# Patient Record
Sex: MALE | Race: WHITE | URBAN - NONMETROPOLITAN AREA
[De-identification: names, ages, dates, MRNs, and addresses within clinical notes are randomized per-mention and may not be internally consistent; named-entity substitution may affect disease eponyms.]

---

## 2017-01-10 ENCOUNTER — DOCTOR'S OFFICE (OUTPATIENT)
Dept: URBAN - NONMETROPOLITAN AREA CLINIC 1 | Facility: CLINIC | Age: 82
Setting detail: OPHTHALMOLOGY
End: 2017-01-10
Payer: COMMERCIAL

## 2017-01-10 DIAGNOSIS — H35.3124: ICD-10-CM

## 2017-01-10 DIAGNOSIS — H35.3211: ICD-10-CM

## 2017-01-10 PROCEDURE — 92134 CPTRZ OPH DX IMG PST SGM RTA: CPT | Performed by: OPHTHALMOLOGY

## 2017-01-10 PROCEDURE — 67028 INJECTION EYE DRUG: CPT | Performed by: OPHTHALMOLOGY

## 2017-01-10 PROCEDURE — 99024 POSTOP FOLLOW-UP VISIT: CPT | Performed by: OPHTHALMOLOGY

## 2017-01-10 RX ORDER — ONDANSETRON 4 MG/1
4 TABLET, FILM COATED ORAL EVERY 8 HOURS PRN
COMMUNITY
End: 2018-06-05 | Stop reason: ALTCHOICE

## 2017-01-10 RX ORDER — COLESEVELAM 180 1/1
625 TABLET ORAL 2 TIMES DAILY WITH MEALS
Status: ON HOLD | COMMUNITY
End: 2018-08-03 | Stop reason: ALTCHOICE

## 2017-01-10 ASSESSMENT — REFRACTION_AUTOREFRACTION
OD_CYLINDER: -1.00
OD_SPHERE: -1.00
OD_AXIS: 087
OS_AXIS: 074
OS_SPHERE: +0.25
OS_CYLINDER: -1.75

## 2017-01-10 ASSESSMENT — REFRACTION_OUTSIDERX
OS_VA1: 20/100-1
OD_ADD: +2.75
OS_VA2: 20/80
OD_VA1: 20/40
OS_SPHERE: PLANO
OD_CYLINDER: -1.00
OD_SPHERE: -1.25
OS_ADD: +2.75
OD_VA2: 20/
OD_VA3: 20/
OS_AXIS: 035
OS_CYLINDER: -1.25
OD_VA3: 20/
OS_SPHERE: +1.50
OS_VA2: 20/
OD_VA1: 20/50-2
OS_AXIS: 075
OD_SPHERE: +1.50
OS_VA1: 20/200+1
OD_AXIS: 087
OD_VA2: 20/50-2
OD_AXIS: 087
OD_CYLINDER: -1.00
OU_VA: 20/
OS_VA3: 20/
OU_VA: 20/
OS_VA3: 20/
OS_CYLINDER: -1.00

## 2017-01-10 ASSESSMENT — SPHEQUIV_DERIVED
OS_SPHEQUIV: -0.625
OD_SPHEQUIV: -1.5

## 2017-01-10 ASSESSMENT — REFRACTION_MANIFEST
OD_VA3: 20/
OS_VA1: 20/
OD_VA2: 20/
OS_VA2: 20/
OS_VA3: 20/
OU_VA: 20/
OD_VA1: 20/

## 2017-01-10 ASSESSMENT — REFRACTION_CURRENTRX
OD_OVR_VA: 20/
OS_OVR_VA: 20/
OS_OVR_VA: 20/
OD_OVR_VA: 20/
OD_OVR_VA: 20/
OS_OVR_VA: 20/

## 2017-01-10 ASSESSMENT — CONFRONTATIONAL VISUAL FIELD TEST (CVF)
OD_FINDINGS: FULL
OS_FINDINGS: FULL

## 2017-01-10 ASSESSMENT — VISUAL ACUITY
OD_BCVA: 20/60-2
OS_BCVA: 20/60-1

## 2017-01-11 ENCOUNTER — APPOINTMENT (OUTPATIENT)
Dept: LAB | Facility: HOSPITAL | Age: 82
End: 2017-01-11
Attending: INTERNAL MEDICINE
Payer: MEDICARE

## 2017-01-11 ENCOUNTER — TRANSCRIBE ORDERS (OUTPATIENT)
Dept: ADMINISTRATIVE | Facility: HOSPITAL | Age: 82
End: 2017-01-11

## 2017-01-11 ENCOUNTER — ANESTHESIA EVENT (OUTPATIENT)
Dept: PERIOP | Facility: HOSPITAL | Age: 82
End: 2017-01-11
Payer: MEDICARE

## 2017-01-11 ENCOUNTER — APPOINTMENT (OUTPATIENT)
Dept: LAB | Facility: HOSPITAL | Age: 82
End: 2017-01-11
Attending: UROLOGY
Payer: MEDICARE

## 2017-01-11 DIAGNOSIS — R79.1 ABNORMAL COAGULATION PROFILE: ICD-10-CM

## 2017-01-11 DIAGNOSIS — D49.4 NEOPLASM OF BLADDER: ICD-10-CM

## 2017-01-11 LAB
ABO GROUP BLD: NORMAL
RH BLD: POSITIVE

## 2017-01-11 PROCEDURE — 85250 CLOT FACTOR IX PTC/CHRSTMAS: CPT

## 2017-01-11 PROCEDURE — 85705 THROMBOPLASTIN INHIBITION: CPT

## 2017-01-11 PROCEDURE — 85270 CLOT FACTOR XI PTA: CPT

## 2017-01-11 PROCEDURE — 85598 HEXAGNAL PHOSPH PLTLT NEUTRL: CPT

## 2017-01-11 PROCEDURE — 86900 BLOOD TYPING SEROLOGIC ABO: CPT | Performed by: UROLOGY

## 2017-01-11 PROCEDURE — 85613 RUSSELL VIPER VENOM DILUTED: CPT

## 2017-01-11 PROCEDURE — 85732 THROMBOPLASTIN TIME PARTIAL: CPT

## 2017-01-11 PROCEDURE — 85670 THROMBIN TIME PLASMA: CPT

## 2017-01-11 PROCEDURE — 86901 BLOOD TYPING SEROLOGIC RH(D): CPT | Performed by: UROLOGY

## 2017-01-11 PROCEDURE — 36415 COLL VENOUS BLD VENIPUNCTURE: CPT

## 2017-01-12 ENCOUNTER — HOSPITAL ENCOUNTER (OUTPATIENT)
Facility: HOSPITAL | Age: 82
Setting detail: OUTPATIENT SURGERY
Discharge: HOME | End: 2017-01-12
Attending: UROLOGY | Admitting: UROLOGY
Payer: MEDICARE

## 2017-01-12 ENCOUNTER — ANESTHESIA (OUTPATIENT)
Dept: PERIOP | Facility: HOSPITAL | Age: 82
End: 2017-01-12
Payer: MEDICARE

## 2017-01-12 VITALS
WEIGHT: 166 LBS | BODY MASS INDEX: 22.48 KG/M2 | HEIGHT: 72 IN | RESPIRATION RATE: 18 BRPM | DIASTOLIC BLOOD PRESSURE: 80 MMHG | TEMPERATURE: 97.8 F | OXYGEN SATURATION: 97 % | HEART RATE: 72 BPM | SYSTOLIC BLOOD PRESSURE: 142 MMHG

## 2017-01-12 DIAGNOSIS — D49.4 NEOPLASM OF BLADDER: ICD-10-CM

## 2017-01-12 PROCEDURE — 88307 TISSUE EXAM BY PATHOLOGIST: CPT | Performed by: UROLOGY

## 2017-01-12 PROCEDURE — 88305 TISSUE EXAM BY PATHOLOGIST: CPT | Performed by: UROLOGY

## 2017-01-12 RX ORDER — PHENAZOPYRIDINE HYDROCHLORIDE 100 MG/1
100 TABLET, FILM COATED ORAL 3 TIMES DAILY PRN
Qty: 30 TABLET | Refills: 0 | Status: SHIPPED | OUTPATIENT
Start: 2017-01-12 | End: 2018-06-05 | Stop reason: ALTCHOICE

## 2017-01-12 RX ORDER — ACETAMINOPHEN AND CODEINE PHOSPHATE 300; 30 MG/1; MG/1
1 TABLET ORAL EVERY 4 HOURS PRN
Qty: 25 TABLET | Refills: 0 | Status: SHIPPED | OUTPATIENT
Start: 2017-01-12 | End: 2018-06-05 | Stop reason: ALTCHOICE

## 2017-01-12 RX ORDER — PROMETHAZINE HYDROCHLORIDE 25 MG/ML
12.5 INJECTION, SOLUTION INTRAMUSCULAR; INTRAVENOUS ONCE AS NEEDED
Status: DISCONTINUED | OUTPATIENT
Start: 2017-01-12 | End: 2017-01-12 | Stop reason: HOSPADM

## 2017-01-12 RX ORDER — LIDOCAINE HYDROCHLORIDE 10 MG/ML
INJECTION, SOLUTION INFILTRATION; PERINEURAL AS NEEDED
Status: DISCONTINUED | OUTPATIENT
Start: 2017-01-12 | End: 2017-01-12 | Stop reason: SURG

## 2017-01-12 RX ORDER — TAMSULOSIN HYDROCHLORIDE 0.4 MG/1
0.4 CAPSULE ORAL
Qty: 30 CAPSULE | Refills: 0 | Status: SHIPPED | OUTPATIENT
Start: 2017-01-12 | End: 2018-06-05 | Stop reason: ALTCHOICE

## 2017-01-12 RX ORDER — MAGNESIUM HYDROXIDE 1200 MG/15ML
LIQUID ORAL AS NEEDED
Status: DISCONTINUED | OUTPATIENT
Start: 2017-01-12 | End: 2017-01-12 | Stop reason: HOSPADM

## 2017-01-12 RX ORDER — SODIUM CHLORIDE, SODIUM LACTATE, POTASSIUM CHLORIDE, CALCIUM CHLORIDE 600; 310; 30; 20 MG/100ML; MG/100ML; MG/100ML; MG/100ML
125 INJECTION, SOLUTION INTRAVENOUS CONTINUOUS
Status: DISCONTINUED | OUTPATIENT
Start: 2017-01-12 | End: 2017-01-12 | Stop reason: HOSPADM

## 2017-01-12 RX ORDER — AMLODIPINE BESYLATE 5 MG/1
5 TABLET ORAL DAILY
COMMUNITY
End: 2017-09-20 | Stop reason: ALTCHOICE

## 2017-01-12 RX ORDER — TAMSULOSIN HYDROCHLORIDE 0.4 MG/1
0.4 CAPSULE ORAL ONCE
Status: COMPLETED | OUTPATIENT
Start: 2017-01-12 | End: 2017-01-12

## 2017-01-12 RX ORDER — PHENAZOPYRIDINE HYDROCHLORIDE 100 MG/1
100 TABLET, FILM COATED ORAL ONCE
Status: COMPLETED | OUTPATIENT
Start: 2017-01-12 | End: 2017-01-12

## 2017-01-12 RX ORDER — ONDANSETRON 2 MG/ML
INJECTION INTRAMUSCULAR; INTRAVENOUS AS NEEDED
Status: DISCONTINUED | OUTPATIENT
Start: 2017-01-12 | End: 2017-01-12 | Stop reason: SURG

## 2017-01-12 RX ORDER — EPHEDRINE SULFATE 50 MG/ML
INJECTION, SOLUTION INTRAVENOUS AS NEEDED
Status: DISCONTINUED | OUTPATIENT
Start: 2017-01-12 | End: 2017-01-12 | Stop reason: SURG

## 2017-01-12 RX ORDER — MIDAZOLAM HYDROCHLORIDE 1 MG/ML
INJECTION INTRAMUSCULAR; INTRAVENOUS AS NEEDED
Status: DISCONTINUED | OUTPATIENT
Start: 2017-01-12 | End: 2017-01-12 | Stop reason: SURG

## 2017-01-12 RX ORDER — FENTANYL CITRATE 50 UG/ML
INJECTION, SOLUTION INTRAMUSCULAR; INTRAVENOUS AS NEEDED
Status: DISCONTINUED | OUTPATIENT
Start: 2017-01-12 | End: 2017-01-12 | Stop reason: SURG

## 2017-01-12 RX ORDER — CIPROFLOXACIN 500 MG/1
500 TABLET, FILM COATED ORAL 2 TIMES DAILY
Qty: 6 TABLET | Refills: 0 | Status: SHIPPED | OUTPATIENT
Start: 2017-01-12 | End: 2017-01-15

## 2017-01-12 RX ORDER — FENTANYL CITRATE/PF 50 MCG/ML
25 SYRINGE (ML) INJECTION
Status: DISCONTINUED | OUTPATIENT
Start: 2017-01-12 | End: 2017-01-12 | Stop reason: HOSPADM

## 2017-01-12 RX ORDER — METOCLOPRAMIDE HYDROCHLORIDE 5 MG/ML
10 INJECTION INTRAMUSCULAR; INTRAVENOUS ONCE AS NEEDED
Status: DISCONTINUED | OUTPATIENT
Start: 2017-01-12 | End: 2017-01-12 | Stop reason: HOSPADM

## 2017-01-12 RX ORDER — MEPERIDINE HYDROCHLORIDE 25 MG/ML
12.5 INJECTION INTRAMUSCULAR; INTRAVENOUS; SUBCUTANEOUS ONCE AS NEEDED
Status: DISCONTINUED | OUTPATIENT
Start: 2017-01-12 | End: 2017-01-12 | Stop reason: HOSPADM

## 2017-01-12 RX ORDER — PROPOFOL 10 MG/ML
INJECTION, EMULSION INTRAVENOUS AS NEEDED
Status: DISCONTINUED | OUTPATIENT
Start: 2017-01-12 | End: 2017-01-12 | Stop reason: SURG

## 2017-01-12 RX ADMIN — PHENAZOPYRIDINE HYDROCHLORIDE 100 MG: 100 TABLET ORAL at 11:24

## 2017-01-12 RX ADMIN — PROPOFOL 200 MG: 10 INJECTION, EMULSION INTRAVENOUS at 10:04

## 2017-01-12 RX ADMIN — MIDAZOLAM HYDROCHLORIDE 2 MG: 1 INJECTION, SOLUTION INTRAMUSCULAR; INTRAVENOUS at 10:01

## 2017-01-12 RX ADMIN — CEFAZOLIN SODIUM 1000 MG: 1 SOLUTION INTRAVENOUS at 10:08

## 2017-01-12 RX ADMIN — TAMSULOSIN HYDROCHLORIDE 0.4 MG: 0.4 CAPSULE ORAL at 11:24

## 2017-01-12 RX ADMIN — EPHEDRINE SULFATE 10 MG: 50 INJECTION, SOLUTION INTRAMUSCULAR; INTRAVENOUS; SUBCUTANEOUS at 10:27

## 2017-01-12 RX ADMIN — DEXAMETHASONE SODIUM PHOSPHATE 4 MG: 10 INJECTION INTRAMUSCULAR; INTRAVENOUS at 10:08

## 2017-01-12 RX ADMIN — ONDANSETRON HYDROCHLORIDE 4 MG: 2 INJECTION, SOLUTION INTRAVENOUS at 10:08

## 2017-01-12 RX ADMIN — SODIUM CHLORIDE, SODIUM LACTATE, POTASSIUM CHLORIDE, AND CALCIUM CHLORIDE 125 ML/HR: .6; .31; .03; .02 INJECTION, SOLUTION INTRAVENOUS at 08:53

## 2017-01-12 RX ADMIN — FENTANYL CITRATE 50 MCG: 50 INJECTION, SOLUTION INTRAMUSCULAR; INTRAVENOUS at 10:04

## 2017-01-12 RX ADMIN — LIDOCAINE HYDROCHLORIDE 50 MG: 10 INJECTION, SOLUTION INFILTRATION; PERINEURAL at 10:04

## 2017-01-12 RX ADMIN — EPHEDRINE SULFATE 10 MG: 50 INJECTION, SOLUTION INTRAMUSCULAR; INTRAVENOUS; SUBCUTANEOUS at 10:13

## 2017-01-12 RX ADMIN — EPHEDRINE SULFATE 10 MG: 50 INJECTION, SOLUTION INTRAMUSCULAR; INTRAVENOUS; SUBCUTANEOUS at 10:09

## 2017-01-14 LAB
FACT IX ACT/NOR PPP: 99 % (ref 60–177)
FACT XI ACT/NOR PPP: 77 % (ref 60–150)

## 2017-01-16 LAB
APTT HEX PL PPP: 22 SEC (ref 0–11)
APTT SCREEN TO CONFIRM RATIO: 1.02 RATIO (ref 0–1.4)
APTT-LA IMM 4:1 NP PPP: 49.5 SEC (ref 0–40.6)
CONFIRM APTT/NORMAL: 41.3 SEC (ref 0–55)
LA PPP-IMP: ABNORMAL
SCREEN APTT: 61.5 SEC (ref 0–40.6)
SCREEN DRVVT: 38.2 SEC (ref 0–44)
THROMBIN TIME: 16.6 SEC (ref 0–20.9)

## 2017-01-24 ENCOUNTER — ALLSCRIPTS OFFICE VISIT (OUTPATIENT)
Dept: OTHER | Facility: OTHER | Age: 82
End: 2017-01-24

## 2017-02-02 ENCOUNTER — ALLSCRIPTS OFFICE VISIT (OUTPATIENT)
Dept: OTHER | Facility: OTHER | Age: 82
End: 2017-02-02

## 2017-02-08 ENCOUNTER — DOCTOR'S OFFICE (OUTPATIENT)
Dept: URBAN - NONMETROPOLITAN AREA CLINIC 1 | Facility: CLINIC | Age: 82
Setting detail: OPHTHALMOLOGY
End: 2017-02-08

## 2017-02-08 ENCOUNTER — DOCTOR'S OFFICE (OUTPATIENT)
Dept: URBAN - NONMETROPOLITAN AREA CLINIC 1 | Facility: CLINIC | Age: 82
Setting detail: OPHTHALMOLOGY
End: 2017-02-08
Payer: COMMERCIAL

## 2017-02-08 DIAGNOSIS — H35.3124: ICD-10-CM

## 2017-02-08 DIAGNOSIS — H52.4: ICD-10-CM

## 2017-02-08 DIAGNOSIS — H31.002: ICD-10-CM

## 2017-02-08 DIAGNOSIS — H35.3211: ICD-10-CM

## 2017-02-08 DIAGNOSIS — H33.021: ICD-10-CM

## 2017-02-08 DIAGNOSIS — H43.393: ICD-10-CM

## 2017-02-08 PROCEDURE — 92134 CPTRZ OPH DX IMG PST SGM RTA: CPT | Performed by: OPHTHALMOLOGY

## 2017-02-08 PROCEDURE — 99024 POSTOP FOLLOW-UP VISIT: CPT | Performed by: OPHTHALMOLOGY

## 2017-02-08 PROCEDURE — 67028 INJECTION EYE DRUG: CPT | Performed by: OPHTHALMOLOGY

## 2017-02-08 PROCEDURE — VITAEYE VITA EYE VITAMINS: Performed by: OPHTHALMOLOGY

## 2017-02-08 ASSESSMENT — REFRACTION_OUTSIDERX
OS_CYLINDER: -1.00
OD_ADD: +2.75
OU_VA: 20/
OS_ADD: +2.75
OU_VA: 20/
OD_AXIS: 087
OS_SPHERE: +1.50
OD_VA3: 20/
OS_AXIS: 075
OS_CYLINDER: -1.25
OD_SPHERE: -1.25
OD_SPHERE: +1.50
OS_VA2: 20/
OD_VA2: 20/
OD_AXIS: 087
OD_VA3: 20/
OS_SPHERE: PLANO
OD_VA2: 20/50-2
OS_AXIS: 035
OS_VA3: 20/
OD_CYLINDER: -1.00
OD_VA1: 20/40
OD_VA1: 20/50-2
OS_VA3: 20/
OS_VA1: 20/200+1
OD_CYLINDER: -1.00
OS_VA2: 20/80
OS_VA1: 20/100-1

## 2017-02-08 ASSESSMENT — REFRACTION_AUTOREFRACTION
OD_SPHERE: -1.00
OS_SPHERE: +0.25
OS_CYLINDER: -1.75
OD_AXIS: 087
OD_CYLINDER: -1.00
OS_AXIS: 074

## 2017-02-08 ASSESSMENT — REFRACTION_MANIFEST
OD_VA1: 20/
OD_VA3: 20/
OS_VA1: 20/
OD_VA2: 20/
OS_VA3: 20/
OS_VA2: 20/
OU_VA: 20/

## 2017-02-08 ASSESSMENT — REFRACTION_CURRENTRX
OS_OVR_VA: 20/
OD_OVR_VA: 20/

## 2017-02-08 ASSESSMENT — CONFRONTATIONAL VISUAL FIELD TEST (CVF)
OD_FINDINGS: FULL
OS_FINDINGS: FULL

## 2017-02-08 ASSESSMENT — SPHEQUIV_DERIVED
OD_SPHEQUIV: -1.5
OS_SPHEQUIV: -0.625

## 2017-02-08 ASSESSMENT — VISUAL ACUITY
OS_BCVA: 20/100-1
OD_BCVA: 20/80-1

## 2017-02-15 ENCOUNTER — DOCTOR'S OFFICE (OUTPATIENT)
Dept: URBAN - NONMETROPOLITAN AREA CLINIC 1 | Facility: CLINIC | Age: 82
Setting detail: OPHTHALMOLOGY
End: 2017-02-15
Payer: COMMERCIAL

## 2017-02-15 DIAGNOSIS — H35.3211: ICD-10-CM

## 2017-02-15 PROCEDURE — 92134 CPTRZ OPH DX IMG PST SGM RTA: CPT | Performed by: OPHTHALMOLOGY

## 2017-02-15 PROCEDURE — 99024 POSTOP FOLLOW-UP VISIT: CPT | Performed by: OPHTHALMOLOGY

## 2017-02-15 PROCEDURE — 67028 INJECTION EYE DRUG: CPT | Performed by: OPHTHALMOLOGY

## 2017-02-15 ASSESSMENT — REFRACTION_AUTOREFRACTION
OS_SPHERE: +0.25
OS_CYLINDER: -1.75
OD_SPHERE: -1.00
OD_AXIS: 087
OS_AXIS: 074
OD_CYLINDER: -1.00

## 2017-02-15 ASSESSMENT — SPHEQUIV_DERIVED
OD_SPHEQUIV: -1.5
OS_SPHEQUIV: -0.625

## 2017-02-15 ASSESSMENT — VISUAL ACUITY
OD_BCVA: 20/80-1
OS_BCVA: 20/100-2

## 2017-02-28 ENCOUNTER — DOCTOR'S OFFICE (OUTPATIENT)
Dept: URBAN - NONMETROPOLITAN AREA CLINIC 1 | Facility: CLINIC | Age: 82
Setting detail: OPHTHALMOLOGY
End: 2017-02-28
Payer: COMMERCIAL

## 2017-02-28 DIAGNOSIS — H43.393: ICD-10-CM

## 2017-02-28 DIAGNOSIS — H33.021: ICD-10-CM

## 2017-02-28 DIAGNOSIS — H35.3211: ICD-10-CM

## 2017-02-28 DIAGNOSIS — H35.3124: ICD-10-CM

## 2017-02-28 PROCEDURE — 92134 CPTRZ OPH DX IMG PST SGM RTA: CPT | Performed by: OPHTHALMOLOGY

## 2017-02-28 PROCEDURE — 67028 INJECTION EYE DRUG: CPT | Performed by: OPHTHALMOLOGY

## 2017-02-28 PROCEDURE — 99024 POSTOP FOLLOW-UP VISIT: CPT | Performed by: OPHTHALMOLOGY

## 2017-02-28 ASSESSMENT — REFRACTION_CURRENTRX
OD_OVR_VA: 20/
OD_OVR_VA: 20/
OS_OVR_VA: 20/
OS_OVR_VA: 20/
OD_OVR_VA: 20/
OS_OVR_VA: 20/

## 2017-02-28 ASSESSMENT — CONFRONTATIONAL VISUAL FIELD TEST (CVF)
OD_FINDINGS: FULL
OS_FINDINGS: FULL

## 2017-02-28 ASSESSMENT — REFRACTION_MANIFEST
OD_VA2: 20/
OS_VA1: 20/
OD_VA1: 20/
OD_VA3: 20/
OU_VA: 20/
OD_VA1: 20/
OU_VA: 20/
OS_VA3: 20/
OS_VA2: 20/
OS_VA2: 20/
OD_VA2: 20/
OS_VA3: 20/
OU_VA: 20/
OS_VA3: 20/
OS_VA2: 20/
OD_VA1: 20/
OS_VA1: 20/
OD_VA3: 20/
OS_VA1: 20/
OD_VA2: 20/
OD_VA3: 20/

## 2017-02-28 ASSESSMENT — REFRACTION_AUTOREFRACTION
OD_SPHERE: -1.00
OD_AXIS: 087
OD_CYLINDER: -1.00
OS_AXIS: 074
OS_SPHERE: +0.25
OS_CYLINDER: -1.75

## 2017-02-28 ASSESSMENT — VISUAL ACUITY
OD_BCVA: 20/80-1
OS_BCVA: 20/100-2

## 2017-02-28 ASSESSMENT — SPHEQUIV_DERIVED
OD_SPHEQUIV: -1.5
OS_SPHEQUIV: -0.625

## 2017-03-02 ENCOUNTER — DOCTOR'S OFFICE (OUTPATIENT)
Dept: URBAN - NONMETROPOLITAN AREA CLINIC 1 | Facility: CLINIC | Age: 82
Setting detail: OPHTHALMOLOGY
End: 2017-03-02
Payer: COMMERCIAL

## 2017-03-02 DIAGNOSIS — H33.021: ICD-10-CM

## 2017-03-02 DIAGNOSIS — H35.3211: ICD-10-CM

## 2017-03-02 PROCEDURE — 92083 EXTENDED VISUAL FIELD XM: CPT | Performed by: OPHTHALMOLOGY

## 2017-03-06 ENCOUNTER — DOCTOR'S OFFICE (OUTPATIENT)
Dept: URBAN - NONMETROPOLITAN AREA CLINIC 1 | Facility: CLINIC | Age: 82
Setting detail: OPHTHALMOLOGY
End: 2017-03-06

## 2017-03-06 ENCOUNTER — GENERIC CONVERSION - ENCOUNTER (OUTPATIENT)
Dept: OTHER | Facility: OTHER | Age: 82
End: 2017-03-06

## 2017-03-06 DIAGNOSIS — H35.3211: ICD-10-CM

## 2017-03-06 PROCEDURE — NO CHARGE N/C PROFESSIONAL COURTESY: Performed by: OPHTHALMOLOGY

## 2017-03-06 ASSESSMENT — SPHEQUIV_DERIVED
OD_SPHEQUIV: -1.5
OS_SPHEQUIV: -0.625

## 2017-03-06 ASSESSMENT — REFRACTION_MANIFEST
OD_VA3: 20/
OS_VA2: 20/
OD_VA1: 20/
OS_VA2: 20/
OS_VA1: 20/
OS_VA1: 20/
OD_VA2: 20/
OS_VA2: 20/
OS_VA1: 20/
OD_VA2: 20/
OD_VA1: 20/
OS_VA3: 20/
OD_VA1: 20/
OD_VA3: 20/
OU_VA: 20/
OS_VA3: 20/
OU_VA: 20/
OS_VA3: 20/
OD_VA3: 20/
OU_VA: 20/
OD_VA2: 20/

## 2017-03-06 ASSESSMENT — REFRACTION_CURRENTRX
OD_OVR_VA: 20/
OS_OVR_VA: 20/
OD_OVR_VA: 20/
OS_OVR_VA: 20/
OD_OVR_VA: 20/
OS_OVR_VA: 20/

## 2017-03-06 ASSESSMENT — REFRACTION_AUTOREFRACTION
OS_AXIS: 074
OD_SPHERE: -1.00
OD_CYLINDER: -1.00
OS_SPHERE: +0.25
OD_AXIS: 087
OS_CYLINDER: -1.75

## 2017-03-06 ASSESSMENT — VISUAL ACUITY
OD_BCVA: 20/80-1
OS_BCVA: 20/100-2

## 2017-03-22 ENCOUNTER — ALLSCRIPTS OFFICE VISIT (OUTPATIENT)
Dept: OTHER | Facility: OTHER | Age: 82
End: 2017-03-22

## 2017-03-22 ENCOUNTER — DOCTOR'S OFFICE (OUTPATIENT)
Dept: URBAN - NONMETROPOLITAN AREA CLINIC 1 | Facility: CLINIC | Age: 82
Setting detail: OPHTHALMOLOGY
End: 2017-03-22
Payer: COMMERCIAL

## 2017-03-22 DIAGNOSIS — H33.021: ICD-10-CM

## 2017-03-22 DIAGNOSIS — H43.393: ICD-10-CM

## 2017-03-22 DIAGNOSIS — H35.3211: ICD-10-CM

## 2017-03-22 DIAGNOSIS — H35.3124: ICD-10-CM

## 2017-03-22 PROCEDURE — 99024 POSTOP FOLLOW-UP VISIT: CPT | Performed by: OPHTHALMOLOGY

## 2017-03-22 PROCEDURE — 92134 CPTRZ OPH DX IMG PST SGM RTA: CPT | Performed by: OPHTHALMOLOGY

## 2017-03-22 PROCEDURE — 67028 INJECTION EYE DRUG: CPT | Performed by: OPHTHALMOLOGY

## 2017-03-22 ASSESSMENT — REFRACTION_AUTOREFRACTION
OD_SPHERE: -1.00
OS_SPHERE: +0.25
OS_AXIS: 074
OS_CYLINDER: -1.75
OD_AXIS: 087
OD_CYLINDER: -1.00

## 2017-03-22 ASSESSMENT — CONFRONTATIONAL VISUAL FIELD TEST (CVF)
OD_FINDINGS: FULL
OS_FINDINGS: FULL

## 2017-03-22 ASSESSMENT — REFRACTION_MANIFEST
OD_VA1: 20/
OS_VA1: 20/
OD_VA1: 20/
OD_VA3: 20/
OD_VA2: 20/
OS_VA3: 20/
OS_VA2: 20/
OS_VA1: 20/
OU_VA: 20/
OS_VA1: 20/
OU_VA: 20/
OD_VA3: 20/
OD_VA2: 20/
OS_VA2: 20/
OS_VA3: 20/
OD_VA2: 20/
OS_VA2: 20/
OD_VA3: 20/
OS_VA3: 20/
OD_VA1: 20/
OU_VA: 20/

## 2017-03-22 ASSESSMENT — REFRACTION_CURRENTRX
OS_OVR_VA: 20/
OS_OVR_VA: 20/
OD_OVR_VA: 20/
OS_OVR_VA: 20/
OD_OVR_VA: 20/
OD_OVR_VA: 20/

## 2017-03-22 ASSESSMENT — VISUAL ACUITY
OD_BCVA: 20/80-1
OS_BCVA: 20/100-1

## 2017-03-22 ASSESSMENT — SPHEQUIV_DERIVED
OD_SPHEQUIV: -1.5
OS_SPHEQUIV: -0.625

## 2017-04-12 ENCOUNTER — DOCTOR'S OFFICE (OUTPATIENT)
Dept: URBAN - NONMETROPOLITAN AREA CLINIC 1 | Facility: CLINIC | Age: 82
Setting detail: OPHTHALMOLOGY
End: 2017-04-12
Payer: COMMERCIAL

## 2017-04-12 DIAGNOSIS — H35.3211: ICD-10-CM

## 2017-04-12 PROCEDURE — 67028 INJECTION EYE DRUG: CPT | Performed by: OPHTHALMOLOGY

## 2017-04-12 ASSESSMENT — VISUAL ACUITY
OD_BCVA: 20/80-1
OS_BCVA: 20/100-1

## 2017-04-12 ASSESSMENT — REFRACTION_AUTOREFRACTION
OD_CYLINDER: -1.00
OS_CYLINDER: -1.75
OS_AXIS: 074
OS_SPHERE: +0.25
OD_SPHERE: -1.00
OD_AXIS: 087

## 2017-04-12 ASSESSMENT — SPHEQUIV_DERIVED
OD_SPHEQUIV: -1.5
OS_SPHEQUIV: -0.625

## 2017-05-08 ENCOUNTER — DOCTOR'S OFFICE (OUTPATIENT)
Dept: URBAN - NONMETROPOLITAN AREA CLINIC 1 | Facility: CLINIC | Age: 82
Setting detail: OPHTHALMOLOGY
End: 2017-05-08

## 2017-05-08 DIAGNOSIS — H52.4: ICD-10-CM

## 2017-05-08 PROCEDURE — VITAEYE VITA EYE VITAMINS: Performed by: OPHTHALMOLOGY

## 2017-05-09 ENCOUNTER — DOCTOR'S OFFICE (OUTPATIENT)
Dept: URBAN - NONMETROPOLITAN AREA CLINIC 1 | Facility: CLINIC | Age: 82
Setting detail: OPHTHALMOLOGY
End: 2017-05-09
Payer: COMMERCIAL

## 2017-05-09 DIAGNOSIS — H43.393: ICD-10-CM

## 2017-05-09 DIAGNOSIS — H35.3231: ICD-10-CM

## 2017-05-09 DIAGNOSIS — H33.021: ICD-10-CM

## 2017-05-09 DIAGNOSIS — H31.002: ICD-10-CM

## 2017-05-09 DIAGNOSIS — H27.8: ICD-10-CM

## 2017-05-09 PROCEDURE — 67028 INJECTION EYE DRUG: CPT | Performed by: OPHTHALMOLOGY

## 2017-05-09 PROCEDURE — 92134 CPTRZ OPH DX IMG PST SGM RTA: CPT | Performed by: OPHTHALMOLOGY

## 2017-05-09 PROCEDURE — 92014 COMPRE OPH EXAM EST PT 1/>: CPT | Performed by: OPHTHALMOLOGY

## 2017-05-09 ASSESSMENT — REFRACTION_MANIFEST
OD_VA3: 20/
OU_VA: 20/
OU_VA: 20/
OS_VA3: 20/
OS_VA3: 20/
OD_VA1: 20/
OD_VA2: 20/
OS_VA1: 20/
OS_VA1: 20/
OD_VA1: 20/
OS_VA2: 20/
OD_VA3: 20/
OU_VA: 20/
OS_VA1: 20/
OD_VA2: 20/
OS_VA2: 20/
OS_VA3: 20/
OD_VA1: 20/
OD_VA3: 20/
OD_VA2: 20/
OS_VA2: 20/

## 2017-05-09 ASSESSMENT — REFRACTION_AUTOREFRACTION
OD_SPHERE: -1.00
OS_CYLINDER: -1.75
OS_AXIS: 074
OD_AXIS: 087
OD_CYLINDER: -1.00
OS_SPHERE: +0.25

## 2017-05-09 ASSESSMENT — REFRACTION_CURRENTRX
OD_OVR_VA: 20/
OS_OVR_VA: 20/
OD_OVR_VA: 20/
OS_OVR_VA: 20/
OD_OVR_VA: 20/
OS_OVR_VA: 20/

## 2017-05-09 ASSESSMENT — CONFRONTATIONAL VISUAL FIELD TEST (CVF)
OD_FINDINGS: FULL
OS_FINDINGS: FULL

## 2017-05-09 ASSESSMENT — SPHEQUIV_DERIVED
OD_SPHEQUIV: -1.5
OS_SPHEQUIV: -0.625

## 2017-05-09 ASSESSMENT — VISUAL ACUITY
OD_BCVA: 20/80+2
OS_BCVA: 20/100

## 2017-05-24 ENCOUNTER — DOCTOR'S OFFICE (OUTPATIENT)
Dept: URBAN - NONMETROPOLITAN AREA CLINIC 1 | Facility: CLINIC | Age: 82
Setting detail: OPHTHALMOLOGY
End: 2017-05-24
Payer: COMMERCIAL

## 2017-05-24 DIAGNOSIS — H33.021: ICD-10-CM

## 2017-05-24 DIAGNOSIS — H31.002: ICD-10-CM

## 2017-05-24 DIAGNOSIS — H27.8: ICD-10-CM

## 2017-05-24 DIAGNOSIS — H43.393: ICD-10-CM

## 2017-05-24 DIAGNOSIS — H35.3231: ICD-10-CM

## 2017-05-24 PROCEDURE — 67028 INJECTION EYE DRUG: CPT | Performed by: OPHTHALMOLOGY

## 2017-05-24 PROCEDURE — 92014 COMPRE OPH EXAM EST PT 1/>: CPT | Performed by: OPHTHALMOLOGY

## 2017-05-24 PROCEDURE — 92134 CPTRZ OPH DX IMG PST SGM RTA: CPT | Performed by: OPHTHALMOLOGY

## 2017-05-24 ASSESSMENT — REFRACTION_MANIFEST
OD_VA2: 20/
OS_VA3: 20/
OU_VA: 20/
OS_VA3: 20/
OS_VA1: 20/
OD_VA1: 20/
OD_VA2: 20/
OS_VA2: 20/
OD_VA1: 20/
OD_VA3: 20/
OU_VA: 20/
OS_VA3: 20/
OU_VA: 20/
OS_VA1: 20/
OD_VA3: 20/
OS_VA2: 20/
OS_VA1: 20/
OD_VA1: 20/
OD_VA2: 20/
OD_VA3: 20/
OS_VA2: 20/

## 2017-05-24 ASSESSMENT — REFRACTION_CURRENTRX
OD_OVR_VA: 20/
OD_OVR_VA: 20/
OS_OVR_VA: 20/
OD_OVR_VA: 20/
OS_OVR_VA: 20/
OS_OVR_VA: 20/

## 2017-05-24 ASSESSMENT — REFRACTION_AUTOREFRACTION
OS_SPHERE: +0.25
OS_AXIS: 074
OD_CYLINDER: -1.00
OS_CYLINDER: -1.75
OD_AXIS: 087
OD_SPHERE: -1.00

## 2017-05-24 ASSESSMENT — VISUAL ACUITY
OS_BCVA: 20/150-1
OD_BCVA: 20/80-1

## 2017-05-24 ASSESSMENT — CONFRONTATIONAL VISUAL FIELD TEST (CVF)
OD_FINDINGS: FULL
OS_FINDINGS: FULL

## 2017-05-24 ASSESSMENT — SPHEQUIV_DERIVED
OS_SPHEQUIV: -0.625
OD_SPHEQUIV: -1.5

## 2017-05-30 ENCOUNTER — ALLSCRIPTS OFFICE VISIT (OUTPATIENT)
Dept: OTHER | Facility: OTHER | Age: 82
End: 2017-05-30

## 2017-06-07 ENCOUNTER — DOCTOR'S OFFICE (OUTPATIENT)
Dept: URBAN - NONMETROPOLITAN AREA CLINIC 1 | Facility: CLINIC | Age: 82
Setting detail: OPHTHALMOLOGY
End: 2017-06-07
Payer: COMMERCIAL

## 2017-06-07 DIAGNOSIS — H43.393: ICD-10-CM

## 2017-06-07 DIAGNOSIS — H35.3231: ICD-10-CM

## 2017-06-07 DIAGNOSIS — H27.8: ICD-10-CM

## 2017-06-07 DIAGNOSIS — H31.002: ICD-10-CM

## 2017-06-07 DIAGNOSIS — H33.021: ICD-10-CM

## 2017-06-07 PROCEDURE — 67028 INJECTION EYE DRUG: CPT | Performed by: OPHTHALMOLOGY

## 2017-06-07 PROCEDURE — 92134 CPTRZ OPH DX IMG PST SGM RTA: CPT | Performed by: OPHTHALMOLOGY

## 2017-06-07 PROCEDURE — 92014 COMPRE OPH EXAM EST PT 1/>: CPT | Performed by: OPHTHALMOLOGY

## 2017-06-07 ASSESSMENT — REFRACTION_AUTOREFRACTION
OD_CYLINDER: -1.00
OS_SPHERE: +0.25
OS_CYLINDER: -1.75
OD_AXIS: 087
OS_AXIS: 074
OD_SPHERE: -1.00

## 2017-06-07 ASSESSMENT — REFRACTION_CURRENTRX
OS_OVR_VA: 20/
OD_OVR_VA: 20/
OS_OVR_VA: 20/
OD_OVR_VA: 20/
OS_OVR_VA: 20/
OD_OVR_VA: 20/

## 2017-06-07 ASSESSMENT — REFRACTION_MANIFEST
OD_VA3: 20/
OU_VA: 20/
OS_VA3: 20/
OD_VA3: 20/
OS_VA2: 20/
OS_VA3: 20/
OS_VA3: 20/
OS_VA1: 20/
OD_VA2: 20/
OD_VA2: 20/
OS_VA1: 20/
OD_VA1: 20/
OU_VA: 20/
OS_VA2: 20/
OD_VA3: 20/
OS_VA2: 20/
OD_VA1: 20/
OD_VA1: 20/
OD_VA2: 20/
OU_VA: 20/
OS_VA1: 20/

## 2017-06-07 ASSESSMENT — SPHEQUIV_DERIVED
OD_SPHEQUIV: -1.5
OS_SPHEQUIV: -0.625

## 2017-06-07 ASSESSMENT — CONFRONTATIONAL VISUAL FIELD TEST (CVF)
OS_FINDINGS: FULL
OD_FINDINGS: FULL

## 2017-06-07 ASSESSMENT — VISUAL ACUITY
OD_BCVA: 20/100
OS_BCVA: 20/150-1

## 2017-06-09 ENCOUNTER — DOCTOR'S OFFICE (OUTPATIENT)
Dept: URBAN - NONMETROPOLITAN AREA CLINIC 1 | Facility: CLINIC | Age: 82
Setting detail: OPHTHALMOLOGY
End: 2017-06-09
Payer: COMMERCIAL

## 2017-06-09 ENCOUNTER — RX ONLY (RX ONLY)
Age: 82
End: 2017-06-09

## 2017-06-09 DIAGNOSIS — H35.3231: ICD-10-CM

## 2017-06-09 DIAGNOSIS — S05.01XA: ICD-10-CM

## 2017-06-09 PROCEDURE — 92012 INTRM OPH EXAM EST PATIENT: CPT | Performed by: OPHTHALMOLOGY

## 2017-06-09 ASSESSMENT — DRY EYES - PHYSICIAN NOTES: OD_GENERALCOMMENTS: INFERIORLY

## 2017-06-09 ASSESSMENT — SPHEQUIV_DERIVED
OS_SPHEQUIV: -0.625
OD_SPHEQUIV: -1.5

## 2017-06-09 ASSESSMENT — REFRACTION_MANIFEST
OD_VA1: 20/
OS_VA1: 20/
OS_VA3: 20/
OD_VA1: 20/
OS_VA2: 20/
OS_VA2: 20/
OD_VA2: 20/
OU_VA: 20/
OD_VA2: 20/
OS_VA3: 20/
OD_VA3: 20/
OS_VA3: 20/
OD_VA2: 20/
OU_VA: 20/
OS_VA1: 20/
OU_VA: 20/
OD_VA1: 20/
OD_VA3: 20/
OS_VA2: 20/
OD_VA3: 20/
OS_VA1: 20/

## 2017-06-09 ASSESSMENT — REFRACTION_AUTOREFRACTION
OS_CYLINDER: -1.75
OS_AXIS: 074
OS_SPHERE: +0.25
OD_AXIS: 087
OD_SPHERE: -1.00
OD_CYLINDER: -1.00

## 2017-06-09 ASSESSMENT — VISUAL ACUITY
OS_BCVA: CF X3FT
OD_BCVA: 20/100

## 2017-06-09 ASSESSMENT — REFRACTION_CURRENTRX
OS_OVR_VA: 20/
OD_OVR_VA: 20/
OS_OVR_VA: 20/
OS_OVR_VA: 20/
OD_OVR_VA: 20/
OD_OVR_VA: 20/

## 2017-06-09 ASSESSMENT — SUPERFICIAL PUNCTATE KERATITIS (SPK): OD_SPK: 2+

## 2017-06-09 ASSESSMENT — CORNEAL TRAUMA - ABRASION: OD_ABRASION: PRESENT

## 2017-06-21 ENCOUNTER — DOCTOR'S OFFICE (OUTPATIENT)
Dept: URBAN - NONMETROPOLITAN AREA CLINIC 1 | Facility: CLINIC | Age: 82
Setting detail: OPHTHALMOLOGY
End: 2017-06-21
Payer: COMMERCIAL

## 2017-06-21 ENCOUNTER — RX ONLY (RX ONLY)
Age: 82
End: 2017-06-21

## 2017-06-21 DIAGNOSIS — H35.3231: ICD-10-CM

## 2017-06-21 DIAGNOSIS — H33.021: ICD-10-CM

## 2017-06-21 DIAGNOSIS — H43.393: ICD-10-CM

## 2017-06-21 PROBLEM — S05.01XA CORNEAL ABRASION WITHOUT FB; RIGHT EYE INITIAL ENCOUNTER: Status: RESOLVED | Noted: 2017-06-09 | Resolved: 2017-06-21

## 2017-06-21 PROCEDURE — 92014 COMPRE OPH EXAM EST PT 1/>: CPT | Performed by: OPHTHALMOLOGY

## 2017-06-21 PROCEDURE — 67028 INJECTION EYE DRUG: CPT | Performed by: OPHTHALMOLOGY

## 2017-06-21 PROCEDURE — 92134 CPTRZ OPH DX IMG PST SGM RTA: CPT | Performed by: OPHTHALMOLOGY

## 2017-06-21 ASSESSMENT — CONFRONTATIONAL VISUAL FIELD TEST (CVF)
OD_FINDINGS: FULL
OS_FINDINGS: FULL

## 2017-06-21 ASSESSMENT — REFRACTION_MANIFEST
OU_VA: 20/
OU_VA: 20/
OD_VA2: 20/
OS_VA1: 20/
OS_VA2: 20/
OS_VA1: 20/
OS_VA3: 20/
OS_VA1: 20/
OD_VA3: 20/
OS_VA2: 20/
OD_VA1: 20/
OS_VA3: 20/
OD_VA2: 20/
OD_VA1: 20/
OS_VA3: 20/
OD_VA3: 20/
OS_VA2: 20/
OD_VA1: 20/
OD_VA2: 20/
OU_VA: 20/
OD_VA3: 20/

## 2017-06-21 ASSESSMENT — REFRACTION_AUTOREFRACTION
OS_SPHERE: +0.25
OD_CYLINDER: -1.00
OS_AXIS: 074
OD_AXIS: 087
OD_SPHERE: -1.00
OS_CYLINDER: -1.75

## 2017-06-21 ASSESSMENT — VISUAL ACUITY
OS_BCVA: 20/150+1
OD_BCVA: 20/60+2

## 2017-06-21 ASSESSMENT — DRY EYES - PHYSICIAN NOTES: OD_GENERALCOMMENTS: INFERIORLY

## 2017-06-21 ASSESSMENT — REFRACTION_CURRENTRX
OD_OVR_VA: 20/
OS_OVR_VA: 20/
OS_OVR_VA: 20/
OD_OVR_VA: 20/
OS_OVR_VA: 20/
OD_OVR_VA: 20/

## 2017-06-21 ASSESSMENT — SPHEQUIV_DERIVED
OD_SPHEQUIV: -1.5
OS_SPHEQUIV: -0.625

## 2017-06-21 ASSESSMENT — SUPERFICIAL PUNCTATE KERATITIS (SPK): OD_SPK: 2+

## 2017-06-21 ASSESSMENT — CORNEAL TRAUMA - ABRASION: OD_ABRASION: PRESENT

## 2017-07-12 ENCOUNTER — DOCTOR'S OFFICE (OUTPATIENT)
Dept: URBAN - NONMETROPOLITAN AREA CLINIC 1 | Facility: CLINIC | Age: 82
Setting detail: OPHTHALMOLOGY
End: 2017-07-12
Payer: COMMERCIAL

## 2017-07-12 DIAGNOSIS — H33.021: ICD-10-CM

## 2017-07-12 DIAGNOSIS — H35.3231: ICD-10-CM

## 2017-07-12 DIAGNOSIS — H43.393: ICD-10-CM

## 2017-07-12 PROCEDURE — 67028 INJECTION EYE DRUG: CPT | Performed by: OPHTHALMOLOGY

## 2017-07-12 PROCEDURE — 92014 COMPRE OPH EXAM EST PT 1/>: CPT | Performed by: OPHTHALMOLOGY

## 2017-07-12 PROCEDURE — 92134 CPTRZ OPH DX IMG PST SGM RTA: CPT | Performed by: OPHTHALMOLOGY

## 2017-07-12 ASSESSMENT — REFRACTION_MANIFEST
OS_VA3: 20/
OD_VA3: 20/
OD_VA2: 20/
OD_VA3: 20/
OS_VA2: 20/
OS_VA1: 20/
OS_VA2: 20/
OS_VA3: 20/
OD_VA2: 20/
OS_VA3: 20/
OS_VA2: 20/
OU_VA: 20/
OD_VA2: 20/
OD_VA3: 20/
OD_VA1: 20/
OU_VA: 20/
OD_VA1: 20/
OD_VA1: 20/
OS_VA1: 20/
OU_VA: 20/
OS_VA1: 20/

## 2017-07-12 ASSESSMENT — SPHEQUIV_DERIVED
OD_SPHEQUIV: -1.5
OS_SPHEQUIV: -0.625

## 2017-07-12 ASSESSMENT — REFRACTION_AUTOREFRACTION
OS_SPHERE: +0.25
OS_AXIS: 074
OD_CYLINDER: -1.00
OD_AXIS: 087
OS_CYLINDER: -1.75
OD_SPHERE: -1.00

## 2017-07-12 ASSESSMENT — SUPERFICIAL PUNCTATE KERATITIS (SPK): OD_SPK: 2+

## 2017-07-12 ASSESSMENT — CORNEAL TRAUMA - ABRASION: OD_ABRASION: PRESENT

## 2017-07-12 ASSESSMENT — DRY EYES - PHYSICIAN NOTES: OD_GENERALCOMMENTS: INFERIORLY

## 2017-07-12 ASSESSMENT — REFRACTION_CURRENTRX
OD_OVR_VA: 20/
OS_OVR_VA: 20/
OD_OVR_VA: 20/
OD_OVR_VA: 20/

## 2017-07-12 ASSESSMENT — CONFRONTATIONAL VISUAL FIELD TEST (CVF)
OD_FINDINGS: FULL
OS_FINDINGS: FULL

## 2017-07-12 ASSESSMENT — VISUAL ACUITY
OS_BCVA: 20/150
OD_BCVA: 20/50

## 2017-08-02 ENCOUNTER — APPOINTMENT (OUTPATIENT)
Dept: RADIOLOGY | Facility: MEDICAL CENTER | Age: 82
End: 2017-08-02
Payer: MEDICARE

## 2017-08-02 ENCOUNTER — DOCTOR'S OFFICE (OUTPATIENT)
Dept: URBAN - NONMETROPOLITAN AREA CLINIC 1 | Facility: CLINIC | Age: 82
Setting detail: OPHTHALMOLOGY
End: 2017-08-02
Payer: COMMERCIAL

## 2017-08-02 ENCOUNTER — GENERIC CONVERSION - ENCOUNTER (OUTPATIENT)
Dept: OTHER | Facility: OTHER | Age: 82
End: 2017-08-02

## 2017-08-02 ENCOUNTER — TRANSCRIBE ORDERS (OUTPATIENT)
Dept: RADIOLOGY | Facility: MEDICAL CENTER | Age: 82
End: 2017-08-02

## 2017-08-02 DIAGNOSIS — H33.021: ICD-10-CM

## 2017-08-02 DIAGNOSIS — S99.929A INJURY OF FOOT: ICD-10-CM

## 2017-08-02 DIAGNOSIS — H31.002: ICD-10-CM

## 2017-08-02 DIAGNOSIS — H35.3231: ICD-10-CM

## 2017-08-02 DIAGNOSIS — E03.9 HYPOTHYROIDISM: ICD-10-CM

## 2017-08-02 DIAGNOSIS — I10 ESSENTIAL (PRIMARY) HYPERTENSION: ICD-10-CM

## 2017-08-02 DIAGNOSIS — M79.89 OTHER DISORDERS OF SOFT TISSUE: ICD-10-CM

## 2017-08-02 DIAGNOSIS — E78.5 HYPERLIPIDEMIA: ICD-10-CM

## 2017-08-02 DIAGNOSIS — H43.393: ICD-10-CM

## 2017-08-02 DIAGNOSIS — R63.4 ABNORMAL WEIGHT LOSS: ICD-10-CM

## 2017-08-02 DIAGNOSIS — M25.473 EFFUSION OF ANKLE: ICD-10-CM

## 2017-08-02 PROCEDURE — 67028 INJECTION EYE DRUG: CPT | Performed by: OPHTHALMOLOGY

## 2017-08-02 PROCEDURE — 92134 CPTRZ OPH DX IMG PST SGM RTA: CPT | Performed by: OPHTHALMOLOGY

## 2017-08-02 PROCEDURE — 92014 COMPRE OPH EXAM EST PT 1/>: CPT | Performed by: OPHTHALMOLOGY

## 2017-08-02 PROCEDURE — 73610 X-RAY EXAM OF ANKLE: CPT

## 2017-08-02 PROCEDURE — 73630 X-RAY EXAM OF FOOT: CPT

## 2017-08-02 ASSESSMENT — REFRACTION_MANIFEST
OU_VA: 20/
OD_VA3: 20/
OD_VA2: 20/
OS_VA3: 20/
OS_VA2: 20/
OD_VA2: 20/
OS_VA1: 20/
OS_VA1: 20/
OS_VA2: 20/
OS_VA1: 20/
OU_VA: 20/
OU_VA: 20/
OD_VA3: 20/
OD_VA1: 20/
OS_VA3: 20/
OD_VA2: 20/
OD_VA3: 20/
OS_VA3: 20/
OD_VA1: 20/
OS_VA2: 20/
OD_VA1: 20/

## 2017-08-02 ASSESSMENT — SPHEQUIV_DERIVED
OD_SPHEQUIV: -1.5
OS_SPHEQUIV: -0.625

## 2017-08-02 ASSESSMENT — REFRACTION_AUTOREFRACTION
OD_SPHERE: -1.00
OS_AXIS: 074
OD_CYLINDER: -1.00
OS_SPHERE: +0.25
OS_CYLINDER: -1.75
OD_AXIS: 087

## 2017-08-02 ASSESSMENT — CONFRONTATIONAL VISUAL FIELD TEST (CVF)
OD_FINDINGS: FULL
OS_FINDINGS: FULL

## 2017-08-02 ASSESSMENT — CORNEAL TRAUMA - ABRASION: OD_ABRASION: PRESENT

## 2017-08-02 ASSESSMENT — VISUAL ACUITY
OS_BCVA: 20/70-2
OD_BCVA: 20/60+1

## 2017-08-02 ASSESSMENT — REFRACTION_CURRENTRX
OS_OVR_VA: 20/
OS_OVR_VA: 20/
OD_OVR_VA: 20/
OS_OVR_VA: 20/

## 2017-08-02 ASSESSMENT — SUPERFICIAL PUNCTATE KERATITIS (SPK): OD_SPK: 2+

## 2017-08-02 ASSESSMENT — DRY EYES - PHYSICIAN NOTES: OD_GENERALCOMMENTS: INFERIORLY

## 2017-08-22 ENCOUNTER — DOCTOR'S OFFICE (OUTPATIENT)
Dept: URBAN - METROPOLITAN AREA CLINIC 136 | Facility: CLINIC | Age: 82
Setting detail: OPHTHALMOLOGY
End: 2017-08-22
Payer: COMMERCIAL

## 2017-08-22 DIAGNOSIS — H35.3231: ICD-10-CM

## 2017-08-22 DIAGNOSIS — H33.021: ICD-10-CM

## 2017-08-22 DIAGNOSIS — H43.392: ICD-10-CM

## 2017-08-22 DIAGNOSIS — H31.002: ICD-10-CM

## 2017-08-22 PROCEDURE — 67028 INJECTION EYE DRUG: CPT | Performed by: OPHTHALMOLOGY

## 2017-08-22 PROCEDURE — 92134 CPTRZ OPH DX IMG PST SGM RTA: CPT | Performed by: OPHTHALMOLOGY

## 2017-08-22 PROCEDURE — 92014 COMPRE OPH EXAM EST PT 1/>: CPT | Performed by: OPHTHALMOLOGY

## 2017-08-22 ASSESSMENT — VISUAL ACUITY
OS_BCVA: 20/500
OD_BCVA: 20/70-2

## 2017-08-22 ASSESSMENT — REFRACTION_MANIFEST
OU_VA: 20/
OS_VA1: 20/
OD_VA2: 20/
OU_VA: 20/
OD_VA3: 20/
OS_VA3: 20/
OS_VA2: 20/
OD_VA3: 20/
OD_VA1: 20/
OS_VA2: 20/
OU_VA: 20/
OD_VA2: 20/
OS_VA2: 20/
OD_VA1: 20/
OS_VA3: 20/
OD_VA2: 20/
OS_VA1: 20/
OD_VA3: 20/
OS_VA1: 20/
OD_VA1: 20/
OS_VA3: 20/

## 2017-08-22 ASSESSMENT — REFRACTION_AUTOREFRACTION
OD_SPHERE: -1.00
OS_AXIS: 074
OS_SPHERE: +0.25
OD_AXIS: 087
OS_CYLINDER: -1.75
OD_CYLINDER: -1.00

## 2017-08-22 ASSESSMENT — DRY EYES - PHYSICIAN NOTES: OD_GENERALCOMMENTS: INFERIORLY

## 2017-08-22 ASSESSMENT — REFRACTION_CURRENTRX
OD_OVR_VA: 20/
OS_OVR_VA: 20/
OS_OVR_VA: 20/
OD_OVR_VA: 20/
OS_OVR_VA: 20/
OD_OVR_VA: 20/

## 2017-08-22 ASSESSMENT — SUPERFICIAL PUNCTATE KERATITIS (SPK): OD_SPK: 2+

## 2017-08-22 ASSESSMENT — CONFRONTATIONAL VISUAL FIELD TEST (CVF)
OD_FINDINGS: FULL
OS_FINDINGS: FULL

## 2017-08-22 ASSESSMENT — SPHEQUIV_DERIVED
OD_SPHEQUIV: -1.5
OS_SPHEQUIV: -0.625

## 2017-08-24 ENCOUNTER — DOCTOR'S OFFICE (OUTPATIENT)
Dept: URBAN - METROPOLITAN AREA CLINIC 136 | Facility: CLINIC | Age: 82
Setting detail: OPHTHALMOLOGY
End: 2017-08-24
Payer: COMMERCIAL

## 2017-08-24 PROCEDURE — DRY EYE VI: Performed by: OPHTHALMOLOGY

## 2017-08-29 ENCOUNTER — APPOINTMENT (OUTPATIENT)
Dept: LAB | Facility: MEDICAL CENTER | Age: 82
End: 2017-08-29
Payer: MEDICARE

## 2017-08-29 ENCOUNTER — TRANSCRIBE ORDERS (OUTPATIENT)
Dept: LAB | Facility: MEDICAL CENTER | Age: 82
End: 2017-08-29

## 2017-08-29 ENCOUNTER — ALLSCRIPTS OFFICE VISIT (OUTPATIENT)
Dept: OTHER | Facility: OTHER | Age: 82
End: 2017-08-29

## 2017-08-29 ENCOUNTER — GENERIC CONVERSION - ENCOUNTER (OUTPATIENT)
Dept: OTHER | Facility: OTHER | Age: 82
End: 2017-08-29

## 2017-08-29 DIAGNOSIS — I10 ESSENTIAL (PRIMARY) HYPERTENSION: ICD-10-CM

## 2017-08-29 DIAGNOSIS — E03.9 UNSPECIFIED HYPOTHYROIDISM: ICD-10-CM

## 2017-08-29 DIAGNOSIS — Z12.5 SPECIAL SCREENING FOR MALIGNANT NEOPLASM OF PROSTATE: ICD-10-CM

## 2017-08-29 DIAGNOSIS — E78.5 OTHER AND UNSPECIFIED HYPERLIPIDEMIA: ICD-10-CM

## 2017-08-29 DIAGNOSIS — R63.4 LOSS OF WEIGHT: ICD-10-CM

## 2017-08-29 DIAGNOSIS — E78.5 HYPERLIPIDEMIA: ICD-10-CM

## 2017-08-29 DIAGNOSIS — I10 UNSPECIFIED ESSENTIAL HYPERTENSION: ICD-10-CM

## 2017-08-29 DIAGNOSIS — Z12.5 SPECIAL SCREENING FOR MALIGNANT NEOPLASM OF PROSTATE: Primary | ICD-10-CM

## 2017-08-29 DIAGNOSIS — R63.4 ABNORMAL WEIGHT LOSS: ICD-10-CM

## 2017-08-29 DIAGNOSIS — E03.9 HYPOTHYROIDISM: ICD-10-CM

## 2017-08-29 LAB
ALBUMIN SERPL BCP-MCNC: 3.3 G/DL (ref 3.5–5)
ALP SERPL-CCNC: 63 U/L (ref 46–116)
ALT SERPL W P-5'-P-CCNC: 25 U/L (ref 12–78)
ANION GAP SERPL CALCULATED.3IONS-SCNC: 9 MMOL/L (ref 4–13)
AST SERPL W P-5'-P-CCNC: 21 U/L (ref 5–45)
BASOPHILS # BLD AUTO: 0.01 THOUSANDS/ΜL (ref 0–0.1)
BASOPHILS NFR BLD AUTO: 0 % (ref 0–1)
BILIRUB SERPL-MCNC: 1.07 MG/DL (ref 0.2–1)
BILIRUB UR QL STRIP: NORMAL
BUN SERPL-MCNC: 14 MG/DL (ref 5–25)
CALCIUM SERPL-MCNC: 8.7 MG/DL (ref 8.3–10.1)
CHLORIDE SERPL-SCNC: 108 MMOL/L (ref 100–108)
CHOLEST SERPL-MCNC: 123 MG/DL (ref 50–200)
CLARITY UR: NORMAL
CO2 SERPL-SCNC: 26 MMOL/L (ref 21–32)
COLOR UR: YELLOW
CREAT SERPL-MCNC: 0.95 MG/DL (ref 0.6–1.3)
EOSINOPHIL # BLD AUTO: 0.18 THOUSAND/ΜL (ref 0–0.61)
EOSINOPHIL NFR BLD AUTO: 3 % (ref 0–6)
ERYTHROCYTE [DISTWIDTH] IN BLOOD BY AUTOMATED COUNT: 16 % (ref 11.6–15.1)
GFR SERPL CREATININE-BSD FRML MDRD: 76 ML/MIN/1.73SQ M
GLUCOSE (HISTORICAL): NORMAL
GLUCOSE P FAST SERPL-MCNC: 94 MG/DL (ref 65–99)
HCT VFR BLD AUTO: 43 % (ref 36.5–49.3)
HDLC SERPL-MCNC: 44 MG/DL (ref 40–60)
HGB BLD-MCNC: 14.2 G/DL (ref 12–17)
HGB UR QL STRIP.AUTO: NORMAL
KETONES UR STRIP-MCNC: NORMAL MG/DL
LDLC SERPL CALC-MCNC: 65 MG/DL (ref 0–100)
LEUKOCYTE ESTERASE UR QL STRIP: NORMAL
LYMPHOCYTES # BLD AUTO: 2.47 THOUSANDS/ΜL (ref 0.6–4.47)
LYMPHOCYTES NFR BLD AUTO: 38 % (ref 14–44)
MCH RBC QN AUTO: 29.5 PG (ref 26.8–34.3)
MCHC RBC AUTO-ENTMCNC: 33 G/DL (ref 31.4–37.4)
MCV RBC AUTO: 89 FL (ref 82–98)
MONOCYTES # BLD AUTO: 0.95 THOUSAND/ΜL (ref 0.17–1.22)
MONOCYTES NFR BLD AUTO: 15 % (ref 4–12)
NEUTROPHILS # BLD AUTO: 2.88 THOUSANDS/ΜL (ref 1.85–7.62)
NEUTS SEG NFR BLD AUTO: 44 % (ref 43–75)
NITRITE UR QL STRIP: NORMAL
NRBC BLD AUTO-RTO: 0 /100 WBCS
PH UR STRIP.AUTO: 5 [PH]
PLATELET # BLD AUTO: 212 THOUSANDS/UL (ref 149–390)
PMV BLD AUTO: 11.2 FL (ref 8.9–12.7)
POTASSIUM SERPL-SCNC: 3.8 MMOL/L (ref 3.5–5.3)
PROT SERPL-MCNC: 7 G/DL (ref 6.4–8.2)
PROT UR STRIP-MCNC: NORMAL MG/DL
PSA SERPL-MCNC: 1 NG/ML (ref 0–4)
RBC # BLD AUTO: 4.82 MILLION/UL (ref 3.88–5.62)
SODIUM SERPL-SCNC: 143 MMOL/L (ref 136–145)
SP GR UR STRIP.AUTO: 1.02
TRIGL SERPL-MCNC: 70 MG/DL
TSH SERPL DL<=0.05 MIU/L-ACNC: 2.09 UIU/ML (ref 0.36–3.74)
UROBILINOGEN UR QL STRIP.AUTO: NORMAL
WBC # BLD AUTO: 6.5 THOUSAND/UL (ref 4.31–10.16)

## 2017-08-29 PROCEDURE — 84443 ASSAY THYROID STIM HORMONE: CPT

## 2017-08-29 PROCEDURE — 80061 LIPID PANEL: CPT

## 2017-08-29 PROCEDURE — 36415 COLL VENOUS BLD VENIPUNCTURE: CPT

## 2017-08-29 PROCEDURE — 80053 COMPREHEN METABOLIC PANEL: CPT

## 2017-08-29 PROCEDURE — 85025 COMPLETE CBC W/AUTO DIFF WBC: CPT

## 2017-08-29 PROCEDURE — G0103 PSA SCREENING: HCPCS

## 2017-08-30 ENCOUNTER — GENERIC CONVERSION - ENCOUNTER (OUTPATIENT)
Dept: OTHER | Facility: OTHER | Age: 82
End: 2017-08-30

## 2017-09-07 ENCOUNTER — ALLSCRIPTS OFFICE VISIT (OUTPATIENT)
Dept: OTHER | Facility: OTHER | Age: 82
End: 2017-09-07

## 2017-09-12 ENCOUNTER — DOCTOR'S OFFICE (OUTPATIENT)
Dept: URBAN - METROPOLITAN AREA CLINIC 136 | Facility: CLINIC | Age: 82
Setting detail: OPHTHALMOLOGY
End: 2017-09-12
Payer: COMMERCIAL

## 2017-09-12 DIAGNOSIS — H35.3231: ICD-10-CM

## 2017-09-12 DIAGNOSIS — H35.3211: ICD-10-CM

## 2017-09-12 PROCEDURE — 67028 INJECTION EYE DRUG: CPT | Performed by: OPHTHALMOLOGY

## 2017-09-12 PROCEDURE — SPECPHARM SPECIALTY PHARMACY DRUG: Performed by: OPHTHALMOLOGY

## 2017-09-12 ASSESSMENT — DRY EYES - PHYSICIAN NOTES: OD_GENERALCOMMENTS: INFERIORLY

## 2017-09-12 ASSESSMENT — CONFRONTATIONAL VISUAL FIELD TEST (CVF)
OD_FINDINGS: FULL
OS_FINDINGS: FULL

## 2017-09-12 ASSESSMENT — SUPERFICIAL PUNCTATE KERATITIS (SPK): OD_SPK: 2+

## 2017-09-13 ASSESSMENT — REFRACTION_MANIFEST
OS_VA3: 20/
OS_VA1: 20/
OU_VA: 20/
OD_VA2: 20/
OU_VA: 20/
OS_VA2: 20/
OD_VA2: 20/
OD_VA1: 20/
OS_VA1: 20/
OS_VA2: 20/
OS_VA2: 20/
OD_VA2: 20/
OD_VA1: 20/
OS_VA3: 20/
OD_VA3: 20/
OU_VA: 20/
OD_VA3: 20/
OS_VA3: 20/
OD_VA3: 20/
OS_VA1: 20/
OD_VA1: 20/

## 2017-09-13 ASSESSMENT — REFRACTION_AUTOREFRACTION
OD_SPHERE: -1.00
OS_CYLINDER: -1.75
OD_AXIS: 087
OS_SPHERE: +0.25
OS_AXIS: 074
OD_CYLINDER: -1.00

## 2017-09-13 ASSESSMENT — REFRACTION_CURRENTRX
OS_OVR_VA: 20/
OS_OVR_VA: 20/
OD_OVR_VA: 20/
OD_OVR_VA: 20/
OS_OVR_VA: 20/
OD_OVR_VA: 20/

## 2017-09-13 ASSESSMENT — VISUAL ACUITY
OS_BCVA: 20/400
OD_BCVA: 20/70+2

## 2017-09-13 ASSESSMENT — SPHEQUIV_DERIVED
OD_SPHEQUIV: -1.5
OS_SPHEQUIV: -0.625

## 2017-09-20 ENCOUNTER — HOSPITAL ENCOUNTER (OUTPATIENT)
Dept: NON INVASIVE DIAGNOSTICS | Facility: HOSPITAL | Age: 82
Discharge: HOME | End: 2017-09-20
Attending: UROLOGY
Payer: MEDICARE

## 2017-09-20 ENCOUNTER — APPOINTMENT (OUTPATIENT)
Dept: LAB | Facility: HOSPITAL | Age: 82
End: 2017-09-20
Attending: UROLOGY
Payer: MEDICARE

## 2017-09-20 ENCOUNTER — TRANSCRIBE ORDERS (OUTPATIENT)
Dept: ADMINISTRATIVE | Facility: HOSPITAL | Age: 82
End: 2017-09-20

## 2017-09-20 ENCOUNTER — APPOINTMENT (OUTPATIENT)
Dept: PREADMISSION TESTING | Facility: HOSPITAL | Age: 82
End: 2017-09-20
Payer: MEDICARE

## 2017-09-20 DIAGNOSIS — C67.9 MALIGNANT NEOPLASM OF URINARY BLADDER, UNSPECIFIED SITE (HCC): ICD-10-CM

## 2017-09-20 DIAGNOSIS — Z01.818 PREOP TESTING: Primary | ICD-10-CM

## 2017-09-20 DIAGNOSIS — Z01.818 PREOP TESTING: ICD-10-CM

## 2017-09-20 LAB
ANION GAP SERPL CALCULATED.3IONS-SCNC: 9 MMOL/L (ref 4–13)
APTT PPP: 46 SECONDS (ref 23–35)
BASOPHILS # BLD AUTO: 0.02 THOUSANDS/ΜL (ref 0–0.1)
BASOPHILS NFR BLD AUTO: 0 % (ref 0–1)
BUN SERPL-MCNC: 14 MG/DL (ref 5–25)
CALCIUM SERPL-MCNC: 8.8 MG/DL (ref 8.3–10.1)
CHLORIDE SERPL-SCNC: 106 MMOL/L (ref 100–108)
CO2 SERPL-SCNC: 27 MMOL/L (ref 21–32)
CREAT SERPL-MCNC: 1.19 MG/DL (ref 0.6–1.3)
EOSINOPHIL # BLD AUTO: 0.15 THOUSAND/ΜL (ref 0–0.61)
EOSINOPHIL NFR BLD AUTO: 2 % (ref 0–6)
ERYTHROCYTE [DISTWIDTH] IN BLOOD BY AUTOMATED COUNT: 15.3 % (ref 11.6–15.1)
GFR SERPL CREATININE-BSD FRML MDRD: 58 ML/MIN/1.73SQ M
GLUCOSE SERPL-MCNC: 91 MG/DL (ref 65–140)
HCT VFR BLD AUTO: 43.3 % (ref 36.5–49.3)
HGB BLD-MCNC: 14.3 G/DL (ref 12–17)
INR PPP: 1.03 (ref 0.86–1.16)
LYMPHOCYTES # BLD AUTO: 2.18 THOUSANDS/ΜL (ref 0.6–4.47)
LYMPHOCYTES NFR BLD AUTO: 34 % (ref 14–44)
MCH RBC QN AUTO: 29 PG (ref 26.8–34.3)
MCHC RBC AUTO-ENTMCNC: 33 G/DL (ref 31.4–37.4)
MCV RBC AUTO: 88 FL (ref 82–98)
MONOCYTES # BLD AUTO: 0.9 THOUSAND/ΜL (ref 0.17–1.22)
MONOCYTES NFR BLD AUTO: 14 % (ref 4–12)
NEUTROPHILS # BLD AUTO: 3.19 THOUSANDS/ΜL (ref 1.85–7.62)
NEUTS SEG NFR BLD AUTO: 50 % (ref 43–75)
PLATELET # BLD AUTO: 183 THOUSANDS/UL (ref 149–390)
PMV BLD AUTO: 10.5 FL (ref 8.9–12.7)
POTASSIUM SERPL-SCNC: 4.1 MMOL/L (ref 3.5–5.3)
PROTHROMBIN TIME: 13.4 SECONDS (ref 12.1–14.4)
RBC # BLD AUTO: 4.93 MILLION/UL (ref 3.88–5.62)
SODIUM SERPL-SCNC: 142 MMOL/L (ref 136–145)
WBC # BLD AUTO: 6.44 THOUSAND/UL (ref 4.31–10.16)

## 2017-09-20 PROCEDURE — 85730 THROMBOPLASTIN TIME PARTIAL: CPT

## 2017-09-20 PROCEDURE — 85610 PROTHROMBIN TIME: CPT

## 2017-09-20 PROCEDURE — 93005 ELECTROCARDIOGRAM TRACING: CPT

## 2017-09-20 PROCEDURE — 80048 BASIC METABOLIC PNL TOTAL CA: CPT

## 2017-09-20 PROCEDURE — 36415 COLL VENOUS BLD VENIPUNCTURE: CPT

## 2017-09-20 PROCEDURE — 87086 URINE CULTURE/COLONY COUNT: CPT

## 2017-09-20 PROCEDURE — 85025 COMPLETE CBC W/AUTO DIFF WBC: CPT

## 2017-09-21 LAB
ATRIAL RATE: 65 BPM
BACTERIA UR CULT: NORMAL
P AXIS: 68 DEGREES
PR INTERVAL: 232 MS
QRS AXIS: -25 DEGREES
QRSD INTERVAL: 104 MS
QT INTERVAL: 452 MS
QTC INTERVAL: 470 MS
T WAVE AXIS: 63 DEGREES
VENTRICULAR RATE: 65 BPM

## 2017-09-28 ENCOUNTER — DOCTOR'S OFFICE (OUTPATIENT)
Dept: URBAN - NONMETROPOLITAN AREA CLINIC 1 | Facility: CLINIC | Age: 82
Setting detail: OPHTHALMOLOGY
End: 2017-09-28
Payer: COMMERCIAL

## 2017-09-28 DIAGNOSIS — H01.002: ICD-10-CM

## 2017-09-28 DIAGNOSIS — H01.001: ICD-10-CM

## 2017-09-28 PROCEDURE — 92012 INTRM OPH EXAM EST PATIENT: CPT | Performed by: OPHTHALMOLOGY

## 2017-09-28 ASSESSMENT — REFRACTION_MANIFEST
OS_VA2: 20/
OU_VA: 20/
OS_VA2: 20/
OD_VA3: 20/
OD_VA1: 20/
OS_VA3: 20/
OS_VA1: 20/
OU_VA: 20/
OS_VA1: 20/
OD_VA1: 20/
OD_VA2: 20/
OD_VA1: 20/
OU_VA: 20/
OS_VA2: 20/
OS_VA1: 20/
OS_VA3: 20/
OD_VA2: 20/
OD_VA2: 20/
OS_VA3: 20/
OD_VA3: 20/
OD_VA3: 20/

## 2017-09-28 ASSESSMENT — SPHEQUIV_DERIVED
OD_SPHEQUIV: -1.5
OS_SPHEQUIV: -0.625

## 2017-09-28 ASSESSMENT — REFRACTION_CURRENTRX
OD_OVR_VA: 20/
OS_OVR_VA: 20/
OD_OVR_VA: 20/
OD_OVR_VA: 20/

## 2017-09-28 ASSESSMENT — REFRACTION_AUTOREFRACTION
OD_CYLINDER: -1.00
OD_AXIS: 087
OD_SPHERE: -1.00
OS_AXIS: 074
OS_SPHERE: +0.25
OS_CYLINDER: -1.75

## 2017-09-28 ASSESSMENT — SUPERFICIAL PUNCTATE KERATITIS (SPK): OD_SPK: 2+

## 2017-09-28 ASSESSMENT — LID EXAM ASSESSMENTS: OD_BLEPHARITIS: 1+

## 2017-09-28 ASSESSMENT — VISUAL ACUITY
OD_BCVA: 20/80-1
OS_BCVA: 20/400

## 2017-09-28 ASSESSMENT — DRY EYES - PHYSICIAN NOTES: OD_GENERALCOMMENTS: INFERIORLY

## 2017-09-29 ENCOUNTER — ANESTHESIA EVENT (OUTPATIENT)
Dept: PERIOP | Facility: HOSPITAL | Age: 82
End: 2017-09-29
Payer: MEDICARE

## 2017-09-29 ENCOUNTER — RX ONLY (RX ONLY)
Age: 82
End: 2017-09-29

## 2017-09-29 ENCOUNTER — DOCTOR'S OFFICE (OUTPATIENT)
Dept: URBAN - NONMETROPOLITAN AREA CLINIC 1 | Facility: CLINIC | Age: 82
Setting detail: OPHTHALMOLOGY
End: 2017-09-29
Payer: COMMERCIAL

## 2017-09-29 DIAGNOSIS — H33.021: ICD-10-CM

## 2017-09-29 DIAGNOSIS — H31.002: ICD-10-CM

## 2017-09-29 DIAGNOSIS — H43.392: ICD-10-CM

## 2017-09-29 DIAGNOSIS — H01.001: ICD-10-CM

## 2017-09-29 DIAGNOSIS — H35.3231: ICD-10-CM

## 2017-09-29 DIAGNOSIS — H01.002: ICD-10-CM

## 2017-09-29 PROCEDURE — 92012 INTRM OPH EXAM EST PATIENT: CPT | Performed by: OPHTHALMOLOGY

## 2017-09-29 ASSESSMENT — REFRACTION_CURRENTRX
OD_OVR_VA: 20/
OS_OVR_VA: 20/
OD_OVR_VA: 20/
OD_OVR_VA: 20/

## 2017-09-29 ASSESSMENT — REFRACTION_MANIFEST
OU_VA: 20/
OD_VA2: 20/
OS_VA2: 20/
OD_VA3: 20/
OS_VA3: 20/
OS_VA1: 20/
OU_VA: 20/
OU_VA: 20/
OS_VA2: 20/
OD_VA1: 20/
OS_VA2: 20/
OD_VA2: 20/
OS_VA3: 20/
OS_VA1: 20/
OD_VA3: 20/
OD_VA2: 20/
OS_VA1: 20/
OD_VA1: 20/
OS_VA3: 20/
OD_VA3: 20/
OD_VA1: 20/

## 2017-09-29 ASSESSMENT — CONFRONTATIONAL VISUAL FIELD TEST (CVF)
OS_FINDINGS: FULL
OD_FINDINGS: FULL

## 2017-09-29 ASSESSMENT — VISUAL ACUITY
OD_BCVA: 20/80-1
OS_BCVA: 20/400

## 2017-09-29 ASSESSMENT — REFRACTION_AUTOREFRACTION
OD_CYLINDER: -1.00
OS_SPHERE: +0.25
OS_CYLINDER: -1.75
OS_AXIS: 074
OD_SPHERE: -1.00
OD_AXIS: 087

## 2017-09-29 ASSESSMENT — SUPERFICIAL PUNCTATE KERATITIS (SPK): OD_SPK: 2+

## 2017-09-29 ASSESSMENT — SPHEQUIV_DERIVED
OS_SPHEQUIV: -0.625
OD_SPHEQUIV: -1.5

## 2017-09-29 ASSESSMENT — DRY EYES - PHYSICIAN NOTES: OD_GENERALCOMMENTS: INFERIORLY

## 2017-09-29 ASSESSMENT — LID EXAM ASSESSMENTS: OD_BLEPHARITIS: 1+

## 2017-10-02 ENCOUNTER — ANESTHESIA (OUTPATIENT)
Dept: PERIOP | Facility: HOSPITAL | Age: 82
End: 2017-10-02
Payer: MEDICARE

## 2017-10-02 ENCOUNTER — HOSPITAL ENCOUNTER (OUTPATIENT)
Facility: HOSPITAL | Age: 82
Setting detail: OUTPATIENT SURGERY
Discharge: HOME | End: 2017-10-02
Attending: UROLOGY | Admitting: UROLOGY
Payer: MEDICARE

## 2017-10-02 ENCOUNTER — APPOINTMENT (OUTPATIENT)
Dept: RADIOLOGY | Facility: HOSPITAL | Age: 82
End: 2017-10-02
Payer: MEDICARE

## 2017-10-02 VITALS
RESPIRATION RATE: 18 BRPM | WEIGHT: 166 LBS | TEMPERATURE: 98 F | BODY MASS INDEX: 22.48 KG/M2 | DIASTOLIC BLOOD PRESSURE: 66 MMHG | OXYGEN SATURATION: 97 % | HEART RATE: 74 BPM | SYSTOLIC BLOOD PRESSURE: 156 MMHG | HEIGHT: 72 IN

## 2017-10-02 DIAGNOSIS — C67.9 MALIGNANT NEOPLASM OF BLADDER (HCC): ICD-10-CM

## 2017-10-02 PROCEDURE — 88305 TISSUE EXAM BY PATHOLOGIST: CPT | Performed by: UROLOGY

## 2017-10-02 PROCEDURE — C1769 GUIDE WIRE: HCPCS | Performed by: UROLOGY

## 2017-10-02 RX ORDER — LIDOCAINE HYDROCHLORIDE 10 MG/ML
INJECTION, SOLUTION INFILTRATION; PERINEURAL AS NEEDED
Status: DISCONTINUED | OUTPATIENT
Start: 2017-10-02 | End: 2017-10-02 | Stop reason: SURG

## 2017-10-02 RX ORDER — SODIUM CHLORIDE, SODIUM LACTATE, POTASSIUM CHLORIDE, CALCIUM CHLORIDE 600; 310; 30; 20 MG/100ML; MG/100ML; MG/100ML; MG/100ML
125 INJECTION, SOLUTION INTRAVENOUS CONTINUOUS
Status: DISCONTINUED | OUTPATIENT
Start: 2017-10-02 | End: 2017-10-02

## 2017-10-02 RX ORDER — FENTANYL CITRATE 50 UG/ML
INJECTION, SOLUTION INTRAMUSCULAR; INTRAVENOUS AS NEEDED
Status: DISCONTINUED | OUTPATIENT
Start: 2017-10-02 | End: 2017-10-02 | Stop reason: SURG

## 2017-10-02 RX ORDER — GLYCOPYRROLATE 0.2 MG/ML
INJECTION INTRAMUSCULAR; INTRAVENOUS AS NEEDED
Status: DISCONTINUED | OUTPATIENT
Start: 2017-10-02 | End: 2017-10-02 | Stop reason: SURG

## 2017-10-02 RX ORDER — ONDANSETRON 2 MG/ML
4 INJECTION INTRAMUSCULAR; INTRAVENOUS ONCE AS NEEDED
Status: DISCONTINUED | OUTPATIENT
Start: 2017-10-02 | End: 2017-10-02 | Stop reason: HOSPADM

## 2017-10-02 RX ORDER — PHENAZOPYRIDINE HYDROCHLORIDE 100 MG/1
100 TABLET, FILM COATED ORAL ONCE
Status: COMPLETED | OUTPATIENT
Start: 2017-10-02 | End: 2017-10-02

## 2017-10-02 RX ORDER — PROPOFOL 10 MG/ML
INJECTION, EMULSION INTRAVENOUS AS NEEDED
Status: DISCONTINUED | OUTPATIENT
Start: 2017-10-02 | End: 2017-10-02 | Stop reason: SURG

## 2017-10-02 RX ORDER — ONDANSETRON 2 MG/ML
INJECTION INTRAMUSCULAR; INTRAVENOUS AS NEEDED
Status: DISCONTINUED | OUTPATIENT
Start: 2017-10-02 | End: 2017-10-02 | Stop reason: SURG

## 2017-10-02 RX ORDER — MAGNESIUM HYDROXIDE 1200 MG/15ML
LIQUID ORAL AS NEEDED
Status: DISCONTINUED | OUTPATIENT
Start: 2017-10-02 | End: 2017-10-02 | Stop reason: HOSPADM

## 2017-10-02 RX ORDER — MIDAZOLAM HYDROCHLORIDE 1 MG/ML
INJECTION INTRAMUSCULAR; INTRAVENOUS AS NEEDED
Status: DISCONTINUED | OUTPATIENT
Start: 2017-10-02 | End: 2017-10-02 | Stop reason: SURG

## 2017-10-02 RX ORDER — TAMSULOSIN HYDROCHLORIDE 0.4 MG/1
0.4 CAPSULE ORAL ONCE
Status: COMPLETED | OUTPATIENT
Start: 2017-10-02 | End: 2017-10-02

## 2017-10-02 RX ORDER — EPHEDRINE SULFATE 50 MG/ML
INJECTION, SOLUTION INTRAVENOUS AS NEEDED
Status: DISCONTINUED | OUTPATIENT
Start: 2017-10-02 | End: 2017-10-02 | Stop reason: SURG

## 2017-10-02 RX ORDER — PHENAZOPYRIDINE HYDROCHLORIDE 100 MG/1
100 TABLET, FILM COATED ORAL 3 TIMES DAILY PRN
Qty: 10 TABLET | Refills: 0 | Status: SHIPPED | OUTPATIENT
Start: 2017-10-02 | End: 2018-06-05 | Stop reason: ALTCHOICE

## 2017-10-02 RX ORDER — FENTANYL CITRATE/PF 50 MCG/ML
25 SYRINGE (ML) INJECTION
Status: DISCONTINUED | OUTPATIENT
Start: 2017-10-02 | End: 2017-10-02 | Stop reason: HOSPADM

## 2017-10-02 RX ADMIN — DEXAMETHASONE SODIUM PHOSPHATE 10 MG: 10 INJECTION INTRAMUSCULAR; INTRAVENOUS at 09:50

## 2017-10-02 RX ADMIN — PROPOFOL 200 MG: 10 INJECTION, EMULSION INTRAVENOUS at 09:45

## 2017-10-02 RX ADMIN — CEFAZOLIN SODIUM 2000 MG: 2 SOLUTION INTRAVENOUS at 09:38

## 2017-10-02 RX ADMIN — EPHEDRINE SULFATE 5 MG: 50 INJECTION, SOLUTION INTRAMUSCULAR; INTRAVENOUS; SUBCUTANEOUS at 10:00

## 2017-10-02 RX ADMIN — TAMSULOSIN HYDROCHLORIDE 0.4 MG: 0.4 CAPSULE ORAL at 10:39

## 2017-10-02 RX ADMIN — ONDANSETRON HYDROCHLORIDE 4 MG: 2 INJECTION, SOLUTION INTRAVENOUS at 09:54

## 2017-10-02 RX ADMIN — PHENAZOPYRIDINE HYDROCHLORIDE 100 MG: 100 TABLET ORAL at 10:39

## 2017-10-02 RX ADMIN — LIDOCAINE HYDROCHLORIDE 50 MG: 10 INJECTION, SOLUTION INFILTRATION; PERINEURAL at 09:45

## 2017-10-02 RX ADMIN — SODIUM CHLORIDE, SODIUM LACTATE, POTASSIUM CHLORIDE, AND CALCIUM CHLORIDE 125 ML/HR: .6; .31; .03; .02 INJECTION, SOLUTION INTRAVENOUS at 08:52

## 2017-10-02 RX ADMIN — FENTANYL CITRATE 25 MCG: 50 INJECTION, SOLUTION INTRAMUSCULAR; INTRAVENOUS at 09:51

## 2017-10-02 RX ADMIN — EPHEDRINE SULFATE 5 MG: 50 INJECTION, SOLUTION INTRAMUSCULAR; INTRAVENOUS; SUBCUTANEOUS at 09:57

## 2017-10-02 RX ADMIN — GLYCOPYRROLATE 0.2 MG: 0.2 INJECTION, SOLUTION INTRAMUSCULAR; INTRAVENOUS at 09:48

## 2017-10-02 RX ADMIN — MIDAZOLAM HYDROCHLORIDE 2 MG: 1 INJECTION, SOLUTION INTRAMUSCULAR; INTRAVENOUS at 09:42

## 2017-10-02 NOTE — OP NOTE
OPERATIVE REPORT  PATIENT NAME: Danyelle Parker    :  1935  MRN: [id number]  Pt Location: [facility] OR ROOM 02    SURGERY DATE: 10/2/2017    Surgeon(s) and Role:     * Tonya Sainz MD - Primary    Preop Diagnosis:  Malignant neoplasm of bladder [C67 9]    Post-Op Diagnosis Codes:     * Malignant neoplasm of bladder [C67 9]    Procedure(s) (LRB):  CYSTOSCOPY WITH  BLADDER BIOPSIES WITH FULGURATION (N/A)  INSTILLATION MYTOMYCIN (N/A)  RETROGRADE PYELOGRAM (Right)  TRANSURETHRAL RESECTION OF BLADDER TUMOR (TURBT) (N/A)  INSERTION STENT URETERAL (Right)    Specimen(s):  ID Type Source Tests Collected by Time Destination   1 : RIGHT trigone Tissue Urinary Bladder TISSUE EXAM Tonya Sainz MD 10/2/2017 1001        Estimated Blood Loss:   Minimal    Drains:   18F rosales to be removed in pacu    Anesthesia Type:   General    Operative Indications:  Malignant neoplasm of bladder [C67 9]      Operative Findings:  1  Papillary tumor involving the RIGHT hemitrigone and ureteral orifice  2  Easy passage of a 5F ureteral catheter into the RIGHT ureter    Complications:   None    Procedure and Technique:  Danyelle Parker is a 80y o  -year-old male with a history of low grade papillary urothelial neoplasm  Flexible cystoscopy in the office revealed recurrent bladder tumor located right hemitrigone  The remainder of the bladder was free and clear of bladder tumor  Risk and benefits of transurethral resection of the recurrent bladder tumor were discussed and reviewed  Informed consent was obtained  I had a lengthy discussion with the patient in the office detail and the potential need for resection of the right laura trigone and ureteral orifice  We discussed the potential need for retrograde stent placement  Patient understood these risks  Patient was brought to the operating room on 10/2/17    After the smooth induction of general LMA anesthesia, the patient was placed in the dorsal lithotomy position  His genitalia was prepped and draped in a sterile fashion  Intravenous antibiotics were administered  A timeout was performed with all members of the operative team confirming the patient's identity and procedure to be performed  A 22 Croatian rigid cystoscope with 30° lens was inserted  The bladder was thoroughly inspected  The right sided hemitrigone bladder tumor was identified  No other mucosal abnormalities or lesions were identified  The left ureteral orifice was also visualized with clear efflux of urine  The cystoscope was exchanged for the continuous-flow resectoscope  Transurethral resection of the medium-sized bladder tumor was performed  We utilized pure cut through the right ureteral orifice  ~3cm of tumor was resected  The tumor was sent as specimen  The base of the tumor was fulgurated with electrocautery  We were extremely careful to stay away from the ureteral orifice with any coagulation  Hemostasis was excellent  Because of the proximity to the right ureteral orifice, after resection we passed a 5 [place] open-ended catheter easily into the right ureter with return of clear urine  This was then removed  The bladder was left full the cystoscope was removed  An 25 Croatian Hobson catheter was inserted and the bladder was drained  40 mg of intravesical mitomycin was then instilled and 40 cc of sterile water into the bladder  The catheter was capped  Overall the patient tolerated the procedure well  The patient was extubated in the operating room and transferred to the PACU in stable condition at the conclusion of the case       I was present for the entire procedure    Patient Disposition:  PACU     SIGNATURE: Milton Koch MD  DATE: October 2, 2017  TIME: 10:16 AM

## 2017-10-02 NOTE — ANESTHESIA POSTPROCEDURE EVALUATION
Post-Op Assessment Note      CV Status:  Stable    Mental Status:  Alert    Hydration Status:  Stable    PONV Controlled:  None    Airway Patency:  Patent    Post Op Vitals Reviewed: Yes          Staff: NAPOLEON           BP   117/64   Temp   97 3   Pulse 92   Resp   16   SpO2   99%

## 2017-10-02 NOTE — ANESTHESIA PREPROCEDURE EVALUATION
Review of Systems/Medical History      No history of anesthetic complications     Cardiovascular  Hyperlipidemia,    Pulmonary  Smoker ex-smoker , ,        GI/Hepatic    GERD well controlled,             Endo/Other  History of thyroid disease ,      GYN       Hematology  Anemia ,     Musculoskeletal       Neurology   Psychology           Physical Exam    Airway       Dental   Comment: Upper denture and lower partial, upper dentures,     Cardiovascular  Cardiovascular exam normal    Pulmonary  Pulmonary exam normal     Other Findings        Anesthesia Plan  ASA Score- 3       Anesthesia Type- general        Induction- intravenous      Informed Consent  Anesthetic plan and risks discussed with patient

## 2017-10-02 NOTE — DISCHARGE INSTRUCTIONS
Transurethral Resection of Bladder Tumors   WHAT YOU NEED TO KNOW:   Transurethral resection of bladder tumors (TURBT) is surgery to remove one or more tumors from your bladder  DISCHARGE INSTRUCTIONS:   Medicines:   · Medicines  help decrease pain or prevent vomiting  · Take your medicine as directed  Contact your healthcare provider if you think your medicine is not helping or if you have side effects  Tell him or her if you are allergic to any medicine  Keep a list of the medicines, vitamins, and herbs you take  Include the amounts, and when and why you take them  Bring the list or the pill bottles to follow-up visits  Carry your medicine list with you in case of an emergency  Follow up with your healthcare provider as directed:  Write down your questions so you remember to ask them during your visits  Care for your Hobson catheter:  Keep the bag below your waist  This will prevent urine from flowing back into your bladder and causing an infection or other problems  Also, keep the tube free of kinks so the urine will drain properly  Do not pull on the catheter  This can cause pain and bleeding and may cause the catheter to come out  Empty your urine drainage bag when it is ½ to ? full, or every 8 hours  If you have a smaller leg bag, empty it every 3 to 4 hours  Do the following when you empty your urine drainage bag:  · Hold the urine bag over the toilet or a large container  · Remove the drain spout from its sleeve at the bottom of the urine bag  Do not touch the tip of the drain spout  Open the slide valve on the spout  · Let the urine flow out of the urine bag into the toilet or container  Do not let the drainage tube touch anything  · Clean the end of the drain spout with alcohol when the bag is empty  Ask which cleaning solution is best to use  · Close the slide valve and put the drain spout into its sleeve at the bottom of the urine bag   Write down how much urine was in your bag if you were asked to keep a record  Contact your healthcare provider if:   · You have a fever or chills  · You have new or more blood in your urine  · You have nausea or are vomiting  · You have new or more pain when you urinate  · You are unable to control when you urinate  · You have questions or concerns about your condition or care  Seek care immediately or call 911 if:   · You have heavy bleeding from your urethra  · You start to urinate less often, very little, or not at all  · You have severe pain in your abdomen or pelvis  © 2017 [employer] Information is for End User's use only and may not be sold, redistributed or otherwise used for commercial purposes  All illustrations and images included in CareNotes® are the copyrighted property of [employer]  or [employer]  The above information is an  only  It is not intended as medical advice for individual conditions or treatments  Talk to your doctor, nurse or pharmacist before following any medical regimen to see if it is safe and effective for you

## 2017-10-04 ENCOUNTER — DOCTOR'S OFFICE (OUTPATIENT)
Dept: URBAN - METROPOLITAN AREA CLINIC 136 | Facility: CLINIC | Age: 82
Setting detail: OPHTHALMOLOGY
End: 2017-10-04
Payer: COMMERCIAL

## 2017-10-04 DIAGNOSIS — H01.001: ICD-10-CM

## 2017-10-04 DIAGNOSIS — H43.392: ICD-10-CM

## 2017-10-04 DIAGNOSIS — H01.002: ICD-10-CM

## 2017-10-04 DIAGNOSIS — H33.021: ICD-10-CM

## 2017-10-04 DIAGNOSIS — H35.3211: ICD-10-CM

## 2017-10-04 DIAGNOSIS — H35.3231: ICD-10-CM

## 2017-10-04 PROCEDURE — 92134 CPTRZ OPH DX IMG PST SGM RTA: CPT | Performed by: OPHTHALMOLOGY

## 2017-10-04 PROCEDURE — 99214 OFFICE O/P EST MOD 30 MIN: CPT | Performed by: OPHTHALMOLOGY

## 2017-10-04 PROCEDURE — 67028 INJECTION EYE DRUG: CPT | Performed by: OPHTHALMOLOGY

## 2017-10-04 ASSESSMENT — REFRACTION_MANIFEST
OD_VA2: 20/
OU_VA: 20/
OD_VA1: 20/
OS_VA1: 20/
OD_VA1: 20/
OS_VA2: 20/
OD_VA3: 20/
OD_VA2: 20/
OU_VA: 20/
OD_VA3: 20/
OS_VA3: 20/
OS_VA1: 20/
OD_VA2: 20/
OS_VA2: 20/
OD_VA1: 20/
OU_VA: 20/
OS_VA2: 20/
OS_VA1: 20/
OS_VA3: 20/
OS_VA3: 20/
OD_VA3: 20/

## 2017-10-04 ASSESSMENT — REFRACTION_AUTOREFRACTION
OD_AXIS: 087
OD_CYLINDER: -1.00
OS_SPHERE: +0.25
OD_SPHERE: -1.00
OS_AXIS: 074
OS_CYLINDER: -1.75

## 2017-10-04 ASSESSMENT — REFRACTION_CURRENTRX
OD_OVR_VA: 20/
OS_OVR_VA: 20/
OD_OVR_VA: 20/
OS_OVR_VA: 20/
OD_OVR_VA: 20/
OS_OVR_VA: 20/

## 2017-10-04 ASSESSMENT — SPHEQUIV_DERIVED
OD_SPHEQUIV: -1.5
OS_SPHEQUIV: -0.625

## 2017-10-04 ASSESSMENT — LID EXAM ASSESSMENTS: OD_BLEPHARITIS: 1+

## 2017-10-04 ASSESSMENT — CONFRONTATIONAL VISUAL FIELD TEST (CVF)
OD_FINDINGS: FULL
OS_FINDINGS: FULL

## 2017-10-04 ASSESSMENT — VISUAL ACUITY
OS_BCVA: 20/400
OD_BCVA: 20/60

## 2017-10-04 ASSESSMENT — SUPERFICIAL PUNCTATE KERATITIS (SPK): OD_SPK: 2+

## 2017-10-04 ASSESSMENT — DRY EYES - PHYSICIAN NOTES: OD_GENERALCOMMENTS: INFERIORLY

## 2017-10-24 ENCOUNTER — DOCTOR'S OFFICE (OUTPATIENT)
Dept: URBAN - METROPOLITAN AREA CLINIC 136 | Facility: CLINIC | Age: 82
Setting detail: OPHTHALMOLOGY
End: 2017-10-24
Payer: COMMERCIAL

## 2017-10-24 DIAGNOSIS — H43.392: ICD-10-CM

## 2017-10-24 DIAGNOSIS — H01.002: ICD-10-CM

## 2017-10-24 DIAGNOSIS — H35.3211: ICD-10-CM

## 2017-10-24 DIAGNOSIS — H35.3221: ICD-10-CM

## 2017-10-24 DIAGNOSIS — H01.001: ICD-10-CM

## 2017-10-24 DIAGNOSIS — H33.021: ICD-10-CM

## 2017-10-24 DIAGNOSIS — H35.3231: ICD-10-CM

## 2017-10-24 PROCEDURE — 92134 CPTRZ OPH DX IMG PST SGM RTA: CPT | Performed by: OPHTHALMOLOGY

## 2017-10-24 PROCEDURE — 92014 COMPRE OPH EXAM EST PT 1/>: CPT | Performed by: OPHTHALMOLOGY

## 2017-10-24 PROCEDURE — 67028 INJECTION EYE DRUG: CPT | Performed by: OPHTHALMOLOGY

## 2017-10-24 PROCEDURE — SPECPHARM SPECIALTY PHARMACY DRUG: Performed by: OPHTHALMOLOGY

## 2017-10-24 ASSESSMENT — CONFRONTATIONAL VISUAL FIELD TEST (CVF)
OD_FINDINGS: FULL
OS_FINDINGS: FULL

## 2017-10-24 ASSESSMENT — LID EXAM ASSESSMENTS: OD_BLEPHARITIS: 1+

## 2017-10-24 ASSESSMENT — SUPERFICIAL PUNCTATE KERATITIS (SPK): OD_SPK: 2+

## 2017-10-24 ASSESSMENT — DRY EYES - PHYSICIAN NOTES: OD_GENERALCOMMENTS: INFERIORLY

## 2017-10-25 ENCOUNTER — GENERIC CONVERSION - ENCOUNTER (OUTPATIENT)
Dept: OTHER | Facility: OTHER | Age: 82
End: 2017-10-25

## 2017-10-25 ASSESSMENT — SPHEQUIV_DERIVED
OS_SPHEQUIV: -0.625
OD_SPHEQUIV: -1.5

## 2017-10-25 ASSESSMENT — REFRACTION_MANIFEST
OS_VA2: 20/
OD_VA3: 20/
OS_VA3: 20/
OS_VA2: 20/
OD_VA2: 20/
OD_VA1: 20/
OU_VA: 20/
OS_VA3: 20/
OS_VA3: 20/
OU_VA: 20/
OD_VA1: 20/
OS_VA1: 20/
OD_VA3: 20/
OS_VA2: 20/
OU_VA: 20/
OS_VA1: 20/
OD_VA2: 20/
OD_VA2: 20/
OS_VA1: 20/
OD_VA3: 20/
OD_VA1: 20/

## 2017-10-25 ASSESSMENT — REFRACTION_CURRENTRX
OD_OVR_VA: 20/
OD_OVR_VA: 20/
OS_OVR_VA: 20/
OD_OVR_VA: 20/

## 2017-10-25 ASSESSMENT — REFRACTION_AUTOREFRACTION
OD_CYLINDER: -1.00
OD_SPHERE: -1.00
OD_AXIS: 087
OS_SPHERE: +0.25
OS_CYLINDER: -1.75
OS_AXIS: 074

## 2017-10-25 ASSESSMENT — VISUAL ACUITY
OD_BCVA: 20/60-2
OS_BCVA: 20/CF-6FT

## 2017-10-26 ENCOUNTER — ALLSCRIPTS OFFICE VISIT (OUTPATIENT)
Dept: OTHER | Facility: OTHER | Age: 82
End: 2017-10-26

## 2017-11-14 ENCOUNTER — DOCTOR'S OFFICE (OUTPATIENT)
Dept: URBAN - METROPOLITAN AREA CLINIC 136 | Facility: CLINIC | Age: 82
Setting detail: OPHTHALMOLOGY
End: 2017-11-14
Payer: COMMERCIAL

## 2017-11-14 DIAGNOSIS — H35.3211: ICD-10-CM

## 2017-11-14 PROCEDURE — 67028 INJECTION EYE DRUG: CPT | Performed by: OPHTHALMOLOGY

## 2017-11-14 PROCEDURE — VITAEYE VITA EYE VITAMINS: Performed by: OPHTHALMOLOGY

## 2017-11-14 ASSESSMENT — REFRACTION_MANIFEST
OD_VA1: 20/
OS_VA3: 20/
OD_VA2: 20/
OS_VA3: 20/
OS_VA2: 20/
OS_VA1: 20/
OD_VA2: 20/
OD_VA3: 20/
OS_VA1: 20/
OD_VA1: 20/
OD_VA3: 20/
OD_VA3: 20/
OD_VA2: 20/
OD_VA1: 20/
OU_VA: 20/
OS_VA3: 20/
OS_VA2: 20/
OS_VA1: 20/
OS_VA2: 20/
OU_VA: 20/
OU_VA: 20/

## 2017-11-14 ASSESSMENT — REFRACTION_CURRENTRX
OD_OVR_VA: 20/
OS_OVR_VA: 20/
OS_OVR_VA: 20/
OD_OVR_VA: 20/
OD_OVR_VA: 20/
OS_OVR_VA: 20/

## 2017-11-14 ASSESSMENT — REFRACTION_AUTOREFRACTION
OD_SPHERE: -1.00
OS_CYLINDER: -1.75
OD_AXIS: 087
OS_AXIS: 074
OD_CYLINDER: -1.00
OS_SPHERE: +0.25

## 2017-11-14 ASSESSMENT — SPHEQUIV_DERIVED
OD_SPHEQUIV: -1.5
OS_SPHEQUIV: -0.625

## 2017-11-14 ASSESSMENT — VISUAL ACUITY
OS_BCVA: 20/CF-6FT
OD_BCVA: 20/60-2

## 2017-12-07 ENCOUNTER — DOCTOR'S OFFICE (OUTPATIENT)
Dept: URBAN - METROPOLITAN AREA CLINIC 136 | Facility: CLINIC | Age: 82
Setting detail: OPHTHALMOLOGY
End: 2017-12-07
Payer: COMMERCIAL

## 2017-12-07 DIAGNOSIS — H35.3231: ICD-10-CM

## 2017-12-07 DIAGNOSIS — H33.021: ICD-10-CM

## 2017-12-07 DIAGNOSIS — H35.3211: ICD-10-CM

## 2017-12-07 DIAGNOSIS — H31.002: ICD-10-CM

## 2017-12-07 PROCEDURE — 92012 INTRM OPH EXAM EST PATIENT: CPT | Performed by: OPHTHALMOLOGY

## 2017-12-07 PROCEDURE — 67028 INJECTION EYE DRUG: CPT | Performed by: OPHTHALMOLOGY

## 2017-12-07 PROCEDURE — 92134 CPTRZ OPH DX IMG PST SGM RTA: CPT | Performed by: OPHTHALMOLOGY

## 2017-12-07 ASSESSMENT — DRY EYES - PHYSICIAN NOTES: OD_GENERALCOMMENTS: INFERIORLY

## 2017-12-07 ASSESSMENT — CONFRONTATIONAL VISUAL FIELD TEST (CVF)
OD_FINDINGS: FULL
OS_FINDINGS: FULL

## 2017-12-07 ASSESSMENT — SUPERFICIAL PUNCTATE KERATITIS (SPK): OD_SPK: 2+

## 2017-12-07 ASSESSMENT — LID EXAM ASSESSMENTS: OD_BLEPHARITIS: 1+

## 2017-12-11 ASSESSMENT — REFRACTION_MANIFEST
OS_VA3: 20/
OS_VA2: 20/
OD_VA1: 20/
OS_VA1: 20/
OS_VA3: 20/
OD_VA3: 20/
OD_VA2: 20/
OD_VA2: 20/
OS_VA2: 20/
OU_VA: 20/
OS_VA1: 20/
OU_VA: 20/
OS_VA3: 20/
OD_VA3: 20/
OD_VA1: 20/
OD_VA2: 20/
OS_VA2: 20/
OS_VA1: 20/
OD_VA1: 20/
OD_VA3: 20/
OU_VA: 20/

## 2017-12-11 ASSESSMENT — REFRACTION_CURRENTRX
OD_OVR_VA: 20/
OS_OVR_VA: 20/

## 2017-12-11 ASSESSMENT — REFRACTION_AUTOREFRACTION
OS_CYLINDER: -1.75
OD_CYLINDER: -1.00
OD_AXIS: 087
OS_SPHERE: +0.25
OS_AXIS: 074
OD_SPHERE: -1.00

## 2017-12-11 ASSESSMENT — VISUAL ACUITY
OD_BCVA: 20/50+1
OS_BCVA: 20/125

## 2017-12-11 ASSESSMENT — SPHEQUIV_DERIVED
OD_SPHEQUIV: -1.5
OS_SPHEQUIV: -0.625

## 2017-12-26 ENCOUNTER — DOCTOR'S OFFICE (OUTPATIENT)
Dept: URBAN - METROPOLITAN AREA CLINIC 136 | Facility: CLINIC | Age: 82
Setting detail: OPHTHALMOLOGY
End: 2017-12-26
Payer: COMMERCIAL

## 2017-12-26 DIAGNOSIS — H35.3231: ICD-10-CM

## 2017-12-26 DIAGNOSIS — H35.3211: ICD-10-CM

## 2017-12-26 PROCEDURE — 67028 INJECTION EYE DRUG: CPT | Performed by: OPHTHALMOLOGY

## 2017-12-26 PROCEDURE — 92134 CPTRZ OPH DX IMG PST SGM RTA: CPT | Performed by: OPHTHALMOLOGY

## 2017-12-27 ASSESSMENT — REFRACTION_AUTOREFRACTION
OD_SPHERE: -1.00
OD_CYLINDER: -1.00
OS_AXIS: 074
OD_AXIS: 087
OS_SPHERE: +0.25
OS_CYLINDER: -1.75

## 2017-12-27 ASSESSMENT — VISUAL ACUITY
OD_BCVA: 20/50+1
OS_BCVA: 20/400

## 2017-12-27 ASSESSMENT — SPHEQUIV_DERIVED
OD_SPHEQUIV: -1.5
OS_SPHEQUIV: -0.625

## 2017-12-28 ENCOUNTER — DOCTOR'S OFFICE (OUTPATIENT)
Dept: URBAN - METROPOLITAN AREA CLINIC 136 | Facility: CLINIC | Age: 82
Setting detail: OPHTHALMOLOGY
End: 2017-12-28
Payer: COMMERCIAL

## 2017-12-28 DIAGNOSIS — H10.12: ICD-10-CM

## 2017-12-28 PROCEDURE — 99211 OFF/OP EST MAY X REQ PHY/QHP: CPT | Performed by: OPHTHALMOLOGY

## 2017-12-28 ASSESSMENT — SUPERFICIAL PUNCTATE KERATITIS (SPK): OD_SPK: 2+

## 2017-12-28 ASSESSMENT — DRY EYES - PHYSICIAN NOTES: OD_GENERALCOMMENTS: INFERIORLY

## 2017-12-28 ASSESSMENT — LID EXAM ASSESSMENTS: OD_BLEPHARITIS: 1+

## 2017-12-29 ENCOUNTER — DOCTOR'S OFFICE (OUTPATIENT)
Dept: URBAN - METROPOLITAN AREA CLINIC 136 | Facility: CLINIC | Age: 82
Setting detail: OPHTHALMOLOGY
End: 2017-12-29
Payer: COMMERCIAL

## 2017-12-29 ENCOUNTER — RX ONLY (RX ONLY)
Age: 82
End: 2017-12-29

## 2017-12-29 DIAGNOSIS — H10.12: ICD-10-CM

## 2017-12-29 PROCEDURE — 99213 OFFICE O/P EST LOW 20 MIN: CPT | Performed by: OPTOMETRIST

## 2017-12-29 ASSESSMENT — REFRACTION_CURRENTRX
OD_OVR_VA: 20/
OS_OVR_VA: 20/
OS_OVR_VA: 20/
OD_OVR_VA: 20/
OS_OVR_VA: 20/
OD_OVR_VA: 20/
OS_OVR_VA: 20/
OS_OVR_VA: 20/
OD_OVR_VA: 20/
OS_OVR_VA: 20/
OD_OVR_VA: 20/
OD_OVR_VA: 20/

## 2017-12-29 ASSESSMENT — REFRACTION_AUTOREFRACTION
OD_SPHERE: -1.00
OS_SPHERE: +0.25
OD_AXIS: 087
OD_AXIS: 087
OS_CYLINDER: -1.75
OS_AXIS: 074
OD_SPHERE: -1.00
OS_AXIS: 074
OS_CYLINDER: -1.75
OD_CYLINDER: -1.00
OD_CYLINDER: -1.00
OS_SPHERE: +0.25

## 2017-12-29 ASSESSMENT — REFRACTION_MANIFEST
OD_VA3: 20/
OD_VA3: 20/
OS_VA2: 20/
OU_VA: 20/
OD_VA2: 20/
OS_VA3: 20/
OS_VA1: 20/
OD_VA2: 20/
OS_VA3: 20/
OS_VA1: 20/
OD_VA2: 20/
OS_VA1: 20/
OU_VA: 20/
OS_VA2: 20/
OD_VA2: 20/
OS_VA3: 20/
OS_VA1: 20/
OS_VA3: 20/
OS_VA1: 20/
OD_VA1: 20/
OS_VA2: 20/
OD_VA1: 20/
OD_VA3: 20/
OU_VA: 20/
OU_VA: 20/
OS_VA1: 20/
OD_VA1: 20/
OD_VA3: 20/
OU_VA: 20/
OD_VA2: 20/
OD_VA1: 20/
OS_VA2: 20/
OD_VA2: 20/
OD_VA3: 20/
OU_VA: 20/
OS_VA2: 20/
OS_VA3: 20/
OS_VA2: 20/
OD_VA3: 20/
OS_VA3: 20/

## 2017-12-29 ASSESSMENT — VISUAL ACUITY
OD_BCVA: 20/70-1
OS_BCVA: 20/400
OD_BCVA: 20/60-2
OS_BCVA: 20/400

## 2017-12-29 ASSESSMENT — SPHEQUIV_DERIVED
OS_SPHEQUIV: -0.625
OD_SPHEQUIV: -1.5
OS_SPHEQUIV: -0.625
OD_SPHEQUIV: -1.5

## 2017-12-29 ASSESSMENT — LID EXAM ASSESSMENTS: OD_BLEPHARITIS: 1+

## 2017-12-29 ASSESSMENT — CONFRONTATIONAL VISUAL FIELD TEST (CVF)
OD_FINDINGS: FULL
OS_FINDINGS: FULL

## 2017-12-29 ASSESSMENT — DRY EYES - PHYSICIAN NOTES: OD_GENERALCOMMENTS: INFERIORLY

## 2017-12-29 ASSESSMENT — SUPERFICIAL PUNCTATE KERATITIS (SPK): OD_SPK: 2+

## 2018-01-03 ENCOUNTER — APPOINTMENT (OUTPATIENT)
Dept: LAB | Facility: MEDICAL CENTER | Age: 83
End: 2018-01-03
Payer: MEDICARE

## 2018-01-03 ENCOUNTER — LAB REQUISITION (OUTPATIENT)
Dept: LAB | Facility: HOSPITAL | Age: 83
End: 2018-01-03
Payer: MEDICARE

## 2018-01-03 ENCOUNTER — GENERIC CONVERSION - ENCOUNTER (OUTPATIENT)
Dept: OTHER | Facility: OTHER | Age: 83
End: 2018-01-03

## 2018-01-03 ENCOUNTER — APPOINTMENT (OUTPATIENT)
Dept: RADIOLOGY | Facility: MEDICAL CENTER | Age: 83
End: 2018-01-03
Payer: MEDICARE

## 2018-01-03 ENCOUNTER — TRANSCRIBE ORDERS (OUTPATIENT)
Dept: RADIOLOGY | Facility: MEDICAL CENTER | Age: 83
End: 2018-01-03

## 2018-01-03 ENCOUNTER — ALLSCRIPTS OFFICE VISIT (OUTPATIENT)
Dept: OTHER | Facility: OTHER | Age: 83
End: 2018-01-03

## 2018-01-03 DIAGNOSIS — J18.1 LOBAR PNEUMONIA (HCC): ICD-10-CM

## 2018-01-03 DIAGNOSIS — J44.1 CHRONIC OBSTRUCTIVE PULMONARY DISEASE WITH ACUTE EXACERBATION (HCC): ICD-10-CM

## 2018-01-03 DIAGNOSIS — D72.829 ELEVATED WHITE BLOOD CELL COUNT: ICD-10-CM

## 2018-01-03 LAB
BASOPHILS # BLD AUTO: 0.01 THOUSANDS/ΜL (ref 0–0.1)
BASOPHILS NFR BLD AUTO: 0 % (ref 0–1)
EOSINOPHIL # BLD AUTO: 0.03 THOUSAND/ΜL (ref 0–0.61)
EOSINOPHIL NFR BLD AUTO: 0 % (ref 0–6)
ERYTHROCYTE [DISTWIDTH] IN BLOOD BY AUTOMATED COUNT: 15.7 % (ref 11.6–15.1)
HCT VFR BLD AUTO: 43.1 % (ref 36.5–49.3)
HGB BLD-MCNC: 14.4 G/DL (ref 12–17)
LYMPHOCYTES # BLD AUTO: 2.1 THOUSANDS/ΜL (ref 0.6–4.47)
LYMPHOCYTES NFR BLD AUTO: 17 % (ref 14–44)
MCH RBC QN AUTO: 29.7 PG (ref 26.8–34.3)
MCHC RBC AUTO-ENTMCNC: 33.4 G/DL (ref 31.4–37.4)
MCV RBC AUTO: 89 FL (ref 82–98)
MONOCYTES # BLD AUTO: 2.11 THOUSAND/ΜL (ref 0.17–1.22)
MONOCYTES NFR BLD AUTO: 17 % (ref 4–12)
NEUTROPHILS # BLD AUTO: 7.92 THOUSANDS/ΜL (ref 1.85–7.62)
NEUTS SEG NFR BLD AUTO: 66 % (ref 43–75)
NRBC BLD AUTO-RTO: 0 /100 WBCS
PLATELET # BLD AUTO: 238 THOUSANDS/UL (ref 149–390)
PMV BLD AUTO: 11 FL (ref 8.9–12.7)
RBC # BLD AUTO: 4.85 MILLION/UL (ref 3.88–5.62)
WBC # BLD AUTO: 12.2 THOUSAND/UL (ref 4.31–10.16)

## 2018-01-03 PROCEDURE — 71046 X-RAY EXAM CHEST 2 VIEWS: CPT

## 2018-01-03 PROCEDURE — 85025 COMPLETE CBC W/AUTO DIFF WBC: CPT

## 2018-01-03 PROCEDURE — 87798 DETECT AGENT NOS DNA AMP: CPT | Performed by: FAMILY MEDICINE

## 2018-01-03 PROCEDURE — 36415 COLL VENOUS BLD VENIPUNCTURE: CPT

## 2018-01-04 ENCOUNTER — GENERIC CONVERSION - ENCOUNTER (OUTPATIENT)
Dept: OTHER | Facility: OTHER | Age: 83
End: 2018-01-04

## 2018-01-04 LAB
FLUAV AG SPEC QL: NORMAL
FLUBV AG SPEC QL: NORMAL
RSV B RNA SPEC QL NAA+PROBE: NORMAL

## 2018-01-04 NOTE — PROGRESS NOTES
Assessment   1  Former smoker (V15 82) (H66 182)   2  No advance directives (V49 89) (Z78 9)   3  COPD with acute exacerbation (491 21) (J44 1)    Plan   COPD with acute exacerbation    · Benzonatate 200 MG Oral Capsule; TAKE 1 CAPSULE 3 TIMES DAILY AS    NEEDED   · Cefdinir 300 MG Oral Capsule; TAKE 1 CAPSULE EVERY 12 HOURS UNTIL    GONE   · Oseltamivir Phosphate 75 MG Oral Capsule (Tamiflu); Take 1 capsule twice daily   · (1) CBC/PLT/DIFF; Status:Active; Requested VDR:58HFK2839;    · * XR CHEST PA & LATERAL; Status:Active; Requested PYX:14LPZ5717;    · Influenza A/B PCR Rfx; Status:Active; Requested LCW:83ZBS2728;     Chief Complaint   1  Cold Symptoms    History of Present Illness   Cold Symptoms:    Rhys Avina presents with complaints of gradual onset of constant episodes of moderate cold symptoms  Episodes started about 5 days ago  He is currently experiencing cold symptoms  Associated symptoms include sneezing,-- nasal congestion,-- runny nose,-- post nasal drainage,-- sore throat,-- productive cough-- and-- fever  Review of Systems        Constitutional: fever,-- feeling poorly-- and-- feeling tired  ENT: no complaints of earache, no loss of hearing, no nosebleeds or nasal discharge, no sore throat or hoarseness  Cardiovascular: no complaints of slow or fast heart rate, no chest pain, no palpitations, no leg claudication or lower extremity edema  Respiratory: shortness of breath-- and-- cough  Gastrointestinal: no complaints of abdominal pain, no constipation, no nausea or vomiting, no diarrhea or bloody stools  Genitourinary: no complaints of dysuria or incontinence, no hesitancy, no nocturia, no genital lesion, no inadequacy of penile erection  Musculoskeletal: Possible left rib fractures from a fall  ROS reviewed  Active Problems   1  Admission for long-term (current) use of other medications (V58 69) (Z51 81)   2   Advanced directives, counseling/discussion (V65 49) (Z71 89)   3  Allergic rhinitis (477 9) (J30 9)   4  Allergy To Pollens (V15 09)   5  Anemia (285 9) (D64 9)   6  Ankle swelling (719 07) (M25 473)   7  Bladder cancer (188 9) (C67 9)   8  Chronic diarrhea of unknown origin (787 91) (K52 9)   9  Depression screening (V79 0) (Z13 89)   10  Diverticulitis of colon (562 11) (K57 32)   11  Encounter for prostate cancer screening (V76 44) (Z12 5)   12  Erectile dysfunction (607 84) (N52 9)   13  Esophageal reflux (530 81) (K21 9)   14  Exercise counseling (V65 41) (Z71 82)   15  Gait instability (781 2) (R26 81)   16  Glaucoma screening (V80 1) (Z13 5)   17  Hyperlipidemia (272 4) (E78 5)   18  Hypertension (401 9) (I10)   19  Hypothyroidism (244 9) (E03 9)   20  Impotence (607 84) (N52 9)   21  Lesion of bladder (596 9) (N32 9)   22  Lumbar canal stenosis (724 02) (M48 061)   23  Lymphocytic colitis (558 9) (K52 832)   24  Macular Degeneration Both Eyes   25  Medicare annual wellness visit, subsequent (V70 0) (Z00 00)   26  Need for influenza vaccination (V04 81) (Z23)   27  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)   28  Peripheral neuropathy (356 9) (G62 9)   29  Prolonged PTT (partial thromboplastin time) (790 92) (R79 1)   30  Screening for genitourinary condition (V81 6) (Z13 89)   31  Screening for neurological condition (V80 09) (Z13 89)   32  Spondylosis 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 (721 0) (M47 812)   33  Von Willebrand disease, type I (286 4) (D68 0)   34  Weight loss (783 21) (R63 4)    Past Medical History   1  History of Acute sinusitis (461 9) (J01 90)   2  History of Acute sinusitis (461 9) (J01 90)   3  History of Acute URI (465 9) (J06 9)   4  History of Cerebral Contusion With Concussion (851 09)   5  History of Colonoscopy (Fiberoptic) Screening   6  History of Contusion of skin with intact surface (924 9) (T14 8XXA)   7  History of 's permit physical examination (V70 3) (Z02 4)   8   History of Foot swelling (729 81) (M79 89)   9  History of influenza vaccination (V49 89) (Z92 29)   10  History of nausea (V12 79) (Z87 898)   11  History of nausea and vomiting (V12 79) (Z87 898)   12  History of Influenza (487 1) (J11 1)   13  History of Injury, foot (959 7) (S99 929A)   14  History of Intentional Injury By Another Person (O192 7)   15  History of Preop cardiovascular exam (V72 81) (Z01 810)   16  History of Shoulder Surgery   17  History of Special screening examination for neoplasm of prostate (V76 44) (Z12 5)  Active Problems And Past Medical History Reviewed: The active problems and past medical history were reviewed and updated today  Family History   Father    1  Family history of Acute Gastric Ulcer  Family History Reviewed: The family history was reviewed and updated today  Social History    · Alcohol Use (History)   · Former smoker (V15 82) (M77 811)   · No advance directives (V49 89) (Z78 9)   · Patient has living will (V49 89) (Z78 9)   · History of Smokes a pipe (305 1) (F17 290)  The social history was reviewed and updated today  The social history was reviewed and is unchanged  Surgical History   1  History of Adenoidectomy   2  History of Appendectomy   3  History of Arthrodesis Lumbar   4  History of Back Surgery   5  History of Cataract Surgery   6  History of Colonoscopy (Fiberoptic)   7  History of Cystoscopy With Resection Of Tumor   8  History of Diagnostic Cystoscopy   9  History of Neck Surgery   10  History of Neuroplasty Median Nerve At Carpal Tunnel   11  History of Retinal Detachment Complete Air Fill Confirmed   12  History of Tonsillectomy With Adenoidectomy  Surgical History Reviewed: The surgical history was reviewed and updated today  Current Meds    1  Atorvastatin Calcium 10 MG Oral Tablet; Take one tablet daily;      Therapy: 61WVW6432 to (Evaluate:09Oct2017)  Requested for: 12Apr2017; Last     Rx:12Apr2017 Ordered   2  B Complex Vitamins CAPS; TAKE 1 CAPSULE Daily Recorded   3  Cetirizine HCl - 10 MG Oral Tablet; take 1 tablet by mouth daily; Therapy: 27YET1738 to (Last Rx:74Lhq3225)  Requested for: 91ZPX7947 Ordered   4  Co Q10 60 MG Oral Capsule; TAKE 1 CAPSULE Daily Recorded   5  Gabapentin 600 MG Oral Tablet; TAKE ONE TABLET BY MOUTH 2 TIMES A DAY; Therapy: 42Ukd8976 to (Evaluate:20Jan2018)  Requested for: 84Fhd3010; Last     Rx:91Wxq9211 Ordered   6  Ginkgo Biloba 120 MG Oral Capsule; Take as directed Recorded   7  Ipratropium Bromide 0 06 % Nasal Solution; INSTILL 2 SPRAYS IN EACH NOSTRIL     TWICE DAILY; Therapy: 35UDJ6394 to (SIBXCXTZ:77ORE0211)  Requested for: 80Fvs6873; Last     Rx:39Hqh5582 Ordered   8  Lutein CAPS; Therapy: ([place]) to Recorded   9  Melatonin CAPS; Therapy: (Lue [place]) to Recorded   10  Milk Thistle CAPS; TAKE 1 CAPSULE Daily Recorded   11  Omeprazole 40 MG Oral Capsule Delayed Release; TAKE ONE CAPSULE BY MOUTH 2      TIMES A DAY; Therapy: 02HBF5920 to (Evaluate:20Jan2018)  Requested for: 09Qgq7414; Last      Rx:53Qpi9933 Ordered   12  SLPG Compound Medication; Called [facility] - supplement provided by Dr Doris Escalante; Therapy: (LVCSZDSE:65XMY8035) to Recorded   13  Viagra 50 MG Oral Tablet; take as directed 1 tab up to 4 hrs before intercourse Maximum      1 tab in 24; Therapy: 56Sat2775 to (Last Rx:97Jpz3503)  Requested for: 71Dqa4181 Ordered   14  Vitamin D3 5000 UNIT Oral Capsule; TAKE AS DIRECTED 1 Bid Recorded     The medication list was reviewed and updated today  Allergies   1  No Known Drug Allergies    Vitals    Recorded: 95OEC6466 01:41PM   Temperature 576 9 F   Systolic 782   Diastolic 84   Height 6 ft 5 in   Weight 185 lb    BMI Calculated 21 94   BSA Calculated 2 16     Physical Exam        Constitutional      General appearance: No acute distress, well appearing and well nourished         Ears, Nose, Mouth, and Throat      External inspection of ears and nose: Normal        Otoscopic examination: Tympanic membrance translucent with normal light reflex  Canals patent without erythema  Nasal mucosa, septum, and turbinates: Abnormal  -- hyperemic  Pulmonary      Respiratory effort: No increased work of breathing or signs of respiratory distress  Auscultation of lungs: Abnormal  -- coarse breath sounds both bases  Cardiovascular      Palpation of heart: Normal PMI, no thrills  Auscultation of heart: Normal rate and rhythm, normal S1 and S2, without murmurs  Examination of extremities for edema and/or varicosities: Normal        Abdomen      Abdomen: Non-tender, no masses  Liver and spleen: No hepatomegaly or splenomegaly  Musculoskeletal      Gait and station: Normal           Future Appointments      Date/Time Provider Specialty Site   02/13/2018 01:00 PM DILIP Jenkins   Urology [facility]     Signatures    Electronically signed by : Arnold Tillman DO; Seven  3 2018  2:09PM EST                       (Author)

## 2018-01-10 NOTE — RESULT NOTES
Verified Results  (1) C  DIFFICILE TOXIN BY PCR [id number] 11:32AM Ankita Johnson    Order Number: [id number]     Test Name Result Flag Reference   C  DIFFICILE TOXIN BY PCR   NEGATIVE for C difficle toxin by PCR  NEGATIVE for C difficle toxin by PCR

## 2018-01-11 NOTE — MISCELLANEOUS
Message   Recorded as Task   Date: 03/06/2017 10:12 AM, Created By: Darci Galeazzi   Task Name: Call Back   Assigned To: Adriana Yeager   Regarding Patient: Ghada Rice, Status: Active   CommentAlvinketurah Keene - 06 Mar 2017 10:12 AM     TASK CREATED  Patient would like results from lab work in January  Adriana Yeager - 06 Mar 2017 4:44 PM     TASK EDITED  Lab results were printed and given to Dr Dora Abel for review  Dr Dora Abel will call and discuss results with the patient  Active Problems    1  3-vessel Coronary Artery Stenosis (414 01)   2  Admission for long-term (current) use of other medications (V58 69) (Z51 81)   3  Advanced directives, counseling/discussion (V65 49) (Z71 89)   4  Allergic rhinitis (477 9) (J30 9)   5  Allergy To Pollens (V15 09)   6  Anemia (285 9) (D64 9)   7  Bladder cancer (188 9) (C67 9)   8  Bladder tumor (239 4) (D49 4)   9  Cerebral Contusion With Concussion (851 09)   10  Chronic diarrhea of unknown origin (787 91) (K52 9)   11  Depression screening (V79 0) (Z13 89)   12  Diverticulitis of colon (562 11) (K57 32)   13  's permit physical examination (V70 3) (Z02 4)   14  Encounter for prostate cancer screening (V76 44) (Z12 5)   15  Erectile dysfunction (607 84) (N52 9)   16  Esophageal reflux (530 81) (K21 9)   17  Exercise counseling (V65 41) (Z71 89)   18  Gait instability (781 2) (R26 81)   19  Glaucoma screening (V80 1) (Z13 5)   20  High blood pressure (401 9) (I10)   21  Hyperlipidemia (272 4) (E78 5)   22  Hypertension (401 9) (I10)   23  Hypothyroidism (244 9) (E03 9)   24  Impotence (607 84) (N52 9)   25  Lesion of bladder (596 9) (N32 9)   26  Lumbar canal stenosis (724 02) (M48 06)   27  Macular Degeneration Both Eyes   28  Medicare annual wellness visit, subsequent (V70 0) (Z00 00)   29  Nausea (787 02) (R11 0)   30  Nausea and vomiting (787 01) (R11 2)   31  Need for influenza vaccination (V04 81) (Z23)   32   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)   33  Peripheral neuropathy (356 9) (G62 9)   34  Preop cardiovascular exam (V72 81) (Z01 810)   35  Prolonged PTT (partial thromboplastin time) (790 92) (R79 1)   36  Renal mass (593 9) (N28 89)   37  Screening for genitourinary condition (V81 6) (Z13 89)   38  Screening for neurological condition (V80 09) (Z13 89)   39  Shoulder Surgery   40  Spondylosis 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 (721 0) (M47 812)   41  Von Willebrand disease, type I (286 4) (D68 0)   42  Weight loss (783 21) (R63 4)    Current Meds   1  AmLODIPine Besylate 5 MG Oral Tablet; TAKE 1 TABLET DAILY; Therapy: 11Dwv1076 to (Evaluate:27Apr2017)  Requested for: 31Wkg1306; Last   Rx:02Lha4239 Ordered   2  B Complex Vitamins CAPS; TAKE 1 CAPSULE Daily Recorded   3  Cetirizine HCl - 10 MG Oral Tablet; take one tablet by mouth every day; Therapy: 15NXK0416 to (Last Rx:93Zbt8161)  Requested for: 79Svx3796 Ordered   4  Co Q10 60 MG Oral Capsule; TAKE 1 CAPSULE Daily Recorded   5  Gabapentin 600 MG Oral Tablet; TAKE ONE TABLET BY MOUTH 2 TIMES A DAY; Therapy: 43Puj7334 to (Last Rx:84Qnw0125)  Requested for: 28Rwa3826 Ordered   6  Ginkgo Biloba 120 MG Oral Capsule; Take as directed Recorded   7  Ipratropium Bromide 0 06 % Nasal Solution; INSTILL 2 SPRAYS IN EACH NOSTRIL   TWICE DAILY; Therapy: 05CKP6354 to (Last Dayo Recinos)  Requested for: 25Jan2017 Ordered   8  Lipitor 10 MG Oral Tablet (Atorvastatin Calcium); One every other evening; Therapy: 90KLV0229 to Recorded   9  Lutein CAPS; Therapy: (Recorded:06Oyk9122) to Recorded   10  Melatonin CAPS; Therapy: (Recorded:96Rtr0433) to Recorded   11  Milk Thistle CAPS; TAKE 1 CAPSULE Daily Recorded   12  Omeprazole 40 MG Oral Capsule Delayed Release; TAKE ONE CAPSULE BY MOUTH 2    TIMES A DAY; Therapy: 45AJD3669 to (Last Rx:47Qao9290)  Requested for: 95VVX3028 Ordered   13   Viagra 50 MG Oral Tablet; take as directed 1 tab up to 4 hrs before intercourse Maximum 1 tab in 24; Therapy: 83Lqe5266 to (Last Rx:25Jeg6391)  Requested for: 63Tdk4421 Ordered   14  Vitamin D3 5000 UNIT Oral Capsule; TAKE AS DIRECTED 1 Bid Recorded    Allergies    1  No Known Drug Allergies    Signatures   Electronically signed by :  Gustabo Cates RN; Mar  6 2017  4:44PM EST                       (Author)

## 2018-01-11 NOTE — CONSULTS
History of Present Illness  Pre-Op Visit (Brief): The patient is being seen for a preoperative visit  The procedure is a(n) cystoscopic with removal of bladder mass scheduled for October 2, 2017 with Dr Darlin Gates  The indication for surgery is bladder mass  Surgical Risk Assessment:   Prior Anesthesia: He had prior anesthesia, no prior adverse reaction to edidural anesthesia, no prior adverse reaction to spinal anesthesia and no prior adverse reaction to general anesthesia  Pertinent Past Medical History: COPD, but no angina, no arrhythmia, no CAD, CAD without prior MI, CAD without recent PCI, no CHF, no chronic liver disease, no acute hepatitis, no coagulation delay, no primary hypercoagulable state, no secondary hypercoagulable state, no pulmonary embolism, no DVT, does not use anticoagulants, no diabetes, does not use insulin, no thyroid disease, no neck osteoarthrosis, no TMJ osteoarthrosis, does not wear dentures, no seizure disorder, no CVA, no asthma, not LAYLA, no renal disease, no low serum albumin and no obesity  Exercise Capacity: unable to walk four blocks without symptoms and unable to walk two flights of stairs without symptoms  Lifestyle Factors: uses tobacco and denies illegal drug use  Symptoms: frequent nosebleeds and no dyspnea  Other LAYLA risk factors include male gender and age over 48, but normal BMI and normal neck circumference  Predicted risk of LAYLA: Mild  Pertinent Family History: no pertinent family history  Living Situation: home is secure and supportive and no post-op concerns with his living situation  HPI: patient here for preop clearance      Review of Systems    Constitutional: No fever or chills, feels well, no tiredness, no recent weight gain or weight loss  Eyes: No complaints of eye pain, no red eyes, no discharge from eyes, no itchy eyes  ENT: no complaints of earache, no hearing loss, no nosebleeds, no nasal discharge, no sore throat, no hoarseness     Cardiovascular: No complaints of slow heart rate, no fast heart rate, no chest pain, no palpitations, no leg claudication, no lower extremity  Respiratory: No complaints of shortness of breath, no wheezing, no cough, no SOB on exertion, no orthopnea or PND  Gastrointestinal: No complaints of abdominal pain, no constipation, no nausea or vomiting, no diarrhea or bloody stools  Genitourinary: No complaints of dysuria, no incontinence, no hesitancy, no nocturia, no genital lesion, no testicular pain  Musculoskeletal: No complaints of arthralgia, no myalgias, no joint swelling or stiffness, no limb pain or swelling  Integumentary: No complaints of skin rash or skin lesions, no itching, no skin wound, no dry skin  Neurological: No compliants of headache, no confusion, no convulsions, no numbness or tingling, no dizziness or fainting, no limb weakness, no difficulty walking  Psychiatric: Is not suicidal, no sleep disturbances, no anxiety or depression, no change in personality, no emotional problems  Endocrine: No complaints of proptosis, no hot flashes, no muscle weakness, no erectile dysfunction, no deepening of the voice, no feelings of weakness  Hematologic/Lymphatic: No complaints of swollen glands, no swollen glands in the neck, does not bleed easily, no easy bruising  ROS reviewed  Active Problems    1  Admission for long-term (current) use of other medications (V58 69) (Z51 81)   2  Advanced directives, counseling/discussion (V65 49) (Z71 89)   3  Allergic rhinitis (477 9) (J30 9)   4  Allergy To Pollens (V15 09)   5  Anemia (285 9) (D64 9)   6  Ankle swelling (719 07) (M25 473)   7  Bladder cancer (188 9) (C67 9)   8  Bladder tumor (239 4) (D49 4)   9  Cerebral Contusion With Concussion (851 09)   10  Chronic diarrhea of unknown origin (787 91) (K52 9)   11  Depression screening (V79 0) (Z13 89)   12  Diverticulitis of colon (562 11) (K57 32)   13   's permit physical examination (V70 3) (Z02 4) 14  Encounter for prostate cancer screening (V76 44) (Z12 5)   15  Erectile dysfunction (607 84) (N52 9)   16  Esophageal reflux (530 81) (K21 9)   17  Exercise counseling (V65 41) (Z71 89)   18  Flu vaccine need (V04 81) (Z23)   19  Foot swelling (729 81) (M79 89)   20  Gait instability (781 2) (R26 81)   21  Glaucoma screening (V80 1) (Z13 5)   22  High blood pressure (401 9) (I10)   23  Hyperlipidemia (272 4) (E78 5)   24  Hypertension (401 9) (I10)   25  Hypothyroidism (244 9) (E03 9)   26  Impotence (607 84) (N52 9)   27  Injury, foot (959 7) (S99 929A)   28  Lesion of bladder (596 9) (N32 9)   29  Lumbar canal stenosis (724 02) (M48 06)   30  Lymphocytic colitis (558 9) (K52 832)   31  Macular Degeneration Both Eyes   32  Medicare annual wellness visit, subsequent (V70 0) (Z00 00)   33  Nausea (787 02) (R11 0)   34  Nausea and vomiting (787 01) (R11 2)   35  Need for influenza vaccination (V04 81) (Z23)   36  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)   37  Peripheral neuropathy (356 9) (G62 9)   38  Preop cardiovascular exam (V72 81) (Z01 810)   39  Prolonged PTT (partial thromboplastin time) (790 92) (R79 1)   40  Renal mass (593 9) (N28 89)   41  Screening for genitourinary condition (V81 6) (Z13 89)   42  Screening for neurological condition (V80 09) (Z13 89)   43  Screening for neurological condition (V80 09) (Z13 89)   44  Shoulder Surgery   45  Spondylosis 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 (721 0) (M47 812)   46  Von Willebrand disease, type I (286 4) (D68 0)   47   Weight loss (783 21) (R63 4)    Past Medical History    · History of Acute sinusitis (461 9) (J01 90)   · History of Acute sinusitis (461 9) (J01 90)   · History of Acute URI (465 9) (J06 9)   · History of Colonoscopy (Fiberoptic) Screening   · History of Contusion of skin with intact surface (924 9) (T14 8)   · History of Influenza (487 1) (J11 1)   · History of Intentional Injury By Another Person (B056 6)   · History of Special screening examination for neoplasm of prostate (V76 44) (Z12 5)    The active problems and past medical history were reviewed and updated today  Surgical History    · History of Adenoidectomy   · History of Appendectomy   · History of Arthrodesis Lumbar   · History of Back Surgery   · History of Cataract Surgery   · History of Colonoscopy (Fiberoptic)   · History of Cystoscopy With Resection Of Tumor   · History of Diagnostic Cystoscopy   · History of Neck Surgery   · History of Neuroplasty Median Nerve At Carpal Tunnel   · History of Retinal Detachment Complete Air Fill Confirmed   · Shoulder Surgery   · History of Tonsillectomy With Adenoidectomy    The surgical history was reviewed and updated today  Family History    · Family history of Acute Gastric Ulcer    The family history was reviewed and updated today  Social History    · Alcohol Use (History)   · Former smoker (V15 82) (L64 513)   · No advance directives (V49 89) (Z78 9)   · Patient has living will (V49 89) (Z78 9)   · Smokes a pipe (305 1) (F17 290)  The social history was reviewed and updated today  The social history was reviewed and is unchanged  Current Meds   1  Atorvastatin Calcium 10 MG Oral Tablet (Lipitor); Take one tablet daily; Therapy: 68HOL7868 to (Evaluate:09Oct2017)  Requested for: 18Pzx3173; Last   Rx:12Apr2017 Ordered   2  B Complex Vitamins CAPS; TAKE 1 CAPSULE Daily Recorded   3  Cetirizine HCl - 10 MG Oral Tablet; take one tablet by mouth every day; Therapy: 90MKE2904 to (Last Rx:90Jnd9835)  Requested for: 30Tcs9574 Ordered   4  Co Q10 60 MG Oral Capsule; TAKE 1 CAPSULE Daily Recorded   5  Gabapentin 600 MG Oral Tablet; TAKE ONE TABLET BY MOUTH 2 TIMES A DAY; Therapy: 50Zks6007 to (Evaluate:20Jan2018)  Requested for: 28Wxy7530; Last   Rx:35Bvv3253 Ordered   6  Ginkgo Biloba 120 MG Oral Capsule; Take as directed Recorded   7   Ipratropium Bromide 0 06 % Nasal Solution; INSTILL 2 SPRAYS IN EACH NOSTRIL TWICE DAILY; Therapy: 97QIB6413 to ([id number])  Requested for: 54Spx8490; Last   Rx:30Qil2587 Ordered   8  Lutein CAPS; Therapy: (Tim Tabor) to Recorded   9  Melatonin CAPS; Therapy: (Tim Tabor) to Recorded   10  Milk Thistle CAPS; TAKE 1 CAPSULE Daily Recorded   11  Omeprazole 40 MG Oral Capsule Delayed Release; TAKE ONE CAPSULE BY MOUTH 2    TIMES A DAY; Therapy: 51XLE7302 to (Evaluate:2018)  Requested for: 08Dce2282; Last    Rx:50Skm0641 Ordered   12  Viagra 50 MG Oral Tablet; take as directed 1 tab up to 4 hrs before intercourse Maximum    1 tab in 24; Therapy: 22Asw4931 to (Last Rx:02Dzp8386)  Requested for: 40Rco3038 Ordered   13  Vitamin D3 5000 UNIT Oral Capsule; TAKE AS DIRECTED 1 Bid Recorded    The medication list was reviewed and updated today  Allergies    1  No Known Drug Allergies    Vitals  Signs    Systolic: 249, LUE, Sitting  Diastolic: 70, LUE, Sitting  Height: 6 ft 5 in  Weight: 182 lb   BMI Calculated: 21 58  BSA Calculated: 2 15    Physical Exam    Constitutional   General appearance: No acute distress, well appearing and well nourished  Eyes   Conjunctiva and lids: No swelling, erythema, or discharge  Pupils and irises: Equal, round and reactive to light  Ears, Nose, Mouth, and Throat   External inspection of ears and nose: Normal     Otoscopic examination: Tympanic membrance translucent with normal light reflex  Canals patent without erythema  Oropharynx: Normal with no erythema, edema, exudate or lesions  Pulmonary   Respiratory effort: No increased work of breathing or signs of respiratory distress  Auscultation of lungs: Clear to auscultation  Cardiovascular   Palpation of heart: Normal PMI, no thrills  Auscultation of heart: Normal rate and rhythm, normal S1 and S2, without murmurs  Examination of extremities for edema and/or varicosities: Normal     Abdomen   Abdomen: Non-tender, no masses      Liver and spleen: No hepatomegaly or splenomegaly  Lymphatic   Palpation of lymph nodes in neck: No lymphadenopathy  Musculoskeletal   Gait and station: Normal     Digits and nails: Normal without clubbing or cyanosis  Inspection/palpation of joints, bones, and muscles: Normal     Skin   Skin and subcutaneous tissue: Normal without rashes or lesions  Neurologic   Cranial nerves: Cranial nerves 2-12 intact  Reflexes: 2+ and symmetric  Sensation: No sensory loss  Psychiatric   Orientation to person, place and time: Normal     Mood and affect: Normal        Results/Data  *VB - Urinary Incontinence Screen (Dx Z13 89 Screen for UI) 07Sep2017 10:52AM Nevin Lango     Test Name Result Flag Reference   Urinary Incontinence Assessment 63TIE6131       *VB-Depression Screening 28ORE1957 10:51AM Nevin Lango     Test Name Result Flag Reference   Depression Scale Result      Depression Screen - Negative For Symptoms     *VB - Fall Risk Assessment  (Dx Z13 89 Screen for Neurologic Disorder) 07Sep2017 10:51AM Nevin Lango     Test Name Result Flag Reference   Falls Risk      No falls in the past year       Assessment    1  Former smoker (V15 82) (Z85 564)   2  No advance directives (V49 89) (Z78 9)   3  Smokes a pipe (305 1) (F17 290)   4  Bladder tumor (239 4) (D49 4)   5  Hypertension (401 9) (I10)    Plan  Depression screening    · *VB-Depression Screening; Status:Complete - Retrospective Authorization;   Done:  50VXR7481 10:51AM  Flu vaccine need    · Stop: Fluzone High-Dose 0 5 ML Intramuscular Suspension Prefilled  Syringe  Screening for genitourinary condition    · *VB - Urinary Incontinence Screen (Dx Z13 89 Screen for UI);  Status:Complete -  Retrospective Authorization;   Done: 18BYN3310 10:52AM  Screening for neurological condition    · *VB - Fall Risk Assessment  (Dx Z13 89 Screen for Neurologic Disorder);  Status:Complete - Retrospective By Protocol Authorization;   Done: 55TUI6167 10: 51AM    Discussion/Summary  Surgical Clearance: He is at a LOW TO MODERATE risk from a cardiovascular standpoint at this time without any additional cardiac testing  Reevaluation needed, if he should present with symptoms prior to surgery/procedure  Patient has no signs of active infection  He has not yet had his preadmission testing done in December he had a medical cardiac clearance by Dr christin Mooney and was cleared for surgery with no need for any pre-operative stress test, so I assume that this still is in effect  He has had no changes since that time we will review his preadmission lab work when it is available in his preop EKG, but from a standpoint of his physical exam  He is medically cleared for surgery  He does not have von Willebrand's disease and does not need treatment with DDAVP prior to his procedure  Education  Education Items with no Session   *VB - Smoking and Tobacco Cessation Counseling 3-10 minutes;  Provided: 80WUD7986  10:54AM; Counselor: Kennedy Davis;     End of Encounter Meds    1  Cetirizine HCl - 10 MG Oral Tablet; take one tablet by mouth every day; Therapy: 76IQW5106 to (Last Rx:46Bkp3357)  Requested for: 40Saa6159 Ordered    2  Ipratropium Bromide 0 06 % Nasal Solution; INSTILL 2 SPRAYS IN EACH NOSTRIL   TWICE DAILY; Therapy: 36YXL4428 to (YXPLJJLP:74DDG9648)  Requested for: 05Yzw3341; Last   Rx:70Pbs3411 Ordered    3  Omeprazole 40 MG Oral Capsule Delayed Release; TAKE ONE CAPSULE BY MOUTH 2   TIMES A DAY; Therapy: 84EFA6769 to (Evaluate:35Wca0175)  Requested for: 83Dlt7036; Last   Rx:95Tva8119 Ordered    4  B Complex Vitamins CAPS; TAKE 1 CAPSULE Daily Recorded   5  Co Q10 60 MG Oral Capsule; TAKE 1 CAPSULE Daily Recorded   6  Ginkgo Biloba 120 MG Oral Capsule; Take as directed Recorded   7  Lutein CAPS; Therapy: (Dalila Radiant) to Recorded   8  Melatonin CAPS; Therapy: (Dalila Radiant) to Recorded   9   Milk Thistle CAPS; TAKE 1 CAPSULE Daily Recorded   10  Vitamin D3 5000 UNIT Oral Capsule; TAKE AS DIRECTED 1 Bid Recorded    11  Atorvastatin Calcium 10 MG Oral Tablet (Lipitor); Take one tablet daily; Therapy: 49XWQ4159 to (77 873 135)  Requested for: 73Vsd5231; Last    Rx:48Kpb1681 Ordered    12  Viagra 50 MG Oral Tablet; take as directed 1 tab up to 4 hrs before intercourse Maximum    1 tab in 24; Therapy: 16Zps3734 to (Last Rx:20Qvo9525)  Requested for: 82Vog4493 Ordered    13  Gabapentin 600 MG Oral Tablet; TAKE ONE TABLET BY MOUTH 2 TIMES A DAY;     Therapy: 38Pzj6823 to (Evaluate:20Jan2018)  Requested for: 51Nle4404; Last    Rx:40Pjw1074 Ordered    Signatures   Electronically signed by : Wali Sharp DO; Sep  7 2017 11:24AM EST                       (Author)

## 2018-01-12 ENCOUNTER — TRANSCRIBE ORDERS (OUTPATIENT)
Dept: RADIOLOGY | Facility: MEDICAL CENTER | Age: 83
End: 2018-01-12

## 2018-01-12 ENCOUNTER — APPOINTMENT (OUTPATIENT)
Dept: LAB | Facility: MEDICAL CENTER | Age: 83
End: 2018-01-12
Payer: MEDICARE

## 2018-01-12 ENCOUNTER — APPOINTMENT (OUTPATIENT)
Dept: RADIOLOGY | Facility: MEDICAL CENTER | Age: 83
End: 2018-01-12
Payer: MEDICARE

## 2018-01-12 VITALS
HEIGHT: 77 IN | SYSTOLIC BLOOD PRESSURE: 148 MMHG | WEIGHT: 175.8 LBS | DIASTOLIC BLOOD PRESSURE: 82 MMHG | BODY MASS INDEX: 20.76 KG/M2

## 2018-01-12 DIAGNOSIS — D72.829 LEUKOCYTOSIS, UNSPECIFIED TYPE: ICD-10-CM

## 2018-01-12 DIAGNOSIS — J18.1 LOBAR PNEUMONIA (HCC): ICD-10-CM

## 2018-01-12 DIAGNOSIS — D72.829 LEUKOCYTOSIS, UNSPECIFIED TYPE: Primary | ICD-10-CM

## 2018-01-12 LAB
BASOPHILS # BLD AUTO: 0.02 THOUSANDS/ΜL (ref 0–0.1)
BASOPHILS NFR BLD AUTO: 0 % (ref 0–1)
EOSINOPHIL # BLD AUTO: 0.18 THOUSAND/ΜL (ref 0–0.61)
EOSINOPHIL NFR BLD AUTO: 2 % (ref 0–6)
ERYTHROCYTE [DISTWIDTH] IN BLOOD BY AUTOMATED COUNT: 15.3 % (ref 11.6–15.1)
HCT VFR BLD AUTO: 44.9 % (ref 36.5–49.3)
HGB BLD-MCNC: 14.7 G/DL (ref 12–17)
LYMPHOCYTES # BLD AUTO: 2.65 THOUSANDS/ΜL (ref 0.6–4.47)
LYMPHOCYTES NFR BLD AUTO: 35 % (ref 14–44)
MCH RBC QN AUTO: 29.2 PG (ref 26.8–34.3)
MCHC RBC AUTO-ENTMCNC: 32.7 G/DL (ref 31.4–37.4)
MCV RBC AUTO: 89 FL (ref 82–98)
MONOCYTES # BLD AUTO: 0.93 THOUSAND/ΜL (ref 0.17–1.22)
MONOCYTES NFR BLD AUTO: 12 % (ref 4–12)
NEUTROPHILS # BLD AUTO: 3.78 THOUSANDS/ΜL (ref 1.85–7.62)
NEUTS SEG NFR BLD AUTO: 51 % (ref 43–75)
NRBC BLD AUTO-RTO: 0 /100 WBCS
PLATELET # BLD AUTO: 335 THOUSANDS/UL (ref 149–390)
PMV BLD AUTO: 10.1 FL (ref 8.9–12.7)
RBC # BLD AUTO: 5.03 MILLION/UL (ref 3.88–5.62)
WBC # BLD AUTO: 7.59 THOUSAND/UL (ref 4.31–10.16)

## 2018-01-12 PROCEDURE — 36415 COLL VENOUS BLD VENIPUNCTURE: CPT

## 2018-01-12 PROCEDURE — 85025 COMPLETE CBC W/AUTO DIFF WBC: CPT

## 2018-01-12 PROCEDURE — 71046 X-RAY EXAM CHEST 2 VIEWS: CPT

## 2018-01-12 NOTE — RESULT NOTES
Verified Results  (1) PSA (SCREEN) (Dx V76 44 Screen for Prostate Cancer) 18Lsl9150 08:47AM [facility]     Test Name Result Flag Reference   PROSTATE SPECIFIC ANTIGEN 1 0 ng/mL  0 0-4 0   American Urological Association Guidelines define biochemical recurrence of prostate cancer as a detectable or rising PSA value post-radical prostatectomy that is greater than or equal to 0 2 ng/mL with a second confirmatory level of greater than or equal to 0 2 ng/mL  This is a patient instruction: This test is non-fasting  Please drink two glasses of water morning of bloodwork  (1) COMPREHENSIVE METABOLIC PANEL 15UHD3506 59:42JR [facility]     Test Name Result Flag Reference   SODIUM 143 mmol/L  136-145   POTASSIUM 3 8 mmol/L  3 5-5 3   CHLORIDE 108 mmol/L  100-108   CARBON DIOXIDE 26 mmol/L  21-32   ANION GAP (CALC) 9 mmol/L  4-13   BLOOD UREA NITROGEN 14 mg/dL  5-25   CREATININE 0 95 mg/dL  0 60-1 30   Standardized to IDMS reference method   CALCIUM 8 7 mg/dL  8 3-10 1   BILI, TOTAL 1 07 mg/dL H 0 20-1 00   ALK PHOSPHATAS 63 U/L     ALT (SGPT) 25 U/L  12-78   Specimen collection should occur prior to Sulfasalazine and/or Sulfapyridine administration due to the potential for falsely depressed results  AST(SGOT) 21 U/L  5-45   Specimen collection should occur prior to Sulfasalazine administration due to the potential for falsely depressed results  ALBUMIN 3 3 g/dL L 3 5-5 0   TOTAL PROTEIN 7 0 g/dL  6 4-8 2   eGFR 76 ml/min/1 73sq m     National Kidney Disease Education Program recommendations are as follows:  GFR calculation is accurate only with a steady state creatinine  Chronic Kidney disease less than 60 ml/min/1 73 sq  meters  Kidney failure less than 15 ml/min/1 73 sq  meters  GLUCOSE FASTING 94 mg/dL  65-99   Specimen collection should occur prior to Sulfasalazine administration due to the potential for falsely depressed results   Specimen collection should occur prior to Sulfapyridine administration due to the potential for falsely elevated results  (1) TSH 55XOY7549 08:47AM Sarah Santiago     Test Name Result Flag Reference   TSH 2 090 uIU/mL  0 358-3 740   This is a patient instruction: This test is non-fasting  Please drink two glasses of water morning of bloodwork  Patients undergoing fluorescein dye angiography may retain small amounts of fluorescein in the body for 48-72 hours post procedure  Samples containing fluorescein can produce falsely depressed TSH values  If the patient had this procedure,a specimen should be resubmitted post fluorescein clearance  (1) LIPID PANEL FASTING W DIRECT LDL REFLEX 45RFI7343 08:47AM Sarah Santiago     Test Name Result Flag Reference   CHOLESTEROL 123 mg/dL     LDL CHOLESTEROL CALCULATED 65 mg/dL  0-100   This is a patient instruction: This is a fasting test  Water, black tea or black coffee only after 9:00pm the night before the test  Drink 2 glasses of water the morning of the test         Triglyceride:        Normal ??? ??? ??? ??? ??? ??? ??? <150 mg/dl   ??? ??? ???Borderline High ??? ??? 150-199 mg/dl   ??? ??? ? ?? High ??? ??? ??? ??? ??? ??? ??? 200-499 mg/dl   ??? ??? ? ??Very High ??? ??? ??? ??? ??? >499 mg/dl      Cholesterol:       Desirable ??? ??? ??? ??? <200 mg/dl   ??? ??? Borderline High ??? 200-239 mg/dl   ??? ??? High ??? ??? ??? ??? ??? ??? >239 mg/dl      HDL Cholesterol:       High ??? ???>59 mg/dL   ??? ??? Low ??? ??? <41 mg/dL      HDL Cholesterol:       High ??? ???>59 mg/dL   ??? ??? Low ??? ??? <41 mg/dL      This screening LDL is a calculated result  It does not have the accuracy of the Direct Measured LDL in the monitoring of patients with hyperlipidemia and/or statin therapy  Direct Measure LDL (UIZ112) must be ordered separately in these patients     TRIGLYCERIDES 70 mg/dL  <=150   Specimen collection should occur prior to N-Acetylcysteine or Metamizole administration due to the potential for falsely depressed results  HDL,DIRECT 44 mg/dL  40-60   Specimen collection should occur prior to Metamizole administration due to the potential for falsley depressed results

## 2018-01-13 VITALS
RESPIRATION RATE: 20 BRPM | BODY MASS INDEX: 21.13 KG/M2 | DIASTOLIC BLOOD PRESSURE: 80 MMHG | HEART RATE: 60 BPM | SYSTOLIC BLOOD PRESSURE: 140 MMHG | HEIGHT: 77 IN | WEIGHT: 179 LBS

## 2018-01-13 VITALS
BODY MASS INDEX: 20.07 KG/M2 | RESPIRATION RATE: 20 BRPM | SYSTOLIC BLOOD PRESSURE: 140 MMHG | HEIGHT: 77 IN | WEIGHT: 170 LBS | HEART RATE: 60 BPM | DIASTOLIC BLOOD PRESSURE: 70 MMHG

## 2018-01-13 VITALS
HEART RATE: 61 BPM | DIASTOLIC BLOOD PRESSURE: 67 MMHG | SYSTOLIC BLOOD PRESSURE: 126 MMHG | OXYGEN SATURATION: 97 % | TEMPERATURE: 96.6 F | BODY MASS INDEX: 21.58 KG/M2 | WEIGHT: 182 LBS

## 2018-01-13 VITALS
WEIGHT: 182 LBS | SYSTOLIC BLOOD PRESSURE: 122 MMHG | HEART RATE: 56 BPM | DIASTOLIC BLOOD PRESSURE: 84 MMHG | BODY MASS INDEX: 21.58 KG/M2

## 2018-01-14 VITALS
BODY MASS INDEX: 21.49 KG/M2 | WEIGHT: 182 LBS | HEIGHT: 77 IN | DIASTOLIC BLOOD PRESSURE: 70 MMHG | SYSTOLIC BLOOD PRESSURE: 138 MMHG

## 2018-01-15 ENCOUNTER — GENERIC CONVERSION - ENCOUNTER (OUTPATIENT)
Dept: OTHER | Facility: OTHER | Age: 83
End: 2018-01-15

## 2018-01-15 ENCOUNTER — TRANSCRIBE ORDERS (OUTPATIENT)
Dept: ADMINISTRATIVE | Facility: HOSPITAL | Age: 83
End: 2018-01-15

## 2018-01-15 DIAGNOSIS — J98.4 DISEASE OF LUNG: Primary | ICD-10-CM

## 2018-01-15 NOTE — RESULT NOTES
Verified Results  (1) LIPID PANEL, FASTING [id number] 09:01AM Cordelia Magdaleno    Order Number: [id number]  TW Order Number: [id number]     Test Name Result Flag Reference   CHOLESTEROL 134 mg/dL     HDL,DIRECT 47 mg/dL  40-60   Specimen collection should occur prior to Metamizole administration due to the potential for falsely depressed results  LDL CHOLESTEROL CALCULATED 77 mg/dL  0-100   - Patient Instructions: This is a fasting blood test  Water,black tea or black  coffee only after 9:00pm the night before test   Drink 2 glasses of water the morning of test    - Patient Instructions: This is a fasting blood test  Water,black tea or black  coffee only after 9:00pm the night before test   Drink 2 glasses of water the morning of test     - Patient Instructions: This is a fasting blood test  Water,black tea or black  coffee only after 9:00pm the night before test Drink 2 glasses of water the morning of test - Patient Instructions: This is a fasting blood test  Water,black tea or black    coffee only after 9:00pm the night before test Drink 2 glasses of water the morning of test - Patient Instructions: This bloodwork is non-fasting  Please drink two glasses of water morning of bloodwork  - Patient Instructions: This bloodwork is   non-fasting  Please drink two glasses of water morning of bloodwork  Triglyceride:         Normal              <150 mg/dl       Borderline High    150-199 mg/dl       High               200-499 mg/dl       Very High          >499 mg/dl  Cholesterol:         Desirable        <200 mg/dl      Borderline High  200-239 mg/dl      High             >239 mg/dl  HDL Cholesterol:        High    >59 mg/dL      Low     <41 mg/dL  LDL CALCULATED:    This screening LDL is a calculated result  It does not have the accuracy of the Direct Measured LDL in the monitoring of patients with hyperlipidemia and/or statin therapy     Direct Measure LDL (HKZ472) must be ordered separately in these patients  TRIGLYCERIDES 52 mg/dL  <=150   Specimen collection should occur prior to N-Acetylcysteine or Metamizole administration due to the potential for falsely depressed results  (1) COMPREHENSIVE METABOLIC PANEL [id number] 18:52MF Emily Prasad    Order Number: [id number]  TW Order Number: [id number]     Test Name Result Flag Reference   GLUCOSE,RANDM 89 mg/dL     If the patient is fasting, the ADA then defines impaired fasting glucose as > 100 mg/dL and diabetes as > or equal to 123 mg/dL  SODIUM 141 mmol/L  136-145   POTASSIUM 4 2 mmol/L  3 5-5 3   CHLORIDE 107 mmol/L  100-108   CARBON DIOXIDE 28 mmol/L  21-32   ANION GAP (CALC) 6 mmol/L  4-13   BLOOD UREA NITROGEN 18 mg/dL  5-25   CREATININE 0 99 mg/dL  0 60-1 30   Standardized to IDMS reference method   CALCIUM 9 1 mg/dL  8 3-10 1   BILI, TOTAL 0 86 mg/dL  0 20-1 00   ALK PHOSPHATAS 70 U/L     ALT (SGPT) 35 U/L  12-78   AST(SGOT) 26 U/L  5-45   ALBUMIN 3 5 g/dL  3 5-5 0   TOTAL PROTEIN 7 2 g/dL  6 4-8 2   eGFR Non-African American      >60 0 ml/min/1 73sq m   - Patient Instructions: This is a fasting blood test  Water,black tea or black  coffee only after 9:00pm the night before test Drink 2 glasses of water the morning of test - Patient Instructions: This is a fasting blood test  Water,black tea or black    coffee only after 9:00pm the night before test Drink 2 glasses of water the morning of test - Patient Instructions: This bloodwork is non-fasting  Please drink two glasses of water morning of bloodwork  - Patient Instructions: This bloodwork is   non-fasting  Please drink two glasses of water morning of bloodwork  National Kidney Disease Education Program recommendations are as follows:  GFR calculation is accurate only with a steady state creatinine  Chronic Kidney disease less than 60 ml/min/1 73 sq  meters  Kidney failure less than 15 ml/min/1 73 sq  meters       (1) TSH [id number] 09:01AM Emily Praasd TW Order Number: [id number]   Order Number: [id number]     Test Name Result Flag Reference   TSH 3 210 uIU/mL  0 358-3 740   - Patient Instructions: This bloodwork is non-fasting  Please drink two glasses of water morning of bloodwork  - Patient Instructions: This bloodwork is non-fasting  Please drink two glasses of water morning of bloodwork  - Patient Instructions: This is a fasting blood test  Water,black tea or black  coffee only after 9:00pm the night before test Drink 2 glasses of water the morning of test - Patient Instructions: This is a fasting blood test  Water,black tea or black    coffee only after 9:00pm the night before test Drink 2 glasses of water the morning of test - Patient Instructions: This bloodwork is non-fasting  Please drink two glasses of water morning of bloodwork  - Patient Instructions: This bloodwork is   non-fasting  Please drink two glasses of water morning of bloodwork  Patients undergoing fluorescein dye angiography may retain small amounts of fluorescein in the body for 48-72 hours post procedure  Samples containing fluorescein can produce falsely depressed TSH values  If the patient had this procedure,a specimen should be resubmitted post fluorescein clearance       (1) CBC/PLT/DIFF [id number] 09:01AM Hernesto Reyna    Order Number: [id number]  TW Order Number: [id number]     Test Name Result Flag Reference   WBC COUNT 8 55 Thousand/uL  4 31-10 16   RBC COUNT 5 20 Million/uL  3 88-5 62   HEMOGLOBIN 15 4 g/dL  12 0-17 0   HEMATOCRIT 46 0 %  36 5-49 3   MCV 89 fL  82-98   MCH 29 6 pg  26 8-34 3   MCHC 33 5 g/dL  31 4-37 4   RDW 15 1 %  11 6-15 1   MPV 11 7 fL  8 9-12 7   PLATELET COUNT 112 Thousands/uL  149-390   nRBC AUTOMATED 0 /100 WBCs     NEUTROPHILS RELATIVE PERCENT 47 %  43-75   LYMPHOCYTES RELATIVE PERCENT 35 %  14-44   MONOCYTES RELATIVE PERCENT 14 % H 4-12   EOSINOPHILS RELATIVE PERCENT 4 %  0-6   BASOPHILS RELATIVE PERCENT 0 %  0-1   NEUTROPHILS ABSOLUTE COUNT 4 05 Thousands/?L  1 85-7 62   LYMPHOCYTES ABSOLUTE COUNT 2 99 Thousands/?L  0 60-4 47   MONOCYTES ABSOLUTE COUNT 1 16 Thousand/?L  0 17-1 22   EOSINOPHILS ABSOLUTE COUNT 0 32 Thousand/?L  0 00-0 61   BASOPHILS ABSOLUTE COUNT 0 02 Thousands/?L  0 00-0 10   - Patient Instructions: This bloodwork is non-fasting  Please drink two glasses of water morning of bloodwork  - Patient Instructions: This bloodwork is non-fasting  Please drink two glasses of water morning of bloodwork  - Patient Instructions: This bloodwork is non-fasting  Please drink two glasses of water morning of bloodwork  - Patient Instructions: This bloodwork is non-fasting  Please drink two glasses of water morning of bloodwork  (1) PSA (SCREEN) (Dx V76 44 Screen for Prostate Cancer) [id number] 09:01AM Mick Gonzalez Order Number: [id number]   Order Number: [id number]     Test Name Result Flag Reference   PROSTATE SPECIFIC ANTIGEN 1 1 ng/mL  0 0-4 0   - Patient Instructions: This test is non-fasting  Please drink two glasses of water morning of bloodwork  - Patient Instructions: This test is non-fasting  Please drink two glasses of water morning of bloodwork  - Patient Instructions: This test is non-fasting  Please drink two glasses of water morning of bloodwork  - Patient Instructions: This test is non-fasting  Please drink two glasses of water morning of bloodwork       (1) URINALYSIS w URINE C/S REFLEX (will reflex a microscopy if leukocytes, occult blood, or nitrites are not within normal limits) [id number] 09:01AM Mick Gonzalez Order Number: [id number]   Order Number: [id number]     Test Name Result Flag Reference   COLOR Yellow     CLARITY Cloudy     PH UA 6 0  4 5-8 0   LEUKOCYTE ESTERASE UA Negative  Negative   NITRITE UA Negative  Negative   PROTEIN  (2+) mg/dl A Negative   GLUCOSE UA Negative mg/dl  Negative   KETONES UA Negative mg/dl Negative   UROBILINOGEN UA 0 2 E U /dl  0 2, 1 0 E U /dl   BILIRUBIN UA Negative  Negative   BLOOD UA Negative  Negative   SPECIFIC GRAVITY UA 1 030  1 003-1 030   BACTERIA None Seen /hpf  None Seen, Occasional   CALCIUM OXALATE CRYSTALS /HPF IN URINE SEDIMENT BY MICROSCOPY Occasional /hpf A None Seen   EPITHELIAL CELLS Occasional /hpf  None Seen, Occasional   HYALINE CASTS 0-3 /lpf A None Seen   RBC UA None Seen /hpf  None Seen   WBC UA None Seen /hpf  None Seen   MUCOUS THREADS Occasional  Occasional, Moderate, Innumerable

## 2018-01-16 NOTE — RESULT NOTES
Verified Results  (1) PT WITH INR [id number] 09:03AM Josias Carrillo     Test Name Result Flag Reference   INR 1 08  0 86-1 16   PT 13 7 seconds  12 0-14 3

## 2018-01-17 NOTE — RESULT NOTES
Verified Results  (1) STOOL CULTURE [id number] 11:14AM Ligia Genre   TW Order Number: [id number]     Test Name Result Flag Reference   CLINICAL REPORT (Report)     Test:        Stool culture  Specimen Type:   Stool  Specimen Date:   11/12/2016 11:14 AM  Result Date:    11/14/2016 10:17 AM  Result Status:   Final result  Resulting Lab:   [place] [address]            Tel: [phone]      CULTURE                                       ------------------                                   No Salmonella, Shigella or Campylobacter isolated  Shiga Toxin 1 NOT detected, Shiga Toxin 2 NOT detected     (1) CBC/PLT/DIFF [id number] 10:19AM Dereck Anderson Order Number: [id number]     Test Name Result Flag Reference   WBC COUNT 7 55 Thousand/uL  4 31-10 16   RBC COUNT 5 14 Million/uL  3 88-5 62   HEMOGLOBIN 15 3 g/dL  12 0-17 0   HEMATOCRIT 45 8 %  36 5-49 3   MCV 89 fL  82-98   MCH 29 8 pg  26 8-34 3   MCHC 33 4 g/dL  31 4-37 4   RDW 15 2 % H 11 6-15 1   MPV 10 7 fL  8 9-12 7   PLATELET COUNT 441 Thousands/uL  149-390   NEUTROPHILS RELATIVE PERCENT 53 %  43-75   LYMPHOCYTES RELATIVE PERCENT 30 %  14-44   MONOCYTES RELATIVE PERCENT 14 % H 4-12   EOSINOPHILS RELATIVE PERCENT 3 %  0-6   BASOPHILS RELATIVE PERCENT 0 %  0-1   NEUTROPHILS ABSOLUTE COUNT 3 94 Thousands/?L  1 85-7 62   LYMPHOCYTES ABSOLUTE COUNT 2 27 Thousands/?L  0 60-4 47   MONOCYTES ABSOLUTE COUNT 1 08 Thousand/?L  0 17-1 22   EOSINOPHILS ABSOLUTE COUNT 0 24 Thousand/?L  0 00-0 61   BASOPHILS ABSOLUTE COUNT 0 02 Thousands/?L  0 00-0 10   - Patient Instructions: This bloodwork is non-fasting  Please drink two glasses of water morning of bloodwork  - Patient Instructions: This bloodwork is non-fasting  Please drink two glasses of water morning of bloodwork       (1) COMPREHENSIVE METABOLIC PANEL [id number] 05:65XK LIN TV Order Number: [id number] Test Name Result Flag Reference   GLUCOSE,RANDM 96 mg/dL     If the patient is fasting, the ADA then defines impaired fasting glucose as > 100 mg/dL and diabetes as > or equal to 123 mg/dL  SODIUM 147 mmol/L H 136-145   POTASSIUM 4 1 mmol/L  3 5-5 3   CHLORIDE 109 mmol/L H 100-108   CARBON DIOXIDE 31 mmol/L  21-32   ANION GAP (CALC) 7 mmol/L  4-13   BLOOD UREA NITROGEN 16 mg/dL  5-25   CREATININE 0 96 mg/dL  0 60-1 30   Standardized to IDMS reference method   CALCIUM 9 0 mg/dL  8 3-10 1   BILI, TOTAL 1 20 mg/dL H 0 20-1 00   ALK PHOSPHATAS 56 U/L     ALT (SGPT) 24 U/L  12-78   AST(SGOT) 20 U/L  5-45   ALBUMIN 3 5 g/dL  3 5-5 0   TOTAL PROTEIN 6 9 g/dL  6 4-8 2   eGFR Non-African American      >60 0 ml/min/1 73sq [facility] Disease Education Program recommendations are as follows:  GFR calculation is accurate only with a steady state creatinine  Chronic Kidney disease less than 60 ml/min/1 73 sq  meters  Kidney failure less than 15 ml/min/1 73 sq  meters

## 2018-01-17 NOTE — RESULT NOTES
Verified Results  (1) TISSUE EXAM [id number] 12:57PM Evie Iqbal     Test Name Result Flag Reference   LAB AP CASE REPORT (Report)     Surgical Pathology Report             Case: [id number]                   Authorizing Provider: Alla Sarkar MD      Collected:      12/09/2016 1257        Ordering Location:   [facility] Received:      12/09/2016 9142                    Operating Room                                 Pathologist:      Gentry Mayorga MD                                 Specimens:  A) - Duodenum, cold forcep Bx, R/O Celiac                               B) - Stomach, cold forcep Bx, R/O H Pylorie, & R/O GAVE                        C) - Polyp, Stomach/Small Intestine, cold forcep Bx, gastric polyp                   D) - Colon, cold forcep Bx, random colon Bx   LAB AP FINAL DIAGNOSIS (Report)     A  Duodenum (biopsy):  - Duodenal mucosa with no diagnostic abnormality  - No Marsh lesion  - Negative for dysplasia     B  Antrum (biopsy):  - 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)  - No H pylori identified on Warthin-Starry stain  - No intestinal metaplasia     C  Stomach polyp (biopsy):  - Most consistent with a hyperplastic polyp  - Negative for dysplasia     D  Random colon (biopsy):  - Colonic mucosa with increased intraepithelial lymphocytes  - Negative for dysplasia     Comment: The features in the proper clinical context are consistent with   lymphocytic colitis  Interpretation performed at [facility], [address]    Electronically signed by Gentry Mayorga MD on 12/13/2016 at 2:24 PM   LAB AP SURGICAL ADDITIONAL INFORMATION (Report)     These tests were developed and their performance characteristics   determined by [facility] or [address] [place] [address] Road  They may not be cleared or approved by the [place]  Food and   Drug Administration   The FDA has determined that such clearance or approval is not necessary  These tests are used for clinical purposes  They should not be regarded as investigational or for research  This   laboratory has been approved by [place] [id number], designated as a high-complexity   laboratory and is qualified to perform these tests  LAB AP GROSS DESCRIPTION (Report)     A  The specimen is received in formalin, labeled with the patient's name   and medical record number, and is designated duodenum biopsy rule out   celiac  The specimen consists of 2 tan soft tissue fragments measuring   0 2 and 0 3 cm  Entirely submitted  One cassette  B  The specimen is received in formalin, labeled with the patient's name   and medical record number, and is designated stomach biopsy rule out H    pylori, rule out GAVE  The specimen consists of several tan soft tissue   fragments measuring in aggregate 0 5 x 0 4 x 0 1 cm  Entirely submitted  One cassette  C  The specimen is received in formalin, labeled with the patient's name   and medical record number, and is designated biopsy gastric polyp  The   specimen consists of 2 tan soft tissue fragments measuring 0 2 and 0 3 cm  Entirely submitted  One cassette  Note: The estimated total formalin fixation time based upon information   provided by the submitting clinician and the standard processing schedule   is 63 5 hours  D  The specimen is received in formalin, labeled with the patient's name   and medical record number, and is designated random colon biopsy  The   specimen consists of 2 tan soft tissue fragments measuring 0 3 and 0 5 cm  Entirely submitted  One cassette  Note: The estimated total formalin fixation time based upon information   provided by the submitting clinician and the standard processing schedule   is 63 25 hours  [facility]   LAB AP CLINICAL INFORMATION (Report)     EGD:     ???  FINDINGS: Esophagus:  The esophagus appeared to be normal  GE junction:   The GE junction appeared to be normal  Stomach: Striped erythematous   mucosa was found in the antrum  Multiple biopsies was taken  A single   polyp (measuring less than 5 mm in size) was found in the body of the   stomach  The polyp was removed by polypectomy with cold biopsy forceps  Duodenum: The duodenum appeared to be normal  Multiple random biopsies   was taken

## 2018-01-18 NOTE — RESULT NOTES
Verified Results  (1) GIARDIA LAMBLIA [id number] 11:14AM Natasha Kiran     Test Name Result Flag Reference   GIARDIA LAMBLIA Negative  Negative   Performed at:  [address]  [id number]  : Adriana Narvaez MD, Phone:  [phone]

## 2018-01-18 NOTE — RESULT NOTES
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  Negative for acute fracture  Workstation performed: [id number]     Signed by:   Rhona Severance Gerldine Gemma, MD   8/2/17     * XR ANKLE 3+ VIEW LEFT [id number] 10:20AM [facility] Order Number: [id number]     Test Name Result Flag Reference   XR ANKLE 3+ VW LEFT (Report)     LEFT ANKLE     INDICATION: Left ankle pain, twisting injury  COMPARISON: None     VIEWS: AP, lateral and oblique      IMAGES: 3     FINDINGS:     There is no acute fracture or dislocation  No degenerative changes  No lytic or blastic lesions are seen  Minimal soft tissue calcification noted dorsal to the ankle joint  IMPRESSION:     No acute osseous abnormality  Workstation performed: [id number]     Signed by:   Rhona Severance Gerldine Gemma, MD   8/2/17

## 2018-01-22 VITALS
SYSTOLIC BLOOD PRESSURE: 150 MMHG | HEART RATE: 72 BPM | RESPIRATION RATE: 20 BRPM | DIASTOLIC BLOOD PRESSURE: 80 MMHG | HEIGHT: 77 IN | BODY MASS INDEX: 21.49 KG/M2 | WEIGHT: 182 LBS

## 2018-01-23 VITALS
WEIGHT: 185 LBS | HEIGHT: 77 IN | BODY MASS INDEX: 21.84 KG/M2 | SYSTOLIC BLOOD PRESSURE: 134 MMHG | DIASTOLIC BLOOD PRESSURE: 84 MMHG | TEMPERATURE: 100.7 F

## 2018-01-23 NOTE — RESULT NOTES
Verified Results  * XR CHEST PA & LATERAL [id number] 11:27AM Hernandez Montelongo Order Number: [id number]     Test Name Result Flag Reference   XR CHEST PA & LATERAL (Report)     This is a summary report  The complete report is available in the patient's medical record  If you cannot access the medical record, please contact the sending organization for a detailed fax or copy  CHEST      INDICATION: Follow-up pneumonia     COMPARISON: Chest radiograph on 318     VIEWS: Frontal and lateral projections     IMAGES: 2     FINDINGS:        Cardiomediastinal silhouette appears unremarkable  Persistent density in the right upper lobe  The remainder of the lungs are clear  No pleural effusion or pneumothorax  Visualized osseous structures appear within normal limits for the patient's age  IMPRESSION:       1  Persistent right-sided density  Differential considerations again include resolving pneumonia versus nodule or healing rib fracture  Correlate clinically and recommend four-week follow-up study to ensure resolution  Alternatively a CT of the chest   could be performed for further characterization  Workstation performed: [id number]     Signed by:   Nolvia Granados MD   1/12/18     (1) CBC/PLT/DIFF [id number] 11:18AM Nevaeh Zhou     Test Name Result Flag Reference   WBC COUNT 7 59 Thousand/uL  4 31-10 16   RBC COUNT 5 03 Million/uL  3 88-5 62   HEMOGLOBIN 14 7 g/dL  12 0-17 0   HEMATOCRIT 44 9 %  36 5-49 3   MCV 89 fL  82-98   MCH 29 2 pg  26 8-34 3   MCHC 32 7 g/dL  31 4-37 4   RDW 15 3 % H 11 6-15 1   MPV 10 1 fL  8 9-12 7   PLATELET COUNT 751 Thousands/uL  149-390   nRBC AUTOMATED 0 /100 WBCs     NEUTROPHILS RELATIVE PERCENT 51 %  43-75   LYMPHOCYTES RELATIVE PERCENT 35 %  14-44   MONOCYTES RELATIVE PERCENT 12 %  4-12   EOSINOPHILS RELATIVE PERCENT 2 %  0-6   BASOPHILS RELATIVE PERCENT 0 %  0-1   NEUTROPHILS ABSOLUTE COUNT 3 78 Thousands/? ??L  1 85-7 62   LYMPHOCYTES ABSOLUTE COUNT 2 65 Thousands/? ??L  0 60-4 47   MONOCYTES ABSOLUTE COUNT 0 93 Thousand/? ??L  0 17-1 22   EOSINOPHILS ABSOLUTE COUNT 0 18 Thousand/? ??L  0 00-0 61   BASOPHILS ABSOLUTE COUNT 0 02 Thousands/? ??L  0 00-0 10   This is a patient instruction: This test is non-fasting  Please drink two glasses of water morning of bloodwork

## 2018-01-23 NOTE — RESULT NOTES
Verified Results  (1) CBC/PLT/DIFF [id number] 02:25PM Beverly Perez Order Number: [id number]     Test Name Result Flag Reference   WBC COUNT 12 20 Thousand/uL H 4 31-10 16   RBC COUNT 4 85 Million/uL  3 88-5 62   HEMOGLOBIN 14 4 g/dL  12 0-17 0   HEMATOCRIT 43 1 %  36 5-49 3   MCV 89 fL  82-98   MCH 29 7 pg  26 8-34 3   MCHC 33 4 g/dL  31 4-37 4   RDW 15 7 % H 11 6-15 1   MPV 11 0 fL  8 9-12 7   PLATELET COUNT 948 Thousands/uL  149-390   nRBC AUTOMATED 0 /100 WBCs     NEUTROPHILS RELATIVE PERCENT 66 %  43-75   LYMPHOCYTES RELATIVE PERCENT 17 %  14-44   MONOCYTES RELATIVE PERCENT 17 % H 4-12   EOSINOPHILS RELATIVE PERCENT 0 %  0-6   BASOPHILS RELATIVE PERCENT 0 %  0-1   NEUTROPHILS ABSOLUTE COUNT 7 92 Thousands/? ??L H 1 85-7 62   LYMPHOCYTES ABSOLUTE COUNT 2 10 Thousands/? ??L  0 60-4 47   MONOCYTES ABSOLUTE COUNT 2 11 Thousand/? ??L H 0 17-1 22   EOSINOPHILS ABSOLUTE COUNT 0 03 Thousand/? ??L  0 00-0 61   BASOPHILS ABSOLUTE COUNT 0 01 Thousands/? ??L  0 00-0 10

## 2018-01-23 NOTE — RESULT NOTES
Verified Results  * XR CHEST PA & LATERAL [id number] 02:20PM Lesley Fair Order Number: [id number]     Test Name Result Flag Reference   XR CHEST PA & LATERAL (Report)     This is a summary report  The complete report is available in the patient's medical record  If you cannot access the medical record, please contact the sending organization for a detailed fax or copy  CHEST     INDICATION: J44 1: Chronic obstructive pulmonary disease with (acute) exacerbation  History taken directly from the electronic ordering system  Productive cough, shortness of breath, low fever, sinus congestion  Remote tobacco abuse  COMPARISON: None     VIEWS: PA and lateral      IMAGES: 2     FINDINGS:     The cardiomediastinal silhouette is unremarkable  Faint ill-defined density overlies the right upper lung zone new since July 20, 2015 (overlying the right anterior 3rd rib and right posterior 7th rib)  Orthopedic internal fixation hardware, right proximal humerus, partially included  Moderate degenerative changes, thoracic spine  IMPRESSION:     Right upper lung field density suspicious for developing infiltrate, nodule or healing right rib fracture  Apical lordotic and oblique views may provide further information in this regard  *The study was marked in EPIC for significant notification  Workstation performed: [id number]     Signed by:   Emily Oates MD   1/3/18

## 2018-01-23 NOTE — PROGRESS NOTES
Assessment   1  Former smoker (V15 82) (D03 761)  2  Smokes a pipe (305 1) (K89 846)  3  Medicare annual wellness visit, subsequent (V70 0) (Z00 00)1   4  Screening for neurological condition (V80 09) (Z13 89)1   5  Screening for genitourinary condition (V81 6) (Z13 89)1   6  Depression screening (V79 0) (Z13 89)1   7  Encounter for preventive health examination (V70 0) (Z00 00)1      1 Amended By: Ankita Johnson; Sep 01 2016 12:11 PM EST    Discussion/Summary  Impression:1  Subsequent Annual Wellness Visit1   Cardiovascular screening and counselin  the risks and benefits of screening were discussed1  and screening is current1   Diabetes screening and counselin  the risks and benefits of screening were discussed1  and screening is current1   Colorectal cancer screening and counselin  the risks and benefits of screening were discussed1  and screening is current1   Prostate cancer screening and counselin  the risks and benefits of screening were discussed1  and screening is current1         1 Amended By: Ankita Johnson; Sep 01 2016 12:12 PM EST    History of Present Illness  The patient is being seen for the subsequent annual wellness visit  Medicare Screening and Risk Factors   Hospitalizations: he has been previously hospitalizied and he has been hospitalized twice within the last year times  Once per lifetime medicare screening tests: ECG and AAA screening US has not yet been done  Medicare Screening Tests Risk Questions   Abdominal aortic aneurysm risk assessment: over 72years of age  Osteoporosis risk assessment: , over 48years of age, alcohol use and past medical history of fracture(s)  HIV risk assessment: none indicated  Drug and Alcohol Use: The patient is a former cigarette smoker and Current Pipe smoker  He has 50 pack year(s) of cigarette use  He is not ready to quit using tobacco  The patient reports drinking 1 drinks per day and Enjoys a glass of wine daily   He has never used illicit drugs  Diet and Physical Activity: Current diet includes well balanced meals, limited junk food and 1-2 cups of coffee per day  He exercises 3 times per week  Exercise: walking, stretching, strength training 90 minutes per day  Mood Disorder and Cognitive Impairment Screening: He denies feeling down, depressed, or hopeless over the past two weeks  He denies feeling little interest or pleasure in doing things over the past two weeks  Cognitive impairment screening: denies difficulty learning/retaining new information, denies difficulty handling complex tasks, denies difficulty with reasoning, denies difficulty with spatial ability and orientation, denies difficulty with language and denies difficulty with behavior  Functional Ability/Level of Safety: Hearing is normal bilaterally and a hearing aid is not used  The patient is currently able to do instrumental activities of daily living with limitations, but able to do activities of daily living without limitations, able to participate in social activities without limitations and able to drive without limitations  Activities of daily living details: does not need help using the phone, no transportation help needed, does not need help shopping, no meal preparation help needed, does not need help doing housework, does not need help doing laundry, does not need help managing medications and does not need help managing money  Fall risk factors:  alcohol use, mobility impairment, visual impairment and Macular degeneration, Balance problem hinders mobility, but The patient fell None times in the past 12 months , no polypharmacy, no antidepressant use, no deconditioning, no postural hypotension, no sedative use, no urinary incontinence, no antihypertensive use, no cognitive impairment, up and go test was normal and no previous fall   Home safety risk factors:  no unfamiliar surroundings, no loose rugs, no poor household lighting, no uneven floors, no household clutter, grab bars in the bathroom and handrails on the stairs  Advance Directives: Advance directives: living will, durable power of  for health care directives and Family & partner know where all necessary paperwork is  Co-Managers and Medical Equipment/Suppliers: See Patient Care Team Falls Risk:1  The patient fell 0 times in the past 12 months  1   The patient is currently asymptomatic1  Symptoms Include: 1    The patient currently has no urinary incontinence symptoms1   Date of last glaucoma screen was1 August,20161        1 Amended By: Yasmin Colon; Sep 01 2016 11:37 AM EST    Patient Care Team    Care Team Member Role Specialty Office Number   [place]   [phone]     Review of Systems    Constitutional:1  negative1   Head and Face:1  negative1   Eyes:1  hx of retinal detachment right eye1   ENT:1  negative1   Cardiovascular:1  negative1   Respiratory:1  negative1   Gastrointestinal:1  negative1   Genitourinary:1  negative1   Musculoskeletal:1  negative1   Integumentary and Breasts:1  negative1   Neurological:1  negative1   Psychiatric:1  negative1   Endocrine:1  negative1   Hematologic and Lymphatic:1  negative1   Over the past 2 weeks, how often have you been bothered by the following problems? 1 ) Little interest or pleasure in doing things? 1  Not at all1     2 ) Feeling down, depressed or hopeless? 1  Not at all1     3 ) Trouble falling asleep or sleeping too much? 1  Not at all1     4 ) Feeling tired or having little energy? 1  Not at all1     5 ) Poor appetite or overeating? 1  Not at all1     6 ) Feeling bad about yourself, or that you are a failure, or have let yourself or your family down? 1  Not at all1     7 ) Trouble concentrating on things, such as reading a newspaper or watching television? 1  Not at all1     8 ) Moving or speaking so slowly that other people could have noticed, or the opposite, moving or speaking faster than usual?1  Not at all1     9 ) Thoughts that you would be better off dead or of hurting yourself in some way? Not at all1   Score 01        1 Amended By: Herberth Ramos; Sep 01 2016 12:10 PM EST    Active Problems   1  3-vessel Coronary Artery Stenosis (414 01)  2  Admission for long-term (current) use of other medications (V58 69) (Z51 81)  3  Allergic rhinitis (477 9) (J30 9)  4  Allergy To Pollens (V15 09)  5  Cerebral Contusion With Concussion (851 09)  6  Encounter for prostate cancer screening (V76 44) (Z12 5)  7  Erectile dysfunction (607 84) (N52 9)  8  Esophageal reflux (530 81) (K21 9)  9  Gait instability (781 2) (R26 81)  10  Glaucoma screening (V80 1) (Z13 5)  11  Hyperlipidemia (272 4) (E78 5)  12  Hypertension (401 9) (I10)  13  Hypothyroidism (244 9) (E03 9)  14  Impotence (607 84) (N52 9)  15  Lumbar canal stenosis (724 02) (M48 06)  16  Macular Degeneration Both Eyes  17  Need for influenza vaccination (V04 81) (Z23)  18  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)  19  Peripheral neuropathy (356 9) (G62 9)  20  Shoulder Surgery  21  Spondylosis 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 (721 0) (M47 812)  22  Von Willebrand disease, type I (286 4) (D68 0)    Past Medical History   1  History of Acute sinusitis (461 9) (J01 90)  2  History of Acute sinusitis (461 9) (J01 90)  3  History of Acute URI (465 9) (J06 9)  4  History of Colonoscopy (Fiberoptic) Screening  5  History of Contusion of skin with intact surface (924 9) (T14 8)  6  History of Influenza (487 1) (J11 1)  7  History of Intentional Injury By Another Person (A211 2)  8  History of Special screening examination for neoplasm of prostate (V76 44) (Z12 5)    The active problems and past medical history were reviewed and updated today  1        1 Amended By: Herberth Ramos; Sep 01 2016 12:10 PM EST    Surgical History   1  History of Adenoidectomy  2  History of Appendectomy  3  History of Arthrodesis Lumbar  4   History of Back Surgery  5  History of Cataract Surgery  6  History of Colonoscopy (Fiberoptic)  7  History of Neck Surgery  8  History of Neuroplasty Median Nerve At Carpal Tunnel  9  History of Retinal Detachment Complete Air Fill Confirmed  10  Shoulder Surgery  11  History of Tonsillectomy With Adenoidectomy    The surgical history was reviewed and updated today  1        1 Amended By: Gary Cannon; Sep 01 2016 12:10 PM EST    Family History  Mother   1  Family history of Cardiac Failure  Father   2  Family history of Acute Gastric Ulcer    The family history was reviewed and updated today  1        1 Amended By: Gary Cannon; Sep 01 2016 12:10 PM EST    Social History    · Alcohol Use (History)   · Former smoker (V15 82) (L59 716)   · Smokes a pipe (305 1) (F17 290)  The social history was reviewed and updated today  1  The social history was reviewed and is unchanged  1        1 Amended By: Gary Cannon; Sep 01 2016 12:10 PM EST    Current Meds  1  Atorvastatin Calcium 10 MG Oral Tablet; TAKE ONE TABLET BY MOUTH ONCE DAILY; Therapy: 55UGH8349 to (Evaluate:78Trd0899)  Requested for: 24GAO5469; Last   Rx:11Jan2016 Ordered  2  B Complex Vitamins CAPS; TAKE 1 CAPSULE Daily Recorded  3  Cetirizine HCl - 10 MG Oral Tablet; take one tablet by mouth every day; Therapy: 40ZEC4383 to (Last Rx:01Jun2016)  Requested for: 01Jun2016 Ordered  4  Co Q10 60 MG Oral Capsule; TAKE 1 CAPSULE Daily Recorded  5  [employer] Lutein CAPS; TAKE AS DIRECTED Focus Macula Pro + Lutein vitamins 2 tabs taken   separately Recorded  6  Dulcolax 5 MG Oral Tablet Delayed Release; take 1 tablet daily as needed Recorded  7  Gabapentin 600 MG Oral Tablet; TAKE ONE TABLET BY MOUTH 2 TIMES A DAY; Therapy: 77Csn1207 to (Last Rx:47Sin3127)  Requested for: 54KVY5864 Ordered  8  Ginkgo Biloba 120 MG Oral Capsule; Take as directed Recorded  9  Inositol 500 MG Oral Tablet; TAKE 1 TABLET TWICE DAILY as directed Recorded  10   Ipratropium Bromide 0 06 % Nasal Solution; INSTILL 2 SPRAYS IN EACH NOSTRIL    TWICE DAILY; Therapy: 53DMS9656 to (Last Rx:52Zgl7578)  Requested for: 28GFN7995 Ordered  11  Milk Thistle CAPS; TAKE 1 CAPSULE Daily Recorded  12  Omeprazole 40 MG Oral Capsule Delayed Release; TAKE ONE CAPSULE BY MOUTH 2    TIMES A DAY; Therapy: 12QAZ1922 to (Last Rx:74Sso7578)  Requested for: 50XAX7023 Ordered  13  Tylenol PM Extra Strength TABS; TAKE 1 TABLETS AT BEDTIME Recorded  14  Viagra 50 MG Oral Tablet; take as directed 1 tab up to 4 hrs before intercourse Maximum    1 tab in 24; Therapy: 16Lby9557 to (Last Rx:96Aca5297)  Requested for: 63Tkw0684 Ordered  15  Vitamin D3 5000 UNIT Oral Capsule; TAKE AS DIRECTED 1 Bid Recorded    The medication list was reviewed and updated today  1        1 Amended By: Linda Alonso; Sep 01 2016 12:10 PM EST    Allergies   1  No Known Drug Allergies    Immunizations  Influenza --- Ewelina Campcaline: 17-Sep-2012  (76y); Series2: 15-Oct-2013  (77y); Series3: 04-Nov-2014   (94V); Series4: 15-Oct-2015  (79y)   PCV --- Ewelina Campanile: 09-Dec-2014  (79y)   Zoster --- Ewelina Campanile: Dec 2014  (79y)     Vitals  Signs   Recorded: 52DXY1705 52:61HT   Systolic: 230  Diastolic: 68  Height: 6 ft   Weight: 175 lb 9 6 oz  BMI Calculated: 23 82  BSA Calculated: 2 02    Health Management  History of Colonoscopy (Fiberoptic) Screening   COLONOSCOPY; every 5 years; Next Due: 82FWH5188; Overdue  Health Maintenance   Medicare Annual Wellness Visit; every 1 year; Last 33GJU1293; Next Due: 59TSE4177;  Overdue    Signatures   Electronically signed by : Rosenda Walker DO; Sep  1 2016 11:35AM EST                       (Author)    Electronically signed by : Rosenda Walker DO; Sep  1 2016 12:14PM EST                       (Author)

## 2018-02-01 ENCOUNTER — DOCTOR'S OFFICE (OUTPATIENT)
Dept: URBAN - METROPOLITAN AREA CLINIC 136 | Facility: CLINIC | Age: 83
Setting detail: OPHTHALMOLOGY
End: 2018-02-01
Payer: COMMERCIAL

## 2018-02-01 DIAGNOSIS — H35.3221: ICD-10-CM

## 2018-02-01 DIAGNOSIS — H31.002: ICD-10-CM

## 2018-02-01 DIAGNOSIS — E78.2 MIXED HYPERLIPIDEMIA: Primary | ICD-10-CM

## 2018-02-01 DIAGNOSIS — H35.3211: ICD-10-CM

## 2018-02-01 DIAGNOSIS — H33.021: ICD-10-CM

## 2018-02-01 DIAGNOSIS — K21.9 GASTROESOPHAGEAL REFLUX DISEASE WITHOUT ESOPHAGITIS: ICD-10-CM

## 2018-02-01 DIAGNOSIS — H35.3231: ICD-10-CM

## 2018-02-01 DIAGNOSIS — M54.16 LUMBAR RADICULOPATHY, CHRONIC: ICD-10-CM

## 2018-02-01 PROCEDURE — 67028 INJECTION EYE DRUG: CPT | Performed by: OPHTHALMOLOGY

## 2018-02-01 PROCEDURE — 92134 CPTRZ OPH DX IMG PST SGM RTA: CPT | Performed by: OPHTHALMOLOGY

## 2018-02-01 PROCEDURE — 92012 INTRM OPH EXAM EST PATIENT: CPT | Performed by: OPHTHALMOLOGY

## 2018-02-01 RX ORDER — OMEPRAZOLE 40 MG/1
CAPSULE, DELAYED RELEASE ORAL
Qty: 180 CAPSULE | Refills: 0 | Status: SHIPPED | OUTPATIENT
Start: 2018-02-01 | End: 2018-04-05 | Stop reason: SDUPTHER

## 2018-02-01 RX ORDER — ATORVASTATIN CALCIUM 10 MG/1
TABLET, FILM COATED ORAL
Qty: 90 TABLET | Refills: 0 | Status: SHIPPED | OUTPATIENT
Start: 2018-02-01 | End: 2018-04-05 | Stop reason: SDUPTHER

## 2018-02-01 RX ORDER — GABAPENTIN 600 MG/1
TABLET ORAL
Qty: 180 TABLET | Refills: 0 | Status: SHIPPED | OUTPATIENT
Start: 2018-02-01 | End: 2018-04-05 | Stop reason: SDUPTHER

## 2018-02-01 ASSESSMENT — REFRACTION_CURRENTRX
OD_OVR_VA: 20/
OS_OVR_VA: 20/
OD_OVR_VA: 20/
OS_OVR_VA: 20/
OD_OVR_VA: 20/
OS_OVR_VA: 20/

## 2018-02-01 ASSESSMENT — REFRACTION_AUTOREFRACTION
OD_SPHERE: -1.00
OS_CYLINDER: -1.75
OS_AXIS: 074
OS_SPHERE: +0.25
OD_AXIS: 087
OD_CYLINDER: -1.00

## 2018-02-01 ASSESSMENT — REFRACTION_MANIFEST
OS_VA3: 20/
OU_VA: 20/
OS_VA1: 20/
OD_VA2: 20/
OD_VA3: 20/
OD_VA2: 20/
OD_VA1: 20/
OD_VA1: 20/
OS_VA1: 20/
OS_VA2: 20/
OS_VA1: 20/
OS_VA2: 20/
OD_VA3: 20/
OS_VA3: 20/
OD_VA1: 20/
OU_VA: 20/
OU_VA: 20/
OD_VA3: 20/
OS_VA2: 20/
OS_VA3: 20/
OD_VA2: 20/

## 2018-02-01 ASSESSMENT — CONFRONTATIONAL VISUAL FIELD TEST (CVF)
OD_FINDINGS: FULL
OS_FINDINGS: FULL

## 2018-02-01 ASSESSMENT — VISUAL ACUITY
OD_BCVA: 20/50+1
OS_BCVA: 20/150

## 2018-02-01 ASSESSMENT — LID EXAM ASSESSMENTS: OD_BLEPHARITIS: 1+

## 2018-02-01 ASSESSMENT — SPHEQUIV_DERIVED
OS_SPHEQUIV: -0.625
OD_SPHEQUIV: -1.5

## 2018-02-01 ASSESSMENT — SUPERFICIAL PUNCTATE KERATITIS (SPK): OD_SPK: 2+

## 2018-02-01 ASSESSMENT — DRY EYES - PHYSICIAN NOTES: OD_GENERALCOMMENTS: INFERIORLY

## 2018-02-08 DIAGNOSIS — J30.0 CHRONIC VASOMOTOR RHINITIS: Primary | ICD-10-CM

## 2018-02-09 RX ORDER — IPRATROPIUM BROMIDE 42 UG/1
SPRAY, METERED NASAL
Qty: 15 ML | Refills: 3 | Status: SHIPPED | OUTPATIENT
Start: 2018-02-09 | End: 2018-07-30 | Stop reason: SDUPTHER

## 2018-02-12 DIAGNOSIS — J98.4 OTHER DISORDERS OF LUNG (CODE): ICD-10-CM

## 2018-02-12 DIAGNOSIS — J18.1 LOBAR PNEUMONIA (HCC): ICD-10-CM

## 2018-02-13 ENCOUNTER — PROCEDURE VISIT (OUTPATIENT)
Dept: UROLOGY | Facility: HOSPITAL | Age: 83
End: 2018-02-13
Payer: MEDICARE

## 2018-02-13 VITALS
WEIGHT: 186 LBS | SYSTOLIC BLOOD PRESSURE: 160 MMHG | BODY MASS INDEX: 25.19 KG/M2 | HEIGHT: 72 IN | DIASTOLIC BLOOD PRESSURE: 80 MMHG | HEART RATE: 60 BPM

## 2018-02-13 DIAGNOSIS — C67.9 MALIGNANT NEOPLASM OF URINARY BLADDER, UNSPECIFIED SITE (HCC): Primary | ICD-10-CM

## 2018-02-13 PROCEDURE — 52000 CYSTOURETHROSCOPY: CPT | Performed by: UROLOGY

## 2018-02-13 RX ORDER — BIOTIN 1 MG
TABLET ORAL
COMMUNITY
End: 2018-06-05 | Stop reason: SDUPTHER

## 2018-02-13 NOTE — PROGRESS NOTES
UROLOGY PROCEDURE NOTE     History of Present Illness:   Leo Arita is a [de-identified] y o  male with a history of low-grade papillary bladder cancer diagnosed in January of 2017  The patient has undergone routine cyst 3 month surveillance cystoscopies for the 1st year  There was some concern in October of 2017 about recurrence  Patient underwent a transurethral resection of his bladder which demonstrated no residual cancer  He presents today for his 12mo surveillance cysto        Past Medical History:     Past Medical History:   Diagnosis Date    Anemia     Appendicitis     Coronary artery disease     Detached retina     Disease of thyroid gland     GERD (gastroesophageal reflux disease)     Hyperlipidemia     Hypertension     Macular degeneration     Neuropathy     Von Willebrand disease ([address] )        PAST SURGICAL HISTORY:     Past Surgical History:   Procedure Laterality Date    APPENDECTOMY      BACK SURGERY      CATARACT EXTRACTION      EGD AND COLONOSCOPY N/A 12/9/2016    Procedure: EGD AND COLONOSCOPY;  Surgeon: Alexa Estes MD;  [facility];  Service:    [facility] EYE SURGERY      FRACTURE SURGERY Bilateral     ARM    NECK SURGERY      IA CYSTOURETHROSCOPY,FULGUR <0 5 CM LESN N/A 1/12/2017    Procedure: CYSTOSCOPY, BLADDER BIOPSY WITH FULGRATION, 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;  Surgeon: Mariah Burnett MD;  [facility];  Service: Urology    IA CYSTOURETHROSCOPY,FULGUR <0 5 CM LESN N/A 10/2/2017    Procedure: CYSTOSCOPY WITH  BLADDER BIOPSIES WITH FULGURATION;  Surgeon: Mariah Burnett MD;  [facility];  Service: Urology    IA INSTILL ANTICANCER AGENT IN BLADDER N/A 1/12/2017    Procedure: Mejia Faria;  Surgeon: Mariah Burnett MD;  [facility];  Service: Urology    IA INSTILL ANTICANCER AGENT IN BLADDER N/A 10/2/2017    Procedure: Mejia Faria;  Surgeon: Mariah Burnett MD;  Location: [facility] OR;  Service: Urology    ROTATOR CUFF REPAIR      TONSILECTOMY AND ADNOIDECTOMY      TONSILLECTOMY      TRANSURETHRAL RESECTION OF BLADDER TUMOR N/A 10/2/2017    Procedure: TRANSURETHRAL RESECTION OF BLADDER TUMOR (TURBT);   Surgeon: Matilde Elizalde MD;  [facility];  Service: Urology    [facility] [address] (HISTORICAL)         CURRENT MEDICATIONS:     Current Outpatient Prescriptions   Medication Sig Dispense Refill    acetaminophen-codeine (TYLENOL #3) 300-30 mg per tablet Take 1 tablet by mouth every 4 (four) hours as needed for moderate pain for up to 25 doses 25 tablet 0    atorvastatin (LIPITOR) 10 mg tablet TAKE 1 TABLET BY MOUTH  DAILY 90 tablet 0    B COMPLEX VITAMINS PO Take 1 tablet by mouth daily      cetirizine (ZyrTEC) 10 mg tablet Take 1 tablet by mouth daily      Cholecalciferol (VITAMIN D3) 1000 units CAPS Take by mouth      Cholecalciferol (VITAMIN D3) 2000 UNITS capsule Take 1 capsule by mouth 2 (two) times a day 5000 units       Coenzyme Q10 (CO Q10) 60 MG CAPS Take 1 capsule by mouth daily      cyanocobalamin 1,000 mcg/mL Inject 1 mL into the shoulder, thigh, or buttocks see administration instructions Every 2 months      gabapentin (NEURONTIN) 600 MG tablet TAKE 1 TABLET BY MOUTH  TWICE A  tablet 0    Ginkgo Biloba 120 MG TABS Take 1 tablet by mouth daily      ipratropium (ATROVENT) 0 06 % nasal spray USE 2 SPRAYS IN EACH  NOSTRIL TWICE DAILY 15 mL 3    lutein 6 mg Take 1 capsule by mouth daily      Melatonin 10 MG TABS Take 1 tablet by mouth daily at bedtime      MILK THISTLE PO Take 1 tablet by mouth daily      Misc Natural Products (ENERGY FOCUS) TABS Take 1 tablet by mouth daily      omeprazole (PriLOSEC) 40 MG capsule TAKE 1 CAPSULE BY MOUTH  TWICE DAILY 180 capsule 0    sildenafil (VIAGRA) 50 MG tablet Take 1 tablet by mouth as needed      colesevelam (WELCHOL) 625 mg tablet Take 625 mg by mouth 2 (two) times a day with meals      Inositol 500 MG TABS Take 1 tablet by mouth 2 (two) times a day      ondansetron (ZOFRAN) 4 mg tablet Take 4 mg by mouth every 8 (eight) hours as needed for nausea or vomiting      phenazopyridine (PYRIDIUM) 100 mg tablet Take 1 tablet by mouth 3 (three) times a day as needed for bladder spasms for up to 30 doses 30 tablet 0    phenazopyridine (PYRIDIUM) 100 mg tablet Take 1 tablet by mouth 3 (three) times a day as needed for bladder spasms 10 tablet 0    tamsulosin (FLOMAX) 0 4 mg Take 1 capsule by mouth daily with dinner for 30 days 30 capsule 0     No current facility-administered medications for this visit  ALLERGIES:   No Known Allergies    SOCIAL HISTORY:     Social History     Social History    Marital status:       Spouse name: N/A    Number of children: N/A    Years of education: N/A     Occupational History       retired      Social History Main Topics    Smoking status: Former Smoker     Years: 60 00     Types: Pipe    Smokeless tobacco: Never Used    Alcohol use 1 2 oz/week     1 Glasses of wine, 1 Shots of liquor per week      Comment: DAILY     Drug use: No    Sexual activity: Not Asked     Other Topics Concern    None     Social History Narrative    None       SOCIAL HISTORY:     Family History   Problem Relation Age of Onset    Ulcers Father     Heart disease Mother     Anuerysm Sister        REVIEW OF SYSTEMS:     General: negative for chills, fatigue, fever, significant unplanned weight changed  Psychological: negative for anxiety, depression, concentration or memory difficulties, irritability, mood swings, sleep disturbances  Ophthalmic: negative for blurry vision or double  ENT: negative for hearing difficulties, tinnitus, vertigo  Hematological and Lymphatic: negative for bleeding problems, blood clots, bruising, swollen lymph nodes  Respiratory: negative for shortness of breath, cough, hemoptysis, orthopnea, tachypnea or wheezing  Cardiovascular: negative for chest pain, dyspnea on exertion, edema, irregular or rapid heartbeat, paroxysmal nocturnal dyspnea  Gastrointestinal: negative for abdominal pain, bright red blood in stools, change in stools, constipation, diarrhea, nausea/vomiting, stool incontinence    GENITOURINARY: see HPI    Musculoskeletal: negative for gait disturbance, joint pain, joint stiffness/sweeling/pain, muscular weakness  Dermatological: negative for rash or skin lesion changes  Neurological: negative for confusion, dizziness, headaches, memory loss, numbness/tingling, seizures, speech problems, tremors or weakness    PHYSICAL EXAM:     /80   Pulse 60   Ht 6' (1 829 m)   Wt 84 4 kg (186 lb)   BMI 25 23 kg/m²   General:  Healthy appearing individual in no acute distress  They have a normal affect  There is not appear to be any gross neurologic defects or abnormalities  HEENT:  Normocephalic, atraumatic  Neck is supple without any palpable lymphadenopathy  Cardiovascular:  Patient has normal palpable distal radial pulses  There is no significant peripheral edema  No JVD is noted  Respiratory:  Patient has unlabored respirations  There is no audible wheeze or rhonchi  Abdomen:  Abdomen is without surgical scars  Abdomen is soft and nontender  There is no tympany  Inguinal and umbilical hernia are not appreciated  Genitourinary: no penile lesions or discharge, no testicular masses or tenderness, no hernias    Musculoskeletal:  Shuffling gait  Dermatologic:  Patient has no skin abnormalities or rashes       LABS:     CBC:   Lab Results   Component Value Date    WBC 7 59 2018    HGB 14 7 2018    HCT 44 9 2018    MCV 89 2018     2018       BMP:   Lab Results   Component Value Date    GLUCOSE 91 2017    CALCIUM 8 8 2017     2017    K 4 1 2017    CO2 27 2017     2017    BUN 14 2017    CREATININE 1 19 2017       IMAGIN/2016  CT ABDOMEN AND PELVIS WITH IV CONTRAST     INDICATION:  Intermittent diarrhea  Remote tobacco abuse  Back surgery/fusion      COMPARISON: None      TECHNIQUE:  CT examination of the abdomen and pelvis was performed after the administration of intravenous contrast   This examination, like all CT scans performed in the [facility], was performed utilizing techniques to minimize   radiation dose exposure, including the use of iterative reconstruction and automated exposure control  Axial, sagittal, and coronal reformatted images were submitted for interpretation  100 cc of intravenous Omnipaque 350 was administered for this   examination  Enteric contrast was not given  FINDINGS:     ABDOMEN     LOWER CHEST:  Minimal linear atelectasis or scarring is present at the lung bases  No pleural effusions      LIVER/BILIARY TREE:  The liver is normal in size and contour without dilated biliary ducts  Numerous rounded lesions of diminished attenuation are noted in the liver, largest in the inferior right hepatic lobe measures 2 4 cm in greatest dimension,   likely cysts      GALLBLADDER:  No calcified gallstones  No pericholecystic inflammatory change      SPLEEN:  Unremarkable      PANCREAS:  Unremarkable      ADRENAL GLANDS:  Unremarkable      KIDNEYS/URETERS:  Multiple bilateral renal cysts  No renal calculi or hydronephrosis      STOMACH AND BOWEL:  Stomach and small bowel unremarkable  Sigmoid diverticula are present without evidence of acute diverticulitis      APPENDIX:  No findings to suggest appendicitis      ABDOMINOPELVIC CAVITY:  No ascites or free intraperitoneal air   No lymphadenopathy      VESSELS:  Unremarkable for patient's age      PELVIS     REPRODUCTIVE ORGANS:  Unremarkable for patient's age      URINARY BLADDER:  12 mm nodule is present in the right side of the urinary bladder consistent with polypoid mucosal lesion      ABDOMINAL WALL/INGUINAL REGIONS:  Small fat-containing umbilical hernia noted  Fat is seen in both inguinal canals      OSSEOUS STRUCTURES:  Pedicle and screw and posterior stabilization bar hardware fusion noted at L3-4  Intervertebral disc space narrowing and vacuum disc phenomenon indicative of degenerative disc disease seen at L2-3 and L4-5      IMPRESSION:     Sigmoid diverticular disease without evidence of acute diverticulitis      Multiple cysts, liver and kidneys      Bladder mucosal nodules suspicious for transitional cell carcinoma  Urology consultation recommended      See above additional nonacute findings  PATHOLOGY:     1/12/17  Case Report   Surgical Pathology Report                         Case: [id number]                                    Authorizing Provider: Yaneth Duke MD   Collected:           01/12/2017 1021               Ordering Location:     [facility] Received:            01/12/2017 1305                                      Operating Room                                                                Pathologist:           Pradeep Roy DO                                                             Specimens:   A) - Urinary Bladder, Left Lateral baldder wall                                                      B) - Urinary Bladder, Right posterior Bladder                                                        C) - Urinary Bladder, Right Trigone Bx                                                     Final Diagnosis   A  Bladder, left lateral wall, biopsy:  - Papillary urothelial neoplasm, see note  - Muscular propria (detrusor muscle), negative for carcinoma      Note: Papillary urothelial growth with minimal cytologic atypia, the differential diagnosis includes a non-invasive low grade papillary urothelial neoplasm or a papillary urothelial neoplasm of uncertain malignant potential (PUNLMP)  Clinical correlation needed       B   Bladder, right posterior, biopsy:  - Noninvasive low-grade papillary urothelial carcinoma with endophytic growth pattern  - Muscular propria (detrusor muscle), not identified in the specimen      C  Bladder, right trigone, biopsy:  - Noninvasive low-grade papillary urothelial carcinoma  - Muscular propria (detrusor muscle), not identified in the specimen  10/2017  Case Report   Surgical Pathology Report                         Case: [id number]                                    Authorizing Provider: Tim Pantoja MD   Collected:           10/02/2017 1001               Ordering Location:     [facility] Received:            10/02/2017 1045                                      Operating Room                                                                Pathologist:           Ilana Gudino MD                                                         Specimen:    Urinary Bladder, RIGHT trigone                                                             Final Diagnosis   A  Urinary bladder, right trigone, biopsy:     - Scant detached cauterized fragment of urothelium (see note)  - Urothelial mucosa with acute and chronic inflammation and fragments of muscle  Electronically signed by Ilana Gudino MD on 10/6/2017 at  9:35 AM   Note   Previous history of noninvasive low-grade papillary urothelial carcinoma is noted      The submitted tissue contains predominantly fragments of muscle as well as few fragments of urothelial   mucosa with chronic inflammation  In addition a very small detached cauterized fragment of urothelium is   noted, limiting histologic assessment  The possibility of a small residual urothelial neoplasm cannot be   excluded  No invasive malignancy is identified in the lamina propria and muscularis propria  PROCEDURE:       Cystoscopy procedure note:    Risk and benefits of flexible cystoscopy were discussed  Informed consent was obtained  Urine dip was adequate for cystoscopy  The patient was placed in the supine position   His genitalia was prepped with Betadine and draped in a sterile fashion  Viscous 2% lidocaine jelly was instilled into the urethra and allowed dwell time for local anesthesia  Flexible cystoscopy was then performed using a 16F scope  The distal urethra and prostatic urethra were evaluated and were normal in course and caliber  Once inside the bladder, it was carefully inspected in a 360 degree fashion  There was no evidence of mucosal abnormalities, lesions, diverticula or stones  Both ureteral orifices in their orthotopic location were visualized with clear efflux of urine  Retroflexion for evaluation of the bladder neck was normal      Overall this was a negative cystoscopy  ASSESSMENT:     [de-identified] y o  male with history of low-grade bladder cancer    PLAN:     Patient has now undergone 1 year of every 3 months cystoscopy  He will be stretched out to every 6 months  I will see him in August for an updated surveillance    Patient knows to call sooner if there are any questions or concerns

## 2018-02-15 ENCOUNTER — HOSPITAL ENCOUNTER (OUTPATIENT)
Dept: CT IMAGING | Facility: HOSPITAL | Age: 83
Discharge: HOME | End: 2018-02-15
Attending: FAMILY MEDICINE
Payer: MEDICARE

## 2018-02-15 DIAGNOSIS — R91.1 LUNG NODULE: Primary | ICD-10-CM

## 2018-02-15 DIAGNOSIS — J98.4 DISEASE OF LUNG: ICD-10-CM

## 2018-02-15 DIAGNOSIS — J18.1 LOBAR PNEUMONIA (HCC): ICD-10-CM

## 2018-02-15 DIAGNOSIS — J98.4 OTHER DISORDERS OF LUNG (CODE): ICD-10-CM

## 2018-02-15 PROCEDURE — 71250 CT THORAX DX C-: CPT

## 2018-02-16 DIAGNOSIS — R93.89 ABNORMAL CT SCAN: Primary | ICD-10-CM

## 2018-02-19 ENCOUNTER — OFFICE VISIT (OUTPATIENT)
Dept: PULMONOLOGY | Facility: HOSPITAL | Age: 83
End: 2018-02-19
Payer: MEDICARE

## 2018-02-19 VITALS
SYSTOLIC BLOOD PRESSURE: 166 MMHG | HEIGHT: 72 IN | OXYGEN SATURATION: 94 % | HEART RATE: 97 BPM | WEIGHT: 186 LBS | BODY MASS INDEX: 25.19 KG/M2 | DIASTOLIC BLOOD PRESSURE: 82 MMHG

## 2018-02-19 DIAGNOSIS — R91.1 LUNG NODULE, SOLITARY: Primary | ICD-10-CM

## 2018-02-19 PROCEDURE — 99204 OFFICE O/P NEW MOD 45 MIN: CPT | Performed by: INTERNAL MEDICINE

## 2018-02-19 NOTE — PROGRESS NOTES
Pulmonary Initial Visit  Rebecca Reyes [de-identified] y o  male MRN: [id number]  @ Encounter: [id number]      Impressions/Recommendations:   Patient is a robust 53-year-old male presenting for follow-up of an abnormal CT scan  After review of the CT scan comparison to previous abdomen pelvis if there is any increase in size and nodule in the left lower lobe  Given the relatively slow growing nature a biopsy would be the most definitive next step given the possible inclusive PET-CT  The procedures his custom the patient who wishes to proceed with a CT-guided biopsy  He has not had previous pulmonary function studies  We will obtain full study PFT  Follow-up will depend upon results of CT-guided biopsy  History of Present Illness   HPI:  Rebecca Reyes is a [de-identified] y o  male with pmhx sig for tobacco use who presents for evaluation of abnormal CT scan  Patient presents for follow-up of an abnormal CT scan  He reports he has been doing quite well and has no specific complaints  He denies any shortness of breath, chest pain, fevers, chills, nausea, vomiting, weight loss, headaches or dizziness  The patient initially had a cough for which she had a chest x-ray on January 3rd  There are some abnormalities noted in the right mid and upper lung zones  If follow-up chest x-ray approximately 10 days later again demonstrated these abnormalities  This was the reason he had a CT scan which had no abnormalities in the right side but instead demonstrated slight progression of a left lower lobe nodule  He has no other symptoms related specifically to this nodule  The patient has no personal history of cancer  He does have an extensive smoking history specifically of a pipe and subsequently cigarettes but he has quit smoking several years ago  He denies any specific occupational or environmental exposures  He has no symptoms concerning for an acute infection  He denies any problems with aspiration      The patient is fit working out for 20-40 minutes daily without difficulty  He denies any associated shortness of breath or hemoptysis  Review of systems:  12 point review of systems was completed and was otherwise negative except as listed in HPI  Historical Information   Past Medical History:   Diagnosis Date    Anemia     Appendicitis     Coronary artery disease     Detached retina     Disease of thyroid gland     GERD (gastroesophageal reflux disease)     Hyperlipidemia     Hypertension     Macular degeneration     Neuropathy     Von Willebrand disease ([address] )      Past Surgical History:   Procedure Laterality Date    APPENDECTOMY      BACK SURGERY      CATARACT EXTRACTION      EGD AND COLONOSCOPY N/A 12/9/2016    Procedure: EGD AND COLONOSCOPY;  Surgeon: Anupam Christie MD;  [facility];  Service:    [facility] EYE SURGERY      FRACTURE SURGERY Bilateral     ARM    NECK SURGERY      TN CYSTOURETHROSCOPY,FULGUR <0 5 CM LESN N/A 1/12/2017    Procedure: CYSTOSCOPY, BLADDER BIOPSY WITH FULGRATION, 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;  Surgeon: Cinthya Bell MD;  [facility];  Service: Urology    TN CYSTOURETHROSCOPY,FULGUR <0 5 CM LESN N/A 10/2/2017    Procedure: CYSTOSCOPY WITH  BLADDER BIOPSIES WITH FULGURATION;  Surgeon: Cinthya Bell MD;  [facility];  Service: Urology    TN INSTILL ANTICANCER AGENT IN BLADDER N/A 1/12/2017    Procedure: Faby Moreno;  Surgeon: Cinthya Bell MD;  [facility];  Service: Urology    TN INSTILL ANTICANCER AGENT IN BLADDER N/A 10/2/2017    Procedure: Faby Moreno;  Surgeon: Cinthya Bell MD;  [facility];  Service: Urology    ROTATOR CUFF REPAIR      TONSILECTOMY AND ADNOIDECTOMY      TONSILLECTOMY      TRANSURETHRAL RESECTION OF BLADDER TUMOR N/A 10/2/2017    Procedure: TRANSURETHRAL RESECTION OF BLADDER TUMOR (TURBT);   Surgeon: Cinthya Bell MD;  [facility];  Service: Urology   [facility] ANTIGEN (HISTORICAL)       Family History   Problem Relation Age of Onset    Ulcers Father     Heart disease Mother    Marielena Hammondysm Sister        Social History     Social History    Marital status:      Spouse name: N/A    Number of children: N/A    Years of education: N/A     Occupational History       retired      Social History Main Topics    Smoking status: Former Smoker     Years: 60 00     Types: Pipe    Smokeless tobacco: Never Used    Alcohol use 1 2 oz/week     1 Glasses of wine, 1 Shots of liquor per week      Comment: DAILY     Drug use: No    Sexual activity: Not on file     Other Topics Concern    Not on file     Social History Narrative    No narrative on file     Meds/Allergies   No current facility-administered medications for this visit  No Known Allergies    Vitals: Blood pressure 166/82, pulse 97, height 6' (1 829 m), weight 84 4 kg (186 lb), SpO2 94 % , RA, Body mass index is 25 23 kg/m²  Physical Exam  General: Pleasant, Awake alert and oriented x 3, conversant without conversational dyspnea, NAD, normal affect  HEENT:  PERRL, Sclera noninjected, nonicteric OU, Nares patent, no nasal flaring, no nasal drainage, Mucous membranes, moist, no oral lesions, normal dentition  NECK: Trachea midline, no accessory muscle use, no stridor, no cervical or supraclavicular adenopathy, JVP not elevated  CARDIAC: Reg, single s1/S2, no m/r/g  PULM: CTA b/l; no w/r/c  CHEST: No gross deformities, equal chest expansion on inspiration bilaterally  ABD: Normoactive bowel sounds, soft nontender, nondistended, no rebound, no rigidity, no guarding  EXT: No cyanosis, no clubbing, no edema, normal capillary refill  SKIN:  No rashes, no lesions  NEURO: no focal neurologic deficits, AAOx3, moving all extremities appropriately    Labs: I have personally reviewed pertinent lab results      Imaging and other studies: I have personally reviewed pertinent films in PACS  CT Chest 2/15/2018:  1  Left lower lobe segmental consolidation at the site of small nodular opacity in 2016  In the absence of acute respiratory symptoms to suggest a new bacterial pneumonia, differential considerations include indolent atypical infection, recurrent aspiration, cryptogenic organizing pneumonia, or adenocarcinoma  Admixed hyperdense material within the consolidation may favor aspiration  Concern for possible progression after review of previous CT scan from  11/16/2016  Pulmonary function testing:   No testing available for review  Marti Ford

## 2018-02-19 NOTE — PATIENT INSTRUCTIONS
Please have your lung biopsy completed  Please have your breathing tests completed  We will follow up with you about 7 days after the biopsy

## 2018-02-20 PROBLEM — R91.1 LUNG NODULE, SOLITARY: Status: ACTIVE | Noted: 2018-02-20

## 2018-02-28 ENCOUNTER — HOSPITAL ENCOUNTER (OUTPATIENT)
Dept: PULMONOLOGY | Facility: HOSPITAL | Age: 83
Discharge: HOME | End: 2018-02-28
Attending: INTERNAL MEDICINE
Payer: MEDICARE

## 2018-02-28 DIAGNOSIS — R91.1 LUNG NODULE, SOLITARY: ICD-10-CM

## 2018-02-28 PROCEDURE — 94726 PLETHYSMOGRAPHY LUNG VOLUMES: CPT | Performed by: INTERNAL MEDICINE

## 2018-02-28 PROCEDURE — 94060 EVALUATION OF WHEEZING: CPT | Performed by: INTERNAL MEDICINE

## 2018-02-28 PROCEDURE — 94727 GAS DIL/WSHOT DETER LNG VOL: CPT

## 2018-02-28 PROCEDURE — 94729 DIFFUSING CAPACITY: CPT | Performed by: INTERNAL MEDICINE

## 2018-02-28 PROCEDURE — 94760 N-INVAS EAR/PLS OXIMETRY 1: CPT

## 2018-02-28 PROCEDURE — 94729 DIFFUSING CAPACITY: CPT

## 2018-02-28 PROCEDURE — 94060 EVALUATION OF WHEEZING: CPT

## 2018-02-28 RX ORDER — ALBUTEROL SULFATE 2.5 MG/3ML
2.5 SOLUTION RESPIRATORY (INHALATION) ONCE AS NEEDED
Status: COMPLETED | OUTPATIENT
Start: 2018-02-28 | End: 2018-02-28

## 2018-02-28 RX ADMIN — ALBUTEROL SULFATE 2.5 MG: 2.5 SOLUTION RESPIRATORY (INHALATION) at 10:00

## 2018-03-01 ENCOUNTER — DOCTOR'S OFFICE (OUTPATIENT)
Dept: URBAN - METROPOLITAN AREA CLINIC 136 | Facility: CLINIC | Age: 83
Setting detail: OPHTHALMOLOGY
End: 2018-03-01
Payer: COMMERCIAL

## 2018-03-01 DIAGNOSIS — H35.3211: ICD-10-CM

## 2018-03-01 DIAGNOSIS — H33.021: ICD-10-CM

## 2018-03-01 DIAGNOSIS — H31.002: ICD-10-CM

## 2018-03-01 DIAGNOSIS — H35.3231: ICD-10-CM

## 2018-03-01 PROCEDURE — 92012 INTRM OPH EXAM EST PATIENT: CPT | Performed by: OPHTHALMOLOGY

## 2018-03-01 PROCEDURE — 67028 INJECTION EYE DRUG: CPT | Performed by: OPHTHALMOLOGY

## 2018-03-01 PROCEDURE — VITAEYE VITA EYE VITAMINS: Performed by: OPHTHALMOLOGY

## 2018-03-01 PROCEDURE — 92134 CPTRZ OPH DX IMG PST SGM RTA: CPT | Performed by: OPHTHALMOLOGY

## 2018-03-01 ASSESSMENT — REFRACTION_MANIFEST
OS_VA1: 20/
OS_VA3: 20/
OD_VA2: 20/
OS_VA3: 20/
OD_VA3: 20/
OS_VA1: 20/
OS_VA2: 20/
OD_VA3: 20/
OD_VA1: 20/
OU_VA: 20/
OD_VA1: 20/
OU_VA: 20/
OS_VA1: 20/
OD_VA2: 20/
OD_VA1: 20/
OS_VA2: 20/
OD_VA2: 20/
OS_VA2: 20/
OD_VA3: 20/
OU_VA: 20/
OS_VA3: 20/

## 2018-03-01 ASSESSMENT — REFRACTION_AUTOREFRACTION
OD_CYLINDER: -1.00
OS_SPHERE: +0.25
OD_SPHERE: -1.00
OS_AXIS: 074
OD_AXIS: 087
OS_CYLINDER: -1.75

## 2018-03-01 ASSESSMENT — REFRACTION_CURRENTRX
OD_OVR_VA: 20/
OS_OVR_VA: 20/
OD_OVR_VA: 20/
OD_OVR_VA: 20/
OS_OVR_VA: 20/
OS_OVR_VA: 20/

## 2018-03-01 ASSESSMENT — DRY EYES - PHYSICIAN NOTES: OD_GENERALCOMMENTS: INFERIORLY

## 2018-03-01 ASSESSMENT — SPHEQUIV_DERIVED
OD_SPHEQUIV: -1.5
OS_SPHEQUIV: -0.625

## 2018-03-01 ASSESSMENT — CONFRONTATIONAL VISUAL FIELD TEST (CVF)
OS_FINDINGS: FULL
OD_FINDINGS: FULL

## 2018-03-01 ASSESSMENT — VISUAL ACUITY
OS_BCVA: 20/400
OD_BCVA: 20/50-2

## 2018-03-01 ASSESSMENT — LID EXAM ASSESSMENTS: OD_BLEPHARITIS: 1+

## 2018-03-01 ASSESSMENT — SUPERFICIAL PUNCTATE KERATITIS (SPK): OD_SPK: 2+

## 2018-03-13 ENCOUNTER — TELEPHONE (OUTPATIENT)
Dept: PULMONOLOGY | Facility: CLINIC | Age: 83
End: 2018-03-13

## 2018-03-13 NOTE — TELEPHONE ENCOUNTER
Patient called regarding lung biopsy done Wed 02/28/18  Patient would like to know his results   He can be reached after 3:30pm  Please advise

## 2018-03-15 NOTE — TELEPHONE ENCOUNTER
Called patient after receiving results from [facility]  No evidence of malignancy  Granulomatous lung disease; etiology undetermined  Sparse polarizable foreign body material present  Will follow up with patient

## 2018-04-05 ENCOUNTER — DOCTOR'S OFFICE (OUTPATIENT)
Dept: URBAN - METROPOLITAN AREA CLINIC 136 | Facility: CLINIC | Age: 83
Setting detail: OPHTHALMOLOGY
End: 2018-04-05
Payer: COMMERCIAL

## 2018-04-05 DIAGNOSIS — H31.002: ICD-10-CM

## 2018-04-05 DIAGNOSIS — E78.2 MIXED HYPERLIPIDEMIA: ICD-10-CM

## 2018-04-05 DIAGNOSIS — M54.16 LUMBAR RADICULOPATHY, CHRONIC: ICD-10-CM

## 2018-04-05 DIAGNOSIS — H35.3231: ICD-10-CM

## 2018-04-05 DIAGNOSIS — K21.9 GASTROESOPHAGEAL REFLUX DISEASE WITHOUT ESOPHAGITIS: ICD-10-CM

## 2018-04-05 DIAGNOSIS — H35.3211: ICD-10-CM

## 2018-04-05 DIAGNOSIS — H33.021: ICD-10-CM

## 2018-04-05 PROCEDURE — 92012 INTRM OPH EXAM EST PATIENT: CPT | Performed by: OPHTHALMOLOGY

## 2018-04-05 PROCEDURE — 92134 CPTRZ OPH DX IMG PST SGM RTA: CPT | Performed by: OPHTHALMOLOGY

## 2018-04-05 PROCEDURE — 67028 INJECTION EYE DRUG: CPT | Performed by: OPHTHALMOLOGY

## 2018-04-05 RX ORDER — ATORVASTATIN CALCIUM 10 MG/1
TABLET, FILM COATED ORAL
Qty: 90 TABLET | Refills: 2 | Status: SHIPPED | OUTPATIENT
Start: 2018-04-05 | End: 2019-06-04 | Stop reason: SDUPTHER

## 2018-04-05 RX ORDER — GABAPENTIN 600 MG/1
TABLET ORAL
Qty: 180 TABLET | Refills: 2 | Status: SHIPPED | OUTPATIENT
Start: 2018-04-05 | End: 2018-12-11

## 2018-04-05 RX ORDER — OMEPRAZOLE 40 MG/1
CAPSULE, DELAYED RELEASE ORAL
Qty: 180 CAPSULE | Refills: 2 | Status: SHIPPED | OUTPATIENT
Start: 2018-04-05 | End: 2018-06-13 | Stop reason: ALTCHOICE

## 2018-04-05 ASSESSMENT — CONFRONTATIONAL VISUAL FIELD TEST (CVF)
OD_FINDINGS: FULL
OS_FINDINGS: FULL

## 2018-04-05 ASSESSMENT — SUPERFICIAL PUNCTATE KERATITIS (SPK): OD_SPK: 2+

## 2018-04-05 ASSESSMENT — DRY EYES - PHYSICIAN NOTES: OD_GENERALCOMMENTS: INFERIORLY

## 2018-04-05 ASSESSMENT — LID EXAM ASSESSMENTS: OD_BLEPHARITIS: 1+

## 2018-04-06 ENCOUNTER — RX ONLY (RX ONLY)
Age: 83
End: 2018-04-06

## 2018-04-06 ASSESSMENT — SPHEQUIV_DERIVED
OD_SPHEQUIV: -1.5
OS_SPHEQUIV: -0.625

## 2018-04-06 ASSESSMENT — REFRACTION_MANIFEST
OS_VA1: 20/
OD_VA2: 20/
OU_VA: 20/
OD_VA3: 20/
OS_VA1: 20/
OS_VA2: 20/
OS_VA3: 20/
OS_VA3: 20/
OS_VA2: 20/
OS_VA1: 20/
OD_VA2: 20/
OD_VA3: 20/
OD_VA1: 20/
OU_VA: 20/
OD_VA1: 20/
OS_VA3: 20/
OU_VA: 20/
OD_VA2: 20/
OD_VA3: 20/
OS_VA2: 20/
OD_VA1: 20/

## 2018-04-06 ASSESSMENT — REFRACTION_CURRENTRX
OD_OVR_VA: 20/
OS_OVR_VA: 20/
OD_OVR_VA: 20/
OS_OVR_VA: 20/
OD_OVR_VA: 20/
OS_OVR_VA: 20/

## 2018-04-06 ASSESSMENT — REFRACTION_AUTOREFRACTION
OS_SPHERE: +0.25
OD_AXIS: 087
OS_AXIS: 074
OD_CYLINDER: -1.00
OS_CYLINDER: -1.75
OD_SPHERE: -1.00

## 2018-04-06 ASSESSMENT — VISUAL ACUITY
OD_BCVA: 20/60-2
OS_BCVA: 20/400

## 2018-04-09 ENCOUNTER — TELEPHONE (OUTPATIENT)
Dept: PULMONOLOGY | Facility: CLINIC | Age: 83
End: 2018-04-09

## 2018-04-09 DIAGNOSIS — R91.8 ABNORMAL CT SCAN OF LUNG: Primary | ICD-10-CM

## 2018-04-09 NOTE — TELEPHONE ENCOUNTER
Pt is calling to follow up after ct scan/bx results  He stated that you wanted him to go on steroids, but he never heard back from us  Please advise  [phone]  Ps    Pt is aware you'll be back tomorrow

## 2018-04-12 NOTE — TELEPHONE ENCOUNTER
Return call patient discussed results  No further prednisone or other medications are indicated at that time  The patient is more concerned about his acid reflux  He was instructed to touch base with his primary care or GI doctors  He was going to increase to 1 omeprazole in the morning and 2 at night  The patient benefit from repeat CT scan in August of 2018 and follow up in pulmonary office afterwards

## 2018-05-03 ENCOUNTER — RX ONLY (RX ONLY)
Age: 83
End: 2018-05-03

## 2018-05-03 ENCOUNTER — DOCTOR'S OFFICE (OUTPATIENT)
Dept: URBAN - METROPOLITAN AREA CLINIC 136 | Facility: CLINIC | Age: 83
Setting detail: OPHTHALMOLOGY
End: 2018-05-03
Payer: COMMERCIAL

## 2018-05-03 DIAGNOSIS — H35.3211: ICD-10-CM

## 2018-05-03 DIAGNOSIS — H35.3231: ICD-10-CM

## 2018-05-03 DIAGNOSIS — H33.021: ICD-10-CM

## 2018-05-03 DIAGNOSIS — H31.002: ICD-10-CM

## 2018-05-03 DIAGNOSIS — H43.392: ICD-10-CM

## 2018-05-03 PROCEDURE — 92012 INTRM OPH EXAM EST PATIENT: CPT | Performed by: OPHTHALMOLOGY

## 2018-05-03 PROCEDURE — 67028 INJECTION EYE DRUG: CPT | Performed by: OPHTHALMOLOGY

## 2018-05-03 PROCEDURE — 92134 CPTRZ OPH DX IMG PST SGM RTA: CPT | Performed by: OPHTHALMOLOGY

## 2018-05-03 ASSESSMENT — REFRACTION_MANIFEST
OS_VA1: 20/
OS_VA3: 20/
OS_VA2: 20/
OD_VA1: 20/
OS_VA3: 20/
OD_VA1: 20/
OU_VA: 20/
OS_VA2: 20/
OD_VA3: 20/
OD_VA1: 20/
OS_VA3: 20/
OU_VA: 20/
OD_VA2: 20/
OS_VA1: 20/
OD_VA3: 20/
OD_VA3: 20/
OD_VA2: 20/
OS_VA1: 20/
OU_VA: 20/
OS_VA2: 20/
OD_VA2: 20/

## 2018-05-03 ASSESSMENT — REFRACTION_AUTOREFRACTION
OD_AXIS: 087
OS_CYLINDER: -1.75
OD_CYLINDER: -1.00
OS_AXIS: 074
OS_SPHERE: +0.25
OD_SPHERE: -1.00

## 2018-05-03 ASSESSMENT — REFRACTION_CURRENTRX
OS_OVR_VA: 20/
OD_OVR_VA: 20/
OS_OVR_VA: 20/
OD_OVR_VA: 20/
OS_OVR_VA: 20/
OD_OVR_VA: 20/

## 2018-05-03 ASSESSMENT — DRY EYES - PHYSICIAN NOTES: OD_GENERALCOMMENTS: INFERIORLY

## 2018-05-03 ASSESSMENT — SPHEQUIV_DERIVED
OS_SPHEQUIV: -0.625
OD_SPHEQUIV: -1.5

## 2018-05-03 ASSESSMENT — LID EXAM ASSESSMENTS: OD_BLEPHARITIS: 1+

## 2018-05-03 ASSESSMENT — CONFRONTATIONAL VISUAL FIELD TEST (CVF)
OS_FINDINGS: FULL
OD_FINDINGS: FULL

## 2018-05-03 ASSESSMENT — SUPERFICIAL PUNCTATE KERATITIS (SPK): OD_SPK: 2+

## 2018-05-03 ASSESSMENT — VISUAL ACUITY
OD_BCVA: 20/70+1
OS_BCVA: 20/400

## 2018-05-23 ENCOUNTER — RX ONLY (RX ONLY)
Age: 83
End: 2018-05-23

## 2018-05-23 ENCOUNTER — DOCTOR'S OFFICE (OUTPATIENT)
Dept: URBAN - METROPOLITAN AREA CLINIC 136 | Facility: CLINIC | Age: 83
Setting detail: OPHTHALMOLOGY
End: 2018-05-23
Payer: COMMERCIAL

## 2018-05-23 DIAGNOSIS — H35.3211: ICD-10-CM

## 2018-05-23 PROCEDURE — VITAEYE VITA EYE VITAMINS: Performed by: OPHTHALMOLOGY

## 2018-05-23 PROCEDURE — 67028 INJECTION EYE DRUG: CPT | Performed by: OPHTHALMOLOGY

## 2018-05-23 ASSESSMENT — REFRACTION_MANIFEST
OS_VA1: 20/
OS_VA2: 20/
OS_VA1: 20/
OS_VA3: 20/
OU_VA: 20/
OS_VA1: 20/
OD_VA2: 20/
OD_VA3: 20/
OS_VA3: 20/
OS_VA2: 20/
OD_VA1: 20/
OS_VA3: 20/
OD_VA3: 20/
OU_VA: 20/
OD_VA2: 20/
OD_VA3: 20/
OD_VA1: 20/
OU_VA: 20/
OS_VA2: 20/
OD_VA1: 20/
OD_VA2: 20/

## 2018-05-23 ASSESSMENT — REFRACTION_CURRENTRX
OD_OVR_VA: 20/
OS_OVR_VA: 20/

## 2018-05-23 ASSESSMENT — REFRACTION_AUTOREFRACTION
OD_AXIS: 087
OD_CYLINDER: -1.00
OS_AXIS: 074
OD_SPHERE: -1.00
OS_SPHERE: +0.25
OS_CYLINDER: -1.75

## 2018-05-23 ASSESSMENT — SPHEQUIV_DERIVED
OD_SPHEQUIV: -1.5
OS_SPHEQUIV: -0.625

## 2018-05-23 ASSESSMENT — VISUAL ACUITY
OS_BCVA: 20/300
OD_BCVA: 20/70+1

## 2018-05-31 ENCOUNTER — TELEPHONE (OUTPATIENT)
Dept: FAMILY MEDICINE CLINIC | Facility: CLINIC | Age: 83
End: 2018-05-31

## 2018-05-31 DIAGNOSIS — E53.8 B12 DEFICIENCY: Primary | ICD-10-CM

## 2018-06-05 ENCOUNTER — OFFICE VISIT (OUTPATIENT)
Dept: FAMILY MEDICINE CLINIC | Facility: CLINIC | Age: 83
End: 2018-06-05
Payer: MEDICARE

## 2018-06-05 VITALS
DIASTOLIC BLOOD PRESSURE: 70 MMHG | BODY MASS INDEX: 25.19 KG/M2 | WEIGHT: 186 LBS | SYSTOLIC BLOOD PRESSURE: 148 MMHG | HEIGHT: 72 IN

## 2018-06-05 DIAGNOSIS — M48.062 SPINAL STENOSIS OF LUMBAR REGION WITH NEUROGENIC CLAUDICATION: Primary | ICD-10-CM

## 2018-06-05 DIAGNOSIS — G95.19 NEUROGENIC CLAUDICATION (HCC): ICD-10-CM

## 2018-06-05 DIAGNOSIS — R27.0 ATAXIA: ICD-10-CM

## 2018-06-05 DIAGNOSIS — M62.549 ATROPHY OF MUSCLE OF HAND, UNSPECIFIED LATERALITY: ICD-10-CM

## 2018-06-05 DIAGNOSIS — M48.02 CERVICAL SPINAL STENOSIS: ICD-10-CM

## 2018-06-05 DIAGNOSIS — G62.9 POLYNEUROPATHY: ICD-10-CM

## 2018-06-05 PROCEDURE — 99214 OFFICE O/P EST MOD 30 MIN: CPT | Performed by: FAMILY MEDICINE

## 2018-06-07 ENCOUNTER — TRANSCRIBE ORDERS (OUTPATIENT)
Dept: ADMINISTRATIVE | Facility: HOSPITAL | Age: 83
End: 2018-06-07

## 2018-06-07 ENCOUNTER — LAB (OUTPATIENT)
Dept: LAB | Facility: MEDICAL CENTER | Age: 83
End: 2018-06-07
Payer: MEDICARE

## 2018-06-07 DIAGNOSIS — R27.0 ATAXIA: ICD-10-CM

## 2018-06-07 DIAGNOSIS — R26.89 BALANCE DISORDER: ICD-10-CM

## 2018-06-07 DIAGNOSIS — Z12.5 SCREENING PSA (PROSTATE SPECIFIC ANTIGEN): Primary | ICD-10-CM

## 2018-06-07 DIAGNOSIS — G62.9 POLYNEUROPATHY: ICD-10-CM

## 2018-06-07 DIAGNOSIS — M62.549 ATROPHY OF MUSCLE OF HAND, UNSPECIFIED LATERALITY: ICD-10-CM

## 2018-06-07 LAB
25(OH)D3 SERPL-MCNC: 90.1 NG/ML (ref 30–100)
ALBUMIN SERPL BCP-MCNC: 3.4 G/DL (ref 3.5–5)
ALP SERPL-CCNC: 82 U/L (ref 46–116)
ALT SERPL W P-5'-P-CCNC: 44 U/L (ref 12–78)
ANION GAP SERPL CALCULATED.3IONS-SCNC: 6 MMOL/L (ref 4–13)
AST SERPL W P-5'-P-CCNC: 32 U/L (ref 5–45)
BILIRUB SERPL-MCNC: 1.04 MG/DL (ref 0.2–1)
BUN SERPL-MCNC: 14 MG/DL (ref 5–25)
CALCIUM SERPL-MCNC: 8.8 MG/DL (ref 8.3–10.1)
CHLORIDE SERPL-SCNC: 106 MMOL/L (ref 100–108)
CO2 SERPL-SCNC: 28 MMOL/L (ref 21–32)
CREAT SERPL-MCNC: 1.03 MG/DL (ref 0.6–1.3)
ERYTHROCYTE [SEDIMENTATION RATE] IN BLOOD: 15 MM/HOUR (ref 0–10)
EST. AVERAGE GLUCOSE BLD GHB EST-MCNC: 128 MG/DL
GFR SERPL CREATININE-BSD FRML MDRD: 68 ML/MIN/1.73SQ M
GLUCOSE P FAST SERPL-MCNC: 92 MG/DL (ref 65–99)
HBA1C MFR BLD: 6.1 % (ref 4.2–6.3)
POTASSIUM SERPL-SCNC: 4 MMOL/L (ref 3.5–5.3)
PROT SERPL-MCNC: 7.5 G/DL (ref 6.4–8.2)
SODIUM SERPL-SCNC: 140 MMOL/L (ref 136–145)
TSH SERPL DL<=0.05 MIU/L-ACNC: 3.01 UIU/ML (ref 0.36–3.74)
VIT B12 SERPL-MCNC: 400 PG/ML (ref 100–900)

## 2018-06-07 PROCEDURE — 85652 RBC SED RATE AUTOMATED: CPT | Performed by: FAMILY MEDICINE

## 2018-06-07 PROCEDURE — 82306 VITAMIN D 25 HYDROXY: CPT

## 2018-06-07 PROCEDURE — 86039 ANTINUCLEAR ANTIBODIES (ANA): CPT | Performed by: FAMILY MEDICINE

## 2018-06-07 PROCEDURE — 36415 COLL VENOUS BLD VENIPUNCTURE: CPT | Performed by: FAMILY MEDICINE

## 2018-06-07 PROCEDURE — 86038 ANTINUCLEAR ANTIBODIES: CPT | Performed by: FAMILY MEDICINE

## 2018-06-07 PROCEDURE — 86618 LYME DISEASE ANTIBODY: CPT | Performed by: FAMILY MEDICINE

## 2018-06-07 PROCEDURE — 82607 VITAMIN B-12: CPT

## 2018-06-07 PROCEDURE — 84443 ASSAY THYROID STIM HORMONE: CPT | Performed by: FAMILY MEDICINE

## 2018-06-07 PROCEDURE — 83036 HEMOGLOBIN GLYCOSYLATED A1C: CPT | Performed by: FAMILY MEDICINE

## 2018-06-07 PROCEDURE — 80053 COMPREHEN METABOLIC PANEL: CPT | Performed by: FAMILY MEDICINE

## 2018-06-07 NOTE — PROGRESS NOTES
Assessment/Plan:    No problem-specific Assessment & Plan notes found for this encounter  Diagnoses and all orders for this visit:    Spinal stenosis of lumbar region with neurogenic claudication  -     MRI lumbar spine w wo contrast; Future    Cervical spinal stenosis  -     MRI cervical spine w wo contrast; Future    Neurogenic claudication  -     EMG 2 limb lower extremity; Future  -     Ambulatory referral to Neurology; Future    Ataxia  -     EMG 2 limb lower extremity; Future  -     Ambulatory referral to Neurology; Future  -     TSH, 3rd generation with T4 reflex  -     HEMOGLOBIN A1C W/ EAG ESTIMATION  -     Vitamin B12; Future  -     Vitamin D 25 hydroxy; Future    Atrophy of muscle of hand, unspecified laterality  -     Ambulatory referral to Neurology; Future  -     WM titer  -     Comprehensive metabolic panel  -     TSH, 3rd generation with T4 reflex  -     HEMOGLOBIN A1C W/ EAG ESTIMATION  -     Vitamin B12; Future  -     Vitamin D 25 hydroxy; Future    Polyneuropathy  -     WM titer  -     Lyme Antibody Profile with reflex to WB  -     Sedimentation rate, automated  -     Comprehensive metabolic panel  -     TSH, 3rd generation with T4 reflex  -     HEMOGLOBIN A1C W/ EAG ESTIMATION  -     Vitamin B12; Future  -     Vitamin D 25 hydroxy; Future          Subjective:      Patient ID: Yoanna Henriquez is a [de-identified] y o  male       Patient comes today accompanied by his significant other he has noticed over the past year that he has had decreased ability to ambulate for a couple of reasons some of the reasons are that he has neurogenic claudication of his legs also when he is on the treadmill he begins to drag his right foot and this causes it to catch on the treadmill he experiences this at home also he has had a number of falls fortunately none of them with serious consequences he is a very active person just 2 or 3 years ago he was commonly riding bicycles and walking at least 5 miles a day he also notices numbness and tingling in his hands and his hands are markedly atrophied almost having the appearance of someone with amyotrophic lateral sclerosis with atrophy of the muscles between the thumb on the 4 fingers and the known hypo thenar eminences his  is poor he is having difficulty with picking things up he has in the past had left cubital tunnel surgery x2 and I think he has had on the right after fracture on I do not believe he has had carpal tunnel he has had both the cervical fusion by Dr Remberto Peterson at [facility] orthopedic [facility] and he has also had a lumbar spinal fusion I believe by Dr Malika Vo all on and these were all several years ago he has had no imaging of his neck or his spine since that time patient is on activity level is declining this it makes him exceedingly unhappy he is falling more frequently and fortunately has not had serious sequelae from his falls but that is only a matter of time until he has a serious fracture or brain injury on patient needs to be evaluated for recurrent cervical or lumbar spinal stenosis since he has already had a fusion he may still have recurring spinal stenosis from exuberant healing scar tissue he will need these MRIs with contrast because of his prior surgery he needs EMG ease of the lower extremities at least and possibly the upper extremities he needs a neurologic consult he needs a battery of blood tests to rule out Lyme and other causes of peripheral neuropathy he wants to take B12 I would lie asked if we could please check his B12 1st to see if that is necessary and on the after we get the results of these tests he also will need an MRI of his brain to make sure he does not have normal pressure hydrocephalus        The following portions of the patient's history were reviewed and updated as appropriate:   He  has a past medical history of Anemia; Appendicitis; Coronary artery disease; Detached retina;  Disease of thyroid gland; GERD (gastroesophageal reflux disease); Hyperlipidemia; Hypertension; Macular degeneration; Neuropathy; and Von Willebrand disease ([facility] )  He   Patient Active Problem List    Diagnosis Date Noted    Lung nodule, solitary 02/20/2018    Bladder cancer ([facility] ) 02/13/2018     He  has a past surgical history that includes Back surgery; Neck surgery; Eye surgery; Appendectomy; TONSILECTOMY AND ADNOIDECTOMY; Fracture surgery (Bilateral); VON WILLEBRAND ANTIGEN (HISTORICAL); Tonsillectomy; Cataract extraction; Rotator cuff repair; pr cystourethroscopy,fulgur <0 5 cm lesn (N/A, 1/12/2017); pr instill anticancer agent in bladder (N/A, 1/12/2017); EGD AND COLONOSCOPY (N/A, 12/9/2016); pr cystourethroscopy,fulgur <0 5 cm lesn (N/A, 10/2/2017); pr instill anticancer agent in bladder (N/A, 10/2/2017); Transurethral resection of bladder tumor (N/A, 10/2/2017); Shoulder surgery (03/03/2015); Adenoidectomy; Arthrodesis (01/15/2014); Colonoscopy (12/09/2016); Cystoscopy; Neuroplasty / transposition median nerve at carpal tunnel (Right, 04/2015); and Retinal detachment surgery (Right, 03/2016)  His family history includes Anuerysm in his sister; Heart disease in his mother; Ulcers in his father  He  reports that he has quit smoking  His smoking use included Pipe  He quit after 60 00 years of use  He has never used smokeless tobacco  He reports that he drinks about 1 2 oz of alcohol per week   He reports that he does not use drugs    Current Outpatient Prescriptions   Medication Sig Dispense Refill    atorvastatin (LIPITOR) 10 mg tablet TAKE 1 TABLET BY MOUTH  DAILY 90 tablet 2    B COMPLEX VITAMINS PO Take 1 tablet by mouth daily      cetirizine (ZyrTEC) 10 mg tablet Take 1 tablet by mouth daily      Cholecalciferol (VITAMIN D3) 2000 UNITS capsule Take 1 capsule by mouth 2 (two) times a day 5000 units       Coenzyme Q10 (CO Q10) 60 MG CAPS Take 1 capsule by mouth daily      colesevelam (WELCHOL) 625 mg tablet Take 625 mg by mouth 2 (two) times a day with meals      gabapentin (NEURONTIN) 600 MG tablet TAKE 1 TABLET BY MOUTH  TWICE A  tablet 2    Ginkgo Biloba 120 MG TABS Take 1 tablet by mouth daily      Inositol 500 MG TABS Take 1 tablet by mouth 2 (two) times a day      ipratropium (ATROVENT) 0 06 % nasal spray USE 2 SPRAYS IN EACH  NOSTRIL TWICE DAILY 15 mL 3    lutein 6 mg Take 1 capsule by mouth daily      Melatonin 10 MG TABS Take 1 tablet by mouth daily at bedtime      MILK THISTLE PO Take 1 tablet by mouth daily      Misc Natural Products (ENERGY FOCUS) TABS Take 1 tablet by mouth daily      omeprazole (PriLOSEC) 40 MG capsule TAKE 1 CAPSULE BY MOUTH  TWICE DAILY 180 capsule 2     No current facility-administered medications for this visit        Current Outpatient Prescriptions on File Prior to Visit   Medication Sig    atorvastatin (LIPITOR) 10 mg tablet TAKE 1 TABLET BY MOUTH  DAILY    B COMPLEX VITAMINS PO Take 1 tablet by mouth daily    cetirizine (ZyrTEC) 10 mg tablet Take 1 tablet by mouth daily    Cholecalciferol (VITAMIN D3) 2000 UNITS capsule Take 1 capsule by mouth 2 (two) times a day 5000 units     Coenzyme Q10 (CO Q10) 60 MG CAPS Take 1 capsule by mouth daily    colesevelam (WELCHOL) 625 mg tablet Take 625 mg by mouth 2 (two) times a day with meals    gabapentin (NEURONTIN) 600 MG tablet TAKE 1 TABLET BY MOUTH  TWICE A DAY    Ginkgo Biloba 120 MG TABS Take 1 tablet by mouth daily    Inositol 500 MG TABS Take 1 tablet by mouth 2 (two) times a day    ipratropium (ATROVENT) 0 06 % nasal spray USE 2 SPRAYS IN EACH  NOSTRIL TWICE DAILY    lutein 6 mg Take 1 capsule by mouth daily    Melatonin 10 MG TABS Take 1 tablet by mouth daily at bedtime    MILK THISTLE PO Take 1 tablet by mouth daily    Misc Natural Products (ENERGY FOCUS) TABS Take 1 tablet by mouth daily    omeprazole (PriLOSEC) 40 MG capsule TAKE 1 CAPSULE BY MOUTH  TWICE DAILY     No current facility-administered medications on file prior to visit  He has No Known Allergies       Review of Systems   Constitutional: Positive for activity change and fatigue  Negative for appetite change, diaphoresis and fever  HENT: Negative  Eyes: Negative  Respiratory: Negative for apnea, cough, chest tightness, shortness of breath and wheezing  Cardiovascular: Negative for chest pain, palpitations and leg swelling  Gastrointestinal: Negative for abdominal distention, abdominal pain, anal bleeding, constipation, diarrhea, nausea and vomiting  Endocrine: Negative for cold intolerance, heat intolerance, polydipsia, polyphagia and polyuria  Genitourinary: Negative for difficulty urinating, dysuria, flank pain, hematuria and urgency  Musculoskeletal: Positive for back pain, gait problem and neck pain  Negative for arthralgias, joint swelling and myalgias  Skin: Negative for color change, rash and wound  Allergic/Immunologic: Negative for environmental allergies, food allergies and immunocompromised state  Neurological: Negative for dizziness, seizures, syncope, speech difficulty, numbness and headaches  Ambulatory dysfunction with is is ataxic gait balance problems and dragging of right foot numbness in the head both hands with marked atrophy of his muscles of his hands dizziness especially when turning around leading to several falls   Hematological: Negative for adenopathy  Does not bruise/bleed easily  Psychiatric/Behavioral: Negative for agitation, behavioral problems, hallucinations, sleep disturbance and suicidal ideas  Objective:      /70 (BP Location: Left arm, Patient Position: Sitting, Cuff Size: Standard)   Ht 6' (1 829 m)   Wt 84 4 kg (186 lb)   BMI 25 23 kg/m²          Physical Exam   Constitutional: He is oriented to person, place, and time  He appears well-developed and well-nourished  No distress  HENT:   Head: Normocephalic     Right Ear: External ear normal    Left Ear: External ear normal  Nose: Nose normal    Mouth/Throat: Oropharynx is clear and moist    Eyes: Conjunctivae and EOM are normal  Pupils are equal, round, and reactive to light  Right eye exhibits no discharge  Left eye exhibits no discharge  No scleral icterus  Neck: Normal range of motion  No tracheal deviation present  No thyromegaly present  Cardiovascular: Normal rate, regular rhythm and normal heart sounds  Exam reveals no gallop and no friction rub  No murmur heard  Pulmonary/Chest: Effort normal and breath sounds normal  No respiratory distress  He has no wheezes  Abdominal: Soft  Bowel sounds are normal  He exhibits no mass  There is no tenderness  There is no guarding  Musculoskeletal: He exhibits no edema or deformity  Abnormal ambulation with ataxic gait dragging of right foot neurogenic claudication with regard to hands atrophy of hand muscles with poor grasp strength and numbness and tingling in the fingers   Lymphadenopathy:     He has no cervical adenopathy  Neurological: He is alert and oriented to person, place, and time  No cranial nerve deficit  Skin: Skin is warm and dry  No rash noted  He is not diaphoretic  No erythema  Psychiatric: He has a normal mood and affect   Thought content normal

## 2018-06-08 LAB
ANA HOMOGEN SER QL IF: NORMAL
ANA HOMOGEN TITR SER: NORMAL {TITER}
B BURGDOR IGG SER IA-ACNC: 0.15
B BURGDOR IGM SER IA-ACNC: 0.13
RYE IGE QN: POSITIVE

## 2018-06-08 NOTE — PROGRESS NOTES
Please call the patient regarding his abnormal result   Sedimentation rate is normal  So this means that he has arthritis but it is not very active at the present time chemistry panel is good kidney and liver function are all good thyroid level is good blood test for diabetes is good there are still some other labs that were not resulted yet

## 2018-06-08 NOTE — PROGRESS NOTES
Please call the patient regarding his abnormal result   Vitamin-D level is normal vitamin B12 level is in the middle of the normal range if he wants to try the B12 treatment I am okay with doing that

## 2018-06-11 ENCOUNTER — HOSPITAL ENCOUNTER (OUTPATIENT)
Dept: CT IMAGING | Facility: HOSPITAL | Age: 83
Discharge: HOME | End: 2018-06-11
Attending: FAMILY MEDICINE
Payer: MEDICARE

## 2018-06-11 DIAGNOSIS — R26.89 BALANCE DISORDER: ICD-10-CM

## 2018-06-11 PROCEDURE — 77073 BONE LENGTH STUDIES: CPT

## 2018-06-11 NOTE — PROGRESS NOTES
Please call the patient regarding his abnormal result   Right leg is approximately 1 cm longer than the left leg so his legs are for all intents and purposes equal in length

## 2018-06-12 ENCOUNTER — TELEPHONE (OUTPATIENT)
Dept: FAMILY MEDICINE CLINIC | Facility: CLINIC | Age: 83
End: 2018-06-12

## 2018-06-12 DIAGNOSIS — K21.9 GASTROESOPHAGEAL REFLUX DISEASE, ESOPHAGITIS PRESENCE NOT SPECIFIED: Primary | ICD-10-CM

## 2018-06-12 DIAGNOSIS — R12 HEARTBURN: Primary | ICD-10-CM

## 2018-06-12 RX ORDER — FAMOTIDINE 40 MG/1
40 TABLET, FILM COATED ORAL DAILY
Qty: 30 TABLET | Refills: 3 | Status: CANCELLED | OUTPATIENT
Start: 2018-06-12

## 2018-06-12 RX ORDER — FAMOTIDINE 40 MG/1
40 TABLET, FILM COATED ORAL DAILY
Qty: 30 TABLET | Refills: 2 | Status: SHIPPED | OUTPATIENT
Start: 2018-06-12 | End: 2018-07-25 | Stop reason: SDUPTHER

## 2018-06-13 DIAGNOSIS — K21.9 GASTROESOPHAGEAL REFLUX DISEASE, ESOPHAGITIS PRESENCE NOT SPECIFIED: Primary | ICD-10-CM

## 2018-06-13 RX ORDER — SUCRALFATE 1 G/1
1 TABLET ORAL 4 TIMES DAILY
Qty: 120 TABLET | Refills: 3 | Status: ON HOLD | OUTPATIENT
Start: 2018-06-13 | End: 2018-08-03 | Stop reason: ALTCHOICE

## 2018-06-14 ENCOUNTER — DOCTOR'S OFFICE (OUTPATIENT)
Dept: URBAN - METROPOLITAN AREA CLINIC 136 | Facility: CLINIC | Age: 83
Setting detail: OPHTHALMOLOGY
End: 2018-06-14
Payer: COMMERCIAL

## 2018-06-14 DIAGNOSIS — H35.3231: ICD-10-CM

## 2018-06-14 DIAGNOSIS — H35.3211: ICD-10-CM

## 2018-06-14 DIAGNOSIS — H31.002: ICD-10-CM

## 2018-06-14 DIAGNOSIS — H33.021: ICD-10-CM

## 2018-06-14 PROCEDURE — 67028 INJECTION EYE DRUG: CPT | Performed by: OPHTHALMOLOGY

## 2018-06-14 PROCEDURE — 92134 CPTRZ OPH DX IMG PST SGM RTA: CPT | Performed by: OPHTHALMOLOGY

## 2018-06-14 PROCEDURE — 92012 INTRM OPH EXAM EST PATIENT: CPT | Performed by: OPHTHALMOLOGY

## 2018-06-14 ASSESSMENT — REFRACTION_MANIFEST
OS_VA1: 20/
OS_VA2: 20/
OS_VA1: 20/
OS_VA3: 20/
OD_VA2: 20/
OD_VA1: 20/
OU_VA: 20/
OD_VA2: 20/
OU_VA: 20/
OU_VA: 20/
OD_VA3: 20/
OD_VA1: 20/
OD_VA3: 20/
OD_VA1: 20/
OS_VA1: 20/
OD_VA2: 20/
OD_VA3: 20/
OS_VA3: 20/
OS_VA3: 20/
OS_VA2: 20/
OS_VA2: 20/

## 2018-06-14 ASSESSMENT — REFRACTION_CURRENTRX
OS_OVR_VA: 20/
OD_OVR_VA: 20/
OS_OVR_VA: 20/
OS_OVR_VA: 20/

## 2018-06-14 ASSESSMENT — REFRACTION_AUTOREFRACTION
OD_SPHERE: -1.00
OD_CYLINDER: -1.00
OD_AXIS: 087
OS_CYLINDER: -1.75
OS_AXIS: 074
OS_SPHERE: +0.25

## 2018-06-14 ASSESSMENT — SPHEQUIV_DERIVED
OD_SPHEQUIV: -1.5
OS_SPHEQUIV: -0.625

## 2018-06-14 ASSESSMENT — VISUAL ACUITY
OS_BCVA: 20/300
OD_BCVA: 20/70+1

## 2018-06-14 ASSESSMENT — DRY EYES - PHYSICIAN NOTES: OD_GENERALCOMMENTS: INFERIORLY

## 2018-06-14 ASSESSMENT — LID EXAM ASSESSMENTS: OD_BLEPHARITIS: 1+

## 2018-06-14 ASSESSMENT — SUPERFICIAL PUNCTATE KERATITIS (SPK): OD_SPK: 2+

## 2018-06-14 ASSESSMENT — CONFRONTATIONAL VISUAL FIELD TEST (CVF)
OD_FINDINGS: FULL
OS_FINDINGS: FULL

## 2018-06-25 ENCOUNTER — TELEPHONE (OUTPATIENT)
Dept: FAMILY MEDICINE CLINIC | Facility: CLINIC | Age: 83
End: 2018-06-25

## 2018-06-25 DIAGNOSIS — K21.9 GASTROESOPHAGEAL REFLUX DISEASE WITHOUT ESOPHAGITIS: Primary | ICD-10-CM

## 2018-06-25 RX ORDER — DEXLANSOPRAZOLE 60 MG/1
60 CAPSULE, DELAYED RELEASE ORAL DAILY
Qty: 30 CAPSULE | Refills: 0 | Status: SHIPPED | OUTPATIENT
Start: 2018-06-25 | End: 2018-07-25 | Stop reason: SDUPTHER

## 2018-06-25 NOTE — TELEPHONE ENCOUNTER
Called Pt with message - Lino Hassan is too expensive $147 - Was not picked - Will discuss at [address] Friday

## 2018-06-25 NOTE — TELEPHONE ENCOUNTER
He is having problems with gerd  He is on 2 medications and they are not working and is unable to get into gi    He feels he needs to see you to adjust his medications

## 2018-06-28 ENCOUNTER — DOCTOR'S OFFICE (OUTPATIENT)
Dept: URBAN - METROPOLITAN AREA CLINIC 136 | Facility: CLINIC | Age: 83
Setting detail: OPHTHALMOLOGY
End: 2018-06-28
Payer: COMMERCIAL

## 2018-06-28 ENCOUNTER — TELEPHONE (OUTPATIENT)
Dept: FAMILY MEDICINE CLINIC | Facility: CLINIC | Age: 83
End: 2018-06-28

## 2018-06-28 ENCOUNTER — RX ONLY (RX ONLY)
Age: 83
End: 2018-06-28

## 2018-06-28 DIAGNOSIS — H35.3221: ICD-10-CM

## 2018-06-28 DIAGNOSIS — F40.240 CLAUSTROPHOBIA: Primary | ICD-10-CM

## 2018-06-28 PROCEDURE — 67028 INJECTION EYE DRUG: CPT | Performed by: OPHTHALMOLOGY

## 2018-06-28 RX ORDER — ALPRAZOLAM 0.25 MG/1
TABLET ORAL
Qty: 3 TABLET | Refills: 0 | Status: ON HOLD | OUTPATIENT
Start: 2018-06-28 | End: 2018-08-03 | Stop reason: ALTCHOICE

## 2018-06-28 ASSESSMENT — REFRACTION_CURRENTRX
OD_OVR_VA: 20/
OS_OVR_VA: 20/
OD_OVR_VA: 20/
OD_OVR_VA: 20/

## 2018-06-28 ASSESSMENT — SPHEQUIV_DERIVED
OD_SPHEQUIV: -1.5
OS_SPHEQUIV: -0.625

## 2018-06-28 ASSESSMENT — REFRACTION_MANIFEST
OS_VA1: 20/
OS_VA2: 20/
OD_VA1: 20/
OD_VA2: 20/
OU_VA: 20/
OD_VA3: 20/
OD_VA1: 20/
OU_VA: 20/
OS_VA3: 20/
OS_VA3: 20/
OD_VA3: 20/
OD_VA2: 20/
OD_VA2: 20/
OS_VA3: 20/
OD_VA1: 20/
OS_VA1: 20/
OS_VA1: 20/
OD_VA3: 20/
OU_VA: 20/
OS_VA2: 20/
OS_VA2: 20/

## 2018-06-28 ASSESSMENT — REFRACTION_AUTOREFRACTION
OS_SPHERE: +0.25
OS_AXIS: 074
OD_CYLINDER: -1.00
OD_AXIS: 087
OD_SPHERE: -1.00
OS_CYLINDER: -1.75

## 2018-06-28 ASSESSMENT — VISUAL ACUITY
OS_BCVA: 20/300
OD_BCVA: 20/70+1

## 2018-06-29 ENCOUNTER — OFFICE VISIT (OUTPATIENT)
Dept: FAMILY MEDICINE CLINIC | Facility: CLINIC | Age: 83
End: 2018-06-29
Payer: MEDICARE

## 2018-06-29 VITALS
DIASTOLIC BLOOD PRESSURE: 60 MMHG | SYSTOLIC BLOOD PRESSURE: 102 MMHG | WEIGHT: 185 LBS | BODY MASS INDEX: 25.06 KG/M2 | HEIGHT: 72 IN

## 2018-06-29 DIAGNOSIS — K21.00 GASTROESOPHAGEAL REFLUX DISEASE WITH ESOPHAGITIS: Primary | ICD-10-CM

## 2018-06-29 PROBLEM — G60.9 IDIOPATHIC PERIPHERAL NEUROPATHY: Status: ACTIVE | Noted: 2018-06-29

## 2018-06-29 PROBLEM — M48.02 CERVICAL STENOSIS OF SPINAL CANAL: Status: ACTIVE | Noted: 2018-06-29

## 2018-06-29 PROBLEM — M48.061 SPINAL STENOSIS OF LUMBAR REGION: Status: ACTIVE | Noted: 2018-06-29

## 2018-06-29 PROCEDURE — 99213 OFFICE O/P EST LOW 20 MIN: CPT | Performed by: FAMILY MEDICINE

## 2018-06-29 NOTE — PROGRESS NOTES
Assessment/Plan:    No problem-specific Assessment & Plan notes found for this encounter  Diagnoses and all orders for this visit:    Gastroesophageal reflux disease with esophagitis          Subjective:      Patient ID: Amy Borrero is a [de-identified] y o  male  No relief from pepcid or omeprazole with gerd         The following portions of the patient's history were reviewed and updated as appropriate:   He  has a past medical history of Anemia; Appendicitis; Coronary artery disease; Detached retina; Disease of thyroid gland; GERD (gastroesophageal reflux disease); Hyperlipidemia; Hypertension; Macular degeneration; Neuropathy; and Von Willebrand disease ([facility] )  He   Patient Active Problem List    Diagnosis Date Noted    Idiopathic peripheral neuropathy 06/29/2018    Cervical stenosis of spinal canal 06/29/2018    Spinal stenosis of lumbar region 06/29/2018    Lung nodule, solitary 02/20/2018    Bladder cancer ([facility] ) 02/13/2018    High blood pressure 12/28/2016    Gait instability 05/31/2016    Erectile dysfunction 12/09/2014    Hypothyroidism 06/07/2013    Spondylosis 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 12/31/2012    Bilateral exudative age-related macular degeneration ([facility] ) 06/26/2012    Esophageal reflux 06/04/2012     He  has a past surgical history that includes Back surgery; Neck surgery; Eye surgery; Appendectomy; TONSILECTOMY AND ADNOIDECTOMY; Fracture surgery (Bilateral); VON WILLEBRAND ANTIGEN (HISTORICAL); Tonsillectomy; Cataract extraction; Rotator cuff repair; pr cystourethroscopy,fulgur <0 5 cm lesn (N/A, 1/12/2017); pr instill anticancer agent in bladder (N/A, 1/12/2017); EGD AND COLONOSCOPY (N/A, 12/9/2016); pr cystourethroscopy,fulgur <0 5 cm lesn (N/A, 10/2/2017); pr instill anticancer agent in bladder (N/A, 10/2/2017); Transurethral resection of bladder tumor (N/A, 10/2/2017); Shoulder surgery (03/03/2015); Adenoidectomy; Arthrodesis (01/15/2014);  Colonoscopy (12/09/2016); Cystoscopy; Neuroplasty / transposition median nerve at carpal tunnel (Right, 04/2015); and Retinal detachment surgery (Right, 03/2016)  His family history includes Anuerysm in his sister; Heart disease in his mother; Ulcers in his father  He  reports that he has quit smoking  His smoking use included Pipe  He quit after 60 00 years of use  He has never used smokeless tobacco  He reports that he drinks about 1 2 oz of alcohol per week   He reports that he does not use drugs    Current Outpatient Prescriptions   Medication Sig Dispense Refill    ALPRAZolam (XANAX) 0 25 mg tablet Take 1 tablet 1 hr before MRI and 1 tablet upon arrival for MRI and if necessary a 3rd tablet during MRI if claustrophobic do not drive after taking these medications 3 tablet 0    atorvastatin (LIPITOR) 10 mg tablet TAKE 1 TABLET BY MOUTH  DAILY 90 tablet 2    B COMPLEX VITAMINS PO Take 1 tablet by mouth daily      cetirizine (ZyrTEC) 10 mg tablet Take 1 tablet by mouth daily      Cholecalciferol (VITAMIN D3) 2000 UNITS capsule Take 1 capsule by mouth 2 (two) times a day 5000 units       Coenzyme Q10 (CO Q10) 60 MG CAPS Take 1 capsule by mouth daily      colesevelam (WELCHOL) 625 mg tablet Take 625 mg by mouth 2 (two) times a day with meals      famotidine (PEPCID) 40 MG tablet Take 1 tablet (40 mg total) by mouth daily 30 tablet 2    gabapentin (NEURONTIN) 600 MG tablet TAKE 1 TABLET BY MOUTH  TWICE A  tablet 2    Ginkgo Biloba 120 MG TABS Take 1 tablet by mouth daily      Inositol 500 MG TABS Take 1 tablet by mouth 2 (two) times a day      ipratropium (ATROVENT) 0 06 % nasal spray USE 2 SPRAYS IN EACH  NOSTRIL TWICE DAILY 15 mL 3    lutein 6 mg Take 1 capsule by mouth daily      Melatonin 10 MG TABS Take 1 tablet by mouth daily at bedtime      MILK THISTLE PO Take 1 tablet by mouth daily      Misc Natural Products (ENERGY FOCUS) TABS Take 1 tablet by mouth daily      sucralfate (CARAFATE) 1 g tablet Take 1 tablet (1 g total) by mouth 4 (four) times a day 120 tablet 3    dexlansoprazole (DEXILANT) 60 MG capsule Take 1 capsule (60 mg total) by mouth daily 30 capsule 0     No current facility-administered medications for this visit  Current Outpatient Prescriptions on File Prior to Visit   Medication Sig    ALPRAZolam (XANAX) 0 25 mg tablet Take 1 tablet 1 hr before MRI and 1 tablet upon arrival for MRI and if necessary a 3rd tablet during MRI if claustrophobic do not drive after taking these medications    atorvastatin (LIPITOR) 10 mg tablet TAKE 1 TABLET BY MOUTH  DAILY    B COMPLEX VITAMINS PO Take 1 tablet by mouth daily    cetirizine (ZyrTEC) 10 mg tablet Take 1 tablet by mouth daily    Cholecalciferol (VITAMIN D3) 2000 UNITS capsule Take 1 capsule by mouth 2 (two) times a day 5000 units     Coenzyme Q10 (CO Q10) 60 MG CAPS Take 1 capsule by mouth daily    colesevelam (WELCHOL) 625 mg tablet Take 625 mg by mouth 2 (two) times a day with meals    famotidine (PEPCID) 40 MG tablet Take 1 tablet (40 mg total) by mouth daily    gabapentin (NEURONTIN) 600 MG tablet TAKE 1 TABLET BY MOUTH  TWICE A DAY    Ginkgo Biloba 120 MG TABS Take 1 tablet by mouth daily    Inositol 500 MG TABS Take 1 tablet by mouth 2 (two) times a day    ipratropium (ATROVENT) 0 06 % nasal spray USE 2 SPRAYS IN EACH  NOSTRIL TWICE DAILY    lutein 6 mg Take 1 capsule by mouth daily    Melatonin 10 MG TABS Take 1 tablet by mouth daily at bedtime    MILK THISTLE PO Take 1 tablet by mouth daily    Misc Natural Products (ENERGY FOCUS) TABS Take 1 tablet by mouth daily    sucralfate (CARAFATE) 1 g tablet Take 1 tablet (1 g total) by mouth 4 (four) times a day    dexlansoprazole (DEXILANT) 60 MG capsule Take 1 capsule (60 mg total) by mouth daily     No current facility-administered medications on file prior to visit  He has No Known Allergies       Review of Systems   Constitutional: Negative for activity change, appetite change, diaphoresis, fatigue and fever  HENT: Negative  Eyes: Negative  Respiratory: Negative for apnea, cough, chest tightness, shortness of breath and wheezing  Cardiovascular: Negative for chest pain, palpitations and leg swelling  Gastrointestinal: Negative for abdominal distention, abdominal pain, anal bleeding, constipation, diarrhea, nausea and vomiting  Endocrine: Negative for cold intolerance, heat intolerance, polydipsia, polyphagia and polyuria  Genitourinary: Negative for difficulty urinating, dysuria, flank pain, hematuria and urgency  Musculoskeletal: Positive for gait problem  Negative for arthralgias, back pain, joint swelling and myalgias  Skin: Negative for color change, rash and wound  Allergic/Immunologic: Negative for environmental allergies, food allergies and immunocompromised state  Neurological: Positive for dizziness  Negative for seizures, syncope, speech difficulty, numbness and headaches  Hematological: Negative for adenopathy  Does not bruise/bleed easily  Psychiatric/Behavioral: Negative for agitation, behavioral problems, hallucinations, sleep disturbance and suicidal ideas  Objective:      /60 (BP Location: Left arm, Patient Position: Sitting, Cuff Size: Standard)   Ht 6' (1 829 m)   Wt 83 9 kg (185 lb)   BMI 25 09 kg/m²          Physical Exam   Constitutional: He is oriented to person, place, and time  He appears well-developed and well-nourished  No distress  HENT:   Head: Normocephalic  Right Ear: External ear normal    Left Ear: External ear normal    Nose: Nose normal    Mouth/Throat: Oropharynx is clear and moist    Eyes: Conjunctivae and EOM are normal  Pupils are equal, round, and reactive to light  Right eye exhibits no discharge  Left eye exhibits no discharge  No scleral icterus  Neck: Normal range of motion  No tracheal deviation present  No thyromegaly present     Cardiovascular: Normal rate, regular rhythm and normal heart sounds  Exam reveals no gallop and no friction rub  No murmur heard  Pulmonary/Chest: Effort normal and breath sounds normal  No respiratory distress  He has no wheezes  Abdominal: Soft  Bowel sounds are normal  He exhibits no mass  There is no tenderness  There is no guarding  Musculoskeletal: He exhibits no edema or deformity  Lymphadenopathy:     He has no cervical adenopathy  Neurological: He is alert and oriented to person, place, and time  No cranial nerve deficit  Coordination abnormal    Skin: Skin is warm and dry  No rash noted  He is not diaphoretic  No erythema  Psychiatric: He has a normal mood and affect   Thought content normal

## 2018-07-05 ENCOUNTER — DOCTOR'S OFFICE (OUTPATIENT)
Dept: URBAN - METROPOLITAN AREA CLINIC 136 | Facility: CLINIC | Age: 83
Setting detail: OPHTHALMOLOGY
End: 2018-07-05
Payer: COMMERCIAL

## 2018-07-05 DIAGNOSIS — Z96.1: ICD-10-CM

## 2018-07-05 DIAGNOSIS — H31.002: ICD-10-CM

## 2018-07-05 DIAGNOSIS — H43.392: ICD-10-CM

## 2018-07-05 DIAGNOSIS — H35.3211: ICD-10-CM

## 2018-07-05 DIAGNOSIS — H01.002: ICD-10-CM

## 2018-07-05 DIAGNOSIS — H35.3231: ICD-10-CM

## 2018-07-05 DIAGNOSIS — H33.021: ICD-10-CM

## 2018-07-05 DIAGNOSIS — H01.001: ICD-10-CM

## 2018-07-05 PROCEDURE — 67028 INJECTION EYE DRUG: CPT | Performed by: OPHTHALMOLOGY

## 2018-07-05 PROCEDURE — 92134 CPTRZ OPH DX IMG PST SGM RTA: CPT | Performed by: OPHTHALMOLOGY

## 2018-07-05 PROCEDURE — 92012 INTRM OPH EXAM EST PATIENT: CPT | Performed by: OPHTHALMOLOGY

## 2018-07-05 ASSESSMENT — SPHEQUIV_DERIVED
OS_SPHEQUIV: -0.625
OD_SPHEQUIV: -1.5

## 2018-07-05 ASSESSMENT — VISUAL ACUITY
OD_BCVA: 20/70+1
OS_BCVA: 20/200

## 2018-07-05 ASSESSMENT — REFRACTION_AUTOREFRACTION
OS_AXIS: 074
OS_CYLINDER: -1.75
OD_CYLINDER: -1.00
OD_AXIS: 087
OD_SPHERE: -1.00
OS_SPHERE: +0.25

## 2018-07-05 ASSESSMENT — REFRACTION_CURRENTRX
OD_OVR_VA: 20/
OD_OVR_VA: 20/
OS_OVR_VA: 20/
OS_OVR_VA: 20/
OD_OVR_VA: 20/
OS_OVR_VA: 20/

## 2018-07-05 ASSESSMENT — REFRACTION_MANIFEST
OD_VA2: 20/
OS_VA3: 20/
OS_VA2: 20/
OU_VA: 20/
OS_VA1: 20/
OD_VA1: 20/
OU_VA: 20/
OD_VA2: 20/
OD_VA1: 20/
OS_VA3: 20/
OD_VA1: 20/
OU_VA: 20/
OS_VA2: 20/
OD_VA3: 20/
OD_VA2: 20/
OS_VA2: 20/
OS_VA1: 20/
OD_VA3: 20/
OD_VA3: 20/
OS_VA3: 20/
OS_VA1: 20/

## 2018-07-05 ASSESSMENT — CONFRONTATIONAL VISUAL FIELD TEST (CVF)
OS_FINDINGS: FULL
OD_FINDINGS: FULL

## 2018-07-05 ASSESSMENT — SUPERFICIAL PUNCTATE KERATITIS (SPK): OD_SPK: 2+

## 2018-07-05 ASSESSMENT — DRY EYES - PHYSICIAN NOTES: OD_GENERALCOMMENTS: INFERIORLY

## 2018-07-05 ASSESSMENT — LID EXAM ASSESSMENTS: OD_BLEPHARITIS: 1+

## 2018-07-07 ENCOUNTER — HOSPITAL ENCOUNTER (OUTPATIENT)
Dept: RADIOLOGY | Facility: HOSPITAL | Age: 83
Discharge: HOME | End: 2018-07-07
Payer: MEDICARE

## 2018-07-07 DIAGNOSIS — R27.0 ATAXIA: ICD-10-CM

## 2018-07-07 PROCEDURE — 70553 MRI BRAIN STEM W/O & W/DYE: CPT

## 2018-07-07 PROCEDURE — A9585 GADOBUTROL INJECTION: HCPCS | Performed by: RADIOLOGY

## 2018-07-07 RX ADMIN — GADOBUTROL 8 ML: 604.72 INJECTION INTRAVENOUS at 17:35

## 2018-07-20 ENCOUNTER — OFFICE VISIT (OUTPATIENT)
Dept: FAMILY MEDICINE CLINIC | Facility: CLINIC | Age: 83
End: 2018-07-20
Payer: MEDICARE

## 2018-07-20 VITALS
BODY MASS INDEX: 24.03 KG/M2 | WEIGHT: 177.4 LBS | HEIGHT: 72 IN | SYSTOLIC BLOOD PRESSURE: 132 MMHG | DIASTOLIC BLOOD PRESSURE: 80 MMHG

## 2018-07-20 DIAGNOSIS — G60.9 IDIOPATHIC PERIPHERAL NEUROPATHY: ICD-10-CM

## 2018-07-20 DIAGNOSIS — R26.81 GAIT INSTABILITY: ICD-10-CM

## 2018-07-20 DIAGNOSIS — R26.2 AMBULATORY DYSFUNCTION: Primary | ICD-10-CM

## 2018-07-20 PROCEDURE — 99214 OFFICE O/P EST MOD 30 MIN: CPT | Performed by: FAMILY MEDICINE

## 2018-07-20 NOTE — PROGRESS NOTES
Assessment/Plan: patient will be referred back to GI he needs an EGD has been about 18 months since his last 1 symptoms are daily and very bothersome also refer to Neurology to evaluate unstable gait and peripheral neuropathy he will also wean himself gradually from the gabapentin to see if this is the etiology of any of his dysfunction    No problem-specific Assessment & Plan notes found for this encounter  Diagnoses and all orders for this visit:    Ambulatory dysfunction  -     Ambulatory referral to Neurology; Future    Gait instability  -     Ambulatory referral to Neurology; Future    Idiopathic peripheral neuropathy  -     Ambulatory referral to Neurology; Future          Subjective:      Patient ID: Chance Elder is a [de-identified] y o  male  Patient here with frequent falling patient also is experiencing intractable esophageal reflux and is concerned about the possibility of esophageal cancer he has some bruises on his knees this is been ongoing problem for him with the falling due to gait instability he has had in the past lumbar surgical procedures done for same with regard to his reflux he is unresponsive to all proton pump inhibitors most recently tried was Dexilant patient continues to have reflux despite taking Carafate in addition to [address] he denies any hematemesis melena or hematochezia        The following portions of the patient's history were reviewed and updated as appropriate:   He  has a past medical history of Anemia; Appendicitis; Coronary artery disease; Detached retina; Disease of thyroid gland; GERD (gastroesophageal reflux disease); Hyperlipidemia; Hypertension; Macular degeneration; Neuropathy; and Von Willebrand disease ([facility] [address] )    He   Patient Active Problem List    Diagnosis Date Noted    Idiopathic peripheral neuropathy 06/29/2018    Cervical stenosis of spinal canal 06/29/2018    Spinal stenosis of lumbar region 06/29/2018    Lung nodule, solitary 02/20/2018    Bladder cancer ([facility] ) 02/13/2018    High blood pressure 12/28/2016    Gait instability 05/31/2016    Erectile dysfunction 12/09/2014    Hypothyroidism 06/07/2013    Spondylosis 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 12/31/2012    Bilateral exudative age-related macular degeneration ([facility] ) 06/26/2012    Esophageal reflux 06/04/2012     He  has a past surgical history that includes Back surgery; Neck surgery; Eye surgery; Appendectomy; TONSILECTOMY AND ADNOIDECTOMY; Fracture surgery (Bilateral); VON WILLEBRAND ANTIGEN (HISTORICAL); Tonsillectomy; Cataract extraction; Rotator cuff repair; pr cystourethroscopy,fulgur <0 5 cm lesn (N/A, 1/12/2017); pr instill anticancer agent in bladder (N/A, 1/12/2017); EGD AND COLONOSCOPY (N/A, 12/9/2016); pr cystourethroscopy,fulgur <0 5 cm lesn (N/A, 10/2/2017); pr instill anticancer agent in bladder (N/A, 10/2/2017); Transurethral resection of bladder tumor (N/A, 10/2/2017); Shoulder surgery (03/03/2015); Adenoidectomy; Arthrodesis (01/15/2014); Colonoscopy (12/09/2016); Cystoscopy; Neuroplasty / transposition median nerve at carpal tunnel (Right, 04/2015); and Retinal detachment surgery (Right, 03/2016)  His family history includes Anuerysm in his sister; Heart disease in his mother; Ulcers in his father  He  reports that he has quit smoking  His smoking use included Pipe  He quit after 60 00 years of use  He has never used smokeless tobacco  He reports that he drinks about 1 2 oz of alcohol per week   He reports that he does not use drugs    Current Outpatient Prescriptions   Medication Sig Dispense Refill    ALPRAZolam (XANAX) 0 25 mg tablet Take 1 tablet 1 hr before MRI and 1 tablet upon arrival for MRI and if necessary a 3rd tablet during MRI if claustrophobic do not drive after taking these medications 3 tablet 0    atorvastatin (LIPITOR) 10 mg tablet TAKE 1 TABLET BY MOUTH  DAILY 90 tablet 2    B COMPLEX VITAMINS PO Take 1 tablet by mouth daily      cetirizine (ZyrTEC) 10 mg tablet Take 1 tablet by mouth daily      Cholecalciferol (VITAMIN D3) 2000 UNITS capsule Take 1 capsule by mouth 2 (two) times a day 5000 units       Coenzyme Q10 (CO Q10) 60 MG CAPS Take 1 capsule by mouth daily      colesevelam (WELCHOL) 625 mg tablet Take 625 mg by mouth 2 (two) times a day with meals      dexlansoprazole (DEXILANT) 60 MG capsule Take 1 capsule (60 mg total) by mouth daily 30 capsule 0    famotidine (PEPCID) 40 MG tablet Take 1 tablet (40 mg total) by mouth daily 30 tablet 2    gabapentin (NEURONTIN) 600 MG tablet TAKE 1 TABLET BY MOUTH  TWICE A  tablet 2    Ginkgo Biloba 120 MG TABS Take 1 tablet by mouth daily      Inositol 500 MG TABS Take 1 tablet by mouth 2 (two) times a day      lutein 6 mg Take 1 capsule by mouth daily      Melatonin 10 MG TABS Take 1 tablet by mouth daily at bedtime      MILK THISTLE PO Take 1 tablet by mouth daily      Misc Natural Products (ENERGY FOCUS) TABS Take 1 tablet by mouth daily      sucralfate (CARAFATE) 1 g tablet Take 1 tablet (1 g total) by mouth 4 (four) times a day 120 tablet 3    ipratropium (ATROVENT) 0 06 % nasal spray USE 2 SPRAYS IN EACH  NOSTRIL TWICE DAILY 15 mL 3     No current facility-administered medications for this visit        Current Outpatient Prescriptions on File Prior to Visit   Medication Sig    ALPRAZolam (XANAX) 0 25 mg tablet Take 1 tablet 1 hr before MRI and 1 tablet upon arrival for MRI and if necessary a 3rd tablet during MRI if claustrophobic do not drive after taking these medications    atorvastatin (LIPITOR) 10 mg tablet TAKE 1 TABLET BY MOUTH  DAILY    B COMPLEX VITAMINS PO Take 1 tablet by mouth daily    cetirizine (ZyrTEC) 10 mg tablet Take 1 tablet by mouth daily    Cholecalciferol (VITAMIN D3) 2000 UNITS capsule Take 1 capsule by mouth 2 (two) times a day 5000 units     Coenzyme Q10 (CO Q10) 60 MG CAPS Take 1 capsule by mouth daily    colesevelam (WELCHOL) 625 mg tablet Take 625 mg by mouth 2 (two) times a day with meals    dexlansoprazole (DEXILANT) 60 MG capsule Take 1 capsule (60 mg total) by mouth daily    famotidine (PEPCID) 40 MG tablet Take 1 tablet (40 mg total) by mouth daily    gabapentin (NEURONTIN) 600 MG tablet TAKE 1 TABLET BY MOUTH  TWICE A DAY    Ginkgo Biloba 120 MG TABS Take 1 tablet by mouth daily    Inositol 500 MG TABS Take 1 tablet by mouth 2 (two) times a day    lutein 6 mg Take 1 capsule by mouth daily    Melatonin 10 MG TABS Take 1 tablet by mouth daily at bedtime    MILK THISTLE PO Take 1 tablet by mouth daily    Misc Natural Products (ENERGY FOCUS) TABS Take 1 tablet by mouth daily    sucralfate (CARAFATE) 1 g tablet Take 1 tablet (1 g total) by mouth 4 (four) times a day    ipratropium (ATROVENT) 0 06 % nasal spray USE 2 SPRAYS IN EACH  NOSTRIL TWICE DAILY     No current facility-administered medications on file prior to visit  He has No Known Allergies       Review of Systems   Constitutional: Positive for activity change  Negative for appetite change, diaphoresis, fatigue and fever  HENT: Negative  Eyes: Negative  Respiratory: Negative for apnea, cough, chest tightness, shortness of breath and wheezing  Cardiovascular: Negative for chest pain, palpitations and leg swelling  Gastrointestinal: Negative for abdominal distention, abdominal pain, anal bleeding, constipation, diarrhea, nausea and vomiting  GI reflux   Endocrine: Negative for cold intolerance, heat intolerance, polydipsia, polyphagia and polyuria  Genitourinary: Negative for difficulty urinating, dysuria, flank pain, hematuria and urgency  Musculoskeletal: Negative for arthralgias, back pain, gait problem, joint swelling and myalgias  Skin: Negative for color change, rash and wound  Allergic/Immunologic: Negative for environmental allergies, food allergies and immunocompromised state     Neurological: Negative for dizziness, seizures, syncope, speech difficulty, numbness and headaches  Unstable gait with frequent falling   Hematological: Negative for adenopathy  Does not bruise/bleed easily  Psychiatric/Behavioral: Negative for agitation, behavioral problems, hallucinations, sleep disturbance and suicidal ideas           Objective:      /80   Ht 6' (1 829 m)   Wt 80 5 kg (177 lb 6 4 oz)   BMI 24 06 kg/m²          Physical Exam

## 2018-07-25 ENCOUNTER — TRANSCRIBE ORDERS (OUTPATIENT)
Dept: RADIOLOGY | Facility: MEDICAL CENTER | Age: 83
End: 2018-07-25

## 2018-07-25 ENCOUNTER — APPOINTMENT (OUTPATIENT)
Dept: LAB | Facility: MEDICAL CENTER | Age: 83
End: 2018-07-25
Payer: MEDICARE

## 2018-07-25 ENCOUNTER — OFFICE VISIT (OUTPATIENT)
Dept: GASTROENTEROLOGY | Facility: HOSPITAL | Age: 83
End: 2018-07-25
Payer: MEDICARE

## 2018-07-25 VITALS
WEIGHT: 175 LBS | TEMPERATURE: 96.2 F | BODY MASS INDEX: 23.7 KG/M2 | HEART RATE: 53 BPM | HEIGHT: 72 IN | SYSTOLIC BLOOD PRESSURE: 161 MMHG | DIASTOLIC BLOOD PRESSURE: 80 MMHG

## 2018-07-25 DIAGNOSIS — R53.83 OTHER FATIGUE: ICD-10-CM

## 2018-07-25 DIAGNOSIS — R63.4 WEIGHT LOSS: Primary | ICD-10-CM

## 2018-07-25 DIAGNOSIS — K52.832 LYMPHOCYTIC COLITIS: ICD-10-CM

## 2018-07-25 DIAGNOSIS — R63.4 WEIGHT LOSS: ICD-10-CM

## 2018-07-25 DIAGNOSIS — R49.0 HOARSE VOICE QUALITY: ICD-10-CM

## 2018-07-25 DIAGNOSIS — K21.9 GASTROESOPHAGEAL REFLUX DISEASE WITHOUT ESOPHAGITIS: ICD-10-CM

## 2018-07-25 DIAGNOSIS — R63.0 APPETITE LOSS: ICD-10-CM

## 2018-07-25 DIAGNOSIS — K21.9 GASTROESOPHAGEAL REFLUX DISEASE, ESOPHAGITIS PRESENCE NOT SPECIFIED: ICD-10-CM

## 2018-07-25 DIAGNOSIS — R14.2 BELCHING: ICD-10-CM

## 2018-07-25 DIAGNOSIS — K31.819 GAVE (GASTRIC ANTRAL VASCULAR ECTASIA): ICD-10-CM

## 2018-07-25 LAB
BASOPHILS # BLD AUTO: 0.03 THOUSANDS/ΜL (ref 0–0.1)
BASOPHILS NFR BLD AUTO: 0 % (ref 0–1)
EOSINOPHIL # BLD AUTO: 0.12 THOUSAND/ΜL (ref 0–0.61)
EOSINOPHIL NFR BLD AUTO: 2 % (ref 0–6)
ERYTHROCYTE [DISTWIDTH] IN BLOOD BY AUTOMATED COUNT: 15.3 % (ref 11.6–15.1)
HCT VFR BLD AUTO: 48.1 % (ref 36.5–49.3)
HGB BLD-MCNC: 15.4 G/DL (ref 12–17)
IMM GRANULOCYTES # BLD AUTO: 0.02 THOUSAND/UL (ref 0–0.2)
IMM GRANULOCYTES NFR BLD AUTO: 0 % (ref 0–2)
LYMPHOCYTES # BLD AUTO: 2.96 THOUSANDS/ΜL (ref 0.6–4.47)
LYMPHOCYTES NFR BLD AUTO: 39 % (ref 14–44)
MCH RBC QN AUTO: 28.5 PG (ref 26.8–34.3)
MCHC RBC AUTO-ENTMCNC: 32 G/DL (ref 31.4–37.4)
MCV RBC AUTO: 89 FL (ref 82–98)
MONOCYTES # BLD AUTO: 1 THOUSAND/ΜL (ref 0.17–1.22)
MONOCYTES NFR BLD AUTO: 13 % (ref 4–12)
NEUTROPHILS # BLD AUTO: 3.46 THOUSANDS/ΜL (ref 1.85–7.62)
NEUTS SEG NFR BLD AUTO: 46 % (ref 43–75)
NRBC BLD AUTO-RTO: 0 /100 WBCS
PLATELET # BLD AUTO: 229 THOUSANDS/UL (ref 149–390)
PMV BLD AUTO: 11 FL (ref 8.9–12.7)
RBC # BLD AUTO: 5.41 MILLION/UL (ref 3.88–5.62)
WBC # BLD AUTO: 7.59 THOUSAND/UL (ref 4.31–10.16)

## 2018-07-25 PROCEDURE — 85025 COMPLETE CBC W/AUTO DIFF WBC: CPT

## 2018-07-25 PROCEDURE — 99214 OFFICE O/P EST MOD 30 MIN: CPT | Performed by: PHYSICIAN ASSISTANT

## 2018-07-25 PROCEDURE — 36415 COLL VENOUS BLD VENIPUNCTURE: CPT

## 2018-07-25 RX ORDER — FAMOTIDINE 40 MG/1
40 TABLET, FILM COATED ORAL DAILY
Qty: 30 TABLET | Refills: 3 | Status: SHIPPED | OUTPATIENT
Start: 2018-07-25 | End: 2018-09-11 | Stop reason: SDUPTHER

## 2018-07-25 RX ORDER — DEXLANSOPRAZOLE 60 MG/1
60 CAPSULE, DELAYED RELEASE ORAL DAILY
Qty: 30 CAPSULE | Refills: 2 | Status: SHIPPED | OUTPATIENT
Start: 2018-07-25 | End: 2018-12-11

## 2018-07-25 NOTE — PROGRESS NOTES
[facility] - Outpatient Follow-up Note  Erika Stevens [de-identified] y o  male MRN: [id number]  Encounter: [id number]          ASSESSMENT AND PLAN:    1  Weight loss  2  Loss of appetite  3  Fatigue  4  Belching  5  Hoarse voice  6  History of GAVE   -  He reports that for the past 2 months he has had  A sudden loss of appetite, increased belching and a 10-12 lb weight loss  He has noted that he has had a hoarse voice  He attributes this to reflux but notes that he does not have typical epigastric or substernal pain  He states he has a history of reflux and did take omeprazole for many years but this did not seem to be working, his PCP switched him to [employer] which did seem to improve his symptoms but did not resolve them  -  He has also tried Pepcid in the past and is taking Carafate currently but he does not believe this is helpful and notes that he does feel nauseous after taking it  -  We discussed possible options including changing his PPI or adding Pepcid before dinner to the [employer] before breakfast and he would prefer to stay on the [employer] and add Pepcid  -  Recommend an upper endoscopy for evaluation of his symptoms given the abrupt onset and the concerning weight loss, we discussed the risks and benefits and he is agreeable and will schedule this  -  Obtain a CBC to check for anemia given his recent fatigue and history of GAVE seen on upper endoscopy in 2016  Biopsies were negative for celiac disease and H pylori at that time  - we discussed that if the upper endoscopy is unremarkable, he may need further evaluation such as pH testing and/or manometry    -Follow up in the office after the procedure    7  Lymphocytic colitis   -  He had a colonoscopy in 2016 with random biopsies that showed lymphocytic colitis, his diarrhea did resolve and he has not been taking anything for this    He notes that recently he has had recurrence of his loose stools, he only has 1-2 bowel movements per day however  -  He can use Imodium over the counter,  Currently he states this does not bother him and he would prefer not to take another medication  ____________________________________________________________    SUBJECTIVE:      82-year-old male with past medical history of hypothyroidism, hypertension and GERD presents to the office for recent worsening of his current symptoms  He reports that over the last 2 months he has had belching after eating, loss of appetite, intermittent hoarseness of his voice as well as a 10-12 lb weight loss  He states that he feels fatigued and simply does not have the energy to do his usual activities  He attributed his upper GI symptoms to reflux and saw his PCP for this, he was taking omeprazole previously and tried Pepcid but this did not work  He then tried Dexilant which he did feel helped his symptoms somewhat, he is also taking Carafate but notes that he has some nausea after taking this  He also notes that he has a heavy feeling in his stomach after eating and it takes several hours for the sensation to improve  He states he is concerned about esophageal cancer as he had several friends who had Pearce's esophagus and  from esophageal cancer  he has a history of lymphocytic colitis noted on biopsies on colonoscopy in 2016, diverticulosis was also noted during this procedure  He had an upper endoscopy in 2016 which showed erythematous mucosa found in the antrum as well as 1 stomach polyp, biopsies were consistent with GAVE, negative for H pylori  This stomach polyp is hyperplastic on pathology  REVIEW OF SYSTEMS IS OTHERWISE NEGATIVE        Historical Information   Past Medical History:   Diagnosis Date    Anemia     Appendicitis     Coronary artery disease     Detached retina     Disease of thyroid gland     GERD (gastroesophageal reflux disease)     Hyperlipidemia     Hypertension     Macular degeneration     Neuropathy     Von Willebrand disease [facility])      Past Surgical History:   Procedure Laterality Date    ADENOIDECTOMY      APPENDECTOMY      ARTHRODESIS  01/15/2014    Lumbar     BACK SURGERY      CATARACT EXTRACTION      COLONOSCOPY  12/09/2016    fiberoptic     CYSTOSCOPY      with resection of tumor / 1/12/17, 10/2/17 / diagnostic 12/8/16, 5/30/17     EGD AND COLONOSCOPY N/A 12/9/2016    Procedure: EGD AND COLONOSCOPY;  Surgeon: Shea Brown MD;  Location: [facility];  Service:    Macy Mata EYE SURGERY      FRACTURE SURGERY Bilateral     ARM    NECK SURGERY      [place]OPLAST[place] [facility] [place]    OH CYSTOURETHROSCOPY,FULGUR <0 5 CM LESN N/A 1/12/2017    Procedure: CYSTOSCOPY, BLADDER BIOPSY WITH FULGRATION, 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;  Surgeon: Elsa Cummings MD;  Location: [facility];  Service: Urology    OH CYSTOURETHROSCOPY,FULGUR <0 5 CM LESN N/A 10/2/2017    Procedure: CYSTOSCOPY WITH  BLADDER BIOPSIES WITH FULGURATION;  Surgeon: Elsa Cummings MD;  [facility]: [facility];  Service: Urology    OH INSTILL ANTICANCER AGENT IN BLADDER N/A 1/12/2017    Procedure: Chelsy Simmons;  Surgeon: Elsa Cummings MD;  [facility]: [facility];  Service: Urology    OH INSTILL ANTICANCER AGENT IN BLADDER N/A 10/2/2017    Procedure: Chelsy Simmons;  Surgeon: Elsa Cummings MD;  [facility]: [facility];  Service: Urology    RETINAL DETACHMENT SURGERY Right 03/2016    complete air fill confirmed     ROTATOR CUFF REPAIR      SHOULDER SURGERY  03/03/2015    proximal humerus fracture / [place]    3/6/15   R    1/3/18     TONSILECTOMY AND ADNOIDECTOMY      TONSILLECTOMY      TRANSURETHRAL RESECTION OF BLADDER TUMOR N/A 10/2/2017    Procedure: TRANSURETHRAL RESECTION OF BLADDER TUMOR (TURBT);   Surgeon: Elsa Cummings MD;  Location: [facility];  Service: Urology    [facility] (HISTORICAL)       Social History   History Alcohol Use    1 2 oz/week    1 Glasses of wine, 1 Shots of liquor per week     Comment: DAILY      History   Drug Use No     History   Smoking Status    Light Tobacco Smoker    Years: 60 00    Types: Pipe   Smokeless Tobacco    Never Used     Comment: smokes a pipe -resolved      Family History   Problem Relation Age of Onset    Ulcers Father         acute gastric    Heart disease Mother    Liv Ohara Sister        Meds/Allergies       Current Outpatient Prescriptions:     ALPRAZolam (XANAX) 0 25 mg tablet    atorvastatin (LIPITOR) 10 mg tablet    B COMPLEX VITAMINS PO    cetirizine (ZyrTEC) 10 mg tablet    Cholecalciferol (VITAMIN D3) 2000 UNITS capsule    Coenzyme Q10 (CO Q10) 60 MG CAPS    colesevelam (WELCHOL) 625 mg tablet    dexlansoprazole (DEXILANT) 60 MG capsule    famotidine (PEPCID) 40 MG tablet    gabapentin (NEURONTIN) 600 MG tablet    Ginkgo Biloba 120 MG TABS    Inositol 500 MG TABS    lutein 6 mg    Melatonin 10 MG TABS    MILK THISTLE PO    Misc Natural Products (ENERGY FOCUS) TABS    sucralfate (CARAFATE) 1 g tablet    ipratropium (ATROVENT) 0 06 % nasal spray    No Known Allergies        Objective     Blood pressure 161/80, pulse (!) 53, temperature (!) 96 2 °F (35 7 °C), temperature source Tympanic, height 6' (1 829 m), weight 79 4 kg (175 lb)  PHYSICAL EXAM:      Physical Exam   Constitutional: He is oriented to person, place, and time  He appears well-developed and well-nourished  No distress  HENT:   Head: Normocephalic and atraumatic  Eyes: Right eye exhibits no discharge  Left eye exhibits no discharge  No scleral icterus  Neck: Neck supple  No tracheal deviation present  Cardiovascular: Normal rate, regular rhythm, normal heart sounds and intact distal pulses  Exam reveals no gallop and no friction rub  No murmur heard  Pulmonary/Chest: Effort normal and breath sounds normal  No respiratory distress  He has no wheezes  He has no rales  Abdominal: Soft  Bowel sounds are normal  He exhibits no distension  There is no tenderness  There is no rebound and no guarding  Neurological: He is alert and oriented to person, place, and time  Skin: Skin is warm and dry  Psychiatric: He has a normal mood and affect  Lab Results:   No visits with results within 1 Day(s) from this visit  Latest known visit with results is:   Lab on 06/07/2018   Component Date Value    Vitamin B-12 06/07/2018 400     Vit D, 25-Hydroxy 06/07/2018 90 1          Radiology Results:   Mri Brain W Wo Contrast    Result Date: 7/9/2018  Narrative: MRI BRAIN WITH AND WITHOUT CONTRAST INDICATION: R27 0: Ataxia, unspecified query NPH COMPARISON:  CT 11/2/2012 TECHNIQUE: Sagittal T1, axial T2, axial FLAIR, axial T1, axial [place], axial diffusion  Sagittal, axial and coronal T1 postcontrast   Axial BRAVO post contrast   IV Contrast:  8 mL of gadobutrol injection (MULTI-DOSE)  IMAGE QUALITY:   Diagnostic  FINDINGS: BRAIN PARENCHYMA:  No acute disease  There is no acute ischemia, intracranial mass, mass effect or edema  No pathologic hemosiderin deposition  Age-appropriate volume loss with moderate degree of chronic small vessel disease  VENTRICLES:  Age-appropriate  No indication of NPH  SELLA AND PITUITARY GLAND:  Partially empty sella ORBITS:  Right retinal banding PARANASAL SINUSES:  Trace sinus mucosal thickening VASCULATURE:  Evaluation of the major intracranial vasculature demonstrates appropriate flow voids  CALVARIUM AND SKULL BASE:  Normal  EXTRACRANIAL SOFT TISSUES:  Cervical spondylosis and osteoarthritis  Impression: No acute disease  Age-appropriate volume loss and moderate degree of chronic small vessel disease  Pattern of ventriculomegaly is not typical for NPH   Workstation performed: [id number]

## 2018-07-26 ENCOUNTER — DOCTOR'S OFFICE (OUTPATIENT)
Dept: URBAN - METROPOLITAN AREA CLINIC 136 | Facility: CLINIC | Age: 83
Setting detail: OPHTHALMOLOGY
End: 2018-07-26
Payer: COMMERCIAL

## 2018-07-26 DIAGNOSIS — H33.021: ICD-10-CM

## 2018-07-26 DIAGNOSIS — H35.3231: ICD-10-CM

## 2018-07-26 DIAGNOSIS — H35.3211: ICD-10-CM

## 2018-07-26 DIAGNOSIS — H01.002: ICD-10-CM

## 2018-07-26 DIAGNOSIS — H01.001: ICD-10-CM

## 2018-07-26 DIAGNOSIS — H31.002: ICD-10-CM

## 2018-07-26 DIAGNOSIS — Z96.1: ICD-10-CM

## 2018-07-26 DIAGNOSIS — H43.392: ICD-10-CM

## 2018-07-26 PROCEDURE — 92012 INTRM OPH EXAM EST PATIENT: CPT | Performed by: OPHTHALMOLOGY

## 2018-07-26 PROCEDURE — 67028 INJECTION EYE DRUG: CPT | Performed by: OPHTHALMOLOGY

## 2018-07-26 PROCEDURE — VITAEYE VITA EYE VITAMINS: Performed by: OPHTHALMOLOGY

## 2018-07-26 PROCEDURE — SPECPHARM SPECIALTY PHARMACY DRUG: Performed by: OPHTHALMOLOGY

## 2018-07-26 ASSESSMENT — LID EXAM ASSESSMENTS: OD_BLEPHARITIS: 1+

## 2018-07-26 ASSESSMENT — SUPERFICIAL PUNCTATE KERATITIS (SPK): OD_SPK: 2+

## 2018-07-26 ASSESSMENT — CONFRONTATIONAL VISUAL FIELD TEST (CVF)
OD_FINDINGS: FULL
OS_FINDINGS: FULL

## 2018-07-26 ASSESSMENT — DRY EYES - PHYSICIAN NOTES: OD_GENERALCOMMENTS: INFERIORLY

## 2018-07-28 ENCOUNTER — HOSPITAL ENCOUNTER (EMERGENCY)
Facility: HOSPITAL | Age: 83
Discharge: HOME | End: 2018-07-29
Attending: EMERGENCY MEDICINE | Admitting: EMERGENCY MEDICINE
Payer: MEDICARE

## 2018-07-28 DIAGNOSIS — R10.9 ABDOMINAL PAIN: Primary | ICD-10-CM

## 2018-07-28 LAB
BASOPHILS # BLD AUTO: 0.04 THOUSANDS/ΜL (ref 0–0.1)
BASOPHILS NFR BLD AUTO: 1 % (ref 0–1)
EOSINOPHIL # BLD AUTO: 0.17 THOUSAND/ΜL (ref 0–0.61)
EOSINOPHIL NFR BLD AUTO: 2 % (ref 0–6)
ERYTHROCYTE [DISTWIDTH] IN BLOOD BY AUTOMATED COUNT: 15.1 % (ref 11.6–15.1)
HCT VFR BLD AUTO: 43.3 % (ref 36.5–49.3)
HGB BLD-MCNC: 15 G/DL (ref 12–17)
LYMPHOCYTES # BLD AUTO: 2.19 THOUSANDS/ΜL (ref 0.6–4.47)
LYMPHOCYTES NFR BLD AUTO: 30 % (ref 14–44)
MCH RBC QN AUTO: 29.3 PG (ref 26.8–34.3)
MCHC RBC AUTO-ENTMCNC: 34.6 G/DL (ref 31.4–37.4)
MCV RBC AUTO: 85 FL (ref 82–98)
MONOCYTES # BLD AUTO: 1.04 THOUSAND/ΜL (ref 0.17–1.22)
MONOCYTES NFR BLD AUTO: 14 % (ref 4–12)
NEUTROPHILS # BLD AUTO: 3.79 THOUSANDS/ΜL (ref 1.85–7.62)
NEUTS SEG NFR BLD AUTO: 52 % (ref 43–75)
PLATELET # BLD AUTO: 208 THOUSANDS/UL (ref 149–390)
PMV BLD AUTO: 10 FL (ref 8.9–12.7)
RBC # BLD AUTO: 5.12 MILLION/UL (ref 3.88–5.62)
WBC # BLD AUTO: 7.23 THOUSAND/UL (ref 4.31–10.16)

## 2018-07-28 PROCEDURE — 80053 COMPREHEN METABOLIC PANEL: CPT | Performed by: EMERGENCY MEDICINE

## 2018-07-28 PROCEDURE — 83690 ASSAY OF LIPASE: CPT | Performed by: EMERGENCY MEDICINE

## 2018-07-28 PROCEDURE — 93005 ELECTROCARDIOGRAM TRACING: CPT

## 2018-07-28 PROCEDURE — 36415 COLL VENOUS BLD VENIPUNCTURE: CPT | Performed by: EMERGENCY MEDICINE

## 2018-07-28 PROCEDURE — 85025 COMPLETE CBC W/AUTO DIFF WBC: CPT | Performed by: EMERGENCY MEDICINE

## 2018-07-28 PROCEDURE — 83605 ASSAY OF LACTIC ACID: CPT | Performed by: EMERGENCY MEDICINE

## 2018-07-28 PROCEDURE — 84484 ASSAY OF TROPONIN QUANT: CPT | Performed by: EMERGENCY MEDICINE

## 2018-07-28 PROCEDURE — 82550 ASSAY OF CK (CPK): CPT | Performed by: EMERGENCY MEDICINE

## 2018-07-28 PROCEDURE — 83735 ASSAY OF MAGNESIUM: CPT | Performed by: EMERGENCY MEDICINE

## 2018-07-29 ENCOUNTER — APPOINTMENT (EMERGENCY)
Dept: CT IMAGING | Facility: HOSPITAL | Age: 83
End: 2018-07-29
Payer: MEDICARE

## 2018-07-29 VITALS
TEMPERATURE: 98 F | SYSTOLIC BLOOD PRESSURE: 177 MMHG | RESPIRATION RATE: 18 BRPM | BODY MASS INDEX: 23.8 KG/M2 | DIASTOLIC BLOOD PRESSURE: 81 MMHG | WEIGHT: 175.71 LBS | HEART RATE: 68 BPM | HEIGHT: 72 IN | OXYGEN SATURATION: 95 %

## 2018-07-29 LAB
ALBUMIN SERPL BCP-MCNC: 3.3 G/DL (ref 3.5–5)
ALP SERPL-CCNC: 69 U/L (ref 46–116)
ALT SERPL W P-5'-P-CCNC: 36 U/L (ref 12–78)
ANION GAP SERPL CALCULATED.3IONS-SCNC: 9 MMOL/L (ref 4–13)
AST SERPL W P-5'-P-CCNC: 26 U/L (ref 5–45)
BILIRUB SERPL-MCNC: 0.7 MG/DL (ref 0.2–1)
BUN SERPL-MCNC: 15 MG/DL (ref 5–25)
CALCIUM SERPL-MCNC: 8.8 MG/DL (ref 8.3–10.1)
CHLORIDE SERPL-SCNC: 104 MMOL/L (ref 100–108)
CK SERPL-CCNC: 122 U/L (ref 39–308)
CO2 SERPL-SCNC: 27 MMOL/L (ref 21–32)
CREAT SERPL-MCNC: 1.13 MG/DL (ref 0.6–1.3)
GFR SERPL CREATININE-BSD FRML MDRD: 61 ML/MIN/1.73SQ M
GLUCOSE SERPL-MCNC: 105 MG/DL (ref 65–140)
LACTATE SERPL-SCNC: 0.9 MMOL/L (ref 0.5–2)
LIPASE SERPL-CCNC: 225 U/L (ref 73–393)
MAGNESIUM SERPL-MCNC: 2.2 MG/DL (ref 1.6–2.6)
POTASSIUM SERPL-SCNC: 4 MMOL/L (ref 3.5–5.3)
PROT SERPL-MCNC: 7 G/DL (ref 6.4–8.2)
SODIUM SERPL-SCNC: 140 MMOL/L (ref 136–145)
TROPONIN I SERPL-MCNC: <0.02 NG/ML

## 2018-07-29 PROCEDURE — 74177 CT ABD & PELVIS W/CONTRAST: CPT

## 2018-07-29 PROCEDURE — 51798 US URINE CAPACITY MEASURE: CPT

## 2018-07-29 PROCEDURE — 99284 EMERGENCY DEPT VISIT MOD MDM: CPT

## 2018-07-29 RX ORDER — ONDANSETRON 4 MG/1
4 TABLET, ORALLY DISINTEGRATING ORAL EVERY 8 HOURS PRN
Qty: 20 TABLET | Refills: 0 | Status: SHIPPED | OUTPATIENT
Start: 2018-07-29 | End: 2018-09-11 | Stop reason: SDUPTHER

## 2018-07-29 RX ORDER — ONDANSETRON 2 MG/ML
4 INJECTION INTRAMUSCULAR; INTRAVENOUS ONCE
Status: DISCONTINUED | OUTPATIENT
Start: 2018-07-29 | End: 2018-07-29 | Stop reason: HOSPADM

## 2018-07-29 RX ADMIN — IOHEXOL 100 ML: 350 INJECTION, SOLUTION INTRAVENOUS at 01:54

## 2018-07-29 NOTE — ED PROVIDER NOTES
History  Chief Complaint   Patient presents with    Abdominal Pain     patient states that for the past month he has been having lower abdominal pain     77-year-old male being closely followed by GI and his family doctor Dr Keyla Soler presents with bilateral lower quadrant pain present for the last month  He describes it as a bloated type feeling occurring after eating  He feels as if his lower abdomen is stuffy and filled he denies early satiety he just simply states he has no appetite has had problems with belching and bloating he has been intermittently nauseated the nausea seemed more symptomatic today  Has lost some weight he even tried to throw up this evening but it did not help  He denies any back pain  He has had no dysuria or incomplete emptying no fever chills he has had an occasional cough with phlegm  He can't off the phlegm is going up or down  He has had severe problems with reflux and has transitioned from omeprazole Pepcid and Carafate to [address]; a [facility] follow-up visit on 07/25 it was recommended; patient has been referred to Neurology for gait disturbance and feeling off blance  he denies any significant trauma or falls (family states last falls was 4 months ago)  he has no headache no visual disturbance review of records indicates he underwent an MRI early this month which showed ventriculomegaly is not typical for [facility]  Prior to Admission Medications   Prescriptions Last Dose Informant Patient Reported? Taking?    ALPRAZolam (XANAX) 0 25 mg tablet  at Unknown time Self No No   Sig: Take 1 tablet 1 hr before MRI and 1 tablet upon arrival for MRI and if necessary a 3rd tablet during MRI if claustrophobic do not drive after taking these medications   B COMPLEX VITAMINS PO  at Unknown time Self Yes No   Sig: Take 1 tablet by mouth daily   Cholecalciferol (VITAMIN D3) 2000 UNITS capsule  at Unknown time Self Yes No   Sig: Take 1 capsule by mouth 2 (two) times a day 5000 units Coenzyme Q10 (CO Q10) 60 MG CAPS  at Unknown time Self Yes No   Sig: Take 1 capsule by mouth daily   Ginkgo Biloba 120 MG TABS  at Unknown time Self Yes No   Sig: Take 1 tablet by mouth daily   Inositol 500 MG TABS  at Unknown time Self Yes No   Sig: Take 1 tablet by mouth 2 (two) times a day   MILK THISTLE PO  at Unknown time Self Yes No   Sig: Take 1 tablet by mouth daily   Melatonin 10 MG TABS  at Unknown time Self Yes No   Sig: Take 1 tablet by mouth daily at bedtime   Misc Natural Products (ENERGY FOCUS) TABS  at Unknown time Self Yes No   Sig: Take 1 tablet by mouth daily   atorvastatin (LIPITOR) 10 mg tablet  at Unknown time Self No No   Sig: TAKE 1 TABLET BY MOUTH  DAILY   cetirizine (ZyrTEC) 10 mg tablet  at Unknown time Self Yes No   Sig: Take 1 tablet by mouth daily   colesevelam (WELCHOL) 625 mg tablet  at Unknown time Self Yes No   Sig: Take 625 mg by mouth 2 (two) times a day with meals   dexlansoprazole (DEXILANT) 60 MG capsule  at Unknown time  No No   Sig: Take 1 capsule (60 mg total) by mouth daily   famotidine (PEPCID) 40 MG tablet  at Unknown time  No No   Sig: Take 1 tablet (40 mg total) by mouth daily   gabapentin (NEURONTIN) 600 MG tablet  at Unknown time Self No No   Sig: TAKE 1 TABLET BY MOUTH  TWICE A DAY   ipratropium (ATROVENT) 0 06 % nasal spray  Self No No   Sig: USE 2 SPRAYS IN EACH  NOSTRIL TWICE DAILY   lutein 6 mg  at Unknown time Self Yes No   Sig: Take 1 capsule by mouth daily   sucralfate (CARAFATE) 1 g tablet  at Unknown time Self No No   Sig: Take 1 tablet (1 g total) by mouth 4 (four) times a day      Facility-Administered Medications: None       Past Medical History:   Diagnosis Date    Anemia     Appendicitis     Coronary artery disease     Detached retina     GERD (gastroesophageal reflux disease)     Hyperlipidemia     Hypertension     Macular degeneration     Neuropathy     Von Willebrand disease ([facility] [address] )        Past Surgical History:   Procedure Laterality Date  ADENOIDECTOMY      APPENDECTOMY      ARTHRODESIS  01/15/2014    Lumbar     BACK SURGERY      CATARACT EXTRACTION      COLONOSCOPY  12/09/2016    fiberoptic     CYSTOSCOPY      with resection of tumor / 1/12/17, 10/2/17 / diagnostic 12/8/16, 5/30/17     EGD AND COLONOSCOPY N/A 12/9/2016    Procedure: EGD AND COLONOSCOPY;  Surgeon: Marisa Guerra MD;  Location: [facility];  Service:    [facility] EYE SURGERY      FRACTURE SURGERY Bilateral     ARM    NECK SURGERY      NEUROPLASTY / TRANSPOSITION MEDIAN NERVE AT [facility] Right 04/2015    LA CYSTOURETHROSCOPY,FULGUR <0 5 CM LESN N/A 1/12/2017    Procedure: CYSTOSCOPY, BLADDER BIOPSY WITH FULGRATION, 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;  Surgeon: Selvin Hart MD;  Location: [facility];  Service: Urology    LA CYSTOURETHROSCOPY,FULGUR <0 5 CM LESN N/A 10/2/2017    Procedure: CYSTOSCOPY WITH  BLADDER BIOPSIES WITH FULGURATION;  Surgeon: Selvin Hart MD;  [facility]: [facility];  Service: Urology    LA INSTILL ANTICANCER AGENT IN BLADDER N/A 1/12/2017    Procedure: Lashawn Phillip;  Surgeon: Selvin Hart MD;  [facility]: [facility];  Service: Urology    LA INSTILL ANTICANCER AGENT IN BLADDER N/A 10/2/2017    Procedure: Lashawn Phillip;  Surgeon: Selvin Hart MD;  [facility]: [facility];  Service: Urology    RETINAL DETACHMENT SURGERY Right 03/2016    complete air fill confirmed    [address] SURGERY  03/03/2015    proximal humerus fracture / LA    3/6/15   R    1/3/18     TONSILECTOMY AND ADNOIDECTOMY      TONSILLECTOMY      TRANSURETHRAL RESECTION OF BLADDER TUMOR N/A 10/2/2017    Procedure: TRANSURETHRAL RESECTION OF BLADDER TUMOR (TURBT);   Surgeon: Selvin Hart MD;  [facility]: [facility];  Service: Urology    [facility] (HISTORICAL)         Family History   Problem Relation Age of Onset    Ulcers Father         acute gastric    Heart disease Mother  Anuerysm Sister      I have reviewed and agree with the history as documented  Social History   Substance Use Topics    Smoking status: Light Tobacco Smoker     Years: 60 00     Types: Pipe    Smokeless tobacco: Never Used      Comment: smokes a pipe -resolved     Alcohol use 1 2 oz/week     1 Glasses of wine, 1 Shots of liquor per week      Comment: DAILY         Review of Systems   Constitutional: Positive for appetite change  Negative for activity change, chills and fever  HENT: Positive for postnasal drip  Negative for congestion, ear discharge, ear pain, trouble swallowing and voice change  Eyes: Negative for discharge  Respiratory: Positive for cough  Negative for chest tightness and shortness of breath  Cardiovascular: Negative for chest pain and leg swelling  Gastrointestinal: Positive for abdominal distention, abdominal pain, diarrhea, nausea and vomiting  Negative for blood in stool  Genitourinary: Negative for difficulty urinating  Musculoskeletal: Negative for gait problem  Neurological: Negative for weakness, light-headedness and headaches  All other systems reviewed and are negative  Physical Exam  Physical Exam   Constitutional: He is oriented to person, place, and time  He appears well-developed  No distress  HENT:   Head: Normocephalic and atraumatic  Right Ear: External ear normal    Left Ear: External ear normal    Nose: Nose normal    Mouth/Throat: Oropharynx is clear and moist    Eyes: Conjunctivae and EOM are normal  Pupils are equal, round, and reactive to light  Right eye exhibits no discharge  Left eye exhibits no discharge  No scleral icterus  Neck: Normal range of motion  Neck supple  Cardiovascular: Normal rate, regular rhythm, normal heart sounds and intact distal pulses  Pulmonary/Chest: Effort normal and breath sounds normal  No respiratory distress  Abdominal: Soft  Bowel sounds are normal  He exhibits no distension and no mass   There is tenderness (mild suprapubic)  There is no rebound and no guarding  Back: no mildline or CVA tenderness   Musculoskeletal: Normal range of motion  He exhibits no edema, tenderness or deformity  Lymphadenopathy:     He has no cervical adenopathy  Neurological: He is alert and oriented to person, place, and time  No cranial nerve deficit or sensory deficit  He exhibits normal muscle tone  Coordination normal    Gait slow but steady   Skin: Skin is warm and dry  Capillary refill takes less than 2 seconds  He is not diaphoretic  Psychiatric: He has a normal mood and affect  Nursing note and vitals reviewed  Vital Signs  ED Triage Vitals [07/28/18 2314]   Temperature Pulse Respirations Blood Pressure SpO2   98 °F (36 7 °C) 76 18 (!) 190/87 95 %      Temp Source Heart Rate Source Patient Position - Orthostatic VS BP Location FiO2 (%)   Temporal Monitor Lying Left arm --      Pain Score       No Pain           Vitals:    07/28/18 2314 07/29/18 0100   BP: (!) 190/87 (!) 177/81   Pulse: 76 68   Patient Position - Orthostatic VS: Lying Lying       Visual Acuity      ED Medications  Medications   iohexol (OMNIPAQUE) 350 MG/ML injection (SINGLE-DOSE) 100 mL (100 mL Intravenous Given 7/29/18 0154)       Diagnostic Studies  Results Reviewed     Procedure Component Value Units Date/Time    Lactic acid, plasma [[id number]]  (Normal) Collected:  07/28/18 2346    Lab Status:  Final result Specimen:  Blood from Arm, Right Updated:  07/29/18 0010     LACTIC ACID 0 9 mmol/L     Narrative:         Result may be elevated if tourniquet was used during collection      Troponin I [[id number]]  (Normal) Collected:  07/28/18 2346    Lab Status:  Final result Specimen:  Blood from Arm, Right Updated:  07/29/18 0010     Troponin I <0 02 ng/mL     Comprehensive metabolic panel [[id number]]  (Abnormal) Collected:  07/28/18 2346    Lab Status:  Final result Specimen:  Blood from Arm, Right Updated:  07/29/18 0008     Sodium 140 mmol/L Potassium 4 0 mmol/L      Chloride 104 mmol/L      CO2 27 mmol/L      Anion Gap 9 mmol/L      BUN 15 mg/dL      Creatinine 1 13 mg/dL      Glucose 105 mg/dL      Calcium 8 8 mg/dL      AST 26 U/L      ALT 36 U/L      Alkaline Phosphatase 69 U/L      Total Protein 7 0 g/dL      Albumin 3 3 (L) g/dL      Total Bilirubin 0 70 mg/dL      eGFR 61 ml/min/1 73sq m     Narrative:         National Kidney Disease Education Program recommendations are as follows:  GFR calculation is accurate only with a steady state creatinine  Chronic Kidney disease less than 60 ml/min/1 73 sq  meters  Kidney failure less than 15 ml/min/1 73 sq  meters      Magnesium [[id number]]  (Normal) Collected:  07/28/18 2346    Lab Status:  Final result Specimen:  Blood from Arm, Right Updated:  07/29/18 0008     Magnesium 2 2 mg/dL     Lipase [[id number]]  (Normal) Collected:  07/28/18 2346    Lab Status:  Final result Specimen:  Blood from Arm, Right Updated:  07/29/18 0008     Lipase 225 u/L     CK Total with Reflex CKMB [[id number]]  (Normal) Collected:  07/28/18 2346    Lab Status:  Final result Specimen:  Blood from Arm, Right Updated:  07/29/18 0008     Total  U/L     CBC and differential [[id number]]  (Abnormal) Collected:  07/28/18 2346    Lab Status:  Final result Specimen:  Blood from Arm, Right Updated:  07/28/18 2354     WBC 7 23 Thousand/uL      RBC 5 12 Million/uL      Hemoglobin 15 0 g/dL      Hematocrit 43 3 %      MCV 85 fL      MCH 29 3 pg      MCHC 34 6 g/dL      RDW 15 1 %      MPV 10 0 fL      Platelets 686 Thousands/uL      Neutrophils Relative 52 %      Lymphocytes Relative 30 %      Monocytes Relative 14 (H) %      Eosinophils Relative 2 %      Basophils Relative 1 %      Neutrophils Absolute 3 79 Thousands/µL      Lymphocytes Absolute 2 19 Thousands/µL      Monocytes Absolute 1 04 Thousand/µL      Eosinophils Absolute 0 17 Thousand/µL      Basophils Absolute 0 04 Thousands/µL                  RADIOLOGY RESULTS   Final Result by (07/29 8669)      CT abdomen pelvis with contrast   ED Interpretation by Ortega Prado MD (07/29 4414)   [facility] Dr Chelita Pierce - several rounded hypodensities in the liver those that are larger than 1 cm are likely cysts toes that are subcentimeter are too small to characterize rounded hypodensities in the kidneys those that are larger than 1 cm likely cysts those that are subcentimeter are too small to characterize there is underdistention versus wall thickening involving the large bowel at the rectum which may represent acute proctitis in the appropriate clinical setting no obstruction or other GI abnormality diverticulosis no findings to suggest acute appendicitis negative acute lymph nodes no enlarged lymph nodes impression is positive for questionable mild acute colitis  Final Result by Heather Hoang MD (07/29 8647)      No acute abdominopelvic pathology appreciated  Numerous hepatic and renal cysts  Very minimal colonic diverticulosis without diverticulitis  Lumbar degenerative arthritis and prior lumbar fusion procedure noted  Minimal bronchial wall thickening and atelectasis focally in the left lower lobe of uncertain significance  Please note that the preliminary report described findings of wall thickening of the rectum felt to possibly indicate proctitis  I disagree with this interpretation  It appears entirely normal given the amount of distention              Workstation performed: [id number]                    Procedures  ECG 12 Lead Documentation  Date/Time: 7/28/2018 11:58 PM  Performed by: Segundo Mabry  Authorized by: Segundo Mabry     Indications / Diagnosis:  Abdm pain  ECG reviewed by me, the ED Provider: yes    Patient location:  ED  Previous ECG:     Previous ECG:  Compared to current    Comparison ECG info:  9/20/17    Similarity:  No change  Rate:     ECG rate:  59    ECG rate assessment: bradycardic    Rhythm:     Rhythm: sinus bradycardia QRS:     QRS axis:  Left  Comments:      No acute ischemic changes           Phone Contacts  ED Phone Contact    ED Course  ED Course as of Jul 29 1746   Sun Jul 29, 2018   0132 Bladder scan zero    0200 Patient declines zofran    1388 Extensively reviewed CT scan and lab findings with patient and significant other  No LAD on CT scan questionable proctitis  Secondary to no WBC no fever, and multiple upper GI sx will hold off on admin of abx  Recommend check in with Dr Grant Brain GI to reviewed sx and scan  Will rx zofran                                 MDM  Number of Diagnoses or Management Options  Abdominal pain:   Diagnosis management comments: Mdm: colitis, diverticulitis, will assess for anemia  Had TSH checked 6/2018 nml with not recheck  Check lactic acid; Discussed Ct of head because of history of frequent falls to evaluate for possible subdural hematoma patient/family decline  Prior GI note from 7/25 reviewed in addition to Dr Arthur Odell note from 7/20/18    CritCare Time    Disposition  Final diagnoses:   Abdominal pain     Time reflects when diagnosis was documented in both MDM as applicable and the Disposition within this note     Time User Action Codes Description Comment    7/29/2018  3:33 AM Gwenith Police Add [R10 9] Abdominal pain       ED Disposition     ED Disposition Condition Comment    Discharge  Aislinn Mckee discharge to home/self care      Condition at discharge: Good        Follow-up Information     Follow up With Specialties Details Why Contact Ellis Rush DO Family Medicine  recheck blood pressure [address] [facility]  [address] [facility]      Shea Brown MD Gastroenterology  [phone] [address] [facility] [address]            Discharge Medication List as of 7/29/2018  3:37 AM      START taking these medications    Details   ondansetron (ZOFRAN-ODT) 4 mg disintegrating tablet Take 1 tablet (4 mg total) by mouth every 8 (eight) hours as needed for nausea or vomiting for up to 20 doses, Starting Sun 7/29/2018, Print         CONTINUE these medications which have NOT CHANGED    Details   ALPRAZolam (XANAX) 0 25 mg tablet Take 1 tablet 1 hr before MRI and 1 tablet upon arrival for MRI and if necessary a 3rd tablet during MRI if claustrophobic do not drive after taking these medications, Print      atorvastatin (LIPITOR) 10 mg tablet TAKE 1 TABLET BY MOUTH  DAILY, Normal      B COMPLEX VITAMINS PO Take 1 tablet by mouth daily, Historical Med      cetirizine (ZyrTEC) 10 mg tablet Take 1 tablet by mouth daily, Starting Mon 10/3/2016, Historical Med      Cholecalciferol (VITAMIN D3) 2000 UNITS capsule Take 1 capsule by mouth 2 (two) times a day 5000 units , Historical Med      Coenzyme Q10 (CO Q10) 60 MG CAPS Take 1 capsule by mouth daily, Historical Med      colesevelam (WELCHOL) 625 mg tablet Take 625 mg by mouth 2 (two) times a day with meals, Historical Med      dexlansoprazole (DEXILANT) 60 MG capsule Take 1 capsule (60 mg total) by mouth daily, Starting Wed 7/25/2018, Normal      famotidine (PEPCID) 40 MG tablet Take 1 tablet (40 mg total) by mouth daily, Starting Wed 7/25/2018, Normal      gabapentin (NEURONTIN) 600 MG tablet TAKE 1 TABLET BY MOUTH  TWICE A DAY, Normal      Ginkgo Biloba 120 MG TABS Take 1 tablet by mouth daily, Historical Med      Inositol 500 MG TABS Take 1 tablet by mouth 2 (two) times a day, Historical Med      ipratropium (ATROVENT) 0 06 % nasal spray USE 2 SPRAYS IN EACH  NOSTRIL TWICE DAILY, Normal      lutein 6 mg Take 1 capsule by mouth daily, Historical Med      Melatonin 10 MG TABS Take 1 tablet by mouth daily at bedtime, Historical Med      MILK THISTLE PO Take 1 tablet by mouth daily, Historical Med      Misc Natural Products (ENERGY FOCUS) TABS Take 1 tablet by mouth daily, Historical Med      sucralfate (CARAFATE) 1 g tablet Take 1 tablet (1 g total) by mouth 4 (four) times a day, Starting Wed 6/13/2018, Print           No discharge procedures on file      ED Provider  Electronically Signed by           Ortega Prado MD  07/29/18 5567

## 2018-07-29 NOTE — DISCHARGE INSTRUCTIONS

## 2018-07-30 ENCOUNTER — TELEPHONE (OUTPATIENT)
Dept: GASTROENTEROLOGY | Facility: AMBULARY SURGERY CENTER | Age: 83
End: 2018-07-30

## 2018-07-30 DIAGNOSIS — J30.0 CHRONIC VASOMOTOR RHINITIS: ICD-10-CM

## 2018-07-30 RX ORDER — IPRATROPIUM BROMIDE 42 UG/1
SPRAY, METERED NASAL
Qty: 60 ML | Refills: 1 | Status: SHIPPED | OUTPATIENT
Start: 2018-07-30 | End: 2018-10-16 | Stop reason: SDUPTHER

## 2018-07-30 NOTE — TELEPHONE ENCOUNTER
Pt called requesting to speak with you directly  Pt mentioned he is having severe stomach pain and wants to know what exactly you want to have him do  Please call pt to discuss   Pt can be reached at [phone]

## 2018-07-31 LAB
ATRIAL RATE: 59 BPM
P AXIS: 57 DEGREES
PR INTERVAL: 226 MS
QRS AXIS: -40 DEGREES
QRSD INTERVAL: 114 MS
QT INTERVAL: 462 MS
QTC INTERVAL: 457 MS
T WAVE AXIS: 32 DEGREES
VENTRICULAR RATE: 59 BPM

## 2018-07-31 PROCEDURE — 93010 ELECTROCARDIOGRAM REPORT: CPT | Performed by: INTERNAL MEDICINE

## 2018-07-31 NOTE — TELEPHONE ENCOUNTER
Can you please call him to schedule an EGD at any campus? Ideally with Dr Elbert Johnson  He would really appreciate a call back today if possible and if you miss him he would like you to leave a message  Also we discussed his symptoms, he had acute onset of nausea, vomiting, diarrhea and abdominal pain after eating a hamburger  His labs and CT in the ED were unremarkable  I asked him to eat light, low fat foods   He knows to call back if his symptoms persist

## 2018-08-01 PROBLEM — R63.4 WEIGHT LOSS: Status: ACTIVE | Noted: 2018-08-01

## 2018-08-01 PROBLEM — K21.9 GASTROESOPHAGEAL REFLUX DISEASE WITHOUT ESOPHAGITIS: Status: ACTIVE | Noted: 2018-08-01

## 2018-08-01 PROBLEM — R49.0 HOARSE VOICE QUALITY: Status: ACTIVE | Noted: 2018-08-01

## 2018-08-01 PROBLEM — R63.0 APPETITE LOSS: Status: ACTIVE | Noted: 2018-08-01

## 2018-08-02 ENCOUNTER — RX ONLY (RX ONLY)
Age: 83
End: 2018-08-02

## 2018-08-02 ENCOUNTER — ANESTHESIA EVENT (OUTPATIENT)
Dept: PERIOP | Facility: HOSPITAL | Age: 83
End: 2018-08-02
Payer: MEDICARE

## 2018-08-02 ASSESSMENT — VISUAL ACUITY
OD_BCVA: 20/70-1
OS_BCVA: 20/400

## 2018-08-02 ASSESSMENT — REFRACTION_MANIFEST
OU_VA: 20/
OS_VA2: 20/
OS_VA3: 20/
OD_VA2: 20/
OD_VA1: 20/
OS_VA2: 20/
OS_VA3: 20/
OD_VA1: 20/
OD_VA3: 20/
OD_VA3: 20/
OS_VA1: 20/
OU_VA: 20/
OD_VA3: 20/
OS_VA1: 20/
OS_VA1: 20/
OD_VA1: 20/
OD_VA2: 20/
OU_VA: 20/
OD_VA2: 20/
OS_VA2: 20/
OS_VA3: 20/

## 2018-08-02 ASSESSMENT — REFRACTION_AUTOREFRACTION
OS_CYLINDER: -1.75
OD_SPHERE: -1.00
OD_CYLINDER: -1.00
OS_SPHERE: +0.25
OS_AXIS: 074
OD_AXIS: 087

## 2018-08-02 ASSESSMENT — REFRACTION_CURRENTRX
OS_OVR_VA: 20/
OS_OVR_VA: 20/
OD_OVR_VA: 20/
OS_OVR_VA: 20/

## 2018-08-02 ASSESSMENT — SPHEQUIV_DERIVED
OS_SPHEQUIV: -0.625
OD_SPHEQUIV: -1.5

## 2018-08-03 ENCOUNTER — HOSPITAL ENCOUNTER (OUTPATIENT)
Facility: HOSPITAL | Age: 83
Setting detail: OUTPATIENT SURGERY
Discharge: HOME | End: 2018-08-03
Attending: INTERNAL MEDICINE | Admitting: INTERNAL MEDICINE
Payer: MEDICARE

## 2018-08-03 ENCOUNTER — ANESTHESIA (OUTPATIENT)
Dept: PERIOP | Facility: HOSPITAL | Age: 83
End: 2018-08-03
Payer: MEDICARE

## 2018-08-03 VITALS
SYSTOLIC BLOOD PRESSURE: 161 MMHG | OXYGEN SATURATION: 98 % | BODY MASS INDEX: 23.7 KG/M2 | RESPIRATION RATE: 20 BRPM | HEART RATE: 60 BPM | WEIGHT: 175 LBS | DIASTOLIC BLOOD PRESSURE: 70 MMHG | TEMPERATURE: 97 F | HEIGHT: 72 IN

## 2018-08-03 DIAGNOSIS — R63.4 WEIGHT LOSS: ICD-10-CM

## 2018-08-03 DIAGNOSIS — R49.0 HOARSE VOICE QUALITY: ICD-10-CM

## 2018-08-03 DIAGNOSIS — K21.9 GASTROESOPHAGEAL REFLUX DISEASE WITHOUT ESOPHAGITIS: ICD-10-CM

## 2018-08-03 DIAGNOSIS — R63.0 APPETITE LOSS: ICD-10-CM

## 2018-08-03 PROCEDURE — 88342 IMHCHEM/IMCYTCHM 1ST ANTB: CPT | Performed by: PATHOLOGY

## 2018-08-03 PROCEDURE — 88341 IMHCHEM/IMCYTCHM EA ADD ANTB: CPT | Performed by: PATHOLOGY

## 2018-08-03 PROCEDURE — 88305 TISSUE EXAM BY PATHOLOGIST: CPT | Performed by: PATHOLOGY

## 2018-08-03 PROCEDURE — 43239 EGD BIOPSY SINGLE/MULTIPLE: CPT | Performed by: INTERNAL MEDICINE

## 2018-08-03 RX ORDER — METOCLOPRAMIDE HYDROCHLORIDE 5 MG/ML
10 INJECTION INTRAMUSCULAR; INTRAVENOUS ONCE AS NEEDED
Status: DISCONTINUED | OUTPATIENT
Start: 2018-08-03 | End: 2018-08-03 | Stop reason: HOSPADM

## 2018-08-03 RX ORDER — LIDOCAINE HYDROCHLORIDE 10 MG/ML
INJECTION, SOLUTION INFILTRATION; PERINEURAL AS NEEDED
Status: DISCONTINUED | OUTPATIENT
Start: 2018-08-03 | End: 2018-08-03 | Stop reason: SURG

## 2018-08-03 RX ORDER — SODIUM CHLORIDE, SODIUM LACTATE, POTASSIUM CHLORIDE, CALCIUM CHLORIDE 600; 310; 30; 20 MG/100ML; MG/100ML; MG/100ML; MG/100ML
125 INJECTION, SOLUTION INTRAVENOUS CONTINUOUS
Status: DISCONTINUED | OUTPATIENT
Start: 2018-08-03 | End: 2018-08-03 | Stop reason: HOSPADM

## 2018-08-03 RX ORDER — ONDANSETRON 2 MG/ML
4 INJECTION INTRAMUSCULAR; INTRAVENOUS ONCE AS NEEDED
Status: DISCONTINUED | OUTPATIENT
Start: 2018-08-03 | End: 2018-08-03 | Stop reason: HOSPADM

## 2018-08-03 RX ORDER — PROPOFOL 10 MG/ML
INJECTION, EMULSION INTRAVENOUS AS NEEDED
Status: DISCONTINUED | OUTPATIENT
Start: 2018-08-03 | End: 2018-08-03 | Stop reason: SURG

## 2018-08-03 RX ADMIN — LIDOCAINE HYDROCHLORIDE 50 MG: 10 INJECTION, SOLUTION INFILTRATION; PERINEURAL at 11:26

## 2018-08-03 RX ADMIN — PROPOFOL 100 MG: 10 INJECTION, EMULSION INTRAVENOUS at 11:21

## 2018-08-03 RX ADMIN — SODIUM CHLORIDE, SODIUM LACTATE, POTASSIUM CHLORIDE, AND CALCIUM CHLORIDE 125 ML/HR: .6; .31; .03; .02 INJECTION, SOLUTION INTRAVENOUS at 10:47

## 2018-08-03 RX ADMIN — PROPOFOL 50 MG: 10 INJECTION, EMULSION INTRAVENOUS at 11:26

## 2018-08-03 NOTE — ANESTHESIA POSTPROCEDURE EVALUATION
Post-Op Assessment Note      CV Status:  Stable    Mental Status:  Alert and awake    Hydration Status:  Euvolemic    PONV Controlled:  Controlled    Airway Patency:  Patent    Post Op Vitals Reviewed: Yes          Staff: Anesthesiologist, CRNA           BP   106/66   Temp  98 0   Pulse  60   Resp   16   SpO2   93%

## 2018-08-03 NOTE — H&P (VIEW-ONLY)
[facility] - Outpatient Follow-up Note  Sohan Castorena [de-identified] y o  male MRN: [id number]  Encounter: [id number]          ASSESSMENT AND PLAN:    1  Weight loss  2  Loss of appetite  3  Fatigue  4  Belching  5  Hoarse voice  6  History of GAVE   -  He reports that for the past 2 months he has had  A sudden loss of appetite, increased belching and a 10-12 lb weight loss  He has noted that he has had a hoarse voice  He attributes this to reflux but notes that he does not have typical epigastric or substernal pain  He states he has a history of reflux and did take omeprazole for many years but this did not seem to be working, his PCP switched him to [employer] which did seem to improve his symptoms but did not resolve them  -  He has also tried Pepcid in the past and is taking Carafate currently but he does not believe this is helpful and notes that he does feel nauseous after taking it  -  We discussed possible options including changing his PPI or adding Pepcid before dinner to the [employer] before breakfast and he would prefer to stay on the [employer] and add Pepcid  -  Recommend an upper endoscopy for evaluation of his symptoms given the abrupt onset and the concerning weight loss, we discussed the risks and benefits and he is agreeable and will schedule this  -  Obtain a CBC to check for anemia given his recent fatigue and history of GAVE seen on upper endoscopy in 2016  Biopsies were negative for celiac disease and H pylori at that time  - we discussed that if the upper endoscopy is unremarkable, he may need further evaluation such as pH testing and/or manometry    -Follow up in the office after the procedure    7  Lymphocytic colitis   -  He had a colonoscopy in 2016 with random biopsies that showed lymphocytic colitis, his diarrhea did resolve and he has not been taking anything for this    He notes that recently he has had recurrence of his loose stools, he only has 1-2 bowel movements per day however  -  He can use Imodium over the counter,  Currently he states this does not bother him and he would prefer not to take another medication  ____________________________________________________________    SUBJECTIVE:      72-year-old male with past medical history of hypothyroidism, hypertension and GERD presents to the office for recent worsening of his current symptoms  He reports that over the last 2 months he has had belching after eating, loss of appetite, intermittent hoarseness of his voice as well as a 10-12 lb weight loss  He states that he feels fatigued and simply does not have the energy to do his usual activities  He attributed his upper GI symptoms to reflux and saw his PCP for this, he was taking omeprazole previously and tried Pepcid but this did not work  He then tried Dexilant which he did feel helped his symptoms somewhat, he is also taking Carafate but notes that he has some nausea after taking this  He also notes that he has a heavy feeling in his stomach after eating and it takes several hours for the sensation to improve  He states he is concerned about esophageal cancer as he had several friends who had Pearce's esophagus and  from esophageal cancer  he has a history of lymphocytic colitis noted on biopsies on colonoscopy in 2016, diverticulosis was also noted during this procedure  He had an upper endoscopy in 2016 which showed erythematous mucosa found in the antrum as well as 1 stomach polyp, biopsies were consistent with GAVE, negative for H pylori  This stomach polyp is hyperplastic on pathology  REVIEW OF SYSTEMS IS OTHERWISE NEGATIVE        Historical Information   Past Medical History:   Diagnosis Date    Anemia     Appendicitis     Coronary artery disease     Detached retina     Disease of thyroid gland     GERD (gastroesophageal reflux disease)     Hyperlipidemia     Hypertension     Macular degeneration     Neuropathy     [facility]illebrand disease [facility])      Past Surgical History:   Procedure Laterality Date    ADENOIDECTOMY      APPENDECTOMY      ARTHRODESIS  01/15/2014    Lumbar     BACK SURGERY      CATARACT EXTRACTION      COLONOSCOPY  12/09/2016    fiberoptic     CYSTOSCOPY      with resection of tumor / 1/12/17, 10/2/17 / diagnostic 12/8/16, 5/30/17     EGD AND COLONOSCOPY N/A 12/9/2016    Procedure: EGD AND COLONOSCOPY;  Surgeon: Roger Hall MD;  [facility];  Service:    [facility] EYE SURGERY      FRACTURE SURGERY Bilateral     ARM    NECK SURGERY      NEUROPLASTY / TRANSPOSITION MEDIAN NERVE AT [facility] [place] Right 04/2015    PA CYSTOURETHROSCOPY,FULGUR <0 5 CM LESN N/A 1/12/2017    Procedure: CYSTOSCOPY, BLADDER BIOPSY WITH FULGRATION, 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;  Surgeon: Deb Beckman MD;  [facility];  Service: Urology    PA CYSTOURETHROSCOPY,FULGUR <0 5 CM LESN N/A 10/2/2017    Procedure: CYSTOSCOPY WITH  BLADDER BIOPSIES WITH FULGURATION;  Surgeon: Deb Beckman MD;  [facility];  Service: Urology    PA INSTILL ANTICANCER AGENT IN BLADDER N/A 1/12/2017    Procedure: Mayra Older;  Surgeon: Deb Beckman MD;  [facility];  Service: Urology    PA INSTILL ANTICANCER AGENT IN BLADDER N/A 10/2/2017    Procedure: Mayra Older;  Surgeon: Deb Beckman MD;  [facility];  Service: Urology    RETINAL DETACHMENT SURGERY Right 03/2016    complete air fill confirmed     ROTATOR CUFF REPAIR      SHOULDER SURGERY  03/03/2015    proximal humerus fracture / LA    3/6/15   R    1/3/18     TONSILECTOMY AND ADNOIDECTOMY      TONSILLECTOMY      TRANSURETHRAL RESECTION OF BLADDER TUMOR N/A 10/2/2017    Procedure: TRANSURETHRAL RESECTION OF BLADDER TUMOR (TURBT);   Surgeon: Deb Beckman MD;  [facility];  Service: Urology    [address] [place] [address] (HISTORICAL)       Social History   History Alcohol Use    1 2 oz/week    1 Glasses of wine, 1 Shots of liquor per week     Comment: DAILY      History   Drug Use No     History   Smoking Status    Light Tobacco Smoker    Years: 60 00    Types: Pipe   Smokeless Tobacco    Never Used     Comment: smokes a pipe -resolved      Family History   Problem Relation Age of Onset    Ulcers Father         acute gastric    Heart disease Mother    Liv Ohara Sister        Meds/Allergies       Current Outpatient Prescriptions:     ALPRAZolam (XANAX) 0 25 mg tablet    atorvastatin (LIPITOR) 10 mg tablet    B COMPLEX VITAMINS PO    cetirizine (ZyrTEC) 10 mg tablet    Cholecalciferol (VITAMIN D3) 2000 UNITS capsule    Coenzyme Q10 (CO Q10) 60 MG CAPS    colesevelam (WELCHOL) 625 mg tablet    dexlansoprazole (DEXILANT) 60 MG capsule    famotidine (PEPCID) 40 MG tablet    gabapentin (NEURONTIN) 600 MG tablet    Ginkgo Biloba 120 MG TABS    Inositol 500 MG TABS    lutein 6 mg    Melatonin 10 MG TABS    MILK THISTLE PO    Misc Natural Products (ENERGY FOCUS) TABS    sucralfate (CARAFATE) 1 g tablet    ipratropium (ATROVENT) 0 06 % nasal spray    No Known Allergies        Objective     Blood pressure 161/80, pulse (!) 53, temperature (!) 96 2 °F (35 7 °C), temperature source Tympanic, height 6' (1 829 m), weight 79 4 kg (175 lb)  PHYSICAL EXAM:      Physical Exam   Constitutional: He is oriented to person, place, and time  He appears well-developed and well-nourished  No distress  HENT:   Head: Normocephalic and atraumatic  Eyes: Right eye exhibits no discharge  Left eye exhibits no discharge  No scleral icterus  Neck: Neck supple  No tracheal deviation present  Cardiovascular: Normal rate, regular rhythm, normal heart sounds and intact distal pulses  Exam reveals no gallop and no friction rub  No murmur heard  Pulmonary/Chest: Effort normal and breath sounds normal  No respiratory distress  He has no wheezes  He has no rales  Abdominal: Soft  Bowel sounds are normal  He exhibits no distension  There is no tenderness  There is no rebound and no guarding  Neurological: He is alert and oriented to person, place, and time  Skin: Skin is warm and dry  Psychiatric: He has a normal mood and affect  Lab Results:   No visits with results within 1 Day(s) from this visit  Latest known visit with results is:   Lab on 06/07/2018   Component Date Value    Vitamin B-12 06/07/2018 400     Vit D, 25-Hydroxy 06/07/2018 90 1          Radiology Results:   Mri Brain W Wo Contrast    Result Date: 7/9/2018  Narrative: MRI BRAIN WITH AND WITHOUT CONTRAST INDICATION: R27 0: Ataxia, unspecified query NPH COMPARISON:  CT 11/2/2012 TECHNIQUE: Sagittal T1, axial T2, axial FLAIR, axial T1, axial [place], axial diffusion  Sagittal, axial and coronal T1 postcontrast   Axial BRAVO post contrast   IV Contrast:  8 mL of gadobutrol injection (MULTI-DOSE)  IMAGE QUALITY:   Diagnostic  FINDINGS: BRAIN PARENCHYMA:  No acute disease  There is no acute ischemia, intracranial mass, mass effect or edema  No pathologic hemosiderin deposition  Age-appropriate volume loss with moderate degree of chronic small vessel disease  VENTRICLES:  Age-appropriate  No indication of NPH  SELLA AND PITUITARY GLAND:  Partially empty sella ORBITS:  Right retinal banding PARANASAL SINUSES:  Trace sinus mucosal thickening VASCULATURE:  Evaluation of the major intracranial vasculature demonstrates appropriate flow voids  CALVARIUM AND SKULL BASE:  Normal  EXTRACRANIAL SOFT TISSUES:  Cervical spondylosis and osteoarthritis  Impression: No acute disease  Age-appropriate volume loss and moderate degree of chronic small vessel disease  Pattern of ventriculomegaly is not typical for NPH   Workstation performed: [id number]

## 2018-08-03 NOTE — ANESTHESIA PREPROCEDURE EVALUATION
Review of Systems/Medical History          Cardiovascular  Hyperlipidemia, Hypertension , CAD ,    Pulmonary  Smoker cigarette smoker  ,        GI/Hepatic    GERD ,             Endo/Other  History of thyroid disease ,      GYN       Hematology  Anemia ,     Musculoskeletal  Back pain ,   Arthritis     Neurology   Psychology           Physical Exam    Airway    Mallampati score: II  TM Distance: >3 FB  Neck ROM: full     Dental       Cardiovascular  Cardiovascular exam normal    Pulmonary  Pulmonary exam normal     Other Findings        Anesthesia Plan  ASA Score- 3     Anesthesia Type- IV sedation with anesthesia with ASA Monitors  Additional Monitors:   Airway Plan:         Plan Factors-    Induction- intravenous  Postoperative Plan-     Informed Consent- Anesthetic plan and risks discussed with patient  I personally reviewed this patient with the CRNA  Discussed and agreed on the Anesthesia Plan with the CRNA  Reyna Smith

## 2018-08-03 NOTE — OP NOTE
**** GI/ENDOSCOPY REPORT ****     PATIENT NAME: Sophia Figures - VISIT ID:     INTRODUCTION: Esophagogastroduodenoscopy - A [facility] [facility] male patient presents for   an [facility]  INDICATIONS: Dyspepsia  Loss of weight  Decreased appetite  CONSENT: The benefits, risks, and alternatives to the procedure were   discussed and informed consent was obtained from the patient  PREPARATION:  EKG, pulse, pulse oximetry and blood pressure were monitored   throughout the procedure  ASA Classification: Class 3 - Patient has severe   systemic disturbance that may or may not be related to the disorder   requiring surgery  MEDICATIONS: Anesthesia-check records     PROCEDURE:  The endoscope was passed without difficulty through the mouth   under direct visualization and advanced to the 2nd portion of the   duodenum  The scope was withdrawn and the mucosa was carefully examined  FINDINGS:   Esophagus: The esophagus appeared to be normal  The GE   junction was observed 40 cm from the entry site  Stomach: Erosive   gastritis was found in the fundus  Two biopsies was taken  Nodular mucosa   was found in the body of the stomach and cardia  Two forceps biopsies was   taken  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  Duodenum:   Nodular mucosa was found in the duodenal bulb  Two biopsies was taken  The 2nd portion of the duodenum and 3rd portion of the duodenum appeared   to be normal      COMPLICATIONS: There were no complications  IMPRESSIONS: Normal esophagus  Erosive gastritis found in the fundus  Two   biopsies taken  Nodular mucosa found in the body of the stomach and   cardia  Two biopsies taken  Mild gastric antral vascular ectasia seen in   antrum  Nodular mucosa found in the duodenal bulb  Two biopsies taken  Normal 2nd portion of the duodenum and 3rd portion of the duodenum       RECOMMENDATIONS: Follow-up on the results of the biopsy specimens in 2   weeks  Plan for gastric emptying study  Follow up in office with Dr Miles Tanner  ESTIMATED BLOOD LOSS:     PATHOLOGY SPECIMENS: Two biopsies taken  Associated finding: Gastritis  Two forceps biopsies taken  Two biopsies taken  PROCEDURE CODES:     ICD-9 Codes: 536 8 Dyspepsia and other specified disorders of function of   stomach 783 21 Loss of weight 535 40 Other specified gastritides, without   mention of hemorrhage     ICD-10 Codes: K30 Functional dyspepsia R63 4 Abnormal weight loss K29   Gastritis and duodenitis R19 8 Other specified symptoms and signs   involving the digestive system and abdomen R19 8 Other specified symptoms   and signs involving the digestive system and abdomen     PERFORMED BY: DILIP Coulter  on 08/03/2018  Version 1, electronically signed by DILIP Coulter  on 08/03/2018 at   11:36

## 2018-08-03 NOTE — H&P
History and Physical - [facility]  Wendie Barksdale [de-identified] y o  male MRN: [id number]    HPI: Wendie Barksdale is a [de-identified]y o  year old male who presents with dyspepsia and decreased appetite / weight loss         Review of Systems    Historical Information   Past Medical History:   Diagnosis Date    Anemia     Appendicitis     Coronary artery disease     Detached retina     GERD (gastroesophageal reflux disease)     Hyperlipidemia     Hypertension     Macular degeneration     Neuropathy     Von Willebrand disease ([address] )      Past Surgical History:   Procedure Laterality Date    ADENOIDECTOMY      APPENDECTOMY      ARTHRODESIS  01/15/2014    Lumbar     BACK SURGERY      CATARACT EXTRACTION      COLONOSCOPY  12/09/2016    fiberoptic     CYSTOSCOPY      with resection of tumor / 1/12/17, 10/2/17 / diagnostic 12/8/16, 5/30/17     EGD AND COLONOSCOPY N/A 12/9/2016    Procedure: EGD AND COLONOSCOPY;  Surgeon: Kevin Castro MD;  [facility];  Service:    [facility] EYE SURGERY      FRACTURE SURGERY Bilateral     ARM    NECK SURGERY      NEUROPLASTY / TRANSPOSITION MEDIAN [facility]ERVE AT [facility] Right 04/2015    [place] CYSTOURETHROSCOPY,FULGUR <0 5 CM LESN N/A 1/12/2017    Procedure: CYSTOSCOPY, BLADDER BIOPSY WITH FULGRATION, 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;  Surgeon: Khanh Deshpande MD;  [facility];  Service: Urology    AK CYSTOURETHROSCOPY,FULGUR <0 5 CM LESN N/A 10/2/2017    Procedure: CYSTOSCOPY WITH  BLADDER BIOPSIES WITH FULGURATION;  Surgeon: Khanh Deshpande MD;  [facility];  Service: Urology    AK INSTILL ANTICANCER AGENT IN BLADDER N/A 1/12/2017    Procedure: Rachael Fabry;  Surgeon: Khanh Deshpande MD;  [facility];  Service: Urology    AK INSTILL ANTICANCER AGENT IN BLADDER N/A 10/2/2017    Procedure: Rachael Fabry;  Surgeon: Khanh Deshpande MD;  [facility];  Service: Urology  RETINAL DETACHMENT SURGERY Right 03/2016    complete air fill confirmed     ROTATOR CUFF REPAIR      SHOULDER SURGERY  03/03/2015    proximal humerus fracture / LA    3/6/15   R    1/3/18     TONSILECTOMY AND ADNOIDECTOMY      TONSILLECTOMY      TRANSURETHRAL RESECTION OF BLADDER TUMOR N/A 10/2/2017    Procedure: TRANSURETHRAL RESECTION OF BLADDER TUMOR (TURBT); Surgeon: Tonya Sainz MD;  [facility];  Service: Urology   [facility] ANTIGEN (HISTORICAL)       Social History   History   Alcohol Use    1 2 oz/week    1 Glasses of wine, 1 Shots of liquor per week     Comment: DAILY      History   Drug Use No     History   Smoking Status    Light Tobacco Smoker    Years: 60 00    Types: Pipe   Smokeless Tobacco    Never Used     Comment: smokes a pipe -resolved      Family History   Problem Relation Age of Onset    Ulcers Father         acute gastric    Heart disease Mother    Eugenio Hammondysm Sister        Meds/Allergies     Prescriptions Prior to Admission   Medication    atorvastatin (LIPITOR) 10 mg tablet    B COMPLEX VITAMINS PO    cetirizine (ZyrTEC) 10 mg tablet    Cholecalciferol (VITAMIN D3) 2000 UNITS capsule    Coenzyme Q10 (CO Q10) 60 MG CAPS    dexlansoprazole (DEXILANT) 60 MG capsule    famotidine (PEPCID) 40 MG tablet    gabapentin (NEURONTIN) 600 MG tablet    Ginkgo Biloba 120 MG TABS    Inositol 500 MG TABS    ipratropium (ATROVENT) 0 06 % nasal spray    lutein 6 mg    Melatonin 10 MG TABS    MILK THISTLE PO    Misc Natural Products (ENERGY FOCUS) TABS    ondansetron (ZOFRAN-ODT) 4 mg disintegrating tablet       No Known Allergies    Objective     Blood pressure 165/78, pulse 64, temperature (!) 96 3 °F (35 7 °C), temperature source Tympanic, resp  rate 18, height 6' (1 829 m), weight 79 4 kg (175 lb), SpO2 96 %        PHYSICAL EXAM    Gen: NAD  CV: RRR  CHEST: Clear  ABD: soft, NT/ND  EXT: no edema  Neuro: AAO      ASSESSMENT/PLAN:  This is a [de-identified] y o  year old male here for dyspepsia and decreased appetite evaluation  Plan for EGD  PLAN:   Procedure: EGD

## 2018-08-08 NOTE — TELEPHONE ENCOUNTER
I called him back, he wanted to see if I could get him in for a sooner gastric emptying study appt as currently this is scheduled after his follow up with Dr Joel Buckley and it should probably be done before  I let him know that I can not reschedule it as this is a nuclear medicine test and that he should call central scheduling to see if it can be done at another campus sooner  Is there anything else we can do to help him get a sooner appt for the GES?

## 2018-08-17 ENCOUNTER — OFFICE VISIT (OUTPATIENT)
Dept: NEUROLOGY | Facility: CLINIC | Age: 83
End: 2018-08-17
Payer: MEDICARE

## 2018-08-17 VITALS
DIASTOLIC BLOOD PRESSURE: 67 MMHG | HEIGHT: 72 IN | SYSTOLIC BLOOD PRESSURE: 141 MMHG | BODY MASS INDEX: 23.09 KG/M2 | HEART RATE: 60 BPM | WEIGHT: 170.5 LBS

## 2018-08-17 DIAGNOSIS — G95.19 NEUROGENIC CLAUDICATION (HCC): ICD-10-CM

## 2018-08-17 DIAGNOSIS — G60.9 IDIOPATHIC PERIPHERAL NEUROPATHY: Primary | ICD-10-CM

## 2018-08-17 DIAGNOSIS — R27.0 ATAXIA: ICD-10-CM

## 2018-08-17 DIAGNOSIS — R26.81 GAIT INSTABILITY: ICD-10-CM

## 2018-08-17 DIAGNOSIS — M62.549 ATROPHY OF MUSCLE OF HAND, UNSPECIFIED LATERALITY: ICD-10-CM

## 2018-08-17 PROCEDURE — 99205 OFFICE O/P NEW HI 60 MIN: CPT | Performed by: PSYCHIATRY & NEUROLOGY

## 2018-08-17 NOTE — LETTER
August 17, 2018     Tavia Castillo DO  [address] [facility] [address]    Patient: Francisco Javier Kan   YOB: 1935   Date of Visit: 8/17/2018       Dear Dr Bryan Reeves: Thank you for referring Carol Gonzales to me for evaluation  Below are my notes for this consultation  If you have questions, please do not hesitate to call me  I look forward to following your patient along with you  Sincerely,        Carmine Rowe MD        CC: No Recipients  Carmine Rowe MD  8/17/2018 11:14 PM  Sign at close encounter  Patient ID: Francisco Javier Kan is a [de-identified] y o  male  Assessment/Plan:    Idiopathic peripheral neuropathy  Mr  Kathryn Miramontes comes in with the primary complain of poor balance  I believe his ambulatory dysfunction is multifactorial in nature, stemming from 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, Lumbosacral spine and he also has acral sensory loss suggesting an underlying neuropathy  He has slight asymmetric weakness, with weakness in the left ankle dorsiflexors, likely a superimposed L5 radiculopathy, which has had for the last 5-6 years or even longer  We talked abt repeating his MRI's of there cervical and L spine, which he deferred  Considering his age, he would not consider a repeat surgery and getting a MRI may not change his management  From a neuropathy standpoint, he has had some blood work for common reversible causes, I have recommended a few additional tests as noted below  I recommended EMG/NCS, which he deferred again,as it may not change his management, which us supportive care  I do agree his symptoms have been slowly progressive to consider this to be an autoimmune/inflammatory phenomenon  I explained my thoughts at length to patient and his family  They demonstrated understanding  They do understand there is no role of steroids or immunotherapy at this point   I did caution him for safety and encouraged him to use a cane for safety, specifically out of home and long distances  He does understand he should take a few seconds to get his bearings together when he initially gets up from a seating position  They do have grab bars in the bathroom  I did not schedule a return visit for him at this point, will call him with the results of blood work  He will call me if there is any change or if he has any other questions or concerns  Diagnoses and all orders for this visit:    Idiopathic peripheral neuropathy  -     Vitamin B6; Future  -     Protein electrophoresis, serum; Future    Thank you for allowing me to participate to participate in his care  Please do not hesitate to contact me for any questions or concerns  Time spent with patient and his family was 76 minutes and more than half the time was spent in counselling and discussion  Subjective:    HPI    I had the pleasure of seeing your patient in the neuromuscular Clinic today  As you know, he is an 46-year-old man who was referred for evaluation for neuropathy  Please allow me to summarize his history for the record  He started having balance problems about 10 years ago  He was sent to a neurologist, was told he has a dropped foot on left, he got a brace  That did not help, he then went to see orthopedic surgeon, was told he had cervical stenosis, ? Cord compression, had a c spine surgery 8-9 years ago at [facility]  He then had a fusion in the lower back a few years later  His balance did not get better with the surgeries  He has been through physical therapy a few times, went through balance therapy  3 years ago, he was seen by another neurologist in [facility], did a CT scan of head, which was normal  B12 was low, and was recommended replacement  He has trouble getting out of his chair, tends to lose balance, drags his left leg  He has had a few falls in the last few months  Some days are worse than otehrs, depending on how tired he is     He has had a few surgeries for the lower back  No pain in the lower back, some paresthesias in the left foot, and right > left hand (knows it is from his ulnar nerve, has broken both arms), has atrophy in intrinsic hand muscles  No speech or swallowing problems  No muscle cramps or twitching  No urinary symptoms, some recent bowel issues, constipation  Had testing for lyme's ds, which was negative  WM 1:40, homogenous  He did have a brain Mri recently, which I personally reviewed as a part of this evaluation  There was no acute stroke, he has mild atrophy and small vessel disease  No significant ventriculomegaly  Has macular degeneration, recently started having stomach issues, going through gi workup  No HTN, DM  Family hx, father passed away at 50 (has gi bleed), No DM  No neuromuscular disorders in the family        The following portions of the patient's history were reviewed and updated as appropriate: allergies, current medications, past family history, past medical history, past social history, past surgical history and problem list       Current Outpatient Prescriptions:     atorvastatin (LIPITOR) 10 mg tablet, TAKE 1 TABLET BY MOUTH  DAILY, Disp: 90 tablet, Rfl: 2    B COMPLEX VITAMINS PO, Take 1 tablet by mouth daily, Disp: , Rfl:     cetirizine (ZyrTEC) 10 mg tablet, Take 1 tablet by mouth daily, Disp: , Rfl:     Cholecalciferol (VITAMIN D3) 2000 UNITS capsule, Take 1 capsule by mouth 2 (two) times a day 5000 units , Disp: , Rfl:     Coenzyme Q10 (CO Q10) 60 MG CAPS, Take 1 capsule by mouth daily, Disp: , Rfl:     dexlansoprazole (DEXILANT) 60 MG capsule, Take 1 capsule (60 mg total) by mouth daily, Disp: 30 capsule, Rfl: 2    famotidine (PEPCID) 40 MG tablet, Take 1 tablet (40 mg total) by mouth daily, Disp: 30 tablet, Rfl: 3    Ginkgo Biloba 120 MG TABS, Take 1 tablet by mouth daily, Disp: , Rfl:     Inositol 500 MG TABS, Take 1 tablet by mouth 2 (two) times a day, Disp: , Rfl:     ipratropium (ATROVENT) 0 06 % nasal spray, USE 2 SPRAYS IN EACH  NOSTRIL TWICE DAILY, Disp: 60 mL, Rfl: 1    lutein 6 mg, Take 1 capsule by mouth daily, Disp: , Rfl:     Melatonin 10 MG TABS, Take 1 tablet by mouth daily at bedtime, Disp: , Rfl:     MILK THISTLE PO, Take 1 tablet by mouth daily, Disp: , Rfl:     Misc Natural Products (ENERGY FOCUS) TABS, Take 1 tablet by mouth daily, Disp: , Rfl:     ondansetron (ZOFRAN-ODT) 4 mg disintegrating tablet, Take 1 tablet (4 mg total) by mouth every 8 (eight) hours as needed for nausea or vomiting for up to 20 doses, Disp: 20 tablet, Rfl: 0    gabapentin (NEURONTIN) 600 MG tablet, TAKE 1 TABLET BY MOUTH  TWICE A DAY (Patient not taking: Reported on 8/17/2018), Disp: 180 tablet, Rfl: 2       Objective:    Blood pressure 141/67, pulse 60, height 6' (1 829 m), weight 77 3 kg (170 lb 8 oz)  Physical Exam   General exam: Pt was awake, alert and oriented  HEENT: atraumatic, normocephalic  Normal oral mucosa, neck was supple, no lymphadenopathy  Normal peripheral pulses  Extremities did not show any edema or cyanosis  Neurological Exam  Neurologically, pt was awake and alert  Speech was normal, no dysarthria or aphasia  Cranial nerve exam showed normal extraocular movements, no nystagmus or diplopia  There was no ptosis at baseline or with sustained upward gaze  Strength of eye closure muscles was normal   Facial sensations were normal bilaterally  No facial weakness, able to blow out the cheeks and push the tongue in the cheeks well  No tongue atrophy or fasciculations  Motor exam revealed normal tone , he has atrophy in both intrinsic hand muscles      Muscle strength was normal in neck flexors and extensors  Muscle strength in the upper extremities was as follows (right/left): Deltoid 5/5, Biceps 5/5, Triceps 5/4, Wrist extensors 5/4+, Wrist flexors 5/4+, Finger extensors 5/4+, finger flexors 5/5, Interossei 4/4    Strength in the lower extremities was as follows (right/left): Hip flexors 5/4, Knee extensors 5/5, knee flexors 5/4+, ankle dorsiflexors 5/4, ankle plantar flexors 5/5, ankle invertors 5/5, evertors 5/4  Reflexes were graded as 2-in the upper extremities, 2 at the knees and absent at the ankles  Toes were downgoing  There was no exaggerated jaw jerk or triplett's sign  No ankle clonus  Sensory exam revealed length dependent, decreased sensation to pin and temp up to lower 1/3 of legs, left slightly worse than the right  Vibration was moderately reduced at toes  Proprioception was abnormal and he was only able to appreciate large excursions  Rhomberg was abnormal   Tends to drag his left leg, loses balance at turns, wide based gait  ROS:    Review of Systems   Constitutional: Positive for appetite change (Loss of appetite) and fatigue  HENT: Positive for tinnitus  Eyes: Positive for visual disturbance  Respiratory: Negative  Cardiovascular: Negative  Gastrointestinal: Positive for nausea  Endocrine: Negative  Genitourinary: Negative  Musculoskeletal: Negative  Skin: Negative  Allergic/Immunologic: Negative  Neurological: Negative  Balance problems   Hematological: Bruises/bleeds easily  Psychiatric/Behavioral: Negative

## 2018-08-17 NOTE — PROGRESS NOTES
Patient ID: Yves Wills is a [de-identified] y o  male  Assessment/Plan:    Idiopathic peripheral neuropathy  Mr  Tanika Sullivan comes in with the primary complain of poor balance  I believe his ambulatory dysfunction is multifactorial in nature, stemming from 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, Lumbosacral spine and he also has acral sensory loss suggesting an underlying neuropathy  He has slight asymmetric weakness, with weakness in the left ankle dorsiflexors, likely a superimposed L5 radiculopathy, which has had for the last 5-6 years or even longer  We talked abt repeating his MRI's of there cervical and L spine, which he deferred  Considering his age, he would not consider a repeat surgery and getting a MRI may not change his management  From a neuropathy standpoint, he has had some blood work for common reversible causes, I have recommended a few additional tests as noted below  I recommended EMG/NCS, which he deferred again,as it may not change his management, which us supportive care  I do agree his symptoms have been slowly progressive to consider this to be an autoimmune/inflammatory phenomenon  I explained my thoughts at length to patient and his family  They demonstrated understanding  They do understand there is no role of steroids or immunotherapy at this point  I did caution him for safety and encouraged him to use a cane for safety, specifically out of home and long distances  He does understand he should take a few seconds to get his bearings together when he initially gets up from a seating position  They do have grab bars in the bathroom  I did not schedule a return visit for him at this point, will call him with the results of blood work  He will call me if there is any change or if he has any other questions or concerns         Diagnoses and all orders for this visit:    Idiopathic peripheral neuropathy  -     Vitamin B6; Future  -     Protein electrophoresis, serum; Future    Thank you for allowing me to participate to participate in his care  Please do not hesitate to contact me for any questions or concerns  Time spent with patient and his family was 76 minutes and more than half the time was spent in counselling and discussion  Subjective:    HPI    I had the pleasure of seeing your patient in the neuromuscular Clinic today  As you know, he is an 82-year-old man who was referred for evaluation for neuropathy  Please allow me to summarize his history for the record  He started having balance problems about 10 years ago  He was sent to a neurologist, was told he has a dropped foot on left, he got a brace  That did not help, he then went to see orthopedic surgeon, was told he had cervical stenosis, ? Cord compression, had a c spine surgery 8-9 years ago at [facility]  He then had a fusion in the lower back a few years later  His balance did not get better with the surgeries  He has been through physical therapy a few times, went through balance therapy  3 years ago, he was seen by another neurologist in [facility], did a CT scan of head, which was normal  B12 was low, and was recommended replacement  He has trouble getting out of his chair, tends to lose balance, drags his left leg  He has had a few falls in the last few months  Some days are worse than otehrs, depending on how tired he is  He has had a few surgeries for the lower back  No pain in the lower back, some paresthesias in the left foot, and right > left hand (knows it is from his ulnar nerve, has broken both arms), has atrophy in intrinsic hand muscles  No speech or swallowing problems  No muscle cramps or twitching  No urinary symptoms, some recent bowel issues, constipation  Had testing for lyme's ds, which was negative  WM 1:40, homogenous  He did have a brain Mri recently, which I personally reviewed as a part of this evaluation   There was no acute stroke, he has mild atrophy and small vessel disease  No significant ventriculomegaly  Has macular degeneration, recently started having stomach issues, going through gi workup  No HTN, DM  Family hx, father passed away at 50 (has gi bleed), No DM  No neuromuscular disorders in the family        The following portions of the patient's history were reviewed and updated as appropriate: allergies, current medications, past family history, past medical history, past social history, past surgical history and problem list       Current Outpatient Prescriptions:     atorvastatin (LIPITOR) 10 mg tablet, TAKE 1 TABLET BY MOUTH  DAILY, Disp: 90 tablet, Rfl: 2    B COMPLEX VITAMINS PO, Take 1 tablet by mouth daily, Disp: , Rfl:     cetirizine (ZyrTEC) 10 mg tablet, Take 1 tablet by mouth daily, Disp: , Rfl:     Cholecalciferol (VITAMIN D3) 2000 UNITS capsule, Take 1 capsule by mouth 2 (two) times a day 5000 units , Disp: , Rfl:     Coenzyme Q10 (CO Q10) 60 MG CAPS, Take 1 capsule by mouth daily, Disp: , Rfl:     dexlansoprazole (DEXILANT) 60 MG capsule, Take 1 capsule (60 mg total) by mouth daily, Disp: 30 capsule, Rfl: 2    famotidine (PEPCID) 40 MG tablet, Take 1 tablet (40 mg total) by mouth daily, Disp: 30 tablet, Rfl: 3    Ginkgo Biloba 120 MG TABS, Take 1 tablet by mouth daily, Disp: , Rfl:     Inositol 500 MG TABS, Take 1 tablet by mouth 2 (two) times a day, Disp: , Rfl:     ipratropium (ATROVENT) 0 06 % nasal spray, USE 2 SPRAYS IN EACH  NOSTRIL TWICE DAILY, Disp: 60 mL, Rfl: 1    lutein 6 mg, Take 1 capsule by mouth daily, Disp: , Rfl:     Melatonin 10 MG TABS, Take 1 tablet by mouth daily at bedtime, Disp: , Rfl:     MILK THISTLE PO, Take 1 tablet by mouth daily, Disp: , Rfl:     Misc Natural Products (ENERGY FOCUS) TABS, Take 1 tablet by mouth daily, Disp: , Rfl:     ondansetron (ZOFRAN-ODT) 4 mg disintegrating tablet, Take 1 tablet (4 mg total) by mouth every 8 (eight) hours as needed for nausea or vomiting for up to 20 doses, Disp: 20 tablet, Rfl: 0    gabapentin (NEURONTIN) 600 MG tablet, TAKE 1 TABLET BY MOUTH  TWICE A DAY (Patient not taking: Reported on 8/17/2018), Disp: 180 tablet, Rfl: 2       Objective:    Blood pressure 141/67, pulse 60, height 6' (1 829 m), weight 77 3 kg (170 lb 8 oz)  Physical Exam   General exam: Pt was awake, alert and oriented  HEENT: atraumatic, normocephalic  Normal oral mucosa, neck was supple, no lymphadenopathy  Normal peripheral pulses  Extremities did not show any edema or cyanosis  Neurological Exam  Neurologically, pt was awake and alert  Speech was normal, no dysarthria or aphasia  Cranial nerve exam showed normal extraocular movements, no nystagmus or diplopia  There was no ptosis at baseline or with sustained upward gaze  Strength of eye closure muscles was normal   Facial sensations were normal bilaterally  No facial weakness, able to blow out the cheeks and push the tongue in the cheeks well  No tongue atrophy or fasciculations  Motor exam revealed normal tone , he has atrophy in both intrinsic hand muscles      Muscle strength was normal in neck flexors and extensors  Muscle strength in the upper extremities was as follows (right/left): Deltoid 5/5, Biceps 5/5, Triceps 5/4, Wrist extensors 5/4+, Wrist flexors 5/4+, Finger extensors 5/4+, finger flexors 5/5, Interossei 4/4  Strength in the lower extremities was as follows (right/left): Hip flexors 5/4, Knee extensors 5/5, knee flexors 5/4+, ankle dorsiflexors 5/4, ankle plantar flexors 5/5, ankle invertors 5/5, evertors 5/4  Reflexes were graded as 2-in the upper extremities, 2 at the knees and absent at the ankles  Toes were downgoing  There was no exaggerated jaw jerk or triplett's sign  No ankle clonus  Sensory exam revealed length dependent, decreased sensation to pin and temp up to lower 1/3 of legs, left slightly worse than the right  Vibration was moderately reduced at toes   Proprioception was abnormal and he was only able to appreciate large excursions  Rhomberg was abnormal   Tends to drag his left leg, loses balance at turns, wide based gait  ROS:    Review of Systems   Constitutional: Positive for appetite change (Loss of appetite) and fatigue  HENT: Positive for tinnitus  Eyes: Positive for visual disturbance  Respiratory: Negative  Cardiovascular: Negative  Gastrointestinal: Positive for nausea  Endocrine: Negative  Genitourinary: Negative  Musculoskeletal: Negative  Skin: Negative  Allergic/Immunologic: Negative  Neurological: Negative  Balance problems   Hematological: Bruises/bleeds easily  Psychiatric/Behavioral: Negative

## 2018-08-18 NOTE — ASSESSMENT & PLAN NOTE
Mr  Shantell Causey comes in with the primary complain of poor balance  I believe his ambulatory dysfunction is multifactorial in nature, stemming from 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, Lumbosacral spine and he also has acral sensory loss suggesting an underlying neuropathy  He has slight asymmetric weakness, with weakness in the left ankle dorsiflexors, likely a superimposed L5 radiculopathy, which has had for the last 5-6 years or even longer  We talked abt repeating his MRI's of there cervical and L spine, which he deferred  Considering his age, he would not consider a repeat surgery and getting a MRI may not change his management  From a neuropathy standpoint, he has had some blood work for common reversible causes, I have recommended a few additional tests as noted below  I recommended EMG/NCS, which he deferred again,as it may not change his management, which us supportive care  I do agree his symptoms have been slowly progressive to consider this to be an autoimmune/inflammatory phenomenon  I explained my thoughts at length to patient and his family  They demonstrated understanding  They do understand there is no role of steroids or immunotherapy at this point  I did caution him for safety and encouraged him to use a cane for safety, specifically out of home and long distances  He does understand he should take a few seconds to get his bearings together when he initially gets up from a seating position  They do have grab bars in the bathroom  I did not schedule a return visit for him at this point, will call him with the results of blood work  He will call me if there is any change or if he has any other questions or concerns

## 2018-08-21 ENCOUNTER — LAB (OUTPATIENT)
Dept: LAB | Facility: HOSPITAL | Age: 83
End: 2018-08-21
Attending: FAMILY MEDICINE
Payer: MEDICARE

## 2018-08-21 ENCOUNTER — HOSPITAL ENCOUNTER (OUTPATIENT)
Dept: NUCLEAR MEDICINE | Facility: HOSPITAL | Age: 83
Discharge: HOME | End: 2018-08-21
Attending: INTERNAL MEDICINE
Payer: MEDICARE

## 2018-08-21 ENCOUNTER — HOSPITAL ENCOUNTER (OUTPATIENT)
Dept: CT IMAGING | Facility: HOSPITAL | Age: 83
Discharge: HOME | End: 2018-08-21
Attending: INTERNAL MEDICINE
Payer: MEDICARE

## 2018-08-21 DIAGNOSIS — K21.9 GASTROESOPHAGEAL REFLUX DISEASE WITHOUT ESOPHAGITIS: ICD-10-CM

## 2018-08-21 DIAGNOSIS — Z12.5 SCREENING PSA (PROSTATE SPECIFIC ANTIGEN): ICD-10-CM

## 2018-08-21 DIAGNOSIS — R91.8 ABNORMAL CT SCAN OF LUNG: ICD-10-CM

## 2018-08-21 DIAGNOSIS — G60.9 IDIOPATHIC PERIPHERAL NEUROPATHY: ICD-10-CM

## 2018-08-21 LAB — PSA SERPL-MCNC: 1.3 NG/ML (ref 0–4)

## 2018-08-21 PROCEDURE — G0103 PSA SCREENING: HCPCS

## 2018-08-21 PROCEDURE — 84165 PROTEIN E-PHORESIS SERUM: CPT

## 2018-08-21 PROCEDURE — 84165 PROTEIN E-PHORESIS SERUM: CPT | Performed by: PATHOLOGY

## 2018-08-21 PROCEDURE — 71250 CT THORAX DX C-: CPT

## 2018-08-21 PROCEDURE — 78264 GASTRIC EMPTYING IMG STUDY: CPT

## 2018-08-21 PROCEDURE — A9541 TC99M SULFUR COLLOID: HCPCS

## 2018-08-21 PROCEDURE — 84207 ASSAY OF VITAMIN B-6: CPT

## 2018-08-21 PROCEDURE — 36415 COLL VENOUS BLD VENIPUNCTURE: CPT

## 2018-08-23 LAB
ALBUMIN SERPL ELPH-MCNC: 3.96 G/DL (ref 3.5–5)
ALBUMIN SERPL ELPH-MCNC: 54.3 % (ref 52–65)
ALPHA1 GLOB SERPL ELPH-MCNC: 0.3 G/DL (ref 0.1–0.4)
ALPHA1 GLOB SERPL ELPH-MCNC: 4.1 % (ref 2.5–5)
ALPHA2 GLOB SERPL ELPH-MCNC: 0.95 G/DL (ref 0.4–1.2)
ALPHA2 GLOB SERPL ELPH-MCNC: 13 % (ref 7–13)
BETA GLOB ABNORMAL SERPL ELPH-MCNC: 0.4 G/DL (ref 0.4–0.8)
BETA1 GLOB SERPL ELPH-MCNC: 5.5 % (ref 5–13)
BETA2 GLOB SERPL ELPH-MCNC: 5.9 % (ref 2–8)
BETA2+GAMMA GLOB SERPL ELPH-MCNC: 0.43 G/DL (ref 0.2–0.5)
GAMMA GLOB ABNORMAL SERPL ELPH-MCNC: 1.26 G/DL (ref 0.5–1.6)
GAMMA GLOB SERPL ELPH-MCNC: 17.2 % (ref 12–22)
IGG/ALB SER: 1.19 {RATIO} (ref 1.1–1.8)
PROT PATTERN SERPL ELPH-IMP: NORMAL
PROT SERPL-MCNC: 7.3 G/DL (ref 6.4–8.2)

## 2018-08-24 ENCOUNTER — TELEPHONE (OUTPATIENT)
Dept: PULMONOLOGY | Facility: CLINIC | Age: 83
End: 2018-08-24

## 2018-08-24 LAB — VIT B6 SERPL-MCNC: 27 UG/L (ref 5.3–46.7)

## 2018-08-27 ENCOUNTER — TELEPHONE (OUTPATIENT)
Dept: PULMONOLOGY | Facility: HOSPITAL | Age: 83
End: 2018-08-27

## 2018-08-27 ENCOUNTER — TELEPHONE (OUTPATIENT)
Dept: NEUROLOGY | Facility: CLINIC | Age: 83
End: 2018-08-27

## 2018-08-27 DIAGNOSIS — R91.1 LUNG NODULE, SOLITARY: Primary | ICD-10-CM

## 2018-08-27 NOTE — TELEPHONE ENCOUNTER
----- Message from Allegra Zelaya MD sent at 8/27/2018 11:43 AM EDT -----  Pls let the patient know that his blood work was normal   Thanks      ----- Message -----  From: Lab, Background User  Sent: 8/23/2018   8:55 AM  To: Allegra Zelaya MD

## 2018-08-27 NOTE — TELEPHONE ENCOUNTER
Call patient discussed results of CT scan  Review of the radiology impression states that previous findings are stable and recommended follow-up CT scan in 1 year  I discussed this with the patient and had no further questions  I will order CT scan to be completed 1 year

## 2018-08-29 ENCOUNTER — OFFICE VISIT (OUTPATIENT)
Dept: GASTROENTEROLOGY | Facility: HOSPITAL | Age: 83
End: 2018-08-29
Payer: MEDICARE

## 2018-08-29 VITALS
BODY MASS INDEX: 23.3 KG/M2 | HEIGHT: 72 IN | SYSTOLIC BLOOD PRESSURE: 128 MMHG | DIASTOLIC BLOOD PRESSURE: 63 MMHG | WEIGHT: 172 LBS | HEART RATE: 76 BPM | TEMPERATURE: 97.6 F

## 2018-08-29 DIAGNOSIS — K21.9 GASTROESOPHAGEAL REFLUX DISEASE WITHOUT ESOPHAGITIS: Primary | ICD-10-CM

## 2018-08-29 DIAGNOSIS — R11.0 NAUSEA: ICD-10-CM

## 2018-08-29 DIAGNOSIS — R19.8 ALTERNATING CONSTIPATION AND DIARRHEA: ICD-10-CM

## 2018-08-29 DIAGNOSIS — R63.4 WEIGHT LOSS, NON-INTENTIONAL: ICD-10-CM

## 2018-08-29 PROCEDURE — 99214 OFFICE O/P EST MOD 30 MIN: CPT | Performed by: INTERNAL MEDICINE

## 2018-08-29 NOTE — PROGRESS NOTES
[facility] - Outpatient Follow-up Note  Brandon Cueto [de-identified] y o  male MRN: [id number]  Encounter: [id number]          ASSESSMENT AND PLAN:      1  Gastroesophageal reflux disease without esophagitis  2  Nausea  3  Weight loss, non-intentional  He most likely had an infectious gastroenteritis complicated by functional dyspepsia  Fortunately his symptoms are improving  I have asked him to continue omeprazole twice a day and to decrease to once a day when he is able  He can also take Pepcid as needed  He should continue to eat small frequent meals and avoid late-night meals  4  Alternating constipation and diarrhea  He has irritable bowel syndrome with alternating diarrhea and constipation  Fortunately this is at his baseline  He should continue to have routine colonoscopy screening     ______________________________________________________________________    SUBJECTIVE:  He presents for follow-up after his recent upper endoscopy and gastric emptying study  He was found to have nodular gastritis and a normal gastric emptying study  He has been taking Dexilant but because of the cost he sometimes takes omeprazole  He had a severe worsening of his symptoms about two months ago but now feel his symptoms are improving  He feels the nausea, reflux, and burping are improving  He continues to have mild intermittent alternating diarrhea and constipation  He denies bleeding and he feels his weight loss is improving  He has actually gained a few lb since his last visit  REVIEW OF SYSTEMS IS OTHERWISE NEGATIVE        Historical Information   Past Medical History:   Diagnosis Date    Anemia     Appendicitis     Coronary artery disease     Detached retina     GERD (gastroesophageal reflux disease)     Hyperlipidemia     Hypertension     Macular degeneration     Neuropathy     Von Willebrand disease ([facility] [address] )      Past Surgical History:   Procedure Laterality Date    ADENOIDECTOMY      APPENDECTOMY      ARTHRODESIS  01/15/2014    Lumbar     BACK SURGERY      CATARACT EXTRACTION      COLONOSCOPY  12/09/2016    fiberoptic     CYSTOSCOPY      with resection of tumor / 1/12/17, 10/2/17 / diagnostic 12/8/16, 5/30/17     EGD AND COLONOSCOPY N/A 12/9/2016    Procedure: EGD AND COLONOSCOPY;  Surgeon: Charles Knott MD;  [facility];  Service:    [facility] EYE SURGERY      FRACTURE SURGERY Bilateral     ARM    NECK SURGERY      NEUROPLASTY / TRANSPOSITION MEDIAN NERVE AT [facility] Right 04/2015    IL CYSTOURETHROSCOPY,FULGUR <0 5 CM LESN N/A 1/12/2017    Procedure: CYSTOSCOPY, BLADDER BIOPSY WITH FULGRATION, 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;  Surgeon: Cristel Maki MD;  [facility];  Service: Urology    IL CYSTOURETHROSCOPY,FULGUR <0 5 CM LESN N/A 10/2/2017    Procedure: CYSTOSCOPY WITH  BLADDER BIOPSIES WITH FULGURATION;  Surgeon: Cristel Maki MD;  [facility];  Service: Urology    IL ESOPHAGOGASTRODUODENOSCOPY TRANSORAL DIAGNOSTIC N/A 8/3/2018    Procedure: ESOPHAGOGASTRODUODENOSCOPY (EGD); Surgeon: Addis Reed MD;  [facility];  Service: Gastroenterology    IL INSTILL ANTICANCER AGENT IN BLADDER N/A 1/12/2017    Procedure: Damián Sung;  Surgeon: Cristel Maki MD;  [facility];  Service: Urology    IL INSTILL ANTICANCER AGENT IN BLADDER N/A 10/2/2017    Procedure: Damián Sung;  Surgeon: Cristel Maki MD;  [facility];  Service: Urology    RETINAL DETACHMENT SURGERY Right 03/2016    complete air fill confirmed    [address] SURGERY  03/03/2015    proximal humerus fracture / LA    3/6/15   R    1/3/18     TONSILECTOMY AND ADNOIDECTOMY      TONSILLECTOMY      TRANSURETHRAL RESECTION OF BLADDER TUMOR N/A 10/2/2017    Procedure: TRANSURETHRAL RESECTION OF BLADDER TUMOR (TURBT);   Surgeon: Cristel Maki MD;  [facility];  Service: Urology   [facility] ANTIGEN (HISTORICAL)       Social History   History   Alcohol Use    1 2 oz/week    1 Glasses of wine, 1 Shots of liquor per week     Comment: DAILY      History   Drug Use No     History   Smoking Status    Light Tobacco Smoker    Years: 60 00    Types: Pipe   Smokeless Tobacco    Never Used     Comment: smokes a pipe -resolved      Family History   Problem Relation Age of Onset    Ulcers Father         acute gastric    Heart disease Mother    [facility] Anuerysm Sister        Meds/Allergies       Current Outpatient Prescriptions:     atorvastatin (LIPITOR) 10 mg tablet    B COMPLEX VITAMINS PO    cetirizine (ZyrTEC) 10 mg tablet    Cholecalciferol (VITAMIN D3) 2000 UNITS capsule    Coenzyme Q10 (CO Q10) 60 MG CAPS    dexlansoprazole (DEXILANT) 60 MG capsule    famotidine (PEPCID) 40 MG tablet    gabapentin (NEURONTIN) 600 MG tablet    Ginkgo Biloba 120 MG TABS    Inositol 500 MG TABS    ipratropium (ATROVENT) 0 06 % nasal spray    lutein 6 mg    Melatonin 10 MG TABS    MILK THISTLE PO    Misc Natural Products (ENERGY FOCUS) TABS    ondansetron (ZOFRAN-ODT) 4 mg disintegrating tablet    No Known Allergies        Objective     Blood pressure 128/63, pulse 76, temperature 97 6 °F (36 4 °C), temperature source Tympanic, height 6' (1 829 m), weight 78 kg (172 lb)  Body mass index is 23 33 kg/m²  PHYSICAL EXAM:      General Appearance:   Alert, cooperative, no distress, uses a cane for ambulation   HEENT:   Normocephalic, atraumatic, anicteric      Neck:  Supple, symmetrical, trachea midline   Lungs:   Clear to auscultation bilaterally; no rales, rhonchi or wheezing; respirations unlabored    Heart[de-identified]   Regular rate and rhythm; no murmur, rub, or gallop     Abdomen:   Soft, non-tender, non-distended; normal bowel sounds; no masses, no organomegaly    Genitalia:   Deferred    Rectal:   Deferred    Extremities:  No cyanosis, clubbing or edema    Pulses:  2+ and symmetric    Skin:  No jaundice, rashes, or lesions    Lymph nodes:  No palpable cervical lymphadenopathy        Lab Results:   No visits with results within 1 Day(s) from this visit  Latest known visit with results is:   Lab on 08/21/2018   Component Date Value    PSA 08/21/2018 1 3     Vitamin B6 08/21/2018 27 0     A/G Ratio 08/21/2018 1 19     Albumin Electrophoresis 08/21/2018 54 3     Albumin CONC 08/21/2018 3 96     Alpha 1 08/21/2018 4 1     ALPHA 1 CONC 08/21/2018 0 30     Alpha 2 08/21/2018 13 0     ALPHA 2 CONC 08/21/2018 0 95     Beta-1 08/21/2018 5 5     BETA 1 CONC 08/21/2018 0 40     Beta-2 08/21/2018 5 9     BETA 2 CONC 08/21/2018 0 43     Gamma Globulin 08/21/2018 17 2     GAMMA CONC 08/21/2018 1 26     SPEP Interpretation 08/21/2018 The serum total protein, albumin and electrophoresis are within normal limits  No monoclonal bands noted  Reviewed by: Mercedes Ocampo MD  ([id number])  **Electronic Signature**     Total Protein 08/21/2018 7 3          Radiology Results:   Ct Chest Without Contrast    Result Date: 8/23/2018  Narrative: CT CHEST WITHOUT IV CONTRAST INDICATION:   Lung nodule follow-up  COMPARISON: Chest CT dated 2/15/2018 and abdominal CTs dated 7/29/2018 and 11/16/2016  TECHNIQUE: CT examination of the chest was performed without intravenous contrast   Axial, sagittal, and coronal 2D reformatted images were created from the source data and submitted for interpretation  Radiation dose length product (DLP) for this visit:  101 88 mGy-cm   This examination, like all CT scans performed in the [facility], was performed utilizing techniques to minimize radiation dose exposure, including the use of iterative  reconstruction and automated exposure control  FINDINGS: LUNGS: No acute consolidation  No interstitial thickening  No endobronchial lesions  No suspicious pulmonary nodules or masses  PLEURA:  Trace biapical fibronodular pleural thickening    No effusion  No pneumothorax  HEART/GREAT VESSELS:  Heart size is normal   Aortic valvular and coronary calcifications  No pericardial thickening or effusion  Thoracic aortic atherosclerosis without aneurysm  MEDIASTINUM AND SPENCER:  Unremarkable  CHEST WALL AND LOWER NECK:   Unremarkable  VISUALIZED STRUCTURES IN THE UPPER ABDOMEN:  Hepatic and renal cysts are unchanged from priors  Renal OSSEOUS STRUCTURES: Prior internal fixation of the proximal right humerus  Old, healed right rib fractures  Left rib fractures show callus maturation compared to February with complete osseous bridging  Incidental note of nonpathologic sternal foramen  No acute fracture or destructive osseous lesion  Impression: Persistent, chronic angular/nodular consolidation peripherally in the left lower lobe appears unchanged since July  Airspace disease around the consolidation has decreased when compared to February  The finding most likely represents scarring secondary  to recurrent aspiration, prior trauma (given presence of old rib fractures), old infection, or chronic inflammatory process such as cryptogenic organizing pneumonia  Adenocarcinoma is less likely given morphology and relative stability  Recommend one more follow-up low-dose chest CT in 12 months to document stability  Note: Imaging follow-up reminder notification was scheduled in the electronic medical record   Workstation performed: [id number]     Nm Gastric Emptying    Result Date: 8/21/2018  Narrative: GASTRIC EMPTYING STUDY INDICATION:  GERD  K21 9: Gastro-esophageal reflux disease without esophagitis COMPARISON:  None available TECHNIQUE:   The study was performed following the oral administration of 1 0 mCi Tc-99m sulfur colloid combined with scrambled eggs, as part of a standard meal   Following the meal, one minute anterior and posterior images were obtained immediately and at 0 25 hours, 0 5 hour, 1 hour, 1 5 hour, 2 hour, 3 hour and 4 hour intervals from the time of ingestion  Geometric mean analyses were then performed  As of March 1, 2016, this gastric emptying protocol has been modified and updated  The gastric emptying times and the normal values reported below reflect the change in protocol  FINDINGS: Gastric emptying at 0 5 hours = 12 (N < 30%) Gastric emptying at 1 hour = 24 % (N = 10 - 70%) Gastric emptying at 2 hours = 48 % (N = > 40%) Gastric emptying at 3 hours = 71 % (N = > 70%) Gastric emptying at 4 hours = 95 % (N = >90%) Linear T-1/2 = 127 minutes     Impression: Normal rate of gastric emptying   Workstation performed: [id number]

## 2018-08-29 NOTE — LETTER
August 29, 2018     Jessica Gracie   [address] [facility] [address]    Patient: John Connell   YOB: 1935   Date of Visit: 8/29/2018       Dear Dr Marcelo Rhodes: Thank you for referring Marcos Flynn to me for evaluation  Below are my notes for this consultation  If you have questions, please do not hesitate to call me  I look forward to following your patient along with you  Sincerely,        Roger Hall MD        CC: No Recipients  Roger Hall MD  8/29/2018 12:27 PM  Sign at close encounter  [facility] - Outpatient Follow-up Note  John Connell [de-identified] y o  male MRN: [id number]  Encounter: [id number]          ASSESSMENT AND PLAN:      1  Gastroesophageal reflux disease without esophagitis  2  Nausea  3  Weight loss, non-intentional  He most likely had an infectious gastroenteritis complicated by functional dyspepsia  Fortunately his symptoms are improving  I have asked him to continue omeprazole twice a day and to decrease to once a day when he is able  He can also take Pepcid as needed  He should continue to eat small frequent meals and avoid late-night meals  4  Alternating constipation and diarrhea  He has irritable bowel syndrome with alternating diarrhea and constipation  Fortunately this is at his baseline  He should continue to have routine colonoscopy screening     ______________________________________________________________________    SUBJECTIVE:  He presents for follow-up after his recent upper endoscopy and gastric emptying study  He was found to have nodular gastritis and a normal gastric emptying study  He has been taking Dexilant but because of the cost he sometimes takes omeprazole  He had a severe worsening of his symptoms about two months ago but now feel his symptoms are improving  He feels the nausea, reflux, and burping are improving    He continues to have mild intermittent alternating diarrhea and constipation  He denies bleeding and he feels his weight loss is improving  He has actually gained a few lb since his last visit  REVIEW OF SYSTEMS IS OTHERWISE NEGATIVE  Historical Information   Past Medical History:   Diagnosis Date    Anemia     Appendicitis     Coronary artery disease     Detached retina     GERD (gastroesophageal reflux disease)     Hyperlipidemia     Hypertension     Macular degeneration     Neuropathy     Von Willebrand disease ([facility] [address] )      Past Surgical History:   Procedure Laterality Date    ADENOIDECTOMY      APPENDECTOMY      ARTHRODESIS  01/15/2014    Lumbar     BACK SURGERY      CATARACT EXTRACTION      COLONOSCOPY  12/09/2016    fiberoptic     CYSTOSCOPY      with resection of tumor / 1/12/17, 10/2/17 / diagnostic 12/8/16, 5/30/17     EGD AND COLONOSCOPY N/A 12/9/2016    Procedure: EGD AND COLONOSCOPY;  Surgeon: Oleg Villanueva MD;  [facility];  Service:    [facility] EYE SURGERY      FRACTURE SURGERY Bilateral     ARM    NECK SURGERY      NEUROPLASTY / TRANSPOSITION MEDIAN NERVE AT [facility] Right 04/2015    IA CYSTOURETHROSCOPY,FULGUR <0 5 CM LESN N/A 1/12/2017    Procedure: CYSTOSCOPY, BLADDER BIOPSY WITH FULGRATION, 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;  Surgeon: Erma Heath MD;  [facility];  Service: Urology    IA CYSTOURETHROSCOPY,FULGUR <0 5 CM LESN N/A 10/2/2017    Procedure: CYSTOSCOPY WITH  BLADDER BIOPSIES WITH FULGURATION;  Surgeon: Erma Heath MD;  [facility];  Service: Urology    IA ESOPHAGOGASTRODUODENOSCOPY TRANSORAL DIAGNOSTIC N/A 8/3/2018    Procedure: ESOPHAGOGASTRODUODENOSCOPY (EGD);   Surgeon: Greg David MD;  [facility];  Service: Gastroenterology    IA INSTILL ANTICANCER AGENT IN BLADDER N/A 1/12/2017    Procedure: Ulysses Chatters;  Surgeon: Erma Heath MD;  [facility];  Service: Urology    IA INSTILL ANTICANCER AGENT IN BLADDER N/A 10/2/2017    Procedure: Annelle Factor;  Surgeon: Rula Esteban MD;  [facility];  Service: Urology    RETINAL DETACHMENT SURGERY Right 03/2016    complete air fill confirmed     ROTATOR CUFF REPAIR      SHOULDER SURGERY  03/03/2015    proximal humerus fracture / LA    3/6/15   R    1/3/18     TONSILECTOMY AND ADNOIDECTOMY      TONSILLECTOMY      TRANSURETHRAL RESECTION OF BLADDER TUMOR N/A 10/2/2017    Procedure: TRANSURETHRAL RESECTION OF BLADDER TUMOR (TURBT); Surgeon: Rula Esteban MD;  [facility];  Service: Urology   [facility] ANTIGEN (HISTORICAL)       Social History   History   Alcohol Use    1 2 oz/week    1 Glasses of wine, 1 Shots of liquor per week     Comment: DAILY      History   Drug Use No     History   Smoking Status    Light Tobacco Smoker    Years: 60 00    Types: Pipe   Smokeless Tobacco    Never Used     Comment: smokes a pipe -resolved      Family History   Problem Relation Age of Onset    Ulcers Father         acute gastric    Heart disease Mother    Kat Hammondysm Sister        Meds/Allergies       Current Outpatient Prescriptions:     atorvastatin (LIPITOR) 10 mg tablet    B COMPLEX VITAMINS PO    cetirizine (ZyrTEC) 10 mg tablet    Cholecalciferol (VITAMIN D3) 2000 UNITS capsule    Coenzyme Q10 (CO Q10) 60 MG CAPS    dexlansoprazole (DEXILANT) 60 MG capsule    famotidine (PEPCID) 40 MG tablet    gabapentin (NEURONTIN) 600 MG tablet    Ginkgo Biloba 120 MG TABS    Inositol 500 MG TABS    ipratropium (ATROVENT) 0 06 % nasal spray    lutein 6 mg    Melatonin 10 MG TABS    MILK THISTLE PO    Misc Natural Products (ENERGY FOCUS) TABS    ondansetron (ZOFRAN-ODT) 4 mg disintegrating tablet    No Known Allergies        Objective     Blood pressure 128/63, pulse 76, temperature 97 6 °F (36 4 °C), temperature source Tympanic, height 6' (1 829 m), weight 78 kg (172 lb)  Body mass index is 23 33 kg/m²        PHYSICAL EXAM: General Appearance:   Alert, cooperative, no distress, uses a cane for ambulation   HEENT:   Normocephalic, atraumatic, anicteric      Neck:  Supple, symmetrical, trachea midline   Lungs:   Clear to auscultation bilaterally; no rales, rhonchi or wheezing; respirations unlabored    Heart[de-identified]   Regular rate and rhythm; no murmur, rub, or gallop  Abdomen:   Soft, non-tender, non-distended; normal bowel sounds; no masses, no organomegaly    Genitalia:   Deferred    Rectal:   Deferred    Extremities:  No cyanosis, clubbing or edema    Pulses:  2+ and symmetric    Skin:  No jaundice, rashes, or lesions    Lymph nodes:  No palpable cervical lymphadenopathy        Lab Results:   No visits with results within 1 Day(s) from this visit  Latest known visit with results is:   Lab on 08/21/2018   Component Date Value    PSA 08/21/2018 1 3     Vitamin B6 08/21/2018 27 0     A/G Ratio 08/21/2018 1 19     Albumin Electrophoresis 08/21/2018 54 3     Albumin CONC 08/21/2018 3 96     Alpha 1 08/21/2018 4 1     ALPHA 1 CONC 08/21/2018 0 30     Alpha 2 08/21/2018 13 0     ALPHA 2 CONC 08/21/2018 0 95     Beta-1 08/21/2018 5 5     BETA 1 CONC 08/21/2018 0 40     Beta-2 08/21/2018 5 9     BETA 2 CONC 08/21/2018 0 43     Gamma Globulin 08/21/2018 17 2     GAMMA CONC 08/21/2018 1 26     SPEP Interpretation 08/21/2018 The serum total protein, albumin and electrophoresis are within normal limits  No monoclonal bands noted  Reviewed by: Johanna Mcgowan MD  ([id number])  **Electronic Signature**     Total Protein 08/21/2018 7 3          Radiology Results:   Ct Chest Without Contrast    Result Date: 8/23/2018  Narrative: CT CHEST WITHOUT IV CONTRAST INDICATION:   Lung nodule follow-up  COMPARISON: Chest CT dated 2/15/2018 and abdominal CTs dated 7/29/2018 and 11/16/2016   TECHNIQUE: CT examination of the chest was performed without intravenous contrast   Axial, sagittal, and coronal 2D reformatted images were created from the source data and submitted for interpretation  Radiation dose length product (DLP) for this visit:  101 88 mGy-cm   This examination, like all CT scans performed in the [facility], was performed utilizing techniques to minimize radiation dose exposure, including the use of iterative  reconstruction and automated exposure control  FINDINGS: LUNGS: No acute consolidation  No interstitial thickening  No endobronchial lesions  No suspicious pulmonary nodules or masses  PLEURA:  Trace biapical fibronodular pleural thickening  No effusion  No pneumothorax  HEART/GREAT VESSELS:  Heart size is normal   Aortic valvular and coronary calcifications  No pericardial thickening or effusion  Thoracic aortic atherosclerosis without aneurysm  MEDIASTINUM AND SPENCER:  Unremarkable  CHEST WALL AND LOWER NECK:   Unremarkable  VISUALIZED STRUCTURES IN THE UPPER ABDOMEN:  Hepatic and renal cysts are unchanged from priors  Renal OSSEOUS STRUCTURES: Prior internal fixation of the proximal right humerus  Old, healed right rib fractures  Left rib fractures show callus maturation compared to February with complete osseous bridging  Incidental note of nonpathologic sternal foramen  No acute fracture or destructive osseous lesion  Impression: Persistent, chronic angular/nodular consolidation peripherally in the left lower lobe appears unchanged since July  Airspace disease around the consolidation has decreased when compared to February  The finding most likely represents scarring secondary  to recurrent aspiration, prior trauma (given presence of old rib fractures), old infection, or chronic inflammatory process such as cryptogenic organizing pneumonia  Adenocarcinoma is less likely given morphology and relative stability  Recommend one more follow-up low-dose chest CT in 12 months to document stability  Note: Imaging follow-up reminder notification was scheduled in the electronic medical record   Workstation performed: [id number]     Nm Gastric Emptying    Result Date: 8/21/2018  Narrative: GASTRIC EMPTYING STUDY INDICATION:  GERD  K21 9: Gastro-esophageal reflux disease without esophagitis COMPARISON:  None available TECHNIQUE:   The study was performed following the oral administration of 1 0 mCi Tc-99m sulfur colloid combined with scrambled eggs, as part of a standard meal   Following the meal, one minute anterior and posterior images were obtained immediately and at 0 25 hours, 0 5 hour, 1 hour, 1 5 hour, 2 hour, 3 hour and 4 hour intervals from the time of ingestion  Geometric mean analyses were then performed  As of March 1, 2016, this gastric emptying protocol has been modified and updated  The gastric emptying times and the normal values reported below reflect the change in protocol  FINDINGS: Gastric emptying at 0 5 hours = 12 (N < 30%) Gastric emptying at 1 hour = 24 % (N = 10 - 70%) Gastric emptying at 2 hours = 48 % (N = > 40%) Gastric emptying at 3 hours = 71 % (N = > 70%) Gastric emptying at 4 hours = 95 % (N = >90%) Linear T-1/2 = 127 minutes     Impression: Normal rate of gastric emptying   Workstation performed: [id number]

## 2018-09-06 ENCOUNTER — RX ONLY (RX ONLY)
Age: 83
End: 2018-09-06

## 2018-09-06 ENCOUNTER — DOCTOR'S OFFICE (OUTPATIENT)
Dept: URBAN - METROPOLITAN AREA CLINIC 136 | Facility: CLINIC | Age: 83
Setting detail: OPHTHALMOLOGY
End: 2018-09-06
Payer: COMMERCIAL

## 2018-09-06 DIAGNOSIS — H35.3211: ICD-10-CM

## 2018-09-06 PROCEDURE — 67028 INJECTION EYE DRUG: CPT | Performed by: OPHTHALMOLOGY

## 2018-09-06 PROCEDURE — SPECPHARM SPECIALTY PHARMACY DRUG: Performed by: OPHTHALMOLOGY

## 2018-09-06 ASSESSMENT — SPHEQUIV_DERIVED
OS_SPHEQUIV: -0.625
OD_SPHEQUIV: -1.5

## 2018-09-06 ASSESSMENT — REFRACTION_AUTOREFRACTION
OD_AXIS: 087
OS_SPHERE: +0.25
OD_CYLINDER: -1.00
OD_SPHERE: -1.00
OS_CYLINDER: -1.75
OS_AXIS: 074

## 2018-09-06 ASSESSMENT — VISUAL ACUITY
OS_BCVA: 20/300
OD_BCVA: 20/70-1

## 2018-09-07 ENCOUNTER — TELEPHONE (OUTPATIENT)
Dept: GASTROENTEROLOGY | Facility: AMBULARY SURGERY CENTER | Age: 83
End: 2018-09-07

## 2018-09-07 NOTE — TELEPHONE ENCOUNTER
DR FAULKNER'S PT    Pt called requesting to speak to [facility] regarding his symptoms  Pt stated he is available Monday early morning, early afternoon

## 2018-09-11 DIAGNOSIS — R11.0 NAUSEA: Primary | ICD-10-CM

## 2018-09-11 DIAGNOSIS — K21.9 GASTROESOPHAGEAL REFLUX DISEASE, ESOPHAGITIS PRESENCE NOT SPECIFIED: ICD-10-CM

## 2018-09-11 RX ORDER — ONDANSETRON 4 MG/1
4 TABLET, ORALLY DISINTEGRATING ORAL EVERY 8 HOURS PRN
Qty: 30 TABLET | Refills: 2 | Status: ON HOLD | OUTPATIENT
Start: 2018-09-11 | End: 2019-02-05

## 2018-09-11 RX ORDER — FAMOTIDINE 40 MG/1
40 TABLET, FILM COATED ORAL
Qty: 30 TABLET | Refills: 2 | Status: SHIPPED | OUTPATIENT
Start: 2018-09-11 | End: 2018-12-20 | Stop reason: SDUPTHER

## 2018-09-11 NOTE — TELEPHONE ENCOUNTER
Spoke to Jose  He complains of nausea and belching when he wakes up in the AM  I recommended he take omeprazole 30 minutes before breakfast  He may also start Pepcid 40 mg daily at bedtime  He also started taking MiraLAX daily for constipation but reports gas and loose stool  I recommended he take MiraLAX every other day to prevent these symptoms  He expressed understanding and all questions were answered

## 2018-09-12 ENCOUNTER — OFFICE VISIT (OUTPATIENT)
Dept: FAMILY MEDICINE CLINIC | Facility: CLINIC | Age: 83
End: 2018-09-12
Payer: MEDICARE

## 2018-09-12 VITALS
DIASTOLIC BLOOD PRESSURE: 80 MMHG | WEIGHT: 169.8 LBS | SYSTOLIC BLOOD PRESSURE: 138 MMHG | HEIGHT: 72 IN | BODY MASS INDEX: 23 KG/M2

## 2018-09-12 DIAGNOSIS — Z00.00 MEDICARE ANNUAL WELLNESS VISIT, SUBSEQUENT: ICD-10-CM

## 2018-09-12 DIAGNOSIS — Z23 NEED FOR INFLUENZA VACCINATION: Primary | ICD-10-CM

## 2018-09-12 PROCEDURE — G0439 PPPS, SUBSEQ VISIT: HCPCS | Performed by: FAMILY MEDICINE

## 2018-09-12 PROCEDURE — 90662 IIV NO PRSV INCREASED AG IM: CPT | Performed by: FAMILY MEDICINE

## 2018-09-12 PROCEDURE — G0008 ADMIN INFLUENZA VIRUS VAC: HCPCS | Performed by: FAMILY MEDICINE

## 2018-09-12 NOTE — PROGRESS NOTES
Assessment and Plan:  Problem List Items Addressed This Visit     None      Visit Diagnoses     Need for influenza vaccination    -  Primary    Relevant Orders    influenza vaccine, [id number], high-dose, PF 0 5 mL, for patients 65 yr+ (FLUZONE HIGH-DOSE)        Health Maintenance Due   Topic Date Due    SLP PLAN OF CARE  1935    INFLUENZA VACCINE  09/01/2018         HPI:  Patient Active Problem List   Diagnosis    Bladder cancer ([facility] [address] )    Lung nodule, solitary    Erectile dysfunction    Gait instability    High blood pressure    Hypothyroidism    Idiopathic peripheral neuropathy    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)    Cervical stenosis of spinal canal    Spinal stenosis of lumbar region    Spondylosis 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    Hoarse voice quality    Appetite loss    Weight loss    Gastroesophageal reflux disease without esophagitis    Alternating constipation and diarrhea    Weight loss, non-intentional    Nausea     Past Medical History:   Diagnosis Date    Anemia     Appendicitis     Coronary artery disease     Detached retina     GERD (gastroesophageal reflux disease)     Hyperlipidemia     Hypertension     Macular degeneration     Neuropathy     [facility]      Past Surgical History:   Procedure Laterality Date    ADENOIDECTOMY      APPENDECTOMY      ARTHRODESIS  01/15/2014    Lumbar     BACK SURGERY      CATARACT EXTRACTION      COLONOSCOPY  12/09/2016    fiberoptic     CYSTOSCOPY      with resection of tumor / 1/12/17, 10/2/17 / diagnostic 12/8/16, 5/30/17     EGD AND COLONOSCOPY N/A 12/9/2016    Procedure: EGD AND COLONOSCOPY;  Surgeon: Shea Brown MD;  [facility]: [facility];  Service:    Macy Mata EYE SURGERY      FRACTURE SURGERY Bilateral     ARM    NECK SURGERY      NEUROPLASTY / TRANSPOSITION MEDIAN NERVE AT CARPAL TUNNEL Right 04/2015    WY CYSTOURETHROSCOPY,FULGUR <0 5 CM LESN N/A 1/12/2017    Procedure: CYSTOSCOPY, BLADDER BIOPSY WITH FULGRATION, 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;  Surgeon: Jyoti Maher MD;  [facility];  Service: Urology    WY CYSTOURETHROSCOPY,FULGUR <0 5 CM LESN N/A 10/2/2017    Procedure: CYSTOSCOPY WITH  BLADDER BIOPSIES WITH FULGURATION;  Surgeon: Jyoti Maher MD;  [facility];  Service: Urology    WY ESOPHAGOGASTRODUODENOSCOPY TRANSORAL DIAGNOSTIC N/A 8/3/2018    Procedure: ESOPHAGOGASTRODUODENOSCOPY (EGD); Surgeon: Cami Mosher MD;  [facility]: [facility];  Service: Gastroenterology    WY INSTILL ANTICANCER AGENT IN BLADDER N/A 1/12/2017    Procedure: Deric Sires;  Surgeon: Jyoti Maher MD;  [facility];  Service: Urology    WY INSTILL ANTICANCER AGENT IN BLADDER N/A 10/2/2017    Procedure: Deric Sires;  Surgeon: Jyoti Maher MD;  [facility];  Service: Urology    RETINAL DETACHMENT SURGERY Right 03/2016    complete air fill confirmed    [address] SURGERY  03/03/2015    proximal humerus fracture / [place]    3/6/15   R    1/3/18     TONSILECTOMY AND ADNOIDECTOMY      TONSILLECTOMY      TRANSURETHRAL RESECTION OF BLADDER TUMOR N/A 10/2/2017    Procedure: TRANSURETHRAL RESECTION OF BLADDER TUMOR (TURBT);   Surgeon: Jyoti Maher MD;  [facility];  Service: Urology    [address] (HISTORICAL)       Family History   Problem Relation Age of Onset    Ulcers Father         acute gastric    Heart disease Mother     Anuerysm Sister      History   Smoking Status    Light Tobacco Smoker    Years: 60 00    Types: Pipe   Smokeless Tobacco    Never Used     Comment: smokes a pipe -resolved      History   Alcohol Use    1 2 oz/week    1 Glasses of wine, 1 Shots of liquor per week     Comment: DAILY       History   Drug Use No         Current Outpatient Prescriptions   Medication Sig Dispense Refill    atorvastatin (LIPITOR) 10 mg tablet TAKE 1 TABLET BY MOUTH  DAILY 90 tablet 2    B COMPLEX VITAMINS PO Take 1 tablet by mouth daily      cetirizine (ZyrTEC) 10 mg tablet Take 1 tablet by mouth daily      Cholecalciferol (VITAMIN D3) 2000 UNITS capsule Take 1 capsule by mouth 2 (two) times a day 5000 units       Coenzyme Q10 (CO Q10) 60 MG CAPS Take 1 capsule by mouth daily      dexlansoprazole (DEXILANT) 60 MG capsule Take 1 capsule (60 mg total) by mouth daily 30 capsule 2    famotidine (PEPCID) 40 MG tablet Take 1 tablet (40 mg total) by mouth daily at bedtime 30 tablet 2    Ginkgo Biloba 120 MG TABS Take 1 tablet by mouth daily      Inositol 500 MG TABS Take 1 tablet by mouth 2 (two) times a day      ipratropium (ATROVENT) 0 06 % nasal spray USE 2 SPRAYS IN EACH  NOSTRIL TWICE DAILY 60 mL 1    lutein 6 mg Take 1 capsule by mouth daily      Melatonin 10 MG TABS Take 1 tablet by mouth daily at bedtime      MILK THISTLE PO Take 1 tablet by mouth daily      Misc Natural Products (ENERGY FOCUS) TABS Take 1 tablet by mouth daily      ondansetron (ZOFRAN-ODT) 4 mg disintegrating tablet Take 1 tablet (4 mg total) by mouth every 8 (eight) hours as needed for nausea or vomiting 30 tablet 2    gabapentin (NEURONTIN) 600 MG tablet TAKE 1 TABLET BY MOUTH  TWICE A DAY (Patient not taking: Reported on 9/12/2018) 180 tablet 2     No current facility-administered medications for this visit        No Known Allergies  Immunization History   Administered Date(s) Administered    Influenza Split High Dose Preservative Free IM 09/17/2012, 10/15/2013, 11/04/2014, 10/15/2015, 09/01/2016, 10/26/2017    Pneumococcal Conjugate 13-Valent 12/09/2014    Pneumococcal Polysaccharide PPV23 01/12/2018    Zoster 12/01/2014       Patient Care Team:  Dario Ruvalcaba DO as PCP - General  MD Karine Bustamante MD Janyce Amen, Hipolito Mcmillan MD    Medicare Screening Tests and Risk Assessments:  Rosina carrasco here for his Subsequent Wellness visit  Health Risk Assessment:  Patient rates overall health as good  Patient feels that their physical health rating is Same  Eyesight was rated as Slightly worse  Hearing was rated as Same  Patient feels that their emotional and mental health rating is Same  Pain experienced by patient in the last 7 days has been None  Patient states that he has experienced weight loss or gain in last 6 months  Emotional/Mental Health:  Patient has been feeling nervous/anxious  PHQ-9 Depression Screening:    Frequency of the following problems over the past two weeks:      1  Little interest or pleasure in doing things: 0 - not at all      2  Feeling down, depressed, or hopeless: 0 - not at all  PHQ-2 Score: 0          Broken Bones/Falls: Fall Risk Assessment:    In the past year, patient has experienced: History of falling in past year     Number of falls: 2 or more  Patient feels unsteady standing  Patient is taking medication that can cause feelings of lightheadedness or tiredness  Patient often has no need to rush to the toilet  Chronic conditions that may contribute to falls arthritis and visual disturbance  Bladder/Bowel:  Patient has not leaked urine accidently in the last six months  Patient reports no loss of bowel control  Immunizations:  Patient has had a flu vaccination within the last year  Patient has received a pneumonia shot  Patient has received a shingles shot  Patient has received tetanus/diphtheria shot  Home Safety:  Patient does not have trouble with stairs inside or outside of their home  Patient currently reports that there are no safety hazards present in home, working smoke alarms, no working carbon monoxide detectors        Preventative Screenings:   prostate cancer screen performed, colon cancer screen completed, cholesterol screen completed, glaucoma eye exam completed,     Nutrition:  Current diet: Regular and Limited junk food with servings of the following:    Medications:  Patient is currently taking over-the-counter supplements  Patient is able to manage medications  Lifestyle Choices:  Patient reports current tobacco use  Patient reports alcohol use  Alcohol use per week: 7-14  Patient drives a vehicle  Patient wears seat belt  Current level of exercise of physical activity described by patient as: goes to the gym three times a week on 1 5 hours  Activities of Daily Living:  Can get out of bed by his or her self, able to dress self, able to make own meals, able to do own shopping, able to bathe self, can do own laundry/housekeeping, can manage own money, pay bills and track expenses    Previous Hospitalizations:  No hospitalization or ED visit in past 12 months  Number of hospitalizations within the last year: 1-2        Advanced Directives:  Patient has decided on a power of   Patient has spoken to designated power of   Patient has completed advanced directive          Preventative Screening/Counseling:      Cardiovascular:      General: Risks and Benefits Discussed and Screening Current      Counseling: Healthy Weight     Due for Labs/Analytes/Optional EKG: Lipid Panel          Diabetes:      General: Risks and Benefits Discussed and Screening Current          Colorectal Cancer:      General: Risks and Benefits Discussed and Screening Not Indicated          Prostate Cancer:      General: Risks and Benefits Discussed and Screening Current          Osteoporosis:      General: Screening Not Indicated          AAA:      General: Screening Not Indicated          Glaucoma:      General: Risks and Benefits Discussed and Screening Current      Comments: Patient has macular degeneration         HIV:      General: Screening Not Indicated          Hepatitis C:      General: Screening Not Indicated        Advanced Directives:   Patient has living will for healthcare, has durable POA for healthcare, patient has an advanced directive  Information on ACP and/or AD provided  No 5 wishes given  End of life assessment reviewed with patient  Provider agrees with end of life decisions   Immunizations:      Influenza: Risks & Benefits Discussed, Influenza Due Today and Influenza UTD This Year      Pneumococcal: Risks & Benefits Discussed and Lifetime Vaccine Completed      Shingrix: Risks & Benefits Discussed and Shingrix Vaccine UTD      Hepatitis B (Low risk patients): Prevention Counseling      Hepatitis B (Medium to high risk patients): Risks & Benefits Discussed and Hep B Vaccine Series UTD      Zostavax: Risks & Benefits Discussed and Zostavax Vaccine UTD            No exam data present    Physical Exam:  Review of Systems   Constitutional: Positive for activity change and fatigue  HENT: Positive for postnasal drip, rhinorrhea and sinus pain  Eyes: Positive for visual disturbance  Macular degeneration  Getting intraocular chemo   Respiratory: Positive for cough  Cardiovascular: Negative  Gastrointestinal: Negative for bowel incontinence  Patient is having excessive direct a shin and reflux symptoms working through this with the GI group   Endocrine: Negative  Genitourinary: Positive for hematuria  Patient has had 2 bladder scope send surgeries and biopsies and neither of these has been a malignancy he has a follow-up visit scheduled in the near future with his urologist   Musculoskeletal: Positive for arthralgias, arthritis, back pain, gait problem and neck stiffness  Allergic/Immunologic: Positive for environmental allergies  Neurological: Positive for dizziness and weakness  Psychiatric/Behavioral: The patient is not nervous/anxious  Vitals:    09/12/18 1056   BP: 138/80   BP Location: Left arm   Patient Position: Sitting   Cuff Size: Standard   Weight: 77 kg (169 lb 12 8 oz)   Height: 6' (1 829 m)   Body mass index is 23 03 kg/m²      Physical Exam   Constitutional: He is oriented to person, place, and time  He appears well-developed and well-nourished  HENT:   Head: Normocephalic and atraumatic  Right Ear: External ear normal    Left Ear: External ear normal    Nose: Nose normal    Mouth/Throat: Oropharynx is clear and moist    Eyes: Conjunctivae and EOM are normal  Pupils are equal, round, and reactive to light  Neck: Normal range of motion  Neck supple  Cardiovascular: Normal rate, regular rhythm, normal heart sounds and intact distal pulses  Exam reveals no friction rub  No murmur heard  Pulmonary/Chest: Effort normal and breath sounds normal  No respiratory distress  He has no wheezes  He has no rales  He exhibits no tenderness  Abdominal: Soft  Bowel sounds are normal    Musculoskeletal: Normal range of motion  Neurological: He is alert and oriented to person, place, and time  Skin: Skin is warm and dry  Capillary refill takes 2 to 3 seconds  Psychiatric: He has a normal mood and affect   His behavior is normal  Judgment and thought content normal

## 2018-09-20 ENCOUNTER — PROCEDURE VISIT (OUTPATIENT)
Dept: UROLOGY | Facility: CLINIC | Age: 83
End: 2018-09-20
Payer: MEDICARE

## 2018-09-20 VITALS
HEART RATE: 64 BPM | SYSTOLIC BLOOD PRESSURE: 120 MMHG | WEIGHT: 166 LBS | DIASTOLIC BLOOD PRESSURE: 60 MMHG | BODY MASS INDEX: 22.51 KG/M2

## 2018-09-20 DIAGNOSIS — C67.9 MALIGNANT NEOPLASM OF URINARY BLADDER, UNSPECIFIED SITE (HCC): Primary | ICD-10-CM

## 2018-09-20 DIAGNOSIS — R35.1 NOCTURIA: ICD-10-CM

## 2018-09-20 PROCEDURE — 52000 CYSTOURETHROSCOPY: CPT | Performed by: UROLOGY

## 2018-09-20 RX ORDER — OMEPRAZOLE 40 MG/1
40 CAPSULE, DELAYED RELEASE ORAL 2 TIMES DAILY
COMMUNITY
End: 2019-06-04 | Stop reason: SDUPTHER

## 2018-09-20 NOTE — PROGRESS NOTES
UROLOGY PROCEDURE NOTE     History of Present Illness:   Gemma Gramajo is a [de-identified] y o  male with a history of low-grade papillary bladder cancer diagnosed in January of 2017  The patient has undergone routine cyst 3 month surveillance cystoscopies for the 1st year  There was some concern in October of 2017 about recurrence  Patient underwent a transurethral resection of his bladder which demonstrated no residual cancer  He presents today for his 18mo surveillance cysto  Patient does report some occasional nighttime frequency  He drinks copious amounts of fluids throughout the day      Past Medical History:     Past Medical History:   Diagnosis Date    Anemia     Appendicitis     Coronary artery disease     Detached retina     GERD (gastroesophageal reflux disease)     Hyperlipidemia     Hypertension     Macular degeneration     Neuropathy     Von Willebrand disease ([facility] [address] )        PAST SURGICAL HISTORY:     Past Surgical History:   Procedure Laterality Date    ADENOIDECTOMY      APPENDECTOMY      ARTHRODESIS  01/15/2014    Lumbar     BACK SURGERY      CATARACT EXTRACTION      COLONOSCOPY  12/09/2016    fiberoptic     CYSTOSCOPY      with resection of tumor / 1/12/17, 10/2/17 / diagnostic 12/8/16, 5/30/17     EGD AND COLONOSCOPY N/A 12/9/2016    Procedure: EGD AND COLONOSCOPY;  Surgeon: Brandy Jimenez MD;  Location: [facility];  Service:    [facility] January EYE SURGERY      FRACTURE SURGERY Bilateral     ARM    NECK SURGERY      NEUROPLASTY / TRANSPOSITION [facility]IAN NERVE AT [facility] Right 04/2015    PA CYSTOURETHROSCOPY,FULGUR <0 5 CM LESN N/A 1/12/2017    Procedure: CYSTOSCOPY, BLADDER BIOPSY WITH FULGRATION, 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;  Surgeon: Sharif Head MD;  Location: [facility];  Service: Urology    PA CYSTOURETHROSCOPY,FULGUR <0 5 CM LESN N/A 10/2/2017    Procedure: CYSTOSCOPY WITH  BLADDER BIOPSIES WITH FULGURATION;  Surgeon: Sharif Head MD; Location: [facility];  Service: Urology    VT ESOPHAGOGASTRODUODENOSCOPY TRANSORAL DIAGNOSTIC N/A 8/3/2018    Procedure: ESOPHAGOGASTRODUODENOSCOPY (EGD); Surgeon: Sheryle Congress, MD;  Location: [facility];  Service: Gastroenterology    VT INSTILL ANTICANCER AGENT IN BLADDER N/A 1/12/2017    Procedure: Tanyalenin Bellkannan;  Surgeon: Elsa Cummings MD;  [facility];  Service: Urology    VT INSTILL ANTICANCER AGENT IN BLADDER N/A 10/2/2017    Procedure: Leta Bellkannan;  Surgeon: Elsa Cummings MD;  [facility];  Service: Urology    RETINAL DETACHMENT SURGERY Right 03/2016    complete air fill confirmed    [address] SURGERY  03/03/2015    proximal humerus fracture / [place]    3/6/15   R    1/3/18     TONSILECTOMY AND ADNOIDECTOMY      TONSILLECTOMY      TRANSURETHRAL RESECTION OF BLADDER TUMOR N/A 10/2/2017    Procedure: TRANSURETHRAL RESECTION OF BLADDER TUMOR (TURBT);   Surgeon: Elsa Cummings MD;  [facility]: [facility];  Service: Urology    [address] (HISTORICAL)         CURRENT MEDICATIONS:     Current Outpatient Prescriptions   Medication Sig Dispense Refill    atorvastatin (LIPITOR) 10 mg tablet TAKE 1 TABLET BY MOUTH  DAILY 90 tablet 2    B COMPLEX VITAMINS PO Take 1 tablet by mouth daily      cetirizine (ZyrTEC) 10 mg tablet Take 1 tablet by mouth daily      Cholecalciferol (VITAMIN D3) 2000 UNITS capsule Take 1 capsule by mouth 2 (two) times a day 5000 units       Coenzyme Q10 (CO Q10) 60 MG CAPS Take 1 capsule by mouth daily      dexlansoprazole (DEXILANT) 60 MG capsule Take 1 capsule (60 mg total) by mouth daily 30 capsule 2    famotidine (PEPCID) 40 MG tablet Take 1 tablet (40 mg total) by mouth daily at bedtime 30 tablet 2    gabapentin (NEURONTIN) 600 MG tablet TAKE 1 TABLET BY MOUTH  TWICE A DAY (Patient not taking: Reported on 9/12/2018) 180 tablet 2    Ginkgo Biloba 120 MG TABS Take 1 tablet by mouth daily  Inositol 500 MG TABS Take 1 tablet by mouth 2 (two) times a day      ipratropium (ATROVENT) 0 06 % nasal spray USE 2 SPRAYS IN EACH  NOSTRIL TWICE DAILY 60 mL 1    lutein 6 mg Take 1 capsule by mouth daily      Melatonin 10 MG TABS Take 1 tablet by mouth daily at bedtime      MILK THISTLE PO Take 1 tablet by mouth daily      Misc Natural Products (ENERGY FOCUS) TABS Take 1 tablet by mouth daily      ondansetron (ZOFRAN-ODT) 4 mg disintegrating tablet Take 1 tablet (4 mg total) by mouth every 8 (eight) hours as needed for nausea or vomiting 30 tablet 2     No current facility-administered medications for this visit  ALLERGIES:   No Known Allergies    SOCIAL HISTORY:     Social History     Social History    Marital status:      Spouse name: N/A    Number of children: N/A    Years of education: N/A     Occupational History       retired      Social History Main Topics    Smoking status: Light Tobacco Smoker     Years: 60 00     Types: Pipe    Smokeless tobacco: Never Used      Comment: smokes a pipe -resolved     Alcohol use 1 2 oz/week     1 Glasses of wine, 1 Shots of liquor per week      Comment: DAILY     Drug use: No    Sexual activity: Not on file     Other Topics Concern    Not on file     Social History Narrative    No advance directives     Patient has living will        SOCIAL HISTORY:     Family History   Problem Relation Age of Onset    Ulcers Father         acute gastric    Heart disease Mother     Anuerysm Sister        REVIEW OF SYSTEMS:     Review of Systems   Constitutional: Negative  Respiratory: Negative  Cardiovascular: Negative  Gastrointestinal: Negative  Genitourinary: Positive for difficulty urinating (Mild nocturia)  Musculoskeletal: Negative  Skin: Negative  Neurological: Negative  Psychiatric/Behavioral: Negative  PHYSICAL EXAM:     There were no vitals taken for this visit    General:  Healthy appearing individual in no acute distress  They have a normal affect  There is not appear to be any gross neurologic defects or abnormalities  HEENT:  Normocephalic, atraumatic  Neck is supple without any palpable lymphadenopathy  Cardiovascular:  Patient has normal palpable distal radial pulses  There is no significant peripheral edema  No JVD is noted  Respiratory:  Patient has unlabored respirations  There is no audible wheeze or rhonchi  Abdomen:  Abdomen is without surgical scars  Abdomen is soft and nontender  There is no tympany  Inguinal and umbilical hernia are not appreciated  Genitourinary: no penile lesions or discharge, no testicular masses or tenderness, no hernias    Musculoskeletal:  Shuffling gait  Dermatologic:  Patient has no skin abnormalities or rashes  LABS:     CBC:   Lab Results   Component Value Date    WBC 7 23 2018    HGB 15 0 2018    HCT 43 3 2018    MCV 85 2018     2018       BMP:   Lab Results   Component Value Date    GLUCOSE 90 2015    CALCIUM 8 8 2018     2018    K 4 0 2018    CO2 27 2018     2018    BUN 15 2018    CREATININE 1 13 2018     Lab Results   Component Value Date    PSA 1 3 2018    PSA 1 0 2017    PSA 1 1 2016     IMAGIN/2016  CT ABDOMEN AND PELVIS WITH IV CONTRAST     INDICATION:  Intermittent diarrhea  Remote tobacco abuse  Back surgery/fusion      COMPARISON: None      TECHNIQUE:  CT examination of the abdomen and pelvis was performed after the administration of intravenous contrast   This examination, like all CT scans performed in the [facility], was performed utilizing techniques to minimize   radiation dose exposure, including the use of iterative reconstruction and automated exposure control  Axial, sagittal, and coronal reformatted images were submitted for interpretation    100 cc of intravenous Omnipaque 350 was administered for this   examination  Enteric contrast was not given  FINDINGS:     ABDOMEN     LOWER CHEST:  Minimal linear atelectasis or scarring is present at the lung bases  No pleural effusions      LIVER/BILIARY TREE:  The liver is normal in size and contour without dilated biliary ducts  Numerous rounded lesions of diminished attenuation are noted in the liver, largest in the inferior right hepatic lobe measures 2 4 cm in greatest dimension,   likely cysts      GALLBLADDER:  No calcified gallstones  No pericholecystic inflammatory change      SPLEEN:  Unremarkable      PANCREAS:  Unremarkable      ADRENAL GLANDS:  Unremarkable      KIDNEYS/URETERS:  Multiple bilateral renal cysts  No renal calculi or hydronephrosis      STOMACH AND BOWEL:  Stomach and small bowel unremarkable  Sigmoid diverticula are present without evidence of acute diverticulitis      APPENDIX:  No findings to suggest appendicitis      ABDOMINOPELVIC CAVITY:  No ascites or free intraperitoneal air  No lymphadenopathy      VESSELS:  Unremarkable for patient's age      PELVIS     REPRODUCTIVE ORGANS:  Unremarkable for patient's age      URINARY BLADDER:  12 mm nodule is present in the right side of the urinary bladder consistent with polypoid mucosal lesion      ABDOMINAL WALL/INGUINAL REGIONS:  Small fat-containing umbilical hernia noted  Fat is seen in both inguinal canals      OSSEOUS STRUCTURES:  Pedicle and screw and posterior stabilization bar hardware fusion noted at L3-4  Intervertebral disc space narrowing and vacuum disc phenomenon indicative of degenerative disc disease seen at L2-3 and L4-5      IMPRESSION:     Sigmoid diverticular disease without evidence of acute diverticulitis      Multiple cysts, liver and kidneys      Bladder mucosal nodules suspicious for transitional cell carcinoma  Urology consultation recommended      See above additional nonacute findings      PATHOLOGY:     1/12/17  Case Report   Surgical Pathology Report                         Case: [id number]                                    Authorizing Provider: Paulette Castillo MD   Collected:           01/12/2017 1021               Ordering Location:     [facility] Received:            01/12/2017 1305                                      Operating Room                                                                Pathologist:           Pradeep Roy DO                                                             Specimens:   A) - Urinary Bladder, Left Lateral baldder wall                                                      B) - Urinary Bladder, Right posterior Bladder                                                        C) - Urinary Bladder, Right Trigone Bx                                                     Final Diagnosis   A  Bladder, left lateral wall, biopsy:  - Papillary urothelial neoplasm, see note  - Muscular propria (detrusor muscle), negative for carcinoma      Note: Papillary urothelial growth with minimal cytologic atypia, the differential diagnosis includes a non-invasive low grade papillary urothelial neoplasm or a papillary urothelial neoplasm of uncertain malignant potential (PUNLMP)  Clinical correlation needed       B  Bladder, right posterior, biopsy:  - Noninvasive low-grade papillary urothelial carcinoma with endophytic growth pattern  - Muscular propria (detrusor muscle), not identified in the specimen      C  Bladder, right trigone, biopsy:  - Noninvasive low-grade papillary urothelial carcinoma  - Muscular propria (detrusor muscle), not identified in the specimen       10/2017  Case Report   Surgical Pathology Report                         Case: [id number]                                    Authorizing Provider: Paulette Castillo MD   Collected:           10/02/2017 1001               Ordering Location:     [facility] Received:            10/02/2017 1045                                      Operating Room                                                                Pathologist:           Ruslan Alston MD                                                         Specimen:    Urinary Bladder, RIGHT trigone                                                             Final Diagnosis   A  Urinary bladder, right trigone, biopsy:     - Scant detached cauterized fragment of urothelium (see note)  - Urothelial mucosa with acute and chronic inflammation and fragments of muscle  Electronically signed by Ruslan Alston MD on 10/6/2017 at  9:35 AM   Note   Previous history of noninvasive low-grade papillary urothelial carcinoma is noted      The submitted tissue contains predominantly fragments of muscle as well as few fragments of urothelial   mucosa with chronic inflammation  In addition a very small detached cauterized fragment of urothelium is   noted, limiting histologic assessment  The possibility of a small residual urothelial neoplasm cannot be   excluded  No invasive malignancy is identified in the lamina propria and muscularis propria  PROCEDURE:     SEE NOTE        ASSESSMENT:     [de-identified] y o  male with history of low-grade bladder cancer    PLAN:     18 month cystoscopy today  Small area of erythema at the right-sided bladder dome  This does not appear like a papillary tumor and is an area that I will follow on his next scope  Should the patient develop hematuria or worsening bladder symptoms in the interim, I will ask him to touch base for sooner cystoscopy  With regard to his nocturia, I have recommended the patient cut down his nighttime fluid intake  If not improved, we will consider  The patient continues to receive PSA testing at his request from his primary care team   I have again discussed with the patient the recommendations against ongoing continued prostate cancer screening in patients over the age of 76    The likelihood of the life-threatening cancer is extremely low and the risks and harms produced by continued longstanding screening outweigh the benefits  The patient has voiced understanding

## 2018-09-20 NOTE — PROGRESS NOTES
Cystoscopy  Date/Time: 9/20/2018 9:35 AM  Performed by: Yusra Berrios  Authorized by: Yusra Berrios     Procedure details: cystoscopy    Patient tolerance: Patient tolerated the procedure well with no immediate complications    Additional Procedure Details:   Cystoscopy procedure note:    Risk and benefits of flexible cystoscopy were discussed  Informed consent was obtained  Urine dip was adequate for cystoscopy  The patient was placed in the supine position  His genitalia was prepped with Betadine and draped in a sterile fashion  Viscous 2% lidocaine jelly was instilled into the urethra and allowed dwell time for local anesthesia  Flexible cystoscopy was then performed using a 16F scope  The distal urethra and prostatic urethra were evaluated and were normal in course and caliber  Once inside the bladder, it was carefully inspected in a 360 degree fashion  There was no evidence of mucosal abnormalities, lesions, diverticula or stones  There is a very small area of erythema at the right-sided bladder dome  Although this is not appear to be a papillary tumor, it is in area that I will follow at a future cystoscopy  Both ureteral orifices in their orthotopic location were visualized with clear efflux of urine  Retroflexion for evaluation of the bladder neck was normal      Overall this was a negative cystoscopy

## 2018-09-27 ENCOUNTER — DOCTOR'S OFFICE (OUTPATIENT)
Dept: URBAN - METROPOLITAN AREA CLINIC 136 | Facility: CLINIC | Age: 83
Setting detail: OPHTHALMOLOGY
End: 2018-09-27
Payer: COMMERCIAL

## 2018-09-27 DIAGNOSIS — H35.3231: ICD-10-CM

## 2018-09-27 DIAGNOSIS — H33.021: ICD-10-CM

## 2018-09-27 PROCEDURE — 92012 INTRM OPH EXAM EST PATIENT: CPT | Performed by: OPHTHALMOLOGY

## 2018-09-27 PROCEDURE — 92134 CPTRZ OPH DX IMG PST SGM RTA: CPT | Performed by: OPHTHALMOLOGY

## 2018-09-27 ASSESSMENT — DRY EYES - PHYSICIAN NOTES: OD_GENERALCOMMENTS: INFERIORLY

## 2018-09-27 ASSESSMENT — SUPERFICIAL PUNCTATE KERATITIS (SPK): OD_SPK: 2+

## 2018-09-27 ASSESSMENT — CONFRONTATIONAL VISUAL FIELD TEST (CVF)
OS_FINDINGS: FULL
OD_FINDINGS: FULL

## 2018-09-27 ASSESSMENT — LID EXAM ASSESSMENTS: OD_BLEPHARITIS: 1+

## 2018-09-28 ASSESSMENT — REFRACTION_AUTOREFRACTION
OD_AXIS: 087
OS_AXIS: 074
OD_SPHERE: -1.00
OS_SPHERE: +0.25
OD_CYLINDER: -1.00
OS_CYLINDER: -1.75

## 2018-09-28 ASSESSMENT — REFRACTION_CURRENTRX
OD_OVR_VA: 20/
OS_OVR_VA: 20/
OS_OVR_VA: 20/
OD_OVR_VA: 20/
OD_OVR_VA: 20/
OS_OVR_VA: 20/

## 2018-09-28 ASSESSMENT — REFRACTION_MANIFEST
OD_VA3: 20/
OS_VA2: 20/
OU_VA: 20/
OS_VA1: 20/
OD_VA1: 20/
OS_VA1: 20/
OS_VA3: 20/
OD_VA3: 20/
OD_VA2: 20/
OS_VA3: 20/
OD_VA1: 20/
OU_VA: 20/
OD_VA2: 20/
OS_VA2: 20/

## 2018-09-28 ASSESSMENT — SPHEQUIV_DERIVED
OS_SPHEQUIV: -0.625
OD_SPHEQUIV: -1.5

## 2018-09-28 ASSESSMENT — VISUAL ACUITY
OS_BCVA: 20/125
OD_BCVA: 20/70-1

## 2018-10-04 ENCOUNTER — DOCTOR'S OFFICE (OUTPATIENT)
Dept: URBAN - METROPOLITAN AREA CLINIC 136 | Facility: CLINIC | Age: 83
Setting detail: OPHTHALMOLOGY
End: 2018-10-04
Payer: COMMERCIAL

## 2018-10-04 DIAGNOSIS — H35.3211: ICD-10-CM

## 2018-10-04 PROCEDURE — 67028 INJECTION EYE DRUG: CPT | Performed by: OPHTHALMOLOGY

## 2018-10-04 ASSESSMENT — CONFRONTATIONAL VISUAL FIELD TEST (CVF)
OD_FINDINGS: FULL
OS_FINDINGS: FULL

## 2018-10-05 ENCOUNTER — RX ONLY (RX ONLY)
Age: 83
End: 2018-10-05

## 2018-10-05 ASSESSMENT — VISUAL ACUITY
OS_BCVA: 20/250
OD_BCVA: 20/70-1

## 2018-10-05 ASSESSMENT — REFRACTION_CURRENTRX
OS_OVR_VA: 20/
OD_OVR_VA: 20/
OD_OVR_VA: 20/
OS_OVR_VA: 20/
OS_OVR_VA: 20/
OD_OVR_VA: 20/

## 2018-10-05 ASSESSMENT — REFRACTION_MANIFEST
OS_VA2: 20/
OD_VA1: 20/
OD_VA3: 20/
OS_VA2: 20/
OU_VA: 20/
OD_VA1: 20/
OU_VA: 20/
OS_VA3: 20/
OD_VA2: 20/
OS_VA1: 20/
OS_VA1: 20/
OS_VA3: 20/
OD_VA2: 20/
OD_VA3: 20/

## 2018-10-05 ASSESSMENT — SPHEQUIV_DERIVED
OD_SPHEQUIV: -1.5
OS_SPHEQUIV: -0.625

## 2018-10-05 ASSESSMENT — REFRACTION_AUTOREFRACTION
OD_CYLINDER: -1.00
OD_SPHERE: -1.00
OD_AXIS: 087
OS_CYLINDER: -1.75
OS_AXIS: 074
OS_SPHERE: +0.25

## 2018-10-15 DIAGNOSIS — J30.1 ALLERGIC RHINITIS DUE TO POLLEN, UNSPECIFIED SEASONALITY: Primary | ICD-10-CM

## 2018-10-15 RX ORDER — CETIRIZINE HYDROCHLORIDE 10 MG/1
10 TABLET ORAL DAILY
Qty: 90 TABLET | Refills: 1 | Status: SHIPPED | OUTPATIENT
Start: 2018-10-15 | End: 2019-04-09 | Stop reason: SDUPTHER

## 2018-10-16 DIAGNOSIS — J30.0 CHRONIC VASOMOTOR RHINITIS: ICD-10-CM

## 2018-10-16 RX ORDER — IPRATROPIUM BROMIDE 42 UG/1
2 SPRAY, METERED NASAL 3 TIMES DAILY
Qty: 60 ML | Refills: 1 | Status: SHIPPED | OUTPATIENT
Start: 2018-10-16 | End: 2019-04-20 | Stop reason: SDUPTHER

## 2018-10-31 ENCOUNTER — DOCTOR'S OFFICE (OUTPATIENT)
Dept: URBAN - METROPOLITAN AREA CLINIC 136 | Facility: CLINIC | Age: 83
Setting detail: OPHTHALMOLOGY
End: 2018-10-31
Payer: COMMERCIAL

## 2018-10-31 ENCOUNTER — RX ONLY (RX ONLY)
Age: 83
End: 2018-10-31

## 2018-10-31 DIAGNOSIS — H35.3231: ICD-10-CM

## 2018-10-31 DIAGNOSIS — H35.3221: ICD-10-CM

## 2018-10-31 DIAGNOSIS — H33.021: ICD-10-CM

## 2018-10-31 DIAGNOSIS — H35.3211: ICD-10-CM

## 2018-10-31 PROCEDURE — 92134 CPTRZ OPH DX IMG PST SGM RTA: CPT | Performed by: OPHTHALMOLOGY

## 2018-10-31 PROCEDURE — 92014 COMPRE OPH EXAM EST PT 1/>: CPT | Performed by: OPHTHALMOLOGY

## 2018-10-31 PROCEDURE — 67028 INJECTION EYE DRUG: CPT | Performed by: OPHTHALMOLOGY

## 2018-10-31 ASSESSMENT — SPHEQUIV_DERIVED
OS_SPHEQUIV: -0.625
OD_SPHEQUIV: -1.5

## 2018-10-31 ASSESSMENT — REFRACTION_AUTOREFRACTION
OD_AXIS: 087
OD_SPHERE: -1.00
OS_CYLINDER: -1.75
OD_CYLINDER: -1.00
OS_AXIS: 074
OS_SPHERE: +0.25

## 2018-10-31 ASSESSMENT — REFRACTION_CURRENTRX
OD_OVR_VA: 20/
OS_OVR_VA: 20/
OD_OVR_VA: 20/
OD_OVR_VA: 20/
OS_OVR_VA: 20/
OS_OVR_VA: 20/

## 2018-10-31 ASSESSMENT — REFRACTION_MANIFEST
OD_VA2: 20/
OU_VA: 20/
OD_VA1: 20/
OU_VA: 20/
OS_VA1: 20/
OD_VA1: 20/
OD_VA2: 20/
OD_VA3: 20/
OS_VA2: 20/
OS_VA2: 20/
OD_VA3: 20/
OS_VA1: 20/
OS_VA3: 20/
OS_VA3: 20/

## 2018-10-31 ASSESSMENT — DRY EYES - PHYSICIAN NOTES: OD_GENERALCOMMENTS: INFERIORLY

## 2018-10-31 ASSESSMENT — VISUAL ACUITY
OD_BCVA: 20/60-2
OS_BCVA: 20/250

## 2018-10-31 ASSESSMENT — LID EXAM ASSESSMENTS: OD_BLEPHARITIS: 1+

## 2018-10-31 ASSESSMENT — SUPERFICIAL PUNCTATE KERATITIS (SPK): OD_SPK: 2+

## 2018-11-29 ENCOUNTER — DOCTOR'S OFFICE (OUTPATIENT)
Dept: URBAN - METROPOLITAN AREA CLINIC 136 | Facility: CLINIC | Age: 83
Setting detail: OPHTHALMOLOGY
End: 2018-11-29
Payer: COMMERCIAL

## 2018-11-29 DIAGNOSIS — H35.3211: ICD-10-CM

## 2018-11-29 DIAGNOSIS — H43.392: ICD-10-CM

## 2018-11-29 DIAGNOSIS — H33.021: ICD-10-CM

## 2018-11-29 DIAGNOSIS — H01.001: ICD-10-CM

## 2018-11-29 DIAGNOSIS — H31.002: ICD-10-CM

## 2018-11-29 DIAGNOSIS — H35.3231: ICD-10-CM

## 2018-11-29 PROCEDURE — 92134 CPTRZ OPH DX IMG PST SGM RTA: CPT | Performed by: OPHTHALMOLOGY

## 2018-11-29 PROCEDURE — 67028 INJECTION EYE DRUG: CPT | Performed by: OPHTHALMOLOGY

## 2018-11-29 PROCEDURE — 92012 INTRM OPH EXAM EST PATIENT: CPT | Performed by: OPHTHALMOLOGY

## 2018-11-29 ASSESSMENT — SUPERFICIAL PUNCTATE KERATITIS (SPK): OD_SPK: 2+

## 2018-11-29 ASSESSMENT — CONFRONTATIONAL VISUAL FIELD TEST (CVF)
OD_FINDINGS: FULL
OS_FINDINGS: FULL

## 2018-11-29 ASSESSMENT — DRY EYES - PHYSICIAN NOTES: OD_GENERALCOMMENTS: INFERIORLY

## 2018-11-29 ASSESSMENT — LID EXAM ASSESSMENTS: OD_BLEPHARITIS: 1+

## 2018-11-30 ENCOUNTER — RX ONLY (RX ONLY)
Age: 83
End: 2018-11-30

## 2018-11-30 ASSESSMENT — REFRACTION_AUTOREFRACTION
OD_SPHERE: -1.00
OS_AXIS: 074
OS_SPHERE: +0.25
OS_CYLINDER: -1.75
OD_AXIS: 087
OD_CYLINDER: -1.00

## 2018-11-30 ASSESSMENT — REFRACTION_MANIFEST
OS_VA1: 20/
OS_VA2: 20/
OS_VA2: 20/
OD_VA3: 20/
OD_VA2: 20/
OD_VA1: 20/
OD_VA1: 20/
OU_VA: 20/
OS_VA3: 20/
OS_VA1: 20/
OU_VA: 20/
OS_VA3: 20/
OD_VA2: 20/
OD_VA3: 20/

## 2018-11-30 ASSESSMENT — REFRACTION_CURRENTRX
OD_OVR_VA: 20/
OS_OVR_VA: 20/
OD_OVR_VA: 20/
OD_OVR_VA: 20/

## 2018-11-30 ASSESSMENT — SPHEQUIV_DERIVED
OS_SPHEQUIV: -0.625
OD_SPHEQUIV: -1.5

## 2018-11-30 ASSESSMENT — VISUAL ACUITY
OD_BCVA: 20/60-1
OS_BCVA: 20/300

## 2018-12-11 ENCOUNTER — HOSPITAL ENCOUNTER (EMERGENCY)
Facility: HOSPITAL | Age: 83
Discharge: HOME | End: 2018-12-11
Attending: EMERGENCY MEDICINE
Payer: MEDICARE

## 2018-12-11 ENCOUNTER — APPOINTMENT (EMERGENCY)
Dept: RADIOLOGY | Facility: HOSPITAL | Age: 83
End: 2018-12-11
Payer: MEDICARE

## 2018-12-11 VITALS
WEIGHT: 166.23 LBS | OXYGEN SATURATION: 97 % | HEIGHT: 72 IN | TEMPERATURE: 97.4 F | HEART RATE: 78 BPM | SYSTOLIC BLOOD PRESSURE: 177 MMHG | RESPIRATION RATE: 16 BRPM | BODY MASS INDEX: 22.51 KG/M2 | DIASTOLIC BLOOD PRESSURE: 85 MMHG

## 2018-12-11 DIAGNOSIS — R53.1 WEAKNESS: Primary | ICD-10-CM

## 2018-12-11 LAB
ALBUMIN SERPL BCP-MCNC: 3.3 G/DL (ref 3.5–5)
ALP SERPL-CCNC: 80 U/L (ref 46–116)
ALT SERPL W P-5'-P-CCNC: 49 U/L (ref 12–78)
ANION GAP SERPL CALCULATED.3IONS-SCNC: 9 MMOL/L (ref 4–13)
APTT PPP: 41 SECONDS (ref 26–38)
AST SERPL W P-5'-P-CCNC: 34 U/L (ref 5–45)
ATRIAL RATE: 91 BPM
BASOPHILS # BLD AUTO: 0.02 THOUSANDS/ΜL (ref 0–0.1)
BASOPHILS NFR BLD AUTO: 0 % (ref 0–1)
BILIRUB SERPL-MCNC: 1 MG/DL (ref 0.2–1)
BILIRUB UR QL STRIP: NEGATIVE
BUN SERPL-MCNC: 22 MG/DL (ref 5–25)
CALCIUM SERPL-MCNC: 8.7 MG/DL (ref 8.3–10.1)
CHLORIDE SERPL-SCNC: 103 MMOL/L (ref 100–108)
CLARITY UR: CLEAR
CO2 SERPL-SCNC: 26 MMOL/L (ref 21–32)
COLOR UR: NORMAL
CREAT SERPL-MCNC: 1 MG/DL (ref 0.6–1.3)
EOSINOPHIL # BLD AUTO: 0.04 THOUSAND/ΜL (ref 0–0.61)
EOSINOPHIL NFR BLD AUTO: 0 % (ref 0–6)
ERYTHROCYTE [DISTWIDTH] IN BLOOD BY AUTOMATED COUNT: 15.2 % (ref 11.6–15.1)
GFR SERPL CREATININE-BSD FRML MDRD: 70 ML/MIN/1.73SQ M
GLUCOSE SERPL-MCNC: 109 MG/DL (ref 65–140)
GLUCOSE UR STRIP-MCNC: NEGATIVE MG/DL
HCT VFR BLD AUTO: 48.5 % (ref 36.5–49.3)
HGB BLD-MCNC: 15.8 G/DL (ref 12–17)
HGB UR QL STRIP.AUTO: NEGATIVE
IMM GRANULOCYTES # BLD AUTO: 0.04 THOUSAND/UL (ref 0–0.2)
IMM GRANULOCYTES NFR BLD AUTO: 0 % (ref 0–2)
INR PPP: 1.05 (ref 0.86–1.17)
KETONES UR STRIP-MCNC: NEGATIVE MG/DL
LEUKOCYTE ESTERASE UR QL STRIP: NEGATIVE
LYMPHOCYTES # BLD AUTO: 2.21 THOUSANDS/ΜL (ref 0.6–4.47)
LYMPHOCYTES NFR BLD AUTO: 20 % (ref 14–44)
MAGNESIUM SERPL-MCNC: 2.2 MG/DL (ref 1.6–2.6)
MCH RBC QN AUTO: 29.8 PG (ref 26.8–34.3)
MCHC RBC AUTO-ENTMCNC: 32.6 G/DL (ref 31.4–37.4)
MCV RBC AUTO: 91 FL (ref 82–98)
MONOCYTES # BLD AUTO: 1.18 THOUSAND/ΜL (ref 0.17–1.22)
MONOCYTES NFR BLD AUTO: 11 % (ref 4–12)
NEUTROPHILS # BLD AUTO: 7.44 THOUSANDS/ΜL (ref 1.85–7.62)
NEUTS SEG NFR BLD AUTO: 69 % (ref 43–75)
NITRITE UR QL STRIP: NEGATIVE
NRBC BLD AUTO-RTO: 0 /100 WBCS
NT-PROBNP SERPL-MCNC: 238 PG/ML
P AXIS: 84 DEGREES
PH UR STRIP.AUTO: 6 [PH] (ref 4.5–8)
PLATELET # BLD AUTO: 193 THOUSANDS/UL (ref 149–390)
PMV BLD AUTO: 10.2 FL (ref 8.9–12.7)
POTASSIUM SERPL-SCNC: 4.2 MMOL/L (ref 3.5–5.3)
PR INTERVAL: 194 MS
PROT SERPL-MCNC: 7.5 G/DL (ref 6.4–8.2)
PROT UR STRIP-MCNC: NEGATIVE MG/DL
PROTHROMBIN TIME: 13.2 SECONDS (ref 11.8–14.2)
QRS AXIS: -19 DEGREES
QRSD INTERVAL: 108 MS
QT INTERVAL: 390 MS
QTC INTERVAL: 479 MS
RBC # BLD AUTO: 5.31 MILLION/UL (ref 3.88–5.62)
SODIUM SERPL-SCNC: 138 MMOL/L (ref 136–145)
SP GR UR STRIP.AUTO: 1.02 (ref 1–1.03)
T WAVE AXIS: 8 DEGREES
TROPONIN I SERPL-MCNC: <0.02 NG/ML
TSH SERPL DL<=0.05 MIU/L-ACNC: 1.42 UIU/ML (ref 0.36–3.74)
UROBILINOGEN UR QL STRIP.AUTO: 0.2 E.U./DL
VENTRICULAR RATE: 91 BPM
WBC # BLD AUTO: 10.93 THOUSAND/UL (ref 4.31–10.16)

## 2018-12-11 PROCEDURE — 85610 PROTHROMBIN TIME: CPT | Performed by: PHYSICIAN ASSISTANT

## 2018-12-11 PROCEDURE — 84484 ASSAY OF TROPONIN QUANT: CPT | Performed by: PHYSICIAN ASSISTANT

## 2018-12-11 PROCEDURE — 81003 URINALYSIS AUTO W/O SCOPE: CPT | Performed by: PHYSICIAN ASSISTANT

## 2018-12-11 PROCEDURE — 85025 COMPLETE CBC W/AUTO DIFF WBC: CPT | Performed by: PHYSICIAN ASSISTANT

## 2018-12-11 PROCEDURE — 84443 ASSAY THYROID STIM HORMONE: CPT | Performed by: PHYSICIAN ASSISTANT

## 2018-12-11 PROCEDURE — 93005 ELECTROCARDIOGRAM TRACING: CPT

## 2018-12-11 PROCEDURE — 71046 X-RAY EXAM CHEST 2 VIEWS: CPT

## 2018-12-11 PROCEDURE — 85730 THROMBOPLASTIN TIME PARTIAL: CPT | Performed by: PHYSICIAN ASSISTANT

## 2018-12-11 PROCEDURE — 80053 COMPREHEN METABOLIC PANEL: CPT | Performed by: PHYSICIAN ASSISTANT

## 2018-12-11 PROCEDURE — 83735 ASSAY OF MAGNESIUM: CPT | Performed by: PHYSICIAN ASSISTANT

## 2018-12-11 PROCEDURE — 83880 ASSAY OF NATRIURETIC PEPTIDE: CPT | Performed by: PHYSICIAN ASSISTANT

## 2018-12-11 PROCEDURE — 93010 ELECTROCARDIOGRAM REPORT: CPT | Performed by: INTERNAL MEDICINE

## 2018-12-11 PROCEDURE — 99285 EMERGENCY DEPT VISIT HI MDM: CPT

## 2018-12-11 NOTE — ED PROVIDER NOTES
History  Chief Complaint   Patient presents with    Weakness - Generalized     pt has been losing weight and not feeling well in the past couple of weeks  Patient presents to the emergency department today via private vehicle with his wife  Patient has vague symptomatology  He states over the last few months he has noted that he lost 10 lb  He states he overall just feels [employer]  He states over the last 24-48 hours he has felt a burning sensation just below left side of his chest   He denies chest pain or shortness of breath  Denies leg pain or edema  Denies back pain  No nausea or vomiting  Producing urine and stool without difficulty with a normal appetite  Prior to Admission Medications   Prescriptions Last Dose Informant Patient Reported? Taking?    B COMPLEX VITAMINS PO 12/11/2018 at Unknown time Self Yes Yes   Sig: Take 1 tablet by mouth daily   Cholecalciferol (VITAMIN D3) 2000 UNITS capsule 12/11/2018 at Unknown time Self Yes Yes   Sig: Take 1 capsule by mouth 2 (two) times a day 5000 units    Coenzyme Q10 (CO Q10) 60 MG CAPS 12/10/2018 at Unknown time Self Yes Yes   Sig: Take 1 capsule by mouth daily   Ginkgo Biloba 120 MG TABS 12/11/2018 at Unknown time Self Yes Yes   Sig: Take 1 tablet by mouth daily   Inositol 500 MG TABS 12/11/2018 at Unknown time Self Yes Yes   Sig: Take 1 tablet by mouth 2 (two) times a day   MILK THISTLE PO 12/11/2018 at Unknown time Self Yes Yes   Sig: Take 1 tablet by mouth daily   Melatonin 10 MG TABS 12/10/2018 at Unknown time Self Yes Yes   Sig: Take 1 tablet by mouth daily at bedtime   Misc Natural Products (ENERGY FOCUS) TABS 12/11/2018 at Unknown time Self Yes Yes   Sig: Take 1 tablet by mouth daily   atorvastatin (LIPITOR) 10 mg tablet 12/10/2018 at Unknown time Self No Yes   Sig: TAKE 1 TABLET BY MOUTH  DAILY   cetirizine (ZyrTEC) 10 mg tablet 12/11/2018 at Unknown time  No Yes   Sig: Take 1 tablet (10 mg total) by mouth daily for 90 days famotidine (PEPCID) 40 MG tablet 12/10/2018 at Unknown time  No Yes   Sig: Take 1 tablet (40 mg total) by mouth daily at bedtime   ipratropium (ATROVENT) 0 06 % nasal spray 2018 at Unknown time  No Yes   Si sprays into each nostril 3 (three) times a day for 90 days   lutein 6 mg 2018 at Unknown time Self Yes Yes   Sig: Take 1 capsule by mouth daily   omeprazole (PriLOSEC) 40 MG capsule 2018 at Unknown time  Yes Yes   Sig: Take 40 mg by mouth 2 (two) times a day   ondansetron (ZOFRAN-ODT) 4 mg disintegrating tablet PRN  No No   Sig: Take 1 tablet (4 mg total) by mouth every 8 (eight) hours as needed for nausea or vomiting      Facility-Administered Medications: None       Past Medical History:   Diagnosis Date    Anemia     Appendicitis     Coronary artery disease     Detached retina     GERD (gastroesophageal reflux disease)     Hyperlipidemia     Hypertension     Macular degeneration     Neuropathy     Von Willebrand disease ([address] )        Past Surgical History:   Procedure Laterality Date    ADENOIDECTOMY      APPENDECTOMY      ARTHRODESIS  01/15/2014    Lumbar     BACK SURGERY      CATARACT EXTRACTION      COLONOSCOPY  2016    fiberoptic     CYSTOSCOPY      with resection of tumor / 17, 10/2/17 / diagnostic 16, 17     CYSTOSCOPY  2018    EGD AND COLONOSCOPY N/A 2016    Procedure: EGD AND COLONOSCOPY;  Surgeon: Anastacia Winn MD;  [facility]: [facility];  Service:    [facility] EYE SURGERY      FRACTURE SURGERY Bilateral     ARM    NECK SURGERY      NEUROPLASTY / TRANSPOSITION MEDIAN NERVE AT CARPAL TUNNEL Right 2015    KS CYSTOURETHROSCOPY,FULGUR <0 5 CM LESN N/A 2017    Procedure: CYSTOSCOPY, BLADDER BIOPSY WITH FULGRATION, 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;  Surgeon: Peggyann Bosworth, MD;  [facility];  Service: Urology    KS CYSTOURETHROSCOPY,FULGUR <0 5 CM LESN N/A 10/2/2017    Procedure: CYSTOSCOPY WITH BLADDER BIOPSIES WITH FULGURATION;  Surgeon: Dulce Juarez MD;  [facility];  Service: Urology    MS ESOPHAGOGASTRODUODENOSCOPY TRANSORAL DIAGNOSTIC N/A 8/3/2018    Procedure: ESOPHAGOGASTRODUODENOSCOPY (EGD); Surgeon: Nica Summers MD;  [facility]: [facility];  Service: Gastroenterology    MS INSTILL ANTICANCER AGENT IN BLADDER N/A 1/12/2017    Procedure: Jose Mae;  Surgeon: Dulce Juarez MD;  [facility];  Service: Urology    MS INSTILL ANTICANCER AGENT IN BLADDER N/A 10/2/2017    Procedure: Jose Mae;  Surgeon: Dulce Juarez MD;  [facility];  Service: Urology    RETINAL DETACHMENT SURGERY Right 03/2016    complete air fill confirmed    [address] SURGERY  03/03/2015    proximal humerus fracture / [place]    3/6/15   R    1/3/18     TONSILECTOMY AND ADNOIDECTOMY      TONSILLECTOMY      TRANSURETHRAL RESECTION OF BLADDER TUMOR N/A 10/2/2017    Procedure: TRANSURETHRAL RESECTION OF BLADDER TUMOR (TURBT); Surgeon: Dulce Juarez MD;  [facility];  Service: Urology    [address] (HISTORICAL)         Family History   Problem Relation Age of Onset    Ulcers Father         acute gastric    Heart disease Mother    Aashish Hammondysm Sister      I have reviewed and agree with the history as documented  Social History   Substance Use Topics    Smoking status: Light Tobacco Smoker     Years: 60 00     Types: Pipe    Smokeless tobacco: Never Used      Comment: smokes a pipe -resolved     Alcohol use 1 2 oz/week     1 Glasses of wine, 1 Shots of liquor per week      Comment: DAILY         Review of Systems   Constitutional: Positive for unexpected weight change  Negative for activity change, appetite change, chills, diaphoresis, fatigue and fever  HENT: Negative  Eyes: Negative  Respiratory: Negative  Cardiovascular: Negative  Gastrointestinal: Negative  Endocrine: Negative  Genitourinary: Negative  Musculoskeletal: Negative  Skin: Negative  Allergic/Immunologic: Negative  Neurological: Negative  Hematological: Negative  Psychiatric/Behavioral: Negative  All other systems reviewed and are negative  Physical Exam  Physical Exam   Constitutional: He is oriented to person, place, and time  He appears well-developed and well-nourished  No distress  HENT:   Head: Normocephalic  Right Ear: External ear normal    Left Ear: External ear normal    Nose: Nose normal    Mouth/Throat: Oropharynx is clear and moist  No oropharyngeal exudate  Eyes: Pupils are equal, round, and reactive to light  EOM are normal    Neck: Normal range of motion  Cardiovascular: Normal rate, regular rhythm, normal heart sounds and intact distal pulses  Exam reveals no gallop and no friction rub  No murmur heard  Pulmonary/Chest: Effort normal and breath sounds normal  No respiratory distress  He has no wheezes  He has no rales  He exhibits no tenderness  Abdominal: Soft  He exhibits no distension and no mass  There is no tenderness  There is no rebound and no guarding  No hernia  Musculoskeletal: Normal range of motion  Neurological: He is alert and oriented to person, place, and time  Skin: Skin is warm  Capillary refill takes less than 2 seconds  He is not diaphoretic  Psychiatric: He has a normal mood and affect  Vitals reviewed        Vital Signs  ED Triage Vitals [12/11/18 1224]   Temperature Pulse Respirations Blood Pressure SpO2   97 5 °F (36 4 °C) 96 (!) 24 (!) 193/111 99 %      Temp Source Heart Rate Source Patient Position - Orthostatic VS BP Location FiO2 (%)   Temporal Monitor Lying Left arm --      Pain Score       No Pain           Vitals:    12/11/18 1300 12/11/18 1315 12/11/18 1345 12/11/18 1445   BP: (!) 183/86  (!) 175/84 (!) 175/81   Pulse: 85 81 73 88   Patient Position - Orthostatic VS:  Lying Lying Lying       Visual Acuity  Visual Acuity      Most Recent Value   L Pupil Size (mm)  2   R Pupil Size (mm)  2          ED Medications  Medications - No data to display    Diagnostic Studies  Results Reviewed     Procedure Component Value Units Date/Time    UA w Reflex to Microscopic [[id number]] Collected:  12/11/18 1441    Lab Status:  Final result Specimen:  Urine from Urine, Clean Catch Updated:  12/11/18 1446     Color, UA Simran     Clarity, UA Clear     Specific Gravity, UA 1 025     pH, UA 6 0     Leukocytes, UA Negative     Nitrite, UA Negative     Protein, UA Negative mg/dl      Glucose, UA Negative mg/dl      Ketones, UA Negative mg/dl      Urobilinogen, UA 0 2 E U /dl      Bilirubin, UA Negative     Blood, UA Negative    Comprehensive metabolic panel [[id number]]  (Abnormal) Collected:  12/11/18 1253    Lab Status:  Final result Specimen:  Blood from Arm, Left Updated:  12/11/18 1321     Sodium 138 mmol/L      Potassium 4 2 mmol/L      Chloride 103 mmol/L      CO2 26 mmol/L      ANION GAP 9 mmol/L      BUN 22 mg/dL      Creatinine 1 00 mg/dL      Glucose 109 mg/dL      Calcium 8 7 mg/dL      AST 34 U/L      ALT 49 U/L      Alkaline Phosphatase 80 U/L      Total Protein 7 5 g/dL      Albumin 3 3 (L) g/dL      Total Bilirubin 1 00 mg/dL      eGFR 70 ml/min/1 73sq m     Narrative:         National Kidney Disease Education Program recommendations are as follows:  GFR calculation is accurate only with a steady state creatinine  Chronic Kidney disease less than 60 ml/min/1 73 sq  meters  Kidney failure less than 15 ml/min/1 73 sq  meters      B-type natriuretic peptide [[id number]]  (Normal) Collected:  12/11/18 1253    Lab Status:  Final result Specimen:  Blood from Arm, Left Updated:  12/11/18 1321     NT-proBNP 238 pg/mL     TSH [[id number]]  (Normal) Collected:  12/11/18 1253    Lab Status:  Final result Specimen:  Blood from Arm, Left Updated:  12/11/18 1321     TSH 3RD GENERATON 1 416 uIU/mL     Narrative:         Patients undergoing fluorescein dye angiography may retain small amounts of fluorescein in the body for 48-72 hours post procedure  Samples containing fluorescein can produce falsely depressed TSH values  If the patient had this procedure,a specimen should be resubmitted post fluorescein clearance  Magnesium [[id number]]  (Normal) Collected:  12/11/18 1253    Lab Status:  Final result Specimen:  Blood from Arm, Left Updated:  12/11/18 1321     Magnesium 2 2 mg/dL     Troponin I [[id number]]  (Normal) Collected:  12/11/18 1253    Lab Status:  Final result Specimen:  Blood from Arm, Left Updated:  12/11/18 1315     Troponin I <0 02 ng/mL     Protime-INR [[id number]]  (Normal) Collected:  12/11/18 1253    Lab Status:  Final result Specimen:  Blood from Arm, Left Updated:  12/11/18 1313     Protime 13 2 seconds      INR 1 05    APTT [[id number]]  (Abnormal) Collected:  12/11/18 1253    Lab Status:  Final result Specimen:  Blood from Arm, Left Updated:  12/11/18 1313     PTT 41 (H) seconds     CBC and differential [[id number]]  (Abnormal) Collected:  12/11/18 1253    Lab Status:  Final result Specimen:  Blood from Arm, Left Updated:  12/11/18 1258     WBC 10 93 (H) Thousand/uL      RBC 5 31 Million/uL      Hemoglobin 15 8 g/dL      Hematocrit 48 5 %      MCV 91 fL      MCH 29 8 pg      MCHC 32 6 g/dL      RDW 15 2 (H) %      MPV 10 2 fL      Platelets 167 Thousands/uL      nRBC 0 /100 WBCs      Neutrophils Relative 69 %      Immat GRANS % 0 %      Lymphocytes Relative 20 %      Monocytes Relative 11 %      Eosinophils Relative 0 %      Basophils Relative 0 %      Neutrophils Absolute 7 44 Thousands/µL      Immature Grans Absolute 0 04 Thousand/uL      Lymphocytes Absolute 2 21 Thousands/µL      Monocytes Absolute 1 18 Thousand/µL      Eosinophils Absolute 0 04 Thousand/µL      Basophils Absolute 0 02 Thousands/µL                  XR chest 2 views   ED Interpretation by Ina Solorio PA-C (12/11 5884)   There appears to be an old stable right 7th rib fracture    I see no evidence of acute consolidative process  There is no effusions were consolidative changes noted  Procedures  Procedures       Phone Contacts  ED Phone Contact    ED Course  ED Course as of Dec 11 1506   Tue Dec 11, 2018   1232 Blood Pressure: (!) 193/111   1232 Temperature: 97 5 °F (36 4 °C)   1232 Temp Source: Temporal   1232 Pulse: 96   1232 Respirations: (!) 24   1232 SpO2: 99 %   1259 WBC: (!) 10 93   1259 Hemoglobin: 15 8   1259 Platelet Count: 410   1411 Magnesium: 2 2   1411 TSH 3RD GENERATON: 1 416   1411 NT-proBNP: 238   1411 Troponin I: <0 02   1411 PTT: (!) 41   1411 Sodium: 138   1411 CO2: 26   1411 Glucose, Random: 109   1411 Total Protein: 7 5   1411 Albumin: (!) 3 3   1434 Awaiting urine    1459 Blood, UA: Negative   1459 Bilirubin, UA: Negative   1459 Leukocytes, UA: Negative         HEART Risk Score      Most Recent Value   History  0 Filed at: 12/11/2018 1241   ECG  0 Filed at: 12/11/2018 1241   Age  2 Filed at: 12/11/2018 1241   Risk Factors  1 Filed at: 12/11/2018 1241   Troponin  0 Filed at: 12/11/2018 1241   Heart Score Risk Calculator   History  0 Filed at: 12/11/2018 1241   ECG  0 Filed at: 12/11/2018 1241   Age  2 Filed at: 12/11/2018 1241   Risk Factors  1 Filed at: 12/11/2018 1241   Troponin  0 Filed at: 12/11/2018 1241   HEART Score  3 Filed at: 12/11/2018 1241   HEART Score  3 Filed at: 12/11/2018 1241                            MDM  CritCare Time    Disposition  Final diagnoses:   Weakness     Time reflects when diagnosis was documented in both MDM as applicable and the Disposition within this note     Time User Action Codes Description Comment    12/11/2018  3:05 PM Theodora BRINK Add [R53 1] Weakness       ED Disposition     ED Disposition Condition Comment    Discharge  Lucila Mcneal discharge to home/self care      Condition at discharge: Good        Follow-up Information     Follow up With Specialties Details Why [address], DO Family Medicine Schedule an appointment as soon as possible for a visit  [address]  [id number]  [phone]            Patient's Medications   Discharge Prescriptions    No medications on file     No discharge procedures on file      ED Provider  Electronically Signed by           Chavez Lyman PA-C  12/11/18 6679

## 2018-12-11 NOTE — ED PROCEDURE NOTE
Procedure  ECG 12 Lead Documentation  Date/Time: 12/11/2018 12:39 PM  Performed by: Sowmya Mendoza by: Hesham Sánchez     Indications / Diagnosis:  Chest pain  ECG reviewed by me, the ED Provider: yes    Patient location:  Bedside  Previous ECG:     Previous ECG:  Compared to current    Similarity:  No change    Comparison to cardiac monitor: Yes    Interpretation:     Interpretation: normal    Rate:     ECG rate:  91    ECG rate assessment: normal    Rhythm:     Rhythm: sinus rhythm    Ectopy:     Ectopy: none    QRS:     QRS axis:  Normal    QRS intervals:  Normal  Conduction:     Conduction: normal    ST segments:     ST segments:  Normal  T waves:     T waves: normal                       Merribeth Part, RAHEL  12/11/18 1244

## 2018-12-11 NOTE — DISCHARGE INSTRUCTIONS
Weakness   WHAT YOU NEED TO KNOW:   Weakness is a loss of muscle strength  It may be caused by brain, nerve, or muscle problems  Physical and mental conditions such as heart problems, pregnancy, dehydration, or depression may also cause weakness  Reactions to certain drugs can cause weakness  Parts of your body may become weak if you need to wear a cast or splint or have been on bed rest for a long time  DISCHARGE INSTRUCTIONS:   Call 911 for any of the following:   · You have any of the following signs of a stroke:      ¨ Numbness or drooping on one side of your face     ¨ Weakness in an arm or leg    ¨ Confusion or difficulty speaking    ¨ Dizziness, a severe headache, or vision loss    · You lose feeling in your weakened body area  · You have electric shock-like feelings down your arms and legs when you flex or move your neck  · You have sudden or increased trouble speaking, swallowing, or breathing  Return to the emergency department if:   · You have severe pain in your back, arms, or legs that worsens  · You have sudden or worsened muscle weakness or loss of movement  · You are not able to control when you urinate or have a bowel movement  Contact your healthcare provider if:   · You feel depressed or anxious  · You have questions or concerns about your condition or care  Manage weakness:   · Use assistive devices as directed  These help protect you from injury  Examples include a walker or cane  Have someone install handrails in your home  These will help you get out of a bathtub or stand up from a toilet  Use a shower chair so you can sit while you shower  Sit down on the toilet or another chair to dry off and put on your clothes  Get help going up and down stairs if your legs are weak  · Go to physical or occupational therapy if directed  A physical therapist can teach you exercises to help strengthen weak muscles   An occupational therapist can show you ways to do your daily activities more easily  For example, light forks and spoons can be easier to use if you have hand weakness  You may also learn ways to organize your household items so you are not moving heavy items  · Balance rest with exercise  Exercise can help increase your muscle strength and energy  Do not exercise for long periods at a time  Take breaks often to rest  Too much exercise can cause muscle strain or make you more tired  Ask your healthcare provider how much exercise is right for you  · Eat a variety of healthy foods  Too much or too little food may cause weakness or tiredness  Ask your healthcare provider what a healthy amount of food is for you  Healthy foods include fruits, vegetables, whole-grain breads, low-fat dairy products, lean meats and fish, nuts, and cooked beans  · Do not smoke  Nicotine and other chemicals in cigarettes and cigars can make your symptoms worse, and can cause lung damage  Ask your healthcare provider for information if you currently smoke and need help to quit  E-cigarettes or smokeless tobacco still contain nicotine  Talk to your healthcare provider before you use these products  · Do not use caffeine, alcohol, or illegal drugs  These may cause muscle twitching, which could lead to worsened weakness  Follow up with your healthcare provider as directed:  Write down your questions so you remember to ask them during your visits  © 2017 [facility] Information is for End User's use only and may not be sold, redistributed or otherwise used for commercial purposes  All illustrations and images included in CareNotes® are the copyrighted property of [employer]  or [employer]  The above information is an  only  It is not intended as medical advice for individual conditions or treatments  Talk to your doctor, nurse or pharmacist before following any medical regimen to see if it is safe and effective for you

## 2018-12-14 ENCOUNTER — OFFICE VISIT (OUTPATIENT)
Dept: GASTROENTEROLOGY | Facility: HOSPITAL | Age: 83
End: 2018-12-14
Payer: MEDICARE

## 2018-12-14 VITALS
SYSTOLIC BLOOD PRESSURE: 169 MMHG | BODY MASS INDEX: 21.94 KG/M2 | TEMPERATURE: 96.7 F | WEIGHT: 162 LBS | DIASTOLIC BLOOD PRESSURE: 92 MMHG | HEIGHT: 72 IN | HEART RATE: 98 BPM

## 2018-12-14 DIAGNOSIS — K21.9 GASTROESOPHAGEAL REFLUX DISEASE WITHOUT ESOPHAGITIS: ICD-10-CM

## 2018-12-14 DIAGNOSIS — R14.2 BELCHING: ICD-10-CM

## 2018-12-14 DIAGNOSIS — R63.0 APPETITE LOSS: ICD-10-CM

## 2018-12-14 DIAGNOSIS — R11.0 NAUSEA: ICD-10-CM

## 2018-12-14 DIAGNOSIS — R93.3 ABNORMAL CT SCAN, COLON: ICD-10-CM

## 2018-12-14 DIAGNOSIS — R63.4 WEIGHT LOSS: Primary | ICD-10-CM

## 2018-12-14 PROCEDURE — 99214 OFFICE O/P EST MOD 30 MIN: CPT | Performed by: PHYSICIAN ASSISTANT

## 2018-12-14 NOTE — PROGRESS NOTES
[facility] - Outpatient Follow-up Note  Peri Swift 82 y o  male MRN: [id number]  Encounter: [id number]          ASSESSMENT AND PLAN:    1  Weight loss  2  Abnormal CT   -he states that he has been losing weight again indeed he has a documented 10 lb weight loss since August, he had weight loss about 2 years ago and had evaluation for this with an EGD and colonoscopy without cause found  He had a recent CT with contrast which was overall unremarkable, rectal thickening was reported though noted to be possibly underdistention    -his last colonoscopy showed only mild diverticulosis in the sigmoid colon but was otherwise unremarkable, this was in 2016  Biopsies did show lymphocytic colitis  -he is very concerned given his rectal thickening and his weight loss  He states he is specifically concerned about colon cancer  He appears very anxious in the office    -It is reasonable to repeat the colonoscopy given the abnormal CT findings, he does have a history of malignancy (bladder cancer)  We discussed the risks and benefits of the colonoscopy, he would prefer to proceed and will schedule this  -follow-up in the office after the colonoscopy    3  Nausea  4  Belching  5  GERD   -EGD showed gastritis, H pylori was negative    -he had a gastric emptying study that was normal   -recent TSH was within normal limits   -continue omeprazole twice daily as well as Pepcid at bedtime as he feels this has helped him  -recommend an anti-reflux diet, we discussed that in detail and he was given a handout  He is hesitant about this as he enjoys chocolate    -he notes that he recently started taking melatonin and Valerian to sleep, these medications can cause nausea   He will try not taking them and see if these have any affect    -follow-up in the office to see how he is doing    ____________________________________________________________    SUBJECTIVE:    82-year-old male past medical history of GERD, hypothyroidism, CAD, von Willebrand's disease, bladder cancer presents to the office for follow-up  He reports that he is feeling somewhat better, he has been taking omeprazole and does feel that overall the nausea, reflux and belching have improved  He does continue to have some intermittent symptoms  He feels that his bowel habits are under better control recently  He has not been having diarrhea  He is concerned about weight loss, he had a 10 lb weight loss documented since August   He states his appetite is good overall and he is eating fairly well, he states he does not eat like he used to when he was young he denies any recent changes  Upon review of the chart, intermittent weight loss has been noted over the past 2 years, his weight has fluctuated over this time but has been stable since 2016  He recently had an upper endoscopy in August which showed erosive gastritis and nodular mucosa in the body of the stomach as well as mild GAVE  Repeat EGD was recommended in 1 year  He also has a history of lymphocytic colitis on biopsy in 2016  REVIEW OF SYSTEMS IS OTHERWISE NEGATIVE        Historical Information   Past Medical History:   Diagnosis Date    Anemia     Appendicitis     Coronary artery disease     Detached retina     GERD (gastroesophageal reflux disease)     Hyperlipidemia     Hypertension     Macular degeneration     Neuropathy     Von Willebrand disease ([facility] [address] )      Past Surgical History:   Procedure Laterality Date    ADENOIDECTOMY      APPENDECTOMY      ARTHRODESIS  01/15/2014    Lumbar     BACK SURGERY      CATARACT EXTRACTION      COLONOSCOPY  12/09/2016    fiberoptic     CYSTOSCOPY      with resection of tumor / 1/12/17, 10/2/17 / diagnostic 12/8/16, 5/30/17     CYSTOSCOPY  09/20/2018    EGD AND COLONOSCOPY N/A 12/9/2016    Procedure: EGD AND COLONOSCOPY;  Surgeon: Mikey Guzman MD;  Location: [facility];  Service:    [facility] FRACTURE SURGERY Bilateral     ARM    NECK SURGERY      NEUROPLASTY / TRANSPOSITION MEDIAN NERVE AT CARPAL TUNNEL Right 04/2015    ME CYSTOURETHROSCOPY,FULGUR <0 5 CM LESN N/A 1/12/2017    Procedure: CYSTOSCOPY, BLADDER BIOPSY WITH FULGRATION, 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;  Surgeon: Cinthya Bell MD;  Location: [facility];  Service: Urology    ME CYSTOURETHROSCOPY,FULGUR <0 5 CM LESN N/A 10/2/2017    Procedure: CYSTOSCOPY WITH  BLADDER BIOPSIES WITH FULGURATION;  Surgeon: Cinthya Bell MD;  [facility]: [facility];  Service: Urology    ME ESOPHAGOGASTRODUODENOSCOPY TRANSORAL DIAGNOSTIC N/A 8/3/2018    Procedure: ESOPHAGOGASTRODUODENOSCOPY (EGD); Surgeon: Heraclio Yanez MD;  Location: [facility];  Service: Gastroenterology    ME INSTILL ANTICANCER AGENT IN BLADDER N/A 1/12/2017    Procedure: Brianne Vizcaino;  Surgeon: Cinthya Bell MD;  [facility];  Service: Urology    ME INSTILL ANTICANCER AGENT IN BLADDER N/A 10/2/2017    Procedure: Brianne Vizcaino;  Surgeon: Cinthya Bell MD;  [facility];  Service: Urology    RETINAL DETACHMENT SURGERY Right 03/2016    complete air fill confirmed    [address] SURGERY  03/03/2015    proximal humerus fracture / [place]    3/6/15   R    1/3/18     TONSILECTOMY AND ADNOIDECTOMY      TONSILLECTOMY      TRANSURETHRAL RESECTION OF BLADDER TUMOR N/A 10/2/2017    Procedure: TRANSURETHRAL RESECTION OF BLADDER TUMOR (TURBT);   Surgeon: Cinthya Bell MD;  [facility]: [facility];  Service: Urology   [facility] ANTIGEN (HISTORICAL)       Social History   History   Alcohol Use    1 2 oz/week    1 Glasses of wine, 1 Shots of liquor per week     Comment: DAILY      History   Drug Use No     History   Smoking Status    Light Tobacco Smoker    Years: 60 00    Types: Pipe   Smokeless Tobacco    Never Used     Comment: smokes a pipe -resolved      Family History   Problem Relation Age of Onset    Ulcers Father         acute gastric    Heart disease Mother    Tri Hammondysm Sister        Meds/Allergies       Current Outpatient Prescriptions:     atorvastatin (LIPITOR) 10 mg tablet    B COMPLEX VITAMINS PO    cetirizine (ZyrTEC) 10 mg tablet    Cholecalciferol (VITAMIN D3) 2000 UNITS capsule    Coenzyme Q10 (CO Q10) 60 MG CAPS    famotidine (PEPCID) 40 MG tablet    Ginkgo Biloba 120 MG TABS    Inositol 500 MG TABS    ipratropium (ATROVENT) 0 06 % nasal spray    lutein 6 mg    Melatonin 10 MG TABS    MILK THISTLE PO    Misc Natural Products (ENERGY FOCUS) TABS    omeprazole (PriLOSEC) 40 MG capsule    ondansetron (ZOFRAN-ODT) 4 mg disintegrating tablet    No Known Allergies        Objective     Blood pressure 169/92, pulse 98, temperature (!) 96 7 °F (35 9 °C), temperature source Tympanic, height 6' (1 829 m), weight 73 5 kg (162 lb)  PHYSICAL EXAM:      Physical Exam   Constitutional: He is oriented to person, place, and time  He appears well-developed and well-nourished  No distress  HENT:   Head: Normocephalic and atraumatic  Eyes: Right eye exhibits no discharge  Left eye exhibits no discharge  No scleral icterus  Neck: Neck supple  No tracheal deviation present  Cardiovascular: Normal rate, regular rhythm, normal heart sounds and intact distal pulses  Exam reveals no gallop and no friction rub  No murmur heard  Pulmonary/Chest: Effort normal and breath sounds normal  No respiratory distress  He has no wheezes  He has no rales  Abdominal: Soft  Bowel sounds are normal  He exhibits no distension  There is no tenderness  There is no rebound and no guarding  Neurological: He is alert and oriented to person, place, and time  Skin: Skin is warm and dry  Psychiatric: He has a normal mood and affect  Lab Results:   No visits with results within 1 Day(s) from this visit     Latest known visit with results is:   Admission on 12/11/2018, Discharged on 12/11/2018   Component Date Value    WBC 12/11/2018 10 93*    RBC 12/11/2018 5 31     Hemoglobin 12/11/2018 15 8     Hematocrit 12/11/2018 48 5     MCV 12/11/2018 91     MCH 12/11/2018 29 8     MCHC 12/11/2018 32 6     RDW 12/11/2018 15 2*    MPV 12/11/2018 10 2     Platelets 86/99/3310 193     nRBC 12/11/2018 0     Neutrophils Relative 12/11/2018 69     Immat GRANS % 12/11/2018 0     Lymphocytes Relative 12/11/2018 20     Monocytes Relative 12/11/2018 11     Eosinophils Relative 12/11/2018 0     Basophils Relative 12/11/2018 0     Neutrophils Absolute 12/11/2018 7 44     Immature Grans Absolute 12/11/2018 0 04     Lymphocytes Absolute 12/11/2018 2 21     Monocytes Absolute 12/11/2018 1 18     Eosinophils Absolute 12/11/2018 0 04     Basophils Absolute 12/11/2018 0 02     Protime 12/11/2018 13 2     INR 12/11/2018 1 05     PTT 12/11/2018 41*    Sodium 12/11/2018 138     Potassium 12/11/2018 4 2     Chloride 12/11/2018 103     CO2 12/11/2018 26     ANION GAP 12/11/2018 9     BUN 12/11/2018 22     Creatinine 12/11/2018 1 00     Glucose 12/11/2018 109     Calcium 12/11/2018 8 7     AST 12/11/2018 34     ALT 12/11/2018 49     Alkaline Phosphatase 12/11/2018 80     Total Protein 12/11/2018 7 5     Albumin 12/11/2018 3 3*    Total Bilirubin 12/11/2018 1 00     eGFR 12/11/2018 70     NT-proBNP 12/11/2018 238     Troponin I 12/11/2018 <0 02     TSH 3RD GENERATON 12/11/2018 1 416     Magnesium 12/11/2018 2 2     Color, UA 12/11/2018 Simran     Clarity, UA 12/11/2018 Clear     Specific Gravity, UA 12/11/2018 1 025     pH, UA 12/11/2018 6 0     Leukocytes, UA 12/11/2018 Negative     Nitrite, UA 12/11/2018 Negative     Protein, UA 12/11/2018 Negative     Glucose, UA 12/11/2018 Negative     Ketones, UA 12/11/2018 Negative     Urobilinogen, UA 12/11/2018 0 2     Bilirubin, UA 12/11/2018 Negative     Blood, UA 12/11/2018 Negative     Ventricular Rate 12/11/2018 91     Atrial Rate 12/11/2018 91     OH Interval 12/11/2018 194     QRSD Interval 12/11/2018 108     QT Interval 12/11/2018 390     QTC Interval 12/11/2018 479     P Axis 12/11/2018 84     QRS [place] 12/11/2018 -23     T Wave [place] 12/11/2018 8          Radiology Results:   Xr Chest 2 Views    Result Date: 12/11/2018  Narrative: CHEST INDICATION:   cough  Midepigastric pain  Weight loss  COMPARISON:  January 12, 2018 EXAM PERFORMED/VIEWS:  XR CHEST PA & LATERAL  PA view was performed utilizing dual energy radiographic technique  Images: 4 FINDINGS: Cardiomediastinal silhouette appears unremarkable  The lungs are clear  No pneumothorax or pleural effusion  Old right 7th and 9th rib fractures  Age-appropriate degenerative changes, thoracolumbar spine  Impression: No acute cardiopulmonary disease   Workstation performed: [id number]

## 2018-12-20 ENCOUNTER — HOSPITAL ENCOUNTER (OUTPATIENT)
Dept: CT IMAGING | Facility: HOSPITAL | Age: 83
Discharge: HOME | End: 2018-12-20
Attending: FAMILY MEDICINE
Payer: MEDICARE

## 2018-12-20 ENCOUNTER — APPOINTMENT (OUTPATIENT)
Dept: LAB | Facility: HOSPITAL | Age: 83
End: 2018-12-20
Attending: FAMILY MEDICINE
Payer: MEDICARE

## 2018-12-20 ENCOUNTER — OFFICE VISIT (OUTPATIENT)
Dept: FAMILY MEDICINE CLINIC | Facility: CLINIC | Age: 83
End: 2018-12-20
Payer: MEDICARE

## 2018-12-20 VITALS
TEMPERATURE: 96.2 F | DIASTOLIC BLOOD PRESSURE: 80 MMHG | WEIGHT: 163 LBS | HEIGHT: 72 IN | BODY MASS INDEX: 22.08 KG/M2 | SYSTOLIC BLOOD PRESSURE: 148 MMHG

## 2018-12-20 DIAGNOSIS — K21.9 GASTROESOPHAGEAL REFLUX DISEASE, ESOPHAGITIS PRESENCE NOT SPECIFIED: ICD-10-CM

## 2018-12-20 DIAGNOSIS — R10.9 ACUTE RIGHT FLANK PAIN: Primary | ICD-10-CM

## 2018-12-20 DIAGNOSIS — R10.9 ACUTE RIGHT FLANK PAIN: ICD-10-CM

## 2018-12-20 DIAGNOSIS — N30.00 ACUTE CYSTITIS WITHOUT HEMATURIA: Primary | ICD-10-CM

## 2018-12-20 LAB
BACTERIA UR QL AUTO: ABNORMAL /HPF
BILIRUB UR QL STRIP: NEGATIVE
CLARITY UR: CLEAR
COLOR UR: ABNORMAL
GLUCOSE UR STRIP-MCNC: NEGATIVE MG/DL
HGB UR QL STRIP.AUTO: NEGATIVE
HYALINE CASTS #/AREA URNS LPF: ABNORMAL /LPF
KETONES UR STRIP-MCNC: NEGATIVE MG/DL
LEUKOCYTE ESTERASE UR QL STRIP: NEGATIVE
MUCOUS THREADS UR QL AUTO: ABNORMAL
NITRITE UR QL STRIP: POSITIVE
NON-SQ EPI CELLS URNS QL MICRO: ABNORMAL /HPF
PH UR STRIP.AUTO: 6 [PH] (ref 4.5–8)
PROT UR STRIP-MCNC: ABNORMAL MG/DL
RBC #/AREA URNS AUTO: ABNORMAL /HPF
SP GR UR STRIP.AUTO: 1.02 (ref 1–1.03)
UROBILINOGEN UR QL STRIP.AUTO: 0.2 E.U./DL
WBC #/AREA URNS AUTO: ABNORMAL /HPF

## 2018-12-20 PROCEDURE — 87086 URINE CULTURE/COLONY COUNT: CPT | Performed by: FAMILY MEDICINE

## 2018-12-20 PROCEDURE — 74176 CT ABD & PELVIS W/O CONTRAST: CPT

## 2018-12-20 PROCEDURE — 99214 OFFICE O/P EST MOD 30 MIN: CPT | Performed by: FAMILY MEDICINE

## 2018-12-20 PROCEDURE — 81001 URINALYSIS AUTO W/SCOPE: CPT | Performed by: FAMILY MEDICINE

## 2018-12-20 RX ORDER — CEPHALEXIN 500 MG/1
500 CAPSULE ORAL EVERY 6 HOURS SCHEDULED
Qty: 28 CAPSULE | Refills: 0 | Status: SHIPPED | OUTPATIENT
Start: 2018-12-20 | End: 2018-12-27

## 2018-12-20 RX ORDER — FAMOTIDINE 40 MG/1
40 TABLET, FILM COATED ORAL
Qty: 90 TABLET | Refills: 2 | Status: SHIPPED | OUTPATIENT
Start: 2018-12-20 | End: 2019-03-19 | Stop reason: SDUPTHER

## 2018-12-20 NOTE — PROGRESS NOTES
Assessment/Plan:    No problem-specific Assessment & Plan notes found for this encounter  Diagnoses and all orders for this visit:    Acute right flank pain    Gastroesophageal reflux disease, esophagitis presence not specified  -     famotidine (PEPCID) 40 MG tablet; Take 1 tablet (40 mg total) by mouth daily at bedtime          Subjective:      Patient ID: Millie Zhang is a 82 y o  male  Mr  Urine 0 which is here with acute onset right flank pain of less than 24-48 hours duration he has also had some urinary frequency but no dysuria he tried taking azo which was not successful in relieving his symptoms symptoms are present in the right flank area        The following portions of the patient's history were reviewed and updated as appropriate:   He  has a past medical history of Anemia; Appendicitis; Coronary artery disease; Detached retina; GERD (gastroesophageal reflux disease); Hyperlipidemia; Hypertension; Macular degeneration; Neuropathy; and Von Willebrand disease ([facility] [address] )  He   Patient Active Problem List    Diagnosis Date Noted    Alternating constipation and diarrhea 08/29/2018    Weight loss, non-intentional 08/29/2018    Nausea 08/29/2018    Hoarse voice quality 08/01/2018    Appetite loss 08/01/2018    Weight loss 08/01/2018    Gastroesophageal reflux disease without esophagitis 08/01/2018    Idiopathic peripheral neuropathy 06/29/2018    Cervical stenosis of spinal canal 06/29/2018    Spinal stenosis of lumbar region 06/29/2018    Lung nodule, solitary 02/20/2018    Bladder cancer ([facility] [address] ) 02/13/2018    High blood pressure 12/28/2016    Gait instability 05/31/2016    Erectile dysfunction 12/09/2014    Hypothyroidism 06/07/2013    Spondylosis 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 12/31/2012    Bilateral exudative age-related macular degeneration ([facility] [address] ) 06/26/2012     He  has a past surgical history that includes Back surgery; Neck surgery;  Eye surgery; Appendectomy; TONSILECTOMY AND ADNOIDECTOMY; Fracture surgery (Bilateral); VON WILLEBRAND ANTIGEN (HISTORICAL); Tonsillectomy; Cataract extraction; Rotator cuff repair; pr cystourethroscopy,fulgur <0 5 cm lesn (N/A, 1/12/2017); pr instill anticancer agent in bladder (N/A, 1/12/2017); EGD AND COLONOSCOPY (N/A, 12/9/2016); pr cystourethroscopy,fulgur <0 5 cm lesn (N/A, 10/2/2017); pr instill anticancer agent in bladder (N/A, 10/2/2017); Transurethral resection of bladder tumor (N/A, 10/2/2017); Shoulder surgery (03/03/2015); Adenoidectomy; Arthrodesis (01/15/2014); Colonoscopy (12/09/2016); Cystoscopy; Neuroplasty / transposition median nerve at carpal tunnel (Right, 04/2015); Retinal detachment surgery (Right, 03/2016); pr esophagogastroduodenoscopy transoral diagnostic (N/A, 8/3/2018); and Cystoscopy (09/20/2018)  His family history includes Anuerysm in his sister; Heart disease in his mother; Ulcers in his father  He  reports that he has been smoking Pipe  He has smoked for the past 60 00 years  He has never used smokeless tobacco  He reports that he drinks about 1 2 oz of alcohol per week   He reports that he does not use drugs    Current Outpatient Prescriptions   Medication Sig Dispense Refill    atorvastatin (LIPITOR) 10 mg tablet TAKE 1 TABLET BY MOUTH  DAILY 90 tablet 2    B COMPLEX VITAMINS PO Take 1 tablet by mouth daily      cetirizine (ZyrTEC) 10 mg tablet Take 1 tablet (10 mg total) by mouth daily for 90 days 90 tablet 1    Cholecalciferol (VITAMIN D3) 2000 UNITS capsule Take 1 capsule by mouth 2 (two) times a day 5000 units       Coenzyme Q10 (CO Q10) 60 MG CAPS Take 1 capsule by mouth daily      famotidine (PEPCID) 40 MG tablet Take 1 tablet (40 mg total) by mouth daily at bedtime 30 tablet 2    Ginkgo Biloba 120 MG TABS Take 1 tablet by mouth daily      Inositol 500 MG TABS Take 1 tablet by mouth 2 (two) times a day      ipratropium (ATROVENT) 0 06 % nasal spray 2 sprays into each nostril 3 (three) times a day for 90 days 60 mL 1    lutein 6 mg Take 1 capsule by mouth daily      Melatonin 10 MG TABS Take 1 tablet by mouth daily at bedtime      MILK THISTLE PO Take 1 tablet by mouth daily      Misc Natural Products (ENERGY FOCUS) TABS Take 1 tablet by mouth daily      Na Sulfate-K Sulfate-Mg Sulf (SUPREP BOWEL PREP KIT) 17 5-3 13-1 6 GM/177ML SOLN Take as directed by the office 1 Bottle 0    omeprazole (PriLOSEC) 40 MG capsule Take 40 mg by mouth 2 (two) times a day      ondansetron (ZOFRAN-ODT) 4 mg disintegrating tablet Take 1 tablet (4 mg total) by mouth every 8 (eight) hours as needed for nausea or vomiting 30 tablet 2     No current facility-administered medications for this visit        Current Outpatient Prescriptions on File Prior to Visit   Medication Sig    atorvastatin (LIPITOR) 10 mg tablet TAKE 1 TABLET BY MOUTH  DAILY    B COMPLEX VITAMINS PO Take 1 tablet by mouth daily    cetirizine (ZyrTEC) 10 mg tablet Take 1 tablet (10 mg total) by mouth daily for 90 days    Cholecalciferol (VITAMIN D3) 2000 UNITS capsule Take 1 capsule by mouth 2 (two) times a day 5000 units     Coenzyme Q10 (CO Q10) 60 MG CAPS Take 1 capsule by mouth daily    famotidine (PEPCID) 40 MG tablet Take 1 tablet (40 mg total) by mouth daily at bedtime    Ginkgo Biloba 120 MG TABS Take 1 tablet by mouth daily    Inositol 500 MG TABS Take 1 tablet by mouth 2 (two) times a day    ipratropium (ATROVENT) 0 06 % nasal spray 2 sprays into each nostril 3 (three) times a day for 90 days    lutein 6 mg Take 1 capsule by mouth daily    Melatonin 10 MG TABS Take 1 tablet by mouth daily at bedtime    MILK THISTLE PO Take 1 tablet by mouth daily    Misc Natural Products (ENERGY FOCUS) TABS Take 1 tablet by mouth daily    Na Sulfate-K Sulfate-Mg Sulf (SUPREP BOWEL PREP KIT) 17 5-3 13-1 6 GM/177ML SOLN Take as directed by the office    omeprazole (PriLOSEC) 40 MG capsule Take 40 mg by mouth 2 (two) times a day    ondansetron (ZOFRAN-ODT) 4 mg disintegrating tablet Take 1 tablet (4 mg total) by mouth every 8 (eight) hours as needed for nausea or vomiting     No current facility-administered medications on file prior to visit  He has No Known Allergies       Review of Systems   Constitutional: Negative for activity change, appetite change, diaphoresis, fatigue and fever  HENT: Negative  Eyes: Negative  Respiratory: Negative for apnea, cough, chest tightness, shortness of breath and wheezing  Cardiovascular: Negative for chest pain, palpitations and leg swelling  Gastrointestinal: Negative for abdominal distention, abdominal pain, anal bleeding, constipation, diarrhea, nausea and vomiting  Endocrine: Negative for cold intolerance, heat intolerance, polydipsia, polyphagia and polyuria  Genitourinary: Positive for flank pain  Negative for difficulty urinating, dysuria, hematuria and urgency  Musculoskeletal: Negative for arthralgias, back pain, gait problem, joint swelling and myalgias  Skin: Negative for color change, rash and wound  Allergic/Immunologic: Negative for environmental allergies, food allergies and immunocompromised state  Neurological: Negative for dizziness, seizures, syncope, speech difficulty, numbness and headaches  Hematological: Negative for adenopathy  Does not bruise/bleed easily  Psychiatric/Behavioral: Negative for agitation, behavioral problems, hallucinations, sleep disturbance and suicidal ideas  Objective:      /80 (BP Location: Left arm, Patient Position: Sitting, Cuff Size: Standard)   Temp (!) 96 2 °F (35 7 °C) (Tympanic)   Ht 6' (1 829 m)   Wt 73 9 kg (163 lb)   BMI 22 11 kg/m²          Physical Exam   Constitutional: He is oriented to person, place, and time  He appears well-developed and well-nourished  No distress  HENT:   Head: Normocephalic     Right Ear: External ear normal    Left Ear: External ear normal    Nose: Nose normal  Mouth/Throat: Oropharynx is clear and moist    Eyes: Pupils are equal, round, and reactive to light  Conjunctivae and EOM are normal  Right eye exhibits no discharge  Left eye exhibits no discharge  No scleral icterus  Neck: Normal range of motion  No tracheal deviation present  No thyromegaly present  Cardiovascular: Normal rate, regular rhythm and normal heart sounds  Exam reveals no gallop and no friction rub  No murmur heard  Pulmonary/Chest: Effort normal and breath sounds normal  No respiratory distress  He has no wheezes  Abdominal: Soft  Bowel sounds are normal  He exhibits no mass  There is tenderness  There is no guarding  Musculoskeletal: He exhibits no edema or deformity  Lymphadenopathy:     He has no cervical adenopathy  Neurological: He is alert and oriented to person, place, and time  No cranial nerve deficit  Skin: Skin is warm and dry  No rash noted  He is not diaphoretic  No erythema  Psychiatric: He has a normal mood and affect   Thought content normal

## 2018-12-20 NOTE — PROGRESS NOTES
Please call the patient regarding his abnormal result    No kidney stones noted however patient does have bacteria in his urine needs an antibiotic I will send a script for Keflex

## 2018-12-21 LAB — BACTERIA UR CULT: NORMAL

## 2018-12-26 ENCOUNTER — DOCTOR'S OFFICE (OUTPATIENT)
Dept: URBAN - METROPOLITAN AREA CLINIC 136 | Facility: CLINIC | Age: 83
Setting detail: OPHTHALMOLOGY
End: 2018-12-26
Payer: COMMERCIAL

## 2018-12-26 DIAGNOSIS — H01.001: ICD-10-CM

## 2018-12-26 DIAGNOSIS — H43.392: ICD-10-CM

## 2018-12-26 DIAGNOSIS — H35.3211: ICD-10-CM

## 2018-12-26 DIAGNOSIS — H33.021: ICD-10-CM

## 2018-12-26 DIAGNOSIS — H31.002: ICD-10-CM

## 2018-12-26 DIAGNOSIS — H35.3231: ICD-10-CM

## 2018-12-26 PROCEDURE — 92134 CPTRZ OPH DX IMG PST SGM RTA: CPT | Performed by: OPHTHALMOLOGY

## 2018-12-26 PROCEDURE — 67028 INJECTION EYE DRUG: CPT | Performed by: OPHTHALMOLOGY

## 2018-12-26 PROCEDURE — 92012 INTRM OPH EXAM EST PATIENT: CPT | Performed by: OPHTHALMOLOGY

## 2018-12-26 ASSESSMENT — LID EXAM ASSESSMENTS: OD_BLEPHARITIS: 1+

## 2018-12-26 ASSESSMENT — SUPERFICIAL PUNCTATE KERATITIS (SPK): OD_SPK: 2+

## 2018-12-26 ASSESSMENT — DRY EYES - PHYSICIAN NOTES: OD_GENERALCOMMENTS: INFERIORLY

## 2018-12-27 ENCOUNTER — RX ONLY (RX ONLY)
Age: 83
End: 2018-12-27

## 2018-12-27 ASSESSMENT — VISUAL ACUITY
OD_BCVA: 20/70-2
OS_BCVA: 20/400

## 2018-12-27 ASSESSMENT — REFRACTION_AUTOREFRACTION
OD_AXIS: 087
OD_SPHERE: -1.00
OS_SPHERE: +0.25
OD_CYLINDER: -1.00
OS_CYLINDER: -1.75
OS_AXIS: 074

## 2018-12-27 ASSESSMENT — REFRACTION_CURRENTRX
OS_OVR_VA: 20/
OD_OVR_VA: 20/

## 2018-12-27 ASSESSMENT — REFRACTION_MANIFEST
OS_VA1: 20/
OS_VA2: 20/
OS_VA2: 20/
OD_VA1: 20/
OD_VA1: 20/
OU_VA: 20/
OS_VA1: 20/
OD_VA3: 20/
OD_VA2: 20/
OS_VA3: 20/
OD_VA3: 20/
OD_VA2: 20/
OU_VA: 20/
OS_VA3: 20/

## 2018-12-27 ASSESSMENT — SPHEQUIV_DERIVED
OS_SPHEQUIV: -0.625
OD_SPHEQUIV: -1.5

## 2019-01-02 ENCOUNTER — OFFICE VISIT (OUTPATIENT)
Dept: FAMILY MEDICINE CLINIC | Facility: CLINIC | Age: 84
End: 2019-01-02
Payer: MEDICARE

## 2019-01-02 VITALS
DIASTOLIC BLOOD PRESSURE: 88 MMHG | SYSTOLIC BLOOD PRESSURE: 132 MMHG | TEMPERATURE: 97 F | BODY MASS INDEX: 21.94 KG/M2 | HEIGHT: 72 IN | WEIGHT: 162 LBS

## 2019-01-02 DIAGNOSIS — J06.9 VIRAL UPPER RESPIRATORY TRACT INFECTION: Primary | ICD-10-CM

## 2019-01-02 PROCEDURE — 99213 OFFICE O/P EST LOW 20 MIN: CPT | Performed by: PHYSICIAN ASSISTANT

## 2019-01-02 NOTE — PROGRESS NOTES
Assessment/Plan:     Diagnoses and all orders for this visit:    Viral upper respiratory tract infection        Advised patient that symptoms are most likely viral and there is no indication for another antibiotic at this time  Advised him to try OTC cough syrup for symptomatic relief and continue to use the nasal spray  If symptoms do not improve or worsen he was advised to let us know  Subjective:      Patient ID: Diya Romero is a 82 y o  male  Patient is a pleasant 82year old who presents with a cough, runny nose, PND, and sore throat for the past two days  He states that the sore throat has improved  The cough is productive first thing in the morning  He has been using atrovent nasal spray, salt water gargles, and cough drops which help provide some relief  He just finished a round of keflex that he was being treated for a UTI with  He denies any urinary symptoms  He denies any fevers, chills, chest pains, shortness of breath  The following portions of the patient's history were reviewed and updated as appropriate:   He  has a past medical history of Anemia; Appendicitis; Coronary artery disease; Detached retina; GERD (gastroesophageal reflux disease); Hyperlipidemia; Hypertension; Macular degeneration; Neuropathy; and Von Willebrand disease ([facility] )    He   Patient Active Problem List    Diagnosis Date Noted    Alternating constipation and diarrhea 08/29/2018    Weight loss, non-intentional 08/29/2018    Nausea 08/29/2018    Hoarse voice quality 08/01/2018    Appetite loss 08/01/2018    Weight loss 08/01/2018    Gastroesophageal reflux disease without esophagitis 08/01/2018    Idiopathic peripheral neuropathy 06/29/2018    Cervical stenosis of spinal canal 06/29/2018    Spinal stenosis of lumbar region 06/29/2018    Lung nodule, solitary 02/20/2018    Bladder cancer ([facility] ) 02/13/2018    High blood pressure 12/28/2016    Gait instability 05/31/2016    Erectile dysfunction 12/09/2014    Hypothyroidism 06/07/2013    Spondylosis 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 12/31/2012    Bilateral exudative age-related macular degeneration ([facility] ) 06/26/2012     He  has a past surgical history that includes Back surgery; Neck surgery; Eye surgery; Appendectomy; TONSILECTOMY AND ADNOIDECTOMY; Fracture surgery (Bilateral); VON WILLEBRAND ANTIGEN (HISTORICAL); Tonsillectomy; Cataract extraction; Rotator cuff repair; pr cystourethroscopy,fulgur <0 5 cm lesn (N/A, 1/12/2017); pr instill anticancer agent in bladder (N/A, 1/12/2017); EGD AND COLONOSCOPY (N/A, 12/9/2016); pr cystourethroscopy,fulgur <0 5 cm lesn (N/A, 10/2/2017); pr instill anticancer agent in bladder (N/A, 10/2/2017); Transurethral resection of bladder tumor (N/A, 10/2/2017); Shoulder surgery (03/03/2015); Adenoidectomy; Arthrodesis (01/15/2014); Colonoscopy (12/09/2016); Cystoscopy; Neuroplasty / transposition median nerve at carpal tunnel (Right, 04/2015); Retinal detachment surgery (Right, 03/2016); pr esophagogastroduodenoscopy transoral diagnostic (N/A, 8/3/2018); and Cystoscopy (09/20/2018)  His family history includes Anuerysm in his sister; Heart disease in his mother; Ulcers in his father  He  reports that he has been smoking Pipe  He has smoked for the past 60 00 years  He has never used smokeless tobacco  He reports that he drinks about 1 2 oz of alcohol per week   He reports that he does not use drugs    Current Outpatient Prescriptions   Medication Sig Dispense Refill    atorvastatin (LIPITOR) 10 mg tablet TAKE 1 TABLET BY MOUTH  DAILY 90 tablet 2    B COMPLEX VITAMINS PO Take 1 tablet by mouth daily      cetirizine (ZyrTEC) 10 mg tablet Take 1 tablet (10 mg total) by mouth daily for 90 days 90 tablet 1    Cholecalciferol (VITAMIN D3) 2000 UNITS capsule Take 1 capsule by mouth 2 (two) times a day 5000 units       Coenzyme Q10 (CO Q10) 60 MG CAPS Take 1 capsule by mouth daily      famotidine (PEPCID) 40 MG tablet Take 1 tablet (40 mg total) by mouth daily at bedtime 90 tablet 2    Ginkgo Biloba 120 MG TABS Take 1 tablet by mouth daily      Inositol 500 MG TABS Take 1 tablet by mouth 2 (two) times a day      ipratropium (ATROVENT) 0 06 % nasal spray 2 sprays into each nostril 3 (three) times a day for 90 days 60 mL 1    lutein 6 mg Take 1 capsule by mouth daily      Melatonin 10 MG TABS Take 1 tablet by mouth daily at bedtime      MILK THISTLE PO Take 1 tablet by mouth daily      Misc Natural Products (ENERGY FOCUS) TABS Take 1 tablet by mouth daily      Na Sulfate-K Sulfate-Mg Sulf (SUPREP BOWEL PREP KIT) 17 5-3 13-1 6 GM/177ML SOLN Take as directed by the office 1 Bottle 0    omeprazole (PriLOSEC) 40 MG capsule Take 40 mg by mouth 2 (two) times a day      ondansetron (ZOFRAN-ODT) 4 mg disintegrating tablet Take 1 tablet (4 mg total) by mouth every 8 (eight) hours as needed for nausea or vomiting (Patient not taking: Reported on 1/2/2019 ) 30 tablet 2     No current facility-administered medications for this visit        Current Outpatient Prescriptions on File Prior to Visit   Medication Sig    atorvastatin (LIPITOR) 10 mg tablet TAKE 1 TABLET BY MOUTH  DAILY    B COMPLEX VITAMINS PO Take 1 tablet by mouth daily    cetirizine (ZyrTEC) 10 mg tablet Take 1 tablet (10 mg total) by mouth daily for 90 days    Cholecalciferol (VITAMIN D3) 2000 UNITS capsule Take 1 capsule by mouth 2 (two) times a day 5000 units     Coenzyme Q10 (CO Q10) 60 MG CAPS Take 1 capsule by mouth daily    famotidine (PEPCID) 40 MG tablet Take 1 tablet (40 mg total) by mouth daily at bedtime    Ginkgo Biloba 120 MG TABS Take 1 tablet by mouth daily    Inositol 500 MG TABS Take 1 tablet by mouth 2 (two) times a day    ipratropium (ATROVENT) 0 06 % nasal spray 2 sprays into each nostril 3 (three) times a day for 90 days    lutein 6 mg Take 1 capsule by mouth daily    Melatonin 10 MG TABS Take 1 tablet by mouth daily at bedtime    MILK THISTLE PO Take 1 tablet by mouth daily    Misc Natural Products (ENERGY FOCUS) TABS Take 1 tablet by mouth daily    Na Sulfate-K Sulfate-Mg Sulf (SUPREP BOWEL PREP KIT) 17 5-3 13-1 6 GM/177ML SOLN Take as directed by the office    omeprazole (PriLOSEC) 40 MG capsule Take 40 mg by mouth 2 (two) times a day    ondansetron (ZOFRAN-ODT) 4 mg disintegrating tablet Take 1 tablet (4 mg total) by mouth every 8 (eight) hours as needed for nausea or vomiting (Patient not taking: Reported on 1/2/2019 )     No current facility-administered medications on file prior to visit  He has No Known Allergies       Review of Systems   Constitutional: Negative for chills, diaphoresis, fatigue and fever  HENT: Positive for congestion, postnasal drip, rhinorrhea and sore throat  Negative for ear pain, sneezing and trouble swallowing  Eyes: Negative for pain and visual disturbance  Respiratory: Positive for cough  Negative for apnea, shortness of breath and wheezing  Cardiovascular: Negative for chest pain and palpitations  Gastrointestinal: Negative for abdominal pain, constipation, diarrhea, nausea and vomiting  Genitourinary: Negative for dysuria, frequency, hematuria and urgency  Musculoskeletal: Negative for arthralgias, gait problem and myalgias  Neurological: Negative for dizziness, syncope, weakness, light-headedness, numbness and headaches  Psychiatric/Behavioral: Negative for suicidal ideas  The patient is not nervous/anxious  Objective:    /88   Temp (!) 97 °F (36 1 °C)   Ht 6' (1 829 m)   Wt 73 5 kg (162 lb)   BMI 21 97 kg/m²      Physical Exam   Constitutional: He is oriented to person, place, and time  He appears well-developed and well-nourished  HENT:   Head: Normocephalic and atraumatic     Right Ear: Tympanic membrane, external ear and ear canal normal    Left Ear: Tympanic membrane, external ear and ear canal normal    Nose: Mucosal edema and rhinorrhea present  Mouth/Throat: Mucous membranes are normal  Posterior oropharyngeal erythema present  No oropharyngeal exudate or posterior oropharyngeal edema  Clear Post Nasal Drip    Eyes: Pupils are equal, round, and reactive to light  EOM are normal    Neck: Normal range of motion  Neck supple  Cardiovascular: Normal rate, regular rhythm and normal heart sounds  Exam reveals no gallop and no friction rub  No murmur heard  Pulmonary/Chest: Effort normal and breath sounds normal  No respiratory distress  He has no wheezes  He has no rales  Abdominal: Soft  There is no tenderness  There is no rebound and no guarding  Musculoskeletal: Normal range of motion  Lymphadenopathy:     He has no cervical adenopathy  Neurological: He is alert and oriented to person, place, and time  Skin: Skin is warm and dry  Psychiatric: He has a normal mood and affect  His behavior is normal  Judgment and thought content normal    Vitals reviewed

## 2019-01-07 ENCOUNTER — OFFICE VISIT (OUTPATIENT)
Dept: FAMILY MEDICINE CLINIC | Facility: CLINIC | Age: 84
End: 2019-01-07
Payer: MEDICARE

## 2019-01-07 VITALS
BODY MASS INDEX: 21.94 KG/M2 | SYSTOLIC BLOOD PRESSURE: 110 MMHG | WEIGHT: 162 LBS | TEMPERATURE: 97.2 F | DIASTOLIC BLOOD PRESSURE: 64 MMHG | HEIGHT: 72 IN

## 2019-01-07 DIAGNOSIS — J20.9 ACUTE INFECTIVE TRACHEOBRONCHITIS: Primary | ICD-10-CM

## 2019-01-07 PROCEDURE — 99213 OFFICE O/P EST LOW 20 MIN: CPT | Performed by: FAMILY MEDICINE

## 2019-01-07 RX ORDER — PREDNISONE 10 MG/1
TABLET ORAL
Qty: 8 TABLET | Refills: 0 | Status: ON HOLD | OUTPATIENT
Start: 2019-01-07 | End: 2019-02-05

## 2019-01-07 RX ORDER — BENZONATATE 100 MG/1
100 CAPSULE ORAL 3 TIMES DAILY PRN
Qty: 20 CAPSULE | Refills: 1 | Status: ON HOLD | OUTPATIENT
Start: 2019-01-07 | End: 2019-02-05

## 2019-01-07 NOTE — PROGRESS NOTES
Assessment/Plan:    No problem-specific Assessment & Plan notes found for this encounter  Diagnoses and all orders for this visit:    Acute infective tracheobronchitis          Subjective:      Patient ID: Barbara Cheng is a 82 y o  male  Patient has a very persistent and nagging cough was treated a week ago by emboli appropriately for a viral tracheobronchitis but is so bothered by the cough and the lack of sleep that he has come back to the office and would like some type of cough suppressant        The following portions of the patient's history were reviewed and updated as appropriate:   He  has a past medical history of Anemia; Appendicitis; Coronary artery disease; Detached retina; GERD (gastroesophageal reflux disease); Hyperlipidemia; Hypertension; Macular degeneration; Neuropathy; and [facility]brand [facility] ([facility] )  He   Patient Active Problem List    Diagnosis Date Noted    Alternating constipation and diarrhea 08/29/2018    Weight loss, non-intentional 08/29/2018    Nausea 08/29/2018    Hoarse voice quality 08/01/2018    Appetite loss 08/01/2018    Weight loss 08/01/2018    Gastroesophageal reflux disease without esophagitis 08/01/2018    Idiopathic peripheral neuropathy 06/29/2018    Cervical stenosis of spinal canal 06/29/2018    Spinal stenosis of lumbar region 06/29/2018    Lung nodule, solitary 02/20/2018    Bladder cancer ([facility] ) 02/13/2018    High blood pressure 12/28/2016    Gait instability 05/31/2016    Erectile dysfunction 12/09/2014    Hypothyroidism 06/07/2013    Spondylosis 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 12/31/2012    Bilateral exudative age-related macular degeneration ([facility] ) 06/26/2012     He  has a past surgical history that includes Back surgery; Neck surgery; Eye surgery; Appendectomy; TONSILECTOMY AND ADNOIDECTOMY; Fracture surgery (Bilateral); VON WILLEBRAND ANTIGEN (HISTORICAL); Tonsillectomy;  Cataract extraction; Rotator cuff repair; pr cystourethroscopy,fulgur <0 5 cm lesn (N/A, 1/12/2017); pr instill anticancer agent in bladder (N/A, 1/12/2017); EGD AND COLONOSCOPY (N/A, 12/9/2016); pr cystourethroscopy,fulgur <0 5 cm lesn (N/A, 10/2/2017); pr instill anticancer agent in bladder (N/A, 10/2/2017); Transurethral resection of bladder tumor (N/A, 10/2/2017); Shoulder surgery (03/03/2015); Adenoidectomy; Arthrodesis (01/15/2014); Colonoscopy (12/09/2016); Cystoscopy; Neuroplasty / transposition median nerve at carpal tunnel (Right, 04/2015); Retinal detachment surgery (Right, 03/2016); pr esophagogastroduodenoscopy transoral diagnostic (N/A, 8/3/2018); and Cystoscopy (09/20/2018)  His family history includes Anuerysm in his sister; Heart disease in his mother; Ulcers in his father  He  reports that he has been smoking Pipe  He has smoked for the past 60 00 years  He has never used smokeless tobacco  He reports that he drinks about 1 2 oz of alcohol per week   He reports that he does not use drugs    Current Outpatient Prescriptions   Medication Sig Dispense Refill    atorvastatin (LIPITOR) 10 mg tablet TAKE 1 TABLET BY MOUTH  DAILY 90 tablet 2    B COMPLEX VITAMINS PO Take 1 tablet by mouth daily      cetirizine (ZyrTEC) 10 mg tablet Take 1 tablet (10 mg total) by mouth daily for 90 days 90 tablet 1    Cholecalciferol (VITAMIN D3) 2000 UNITS capsule Take 1 capsule by mouth 2 (two) times a day 5000 units       Coenzyme Q10 (CO Q10) 60 MG CAPS Take 1 capsule by mouth daily      famotidine (PEPCID) 40 MG tablet Take 1 tablet (40 mg total) by mouth daily at bedtime 90 tablet 2    Ginkgo Biloba 120 MG TABS Take 1 tablet by mouth daily      Inositol 500 MG TABS Take 1 tablet by mouth 2 (two) times a day      ipratropium (ATROVENT) 0 06 % nasal spray 2 sprays into each nostril 3 (three) times a day for 90 days 60 mL 1    lutein 6 mg Take 1 capsule by mouth daily      Melatonin 10 MG TABS Take 1 tablet by mouth daily at bedtime      MILK THISTLE PO Take 1 tablet by mouth daily      Misc Natural Products (ENERGY FOCUS) TABS Take 1 tablet by mouth daily      Na Sulfate-K Sulfate-Mg Sulf (SUPREP BOWEL PREP KIT) 17 5-3 13-1 6 GM/177ML SOLN Take as directed by the office 1 Bottle 0    omeprazole (PriLOSEC) 40 MG capsule Take 40 mg by mouth 2 (two) times a day      ondansetron (ZOFRAN-ODT) 4 mg disintegrating tablet Take 1 tablet (4 mg total) by mouth every 8 (eight) hours as needed for nausea or vomiting 30 tablet 2     No current facility-administered medications for this visit        Current Outpatient Prescriptions on File Prior to Visit   Medication Sig    atorvastatin (LIPITOR) 10 mg tablet TAKE 1 TABLET BY MOUTH  DAILY    B COMPLEX VITAMINS PO Take 1 tablet by mouth daily    cetirizine (ZyrTEC) 10 mg tablet Take 1 tablet (10 mg total) by mouth daily for 90 days    Cholecalciferol (VITAMIN D3) 2000 UNITS capsule Take 1 capsule by mouth 2 (two) times a day 5000 units     Coenzyme Q10 (CO Q10) 60 MG CAPS Take 1 capsule by mouth daily    famotidine (PEPCID) 40 MG tablet Take 1 tablet (40 mg total) by mouth daily at bedtime    Ginkgo Biloba 120 MG TABS Take 1 tablet by mouth daily    Inositol 500 MG TABS Take 1 tablet by mouth 2 (two) times a day    ipratropium (ATROVENT) 0 06 % nasal spray 2 sprays into each nostril 3 (three) times a day for 90 days    lutein 6 mg Take 1 capsule by mouth daily    Melatonin 10 MG TABS Take 1 tablet by mouth daily at bedtime    MILK THISTLE PO Take 1 tablet by mouth daily    Misc Natural Products (ENERGY FOCUS) TABS Take 1 tablet by mouth daily    Na Sulfate-K Sulfate-Mg Sulf (SUPREP BOWEL PREP KIT) 17 5-3 13-1 6 GM/177ML SOLN Take as directed by the office    omeprazole (PriLOSEC) 40 MG capsule Take 40 mg by mouth 2 (two) times a day    ondansetron (ZOFRAN-ODT) 4 mg disintegrating tablet Take 1 tablet (4 mg total) by mouth every 8 (eight) hours as needed for nausea or vomiting     No current facility-administered medications on file prior to visit  He has No Known Allergies       Review of Systems   Constitutional: Negative for activity change, appetite change, diaphoresis, fatigue and fever  HENT: Negative  Eyes: Negative  Respiratory: Positive for cough  Negative for apnea, chest tightness, shortness of breath and wheezing  Cardiovascular: Negative for chest pain, palpitations and leg swelling  Gastrointestinal: Negative for abdominal distention, abdominal pain, anal bleeding, constipation, diarrhea, nausea and vomiting  Endocrine: Negative for cold intolerance, heat intolerance, polydipsia, polyphagia and polyuria  Genitourinary: Negative for difficulty urinating, dysuria, flank pain, hematuria and urgency  Musculoskeletal: Negative for arthralgias, back pain, gait problem, joint swelling and myalgias  Skin: Negative for color change, rash and wound  Allergic/Immunologic: Negative for environmental allergies, food allergies and immunocompromised state  Neurological: Negative for dizziness, seizures, syncope, speech difficulty, numbness and headaches  Hematological: Negative for adenopathy  Does not bruise/bleed easily  Psychiatric/Behavioral: Negative for agitation, behavioral problems, hallucinations, sleep disturbance and suicidal ideas  Objective:      /64 (BP Location: Left arm, Patient Position: Sitting, Cuff Size: Standard)   Temp (!) 97 2 °F (36 2 °C) (Tympanic)   Ht 6' (1 829 m)   Wt 73 5 kg (162 lb)   BMI 21 97 kg/m²          Physical Exam   Constitutional: He is oriented to person, place, and time  He appears well-developed and well-nourished  No distress  HENT:   Head: Normocephalic  Right Ear: External ear normal    Left Ear: External ear normal    Nose: Nose normal    Mouth/Throat: Oropharynx is clear and moist    Eyes: Pupils are equal, round, and reactive to light  Conjunctivae and EOM are normal  Right eye exhibits no discharge  Left eye exhibits no discharge  No scleral icterus  Neck: Normal range of motion  No tracheal deviation present  No thyromegaly present  Cardiovascular: Normal rate, regular rhythm and normal heart sounds  Exam reveals no gallop and no friction rub  No murmur heard  Pulmonary/Chest: Effort normal and breath sounds normal  No respiratory distress  He has no wheezes  Abdominal: Soft  Bowel sounds are normal  He exhibits no mass  There is no tenderness  There is no guarding  Musculoskeletal: He exhibits no edema or deformity  Lymphadenopathy:     He has no cervical adenopathy  Neurological: He is alert and oriented to person, place, and time  No cranial nerve deficit  Skin: Skin is warm and dry  No rash noted  He is not diaphoretic  No erythema  Psychiatric: He has a normal mood and affect   Thought content normal

## 2019-01-08 ENCOUNTER — TELEPHONE (OUTPATIENT)
Dept: GASTROENTEROLOGY | Facility: MEDICAL CENTER | Age: 84
End: 2019-01-08

## 2019-01-08 NOTE — TELEPHONE ENCOUNTER
Dr Jodi Sainz pt called wanting to speak to Tu MACIAS)  Pt states he is sched for colon procedure 01/15/19 and has a virus that he would like to discuss with someone   Pt would like to be called and can be reached at [phone]

## 2019-01-14 ENCOUNTER — TELEPHONE (OUTPATIENT)
Dept: GASTROENTEROLOGY | Facility: CLINIC | Age: 84
End: 2019-01-14

## 2019-01-14 ENCOUNTER — TELEPHONE (OUTPATIENT)
Dept: FAMILY MEDICINE CLINIC | Facility: CLINIC | Age: 84
End: 2019-01-14

## 2019-01-14 DIAGNOSIS — R05.3 CHRONIC COUGH: Primary | ICD-10-CM

## 2019-01-14 RX ORDER — BENZONATATE 100 MG/1
200 CAPSULE ORAL 3 TIMES DAILY PRN
Qty: 20 CAPSULE | Refills: 0 | Status: ON HOLD | OUTPATIENT
Start: 2019-01-14 | End: 2019-02-05

## 2019-01-14 NOTE — TELEPHONE ENCOUNTER
Patient called in to reschedule due to having a cold   Rescheduled to 2/5/19 at [facility] with Dr Louis Taveras

## 2019-01-14 NOTE — TELEPHONE ENCOUNTER
He needs another prescription for tessalon pearls  He still has a cough and he was doubling the dose    He wants a script for 200 mg tabs

## 2019-01-31 ENCOUNTER — DOCTOR'S OFFICE (OUTPATIENT)
Dept: URBAN - METROPOLITAN AREA CLINIC 136 | Facility: CLINIC | Age: 84
Setting detail: OPHTHALMOLOGY
End: 2019-01-31
Payer: COMMERCIAL

## 2019-01-31 DIAGNOSIS — H43.392: ICD-10-CM

## 2019-01-31 DIAGNOSIS — H35.3231: ICD-10-CM

## 2019-01-31 DIAGNOSIS — H01.002: ICD-10-CM

## 2019-01-31 DIAGNOSIS — H31.002: ICD-10-CM

## 2019-01-31 DIAGNOSIS — H33.021: ICD-10-CM

## 2019-01-31 DIAGNOSIS — Z96.1: ICD-10-CM

## 2019-01-31 DIAGNOSIS — H01.001: ICD-10-CM

## 2019-01-31 DIAGNOSIS — H35.3211: ICD-10-CM

## 2019-01-31 PROCEDURE — 92134 CPTRZ OPH DX IMG PST SGM RTA: CPT | Performed by: OPHTHALMOLOGY

## 2019-01-31 PROCEDURE — 67028 INJECTION EYE DRUG: CPT | Performed by: OPHTHALMOLOGY

## 2019-01-31 PROCEDURE — 92014 COMPRE OPH EXAM EST PT 1/>: CPT | Performed by: OPHTHALMOLOGY

## 2019-01-31 ASSESSMENT — REFRACTION_CURRENTRX
OD_OVR_VA: 20/
OS_OVR_VA: 20/
OD_OVR_VA: 20/
OD_OVR_VA: 20/
OS_OVR_VA: 20/
OS_OVR_VA: 20/

## 2019-01-31 ASSESSMENT — REFRACTION_MANIFEST
OU_VA: 20/
OS_VA1: 20/
OS_VA2: 20/
OD_VA1: 20/
OS_VA1: 20/
OD_VA1: 20/
OS_VA2: 20/
OS_VA3: 20/
OS_VA3: 20/
OD_VA2: 20/
OU_VA: 20/
OD_VA3: 20/
OD_VA3: 20/
OD_VA2: 20/

## 2019-01-31 ASSESSMENT — REFRACTION_AUTOREFRACTION
OS_AXIS: 074
OS_SPHERE: +0.25
OD_CYLINDER: -1.00
OS_CYLINDER: -1.75
OD_SPHERE: -1.00
OD_AXIS: 087

## 2019-01-31 ASSESSMENT — SUPERFICIAL PUNCTATE KERATITIS (SPK): OD_SPK: 2+

## 2019-01-31 ASSESSMENT — CONFRONTATIONAL VISUAL FIELD TEST (CVF)
OD_FINDINGS: FULL
OS_FINDINGS: FULL

## 2019-01-31 ASSESSMENT — VISUAL ACUITY
OD_BCVA: 20/80
OS_BCVA: 20/400

## 2019-01-31 ASSESSMENT — SPHEQUIV_DERIVED
OS_SPHEQUIV: -0.625
OD_SPHEQUIV: -1.5

## 2019-01-31 ASSESSMENT — LID EXAM ASSESSMENTS: OD_BLEPHARITIS: 1+

## 2019-01-31 ASSESSMENT — DRY EYES - PHYSICIAN NOTES: OD_GENERALCOMMENTS: INFERIORLY

## 2019-02-01 ENCOUNTER — TELEPHONE (OUTPATIENT)
Dept: GASTROENTEROLOGY | Facility: CLINIC | Age: 84
End: 2019-02-01

## 2019-02-01 NOTE — TELEPHONE ENCOUNTER
Spoke to patient, I told him his colonoscopy is scheduled as diagnostic and not screening so it would be covered

## 2019-02-04 ENCOUNTER — ANESTHESIA EVENT (OUTPATIENT)
Dept: PERIOP | Facility: HOSPITAL | Age: 84
End: 2019-02-04
Payer: MEDICARE

## 2019-02-04 NOTE — ANESTHESIA PREPROCEDURE EVALUATION
Review of Systems/Medical History  Patient summary reviewed        Cardiovascular  Hyperlipidemia, Hypertension , CAD ,    Pulmonary       GI/Hepatic    GERD ,             Endo/Other  History of thyroid disease ,      GYN       Hematology  Anemia ,     Musculoskeletal    Arthritis     Neurology   Psychology                Anesthesia Plan  ASA Score- 3     Anesthesia Type- IV sedation with anesthesia with ASA Monitors  Additional Monitors:   Airway Plan:         Plan Factors-    Induction- intravenous  Postoperative Plan-     Informed Consent- Anesthetic plan and risks discussed with patient  I personally reviewed this patient with the CRNA  Discussed and agreed on the Anesthesia Plan with the CRNA  Reese Drake

## 2019-02-05 ENCOUNTER — HOSPITAL ENCOUNTER (OUTPATIENT)
Facility: HOSPITAL | Age: 84
Setting detail: OUTPATIENT SURGERY
Discharge: HOME | End: 2019-02-05
Attending: INTERNAL MEDICINE | Admitting: INTERNAL MEDICINE
Payer: MEDICARE

## 2019-02-05 ENCOUNTER — ANESTHESIA (OUTPATIENT)
Dept: PERIOP | Facility: HOSPITAL | Age: 84
End: 2019-02-05
Payer: MEDICARE

## 2019-02-05 VITALS
RESPIRATION RATE: 20 BRPM | TEMPERATURE: 97 F | HEART RATE: 74 BPM | OXYGEN SATURATION: 99 % | HEIGHT: 72 IN | BODY MASS INDEX: 22.21 KG/M2 | WEIGHT: 164 LBS | SYSTOLIC BLOOD PRESSURE: 171 MMHG | DIASTOLIC BLOOD PRESSURE: 74 MMHG

## 2019-02-05 DIAGNOSIS — R63.4 WEIGHT LOSS: ICD-10-CM

## 2019-02-05 PROCEDURE — 45385 COLONOSCOPY W/LESION REMOVAL: CPT | Performed by: INTERNAL MEDICINE

## 2019-02-05 PROCEDURE — 88305 TISSUE EXAM BY PATHOLOGIST: CPT | Performed by: PATHOLOGY

## 2019-02-05 PROCEDURE — 45380 COLONOSCOPY AND BIOPSY: CPT | Performed by: INTERNAL MEDICINE

## 2019-02-05 RX ORDER — LIDOCAINE HYDROCHLORIDE 10 MG/ML
INJECTION, SOLUTION INFILTRATION; PERINEURAL AS NEEDED
Status: DISCONTINUED | OUTPATIENT
Start: 2019-02-05 | End: 2019-02-05 | Stop reason: SURG

## 2019-02-05 RX ORDER — ONDANSETRON 2 MG/ML
4 INJECTION INTRAMUSCULAR; INTRAVENOUS ONCE AS NEEDED
Status: DISCONTINUED | OUTPATIENT
Start: 2019-02-05 | End: 2019-02-05 | Stop reason: HOSPADM

## 2019-02-05 RX ORDER — PROPOFOL 10 MG/ML
INJECTION, EMULSION INTRAVENOUS AS NEEDED
Status: DISCONTINUED | OUTPATIENT
Start: 2019-02-05 | End: 2019-02-05 | Stop reason: SURG

## 2019-02-05 RX ORDER — SODIUM CHLORIDE, SODIUM LACTATE, POTASSIUM CHLORIDE, CALCIUM CHLORIDE 600; 310; 30; 20 MG/100ML; MG/100ML; MG/100ML; MG/100ML
125 INJECTION, SOLUTION INTRAVENOUS CONTINUOUS
Status: DISCONTINUED | OUTPATIENT
Start: 2019-02-05 | End: 2019-02-05

## 2019-02-05 RX ORDER — SODIUM CHLORIDE, SODIUM LACTATE, POTASSIUM CHLORIDE, CALCIUM CHLORIDE 600; 310; 30; 20 MG/100ML; MG/100ML; MG/100ML; MG/100ML
100 INJECTION, SOLUTION INTRAVENOUS CONTINUOUS
Status: CANCELLED | OUTPATIENT
Start: 2019-02-05

## 2019-02-05 RX ADMIN — PROPOFOL 50 MG: 10 INJECTION, EMULSION INTRAVENOUS at 09:03

## 2019-02-05 RX ADMIN — PROPOFOL 50 MG: 10 INJECTION, EMULSION INTRAVENOUS at 09:12

## 2019-02-05 RX ADMIN — PROPOFOL 50 MG: 10 INJECTION, EMULSION INTRAVENOUS at 09:22

## 2019-02-05 RX ADMIN — SODIUM CHLORIDE, SODIUM LACTATE, POTASSIUM CHLORIDE, AND CALCIUM CHLORIDE 125 ML/HR: .6; .31; .03; .02 INJECTION, SOLUTION INTRAVENOUS at 08:50

## 2019-02-05 RX ADMIN — SODIUM CHLORIDE, SODIUM LACTATE, POTASSIUM CHLORIDE, AND CALCIUM CHLORIDE: .6; .31; .03; .02 INJECTION, SOLUTION INTRAVENOUS at 08:40

## 2019-02-05 RX ADMIN — PROPOFOL 50 MG: 10 INJECTION, EMULSION INTRAVENOUS at 09:16

## 2019-02-05 RX ADMIN — PROPOFOL 50 MG: 10 INJECTION, EMULSION INTRAVENOUS at 09:07

## 2019-02-05 RX ADMIN — LIDOCAINE HYDROCHLORIDE 40 MG: 10 INJECTION, SOLUTION INFILTRATION; PERINEURAL at 08:57

## 2019-02-05 RX ADMIN — PROPOFOL 30 MG: 10 INJECTION, EMULSION INTRAVENOUS at 09:25

## 2019-02-05 RX ADMIN — PROPOFOL 50 MG: 10 INJECTION, EMULSION INTRAVENOUS at 08:57

## 2019-02-05 RX ADMIN — PROPOFOL 50 MG: 10 INJECTION, EMULSION INTRAVENOUS at 08:59

## 2019-02-05 NOTE — H&P
History and Physical - [facility]  Tim Brasher 82 y o  male MRN: [id number]    HPI: Tim Brasher is a 82y o  year old male who presents with rectal thickening and weight loss  He is concerned about colon cancer  He had a previous colonoscopy which was unremarkable and this was in 2016  Biopsies then did show lymphocytic colitis  Review of Systems    Historical Information   Past Medical History:   Diagnosis Date    Anemia     Appendicitis     Coronary artery disease     Detached retina     GERD (gastroesophageal reflux disease)     Hyperlipidemia     Hypertension     Macular degeneration     Neuropathy     Von Willebrand disease ([address] )      Past Surgical History:   Procedure Laterality Date    ADENOIDECTOMY      APPENDECTOMY      ARTHRODESIS  01/15/2014    Lumbar     BACK SURGERY      CATARACT EXTRACTION      COLONOSCOPY  12/09/2016    fiberoptic     CYSTOSCOPY      with resection of tumor / 1/12/17, 10/2/17 / diagnostic 12/8/16, 5/30/17     CYSTOSCOPY  09/20/2018    EGD AND COLONOSCOPY N/A 12/9/2016    Procedure: EGD AND COLONOSCOPY;  Surgeon: Khushboo Duval MD;  Location: [facility];  Service:    [facility] EYE SURGERY      FRACTURE SURGERY Bilateral     ARM    NECK SURGERY      NEUROPLASTY / TRANSPOSITION MEDIAN NERVE AT [facility] Right 04/2015    MI CYSTOURETHROSCOPY,FULGUR <0 5 CM LESN N/A 1/12/2017    Procedure: CYSTOSCOPY, BLADDER BIOPSY WITH FULGRATION, 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;  Surgeon: Austin Caballero MD;  Location: [facility];  Service: Urology    MI CYSTOURETHROSCOPY,FULGUR <0 5 CM LESN N/A 10/2/2017    Procedure: CYSTOSCOPY WITH  BLADDER BIOPSIES WITH FULGURATION;  Surgeon: Austin Caballero MD;  Location: [facility];  Service: Urology    MI ESOPHAGOGASTRODUODENOSCOPY TRANSORAL DIAGNOSTIC N/A 8/3/2018    Procedure: ESOPHAGOGASTRODUODENOSCOPY (EGD);   Surgeon: Yemi Morse MD;  Location: [facility];  Service: Gastroenterology    OK INSTILL ANTICANCER AGENT IN BLADDER N/A 1/12/2017    Procedure: INSTILLATION MYTOMYCIN;  Surgeon: Radha Gipson MD;  Location: [facility];  Service: Urology    OK INSTILL ANTICANCER AGENT IN BLADDER N/A 10/2/2017    Procedure: Florecita Olives;  Surgeon: Radha Gipson MD;  Location: [facility];  Service: Urology    RETINAL DETACHMENT SURGERY Right 03/2016    complete air fill confirmed     ROTATOR CUFF REPAIR      SHOULDER SURGERY  03/03/2015    proximal humerus fracture / [place]    3/6/15   R    1/3/18     TONSILECTOMY AND ADNOIDECTOMY      TONSILLECTOMY      TRANSURETHRAL RESECTION OF BLADDER TUMOR N/A 10/2/2017    Procedure: TRANSURETHRAL RESECTION OF BLADDER TUMOR (TURBT);   Surgeon: Radha Gipson MD;  Location: [facility];  Service: Urology   [facility] ANTIGEN (HISTORICAL)       Social History   History   Alcohol Use    1 2 oz/week    1 Glasses of wine, 1 Shots of liquor per week     Comment: DAILY      History   Drug Use No     History   Smoking Status    Light Tobacco Smoker    Packs/day: 0 25    Years: 60 00    Types: Pipe   Smokeless Tobacco    Never Used     Comment: smokes a pipe -resolved      Family History   Problem Relation Age of Onset    Ulcers Father         acute gastric    Heart disease Mother    [facility]        Meds/Allergies     Prescriptions Prior to Admission   Medication    atorvastatin (LIPITOR) 10 mg tablet    B COMPLEX VITAMINS PO    Cholecalciferol (VITAMIN D3) 2000 UNITS capsule    Coenzyme Q10 (CO Q10) 60 MG CAPS    famotidine (PEPCID) 40 MG tablet    Ginkgo Biloba 120 MG TABS    lutein 6 mg    Melatonin 10 MG TABS    omeprazole (PriLOSEC) 40 MG capsule    cetirizine (ZyrTEC) 10 mg tablet    Inositol 500 MG TABS    ipratropium (ATROVENT) 0 06 % nasal spray    MILK THISTLE PO    Misc Natural Products (ENERGY FOCUS) TABS    Na Sulfate-K Sulfate-Mg Sulf (SUPREP BOWEL PREP KIT) 17 5-3 13-1 6 GM/177ML SOLN       No Known Allergies    Objective     Blood pressure (!) 184/83, pulse 85, temperature 97 6 °F (36 4 °C), temperature source Tympanic, resp  rate 20, height 6' (1 829 m), weight 74 4 kg (164 lb), SpO2 98 %  PHYSICAL EXAM    Gen: NAD  CV: RRR  CHEST: Clear  ABD: soft, NT/ND  EXT: no edema  Neuro: AAO      ASSESSMENT/PLAN:  This is a 82y o  year old male here for colonoscopy for evaluation of rectal wall thickening and weight loss to rule out colonic malignancy  He has a history of lymphocytic colitis  PLAN:   Procedure:  Colonoscopy

## 2019-02-05 NOTE — OP NOTE
OPERATIVE REPORT  PATIENT NAME: Thien Kaplan    :  1935  MRN: [id number]  Pt Location: [facility] 03    SURGERY DATE: 2019    Surgeon(s) and Role:     Sara Walker MD - Primary    Preop Diagnosis:  Weight loss [R63 4]    Post-Op Diagnosis Codes:     * Weight loss [R63 4]    Procedure(s) (LRB):  COLONOSCOPY (N/A)    Specimen(s):  ID Type Source Tests Collected by Time Destination   1 : random colon biopsies, retrieved by cold biopsy forceps, H/O lymphocytic colitis Tissue Colon TISSUE EXAM Sagar Rodriguez MD 2019 1860    2 : polyp retrieved by cold snare Tissue Large Intestine, Transverse Colon TISSUE EXAM Sagar Rodriguez MD 2019 0919    3 : polyps x 3 retrieved by cold snare Tissue Large Intestine, Sigmoid Colon TISSUE EXAM Sagar Rodriguez MD 2019 9289        Estimated Blood Loss:   Minimal    COLONOSCOPY    PROCEDURE: Colonoscopy/ Biopsy  Polypectomy (Cold Snare)    INDICATIONS: rectal thickening and weight loss  History of lymphocytic colitis  POST-OP DIAGNOSIS: See the impression below    SEDATION: Monitored anesthesia care, check anesthesia records    PRIOR COLONOSCOPY: Less than 3 years ago  It is being repeated at an interval of less than 3 years because: This colonoscopy is being performed for a diagnostic indication    CONSENT:  Informed consent was obtained for the procedure, including sedation after explaining the risks and benefits of the procedure  Risks including but not limited to bleeding, perforation, infection, aspiration were discussed in detail  Also explained about less than 100%$ sensitivity with the exam and other alternatives  PREPARATION:   EKG tracing, pulse oximetry, blood pressure were monitored throughout the procedure  Patient was identified by myself both verbally and by visual inspection of ID band  DESCRIPTION:   Patient was placed in the left lateral decubitus position and was sedated with the above medication   Digital rectal examination was performed  The colonoscope was introduced in to the anal canal and advanced up to cecum, which was identified by the appendiceal orifice and IC valve  A careful inspection was made as the colonoscope was withdrawn, including a retroflexed view of the rectum; findings and interventions are described below  Appropriate photodocumentation was obtained  The quality of the colonic preparation was good  FINDINGS:    One small polyp was seen in the transverse colon was removed by cold snare polypectomy  Three small to medium polyps were seen in the sigmoid colon and were removed by cold snare polypectomy  Mild diverticulosis seen in the sigmoid colon as well as in the ascending colon  Otherwise normal appearing colon and random biopsies were done as well  IMPRESSIONS:      1  Polyp seen in the transverse colon and the sigmoid colon  Total of 4 polyps were removed  2  Random colon biopsies were done for history of lymphocytic colitis  3  Mild diverticulosis  4  No rectal thickening was seen  RECOMMENDATIONS:    1  Follow up with the results of the biopsies  2  Repeat colonoscopy in 3 years  Repeat colonoscopy in 3 years or sooner if clinically indicated  Repeat colonoscopy is being recommended at an interval of less than 10 years, this is because of a personal history of polyps or colon cancer  COMPLICATIONS:  None; patient tolerated the procedure well      DISPOSITION: PACU           CONDITION: Stable

## 2019-02-05 NOTE — NURSING NOTE
Pt returned from PACU with the IV in the right anticubital infiltrated  Warm blankets applied for 10 minutes  Wife left with py ambulating - stating it is getting smaller

## 2019-02-05 NOTE — ANESTHESIA POSTPROCEDURE EVALUATION
Post-Op Assessment Note      CV Status:  Stable    Hydration Status:  Stable    PONV Controlled:  None    Airway Patency:  Patent        Staff: CRNA           BP  118/73   Temp   97 3   Pulse  77   Resp      SpO2   98

## 2019-02-11 RX ORDER — IPRATROPIUM BROMIDE 21 UG/1
SPRAY, METERED NASAL DAILY
COMMUNITY
End: 2019-03-19 | Stop reason: DRUGHIGH

## 2019-02-11 RX ORDER — DESMOPRESSIN ACETATE 0.1 MG/ML
0.1 SOLUTION NASAL
COMMUNITY
End: 2019-09-17 | Stop reason: ALTCHOICE

## 2019-02-11 RX ORDER — CHLORHEXIDINE GLUCONATE 4 G/100ML
SOLUTION TOPICAL
COMMUNITY
End: 2019-03-19 | Stop reason: ALTCHOICE

## 2019-02-11 RX ORDER — SILDENAFIL 50 MG/1
TABLET, FILM COATED ORAL
COMMUNITY

## 2019-02-15 ENCOUNTER — OFFICE VISIT (OUTPATIENT)
Dept: GASTROENTEROLOGY | Facility: HOSPITAL | Age: 84
End: 2019-02-15
Payer: MEDICARE

## 2019-02-15 VITALS
HEART RATE: 74 BPM | BODY MASS INDEX: 22.27 KG/M2 | TEMPERATURE: 97.3 F | SYSTOLIC BLOOD PRESSURE: 153 MMHG | DIASTOLIC BLOOD PRESSURE: 88 MMHG | HEIGHT: 72 IN | WEIGHT: 164.4 LBS

## 2019-02-15 DIAGNOSIS — R63.0 APPETITE LOSS: ICD-10-CM

## 2019-02-15 DIAGNOSIS — K29.60 EROSIVE GASTRITIS: ICD-10-CM

## 2019-02-15 DIAGNOSIS — R63.4 WEIGHT LOSS: ICD-10-CM

## 2019-02-15 DIAGNOSIS — R09.89 THROAT CLEARING: ICD-10-CM

## 2019-02-15 DIAGNOSIS — K21.9 GASTROESOPHAGEAL REFLUX DISEASE WITHOUT ESOPHAGITIS: Primary | ICD-10-CM

## 2019-02-15 DIAGNOSIS — R19.8 ALTERNATING CONSTIPATION AND DIARRHEA: ICD-10-CM

## 2019-02-15 DIAGNOSIS — K31.819 GAVE (GASTRIC ANTRAL VASCULAR ECTASIA): ICD-10-CM

## 2019-02-15 DIAGNOSIS — R11.0 NAUSEA: ICD-10-CM

## 2019-02-15 DIAGNOSIS — R93.5 ABNORMAL CT OF THE ABDOMEN: ICD-10-CM

## 2019-02-15 PROCEDURE — 99214 OFFICE O/P EST MOD 30 MIN: CPT | Performed by: PHYSICIAN ASSISTANT

## 2019-02-15 NOTE — PROGRESS NOTES
[facility] - Outpatient Follow-up Note  Melissa Ford 82 y o  male MRN: [id number]  Encounter: [id number]          ASSESSMENT AND PLAN:    1  Weight loss and loss of appetite  2  Abnormal CT              -he happily reports that his weight has been stable since his last visit, he has achieved this by eating breakfast and lunch which he was commonly skipping in the past    -he had rectal thickening on CT and this was further evaluated with a colonoscopy with 4 tubular adenomas removed, repeat colonoscopy was recommended in 3 years because of this  We discussed that we can review his options in 3 years and discuss the risks versus benefits as he will be 86 at that time    -he does have a history of lymphocytic colitis, colon biopsies were negative on repeat colonoscopy  -encouraged him to continue a healthy diet and adequate caloric intake     3  Nausea  4  Belching  5  GERD              -EGD showed gastritis, H pylori was negative               -he had a gastric emptying study that was normal              -recent TSH was within normal limits              -continue omeprazole twice daily as well as Pepcid at bedtime as he feels this has helped him                -he reports that since his last visit his symptoms are much improved    6  GAVE  7  Erosive gastritis   -he had an upper endoscopy in August of 2018 which showed erosive gastritis and nodular mucosa in the body of the stomach as well as mild GAVE  -repeat upper endoscopy was recommended in 1 year so he will be due in August of this year  8  Frequent throat clearing   -he reports he continues to have frequent throat clearing despite resolution of his heartburn, he does have a history of postnasal drip and he sees an ENT physician for this     -I encouraged him to follow up with the ENT as has frequent throat clearing may be secondary to postnasal drip rather than GERD      He can follow up in 6 months or as needed in the meantime            ____________________________________________________________    SUBJECTIVE:    72-year-old male with past medical history of GERD, hypothyroidism, CAD, von Willebrand's disease and bladder cancer presents to the office for follow-up after recent colonoscopy  He had several tubular adenomas removed but fortunately no malignancy was seen  He reports that since his last visit his weight has been stable, he has been trying to eat both breakfast and lunch which was previously skipping  He was previously having frequent nausea, belching and reflux  He started omeprazole twice daily as well as Pepcid at bedtime and he had resolution of his symptoms  He was also taking valerian and melatonin for sleep in the past and he decreased these which may have improved his nausea  He denies any new symptoms and specifically denies any abdominal pain, difficulty swallowing, hematochezia, melena, change in bowel habits or jaundice  REVIEW OF SYSTEMS IS OTHERWISE NEGATIVE        Historical Information   Past Medical History:   Diagnosis Date    Anemia     Appendicitis     Coronary artery disease     Detached retina     GERD (gastroesophageal reflux disease)     Hyperlipidemia     Hypertension     Macular degeneration     Neuropathy     Von Willebrand disease ([address] )      Past Surgical History:   Procedure Laterality Date    ADENOIDECTOMY      APPENDECTOMY      ARTHRODESIS  01/15/2014    Lumbar     BACK SURGERY      CATARACT EXTRACTION      COLONOSCOPY  12/09/2016    fiberoptic     CYSTOSCOPY      with resection of tumor / 1/12/17, 10/2/17 / diagnostic 12/8/16, 5/30/17     CYSTOSCOPY  09/20/2018    EGD AND COLONOSCOPY N/A 12/9/2016    Procedure: EGD AND COLONOSCOPY;  Surgeon: Lesley Canales MD;  Location: [facility];  Service:    [facility] EYE SURGERY      FRACTURE SURGERY Bilateral     ARM    NECK SURGERY      NEUROPLASTY / TRANSPOSITION MEDIAN NERVE AT [facility] Right 04/2015  WA COLONOSCOPY FLX DX W/COLLJ SPEC WHEN PFRMD N/A 2/5/2019    Procedure: COLONOSCOPY;  Surgeon: Rigo العراقي MD;  Location: [facility];  Service: Gastroenterology    WA CYSTOURETHROSCOPY,FULGUR <0 5 CM LESN N/A 1/12/2017    Procedure: CYSTOSCOPY, BLADDER BIOPSY WITH FULGRATION, 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;  Surgeon: Raheem Ceron MD;  [facility]: [facility];  Service: Urology    WA CYSTOURETHROSCOPY,FULGUR <0 5 CM LESN N/A 10/2/2017    Procedure: CYSTOSCOPY WITH  BLADDER BIOPSIES WITH FULGURATION;  Surgeon: Raheem Ceron MD;  [facility];  Service: Urology    WA ESOPHAGOGASTRODUODENOSCOPY TRANSORAL DIAGNOSTIC N/A 8/3/2018    Procedure: ESOPHAGOGASTRODUODENOSCOPY (EGD); Surgeon: Rigo العراقي MD;  [facility];  Service: Gastroenterology    WA INSTILL ANTICANCER AGENT IN BLADDER N/A 1/12/2017    Procedure: [place] Finder;  Surgeon: Raheem Ceron MD;  [facility];  Service: Urology    WA INSTILL ANTICANCER AGENT IN BLADDER N/A 10/2/2017    Procedure: [place] Finder;  Surgeon: Raheem Ceron MD;  [facility];  Service: Urology    RETINAL DETACHMENT SURGERY Right 03/2016    complete air fill confirmed    [address] SURGERY  03/03/2015    proximal humerus fracture / [place]    3/6/15   R    1/3/18     TONSILECTOMY AND ADNOIDECTOMY      TONSILLECTOMY      TRANSURETHRAL RESECTION OF BLADDER TUMOR N/A 10/2/2017    Procedure: TRANSURETHRAL RESECTION OF BLADDER TUMOR (TURBT);   Surgeon: Raheem Ceron MD;  [facility];  Service: Urology    [address] (HISTORICAL)       Social History   Social History     Substance and Sexual Activity   Alcohol Use Yes    Alcohol/week: 1 2 oz    Types: 1 Glasses of wine, 1 Shots of liquor per week    Comment: DAILY      Social History     Substance and Sexual Activity   Drug Use No     Social History     Tobacco Use   Smoking Status Light Tobacco Smoker    Packs/day: 0 25    Years: 60 00    Pack years: 15 00    Types: Pipe   Smokeless Tobacco Never Used   Tobacco Comment    smokes a pipe -resolved      Family History   Problem Relation Age of Onset    Ulcers Father         acute gastric    Heart disease Mother    Travis Hammondysm Sister        Meds/Allergies       Current Outpatient Medications:     atorvastatin (LIPITOR) 10 mg tablet    B COMPLEX VITAMINS PO    chlorhexidine (HIBICLENS) 4 % external liquid    Cholecalciferol (VITAMIN D3) 2000 UNITS capsule    Coenzyme Q10 (CO Q10) 60 MG CAPS    desmopressin (DDAVP) 0 01 % solution    famotidine (PEPCID) 40 MG tablet    Ginkgo Biloba 120 MG TABS    Inositol 500 MG TABS    ipratropium (ATROVENT) 0 03 % nasal spray    lutein 6 mg    Melatonin 10 MG TABS    MILK THISTLE PO    Misc Natural Products (ENERGY FOCUS) TABS    omeprazole (PriLOSEC) 40 MG capsule    sildenafil (VIAGRA) 50 MG tablet    cetirizine (ZyrTEC) 10 mg tablet    ipratropium (ATROVENT) 0 06 % nasal spray    Na Sulfate-K Sulfate-Mg Sulf (SUPREP BOWEL PREP KIT) 17 5-3 13-1 6 GM/177ML SOLN    No Known Allergies        Objective     Blood pressure 153/88, pulse 74, temperature (!) 97 3 °F (36 3 °C), temperature source Tympanic, height 6' (1 829 m), weight 74 6 kg (164 lb 6 4 oz)  PHYSICAL EXAM:      Physical Exam   Constitutional: He is oriented to person, place, and time  He appears well-developed and well-nourished  No distress  HENT:   Head: Normocephalic and atraumatic  Eyes: Right eye exhibits no discharge  Left eye exhibits no discharge  No scleral icterus  Neck: Neck supple  No tracheal deviation present  Cardiovascular: Normal rate, regular rhythm, normal heart sounds and intact distal pulses  Exam reveals no gallop and no friction rub  No murmur heard  Pulmonary/Chest: Effort normal and breath sounds normal  No respiratory distress  He has no wheezes  He has no rales  Abdominal: Soft   Bowel sounds are normal  He exhibits no distension and no mass  There is no tenderness  There is no rebound and no guarding  Neurological: He is alert and oriented to person, place, and time  Skin: Skin is warm and dry  Psychiatric: He has a normal mood and affect  Lab Results:   No visits with results within 1 Day(s) from this visit  Latest known visit with results is:   Admission on 02/05/2019, Discharged on 02/05/2019   Component Date Value    Case Report 02/05/2019                      Value:Surgical Pathology Report                         Case: [id number]                                   Authorizing Provider:  Mathew Lion MD             Collected:           02/05/2019 0916              Ordering Location:     [facility] Received:            02/05/2019 [address] [facility]                                     Operating Room                                                               Pathologist:           Michaelle Caputo DO                                                      Specimens:   A) - Colon, random colon biopsies, retrieved by cold biopsy forceps, H/O lymphocytic                colitis                                                                                             B) - Large Intestine, Transverse Colon, polyp retrieved by cold snare                               C) - Large Intestine, Sigmoid Colon, polyps x 3 retrieved by cold snare                    Final Diagnosis 02/05/2019                      Value: This result contains rich text formatting which cannot be displayed here   Note 02/05/2019                      Value: This result contains rich text formatting which cannot be displayed here   Additional Information 02/05/2019                      Value: This result contains rich text formatting which cannot be displayed here  [facility] Gross Description 02/05/2019                      Value: This result contains rich text formatting which cannot be displayed here  Radiology Results:   No results found

## 2019-02-25 ENCOUNTER — DOCTOR'S OFFICE (OUTPATIENT)
Dept: URBAN - NONMETROPOLITAN AREA CLINIC 1 | Facility: CLINIC | Age: 84
Setting detail: OPHTHALMOLOGY
End: 2019-02-25
Payer: COMMERCIAL

## 2019-02-25 DIAGNOSIS — H01.004: ICD-10-CM

## 2019-02-25 DIAGNOSIS — H02.011: ICD-10-CM

## 2019-02-25 DIAGNOSIS — H01.001: ICD-10-CM

## 2019-02-25 DIAGNOSIS — H02.014: ICD-10-CM

## 2019-02-25 DIAGNOSIS — H01.002: ICD-10-CM

## 2019-02-25 DIAGNOSIS — H02.005: ICD-10-CM

## 2019-02-25 PROCEDURE — 92012 INTRM OPH EXAM EST PATIENT: CPT | Performed by: OPHTHALMOLOGY

## 2019-02-25 PROCEDURE — 67820 REVISE EYELASHES: CPT | Performed by: OPHTHALMOLOGY

## 2019-02-25 ASSESSMENT — CONFRONTATIONAL VISUAL FIELD TEST (CVF)
OS_FINDINGS: FULL
OD_FINDINGS: FULL

## 2019-02-25 ASSESSMENT — REFRACTION_MANIFEST
OD_VA2: 20/
OS_VA3: 20/
OS_VA1: 20/
OD_VA3: 20/
OS_VA2: 20/
OS_VA1: 20/
OS_VA3: 20/
OD_VA2: 20/
OD_VA3: 20/
OU_VA: 20/
OS_VA2: 20/
OD_VA1: 20/
OU_VA: 20/
OD_VA1: 20/

## 2019-02-25 ASSESSMENT — LID POSITION - ENTROPION: OS_ENTROPION: LLL T

## 2019-02-25 ASSESSMENT — LID EXAM ASSESSMENTS
OD_TRICHIASIS: RUL T
OD_BLEPHARITIS: 1+
OS_TRICHIASIS: LUL T

## 2019-02-25 ASSESSMENT — SUPERFICIAL PUNCTATE KERATITIS (SPK): OD_SPK: 2+

## 2019-02-25 ASSESSMENT — REFRACTION_AUTOREFRACTION
OD_CYLINDER: -1.00
OS_AXIS: 074
OS_CYLINDER: -1.75
OD_SPHERE: -1.00
OS_SPHERE: +0.25
OD_AXIS: 087

## 2019-02-25 ASSESSMENT — VISUAL ACUITY
OS_BCVA: 20/400
OD_BCVA: 20/80

## 2019-02-25 ASSESSMENT — REFRACTION_CURRENTRX
OD_OVR_VA: 20/
OS_OVR_VA: 20/
OD_OVR_VA: 20/
OD_OVR_VA: 20/
OS_OVR_VA: 20/
OS_OVR_VA: 20/

## 2019-02-25 ASSESSMENT — SPHEQUIV_DERIVED
OS_SPHEQUIV: -0.625
OD_SPHEQUIV: -1.5

## 2019-02-25 ASSESSMENT — LID POSITION - COMMENTS: OS_COMMENTS: EARLY

## 2019-02-25 ASSESSMENT — DRY EYES - PHYSICIAN NOTES: OD_GENERALCOMMENTS: INFERIORLY

## 2019-03-06 ENCOUNTER — RX ONLY (RX ONLY)
Age: 84
End: 2019-03-06

## 2019-03-06 ENCOUNTER — DOCTOR'S OFFICE (OUTPATIENT)
Dept: URBAN - METROPOLITAN AREA CLINIC 136 | Facility: CLINIC | Age: 84
Setting detail: OPHTHALMOLOGY
End: 2019-03-06
Payer: COMMERCIAL

## 2019-03-06 DIAGNOSIS — H33.021: ICD-10-CM

## 2019-03-06 DIAGNOSIS — H43.392: ICD-10-CM

## 2019-03-06 DIAGNOSIS — H35.3231: ICD-10-CM

## 2019-03-06 DIAGNOSIS — H35.3221: ICD-10-CM

## 2019-03-06 DIAGNOSIS — Z96.1: ICD-10-CM

## 2019-03-06 DIAGNOSIS — H35.3211: ICD-10-CM

## 2019-03-06 DIAGNOSIS — H31.002: ICD-10-CM

## 2019-03-06 PROCEDURE — 67028 INJECTION EYE DRUG: CPT | Performed by: OPHTHALMOLOGY

## 2019-03-06 PROCEDURE — 92012 INTRM OPH EXAM EST PATIENT: CPT | Performed by: OPHTHALMOLOGY

## 2019-03-06 PROCEDURE — 92134 CPTRZ OPH DX IMG PST SGM RTA: CPT | Performed by: OPHTHALMOLOGY

## 2019-03-06 ASSESSMENT — DRY EYES - PHYSICIAN NOTES: OD_GENERALCOMMENTS: INFERIORLY

## 2019-03-06 ASSESSMENT — REFRACTION_CURRENTRX
OS_OVR_VA: 20/
OS_OVR_VA: 20/
OD_OVR_VA: 20/
OS_OVR_VA: 20/
OD_OVR_VA: 20/
OD_OVR_VA: 20/

## 2019-03-06 ASSESSMENT — REFRACTION_MANIFEST
OS_VA2: 20/
OD_VA1: 20/
OS_VA1: 20/
OU_VA: 20/
OD_VA1: 20/
OD_VA3: 20/
OD_VA3: 20/
OS_VA2: 20/
OU_VA: 20/
OS_VA3: 20/
OD_VA2: 20/
OD_VA2: 20/
OS_VA1: 20/
OS_VA3: 20/

## 2019-03-06 ASSESSMENT — REFRACTION_AUTOREFRACTION
OD_CYLINDER: -1.00
OD_SPHERE: -1.00
OD_AXIS: 087
OS_SPHERE: +0.25
OS_CYLINDER: -1.75
OS_AXIS: 074

## 2019-03-06 ASSESSMENT — CONFRONTATIONAL VISUAL FIELD TEST (CVF)
OD_FINDINGS: CONSTRICTION
OS_FINDINGS: FULL

## 2019-03-06 ASSESSMENT — VISUAL ACUITY
OD_BCVA: 20/60-2
OS_BCVA: 20/400

## 2019-03-06 ASSESSMENT — LID POSITION - ENTROPION: OS_ENTROPION: LLL T

## 2019-03-06 ASSESSMENT — LID EXAM ASSESSMENTS
OD_TRICHIASIS: RUL T
OS_TRICHIASIS: LUL T
OD_BLEPHARITIS: 1+

## 2019-03-06 ASSESSMENT — SUPERFICIAL PUNCTATE KERATITIS (SPK): OD_SPK: 2+

## 2019-03-06 ASSESSMENT — SPHEQUIV_DERIVED
OS_SPHEQUIV: -0.625
OD_SPHEQUIV: -1.5

## 2019-03-06 ASSESSMENT — LID POSITION - COMMENTS: OS_COMMENTS: EARLY

## 2019-03-19 ENCOUNTER — OFFICE VISIT (OUTPATIENT)
Dept: FAMILY MEDICINE CLINIC | Facility: CLINIC | Age: 84
End: 2019-03-19
Payer: MEDICARE

## 2019-03-19 ENCOUNTER — APPOINTMENT (OUTPATIENT)
Dept: RADIOLOGY | Facility: MEDICAL CENTER | Age: 84
End: 2019-03-19
Payer: MEDICARE

## 2019-03-19 VITALS
WEIGHT: 162 LBS | HEIGHT: 72 IN | SYSTOLIC BLOOD PRESSURE: 182 MMHG | DIASTOLIC BLOOD PRESSURE: 102 MMHG | BODY MASS INDEX: 21.94 KG/M2

## 2019-03-19 DIAGNOSIS — M16.12 PRIMARY OSTEOARTHRITIS OF LEFT HIP: ICD-10-CM

## 2019-03-19 DIAGNOSIS — M54.50 LUMBAR PAIN: ICD-10-CM

## 2019-03-19 DIAGNOSIS — M17.11 PRIMARY OSTEOARTHRITIS OF RIGHT KNEE: ICD-10-CM

## 2019-03-19 DIAGNOSIS — C67.9 MALIGNANT NEOPLASM OF URINARY BLADDER, UNSPECIFIED SITE (HCC): ICD-10-CM

## 2019-03-19 DIAGNOSIS — D68.0 VON WILLEBRAND'S DISEASE (HCC): ICD-10-CM

## 2019-03-19 DIAGNOSIS — H35.3232 EXUDATIVE AGE-RELATED MACULAR DEGENERATION OF BOTH EYES WITH INACTIVE CHOROIDAL NEOVASCULARIZATION (HCC): ICD-10-CM

## 2019-03-19 DIAGNOSIS — K21.9 GASTROESOPHAGEAL REFLUX DISEASE, ESOPHAGITIS PRESENCE NOT SPECIFIED: ICD-10-CM

## 2019-03-19 DIAGNOSIS — M16.12 PRIMARY OSTEOARTHRITIS OF LEFT HIP: Primary | ICD-10-CM

## 2019-03-19 DIAGNOSIS — J44.1 CHRONIC OBSTRUCTIVE PULMONARY DISEASE WITH ACUTE EXACERBATION (HCC): ICD-10-CM

## 2019-03-19 PROBLEM — D68.00 VON WILLEBRAND'S DISEASE: Status: ACTIVE | Noted: 2019-03-19

## 2019-03-19 PROCEDURE — 73562 X-RAY EXAM OF KNEE 3: CPT

## 2019-03-19 PROCEDURE — 73502 X-RAY EXAM HIP UNI 2-3 VIEWS: CPT

## 2019-03-19 PROCEDURE — 72110 X-RAY EXAM L-2 SPINE 4/>VWS: CPT

## 2019-03-19 PROCEDURE — 99213 OFFICE O/P EST LOW 20 MIN: CPT | Performed by: FAMILY MEDICINE

## 2019-03-19 RX ORDER — VIT C/E/ZN/COPPR/LUTEIN/ZEAXAN 60 MG-6 MG
CAPSULE ORAL DAILY
COMMUNITY

## 2019-03-19 RX ORDER — FAMOTIDINE 40 MG/1
40 TABLET, FILM COATED ORAL
Qty: 90 TABLET | Refills: 2 | Status: SHIPPED | OUTPATIENT
Start: 2019-03-19 | End: 2019-10-31 | Stop reason: SDUPTHER

## 2019-03-21 ENCOUNTER — PROCEDURE VISIT (OUTPATIENT)
Dept: UROLOGY | Facility: CLINIC | Age: 84
End: 2019-03-21
Payer: MEDICARE

## 2019-03-21 VITALS
WEIGHT: 162 LBS | DIASTOLIC BLOOD PRESSURE: 100 MMHG | HEIGHT: 72 IN | HEART RATE: 58 BPM | SYSTOLIC BLOOD PRESSURE: 160 MMHG | BODY MASS INDEX: 21.94 KG/M2

## 2019-03-21 DIAGNOSIS — C67.9 MALIGNANT NEOPLASM OF URINARY BLADDER, UNSPECIFIED SITE (HCC): Primary | ICD-10-CM

## 2019-03-21 PROCEDURE — 52000 CYSTOURETHROSCOPY: CPT | Performed by: UROLOGY

## 2019-03-21 PROCEDURE — 99213 OFFICE O/P EST LOW 20 MIN: CPT | Performed by: UROLOGY

## 2019-03-21 NOTE — LETTER
March 21, 2019     Edna Funes,   [address]  [address]  [facility] [id number]    Patient: Rochelle Lunsford   YOB: 1935   Date of Visit: 3/21/2019       Dear Dr Khai Acosta: Thank you for referring Frandy Estrada to me for evaluation  Below are my notes for this consultation  If you have questions, please do not hesitate to call me  I look forward to following your patient along with you  Sincerely,        Quinton Barrow MD        CC: No Recipients  Quinton Barrow MD  3/21/2019 11:05 AM  Sign at close encounter  Cystoscopy  Date/Time: 3/21/2019 11:05 AM  Performed by: Quinton Barrow MD  Authorized by: Quinton Barrow MD     Procedure details: cystoscopy    Patient tolerance: Patient tolerated the procedure well with no immediate complications    Additional Procedure Details:   Cystoscopy procedure note:    Risk and benefits of flexible cystoscopy were discussed  Informed consent was obtained  Urine dip was adequate for cystoscopy  The patient was placed in the supine position  His genitalia was prepped with Betadine and draped in a sterile fashion  Viscous 2% lidocaine jelly was instilled into the urethra and allowed dwell time for local anesthesia  Flexible cystoscopy was then performed using a 16F scope  The distal urethra and prostatic urethra were evaluated and were normal in course and caliber  Once inside the bladder, it was carefully inspected in a 360 degree fashion  There was no evidence of mucosal abnormalities, lesions, diverticula or stones  Both ureteral orifices in their orthotopic location were visualized with clear efflux of urine  Retroflexion for evaluation of the bladder neck was normal      Overall this was a negative cystoscopy                  Quinton Barrow MD  3/21/2019 11:03 AM  Sign at close encounter    UROLOGY PROCEDURE NOTE     History of Present Illness:   Rochelle Lunsford is a 82 y o  male with a history of low-grade papillary bladder cancer diagnosed in January of 2017  The patient has undergone routine cystoscopy 3 month surveillance cystoscopies for the 1st year  There was some concern in October of 2017 about recurrence  Patient underwent a transurethral resection of his bladder which demonstrated no residual cancer  He presents today for his 2 year surveillance cystoscopy  Patient does report some occasional nighttime frequency  He drinks copious amounts of fluids throughout the day  Patient continues to discuss PSA testing and prostate cancer screening with me  We have had multiple lengthy conversations about the recommendations and my strong encouragement for cessation at age 82  The patient's PSAs have been less than 2 when checked in recent years  At our last visit I strongly encouraged the patient to avoid any further testing  He continues to forget our prior discussions and again inquired about screening today      Past Medical History:     Past Medical History:   Diagnosis Date    Anemia     Appendicitis     Coronary artery disease     Detached retina     GERD (gastroesophageal reflux disease)     Hyperlipidemia     Hypertension     Macular degeneration     Neuropathy     Von Willebrand disease ([address] )        PAST SURGICAL HISTORY:     Past Surgical History:   Procedure Laterality Date    ADENOIDECTOMY      APPENDECTOMY      ARTHRODESIS  01/15/2014    Lumbar     BACK SURGERY      CATARACT EXTRACTION      COLONOSCOPY  12/09/2016    fiberoptic     CYSTOSCOPY      with resection of tumor / 1/12/17, 10/2/17 / diagnostic 12/8/16, 5/30/17     CYSTOSCOPY  09/20/2018    EGD AND COLONOSCOPY N/A 12/9/2016    Procedure: EGD AND COLONOSCOPY;  Surgeon: Estelita Tafoya MD;  Location: [facility];  Service:    Reinaldo Fritz EYE SURGERY      FRACTURE SURGERY Bilateral     ARM    NECK SURGERY      NEUROPLASTY / TRANSPOSITION MEDIAN NERVE AT [facility] Right 04/2015    TX COLONOSCOPY FLX DX W/COLLJ SPEC WHEN PFRMD N/A 2/5/2019    Procedure: COLONOSCOPY;  Surgeon: Jb Hines MD;  [facility]: [facility];  Service: Gastroenterology    AL CYSTOURETHROSCOPY,FULGUR <0 5 CM LESN N/A 1/12/2017    Procedure: CYSTOSCOPY, BLADDER BIOPSY WITH FULGRATION, 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;  Surgeon: Tisha Sebastian MD;  [facility];  Service: Urology    AL CYSTOURETHROSCOPY,FULGUR <0 5 CM LESN N/A 10/2/2017    Procedure: CYSTOSCOPY WITH  BLADDER BIOPSIES WITH FULGURATION;  Surgeon: Tisha Sebastian MD;  [facility];  Service: Urology    AL ESOPHAGOGASTRODUODENOSCOPY TRANSORAL DIAGNOSTIC N/A 8/3/2018    Procedure: ESOPHAGOGASTRODUODENOSCOPY (EGD); Surgeon: Jb Hines MD;  [facility];  Service: Gastroenterology    AL INSTILL ANTICANCER AGENT IN BLADDER N/A 1/12/2017    Procedure: Stephen Hence;  Surgeon: Tisha Sebastian MD;  [facility];  Service: Urology    AL INSTILL ANTICANCER AGENT IN BLADDER N/A 10/2/2017    Procedure: Stephen Hence;  Surgeon: Tisha Sebastian MD;  [facility];  Service: Urology    RETINAL DETACHMENT SURGERY Right 03/2016    complete air fill confirmed    [address] SURGERY  03/03/2015    proximal humerus fracture / [place]    3/6/15   R    1/3/18     TONSILECTOMY AND ADNOIDECTOMY      TONSILLECTOMY      TRANSURETHRAL RESECTION OF BLADDER TUMOR N/A 10/2/2017    Procedure: TRANSURETHRAL RESECTION OF BLADDER TUMOR (TURBT);   Surgeon: Tisha Sebastian MD;  [facility];  Service: Urology    [address] (HISTORICAL)         CURRENT MEDICATIONS:     Current Outpatient Medications   Medication Sig Dispense Refill    atorvastatin (LIPITOR) 10 mg tablet TAKE 1 TABLET BY MOUTH  DAILY (Patient taking differently: TAKE 1 TABLET BY MOUTH  DAILY EVERY OTHER DAY) 90 tablet 2    B COMPLEX VITAMINS PO Take 1 tablet by mouth daily      cetirizine (ZyrTEC) 10 mg tablet Take 1 tablet (10 mg total) by mouth daily for 90 days 90 tablet 1    Cholecalciferol (VITAMIN D3) 2000 UNITS capsule Take 1 capsule by mouth 2 (two) times a day 5000 units       Coenzyme Q10 (CO Q10) 60 MG CAPS Take 1 capsule by mouth daily      desmopressin (DDAVP) 0 01 % solution 0 1 mL      famotidine (PEPCID) 40 MG tablet Take 1 tablet (40 mg total) by mouth daily at bedtime 90 tablet 2    Ginkgo Biloba 120 MG TABS Take 1 tablet by mouth daily      Inositol 500 MG TABS Take 1 tablet by mouth 2 (two) times a day      ipratropium (ATROVENT) 0 06 % nasal spray 2 sprays into each nostril 3 (three) times a day for 90 days 60 mL 1    lutein 6 mg Take 1 capsule by mouth daily      MILK THISTLE PO Take 1 tablet by mouth daily      Misc Natural Products (SUPER SNOOZE) CAPS Take 3 capsules by mouth daily Bed time      Multiple Vitamins-Minerals (OCUVITE-LUTEIN) CAPS Take by mouth daily      omeprazole (PriLOSEC) 40 MG capsule Take 40 mg by mouth 2 (two) times a day      sildenafil (VIAGRA) 50 MG tablet Viagra 50 mg tablet   TAKE 1 TABLET BY MOUTH UP TO 4 HOURS BEFORE INTERCOURSE AND MAX OF 1 TABLET IN 24 HOURS       No current facility-administered medications for this visit  ALLERGIES:   No Known Allergies    SOCIAL HISTORY:     Social History     Socioeconomic History    Marital status:       Spouse name: Not on file    Number of children: Not on file    Years of education: Not on file    Highest education level: Not on file   Occupational History    Occupation:    retired   Social Needs    Financial resource strain: Not on file    Food insecurity:     Worry: Not on file     Inability: Not on file   [employer] needs:     Medical: Not on file     Non-medical: Not on file   Tobacco Use    Smoking status: Current Every Day Smoker     Packs/day: 0 25     Years: 60 00     Pack years: 15 00     Types: Pipe    Smokeless tobacco: Never Used    Tobacco comment: smokes a pipe -resolved    Substance and Sexual Activity    Alcohol use: Yes     Alcohol/week: 1 2 oz     Types: 1 Glasses of wine, 1 Shots of liquor per week     Frequency: 4 or more times a week     Drinks per session: 1 or 2     Comment: DAILY     Drug use: No    Sexual activity: Not on file   Lifestyle    Physical activity:     Days per week: Not on file     Minutes per session: Not on file    Stress: Not on file   Relationships    Social connections:     Talks on phone: Not on file     Gets together: Not on file     Attends Synagogue service: Not on file     Active member of club or organization: Not on file     Attends meetings of clubs or organizations: Not on file     Relationship status: Not on file    Intimate partner violence:     Fear of current or ex partner: Not on file     Emotionally abused: Not on file     Physically abused: Not on file     Forced sexual activity: Not on file   Other Topics Concern    Not on file   Social History Narrative    No advance directives     Patient has living will        SOCIAL HISTORY:     Family History   Problem Relation Age of Onset    Ulcers Father         acute gastric    Heart disease Mother     Anuerysm Sister        REVIEW OF SYSTEMS:     Review of Systems   Constitutional: Negative  Respiratory: Negative  Cardiovascular: Negative  Gastrointestinal: Negative  Genitourinary: Positive for difficulty urinating (Mild nocturia)  Musculoskeletal: Negative  Skin: Negative  Neurological: Negative  Psychiatric/Behavioral: Negative  PHYSICAL EXAM:     There were no vitals taken for this visit  General:  Healthy appearing individual in no acute distress  They have a normal affect  There is not appear to be any gross neurologic defects or abnormalities  HEENT:  Normocephalic, atraumatic  Neck is supple without any palpable lymphadenopathy  Cardiovascular:  Patient has normal palpable distal radial pulses    There is no significant peripheral edema  No JVD is noted  Respiratory:  Patient has unlabored respirations  There is no audible wheeze or rhonchi  Abdomen:  Abdomen is without surgical scars  Abdomen is soft and nontender  There is no tympany  Inguinal and umbilical hernia are not appreciated  Genitourinary: no penile lesions or discharge, no testicular masses or tenderness, no hernias    Musculoskeletal:  Shuffling gait  Dermatologic:  Patient has no skin abnormalities or rashes  LABS:     CBC:   Lab Results   Component Value Date    WBC 10 93 (H) 2018    HGB 15 8 2018    HCT 48 5 2018    MCV 91 2018     2018       BMP:   Lab Results   Component Value Date    GLUCOSE 90 2015    CALCIUM 8 7 2018     2015    K 4 2 2018    CO2 26 2018     2018    BUN 22 2018    CREATININE 1 00 2018     Lab Results   Component Value Date    PSA 1 3 2018    PSA 1 0 2017    PSA 1 1 2016     IMAGIN/2016  CT ABDOMEN AND PELVIS WITH IV CONTRAST     INDICATION:  Intermittent diarrhea  Remote tobacco abuse  Back surgery/fusion      COMPARISON: None      TECHNIQUE:  CT examination of the abdomen and pelvis was performed after the administration of intravenous contrast   This examination, like all CT scans [facility], was performed utilizing techniques to minimize   radiation dose exposure, including the use of iterative reconstruction and automated exposure control  Axial, sagittal, and coronal reformatted images were submitted for interpretation  100 cc of intravenous Omnipaque 350 was administered for this   examination  Enteric contrast was not given  FINDINGS:     ABDOMEN     LOWER CHEST:  Minimal linear atelectasis or scarring is present at the lung bases    No pleural effusions      LIVER/BILIARY TREE:  The liver is normal in size and contour without dilated biliary ducts   Numerous rounded lesions of diminished attenuation are noted in the liver, largest in the inferior right hepatic lobe measures 2 4 cm in greatest dimension,   likely cysts      GALLBLADDER:  No calcified gallstones  No pericholecystic inflammatory change      SPLEEN:  Unremarkable      PANCREAS:  Unremarkable      ADRENAL GLANDS:  Unremarkable      KIDNEYS/URETERS:  Multiple bilateral renal cysts  No renal calculi or hydronephrosis      STOMACH AND BOWEL:  Stomach and small bowel unremarkable  Sigmoid diverticula are present without evidence of acute diverticulitis      APPENDIX:  No findings to suggest appendicitis      ABDOMINOPELVIC CAVITY:  No ascites or free intraperitoneal air  No lymphadenopathy      VESSELS:  Unremarkable for patient's age      PELVIS     REPRODUCTIVE ORGANS:  Unremarkable for patient's age      URINARY BLADDER:  12 mm nodule is present in the right side of the urinary bladder consistent with polypoid mucosal lesion      ABDOMINAL WALL/INGUINAL REGIONS:  Small fat-containing umbilical hernia noted  Fat is seen in both inguinal canals      OSSEOUS STRUCTURES:  Pedicle and screw and posterior stabilization bar hardware fusion noted at L3-4  Intervertebral disc space narrowing and vacuum disc phenomenon indicative of degenerative disc disease seen at L2-3 and L4-5      IMPRESSION:     Sigmoid diverticular disease without evidence of acute diverticulitis      Multiple cysts, liver and kidneys      Bladder mucosal nodules suspicious for transitional cell carcinoma  Urology consultation recommended      See above additional nonacute findings      PATHOLOGY:     1/12/17  Case Report   Surgical Pathology Report                         Case: [id number]                                    Authorizing Provider: Lainey Muñoz MD   Collected:           01/12/2017 1021               Ordering Location:     [facility] Received:            01/12/2017 1305                                      Operating Room                                                                Pathologist:           Pradeep Roy DO                                                             Specimens:   A) - Urinary Bladder, Left Lateral baldder wall                                                      B) - Urinary Bladder, Right posterior Bladder                                                        C) - Urinary Bladder, Right Trigone Bx                                                     Final Diagnosis   A  Bladder, left lateral wall, biopsy:  - Papillary urothelial neoplasm, see note  - Muscular propria (detrusor muscle), negative for carcinoma      Note: Papillary urothelial growth with minimal cytologic atypia, the differential diagnosis includes a non-invasive low grade papillary urothelial neoplasm or a papillary urothelial neoplasm of uncertain malignant potential (PUNLMP)  Clinical correlation needed       B  Bladder, right posterior, biopsy:  - Noninvasive low-grade papillary urothelial carcinoma with endophytic growth pattern  - Muscular propria (detrusor muscle), not identified in the specimen      C  Bladder, right trigone, biopsy:  - Noninvasive low-grade papillary urothelial carcinoma  - Muscular propria (detrusor muscle), not identified in the specimen       10/2017  Case Report   Surgical Pathology Report                         Case: [id number]                                    Authorizing Provider: Angelique Valverde MD   Collected:           10/02/2017 1001               Ordering Location:     [facility] Received:            10/02/2017 1045                                      Operating Room                                                                Pathologist:           Alfredo Tavares MD                                                         Specimen:    Urinary Bladder, RIGHT trigone                                                             Final Diagnosis   A  Urinary bladder, right trigone, biopsy:     - Scant detached cauterized fragment of urothelium (see note)  - Urothelial mucosa with acute and chronic inflammation and fragments of muscle  Electronically signed by Adrienne Montalvo MD on 10/6/2017 at  9:35 AM   Note   Previous history of noninvasive low-grade papillary urothelial carcinoma is noted      The submitted tissue contains predominantly fragments of muscle as well as few fragments of urothelial   mucosa with chronic inflammation  In addition a very small detached cauterized fragment of urothelium is   noted, limiting histologic assessment  The possibility of a small residual urothelial neoplasm cannot be   excluded  No invasive malignancy is identified in the lamina propria and muscularis propria  PROCEDURE:     SEE NOTE        ASSESSMENT:     82 y o  male with history of low-grade bladder cancer    PLAN:       Cystoscopy is negative for recurrence of cancer today  Will now stretch to yearly cystoscopy  I again spent more than 10 minutes discussing prostate cancer screening with the patient and the current recommendations from the American urologic Association and the [employer]  The harms of PSA testing and prostate cancer screening in elderly patients were discussed  I reassured the patient that with his prior PSA testing being extremely low, statistically his risk of developing a dangerous prostate cancer in the remainder of his life is exceedingly low  I have encouraged him to avoid digital rectal exams and PSA testing moving forward

## 2019-03-21 NOTE — PROGRESS NOTES
Cystoscopy  Date/Time: 3/21/2019 11:05 AM  Performed by: Colleen Bernal MD  Authorized by: Colleen Bernal MD     Procedure details: cystoscopy    Patient tolerance: Patient tolerated the procedure well with no immediate complications    Additional Procedure Details:   Cystoscopy procedure note:    Risk and benefits of flexible cystoscopy were discussed  Informed consent was obtained  Urine dip was adequate for cystoscopy  The patient was placed in the supine position  His genitalia was prepped with Betadine and draped in a sterile fashion  Viscous 2% lidocaine jelly was instilled into the urethra and allowed dwell time for local anesthesia  Flexible cystoscopy was then performed using a 16F scope  The distal urethra and prostatic urethra were evaluated and were normal in course and caliber  Once inside the bladder, it was carefully inspected in a 360 degree fashion  There was no evidence of mucosal abnormalities, lesions, diverticula or stones  Both ureteral orifices in their orthotopic location were visualized with clear efflux of urine  Retroflexion for evaluation of the bladder neck was normal      Overall this was a negative cystoscopy

## 2019-03-21 NOTE — PROGRESS NOTES
UROLOGY PROCEDURE NOTE     History of Present Illness:   Harjinder Syed is a 82 y o  male with a history of low-grade papillary bladder cancer diagnosed in January of 2017  The patient has undergone routine cystoscopy 3 month surveillance cystoscopies for the 1st year  There was some concern in October of 2017 about recurrence  Patient underwent a transurethral resection of his bladder which demonstrated no residual cancer  He presents today for his 2 year surveillance cystoscopy  Patient does report some occasional nighttime frequency  He drinks copious amounts of fluids throughout the day  Patient continues to discuss PSA testing and prostate cancer screening with me  We have had multiple lengthy conversations about the recommendations and my strong encouragement for cessation at age 82  The patient's PSAs have been less than 2 when checked in recent years  At our last visit I strongly encouraged the patient to avoid any further testing  He continues to forget our prior discussions and again inquired about screening today      Past Medical History:     Past Medical History:   Diagnosis Date    Anemia     Appendicitis     Coronary artery disease     Detached retina     GERD (gastroesophageal reflux disease)     Hyperlipidemia     Hypertension     Macular degeneration     Neuropathy     Von Willebrand disease ([address] )        PAST SURGICAL HISTORY:     Past Surgical History:   Procedure Laterality Date    ADENOIDECTOMY      APPENDECTOMY      ARTHRODESIS  01/15/2014    Lumbar     BACK SURGERY      CATARACT EXTRACTION      COLONOSCOPY  12/09/2016    fiberoptic     CYSTOSCOPY      with resection of tumor / 1/12/17, 10/2/17 / diagnostic 12/8/16, 5/30/17     CYSTOSCOPY  09/20/2018    EGD AND COLONOSCOPY N/A 12/9/2016    Procedure: EGD AND COLONOSCOPY;  Surgeon: Delaney Michel MD;  [facility];  Service:    [facility] EYE SURGERY      FRACTURE SURGERY Bilateral     ARM    NECK SURGERY      NEUROPLASTY / TRANSPOSITION MEDIAN NERVE AT CARPAL TUNNEL Right 04/2015    MS COLONOSCOPY FLX DX W/COLLJ SPEC WHEN PFRMD N/A 2/5/2019    Procedure: COLONOSCOPY;  Surgeon: Magan Pearson MD;  [facility];  Service: Gastroenterology    MS CYSTOURETHROSCOPY,FULGUR <0 5 CM LESN N/A 1/12/2017    Procedure: CYSTOSCOPY, BLADDER BIOPSY WITH FULGRATION, 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;  Surgeon: Kerline Huerta MD;  [facility];  Service: Urology    MS CYSTOURETHROSCOPY,FULGUR <0 5 CM LESN N/A 10/2/2017    Procedure: CYSTOSCOPY WITH  BLADDER BIOPSIES WITH FULGURATION;  Surgeon: Kerline Huerta MD;  [facility];  Service: Urology    MS ESOPHAGOGASTRODUODENOSCOPY TRANSORAL DIAGNOSTIC N/A 8/3/2018    Procedure: ESOPHAGOGASTRODUODENOSCOPY (EGD); Surgeon: Magan Pearson MD;  [facility];  Service: Gastroenterology    MS INSTILL ANTICANCER AGENT IN BLADDER N/A 1/12/2017    Procedure: Jessica Ortega;  Surgeon: Kerline Huerta MD;  [facility];  Service: Urology    MS INSTILL ANTICANCER AGENT IN BLADDER N/A 10/2/2017    Procedure: Jessica Ortega;  Surgeon: Kerline Huerta MD;  [facility];  Service: Urology    RETINAL DETACHMENT SURGERY Right 03/2016    complete air fill confirmed    [address] SURGERY  03/03/2015    proximal humerus fracture / [place]    3/6/15   R    1/3/18     TONSILECTOMY AND ADNOIDECTOMY      TONSILLECTOMY      TRANSURETHRAL RESECTION OF BLADDER TUMOR N/A 10/2/2017    Procedure: TRANSURETHRAL RESECTION OF BLADDER TUMOR (TURBT);   Surgeon: Kerline Huerta MD;  [facility];  Service: Urology    [address] (HISTORICAL)         CURRENT MEDICATIONS:     Current Outpatient Medications   Medication Sig Dispense Refill    atorvastatin (LIPITOR) 10 mg tablet TAKE 1 TABLET BY MOUTH  DAILY (Patient taking differently: TAKE 1 TABLET BY MOUTH  DAILY EVERY OTHER DAY) 90 tablet 2    B COMPLEX VITAMINS PO Take 1 tablet by mouth daily      cetirizine (ZyrTEC) 10 mg tablet Take 1 tablet (10 mg total) by mouth daily for 90 days 90 tablet 1    Cholecalciferol (VITAMIN D3) 2000 UNITS capsule Take 1 capsule by mouth 2 (two) times a day 5000 units       Coenzyme Q10 (CO Q10) 60 MG CAPS Take 1 capsule by mouth daily      desmopressin (DDAVP) 0 01 % solution 0 1 mL      famotidine (PEPCID) 40 MG tablet Take 1 tablet (40 mg total) by mouth daily at bedtime 90 tablet 2    Ginkgo Biloba 120 MG TABS Take 1 tablet by mouth daily      Inositol 500 MG TABS Take 1 tablet by mouth 2 (two) times a day      ipratropium (ATROVENT) 0 06 % nasal spray 2 sprays into each nostril 3 (three) times a day for 90 days 60 mL 1    lutein 6 mg Take 1 capsule by mouth daily      MILK THISTLE PO Take 1 tablet by mouth daily      Misc Natural Products (SUPER SNOOZE) CAPS Take 3 capsules by mouth daily Bed time      Multiple Vitamins-Minerals (OCUVITE-LUTEIN) CAPS Take by mouth daily      omeprazole (PriLOSEC) 40 MG capsule Take 40 mg by mouth 2 (two) times a day      sildenafil (VIAGRA) 50 MG tablet Viagra 50 mg tablet   TAKE 1 TABLET BY MOUTH UP TO 4 HOURS BEFORE INTERCOURSE AND MAX OF 1 TABLET IN 24 HOURS       No current facility-administered medications for this visit  ALLERGIES:   No Known Allergies    SOCIAL HISTORY:     Social History     Socioeconomic History    Marital status:       Spouse name: Not on file    Number of children: Not on file    Years of education: Not on file    Highest education level: Not on file   Occupational History    Occupation:    retired   Social Needs    Financial resource strain: Not on file    Food insecurity:     Worry: Not on file     Inability: Not on file   Healthy Labs needs:     Medical: Not on file     Non-medical: Not on file   Tobacco Use    Smoking status: Current Every Day Smoker     Packs/day: 0 25 Years: 60 00     Pack years: 15 00     Types: Pipe    Smokeless tobacco: Never Used    Tobacco comment: smokes a pipe -resolved    Substance and Sexual Activity    Alcohol use: Yes     Alcohol/week: 1 2 oz     Types: 1 Glasses of wine, 1 Shots of liquor per week     Frequency: 4 or more times a week     Drinks per session: 1 or 2     Comment: DAILY     Drug use: No    Sexual activity: Not on file   Lifestyle    Physical activity:     Days per week: Not on file     Minutes per session: Not on file    Stress: Not on file   Relationships    Social connections:     Talks on phone: Not on file     Gets together: Not on file     Attends [employer] service: Not on file     Active member of club or organization: Not on file     Attends meetings of clubs or organizations: Not on file     Relationship status: Not on file    Intimate partner violence:     Fear of current or ex partner: Not on file     Emotionally abused: Not on file     Physically abused: Not on file     Forced sexual activity: Not on file   Other Topics Concern    Not on file   Social History Narrative    No advance directives     Patient has living will        SOCIAL HISTORY:     Family History   Problem Relation Age of Onset    Ulcers Father         acute gastric    Heart disease Mother     Anuerysm Sister        REVIEW OF SYSTEMS:     Review of Systems   Constitutional: Negative  Respiratory: Negative  Cardiovascular: Negative  Gastrointestinal: Negative  Genitourinary: Positive for difficulty urinating (Mild nocturia)  Musculoskeletal: Negative  Skin: Negative  Neurological: Negative  Psychiatric/Behavioral: Negative  PHYSICAL EXAM:     There were no vitals taken for this visit  General:  Healthy appearing individual in no acute distress  They have a normal affect  There is not appear to be any gross neurologic defects or abnormalities  HEENT:  Normocephalic, atraumatic    Neck is supple without any palpable lymphadenopathy  Cardiovascular:  Patient has normal palpable distal radial pulses  There is no significant peripheral edema  No JVD is noted  Respiratory:  Patient has unlabored respirations  There is no audible wheeze or rhonchi  Abdomen:  Abdomen is without surgical scars  Abdomen is soft and nontender  There is no tympany  Inguinal and umbilical hernia are not appreciated  Genitourinary: no penile lesions or discharge, no testicular masses or tenderness, no hernias    Musculoskeletal:  Shuffling gait  Dermatologic:  Patient has no skin abnormalities or rashes  LABS:     CBC:   Lab Results   Component Value Date    WBC 10 93 (H) 2018    HGB 15 8 2018    HCT 48 5 2018    MCV 91 2018     2018       BMP:   Lab Results   Component Value Date    GLUCOSE 90 2015    CALCIUM 8 7 2018     2015    K 4 2 2018    CO2 26 2018     2018    BUN 22 2018    CREATININE 1 00 2018     Lab Results   Component Value Date    PSA 1 3 2018    PSA 1 0 2017    PSA 1 1 2016     IMAGIN/2016  CT ABDOMEN AND PELVIS WITH IV CONTRAST     INDICATION:  Intermittent diarrhea  Remote tobacco abuse  Back surgery/fusion      COMPARISON: None      TECHNIQUE:  CT examination of the abdomen and pelvis was performed after the administration of intravenous contrast   This examination, like all CT scans performed in the [facility], was performed utilizing techniques to minimize   radiation dose exposure, including the use of iterative reconstruction and automated exposure control  Axial, sagittal, and coronal reformatted images were submitted for interpretation  100 cc of intravenous Omnipaque 350 was administered for this   examination  Enteric contrast was not given  FINDINGS:     ABDOMEN     LOWER CHEST:  Minimal linear atelectasis or scarring is present at the lung bases    No pleural effusions      LIVER/BILIARY TREE:  The liver is normal in size and contour without dilated biliary ducts  Numerous rounded lesions of diminished attenuation are noted in the liver, largest in the inferior right hepatic lobe measures 2 4 cm in greatest dimension,   likely cysts      GALLBLADDER:  No calcified gallstones  No pericholecystic inflammatory change      SPLEEN:  Unremarkable      PANCREAS:  Unremarkable      ADRENAL GLANDS:  Unremarkable      KIDNEYS/URETERS:  Multiple bilateral renal cysts  No renal calculi or hydronephrosis      STOMACH AND BOWEL:  Stomach and small bowel unremarkable  Sigmoid diverticula are present without evidence of acute diverticulitis      APPENDIX:  No findings to suggest appendicitis      ABDOMINOPELVIC CAVITY:  No ascites or free intraperitoneal air  No lymphadenopathy      VESSELS:  Unremarkable for patient's age      PELVIS     REPRODUCTIVE ORGANS:  Unremarkable for patient's age      URINARY BLADDER:  12 mm nodule is present in the right side of the urinary bladder consistent with polypoid mucosal lesion      ABDOMINAL WALL/INGUINAL REGIONS:  Small fat-containing umbilical hernia noted  Fat is seen in both inguinal canals      OSSEOUS STRUCTURES:  Pedicle and screw and posterior stabilization bar hardware fusion noted at L3-4  Intervertebral disc space narrowing and vacuum disc phenomenon indicative of degenerative disc disease seen at L2-3 and L4-5      IMPRESSION:     Sigmoid diverticular disease without evidence of acute diverticulitis      Multiple cysts, liver and kidneys      Bladder mucosal nodules suspicious for transitional cell carcinoma  Urology consultation recommended      See above additional nonacute findings      PATHOLOGY:     1/12/17  Case Report   Surgical Pathology Report                         Case: [id number]                                    Authorizing Provider: Remy Chowdhury MD   Collected:           01/12/2017 1021             Ordering Location:     [facility] Received:            01/12/2017 1305                                      Operating Room                                                                Pathologist:           Pradeep Roy DO                                                             Specimens:   A) - Urinary Bladder, Left Lateral baldder wall                                                      B) - Urinary Bladder, Right posterior Bladder                                                        C) - Urinary Bladder, Right Trigone Bx                                                     Final Diagnosis   A  Bladder, left lateral wall, biopsy:  - Papillary urothelial neoplasm, see note  - Muscular propria (detrusor muscle), negative for carcinoma      Note: Papillary urothelial growth with minimal cytologic atypia, the differential diagnosis includes a non-invasive low grade papillary urothelial neoplasm or a papillary urothelial neoplasm of uncertain malignant potential (PUNLMP)  Clinical correlation needed       B  Bladder, right posterior, biopsy:  - Noninvasive low-grade papillary urothelial carcinoma with endophytic growth pattern  - Muscular propria (detrusor muscle), not identified in the specimen      C  Bladder, right trigone, biopsy:  - Noninvasive low-grade papillary urothelial carcinoma  - Muscular propria (detrusor muscle), not identified in the specimen       10/2017  Case Report   Surgical Pathology Report                         Case: [id number]                                    Authorizing Provider: Carolyn Ramey MD   Collected:           10/02/2017 1001               Ordering Location:     [facility] Received:            10/02/2017 1045                                      Operating Room                                                                Pathologist:           Thad Blanton MD                                                       Specimen:    Urinary Bladder, RIGHT trigone                                                             Final Diagnosis   A  Urinary bladder, right trigone, biopsy:     - Scant detached cauterized fragment of urothelium (see note)  - Urothelial mucosa with acute and chronic inflammation and fragments of muscle  Electronically signed by Lee Ray MD on 10/6/2017 at  9:35 AM   Note   Previous history of noninvasive low-grade papillary urothelial carcinoma is noted      The submitted tissue contains predominantly fragments of muscle as well as few fragments of urothelial   mucosa with chronic inflammation  In addition a very small detached cauterized fragment of urothelium is   noted, limiting histologic assessment  The possibility of a small residual urothelial neoplasm cannot be   excluded  No invasive malignancy is identified in the lamina propria and muscularis propria  PROCEDURE:     SEE NOTE        ASSESSMENT:     82 y o  male with history of low-grade bladder cancer    PLAN:       Cystoscopy is negative for recurrence of cancer today  Will now stretch to yearly cystoscopy  I again spent more than 10 minutes discussing prostate cancer screening with the patient and the current recommendations from the American urologic Association and the [employer]  The harms of PSA testing and prostate cancer screening in elderly patients were discussed  I reassured the patient that with his prior PSA testing being extremely low, statistically his risk of developing a dangerous prostate cancer in the remainder of his life is exceedingly low  I have encouraged him to avoid digital rectal exams and PSA testing moving forward

## 2019-03-25 NOTE — RESULT ENCOUNTER NOTE
Please call the patient regarding his abnormal result    Left hip shows some advanced arthritis if the his hips really bothering him it is possible that injections would help

## 2019-03-25 NOTE — RESULT ENCOUNTER NOTE
Please call the patient regarding his abnormal result    Multiple levels of degenerative disc disease of the lumbar spine physical therapy is his best bet

## 2019-04-04 ENCOUNTER — TELEPHONE (OUTPATIENT)
Dept: FAMILY MEDICINE CLINIC | Facility: CLINIC | Age: 84
End: 2019-04-04

## 2019-04-04 ENCOUNTER — DOCTOR'S OFFICE (OUTPATIENT)
Dept: URBAN - METROPOLITAN AREA CLINIC 136 | Facility: CLINIC | Age: 84
Setting detail: OPHTHALMOLOGY
End: 2019-04-04
Payer: COMMERCIAL

## 2019-04-04 ENCOUNTER — RX ONLY (RX ONLY)
Age: 84
End: 2019-04-04

## 2019-04-04 VITALS — SYSTOLIC BLOOD PRESSURE: 170 MMHG | DIASTOLIC BLOOD PRESSURE: 92 MMHG

## 2019-04-04 DIAGNOSIS — H35.3231: ICD-10-CM

## 2019-04-04 DIAGNOSIS — I10 ESSENTIAL HYPERTENSION: Primary | ICD-10-CM

## 2019-04-04 DIAGNOSIS — H31.002: ICD-10-CM

## 2019-04-04 DIAGNOSIS — Z96.1: ICD-10-CM

## 2019-04-04 DIAGNOSIS — H35.3211: ICD-10-CM

## 2019-04-04 DIAGNOSIS — H43.392: ICD-10-CM

## 2019-04-04 DIAGNOSIS — H33.021: ICD-10-CM

## 2019-04-04 PROCEDURE — 67028 INJECTION EYE DRUG: CPT | Performed by: OPHTHALMOLOGY

## 2019-04-04 PROCEDURE — 92134 CPTRZ OPH DX IMG PST SGM RTA: CPT | Performed by: OPHTHALMOLOGY

## 2019-04-04 PROCEDURE — 92012 INTRM OPH EXAM EST PATIENT: CPT | Performed by: OPHTHALMOLOGY

## 2019-04-04 RX ORDER — NEBIVOLOL 5 MG/1
5 TABLET ORAL DAILY
Qty: 30 TABLET | Refills: 0 | Status: SHIPPED | OUTPATIENT
Start: 2019-04-04 | End: 2019-04-22 | Stop reason: SDUPTHER

## 2019-04-04 ASSESSMENT — REFRACTION_AUTOREFRACTION
OS_SPHERE: +0.25
OD_AXIS: 087
OD_SPHERE: -1.00
OD_CYLINDER: -1.00
OS_CYLINDER: -1.75
OS_AXIS: 074

## 2019-04-04 ASSESSMENT — SPHEQUIV_DERIVED
OS_SPHEQUIV: -0.625
OD_SPHEQUIV: -1.5

## 2019-04-04 ASSESSMENT — REFRACTION_MANIFEST
OS_VA2: 20/
OU_VA: 20/
OD_VA1: 20/
OD_VA3: 20/
OD_VA1: 20/
OS_VA3: 20/
OS_VA1: 20/
OU_VA: 20/
OD_VA3: 20/
OS_VA1: 20/
OD_VA2: 20/
OS_VA3: 20/
OD_VA2: 20/
OS_VA2: 20/

## 2019-04-04 ASSESSMENT — LID POSITION - ENTROPION: OS_ENTROPION: LLL T

## 2019-04-04 ASSESSMENT — LID POSITION - COMMENTS: OS_COMMENTS: EARLY

## 2019-04-04 ASSESSMENT — REFRACTION_CURRENTRX
OD_OVR_VA: 20/
OS_OVR_VA: 20/

## 2019-04-04 ASSESSMENT — CONFRONTATIONAL VISUAL FIELD TEST (CVF)
OD_FINDINGS: CONSTRICTION
OS_FINDINGS: FULL

## 2019-04-04 ASSESSMENT — LID EXAM ASSESSMENTS
OD_TRICHIASIS: RUL T
OD_BLEPHARITIS: 1+
OS_TRICHIASIS: LUL T

## 2019-04-04 ASSESSMENT — SUPERFICIAL PUNCTATE KERATITIS (SPK): OD_SPK: 2+

## 2019-04-04 ASSESSMENT — DRY EYES - PHYSICIAN NOTES: OD_GENERALCOMMENTS: INFERIORLY

## 2019-04-04 ASSESSMENT — VISUAL ACUITY
OD_BCVA: 20/60
OS_BCVA: 20/400

## 2019-04-09 ENCOUNTER — DOCTOR'S OFFICE (OUTPATIENT)
Dept: URBAN - METROPOLITAN AREA CLINIC 136 | Facility: CLINIC | Age: 84
Setting detail: OPHTHALMOLOGY
End: 2019-04-09
Payer: COMMERCIAL

## 2019-04-09 DIAGNOSIS — J30.1 ALLERGIC RHINITIS DUE TO POLLEN, UNSPECIFIED SEASONALITY: ICD-10-CM

## 2019-04-09 DIAGNOSIS — H53.19: ICD-10-CM

## 2019-04-09 PROCEDURE — 92012 INTRM OPH EXAM EST PATIENT: CPT | Performed by: OPHTHALMOLOGY

## 2019-04-09 RX ORDER — CETIRIZINE HYDROCHLORIDE 10 MG/1
TABLET ORAL
Qty: 90 TABLET | Refills: 0 | Status: SHIPPED | OUTPATIENT
Start: 2019-04-09 | End: 2019-07-16 | Stop reason: SDUPTHER

## 2019-04-09 ASSESSMENT — REFRACTION_MANIFEST
OS_VA3: 20/
OD_VA2: 20/
OD_VA3: 20/
OD_VA1: 20/
OD_VA3: 20/
OS_VA2: 20/
OS_VA1: 20/
OS_VA3: 20/
OD_VA2: 20/
OS_VA1: 20/
OU_VA: 20/
OD_VA1: 20/
OU_VA: 20/
OS_VA2: 20/

## 2019-04-09 ASSESSMENT — VISUAL ACUITY
OD_BCVA: 20/70
OS_BCVA: 20/250

## 2019-04-09 ASSESSMENT — LID EXAM ASSESSMENTS
OD_TRICHIASIS: RUL T
OS_TRICHIASIS: LUL T
OD_BLEPHARITIS: 1+

## 2019-04-09 ASSESSMENT — REFRACTION_CURRENTRX
OD_OVR_VA: 20/
OS_OVR_VA: 20/
OD_OVR_VA: 20/
OS_OVR_VA: 20/
OS_OVR_VA: 20/
OD_OVR_VA: 20/

## 2019-04-09 ASSESSMENT — LID POSITION - ENTROPION: OS_ENTROPION: LLL T

## 2019-04-09 ASSESSMENT — CONFRONTATIONAL VISUAL FIELD TEST (CVF)
OS_FINDINGS: CONSTRICTION
OD_FINDINGS: FULL

## 2019-04-09 ASSESSMENT — SPHEQUIV_DERIVED
OS_SPHEQUIV: -0.625
OD_SPHEQUIV: -1.5

## 2019-04-09 ASSESSMENT — REFRACTION_AUTOREFRACTION
OD_AXIS: 087
OS_CYLINDER: -1.75
OD_SPHERE: -1.00
OS_SPHERE: +0.25
OD_CYLINDER: -1.00
OS_AXIS: 074

## 2019-04-09 ASSESSMENT — DRY EYES - PHYSICIAN NOTES: OD_GENERALCOMMENTS: INFERIORLY

## 2019-04-09 ASSESSMENT — SUPERFICIAL PUNCTATE KERATITIS (SPK): OD_SPK: 2+

## 2019-04-09 ASSESSMENT — LID POSITION - COMMENTS: OS_COMMENTS: EARLY

## 2019-04-20 DIAGNOSIS — J30.0 CHRONIC VASOMOTOR RHINITIS: ICD-10-CM

## 2019-04-22 ENCOUNTER — APPOINTMENT (OUTPATIENT)
Dept: RADIOLOGY | Facility: MEDICAL CENTER | Age: 84
End: 2019-04-22
Payer: MEDICARE

## 2019-04-22 DIAGNOSIS — M25.562 ACUTE PAIN OF LEFT KNEE: ICD-10-CM

## 2019-04-22 DIAGNOSIS — I10 ESSENTIAL HYPERTENSION: ICD-10-CM

## 2019-04-22 DIAGNOSIS — M25.562 ACUTE PAIN OF LEFT KNEE: Primary | ICD-10-CM

## 2019-04-22 PROCEDURE — 73562 X-RAY EXAM OF KNEE 3: CPT

## 2019-04-22 RX ORDER — IPRATROPIUM BROMIDE 42 UG/1
SPRAY, METERED NASAL
Qty: 15 ML | Refills: 1 | Status: SHIPPED | OUTPATIENT
Start: 2019-04-22 | End: 2019-06-04 | Stop reason: SDUPTHER

## 2019-04-22 RX ORDER — NEBIVOLOL 5 MG/1
5 TABLET ORAL DAILY
Qty: 90 TABLET | Refills: 3 | Status: SHIPPED | OUTPATIENT
Start: 2019-04-22 | End: 2019-05-03

## 2019-04-30 DIAGNOSIS — I10 ESSENTIAL HYPERTENSION: ICD-10-CM

## 2019-04-30 RX ORDER — NEBIVOLOL HYDROCHLORIDE 5 MG/1
TABLET ORAL
Qty: 30 TABLET | Refills: 0 | Status: SHIPPED | OUTPATIENT
Start: 2019-04-30 | End: 2019-05-03

## 2019-05-01 ENCOUNTER — TELEPHONE (OUTPATIENT)
Dept: FAMILY MEDICINE CLINIC | Facility: CLINIC | Age: 84
End: 2019-05-01

## 2019-05-02 ENCOUNTER — DOCTOR'S OFFICE (OUTPATIENT)
Dept: URBAN - METROPOLITAN AREA CLINIC 136 | Facility: CLINIC | Age: 84
Setting detail: OPHTHALMOLOGY
End: 2019-05-02
Payer: COMMERCIAL

## 2019-05-02 DIAGNOSIS — H35.3231: ICD-10-CM

## 2019-05-02 DIAGNOSIS — H33.021: ICD-10-CM

## 2019-05-02 DIAGNOSIS — H31.002: ICD-10-CM

## 2019-05-02 DIAGNOSIS — Z96.1: ICD-10-CM

## 2019-05-02 DIAGNOSIS — H43.392: ICD-10-CM

## 2019-05-02 DIAGNOSIS — H35.3211: ICD-10-CM

## 2019-05-02 PROCEDURE — 92012 INTRM OPH EXAM EST PATIENT: CPT | Performed by: OPHTHALMOLOGY

## 2019-05-02 PROCEDURE — 92134 CPTRZ OPH DX IMG PST SGM RTA: CPT | Performed by: OPHTHALMOLOGY

## 2019-05-02 PROCEDURE — 67028 INJECTION EYE DRUG: CPT | Performed by: OPHTHALMOLOGY

## 2019-05-02 ASSESSMENT — REFRACTION_MANIFEST
OU_VA: 20/
OS_VA3: 20/
OD_VA3: 20/
OD_VA2: 20/
OD_VA1: 20/
OD_VA3: 20/
OS_VA2: 20/
OD_VA2: 20/
OD_VA1: 20/
OS_VA2: 20/
OS_VA3: 20/
OS_VA1: 20/
OU_VA: 20/
OS_VA1: 20/

## 2019-05-02 ASSESSMENT — REFRACTION_CURRENTRX
OS_OVR_VA: 20/
OD_OVR_VA: 20/
OD_OVR_VA: 20/
OS_OVR_VA: 20/
OS_OVR_VA: 20/
OD_OVR_VA: 20/

## 2019-05-02 ASSESSMENT — REFRACTION_AUTOREFRACTION
OD_CYLINDER: -1.00
OD_AXIS: 087
OS_CYLINDER: -1.75
OD_SPHERE: -1.00
OS_AXIS: 074
OS_SPHERE: +0.25

## 2019-05-02 ASSESSMENT — SUPERFICIAL PUNCTATE KERATITIS (SPK): OD_SPK: 2+

## 2019-05-02 ASSESSMENT — LID EXAM ASSESSMENTS
OD_BLEPHARITIS: 1+
OD_TRICHIASIS: RUL T
OS_TRICHIASIS: LUL T

## 2019-05-02 ASSESSMENT — CONFRONTATIONAL VISUAL FIELD TEST (CVF)
OS_FINDINGS: INFEROTEMPORAL DEFECT
OD_FINDINGS: FULL

## 2019-05-02 ASSESSMENT — VISUAL ACUITY
OS_BCVA: 20/400
OD_BCVA: 20/80

## 2019-05-02 ASSESSMENT — LID POSITION - COMMENTS: OS_COMMENTS: EARLY

## 2019-05-02 ASSESSMENT — DRY EYES - PHYSICIAN NOTES: OD_GENERALCOMMENTS: INFERIORLY

## 2019-05-02 ASSESSMENT — SPHEQUIV_DERIVED
OS_SPHEQUIV: -0.625
OD_SPHEQUIV: -1.5

## 2019-05-02 ASSESSMENT — LID POSITION - ENTROPION: OS_ENTROPION: LLL T

## 2019-05-03 DIAGNOSIS — I10 ESSENTIAL HYPERTENSION: Primary | ICD-10-CM

## 2019-05-06 ENCOUNTER — TELEPHONE (OUTPATIENT)
Dept: GASTROENTEROLOGY | Facility: MEDICAL CENTER | Age: 84
End: 2019-05-06

## 2019-05-07 ENCOUNTER — TRANSCRIBE ORDERS (OUTPATIENT)
Dept: ADMINISTRATIVE | Facility: HOSPITAL | Age: 84
End: 2019-05-07

## 2019-05-07 DIAGNOSIS — M54.16 LUMBAR RADICULOPATHY: Primary | ICD-10-CM

## 2019-05-14 ENCOUNTER — HOSPITAL ENCOUNTER (OUTPATIENT)
Dept: MRI IMAGING | Facility: HOSPITAL | Age: 84
Discharge: HOME | End: 2019-05-14
Payer: MEDICARE

## 2019-05-14 DIAGNOSIS — M54.16 LUMBAR RADICULOPATHY: ICD-10-CM

## 2019-05-14 PROCEDURE — 72158 MRI LUMBAR SPINE W/O & W/DYE: CPT

## 2019-05-14 PROCEDURE — A9585 GADOBUTROL INJECTION: HCPCS | Performed by: PHYSICIAN ASSISTANT

## 2019-05-14 RX ADMIN — GADOBUTROL 7 ML: 604.72 INJECTION INTRAVENOUS at 13:10

## 2019-06-04 DIAGNOSIS — J30.0 CHRONIC VASOMOTOR RHINITIS: ICD-10-CM

## 2019-06-04 DIAGNOSIS — K21.9 GASTROESOPHAGEAL REFLUX DISEASE WITHOUT ESOPHAGITIS: ICD-10-CM

## 2019-06-04 DIAGNOSIS — E78.2 MIXED HYPERLIPIDEMIA: ICD-10-CM

## 2019-06-04 DIAGNOSIS — G62.9 PERIPHERAL POLYNEUROPATHY: Primary | ICD-10-CM

## 2019-06-04 RX ORDER — OMEPRAZOLE 40 MG/1
CAPSULE, DELAYED RELEASE ORAL
Qty: 180 CAPSULE | Refills: 1 | Status: SHIPPED | OUTPATIENT
Start: 2019-06-04 | End: 2019-10-31 | Stop reason: SDUPTHER

## 2019-06-04 RX ORDER — IPRATROPIUM BROMIDE 42 UG/1
SPRAY, METERED NASAL
Qty: 30 ML | Refills: 1 | Status: SHIPPED | OUTPATIENT
Start: 2019-06-04 | End: 2019-08-20 | Stop reason: SDUPTHER

## 2019-06-04 RX ORDER — ATORVASTATIN CALCIUM 10 MG/1
TABLET, FILM COATED ORAL
Qty: 90 TABLET | Refills: 2 | Status: SHIPPED | OUTPATIENT
Start: 2019-06-04 | End: 2019-10-31 | Stop reason: SDUPTHER

## 2019-06-04 RX ORDER — GABAPENTIN 600 MG/1
TABLET ORAL
Qty: 180 TABLET | Refills: 1 | Status: SHIPPED | OUTPATIENT
Start: 2019-06-04 | End: 2019-10-31 | Stop reason: SDUPTHER

## 2019-06-06 ENCOUNTER — DOCTOR'S OFFICE (OUTPATIENT)
Dept: URBAN - METROPOLITAN AREA CLINIC 136 | Facility: CLINIC | Age: 84
Setting detail: OPHTHALMOLOGY
End: 2019-06-06
Payer: COMMERCIAL

## 2019-06-06 ENCOUNTER — RX ONLY (RX ONLY)
Age: 84
End: 2019-06-06

## 2019-06-06 DIAGNOSIS — H43.392: ICD-10-CM

## 2019-06-06 DIAGNOSIS — Z96.1: ICD-10-CM

## 2019-06-06 DIAGNOSIS — H35.3231: ICD-10-CM

## 2019-06-06 DIAGNOSIS — H33.021: ICD-10-CM

## 2019-06-06 DIAGNOSIS — H35.3211: ICD-10-CM

## 2019-06-06 DIAGNOSIS — H31.002: ICD-10-CM

## 2019-06-06 PROCEDURE — 92014 COMPRE OPH EXAM EST PT 1/>: CPT | Performed by: OPHTHALMOLOGY

## 2019-06-06 PROCEDURE — 67028 INJECTION EYE DRUG: CPT | Performed by: OPHTHALMOLOGY

## 2019-06-06 PROCEDURE — 92134 CPTRZ OPH DX IMG PST SGM RTA: CPT | Performed by: OPHTHALMOLOGY

## 2019-06-06 ASSESSMENT — REFRACTION_CURRENTRX
OS_OVR_VA: 20/
OS_OVR_VA: 20/
OD_OVR_VA: 20/
OS_OVR_VA: 20/
OD_OVR_VA: 20/
OD_OVR_VA: 20/

## 2019-06-06 ASSESSMENT — REFRACTION_MANIFEST
OD_VA3: 20/
OS_VA2: 20/
OU_VA: 20/
OS_VA3: 20/
OD_VA3: 20/
OD_VA2: 20/
OS_VA3: 20/
OU_VA: 20/
OD_VA1: 20/
OS_VA1: 20/
OS_VA2: 20/
OD_VA2: 20/
OD_VA1: 20/
OS_VA1: 20/

## 2019-06-06 ASSESSMENT — LID POSITION - ENTROPION: OS_ENTROPION: LLL T

## 2019-06-06 ASSESSMENT — REFRACTION_AUTOREFRACTION
OD_AXIS: 087
OS_AXIS: 074
OS_CYLINDER: -1.75
OS_SPHERE: +0.25
OD_CYLINDER: -1.00
OD_SPHERE: -1.00

## 2019-06-06 ASSESSMENT — SPHEQUIV_DERIVED
OD_SPHEQUIV: -1.5
OS_SPHEQUIV: -0.625

## 2019-06-06 ASSESSMENT — VISUAL ACUITY
OD_BCVA: 20/80
OS_BCVA: 20/400

## 2019-06-06 ASSESSMENT — CONFRONTATIONAL VISUAL FIELD TEST (CVF)
OS_FINDINGS: INFEROTEMPORAL DEFECT
OD_FINDINGS: FULL

## 2019-06-06 ASSESSMENT — SUPERFICIAL PUNCTATE KERATITIS (SPK)
OD_SPK: 2+
OS_SPK: 2+

## 2019-06-06 ASSESSMENT — LID EXAM ASSESSMENTS
OD_BLEPHARITIS: 1+
OS_TRICHIASIS: LUL T
OD_TRICHIASIS: RUL T

## 2019-06-06 ASSESSMENT — LID POSITION - COMMENTS: OS_COMMENTS: EARLY

## 2019-06-06 ASSESSMENT — DRY EYES - PHYSICIAN NOTES: OD_GENERALCOMMENTS: INFERIORLY

## 2019-06-26 ENCOUNTER — APPOINTMENT (OUTPATIENT)
Dept: LAB | Facility: MEDICAL CENTER | Age: 84
End: 2019-06-26
Payer: MEDICARE

## 2019-06-26 ENCOUNTER — TRANSCRIBE ORDERS (OUTPATIENT)
Dept: ADMINISTRATIVE | Facility: HOSPITAL | Age: 84
End: 2019-06-26

## 2019-06-26 DIAGNOSIS — K76.9 CHRONIC NONALCOHOLIC LIVER DISEASE: Primary | ICD-10-CM

## 2019-06-26 DIAGNOSIS — K76.9 CHRONIC NONALCOHOLIC LIVER DISEASE: ICD-10-CM

## 2019-06-26 LAB
ALBUMIN SERPL BCP-MCNC: 3.6 G/DL (ref 3.5–5)
ALP SERPL-CCNC: 69 U/L (ref 46–116)
ALT SERPL W P-5'-P-CCNC: 37 U/L (ref 12–78)
ANION GAP SERPL CALCULATED.3IONS-SCNC: 7 MMOL/L (ref 4–13)
AST SERPL W P-5'-P-CCNC: 24 U/L (ref 5–45)
BILIRUB SERPL-MCNC: 1.45 MG/DL (ref 0.2–1)
BUN SERPL-MCNC: 16 MG/DL (ref 5–25)
CALCIUM SERPL-MCNC: 9.2 MG/DL (ref 8.3–10.1)
CHLORIDE SERPL-SCNC: 108 MMOL/L (ref 100–108)
CO2 SERPL-SCNC: 27 MMOL/L (ref 21–32)
CREAT SERPL-MCNC: 1.07 MG/DL (ref 0.6–1.3)
GFR SERPL CREATININE-BSD FRML MDRD: 65 ML/MIN/1.73SQ M
GLUCOSE P FAST SERPL-MCNC: 93 MG/DL (ref 65–99)
POTASSIUM SERPL-SCNC: 4.2 MMOL/L (ref 3.5–5.3)
PROT SERPL-MCNC: 7.3 G/DL (ref 6.4–8.2)
SODIUM SERPL-SCNC: 142 MMOL/L (ref 136–145)

## 2019-06-26 PROCEDURE — 36415 COLL VENOUS BLD VENIPUNCTURE: CPT

## 2019-06-26 PROCEDURE — 80053 COMPREHEN METABOLIC PANEL: CPT

## 2019-07-05 ENCOUNTER — RX ONLY (RX ONLY)
Age: 84
End: 2019-07-05

## 2019-07-05 ENCOUNTER — DOCTOR'S OFFICE (OUTPATIENT)
Dept: URBAN - METROPOLITAN AREA CLINIC 136 | Facility: CLINIC | Age: 84
Setting detail: OPHTHALMOLOGY
End: 2019-07-05
Payer: COMMERCIAL

## 2019-07-05 DIAGNOSIS — H35.3231: ICD-10-CM

## 2019-07-05 DIAGNOSIS — H35.3221: ICD-10-CM

## 2019-07-05 DIAGNOSIS — H35.3211: ICD-10-CM

## 2019-07-05 PROCEDURE — 67028 INJECTION EYE DRUG: CPT | Performed by: OPHTHALMOLOGY

## 2019-07-05 PROCEDURE — 92134 CPTRZ OPH DX IMG PST SGM RTA: CPT | Performed by: OPHTHALMOLOGY

## 2019-07-05 ASSESSMENT — REFRACTION_MANIFEST
OD_VA1: 20/
OD_VA2: 20/
OS_VA2: 20/
OD_VA2: 20/
OS_VA3: 20/
OS_VA2: 20/
OU_VA: 20/
OD_VA1: 20/
OS_VA3: 20/
OU_VA: 20/
OS_VA1: 20/
OD_VA3: 20/
OD_VA3: 20/
OS_VA1: 20/

## 2019-07-05 ASSESSMENT — LID POSITION - COMMENTS: OS_COMMENTS: EARLY

## 2019-07-05 ASSESSMENT — SUPERFICIAL PUNCTATE KERATITIS (SPK)
OD_SPK: 2+
OS_SPK: 2+

## 2019-07-05 ASSESSMENT — LID EXAM ASSESSMENTS
OD_BLEPHARITIS: 1+
OD_TRICHIASIS: RUL T
OS_TRICHIASIS: LUL T

## 2019-07-05 ASSESSMENT — REFRACTION_AUTOREFRACTION
OD_CYLINDER: -1.00
OD_SPHERE: -1.00
OS_CYLINDER: -1.75
OD_AXIS: 087
OS_SPHERE: +0.25
OS_AXIS: 074

## 2019-07-05 ASSESSMENT — LID POSITION - ENTROPION: OS_ENTROPION: LLL T

## 2019-07-05 ASSESSMENT — REFRACTION_CURRENTRX
OS_OVR_VA: 20/
OD_OVR_VA: 20/
OS_OVR_VA: 20/
OD_OVR_VA: 20/
OS_OVR_VA: 20/
OD_OVR_VA: 20/

## 2019-07-05 ASSESSMENT — SPHEQUIV_DERIVED
OD_SPHEQUIV: -1.5
OS_SPHEQUIV: -0.625

## 2019-07-05 ASSESSMENT — DRY EYES - PHYSICIAN NOTES: OD_GENERALCOMMENTS: INFERIORLY

## 2019-07-05 ASSESSMENT — VISUAL ACUITY
OD_BCVA: 20/60-2
OS_BCVA: 20/70

## 2019-07-16 DIAGNOSIS — J30.1 ALLERGIC RHINITIS DUE TO POLLEN, UNSPECIFIED SEASONALITY: ICD-10-CM

## 2019-07-16 RX ORDER — CETIRIZINE HYDROCHLORIDE 10 MG/1
TABLET ORAL
Qty: 90 TABLET | Refills: 0 | Status: SHIPPED | OUTPATIENT
Start: 2019-07-16 | End: 2019-10-15 | Stop reason: SDUPTHER

## 2019-07-18 ENCOUNTER — TELEPHONE (OUTPATIENT)
Dept: FAMILY MEDICINE CLINIC | Facility: CLINIC | Age: 84
End: 2019-07-18

## 2019-07-18 NOTE — TELEPHONE ENCOUNTER
We can send in a script for a unit dose B12 1000 mcg I think it is in 1 mL and then he can bring it in here will give it to him he can only have it once a month for the next 3 months

## 2019-07-18 NOTE — TELEPHONE ENCOUNTER
Patient stopped in the pharmacy and wanted a script for b 12 injections,  They said they would call you, they never dispensed b 12 in the past for him

## 2019-07-30 ENCOUNTER — CLINICAL SUPPORT (OUTPATIENT)
Dept: FAMILY MEDICINE CLINIC | Facility: CLINIC | Age: 84
End: 2019-07-30
Payer: MEDICARE

## 2019-07-30 DIAGNOSIS — D50.9 IRON DEFICIENCY ANEMIA, UNSPECIFIED IRON DEFICIENCY ANEMIA TYPE: Primary | ICD-10-CM

## 2019-07-30 PROCEDURE — 96372 THER/PROPH/DIAG INJ SC/IM: CPT | Performed by: FAMILY MEDICINE

## 2019-07-30 RX ORDER — CYANOCOBALAMIN 1000 UG/ML
1000 INJECTION INTRAMUSCULAR; SUBCUTANEOUS
Status: COMPLETED | OUTPATIENT
Start: 2019-07-30 | End: 2019-11-12

## 2019-07-30 RX ADMIN — CYANOCOBALAMIN 1000 MCG: 1000 INJECTION INTRAMUSCULAR; SUBCUTANEOUS at 11:56

## 2019-08-08 ENCOUNTER — DOCTOR'S OFFICE (OUTPATIENT)
Dept: URBAN - METROPOLITAN AREA CLINIC 136 | Facility: CLINIC | Age: 84
Setting detail: OPHTHALMOLOGY
End: 2019-08-08
Payer: COMMERCIAL

## 2019-08-08 DIAGNOSIS — H35.3231: ICD-10-CM

## 2019-08-08 DIAGNOSIS — H35.3211: ICD-10-CM

## 2019-08-08 PROCEDURE — 92134 CPTRZ OPH DX IMG PST SGM RTA: CPT | Performed by: OPHTHALMOLOGY

## 2019-08-08 PROCEDURE — 67028 INJECTION EYE DRUG: CPT | Performed by: OPHTHALMOLOGY

## 2019-08-08 ASSESSMENT — REFRACTION_CURRENTRX
OD_OVR_VA: 20/
OD_OVR_VA: 20/
OS_OVR_VA: 20/
OD_OVR_VA: 20/

## 2019-08-08 ASSESSMENT — VISUAL ACUITY
OS_BCVA: 20/100-1
OD_BCVA: 20/60+2

## 2019-08-08 ASSESSMENT — REFRACTION_MANIFEST
OS_VA3: 20/
OD_VA3: 20/
OS_VA2: 20/
OD_VA2: 20/
OD_VA2: 20/
OU_VA: 20/
OS_VA2: 20/
OD_VA3: 20/
OS_VA3: 20/
OD_VA1: 20/
OS_VA1: 20/
OU_VA: 20/
OD_VA1: 20/
OS_VA1: 20/

## 2019-08-08 ASSESSMENT — SPHEQUIV_DERIVED
OD_SPHEQUIV: -1.5
OS_SPHEQUIV: -0.625

## 2019-08-08 ASSESSMENT — CONFRONTATIONAL VISUAL FIELD TEST (CVF)
OS_FINDINGS: INFEROTEMPORAL DEFECT
OD_FINDINGS: FULL

## 2019-08-08 ASSESSMENT — REFRACTION_AUTOREFRACTION
OS_CYLINDER: -1.75
OD_SPHERE: -1.00
OS_SPHERE: +0.25
OD_CYLINDER: -1.00
OD_AXIS: 087
OS_AXIS: 074

## 2019-08-15 ENCOUNTER — TRANSCRIBE ORDERS (OUTPATIENT)
Dept: ADMINISTRATIVE | Facility: HOSPITAL | Age: 84
End: 2019-08-15

## 2019-08-15 DIAGNOSIS — S09.90XA TRAUMATIC INJURY OF HEAD, INITIAL ENCOUNTER: ICD-10-CM
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
## 2019-08-16 ENCOUNTER — HOSPITAL ENCOUNTER (OUTPATIENT)
Dept: CT IMAGING | Facility: HOSPITAL | Age: 84
Discharge: HOME | End: 2019-08-16
Payer: MEDICARE

## 2019-08-16 DIAGNOSIS — S09.90XA TRAUMATIC INJURY OF HEAD, INITIAL ENCOUNTER: ICD-10-CM
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
## 2019-08-16 PROCEDURE — 70450 CT HEAD/BRAIN W/O DYE: CPT

## 2019-08-19 NOTE — PROGRESS NOTES
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                   Assessment  Assessment details:   CURRENT FUNCTIONAL STATUS    Standing/ADL tolerance 5-10 minutes  Walking tolerance 75' with a SPC  Ascends/descends stairs reciprocally  Difficulty arising from sitting: Moderate use of arms  SHORT TERM GOALS (2 WEEKS)    IIncrease LE strength 2-3 lbs in all weak areas  TUG Score: 12 seconds  SLS R/L: 5/3 seconds  Standing/ADL tolerance 10-15 minutes  Walking tolerance 150' with a SPC  Ascends/descends stairs reciprocally  Difficulty arising from sitting: Mild use of arms  LONG TERM GOALS (DISCHARGE)    B Hip AROM: WNL  B Hip Strength R/L: F=30 lbs, ABD=32 lbs  B Knee AROM: WNL  B Knee Strength R/L: E=55 lbs, F=46 lbs  B Ankle AROM R/L: WNL  B Ankle Strength R/L: DF=29 lbs  TUG Score: 10 seconds  SLS R/L: 10/8 seconds  Standing/ADL tolerance 20-30 minutes  Walking tolerance 300' with a SPC  Ascends/descends stairs reciprocally  Difficulty arising from sitting: Minimal use of arms  Understanding of Dx/Px/POC: good   Prognosis: fair    Goals  See assessment details above  Plan  Plan details: Rico Phalen is a 82 y o  male presenting to PT with poor balance, decreased strength, and decreased tolerance to activity  This patient would benefit from skilled PT services to address these issues and to maximize function  Thank you for the referral     Patient would benefit from: skilled physical therapy  Planned therapy interventions: neuromuscular re-education, therapeutic exercise and therapeutic activities  Frequency: 2x week  Duration in weeks: 4        Subjective Evaluation    History of Present Illness  Mechanism of injury: CC: Poor balance and repeated falls    HPI: The patient has had balance problems for many years  Several neurologists have been unable to identify a specific cause for his condition  He mostly uses a SPC outside of his home, and a rolling walker for longer distances  Inside his home he uses walls or furniture  He denies having any pain, weakness, or parasthesias  He is retired and lives in a 2 story home  Pain  Current pain ratin  At best pain ratin  At worst pain ratin    Patient Goals  Patient goals for therapy: improved balance          Objective     Ambulation     Observational Gait     Additional Observational Gait Details  Patient ambulates with a SPC on the left side  Bilateral Trendelenburg sign is noted, along with a decrease in stride length and foot clearance  Moderate deviations in the gait pathway are also noted  Comments   CURRENT OBJECTIVE MEASUREMENTS    B Hip AROM: WNL  Hip Strength R/L: F=25/30 lbs, ABD=24/22 lbs  B Knee AROM: WNL  Knee Strength R/L: E=47/47 lbs, F=46/46 lbs  B Ankle AROM R/L: WNL  Ankle Strength R/L: DF=29/26 lbs  TUG Score: 14 seconds  SLS R/L: 3/1 seconds               Precautions: None    Daily Treatment Diary     Manual                   Exercise Diary          Balance: EOWB         Balance: EONB         Balance: ECWB         Balance: ECNB         SLS         Hurdles Forward         Hurdles Sideways         Cone Reach         Ball Toss         Side Stepping         Carioca         Heel Toe Gait         STS         Steps Forward         Steps Lateral         Hip Patterns         Mini Squats         Ball Squeeze         T-Band ABD         T-Band Hamstrings         T-Band TKE         LAQ         NuStep:   S , A                  Modalities

## 2019-08-20 DIAGNOSIS — J30.0 CHRONIC VASOMOTOR RHINITIS: ICD-10-CM

## 2019-08-20 RX ORDER — IPRATROPIUM BROMIDE 42 UG/1
SPRAY, METERED NASAL
Qty: 60 ML | Refills: 2 | Status: SHIPPED | OUTPATIENT
Start: 2019-08-20 | End: 2019-10-31 | Stop reason: SDUPTHER

## 2019-08-22 ENCOUNTER — EVALUATION (OUTPATIENT)
Dept: PHYSICAL THERAPY | Facility: CLINIC | Age: 84
End: 2019-08-22
Payer: MEDICARE

## 2019-08-22 ENCOUNTER — TRANSCRIBE ORDERS (OUTPATIENT)
Dept: PHYSICAL THERAPY | Facility: CLINIC | Age: 84
End: 2019-08-22

## 2019-08-22 DIAGNOSIS — R26.89 BALANCE PROBLEM: Primary | ICD-10-CM

## 2019-08-22 DIAGNOSIS — R26.89 MULTIFACTORIAL GAIT DISORDER: Primary | ICD-10-CM

## 2019-08-22 PROCEDURE — 97163 PT EVAL HIGH COMPLEX 45 MIN: CPT | Performed by: PHYSICAL THERAPIST

## 2019-08-22 NOTE — LETTER
2019    Gemini Apontey, [address] 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        Dear Dr Reyes Pin: Thank you for your recent referral of Charley Marrero  Please review the attached evaluation summary from Abdirahman's recent visit  Please verify that you agree with the plan of care by signing the attached order  If you have any questions or concerns, please do not hesitate to call  I sincerely appreciate the opportunity to share in the care of one of your patients and hope to have another opportunity to work with you in the near future  Sincerely,    Ana Quevedo, PT      Referring Provider:      I certify that I have read the below Plan of Care and certify the need for these services furnished under this plan of treatment while under my care  Gemini Banda DO  [address] [facility] [address]  [address] [facility]  VIA Facsimile: [phone]          PT Evaluation     Today's date: 2019  Patient name: Charley Marrero  : 1935  MRN: [id number]  Referring provider: Anthony Arango DO  Dx:   Encounter Diagnosis     ICD-10-CM    1  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                   Assessment  Assessment details:   CURRENT FUNCTIONAL STATUS    Standing/ADL tolerance 5-10 minutes  Walking tolerance 75' with a SPC  Ascends/descends stairs reciprocally  Difficulty arising from sitting: Moderate use of arms  SHORT TERM GOALS (2 WEEKS)    IIncrease LE strength 2-3 lbs in all weak areas  TUG Score: 12 seconds  SLS R/L: 5/3 seconds  Standing/ADL tolerance 10-15 minutes  Walking tolerance 150' with a SPC  Ascends/descends stairs reciprocally  Difficulty arising from sitting: Mild use of arms        LONG TERM GOALS (DISCHARGE)    B Hip AROM: WNL  B Hip Strength R/L: F=30 lbs, ABD=32 lbs  B Knee AROM: WNL  B Knee Strength R/L: E=55 lbs, F=46 lbs  B Ankle AROM R/L: WNL  B Ankle Strength R/L: DF=29 lbs  TUG Score: 10 seconds  SLS R/L: 10/8 seconds  Standing/ADL tolerance 20-30 minutes  Walking tolerance 300' with a SPC  Ascends/descends stairs reciprocally  Difficulty arising from sitting: Minimal use of arms  Understanding of Dx/Px/POC: good   Prognosis: fair    Goals  See assessment details above  Plan  Plan details: Charu Taveras is a 82 y o  male presenting to PT with poor balance, decreased strength, and decreased tolerance to activity  This patient would benefit from skilled PT services to address these issues and to maximize function  Thank you for the referral     Patient would benefit from: skilled physical therapy  Planned therapy interventions: neuromuscular re-education, therapeutic exercise and therapeutic activities  Frequency: 2x week  Duration in weeks: 4        Subjective Evaluation    History of Present Illness  Mechanism of injury: CC: Poor balance and repeated falls  HPI: The patient has had balance problems for many years  Several neurologists have been unable to identify a specific cause for his condition  He mostly uses a SPC outside of his home, and a rolling walker for longer distances  Inside his home he uses walls or furniture  He denies having any pain, weakness, or parasthesias  He is retired and lives in a 2 story home  Pain  Current pain ratin  At best pain ratin  At worst pain ratin    Patient Goals  Patient goals for therapy: improved balance          Objective     Ambulation     Observational Gait     Additional Observational Gait Details  Patient ambulates with a SPC on the left side  Bilateral Trendelenburg sign is noted, along with a decrease in stride length and foot clearance  Moderate deviations in the gait pathway are also noted      Comments   CURRENT OBJECTIVE MEASUREMENTS    B Hip AROM: WNL  Hip Strength R/L: F=25/30 lbs, ABD=24/22 lbs  B Knee AROM: WNL  Knee Strength R/L: E=47/47 lbs, F=46/46 lbs  B Ankle AROM R/L: WNL  Ankle Strength R/L: DF=29/26 lbs  TUG Score: 14 seconds  SLS R/L: 3/1 seconds               Precautions: None    Daily Treatment Diary     Manual                   Exercise Diary          Balance: EOWB         Balance: EONB         Balance: ECWB         Balance: ECNB         SLS         Hurdles Forward         Hurdles Sideways         Cone Reach         Ball Toss         Side Stepping         Carioca         Heel Toe Gait         STS         Steps Forward         Steps Lateral         Hip Patterns         Mini Squats         Ball Squeeze         T-Band ABD         T-Band Hamstrings         T-Band TKE         LAQ         NuStep:   S , A                  Modalities

## 2019-08-27 ENCOUNTER — OFFICE VISIT (OUTPATIENT)
Dept: PHYSICAL THERAPY | Facility: CLINIC | Age: 84
End: 2019-08-27
Payer: MEDICARE

## 2019-08-27 ENCOUNTER — CLINICAL SUPPORT (OUTPATIENT)
Dept: FAMILY MEDICINE CLINIC | Facility: CLINIC | Age: 84
End: 2019-08-27
Payer: MEDICARE

## 2019-08-27 DIAGNOSIS — R26.89 MULTIFACTORIAL GAIT DISORDER: Primary | ICD-10-CM

## 2019-08-27 DIAGNOSIS — D50.9 IRON DEFICIENCY ANEMIA, UNSPECIFIED IRON DEFICIENCY ANEMIA TYPE: Primary | ICD-10-CM

## 2019-08-27 PROCEDURE — 97110 THERAPEUTIC EXERCISES: CPT

## 2019-08-27 PROCEDURE — 96372 THER/PROPH/DIAG INJ SC/IM: CPT | Performed by: FAMILY MEDICINE

## 2019-08-27 RX ADMIN — CYANOCOBALAMIN 1000 MCG: 1000 INJECTION INTRAMUSCULAR; SUBCUTANEOUS at 11:07

## 2019-08-27 NOTE — PROGRESS NOTES
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                         Subjective: Patient notes he is unsteady when walking  Objective: See treatment diary below      Assessment: Tolerated treatment well  Patient exhibited good technique with therapeutic exercises      Plan: Continue per plan of care        Precautions: None    Daily Treatment Diary     Manual                   Exercise Diary          Balance: EOWB         Balance: EONB         Balance: ECWB         Balance: ECNB         SLS         Hurdles Forward         Hurdles Sideways         Cone Reach         Ball Toss         Side Stepping L3 6X        Monster walk L3 6X        HipX3 alt /balance 15X        Supine bridge ball squeeze 15X        Supine bridge TB Abd L3 15X        Pyr Abs S5 100# 3/10        F12 P4Back Ext  150 3/10                 Carioca         Heel Toe Gait         STS         Steps Forward         Steps Lateral         Hip Patterns         Mini Squats         Ball Squeeze         T-Band ABD         T-Band Hamstrings         T-Band TKE         LAQ         NuStep:   S , A                  Modalities

## 2019-08-29 ENCOUNTER — OFFICE VISIT (OUTPATIENT)
Dept: PHYSICAL THERAPY | Facility: CLINIC | Age: 84
End: 2019-08-29
Payer: MEDICARE

## 2019-08-29 DIAGNOSIS — R26.89 MULTIFACTORIAL GAIT DISORDER: Primary | ICD-10-CM

## 2019-08-29 PROCEDURE — 97112 NEUROMUSCULAR REEDUCATION: CPT

## 2019-08-29 PROCEDURE — 97110 THERAPEUTIC EXERCISES: CPT

## 2019-08-29 NOTE — PROGRESS NOTES
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                   Subjective: Patient had no c/o soreness post last session  Objective: See treatment diary below      Assessment: Tolerated treatment well  Patient exhibited good technique with therapeutic exercises      Plan: Continue per plan of care        Precautions: None    Daily Treatment Diary     Manual                  Exercise Diary          Balance: EOWB         Balance: EONB         Balance: ECNB  20" X2       Balance: ECWB Foam  15" X2       Staggered   20" X2       SLS         Hurdles Forward         Hurdles Sideways         Cone Reach         Ball Toss         Side Stepping L3 6X L4 7X       Monster walk L3 6X L4 7X       HipX3 alt /balance 15X 4# 2/10       Supine bridge ball squeeze 15X 2/10       Supine bridge TB Abd L3 15X L4 2/10       Pyr Abs S5 100# 3/10        F12 P4Back Ext  150 3/10                 Carioca         Heel Toe Gait         STS         Steps Forward         Steps Lateral         Hip Patterns         Mini Squats         Ball Squeeze         T-Band ABD         T-Band Hamstrings         T-Band TKE         LAQ         NuStep:   S , A                  Modalities

## 2019-09-03 ENCOUNTER — OFFICE VISIT (OUTPATIENT)
Dept: PHYSICAL THERAPY | Facility: CLINIC | Age: 84
End: 2019-09-03
Payer: MEDICARE

## 2019-09-03 DIAGNOSIS — R26.89 MULTIFACTORIAL GAIT DISORDER: Primary | ICD-10-CM

## 2019-09-03 PROCEDURE — 97140 MANUAL THERAPY 1/> REGIONS: CPT

## 2019-09-03 PROCEDURE — 97112 NEUROMUSCULAR REEDUCATION: CPT

## 2019-09-03 NOTE — PROGRESS NOTES
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                   Subjective: Patient reports he did not perform the HEP over the weekend  He reports he is trying to concentrate on his posture and walking heel-toe with SPC  Objective: See treatment diary below      Assessment: Tolerated treatment well  Patient exhibited good technique with therapeutic exercises  Challenged balance with more exercises today, patient showing better posture throughout exercise program      Plan: Continue per plan of care        Precautions: None    Daily Treatment Diary     Manual  8/27 8/29 9/3               Exercise Diary          Balance: EOWB         Balance: EONB Foam head turns   10Xea      Balance: ECNB  20" X2       Balance: ECWB Foam  15" X2 30" X2      Staggered foam  20" X2 30" X2      SLS         Hurdles Forward         Hurdles Sideways         Cone Reach         NBOS MB Diag reach   3# 10Xea      Ball Toss NBOS`   20X      Side Stepping L3 6X L4 7X L4 7X      Monster walk L3 6X L4 7X L4 7X      HipX3 alt /balance 15X 4# 2/10 4# 2/10      Supine bridge ball squeeze 15X 2/10       Supine bridge TB Abd L3 15X L4 2/10       Pyr Abs S5 100# 3/10        F12 P4Back Ext  150 3/10                 Carioca         Heel Toe Gait         STS         Steps Forward         Steps Lateral         Hip Patterns         Mini Squats         Ball Squeeze         T-Band ABD         T-Band Hamstrings         T-Band TKE         LAQ         NuStep:   S , A                  Modalities

## 2019-09-05 ENCOUNTER — OFFICE VISIT (OUTPATIENT)
Dept: PHYSICAL THERAPY | Facility: CLINIC | Age: 84
End: 2019-09-05
Payer: MEDICARE

## 2019-09-05 DIAGNOSIS — R26.89 MULTIFACTORIAL GAIT DISORDER: Primary | ICD-10-CM

## 2019-09-05 PROCEDURE — 97112 NEUROMUSCULAR REEDUCATION: CPT

## 2019-09-05 PROCEDURE — 97110 THERAPEUTIC EXERCISES: CPT

## 2019-09-05 NOTE — PROGRESS NOTES
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                   Subjective: Patient reports he still has trouble with balance when walking  Objective: See treatment diary below      Assessment: Tolerated treatment well  Patient exhibited good technique with therapeutic exercises      Plan: Continue per plan of care        Precautions: None    Daily Treatment Diary     Manual  8/27 8/29 9/3 9/5              Exercise Diary          Balance: EOWB         Balance: EONB Foam head turns   10Xea      Balance: ECNB  20" X2       Balance: ECWB Foam  15" X2 30" X2      Staggered foam  20" X2 30" X2      SLS         Hurdles Forward         Hurdles Sideways         Cone Reach         NBOS MB Diag reach   3# 10Xea      Ball Toss NBOS`   20X 20X     Side Stepping L3 6X L4 7X L4 7X      Monster walk L3 6X L4 7X L4 7X      HipX3 alt /balance 15X 4# 2/10 4# 2/10 4# 2/10     Supine bridge ball squeeze 15X 2/10  2/15     Supine bridge TB Abd L3 15X L4 2/10  L4 2/15     Pyr Abs S5 100# 3/10        F12 P4Back Ext  150 3/10   P150 3/10     Leg Press S9    P6 2/15     Pyr Quad S6 P6    P6 2/10  P5 1/10     Carioca         Heel Toe Gait         STS         Steps Forward         Steps Lateral         Hip Patterns         Mini Squats         Ball Squeeze         T-Band ABD         T-Band Hamstrings         T-Band TKE         LAQ         NuStep:   S , A                  Modalities

## 2019-09-06 ENCOUNTER — DOCTOR'S OFFICE (OUTPATIENT)
Dept: URBAN - METROPOLITAN AREA CLINIC 136 | Facility: CLINIC | Age: 84
Setting detail: OPHTHALMOLOGY
End: 2019-09-06
Payer: COMMERCIAL

## 2019-09-06 DIAGNOSIS — H35.3231: ICD-10-CM

## 2019-09-06 DIAGNOSIS — H35.3211: ICD-10-CM

## 2019-09-06 PROCEDURE — 67028 INJECTION EYE DRUG: CPT | Performed by: OPHTHALMOLOGY

## 2019-09-06 PROCEDURE — 92134 CPTRZ OPH DX IMG PST SGM RTA: CPT | Performed by: OPHTHALMOLOGY

## 2019-09-06 ASSESSMENT — SPHEQUIV_DERIVED
OD_SPHEQUIV: -1.5
OS_SPHEQUIV: -0.625

## 2019-09-06 ASSESSMENT — REFRACTION_MANIFEST
OD_VA1: 20/
OS_VA3: 20/
OU_VA: 20/
OD_VA2: 20/
OD_VA3: 20/
OS_VA2: 20/
OU_VA: 20/
OD_VA3: 20/
OS_VA3: 20/
OD_VA2: 20/
OS_VA1: 20/
OS_VA1: 20/
OS_VA2: 20/
OD_VA1: 20/

## 2019-09-06 ASSESSMENT — REFRACTION_AUTOREFRACTION
OD_AXIS: 087
OS_AXIS: 074
OS_CYLINDER: -1.75
OS_SPHERE: +0.25
OD_CYLINDER: -1.00
OD_SPHERE: -1.00

## 2019-09-06 ASSESSMENT — CONFRONTATIONAL VISUAL FIELD TEST (CVF)
OS_FINDINGS: INFEROTEMPORAL DEFECT
OD_FINDINGS: FULL

## 2019-09-06 ASSESSMENT — DRY EYES - PHYSICIAN NOTES: OD_GENERALCOMMENTS: INFERIORLY

## 2019-09-06 ASSESSMENT — SUPERFICIAL PUNCTATE KERATITIS (SPK)
OD_SPK: 2+
OS_SPK: 2+

## 2019-09-06 ASSESSMENT — REFRACTION_CURRENTRX
OD_OVR_VA: 20/
OD_OVR_VA: 20/
OS_OVR_VA: 20/
OD_OVR_VA: 20/
OS_OVR_VA: 20/
OS_OVR_VA: 20/

## 2019-09-06 ASSESSMENT — LID POSITION - ENTROPION: OS_ENTROPION: LLL T

## 2019-09-06 ASSESSMENT — LID EXAM ASSESSMENTS
OD_BLEPHARITIS: 1+
OS_TRICHIASIS: LUL T
OD_TRICHIASIS: RUL T

## 2019-09-06 ASSESSMENT — LID POSITION - COMMENTS: OS_COMMENTS: EARLY

## 2019-09-06 ASSESSMENT — VISUAL ACUITY
OS_BCVA: 20/800
OD_BCVA: 20/60-1

## 2019-09-10 ENCOUNTER — OFFICE VISIT (OUTPATIENT)
Dept: PHYSICAL THERAPY | Facility: CLINIC | Age: 84
End: 2019-09-10
Payer: MEDICARE

## 2019-09-10 DIAGNOSIS — R26.89 MULTIFACTORIAL GAIT DISORDER: Primary | ICD-10-CM

## 2019-09-10 PROCEDURE — 97112 NEUROMUSCULAR REEDUCATION: CPT

## 2019-09-10 NOTE — PROGRESS NOTES
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                   Subjective: Patient reports he has not noticed any change in balance thus far  He had slight soreness in the R posterior LB/hip post last session  He reports compliance with HEP  Objective: See treatment diary below      Assessment: Tolerated treatment well  Patient exhibited good technique with therapeutic exercises  Patient's balance challenged with new exercises today      Plan: Continue per plan of care        Precautions: None    Daily Treatment Diary     Manual  8/27 8/29 9/3 9/5 9/10             Exercise Diary          Balance: EOWB         Balance: EONB Foam head turns   10Xea      Balance: ECNB  20" X2       Balance: ECWB Foam  15" X2 30" X2      Staggered foam  20" X2 30" X2      SLS         Hurdles Forward     6" 4X    Hurdles Sideways     6" 4X    DB Reach     3# 10Xea    NBOS MB Diag reach   3# 10Xea  Foam 10Xea    Ball Toss NBOS`   20X 20X     Side Stepping L3 6X L4 7X L4 7X  L5 8X    Monster walk L3 6X L4 7X L4 7X  L5 8X    HipX3 alt /balance 15X 4# 2/10 4# 2/10 4# 2/10     Supine bridge ball squeeze 15X 2/10  2/15     Supine bridge TB Abd L3 15X L4 2/10  L4 2/15     Pyr Abs S5 100# 3/10        F12 P4Back Ext  150 3/10   P150 3/10     Leg Press S9    P6 2/15     Pyr Quad S6 P6    P6 2/10  P5 1/10     Alt UE/LE march     2/10    Square     5Xea    Carioca         Heel Toe Gait         STS         Steps Forward         Steps Lateral         Hip Patterns         Mini Squats         Ball Squeeze         T-Band ABD         T-Band Hamstrings         T-Band TKE         LAQ         NuStep:   S , A                  Modalities

## 2019-09-12 ENCOUNTER — OFFICE VISIT (OUTPATIENT)
Dept: PHYSICAL THERAPY | Facility: CLINIC | Age: 84
End: 2019-09-12
Payer: MEDICARE

## 2019-09-12 DIAGNOSIS — R26.89 MULTIFACTORIAL GAIT DISORDER: Primary | ICD-10-CM

## 2019-09-12 PROCEDURE — 97112 NEUROMUSCULAR REEDUCATION: CPT

## 2019-09-12 PROCEDURE — 97110 THERAPEUTIC EXERCISES: CPT

## 2019-09-12 NOTE — PROGRESS NOTES
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                   Subjective: Patient reports he has not noticed any change in balance  He reports compliance with HEP, and has some soreness  In the L groin area  Objective: See treatment diary below      Assessment: Tolerated treatment well  Patient exhibited good technique with therapeutic exercises      Plan: Continue per plan of care        Precautions: None    Daily Treatment Diary     Manual  8/27 8/29 9/3 9/5 9/10 9/12            Exercise Diary          Balance: EOWB         Balance: EONB Foam head turns   10Xea   10Xea   Balance: ECNB  20" X2    20" X2   Balance: ECWB Foam  15" X2 30" X2      Staggered foam  20" X2 30" X2      SLS         Hurdles Forward     6" 4X 12" 4X   Hurdles Sideways     6" 4X 12" 4X   DB Reach     3# 10Xea    NBOS MB Diag reach   3# 10Xea  Foam 10Xea    Ball Toss NBOS`   20X 20X     Side Stepping L3 6X L4 7X L4 7X  L5 8X    Monster walk L3 6X L4 7X L4 7X  L5 8X    HipX3 alt /balance 15X 4# 2/10 4# 2/10 4# 2/10  4# 3/10   Supine bridge ball squeeze 15X 2/10  2/15     Supine bridge TB Abd L3 15X L4 2/10  L4 2/15     Pyr Abs S5 100# 3/10        F12 P4Back Ext  150 3/10   P150 3/10     Leg Press S9    P6 2/15     Pyr Quad S6 P6    P6 2/10  P5 1/10     Alt UE/LE /10    Roll kick pass     15X    Square     5Xea    Carioca         Heel Toe Gait         STS         Steps Forward         Steps Lateral         Hip Patterns         Mini Squats         Ball Squeeze         T-Band ABD         T-Band Hamstrings         T-Band TKE         LAQ         NuStep:   S , A                  Modalities

## 2019-09-16 NOTE — PROGRESS NOTES
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      Addendum 2019: I spoke with Norma Hodgson, PT today  She evaluated Lon on 2019, and recommended that he see a physiatrist for an evaluation  He is therefore discharged from our care  Assessment  Assessment details:   CURRENT FUNCTIONAL STATUS    Standing/ADL tolerance 5-10 minutes  Walking tolerance 100' with a SPC  Ascends/descends stairs reciprocally  Difficulty arising from sitting: Moderate use of arms  SHORT TERM GOALS (2 WEEKS)    IIncrease LE strength 2-3 lbs in all weak areas  TUG Score: 12 seconds  SLS R/L: 5/3 seconds  Standing/ADL tolerance 10-15 minutes  Walking tolerance 150' with a SPC  Ascends/descends stairs reciprocally  Difficulty arising from sitting: Mild use of arms  LONG TERM GOALS (DISCHARGE)    B Hip AROM: WNL  B Hip Strength R/L: F=30 lbs, ABD=32 lbs  B Knee AROM: WNL  B Knee Strength R/L: E=55 lbs, F=46 lbs  B Ankle AROM R/L: WNL  B Ankle Strength R/L: DF=29 lbs  TUG Score: 10 seconds  SLS R/L: 10/8 seconds  Standing/ADL tolerance 20-30 minutes  Walking tolerance 300' with a SPC  Ascends/descends stairs reciprocally  Difficulty arising from sitting: Minimal use of arms  Understanding of Dx/Px/POC: good   Prognosis: fair    Goals  See assessment details above  Plan  Plan details: Although the patient exhibits improved lower extremity strength, his balance problems continue  I have recommended that he see a PT specialized in neurologic disorders located in [facility] office  He will see her 2019 for an evaluation, and possible treatment options    Patient would benefit from: skilled physical therapy  Planned therapy interventions: neuromuscular re-education, therapeutic exercise and therapeutic activities  Frequency: 2x week  Duration in weeks: 4        Subjective Evaluation    History of Present Illness  Mechanism of injury: Subjective: The patient feels that his leg strength has improved the past 4 weeks, but his balance has not  He has not had any falls the past 4 weeks, but uses a SPC and furniture to steady himself at home  Pain  Current pain ratin  At best pain ratin  At worst pain ratin    Patient Goals  Patient goals for therapy: improved balance          Objective     Ambulation     Observational Gait     Additional Observational Gait Details  Patient ambulates with a SPC on the left side  Bilateral Trendelenburg sign is present, along with a decrease in stride length and foot clearance  Moderate deviations in the gait pathway are still present  Comments   CURRENT OBJECTIVE MEASUREMENTS    B Hip AROM: WNL  Hip Strength R/L: F=25/30 lbs, ABD=26/28 lbs  B Knee AROM: WNL  Knee Strength R/L: E=56/47 lbs, F=47/47 lbs  B Ankle AROM R/L: WNL  Ankle Strength R/L: DF=29/27 lbs  TUG Score: 14 seconds  SLS R/L: 3/1 seconds               Precautions: None     Daily Treatment Diary      Manual  9/18 9/19 9/3 9/5 9/10 9/12                   Exercise Diary                Balance: EOWB               Balance: EONB Foam head turns 10Xea  10x ea 10Xea     10Xea   Balance: ECNB          20" X2   Balance: ECWB Foam   30"x2 30" X2         Staggered foam 30", 60" 30", 60" 30" X2         SLS               Hurdles Forward    12" 4x     6" 4X 12" 4X   Hurdles Sideways    12" 4x     6" 4X 12" 4X   DB Reach         3# 10Xea     NBOS MB Diag reach    Foam 10x ea 3# 10Xea   Foam 10Xea     Ball Toss NBOS`     20X 20X       Side Stepping L5 8X L5 8x L4 7X   L5 8X     Monster walk L5 8X L5 8x L4 7X   L5 8X     HipX3 alt /balance   4# 3/10 4# 2/10 4# 2/10   4# 3/10   Supine bridge ball squeeze      15       Supine bridge TB Abd      L4 215       Pyr Abs S5 100# 3/10  P10 3/10         F12 P4Back Ext  150 3/10  P15 3/10   P150 3/10       Leg Press S9 P6 2/15  P6 2/15   P6 2/15       Pyr Quad S6 P6 P5 2/10  P5 2/10   P6 2/10  P5 1/10       Alt UE/LE march    2/10     2/10     Roll kick pass   15x     15X     Square    5x ea     5Xea

## 2019-09-17 ENCOUNTER — OFFICE VISIT (OUTPATIENT)
Dept: FAMILY MEDICINE CLINIC | Facility: CLINIC | Age: 84
End: 2019-09-17
Payer: MEDICARE

## 2019-09-17 VITALS
WEIGHT: 172 LBS | DIASTOLIC BLOOD PRESSURE: 86 MMHG | SYSTOLIC BLOOD PRESSURE: 160 MMHG | HEIGHT: 72 IN | BODY MASS INDEX: 23.3 KG/M2

## 2019-09-17 DIAGNOSIS — E78.2 MIXED HYPERLIPIDEMIA: ICD-10-CM

## 2019-09-17 DIAGNOSIS — Z13.29 SCREENING FOR HYPOTHYROIDISM: ICD-10-CM

## 2019-09-17 DIAGNOSIS — Z00.00 MEDICARE ANNUAL WELLNESS VISIT, SUBSEQUENT: ICD-10-CM

## 2019-09-17 DIAGNOSIS — Z23 NEED FOR IMMUNIZATION AGAINST INFLUENZA: Primary | ICD-10-CM

## 2019-09-17 DIAGNOSIS — Z13.228 ENCOUNTER FOR SCREENING FOR OTHER METABOLIC DISORDERS: ICD-10-CM

## 2019-09-17 PROCEDURE — 90662 IIV NO PRSV INCREASED AG IM: CPT | Performed by: FAMILY MEDICINE

## 2019-09-17 PROCEDURE — G0439 PPPS, SUBSEQ VISIT: HCPCS | Performed by: FAMILY MEDICINE

## 2019-09-17 PROCEDURE — G0008 ADMIN INFLUENZA VIRUS VAC: HCPCS | Performed by: FAMILY MEDICINE

## 2019-09-17 RX ORDER — FEXOFENADINE HCL 180 MG/1
180 TABLET ORAL DAILY
COMMUNITY
End: 2019-10-15 | Stop reason: ALTCHOICE

## 2019-09-17 NOTE — PROGRESS NOTES
Assessment and Plan:     Problem List Items Addressed This Visit     None      Visit Diagnoses     Need for immunization against influenza    -  Primary    Relevant Orders    influenza vaccine, [id number], high-dose, PF 0 5 mL (FLUZONE HIGH-DOSE)          Falls Plan of Care: balance, strength, and gait training instructions were provided and referral to physical therapy  Recommended assistive device to help with gait and balance  Medications that increase falls were reviewed  Home safety education provided  Tobacco Cessation Counseling: Tobacco cessation counseling was provided  The patient is sincerely urged to quit consumption of tobacco  He is ready to quit tobacco  Side effects of medication discussed  Medication options not discussed  Patient refused medication  Tobacco use screening or tobacco cessation counseling was not performed due to limited life expectancy  Preventive health issues were discussed with patient, and age appropriate screening tests were ordered as noted in patient's After Visit Summary  Personalized health advice and appropriate referrals for health education or preventive services given if needed, as noted in patient's After Visit Summary  History of Present Illness:     Patient presents for Welcome to Medicare visit  Patient Care Team:  Tomma Lefort, DO as PCP - Evangeline Poser, MD Christie Lulas, MD Tomma Lefort, Michaelyn Munro, MD Shila Comings, MD as Endoscopist     Review of Systems:     Review of Systems   Constitutional: Positive for activity change and fatigue  Negative for appetite change, diaphoresis and fever  HENT: Positive for postnasal drip  Eyes: Positive for visual disturbance  Respiratory: Negative for apnea, cough, chest tightness, shortness of breath and wheezing  Cardiovascular: Negative for chest pain, palpitations and leg swelling     Gastrointestinal: Negative for abdominal distention, abdominal pain, anal bleeding, constipation, diarrhea, nausea and vomiting  Endocrine: Negative for cold intolerance, heat intolerance, polydipsia, polyphagia and polyuria  Genitourinary: Negative for difficulty urinating, dysuria, flank pain, hematuria and urgency  Musculoskeletal: Positive for arthralgias and gait problem  Negative for back pain, joint swelling and myalgias  Skin: Negative for color change, rash and wound  Allergic/Immunologic: Negative for environmental allergies, food allergies and immunocompromised state  Neurological: Positive for weakness  Negative for dizziness, seizures, syncope, speech difficulty, numbness and headaches  Balance dysfunction   Hematological: Negative for adenopathy  Does not bruise/bleed easily  Psychiatric/Behavioral: Negative for agitation, behavioral problems, hallucinations, sleep disturbance and suicidal ideas        Problem List:     Patient Active Problem List   Diagnosis    Bladder cancer ([facility] )    Lung nodule, solitary    Erectile dysfunction    Gait instability    High blood pressure    Hypothyroidism    Idiopathic peripheral neuropathy    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)    Cervical stenosis of spinal canal    Spinal stenosis of lumbar region    Spondylosis 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    Hoarse voice quality    Appetite loss    Weight loss    Gastroesophageal reflux disease without esophagitis    Alternating constipation and diarrhea    Weight loss, non-intentional    Nausea    Chronic obstructive pulmonary disease with acute exacerbation (HCC)    Von Willebrand's disease [facility])      Past Medical and Surgical History:     Past Medical History:   Diagnosis Date    Anemia     Appendicitis     Coronary artery disease     Detached retina     GERD (gastroesophageal reflux disease)     Hyperlipidemia     Hypertension     Macular degeneration     Neuropathy     Von Willebrand disease ([facility] )      Past Surgical History:   Procedure Laterality Date    ADENOIDECTOMY      APPENDECTOMY      ARTHRODESIS  01/15/2014    Lumbar     BACK SURGERY      CATARACT EXTRACTION      COLONOSCOPY  12/09/2016    fiberoptic     CYSTOSCOPY      with resection of tumor / 1/12/17, 10/2/17 / diagnostic 12/8/16, 5/30/17     CYSTOSCOPY  09/20/2018    EGD AND COLONOSCOPY N/A 12/9/2016    Procedure: EGD AND COLONOSCOPY;  Surgeon: Tank Musa MD;  [facility];  Service:    [employer] EYE SURGERY      FRACTURE SURGERY Bilateral     ARM    NECK SURGERY      NEUROPLASTY / TRANSPOSITION MEDIAN NERVE AT [facility] 04/2015    NM COLONOSCOPY FLX DX W/COLLJ SPEC WHEN PFRMD N/A 2/5/2019    Procedure: COLONOSCOPY;  Surgeon: Jil Estrada MD;  [facility];  Service: Gastroenterology    NM CYSTOURETHROSCOPY,FULGUR <0 5 CM LESN N/A 1/12/2017    Procedure: CYSTOSCOPY, BLADDER BIOPSY WITH FULGRATION, 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;  Surgeon: Radha Morfin MD;  [facility];  Service: Urology    NM CYSTOURETHROSCOPY,FULGUR <0 5 CM LESN N/A 10/2/2017    Procedure: CYSTOSCOPY WITH  BLADDER BIOPSIES WITH FULGURATION;  Surgeon: Radha Morfin MD;  [facility];  Service: Urology    NM ESOPHAGOGASTRODUODENOSCOPY TRANSORAL DIAGNOSTIC N/A 8/3/2018    Procedure: ESOPHAGOGASTRODUODENOSCOPY (EGD); Surgeon: Jil Estrada MD;  [facility];  Service: Gastroenterology    NM INSTILL ANTICANCER AGENT IN BLADDER N/A 1/12/2017    Procedure: Bisi Linker;  Surgeon: Radha Morfin MD;  [facility];  Service: Urology    NM INSTILL ANTICANCER AGENT IN BLADDER N/A 10/2/2017    Procedure: Bisi Linker;  Surgeon: Radha Morfin MD;  [facility];  Service: Urology    RETINAL DETACHMENT SURGERY Right 03/2016    complete air fill confirmed    [address] SURGERY  03/03/2015    proximal humerus fracture / [place]    3/6/15 R 1/3/18     TONSILECTOMY AND ADNOIDECTOMY      TONSILLECTOMY      TRANSURETHRAL RESECTION OF BLADDER TUMOR N/A 10/2/2017    Procedure: TRANSURETHRAL RESECTION OF BLADDER TUMOR (TURBT); Surgeon: Deb Beckman MD;  [facility];  Service: Urology    [facility] [address] (HISTORICAL)        Family History:     Family History   Problem Relation Age of Onset    Ulcers Father         acute gastric    Heart disease Mother    [facility]       Social History:     Social History     Socioeconomic History    Marital status:      Spouse name: None    Number of children: None    Years of education: None    Highest education level: None   Occupational History    Occupation:    retired   Social Needs    Financial resource strain: None    Food insecurity:     Worry: None     Inability: None    Transportation needs:     Medical: None     Non-medical: None   Tobacco Use    Smoking status: Current Every Day Smoker     Packs/day: 0 25     Years: 60 00     Pack years: 15 00     Types: Cigarettes    Smokeless tobacco: Never Used    Tobacco comment: smokes a pipe -resolved    Substance and Sexual Activity    Alcohol use:  Yes     Alcohol/week: 2 0 standard drinks     Types: 1 Glasses of wine, 1 Shots of liquor per week     Frequency: 4 or more times a week     Drinks per session: 1 or 2     Comment: DAILY     Drug use: No    Sexual activity: None   Lifestyle    Physical activity:     Days per week: None     Minutes per session: None    Stress: None   Relationships    Social connections:     Talks on phone: None     Gets together: None     Attends Baptism service: None     Active member of club or organization: None     Attends meetings of clubs or organizations: None     Relationship status: None    Intimate partner violence:     Fear of current or ex partner: None     Emotionally abused: None     Physically abused: None     Forced sexual activity: None   Other Topics Concern    None   Social History Narrative    No advance directives     Patient has living will       Medications and Allergies:     Current Outpatient Medications   Medication Sig Dispense Refill    atorvastatin (LIPITOR) 10 mg tablet TAKE 1 TABLET BY MOUTH  DAILY 90 tablet 2    B COMPLEX VITAMINS PO Take 1 tablet by mouth daily      Cholecalciferol (VITAMIN D3) 2000 UNITS capsule Take 1 capsule by mouth 2 (two) times a day 5000 units       Coenzyme Q10 (CO Q10) 60 MG CAPS Take 1 capsule by mouth daily      famotidine (PEPCID) 40 MG tablet Take 1 tablet (40 mg total) by mouth daily at bedtime 90 tablet 2    fexofenadine (ALLEGRA) 180 MG tablet Take 180 mg by mouth daily      gabapentin (NEURONTIN) 600 MG tablet TAKE 1 TABLET BY MOUTH  TWICE A  tablet 1    Ginkgo Biloba 120 MG TABS Take 1 tablet by mouth daily      ipratropium (ATROVENT) 0 06 % nasal spray USE 2 SPRAYS IN EACH  NOSTRIL 3 TIMES A DAY 60 mL 2    lutein 6 mg Take 1 capsule by mouth daily      metoprolol tartrate (LOPRESSOR) 25 mg tablet Take 1 tablet (25 mg total) by mouth every 12 (twelve) hours 180 tablet 2    MILK THISTLE PO Take 1 tablet by mouth daily      Misc Natural Products (SUPER SNOOZE) CAPS Take 1 capsule by mouth daily Bed time       Multiple Vitamins-Minerals (OCUVITE-LUTEIN) CAPS Take by mouth daily      omeprazole (PriLOSEC) 40 MG capsule TAKE 1 CAPSULE BY MOUTH  TWICE DAILY 180 capsule 1    sildenafil (VIAGRA) 50 MG tablet Viagra 50 mg tablet   TAKE 1 TABLET BY MOUTH UP TO 4 HOURS BEFORE INTERCOURSE AND MAX OF 1 TABLET IN 24 HOURS      cetirizine (ZyrTEC) 10 mg tablet TAKE ONE TABLET BY MOUTH DAILY 90 tablet 0    Inositol 500 MG TABS Take 1 tablet by mouth 2 (two) times a day       Current Facility-Administered Medications   Medication Dose Route Frequency Provider Last Rate Last Dose    cyanocobalamin injection 1,000 mcg  1,000 mcg Intramuscular Q30 Days Unknown DO Grupo   1,000 mcg at 08/27/19 1107     No Known Allergies   Immunizations:     Immunization History   Administered Date(s) Administered    INFLUENZA 10/26/2017, 09/12/2018    Influenza Split High Dose Preservative Free IM 09/17/2012, 10/15/2013, 11/04/2014, 10/15/2015, 09/01/2016, 10/26/2017    Influenza, high dose seasonal 0 5 mL 09/12/2018    Pneumococcal Conjugate 13-Valent 12/09/2014    Pneumococcal Polysaccharide PPV23 01/12/2018    Zoster 12/12/2014, 12/12/2014, 12/12/2014      Health Maintenance:         Topic Date Due    CRC Screening: Colonoscopy  02/05/2024         Topic Date Due    DTaP,Tdap,and Td Vaccines (1 - Tdap) 11/21/1956    INFLUENZA VACCINE  07/01/2019      Medicare Screening Tests and Risk Assessments:     Ayo Cook is here for his Subsequent Wellness visit  Health Risk Assessment:   Patient rates overall health as good  Patient feels that their physical health rating is same  Eyesight was rated as same  Hearing was rated as same  Patient feels that their emotional and mental health rating is same  Pain experienced in the last 7 days has been none  Patient states that he has experienced no weight loss or gain in last 6 months  Depression Screening:   PHQ-2 Score: 0      Fall Risk Screening: In the past year, patient has experienced: history of falling in past year    Number of falls: 2 or more  Injured during fall?: Yes    Feels unsteady when standing or walking?: Yes    Worried about falling?: Yes      Home Safety:  Patient does not have trouble with stairs inside or outside of their home  Patient has working smoke alarms and has no working carbon monoxide detector  Home safety hazards include: none  Nutrition:   Current diet is Regular and Limited junk food  Medications:   Patient is currently taking over-the-counter supplements  OTC medications include: see medication list  Patient is able to manage medications       Activities of Daily Living (ADLs)/Instrumental Activities of Daily Living (IADLs):   Walk and transfer into and out of bed and chair?: Yes  Dress and groom yourself?: Yes    Bathe or shower yourself?: Yes    Feed yourself? Yes  Do your laundry/housekeeping?: Yes  Manage your money, pay your bills and track your expenses?: Yes  Make your own meals?: Yes    Do your own shopping?: Yes    Previous Hospitalizations:   Any hospitalizations or ED visits within the last 12 months?: No      Advance Care Planning:   Living will: Yes    Durable POA for healthcare:  Yes    Advanced directive: Yes    Advanced directive counseling given: Yes    Five wishes given: No    Patient declined ACP directive: No    End of Life Decisions reviewed with patient: Yes    Provider agrees with end of life decisions: Yes      Cognitive Screening:   Provider or family/friend/caregiver concerned regarding cognition?: No    PREVENTIVE SCREENINGS      Cardiovascular Screening:    General: Screening Not Indicated and History Lipid Disorder      Diabetes Screening:     General: Screening Current      Colorectal Cancer Screening:     General: Screening Current      Prostate Cancer Screening:    General: Screening Not Indicated      Abdominal Aortic Aneurysm (AAA) Screening:    Risk factors include: tobacco use        Lung Cancer Screening:     General: Screening Not Indicated      Hepatitis C Screening:    General: Screening Not Indicated    No exam data present     Physical Exam:     /86 (BP Location: Left arm, Patient Position: Sitting, Cuff Size: Standard)   Ht 6' (1 829 m)   Wt 78 kg (172 lb)   BMI 23 33 kg/m²     Physical Exam    Tony Crowe DO

## 2019-09-17 NOTE — PATIENT INSTRUCTIONS
Medicare Preventive Visit Patient Instructions  Thank you for completing your Welcome to Medicare Visit or Medicare Annual Wellness Visit today  Your next wellness visit will be due in one year (9/17/2020)  The screening/preventive services that you may require over the next 5-10 years are detailed below  Some tests may not apply to you based off risk factors and/or age  Screening tests ordered at today's visit but not completed yet may show as past due  Also, please note that scanned in results may not display below  Preventive Screenings:  Service Recommendations Previous Testing/Comments   Colorectal Cancer Screening  · Colonoscopy    · Fecal Occult Blood Test (FOBT)/Fecal Immunochemical Test (FIT)  · Fecal DNA/Cologuard Test  · Flexible Sigmoidoscopy Age: 54-65 years old   Colonoscopy: every 10 years (May be performed more frequently if at higher risk)  OR  FOBT/FIT: every 1 year  OR  Cologuard: every 3 years  OR  Sigmoidoscopy: every 5 years  Screening may be recommended earlier than age 48 if at higher risk for colorectal cancer  Also, an individualized decision between you and your healthcare provider will decide whether screening between the ages of 74-80 would be appropriate   Colonoscopy: 02/05/2019  FOBT/FIT: Not on file  Cologuard: Not on file  Sigmoidoscopy: Not on file    Screening Current     Prostate Cancer Screening Individualized decision between patient and health care provider in men between ages of 53-78   Medicare will cover every 12 months beginning on the day after your 50th birthday PSA: 1 3 ng/mL     Screening Not Indicated     Hepatitis C Screening Once for adults born between 1945 and 1965  More frequently in patients at high risk for Hepatitis C Hep C Antibody: Not on file       Diabetes Screening 1-2 times per year if you're at risk for diabetes or have pre-diabetes Fasting glucose: 93 mg/dL   A1C: 6 1 %    Screening Current   Cholesterol Screening Once every 5 years if you don't have a lipid disorder  May order more often based on risk factors  Lipid panel: 08/29/2017    Screening Not Indicated  History Lipid Disorder      Other Preventive Screenings Covered by Medicare:  1  Abdominal Aortic Aneurysm (AAA) Screening: covered once if your at risk  You're considered to be at risk if you have a family history of AAA or a male between the age of 73-68 who smoking at least 100 cigarettes in your lifetime  2  Lung Cancer Screening: covers low dose CT scan once per year if you meet all of the following conditions: (1) Age 50-69; (2) No signs or symptoms of lung cancer; (3) Current smoker or have quit smoking within the last 15 years; (4) You have a tobacco smoking history of at least 30 pack years (packs per day x number of years you smoked); (5) You get a written order from a healthcare provider  3  Glaucoma Screening: covered annually if you're considered high risk: (1) You have diabetes OR (2) Family history of glaucoma OR (3)  aged 48 and older OR (3)  American aged 72 and older  3  Osteoporosis Screening: covered every 2 years if you meet one of the following conditions: (1) Have a vertebral abnormality; (2) On glucocorticoid therapy for more than 3 months; (3) Have primary hyperparathyroidism; (4) On osteoporosis medications and need to assess response to drug therapy  5  HIV Screening: covered annually if you're between the age of 12-76  Also covered annually if you are younger than 13 and older than 72 with risk factors for HIV infection  For pregnant patients, it is covered up to 3 times per pregnancy      Immunizations:  Immunization Recommendations   Influenza Vaccine Annual influenza vaccination during flu season is recommended for all persons aged >= 6 months who do not have contraindications   Pneumococcal Vaccine (Prevnar and Pneumovax)  * Prevnar = PCV13  * Pneumovax = PPSV23 Adults 25-60 years old: 1-3 doses may be recommended based on certain risk factors  Adults 72 years old: Prevnar (PCV13) vaccine recommended followed by Pneumovax (PPSV23) vaccine  If already received PPSV23 since turning 65, then PCV13 recommended at least one year after PPSV23 dose  Hepatitis B Vaccine 3 dose series if at intermediate or high risk (ex: diabetes, end stage renal disease, liver disease)   Tetanus (Td) Vaccine - COST NOT COVERED BY MEDICARE PART B Following completion of primary series, a booster dose should be given every 10 years to maintain immunity against tetanus  Td may also be given as tetanus wound prophylaxis  Tdap Vaccine - COST NOT COVERED BY MEDICARE PART B Recommended at least once for all adults  For pregnant patients, recommended with each pregnancy  Shingles Vaccine (Shingrix) - COST NOT COVERED BY MEDICARE PART B  2 shot series recommended in those aged 48 and above     Health Maintenance Due:      Topic Date Due    CRC Screening: Colonoscopy  02/05/2024     Immunizations Due:      Topic Date Due    DTaP,Tdap,and Td Vaccines (1 - Tdap) 11/21/1956    INFLUENZA VACCINE  07/01/2019     Advance Directives   What are advance directives? Advance directives are legal documents that state your wishes and plans for medical care  These plans are made ahead of time in case you lose your ability to make decisions for yourself  Advance directives can apply to any medical decision, such as the treatments you want, and if you want to donate organs  What are the types of advance directives? There are many types of advance directives, and each state has rules about how to use them  You may choose a combination of any of the following:  · Living will: This is a written record of the treatment you want  You can also choose which treatments you do not want, which to limit, and which to stop at a certain time  This includes surgery, medicine, IV fluid, and tube feedings  · Durable power of  for healthcare [facility]):   This is a written record that states who you want to make healthcare choices for you when you are unable to make them for yourself  This person, called a proxy, is usually a family member or a friend  You may choose more than 1 proxy  · Do not resuscitate (DNR) order:  A DNR order is used in case your heart stops beating or you stop breathing  It is a request not to have certain forms of treatment, such as CPR  A DNR order may be included in other types of advance directives  · Medical directive: This covers the care that you want if you are in a coma, near death, or unable to make decisions for yourself  You can list the treatments you want for each condition  Treatment may include pain medicine, surgery, blood transfusions, dialysis, IV or tube feedings, and a ventilator (breathing machine)  · Values history: This document has questions about your views, beliefs, and how you feel and think about life  This information can help others choose the care that you would choose  Why are advance directives important? An advance directive helps you control your care  Although spoken wishes may be used, it is better to have your wishes written down  Spoken wishes can be misunderstood, or not followed  Treatments may be given even if you do not want them  An advance directive may make it easier for your family to make difficult choices about your care  Fall Prevention    Fall prevention  includes ways to make your home and other areas safer  It also includes ways you can move more carefully to prevent a fall  Health conditions that cause changes in your blood pressure, vision, or muscle strength and coordination may increase your risk for falls  Medicines may also increase your risk for falls if they make you dizzy, weak, or sleepy  Fall prevention tips:   · Stand or sit up slowly  · Use assistive devices as directed  · Wear shoes that fit well and have soles that   · Wear a personal alarm  · Stay active      · Manage your medical conditions  Home Safety Tips:  · Add items to prevent falls in the bathroom  · Keep paths clear  · Install bright lights in your home  · Keep items you use often on shelves within reach  · Paint or place reflective tape on the edges of your stairs  Cigarette Smoking and Your Health   Risks to your health if you smoke:  Nicotine and other chemicals found in tobacco damage every cell in your body  Even if you are a light smoker, you have an increased risk for cancer, heart disease, and lung disease  If you are pregnant or have diabetes, smoking increases your risk for complications  Benefits to your health if you stop smoking:   · You decrease respiratory symptoms such as coughing, wheezing, and shortness of breath  · You reduce your risk for cancers of the lung, mouth, throat, kidney, bladder, pancreas, stomach, and cervix  If you already have cancer, you increase the benefits of chemotherapy  You also reduce your risk for cancer returning or a second cancer from developing  · You reduce your risk for heart disease, blood clots, heart attack, and stroke  · You reduce your risk for lung infections, and diseases such as pneumonia, asthma, chronic bronchitis, and emphysema  · Your circulation improves  More oxygen can be delivered to your body  If you have diabetes, you lower your risk for complications, such as kidney, artery, and eye diseases  You also lower your risk for nerve damage  Nerve damage can lead to amputations, poor vision, and blindness  · You improve your body's ability to heal and to fight infections  For more information and support to stop smoking:   · Smokefree  gov  Phone: 7- 499 - 988-7845  Web Address: www [employer]  [address] [place], [place] [address]jorge luis 2018 Information is for End User's use only and may not be sold, redistributed or otherwise used for commercial purposes   All illustrations and images included in CareNotes® are the copyrighted property of [employer]  or [facility]

## 2019-09-18 ENCOUNTER — OFFICE VISIT (OUTPATIENT)
Dept: PHYSICAL THERAPY | Facility: CLINIC | Age: 84
End: 2019-09-18
Payer: MEDICARE

## 2019-09-18 DIAGNOSIS — R26.89 MULTIFACTORIAL GAIT DISORDER: Primary | ICD-10-CM

## 2019-09-18 PROCEDURE — 97110 THERAPEUTIC EXERCISES: CPT

## 2019-09-18 PROCEDURE — 97112 NEUROMUSCULAR REEDUCATION: CPT

## 2019-09-18 NOTE — PROGRESS NOTES
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                   Subjective: Patient reports he sees no change in balance  Objective: See treatment diary below      Assessment: Tolerated treatment well  Patient exhibited good technique with therapeutic exercises      Plan: Continue per plan of care        Precautions: None    Daily Treatment Diary     Manual  9/18 8/29 9/3 9/5 9/10 9/12            Exercise Diary          Balance: EOWB         Balance: EONB Foam head turns 10Xea  10Xea   10Xea   Balance: ECNB  20" X2    20" X2   Balance: ECWB Foam  15" X2 30" X2      Staggered foam 30", 60" 20" X2 30" X2      SLS         Hurdles Forward     6" 4X 12" 4X   Hurdles Sideways     6" 4X 12" 4X   DB Reach     3# 10Xea    NBOS MB Diag reach   3# 10Xea  Foam 10Xea    Ball Toss NBOS`   20X 20X     Side Stepping L5 8X L4 7X L4 7X  L5 8X    Monster walk L5 8X L4 7X L4 7X  L5 8X    HipX3 alt /balance  4# 2/10 4# 2/10 4# 2/10  4# 3/10   Supine bridge ball squeeze  /10  2/15     Supine bridge TB Abd  L4 2/10  L4 2/15     Pyr Abs S5 100# 3/10        F12 P4Back Ext  150 3/10   P150 3/10     Leg Press S9 P6 2/15   P6 2/15     Pyr Quad S6 P6 P5 2/10   P6 2/10  P5 1/10     Alt UE/LE 10    Roll kick pass     15X    Square     5Xea    Carioca         Heel Toe Gait         STS         Steps Forward         Steps Lateral         Hip Patterns         Mini Squats         Ball Squeeze         T-Band ABD         T-Band Hamstrings         T-Band TKE         LAQ         NuStep:   S , A                  Modalities

## 2019-09-19 ENCOUNTER — EVALUATION (OUTPATIENT)
Dept: PHYSICAL THERAPY | Facility: CLINIC | Age: 84
End: 2019-09-19
Payer: MEDICARE

## 2019-09-19 ENCOUNTER — TRANSCRIBE ORDERS (OUTPATIENT)
Dept: PHYSICAL THERAPY | Facility: CLINIC | Age: 84
End: 2019-09-19

## 2019-09-19 DIAGNOSIS — R26.89 MULTIFACTORIAL GAIT DISORDER: Primary | ICD-10-CM

## 2019-09-19 PROCEDURE — 97110 THERAPEUTIC EXERCISES: CPT | Performed by: PHYSICAL THERAPIST

## 2019-09-19 PROCEDURE — 97140 MANUAL THERAPY 1/> REGIONS: CPT | Performed by: PHYSICAL THERAPIST

## 2019-09-19 NOTE — LETTER
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        Dear Dr Vannessa Escalona: Thank you for your recent referral of Chance Watts  Please review the attached evaluation summary from Abdirahman's recent visit  Please verify that you agree with the plan of care by signing the attached order  If you have any questions or concerns, please do not hesitate to call  I sincerely appreciate the opportunity to share in the care of one of your patients and hope to have another opportunity to work with you in the near future  Sincerely,    Suzan Be, PT      Referring Provider:      I certify that I have read the below Plan of Care and certify the need for these services furnished under this plan of treatment while under my care  Rochelle Henriquez DO  [address]: [phone]          PT Re-Evaluation     Today's date: 2019  Patient name: Chance Watts  : 1935  MRN: [id number]  Referring provider: Fredi Galvan DO  Dx:   Encounter Diagnosis     ICD-10-CM    1  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                   Assessment  Assessment details:   CURRENT FUNCTIONAL STATUS    Standing/ADL tolerance 5-10 minutes  Walking tolerance 100' with a SPC  Ascends/descends stairs reciprocally  Difficulty arising from sitting: Moderate use of arms  SHORT TERM GOALS (2 WEEKS)    IIncrease LE strength 2-3 lbs in all weak areas  TUG Score: 12 seconds  SLS R/L: 5/3 seconds  Standing/ADL tolerance 10-15 minutes  Walking tolerance 150' with a SPC  Ascends/descends stairs reciprocally  Difficulty arising from sitting: Mild use of arms      LONG TERM GOALS (DISCHARGE)    B Hip AROM: WNL  B Hip Strength R/L: F=30 lbs, ABD=32 lbs  B Knee AROM: WNL  B Knee Strength R/L: E=55 lbs, F=46 lbs  B Ankle AROM R/L: WNL  B Ankle Strength R/L: DF=29 lbs  TUG Score: 10 seconds  SLS R/L: 10/8 seconds  Standing/ADL tolerance 20-30 minutes  Walking tolerance 300' with a SPC  Ascends/descends stairs reciprocally  Difficulty arising from sitting: Minimal use of arms  Understanding of Dx/Px/POC: good   Prognosis: fair    Goals  See assessment details above  Plan  Plan details: Although the patient exhibits improved lower extremity strength, his balance problems continue  I have recommended that he see a PT specialized in neurologic disorders located in out [address] [place] [address] office  He will see her 2019 for an evaluation, and possible treatment options  Patient would benefit from: skilled physical therapy  Planned therapy interventions: neuromuscular re-education, therapeutic exercise and therapeutic activities  Frequency: 2x week  Duration in weeks: 4        Subjective Evaluation    History of Present Illness  Mechanism of injury: Subjective: The patient feels that his leg strength has improved the past 4 weeks, but his balance has not  He has not had any falls the past 4 weeks, but uses a SPC and furniture to steady himself at home  Pain  Current pain ratin  At best pain ratin  At worst pain ratin    Patient Goals  Patient goals for therapy: improved balance          Objective     Ambulation     Observational Gait     Additional Observational Gait Details  Patient ambulates with a SPC on the left side  Bilateral Trendelenburg sign is present, along with a decrease in stride length and foot clearance  Moderate deviations in the gait pathway are still present  Comments   CURRENT OBJECTIVE MEASUREMENTS    B Hip AROM: WNL  Hip Strength R/L: F=25/30 lbs, ABD=26/28 lbs  B Knee AROM: WNL  Knee Strength R/L: E=56/47 lbs, F=47/47 lbs  B Ankle AROM R/L: WNL  Ankle Strength R/L: DF=29/27 lbs     TUG Score: 14 seconds  SLS R/L: 3/1 seconds               Precautions: None     Daily Treatment Diary      Manual  9/18 9/19 9/3 9/5 9/10 9/12                   Exercise Diary                Balance: EOWB               Balance: EONB Foam head turns 10Xea  10x ea 10Xea     10Xea   Balance: ECNB          20" X2   Balance: ECWB Foam   30"x2 30" X2         Staggered foam 30", 60" 30", 60" 30" X2         SLS               Hurdles Forward    12" 4x     6" 4X 12" 4X   Hurdles Sideways    12" 4x     6" 4X 12" 4X   DB Reach         3# 10Xea     NBOS MB Diag reach    Foam 10x ea 3# 10Xea   Foam 10Xea     Ball Toss NBOS`     20X 20X       Side Stepping L5 8X L5 8x L4 7X   L5 8X     Monster walk L5 8X L5 8x L4 7X   L5 8X     HipX3 alt /balance   4# 3/10 4# 2/10 4# 2/10   4# 3/10   Supine bridge ball squeeze      2/15       Supine bridge TB Abd      L4 2/15       Pyr Abs S5 100# 3/10  P10 3/10           F12 P4Back Ext  150 3/10  P15 3/10   P150 3/10       Leg Press S9 P6 2/15  P6 2/15   P6 2/15       Pyr Quad S6 P6 P5 2/10  P5 2/10   P6 2/10  P5 1/10       Alt UE/LE march    2/10     2/10     Roll kick pass   15x     15X     Square    5x ea     5Xea

## 2019-09-25 ENCOUNTER — OFFICE VISIT (OUTPATIENT)
Dept: PHYSICAL THERAPY | Facility: CLINIC | Age: 84
End: 2019-09-25
Payer: MEDICARE

## 2019-09-25 DIAGNOSIS — R26.89 MULTIFACTORIAL GAIT DISORDER: Primary | ICD-10-CM

## 2019-09-25 PROCEDURE — 97112 NEUROMUSCULAR REEDUCATION: CPT | Performed by: PHYSICAL THERAPIST

## 2019-09-25 NOTE — PROGRESS NOTES
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                   Subjective: Patient, patient's daughter, and patient's wife present today requesting information/advice on how to proceed with therapy interventions  Patient reports he doesn't feel he was getting too much better with therapy  Still feels his gait is affected  Objective: See treatment diary below  Brief gait assessment showing bilateral abnormal tone with increased knee hyperextension bilaterally, left > right  Decreased heel strike  Increased WBing laterally on left foot with inability to correct for left knee hyperextension  Patient with left LE ace wrapped into DF and improved with left knee hyperextension  Patient also noted with ongoing right knee hyperextension  He has multi-directional LOBs  Brief LE evaluation showing bilateral ankle clonus 2-3 beats on right, 4-5 on left with quick stretch  Patient with decreased left great toe proprioception, decreased left > right sensation on plantar surface of foot  Patient with visible atrophy of left > right LE musculature  Assessment: Patient encouraged to return to physician for follow up  Discussed considering referral to PM&R - physiatry as patient not happy with outcome of referral to neurology      Plan: Consider transition to wellness program at this time and follow up with physician

## 2019-09-26 ENCOUNTER — TELEPHONE (OUTPATIENT)
Dept: NEUROLOGY | Facility: CLINIC | Age: 84
End: 2019-09-26

## 2019-09-26 DIAGNOSIS — R26.2 AMBULATORY DYSFUNCTION: Primary | ICD-10-CM

## 2019-10-04 ENCOUNTER — DOCTOR'S OFFICE (OUTPATIENT)
Dept: URBAN - METROPOLITAN AREA CLINIC 136 | Facility: CLINIC | Age: 84
Setting detail: OPHTHALMOLOGY
End: 2019-10-04
Payer: COMMERCIAL

## 2019-10-04 DIAGNOSIS — H43.392: ICD-10-CM

## 2019-10-04 DIAGNOSIS — H35.3211: ICD-10-CM

## 2019-10-04 DIAGNOSIS — H33.021: ICD-10-CM

## 2019-10-04 DIAGNOSIS — H04.123: ICD-10-CM

## 2019-10-04 DIAGNOSIS — H31.002: ICD-10-CM

## 2019-10-04 DIAGNOSIS — H35.3231: ICD-10-CM

## 2019-10-04 PROCEDURE — 92012 INTRM OPH EXAM EST PATIENT: CPT | Performed by: OPHTHALMOLOGY

## 2019-10-04 PROCEDURE — 92134 CPTRZ OPH DX IMG PST SGM RTA: CPT | Performed by: OPHTHALMOLOGY

## 2019-10-04 PROCEDURE — 67028 INJECTION EYE DRUG: CPT | Performed by: OPHTHALMOLOGY

## 2019-10-04 ASSESSMENT — SPHEQUIV_DERIVED
OD_SPHEQUIV: -1.5
OS_SPHEQUIV: -0.625

## 2019-10-04 ASSESSMENT — REFRACTION_MANIFEST
OS_VA3: 20/
OU_VA: 20/
OD_VA1: 20/
OD_VA2: 20/
OD_VA3: 20/
OS_VA2: 20/
OS_VA2: 20/
OS_VA3: 20/
OD_VA1: 20/
OD_VA3: 20/
OD_VA2: 20/
OS_VA1: 20/
OU_VA: 20/
OS_VA1: 20/

## 2019-10-04 ASSESSMENT — REFRACTION_CURRENTRX
OD_OVR_VA: 20/
OD_OVR_VA: 20/
OS_OVR_VA: 20/
OD_OVR_VA: 20/

## 2019-10-04 ASSESSMENT — SUPERFICIAL PUNCTATE KERATITIS (SPK)
OS_SPK: 2+
OD_SPK: 2+

## 2019-10-04 ASSESSMENT — REFRACTION_AUTOREFRACTION
OS_CYLINDER: -1.75
OD_AXIS: 087
OD_SPHERE: -1.00
OS_AXIS: 074
OD_CYLINDER: -1.00
OS_SPHERE: +0.25

## 2019-10-04 ASSESSMENT — VISUAL ACUITY
OD_BCVA: 20/70
OS_BCVA: 20/500

## 2019-10-04 ASSESSMENT — DRY EYES - PHYSICIAN NOTES: OD_GENERALCOMMENTS: INFERIORLY

## 2019-10-04 ASSESSMENT — LID POSITION - ENTROPION: OS_ENTROPION: LLL T

## 2019-10-04 ASSESSMENT — LID EXAM ASSESSMENTS
OS_TRICHIASIS: LUL T
OD_BLEPHARITIS: 1+
OD_TRICHIASIS: RUL T

## 2019-10-04 ASSESSMENT — CONFRONTATIONAL VISUAL FIELD TEST (CVF)
OS_FINDINGS: INFEROTEMPORAL DEFECT
OD_FINDINGS: FULL

## 2019-10-04 ASSESSMENT — LID POSITION - COMMENTS: OS_COMMENTS: EARLY

## 2019-10-08 ENCOUNTER — CLINICAL SUPPORT (OUTPATIENT)
Dept: FAMILY MEDICINE CLINIC | Facility: CLINIC | Age: 84
End: 2019-10-08
Payer: MEDICARE

## 2019-10-08 ENCOUNTER — APPOINTMENT (OUTPATIENT)
Dept: LAB | Facility: MEDICAL CENTER | Age: 84
End: 2019-10-08
Payer: MEDICARE

## 2019-10-08 DIAGNOSIS — D50.9 IRON DEFICIENCY ANEMIA, UNSPECIFIED IRON DEFICIENCY ANEMIA TYPE: Primary | ICD-10-CM

## 2019-10-08 LAB
ALBUMIN SERPL BCP-MCNC: 3.5 G/DL (ref 3.5–5)
ALP SERPL-CCNC: 72 U/L (ref 46–116)
ALT SERPL W P-5'-P-CCNC: 21 U/L (ref 12–78)
ANION GAP SERPL CALCULATED.3IONS-SCNC: 6 MMOL/L (ref 4–13)
AST SERPL W P-5'-P-CCNC: 22 U/L (ref 5–45)
BILIRUB SERPL-MCNC: 1 MG/DL (ref 0.2–1)
BUN SERPL-MCNC: 16 MG/DL (ref 5–25)
CALCIUM SERPL-MCNC: 9.3 MG/DL (ref 8.3–10.1)
CHLORIDE SERPL-SCNC: 108 MMOL/L (ref 100–108)
CHOLEST SERPL-MCNC: 137 MG/DL (ref 50–200)
CO2 SERPL-SCNC: 25 MMOL/L (ref 21–32)
CREAT SERPL-MCNC: 0.99 MG/DL (ref 0.6–1.3)
GFR SERPL CREATININE-BSD FRML MDRD: 71 ML/MIN/1.73SQ M
GLUCOSE P FAST SERPL-MCNC: 82 MG/DL (ref 65–99)
HDLC SERPL-MCNC: 43 MG/DL (ref 40–60)
LDLC SERPL CALC-MCNC: 68 MG/DL (ref 0–100)
NONHDLC SERPL-MCNC: 94 MG/DL
POTASSIUM SERPL-SCNC: 4.1 MMOL/L (ref 3.5–5.3)
PROT SERPL-MCNC: 7.4 G/DL (ref 6.4–8.2)
SODIUM SERPL-SCNC: 139 MMOL/L (ref 136–145)
TRIGL SERPL-MCNC: 130 MG/DL
TSH SERPL DL<=0.05 MIU/L-ACNC: 2.04 UIU/ML (ref 0.36–3.74)

## 2019-10-08 PROCEDURE — 80061 LIPID PANEL: CPT | Performed by: FAMILY MEDICINE

## 2019-10-08 PROCEDURE — 80053 COMPREHEN METABOLIC PANEL: CPT | Performed by: FAMILY MEDICINE

## 2019-10-08 PROCEDURE — 96372 THER/PROPH/DIAG INJ SC/IM: CPT | Performed by: FAMILY MEDICINE

## 2019-10-08 PROCEDURE — 36415 COLL VENOUS BLD VENIPUNCTURE: CPT | Performed by: FAMILY MEDICINE

## 2019-10-08 PROCEDURE — 84443 ASSAY THYROID STIM HORMONE: CPT | Performed by: FAMILY MEDICINE

## 2019-10-08 RX ADMIN — CYANOCOBALAMIN 1000 MCG: 1000 INJECTION INTRAMUSCULAR; SUBCUTANEOUS at 11:26

## 2019-10-15 DIAGNOSIS — J30.1 ALLERGIC RHINITIS DUE TO POLLEN, UNSPECIFIED SEASONALITY: ICD-10-CM

## 2019-10-15 RX ORDER — CETIRIZINE HYDROCHLORIDE 10 MG/1
TABLET ORAL
Qty: 90 TABLET | Refills: 0 | Status: SHIPPED | OUTPATIENT
Start: 2019-10-15 | End: 2020-01-07

## 2019-10-17 ENCOUNTER — TELEPHONE (OUTPATIENT)
Dept: NEUROLOGY | Facility: CLINIC | Age: 84
End: 2019-10-17

## 2019-10-22 ENCOUNTER — TELEPHONE (OUTPATIENT)
Dept: NEUROLOGY | Facility: CLINIC | Age: 84
End: 2019-10-22

## 2019-10-22 NOTE — TELEPHONE ENCOUNTER
----- Message from Alannah Carrillo MD sent at 10/22/2019  2:15 PM EDT -----  Contact: [phone]  Aristeo Vinson,    Could you call this patient and schedule an appointment? Thanks,  Priya Park    ----- Message -----  From: Jessee Medel  Sent: 10/22/2019   1:58 PM EDT  To: Salomón Brink MD, Brittney Hatfield MA, #    I spoke with Marilee Iqbalbenigno and he is very interested  Thank you again,  Juanita Mar    ----- Message -----  From: Salomón Brink MD  Sent: 10/22/2019  10:34 AM EDT  To: Jessee Tobias MA, #    Hi Neli,     I have few openings earlier but we can add him to a wait list   Or he is able to see my partner Dr Lovett All earlier  Unfortunately, we have a big back up in outpatient schedules due to helping out with inpatient coverage  If he is willing to see Dr Lovett All - I'll have my MA call and schedule this  Thanks for the referral!    Dr Yogesh Copeland      ----- Message -----  From: Jessee Medel  Sent: 10/22/2019  10:03 AM EDT  To: Salomón Brink MD    Good morning,  I had consulted on a patient a few weeks ago  He had been treated for several weeks by another clinician and ultimately didn't make a lot of progress  He has been round and round with physician and therapy  He came to me and was frustrated with the whole process  Long story short, I referred him to see you  He scheduled an appointment and was given an appointment early November  This appointment was then cancelled and he is now not able to be rescheduled until Feb    He's presenting very much like a SCI patient, but without any official diagnosis  Most of his other physicians have passed him off as a gait impairment from what he was telling me  He did ask to be on a cancellation list, but is there any way to get him in sooner? Any help would be greatly appreciated  This man and his family are very frustrated    Thank you, Neli

## 2019-10-22 NOTE — TELEPHONE ENCOUNTER
Spoke to Antionette Nuñez and schedule patient for 11/14/2019  Did offer him availability for 10/23/2019 but couldn't accept due to transportation       Appointment card along with directions mailed to patient address on file

## 2019-10-31 DIAGNOSIS — K21.9 GASTROESOPHAGEAL REFLUX DISEASE, ESOPHAGITIS PRESENCE NOT SPECIFIED: ICD-10-CM

## 2019-10-31 DIAGNOSIS — E78.2 MIXED HYPERLIPIDEMIA: ICD-10-CM

## 2019-10-31 DIAGNOSIS — K21.9 GASTROESOPHAGEAL REFLUX DISEASE WITHOUT ESOPHAGITIS: ICD-10-CM

## 2019-10-31 DIAGNOSIS — I10 ESSENTIAL HYPERTENSION: ICD-10-CM

## 2019-10-31 DIAGNOSIS — J30.0 CHRONIC VASOMOTOR RHINITIS: ICD-10-CM

## 2019-10-31 RX ORDER — IPRATROPIUM BROMIDE 42 UG/1
SPRAY, METERED NASAL
Qty: 75 ML | Refills: 3 | Status: SHIPPED | OUTPATIENT
Start: 2019-10-31 | End: 2021-01-05 | Stop reason: CLARIF

## 2019-10-31 RX ORDER — GABAPENTIN 600 MG/1
TABLET ORAL
Qty: 180 TABLET | Refills: 1 | Status: SHIPPED | OUTPATIENT
Start: 2019-10-31 | End: 2020-05-12

## 2019-10-31 RX ORDER — ATORVASTATIN CALCIUM 10 MG/1
TABLET, FILM COATED ORAL
Qty: 90 TABLET | Refills: 2 | Status: SHIPPED | OUTPATIENT
Start: 2019-10-31 | End: 2020-10-07

## 2019-10-31 RX ORDER — OMEPRAZOLE 40 MG/1
CAPSULE, DELAYED RELEASE ORAL
Qty: 180 CAPSULE | Refills: 1 | Status: SHIPPED | OUTPATIENT
Start: 2019-10-31 | End: 2020-05-12

## 2019-10-31 RX ORDER — FAMOTIDINE 40 MG/1
TABLET, FILM COATED ORAL
Qty: 90 TABLET | Refills: 2 | Status: SHIPPED | OUTPATIENT
Start: 2019-10-31 | End: 2020-03-30 | Stop reason: SDUPTHER

## 2019-11-07 ENCOUNTER — DOCTOR'S OFFICE (OUTPATIENT)
Dept: URBAN - METROPOLITAN AREA CLINIC 136 | Facility: CLINIC | Age: 84
Setting detail: OPHTHALMOLOGY
End: 2019-11-07
Payer: COMMERCIAL

## 2019-11-07 DIAGNOSIS — H35.3231: ICD-10-CM

## 2019-11-07 DIAGNOSIS — H31.002: ICD-10-CM

## 2019-11-07 DIAGNOSIS — H33.021: ICD-10-CM

## 2019-11-07 DIAGNOSIS — H04.123: ICD-10-CM

## 2019-11-07 DIAGNOSIS — H43.392: ICD-10-CM

## 2019-11-07 DIAGNOSIS — H35.3211: ICD-10-CM

## 2019-11-07 PROCEDURE — 67028 INJECTION EYE DRUG: CPT | Performed by: OPHTHALMOLOGY

## 2019-11-07 PROCEDURE — 92012 INTRM OPH EXAM EST PATIENT: CPT | Performed by: OPHTHALMOLOGY

## 2019-11-07 PROCEDURE — 92134 CPTRZ OPH DX IMG PST SGM RTA: CPT | Performed by: OPHTHALMOLOGY

## 2019-11-07 ASSESSMENT — REFRACTION_MANIFEST
OD_VA2: 20/
OS_VA1: 20/
OD_VA1: 20/
OS_VA1: 20/
OD_VA2: 20/
OD_VA3: 20/
OD_VA1: 20/
OU_VA: 20/
OS_VA2: 20/
OS_VA2: 20/
OU_VA: 20/
OS_VA3: 20/
OS_VA3: 20/
OD_VA3: 20/

## 2019-11-07 ASSESSMENT — SPHEQUIV_DERIVED
OD_SPHEQUIV: -1.5
OS_SPHEQUIV: -0.625

## 2019-11-07 ASSESSMENT — REFRACTION_AUTOREFRACTION
OD_SPHERE: -1.00
OD_CYLINDER: -1.00
OS_CYLINDER: -1.75
OD_AXIS: 087
OS_AXIS: 074
OS_SPHERE: +0.25

## 2019-11-07 ASSESSMENT — REFRACTION_CURRENTRX
OS_OVR_VA: 20/
OS_OVR_VA: 20/
OD_OVR_VA: 20/
OS_OVR_VA: 20/
OD_OVR_VA: 20/
OD_OVR_VA: 20/

## 2019-11-07 ASSESSMENT — DRY EYES - PHYSICIAN NOTES: OD_GENERALCOMMENTS: INFERIORLY

## 2019-11-07 ASSESSMENT — LID POSITION - ENTROPION: OS_ENTROPION: LLL T

## 2019-11-07 ASSESSMENT — SUPERFICIAL PUNCTATE KERATITIS (SPK)
OS_SPK: 2+
OD_SPK: 2+

## 2019-11-07 ASSESSMENT — VISUAL ACUITY
OS_BCVA: 20/300
OD_BCVA: 20/60-1

## 2019-11-07 ASSESSMENT — CONFRONTATIONAL VISUAL FIELD TEST (CVF)
OS_FINDINGS: INFEROTEMPORAL DEFECT
OD_FINDINGS: FULL

## 2019-11-07 ASSESSMENT — LID EXAM ASSESSMENTS
OS_TRICHIASIS: LUL T
OD_TRICHIASIS: RUL T
OD_BLEPHARITIS: 1+

## 2019-11-07 ASSESSMENT — LID POSITION - COMMENTS: OS_COMMENTS: EARLY

## 2019-11-12 ENCOUNTER — CLINICAL SUPPORT (OUTPATIENT)
Dept: FAMILY MEDICINE CLINIC | Facility: CLINIC | Age: 84
End: 2019-11-12
Payer: MEDICARE

## 2019-11-12 DIAGNOSIS — D50.9 IRON DEFICIENCY ANEMIA, UNSPECIFIED IRON DEFICIENCY ANEMIA TYPE: Primary | ICD-10-CM

## 2019-11-12 PROCEDURE — 96372 THER/PROPH/DIAG INJ SC/IM: CPT | Performed by: FAMILY MEDICINE

## 2019-11-12 RX ADMIN — CYANOCOBALAMIN 2000 MCG: 1000 INJECTION INTRAMUSCULAR; SUBCUTANEOUS at 10:59

## 2019-11-14 ENCOUNTER — OFFICE VISIT (OUTPATIENT)
Dept: NEUROLOGY | Facility: CLINIC | Age: 84
End: 2019-11-14
Payer: MEDICARE

## 2019-11-14 VITALS
HEIGHT: 72 IN | DIASTOLIC BLOOD PRESSURE: 84 MMHG | SYSTOLIC BLOOD PRESSURE: 182 MMHG | BODY MASS INDEX: 24.38 KG/M2 | HEART RATE: 48 BPM | WEIGHT: 180 LBS

## 2019-11-14 DIAGNOSIS — R26.81 GAIT INSTABILITY: ICD-10-CM

## 2019-11-14 DIAGNOSIS — M48.061 SPINAL STENOSIS OF LUMBAR REGION WITHOUT NEUROGENIC CLAUDICATION: ICD-10-CM

## 2019-11-14 DIAGNOSIS — M54.16 RADICULOPATHY, LUMBAR REGION: Primary | ICD-10-CM

## 2019-11-14 PROCEDURE — 99205 OFFICE O/P NEW HI 60 MIN: CPT | Performed by: PHYSICAL MEDICINE & REHABILITATION

## 2019-11-14 NOTE — PATIENT INSTRUCTIONS
We discussed a regular exercise program to improve gait  - daily walking, starting with short distances, and building up according to your tolerance  - daily core and pelvic strengthening exercises, as recommended by your physical therapist    Please keep a log of what is limiting your walking - back pain, groin pain, or lateral thigh pain    We discussed scheduling an EMG to evaluate for peripheral nerve damage    There are several things that may be impacting your ability to walk  - hip arthritis  - left greater trochanteric bursitis   - alcohol use - recommend cessation

## 2019-11-14 NOTE — PROGRESS NOTES
Physical Medicine & Rehabilitation New Patient Evaluation  Tali Lincoln 82 y o  male      ASSESSMENT/PLAN:   Gait dysfunction, chronic - multifactorial, left greater trochanteric bursitis, left hip OA, low back pain secondary to lumbar spondylosis, deconditioning  - currently leading sedentary lifestyle   - advised to start light exercise program with progressive daily ambulation   - keep a log of limiting factors to gait - back pain, groin pain, greater trochanteric pain, or fatigue    - if significant low back pain, consider referral to pain medicine for facet versus epidural injection; trial salonpas patches   - if significant groin pain, consider referral to orthopedics for hip injection    - if significant greater trochanteric pain, consider corticosteroid injection  - recommend daily core and pelvic strengthening exercises, with focus on hip abductor strengthening, as provided by PT  - possible lumbar radiculopathy - EMG bilateral lower extremities ordered   - recommend alcohol cessation - discussed with patient, although he is hesitant to stop at this time   - continue cane at this time for stability     *I have spent 60 minutes with Patient and family today in which greater than 50% of this time was spent in counseling/coordination of care regarding Diagnostic results, Risks and benefits of tx options, Intructions for management, Importance of tx compliance and Impressions  SUBJECTIVE:  Patient presents today in the office with chief complaint of gait dysfunction    HPI:   Tali Lincoln 82 y o  male, who  has a past medical history of Anemia, Appendicitis, Coronary artery disease, Detached retina, GERD (gastroesophageal reflux disease), Hyperlipidemia, Hypertension, Macular degeneration, Neuropathy, and Von Willebrand disease ([address] )  Old records were reviewed personally  He is referred to PM&R clinic for evaluation of gait dysfunction  He is followed by neurology at [facility]   He reports gait dysfunction, chronic, ongoing for more than 10 years  He does have history of cervical and lumbar spine surgeries, last in 2012  He was recently referred to PT for gait training, which he reports had minimal effect  He continues to ambulate with cane for longer distances  More recently, he had left lateral calf pain, although this has resolved several weeks ago  Occasionally, he will endorse left groin pain with walking  He endorses chronic low back pain that flares up occasionally when he walks, although nonradiating  No bowel/bladder dysfunction  He is not currently taking pain medication  He has tried PT with minimal improvement in gait  Regarding gait dysfunction, he is unable to identify precisely what the limiting factor is  He reports intermittent low back pain, groin pain, lateral thigh pain that may be contributing  He endorses fatigue with ambulation, and a stooped posture which may be contributing  He does not perform daily exercises; he used to use a treadmill for walking at the local gym, but stopped due to low back and groin pain  Imaging: I personally reviewed pertinent imaging  MRI lumbar spine 5/14/19:     L1-L2: Mild degenerative spondylosis and bulging annulus, no stenosis     L2-L3:  Moderate degenerative spondylosis, bulging annulus, grade 1 retrolisthesis, mild central canal stenosis, mild to moderate bilateral foraminal stenosis     L3-L4:  Moderate degenerative spondylosis, midline laminectomy, bilateral pedicle screw and rosio posterior fixation, moderate bilateral foraminal stenosis, possible bilateral L3 nerve root encroachment     L4-L5:  Moderate to severe degenerative spondylosis, grade 1 retrolisthesis, left laminotomy, right laminotomy, probable small right-sided disc protrusion with adjacent mildly enhancing granulation tissue,, moderate to severe bilateral foraminal   stenosis, possible bilateral L4 and  right L5 nerve root encroachment    There is mild enhancement of the right L5 nerve root in the subarticular recess      L5-S1:  Moderate to severe degenerative spondylosis, left laminectomy, moderate to severe left foraminal stenosis, moderate right foraminal stenosis, probable left L5 nerve root encroachment  MRI brain 7/7/18:  No acute disease  Age-appropriate volume loss and moderate degree of chronic small vessel disease  Pattern of ventriculomegaly is not typical for NPH  XR left hip 3/19/19:  There is no acute fracture or dislocation      Mild joint space narrowing is present  There are hypertrophic changes seen in the acetabulum  Femoral head is unremarkable      No lytic or blastic osseous lesions      Soft tissues are unremarkable      Advanced degenerative changes are seen within the lower lumbar spine  Patient is undergone prior lumbar spine fusion  Visualized hardware is unremarkable        ROS:  No fever, chills, chest pain, SOB, cough, nausea, vomiting, diarrhea, constipation, abdominal pain, dysuria, bowel/bladder incontinence, new weakness or changes in sensation  +gait dysfunction, chronic; left hip pain  Complete ROS obtained and otherwise negative  Refer to intake sheet for complete ROS      OBJECTIVE:   BP (!) 182/84 (BP Location: Left arm, Patient Position: Sitting, Cuff Size: Large)   Pulse (!) 48   Ht 6' (1 829 m)   Wt 81 6 kg (180 lb)   BMI 24 41 kg/m²      GEN: NAD, sitting comfortably in chair  Head: NCAT, no gross lesions  Eyes: PERRL, EOMI  Throat: clear, no thrush, MMM  Pulm: CTAB, no rales/wheezes  CV: RRR, normal s1/s2  Abd: soft, NTND  Ext: no pedal edema bilaterally, distal extremities warm and well perfused  Psych: normal affect, no agitation  Skin: no observable rashes  Neuro:  A+Ox3, fluent speech, follows commands  Negative babinski/triplett bilateral  No ankle clonus  Proprioception intact bilateral hallux  Able to perform finger to nose, with mild dysmetria bilateral    Motor Exam:   Right Left Site Right Left Site   5 5 S Ab: Shoulder Abductors 5- 4 HF:  Hip Flexors   5 5 EF:  Elbow Flexors 5 5- KE:  Knee Extensors   5 5 EE:  Elbow Extensors 5 5 DR:  Dorsi Flexors   5 5 WE:  Wrist Extensors 5- 5- EHL: Ext Burden Longus   5 5 FF:  Finger Flexors 5 5 PF:  Plantar Flexors   5 5 HI:  Hand Intrinsics        Gait: antalgic, short stance phase left, wide based    MSK: Nontender cervical/lumbar spinous processes or paraspinal muscles  Normal ROM with lumbar forward flexion  Limited ROM cervical forward flexion/extension, lateral rotation  Negative spurling bilateral   Negative SLR bilateral    +FADIR left with groin pain  Limited internal rotation left hip to neutral   TTP left greater trochanter with 4/5 hip abduction strength       Labs:   Lab Results   Component Value Date    WBC 10 93 (H) 12/11/2018    HGB 15 8 12/11/2018    HCT 48 5 12/11/2018    MCV 91 12/11/2018     12/11/2018     Lab Results   Component Value Date    GLUCOSE 90 07/30/2015    CALCIUM 9 3 10/08/2019     07/30/2015    K 4 1 10/08/2019    CO2 25 10/08/2019     10/08/2019    BUN 16 10/08/2019    CREATININE 0 99 10/08/2019         Past Medical History:   Diagnosis Date    Anemia     Appendicitis     Coronary artery disease     Detached retina     GERD (gastroesophageal reflux disease)     Hyperlipidemia     Hypertension     Macular degeneration     Neuropathy     Von Willebrand disease ([facility] )        Patient Active Problem List    Diagnosis Date Noted    Chronic obstructive pulmonary disease with acute exacerbation ([facility] [address] ) 03/19/2019    [facility] 03/19/2019    Alternating constipation and diarrhea 08/29/2018    Weight loss, non-intentional 08/29/2018    Nausea 08/29/2018    Hoarse voice quality 08/01/2018    Appetite loss 08/01/2018    Weight loss 08/01/2018    Gastroesophageal reflux disease without esophagitis 08/01/2018    Idiopathic peripheral neuropathy 06/29/2018    Cervical stenosis of spinal canal 06/29/2018    Spinal stenosis of lumbar region 06/29/2018    Lung nodule, solitary 02/20/2018    Bladder cancer [facility] ) 02/13/2018    High blood pressure 12/28/2016    Gait instability 05/31/2016    Erectile dysfunction 12/09/2014    Hypothyroidism 06/07/2013    Spondylosis 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 12/31/2012    Bilateral exudative age-related macular degeneration ([facility] ) 06/26/2012       Past Surgical History:   Procedure Laterality Date    ADENOIDECTOMY      APPENDECTOMY      ARTHRODESIS  01/15/2014    Lumbar     BACK SURGERY      CATARACT EXTRACTION      COLONOSCOPY  12/09/2016    fiberoptic     CYSTOSCOPY      with resection of tumor / 1/12/17, 10/2/17 / diagnostic 12/8/16, 5/30/17     CYSTOSCOPY  09/20/2018    EGD AND COLONOSCOPY N/A 12/9/2016    Procedure: EGD AND COLONOSCOPY;  Surgeon: Karly Williamson MD;  Location: [facility];  Service:    [facility] EYE SURGERY      FRACTURE SURGERY Bilateral     ARM    NECK SURGERY      NEUROPLASTY / [facility]SP[facility] [place] [facility] [place]    SD COLONOSCOPY FLX DX W/COLLJ SPEC WHEN PFRMD N/A 2/5/2019    Procedure: COLONOSCOPY;  Surgeon: Promise Atkins MD;  Location: [facility];  Service: Gastroenterology    SD CYSTOURETHROSCOPY,FULGUR <0 5 CM LESN N/A 1/12/2017    Procedure: CYSTOSCOPY, BLADDER BIOPSY WITH FULGRATION, 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;  Surgeon: Andrew Delacruz MD;  [facility]: [facility];  Service: Urology    SD CYSTOURETHROSCOPY,FULGUR <0 5 CM LESN N/A 10/2/2017    Procedure: CYSTOSCOPY WITH  BLADDER BIOPSIES WITH FULGURATION;  Surgeon: Andrew Delacruz MD;  [facility]: [facility];  Service: Urology    SD ESOPHAGOGASTRODUODENOSCOPY TRANSORAL DIAGNOSTIC N/A 8/3/2018    Procedure: ESOPHAGOGASTRODUODENOSCOPY (EGD);   Surgeon: Promise Atkins MD;  [facility];  Service: Gastroenterology    SD INSTILL ANTICANCER AGENT IN BLADDER N/A 1/12/2017    Procedure: Chetna Rosenbaum;  Surgeon: Funmi Schaffer MD;  [facility];  Service: Urology    [place] INSTILL ANTICANCER AGENT IN BLADDER N/A 10/2/2017    Procedure: Claireo Ernestine;  Surgeon: Funmi Schaffer MD;  [facility];  Service: Urology    RETINAL DETACHMENT SURGERY Right 03/2016    complete air fill confirmed     ROTATOR CUFF REPAIR      SHOULDER SURGERY  03/03/2015    proximal humerus fracture / [place]    3/6/15   R    1/3/18     TONSILECTOMY AND ADNOIDECTOMY      TONSILLECTOMY      TRANSURETHRAL RESECTION OF BLADDER TUMOR N/A 10/2/2017    Procedure: TRANSURETHRAL RESECTION OF BLADDER TUMOR (TURBT);   Surgeon: Funmi Schaffer MD;  [facility]: [facility];  Service: Urology    [address] (HISTORICAL)         Family History   Problem Relation Age of Onset    Ulcers Father         acute gastric    Heart disease Mother    Anthony Hammondysm Sister        Social History   Daily alcohol use, "several" oz susana and glass of wine    No Known Allergies      Current Outpatient Medications:     atorvastatin (LIPITOR) 10 mg tablet, TAKE 1 TABLET BY MOUTH  DAILY, Disp: 90 tablet, Rfl: 2    B COMPLEX VITAMINS PO, Take 1 tablet by mouth daily, Disp: , Rfl:     cetirizine (ZyrTEC) 10 mg tablet, TAKE ONE TABLET BY MOUTH DAILY, Disp: 90 tablet, Rfl: 0    Cholecalciferol (VITAMIN D3) 2000 UNITS capsule, Take 1 capsule by mouth 2 (two) times a day 5000 units , Disp: , Rfl:     Coenzyme Q10 (CO Q10) 60 MG CAPS, Take 1 capsule by mouth daily, Disp: , Rfl:     famotidine (PEPCID) 40 MG tablet, TAKE 1 TABLET BY MOUTH  DAILY AT BEDTIME, Disp: 90 tablet, Rfl: 2    gabapentin (NEURONTIN) 600 MG tablet, TAKE 1 TABLET BY MOUTH  TWICE A DAY, Disp: 180 tablet, Rfl: 1    Ginkgo Biloba 120 MG TABS, Take 1 tablet by mouth daily, Disp: , Rfl:     Inositol 500 MG TABS, Take 1 tablet by mouth 2 (two) times a day, Disp: , Rfl:     ipratropium (ATROVENT) 0 06 % nasal spray, USE 2 SPRAYS IN EACH  NOSTRIL 3 TIMES A DAY (Patient taking differently: 2 (two) times a day ), Disp: 75 mL, Rfl: 3    lutein 6 mg, Take 1 capsule by mouth daily, Disp: , Rfl:     metoprolol tartrate (LOPRESSOR) 25 mg tablet, TAKE 1 TABLET BY MOUTH  EVERY 12 HOURS, Disp: 180 tablet, Rfl: 2    MILK THISTLE PO, Take 1 tablet by mouth daily, Disp: , Rfl:     Misc Natural Products (SUPER SNOOZE) CAPS, Take 1 capsule by mouth daily Bed time , Disp: , Rfl:     Multiple Vitamins-Minerals (OCUVITE-LUTEIN) CAPS, Take by mouth daily, Disp: , Rfl:     omeprazole (PriLOSEC) 40 MG capsule, TAKE 1 CAPSULE BY MOUTH  TWICE DAILY, Disp: 180 capsule, Rfl: 1    sildenafil (VIAGRA) 50 MG tablet, Viagra 50 mg tablet  TAKE 1 TABLET BY MOUTH UP TO 4 HOURS BEFORE INTERCOURSE AND MAX OF 1 TABLET IN 24 HOURS, Disp: , Rfl:

## 2019-11-22 ENCOUNTER — OFFICE VISIT (OUTPATIENT)
Dept: FAMILY MEDICINE CLINIC | Facility: CLINIC | Age: 84
End: 2019-11-22
Payer: MEDICARE

## 2019-11-22 VITALS
BODY MASS INDEX: 24.11 KG/M2 | SYSTOLIC BLOOD PRESSURE: 178 MMHG | WEIGHT: 178 LBS | DIASTOLIC BLOOD PRESSURE: 80 MMHG | HEIGHT: 72 IN

## 2019-11-22 DIAGNOSIS — I10 ESSENTIAL HYPERTENSION: Primary | ICD-10-CM

## 2019-11-22 PROCEDURE — 99213 OFFICE O/P EST LOW 20 MIN: CPT | Performed by: FAMILY MEDICINE

## 2019-11-25 ENCOUNTER — TELEPHONE (OUTPATIENT)
Dept: GASTROENTEROLOGY | Facility: AMBULARY SURGERY CENTER | Age: 84
End: 2019-11-25

## 2019-11-25 NOTE — PROGRESS NOTES
Tobacco Cessation Counseling: Tobacco cessation counseling was provided  The patient is sincerely urged to quit consumption of tobacco  He is not ready to quit tobacco  Medication options and side effects of medication discussed  Patient refused medication  Assessment/Plan:  Patient was told to take an extra 1/2 tablet have his metoprolol in the mid afternoon    Problem List Items Addressed This Visit        Cardiovascular and Mediastinum    High blood pressure - Primary           Diagnoses and all orders for this visit:    Essential hypertension        No problem-specific Assessment & Plan notes found for this encounter  Subjective:      Patient ID: Austin Simons is a 82 y o  male  Patient arrived with a blood pressure readings at home and wants to discuss his blood pressure which he feels is quite labile      The following portions of the patient's history were reviewed and updated as appropriate:   He has a past medical history of Anemia, Appendicitis, Coronary artery disease, Detached retina, GERD (gastroesophageal reflux disease), Hyperlipidemia, Hypertension, Macular degeneration, Neuropathy, and Von Willebrand disease ([facility] [address] )  ,  does not have any pertinent problems on file  ,   has a past surgical history that includes Back surgery; Neck surgery; Eye surgery; Appendectomy; TONSILECTOMY AND ADNOIDECTOMY; Fracture surgery (Bilateral); VON WILLEBRAND ANTIGEN (HISTORICAL); Tonsillectomy; Cataract extraction; Rotator cuff repair; pr cystourethroscopy,fulgur <0 5 cm lesn (N/A, 1/12/2017); pr instill anticancer agent in bladder (N/A, 1/12/2017); EGD AND COLONOSCOPY (N/A, 12/9/2016); pr cystourethroscopy,fulgur <0 5 cm lesn (N/A, 10/2/2017); pr instill anticancer agent in bladder (N/A, 10/2/2017); Transurethral resection of bladder tumor (N/A, 10/2/2017); Shoulder surgery (03/03/2015); Adenoidectomy; Arthrodesis (01/15/2014); Colonoscopy (12/09/2016); Cystoscopy;  Neuroplasty / transposition median nerve at carpal tunnel (Right, 04/2015); Retinal detachment surgery (Right, 03/2016); pr esophagogastroduodenoscopy transoral diagnostic (N/A, 8/3/2018); Cystoscopy (09/20/2018); and pr colonoscopy flx dx w/collj spec when pfrmd (N/A, 2/5/2019)  ,  family history includes Anuerysm in his sister; Heart disease in his mother; Ulcers in his father  ,   reports that he has been smoking cigarettes  He has a 15 00 pack-year smoking history  He has never used smokeless tobacco  He reports that he drinks about 2 0 standard drinks of alcohol per week  He reports that he does not use drugs  ,  has No Known Allergies     Current Outpatient Medications   Medication Sig Dispense Refill    atorvastatin (LIPITOR) 10 mg tablet TAKE 1 TABLET BY MOUTH  DAILY 90 tablet 2    B COMPLEX VITAMINS PO Take 1 tablet by mouth daily      cetirizine (ZyrTEC) 10 mg tablet TAKE ONE TABLET BY MOUTH DAILY 90 tablet 0    Cholecalciferol (VITAMIN D3) 2000 UNITS capsule Take 1 capsule by mouth 2 (two) times a day 5000 units       Coenzyme Q10 (CO Q10) 60 MG CAPS Take 1 capsule by mouth daily      famotidine (PEPCID) 40 MG tablet TAKE 1 TABLET BY MOUTH  DAILY AT BEDTIME 90 tablet 2    gabapentin (NEURONTIN) 600 MG tablet TAKE 1 TABLET BY MOUTH  TWICE A  tablet 1    Ginkgo Biloba 120 MG TABS Take 1 tablet by mouth daily      Inositol 500 MG TABS Take 1 tablet by mouth 2 (two) times a day      ipratropium (ATROVENT) 0 06 % nasal spray USE 2 SPRAYS IN EACH  NOSTRIL 3 TIMES A DAY (Patient taking differently: 2 (two) times a day ) 75 mL 3    lutein 6 mg Take 1 capsule by mouth daily      metoprolol tartrate (LOPRESSOR) 25 mg tablet TAKE 1 TABLET BY MOUTH  EVERY 12 HOURS 180 tablet 2    MILK THISTLE PO Take 1 tablet by mouth daily      Misc Natural Products (SUPER SNOOZE) CAPS Take 1 capsule by mouth daily Bed time       Multiple Vitamins-Minerals (OCUVITE-LUTEIN) CAPS Take by mouth daily      omeprazole (PriLOSEC) 40 MG capsule TAKE 1 CAPSULE BY MOUTH  TWICE DAILY 180 capsule 1    sildenafil (VIAGRA) 50 MG tablet Viagra 50 mg tablet   TAKE 1 TABLET BY MOUTH UP TO 4 HOURS BEFORE INTERCOURSE AND MAX OF 1 TABLET IN 24 HOURS       No current facility-administered medications for this visit  Review of Systems   Constitutional: Negative for activity change, appetite change, diaphoresis, fatigue and fever  HENT: Negative  Eyes: Negative  Respiratory: Negative for apnea, cough, chest tightness, shortness of breath and wheezing  Cardiovascular: Negative for chest pain, palpitations and leg swelling  Gastrointestinal: Negative for abdominal distention, abdominal pain, anal bleeding, constipation, diarrhea, nausea and vomiting  Endocrine: Negative for cold intolerance, heat intolerance, polydipsia, polyphagia and polyuria  Genitourinary: Negative for difficulty urinating, dysuria, flank pain, hematuria and urgency  Musculoskeletal: Negative for arthralgias, back pain, gait problem, joint swelling and myalgias  Skin: Negative for color change, rash and wound  Allergic/Immunologic: Negative for environmental allergies, food allergies and immunocompromised state  Neurological: Negative for dizziness, seizures, syncope, speech difficulty, numbness and headaches  Hematological: Negative for adenopathy  Does not bruise/bleed easily  Psychiatric/Behavioral: Negative for agitation, behavioral problems, hallucinations, sleep disturbance and suicidal ideas  Objective:  Vitals:    11/22/19 1038 11/22/19 1040   BP: (!) 186/86 (!) 178/80   BP Location: Right arm Left arm   Patient Position: Sitting Sitting   Cuff Size: Standard Standard   Weight: 80 7 kg (178 lb)    Height: 6' (1 829 m)      Body mass index is 24 14 kg/m²  Physical Exam   Constitutional: He is oriented to person, place, and time  He appears well-developed and well-nourished  No distress  HENT:   Head: Normocephalic     Right Ear: External ear normal  Left Ear: External ear normal    Nose: Nose normal    Mouth/Throat: Oropharynx is clear and moist    Eyes: Pupils are equal, round, and reactive to light  Conjunctivae and EOM are normal  Right eye exhibits no discharge  Left eye exhibits no discharge  No scleral icterus  Neck: Normal range of motion  No tracheal deviation present  No thyromegaly present  Cardiovascular: Normal rate, regular rhythm and normal heart sounds  Exam reveals no gallop and no friction rub  No murmur heard  Pulmonary/Chest: Effort normal and breath sounds normal  No respiratory distress  He has no wheezes  Abdominal: Soft  Bowel sounds are normal  He exhibits no mass  There is no tenderness  There is no guarding  Musculoskeletal: He exhibits no edema or deformity  Lymphadenopathy:     He has no cervical adenopathy  Neurological: He is alert and oriented to person, place, and time  No cranial nerve deficit  Skin: Skin is warm and dry  No rash noted  He is not diaphoretic  No erythema  Psychiatric: He has a normal mood and affect   Thought content normal

## 2019-11-25 NOTE — TELEPHONE ENCOUNTER
Patients GI provider:  Dr FERRIS     Number to return call: [phone]    Reason for call: Pt called after receiving a letter to schedule a fu  Pt stated he is well so far and doesn't need a follow but will call if any changes  Scheduled procedure/appointment date if applicable: Apt/procedure   n/a

## 2019-12-04 ENCOUNTER — TELEPHONE (OUTPATIENT)
Dept: NEUROLOGY | Facility: CLINIC | Age: 84
End: 2019-12-04

## 2019-12-06 ENCOUNTER — DOCTOR'S OFFICE (OUTPATIENT)
Dept: URBAN - METROPOLITAN AREA CLINIC 136 | Facility: CLINIC | Age: 84
Setting detail: OPHTHALMOLOGY
End: 2019-12-06
Payer: COMMERCIAL

## 2019-12-06 DIAGNOSIS — H35.3231: ICD-10-CM

## 2019-12-06 DIAGNOSIS — H33.021: ICD-10-CM

## 2019-12-06 DIAGNOSIS — H35.3211: ICD-10-CM

## 2019-12-06 DIAGNOSIS — H04.123: ICD-10-CM

## 2019-12-06 DIAGNOSIS — H43.392: ICD-10-CM

## 2019-12-06 PROCEDURE — 92134 CPTRZ OPH DX IMG PST SGM RTA: CPT | Performed by: OPHTHALMOLOGY

## 2019-12-06 PROCEDURE — 67028 INJECTION EYE DRUG: CPT | Performed by: OPHTHALMOLOGY

## 2019-12-06 PROCEDURE — 92012 INTRM OPH EXAM EST PATIENT: CPT | Performed by: OPHTHALMOLOGY

## 2019-12-06 ASSESSMENT — LID EXAM ASSESSMENTS
OD_BLEPHARITIS: 1+
OS_TRICHIASIS: LUL T
OD_TRICHIASIS: RUL T

## 2019-12-06 ASSESSMENT — REFRACTION_MANIFEST
OD_VA2: 20/
OU_VA: 20/
OD_VA3: 20/
OS_VA1: 20/
OU_VA: 20/
OS_VA3: 20/
OS_VA1: 20/
OS_VA2: 20/
OD_VA3: 20/
OD_VA1: 20/
OD_VA2: 20/
OS_VA2: 20/
OS_VA3: 20/
OD_VA1: 20/

## 2019-12-06 ASSESSMENT — REFRACTION_AUTOREFRACTION
OD_SPHERE: -1.00
OD_CYLINDER: -1.00
OS_SPHERE: +0.25
OS_CYLINDER: -1.75
OD_AXIS: 087
OS_AXIS: 074

## 2019-12-06 ASSESSMENT — REFRACTION_CURRENTRX
OS_OVR_VA: 20/
OD_OVR_VA: 20/

## 2019-12-06 ASSESSMENT — SUPERFICIAL PUNCTATE KERATITIS (SPK)
OS_SPK: 2+
OD_SPK: 2+

## 2019-12-06 ASSESSMENT — VISUAL ACUITY
OS_BCVA: 20/CF
OD_BCVA: 20/80-1

## 2019-12-06 ASSESSMENT — LID POSITION - COMMENTS: OS_COMMENTS: EARLY

## 2019-12-06 ASSESSMENT — CONFRONTATIONAL VISUAL FIELD TEST (CVF)
OD_FINDINGS: FULL
OS_FINDINGS: INFEROTEMPORAL DEFECT

## 2019-12-06 ASSESSMENT — SPHEQUIV_DERIVED
OD_SPHEQUIV: -1.5
OS_SPHEQUIV: -0.625

## 2019-12-06 ASSESSMENT — DRY EYES - PHYSICIAN NOTES: OD_GENERALCOMMENTS: INFERIORLY

## 2019-12-06 ASSESSMENT — LID POSITION - ENTROPION: OS_ENTROPION: LLL T

## 2019-12-07 ENCOUNTER — DOCTOR'S OFFICE (OUTPATIENT)
Dept: URBAN - NONMETROPOLITAN AREA CLINIC 1 | Facility: CLINIC | Age: 84
Setting detail: OPHTHALMOLOGY
End: 2019-12-07
Payer: COMMERCIAL

## 2019-12-07 DIAGNOSIS — H43.11: ICD-10-CM

## 2019-12-07 DIAGNOSIS — H35.3231: ICD-10-CM

## 2019-12-07 DIAGNOSIS — H33.021: ICD-10-CM

## 2019-12-07 PROCEDURE — 76512 OPH US DX B-SCAN: CPT | Performed by: OPHTHALMOLOGY

## 2019-12-07 PROCEDURE — 92014 COMPRE OPH EXAM EST PT 1/>: CPT | Performed by: OPHTHALMOLOGY

## 2019-12-07 PROCEDURE — 92226 OPHTHALMOSCOPY EXT SUBSEQUENT: CPT | Performed by: OPHTHALMOLOGY

## 2019-12-07 ASSESSMENT — REFRACTION_CURRENTRX
OS_OVR_VA: 20/
OD_OVR_VA: 20/
OS_OVR_VA: 20/
OS_OVR_VA: 20/

## 2019-12-07 ASSESSMENT — REFRACTION_AUTOREFRACTION
OD_AXIS: 087
OS_SPHERE: +0.25
OD_SPHERE: -1.00
OD_CYLINDER: -1.00
OS_CYLINDER: -1.75
OS_AXIS: 074

## 2019-12-07 ASSESSMENT — SUPERFICIAL PUNCTATE KERATITIS (SPK)
OD_SPK: 2+
OS_SPK: 2+

## 2019-12-07 ASSESSMENT — REFRACTION_MANIFEST
OD_VA1: 20/
OS_VA1: 20/
OD_VA3: 20/
OD_VA2: 20/
OU_VA: 20/
OS_VA1: 20/
OD_VA2: 20/
OD_VA1: 20/
OD_VA3: 20/
OS_VA3: 20/
OU_VA: 20/
OS_VA2: 20/
OS_VA3: 20/
OS_VA2: 20/

## 2019-12-07 ASSESSMENT — SPHEQUIV_DERIVED
OS_SPHEQUIV: -0.625
OD_SPHEQUIV: -1.5

## 2019-12-07 ASSESSMENT — VISUAL ACUITY: OD_BCVA: 20/80-1

## 2019-12-07 ASSESSMENT — LID EXAM ASSESSMENTS
OD_TRICHIASIS: RUL T
OD_BLEPHARITIS: 1+
OS_TRICHIASIS: LUL T

## 2019-12-07 ASSESSMENT — CONFRONTATIONAL VISUAL FIELD TEST (CVF)
OD_FINDINGS: SUPEROTEMPORAL DEFECT, INFEROTEMPORAL DEFECT, SUPERONASAL DEFECT, INFERONASAL DEFECT
OS_FINDINGS: INFEROTEMPORAL DEFECT

## 2019-12-07 ASSESSMENT — DRY EYES - PHYSICIAN NOTES: OD_GENERALCOMMENTS: INFERIORLY

## 2019-12-07 ASSESSMENT — LID POSITION - ENTROPION: OS_ENTROPION: LLL T

## 2019-12-07 ASSESSMENT — LID POSITION - COMMENTS: OS_COMMENTS: EARLY

## 2019-12-09 ENCOUNTER — DOCTOR'S OFFICE (OUTPATIENT)
Dept: URBAN - NONMETROPOLITAN AREA CLINIC 1 | Facility: CLINIC | Age: 84
Setting detail: OPHTHALMOLOGY
End: 2019-12-09
Payer: COMMERCIAL

## 2019-12-09 DIAGNOSIS — H43.11: ICD-10-CM

## 2019-12-09 PROCEDURE — 76512 OPH US DX B-SCAN: CPT | Performed by: OPHTHALMOLOGY

## 2019-12-09 PROCEDURE — 92014 COMPRE OPH EXAM EST PT 1/>: CPT | Performed by: OPHTHALMOLOGY

## 2019-12-09 ASSESSMENT — REFRACTION_MANIFEST
OS_VA2: 20/
OD_VA1: 20/
OD_VA2: 20/
OU_VA: 20/
OS_VA1: 20/
OD_VA3: 20/
OS_VA3: 20/
OD_VA2: 20/
OU_VA: 20/
OD_VA1: 20/
OS_VA3: 20/
OS_VA1: 20/
OD_VA3: 20/
OS_VA2: 20/

## 2019-12-09 ASSESSMENT — REFRACTION_CURRENTRX
OS_OVR_VA: 20/
OD_OVR_VA: 20/
OS_OVR_VA: 20/
OS_OVR_VA: 20/
OD_OVR_VA: 20/
OD_OVR_VA: 20/

## 2019-12-09 ASSESSMENT — LID EXAM ASSESSMENTS
OD_BLEPHARITIS: 1+
OD_TRICHIASIS: RUL T
OS_TRICHIASIS: LUL T

## 2019-12-09 ASSESSMENT — SUPERFICIAL PUNCTATE KERATITIS (SPK)
OS_SPK: 2+
OD_SPK: 2+

## 2019-12-09 ASSESSMENT — LID POSITION - COMMENTS: OS_COMMENTS: EARLY

## 2019-12-09 ASSESSMENT — REFRACTION_AUTOREFRACTION
OS_CYLINDER: -1.75
OD_SPHERE: -1.00
OD_CYLINDER: -1.00
OS_AXIS: 074
OS_SPHERE: +0.25
OD_AXIS: 087

## 2019-12-09 ASSESSMENT — SPHEQUIV_DERIVED
OD_SPHEQUIV: -1.5
OS_SPHEQUIV: -0.625

## 2019-12-09 ASSESSMENT — DRY EYES - PHYSICIAN NOTES: OD_GENERALCOMMENTS: INFERIORLY

## 2019-12-09 ASSESSMENT — VISUAL ACUITY: OD_BCVA: 20/80-1

## 2019-12-09 ASSESSMENT — LID POSITION - ENTROPION: OS_ENTROPION: LLL T

## 2019-12-11 ENCOUNTER — RX ONLY (RX ONLY)
Age: 84
End: 2019-12-11

## 2019-12-11 ENCOUNTER — DOCTOR'S OFFICE (OUTPATIENT)
Dept: URBAN - METROPOLITAN AREA CLINIC 136 | Facility: CLINIC | Age: 84
Setting detail: OPHTHALMOLOGY
End: 2019-12-11
Payer: COMMERCIAL

## 2019-12-11 DIAGNOSIS — H35.3231: ICD-10-CM

## 2019-12-11 DIAGNOSIS — H43.11: ICD-10-CM

## 2019-12-11 PROCEDURE — 92134 CPTRZ OPH DX IMG PST SGM RTA: CPT | Performed by: OPHTHALMOLOGY

## 2019-12-11 PROCEDURE — 92012 INTRM OPH EXAM EST PATIENT: CPT | Performed by: OPHTHALMOLOGY

## 2019-12-11 ASSESSMENT — REFRACTION_AUTOREFRACTION
OD_CYLINDER: -1.00
OS_AXIS: 074
OD_AXIS: 087
OD_SPHERE: -1.00
OS_SPHERE: +0.25
OS_CYLINDER: -1.75

## 2019-12-11 ASSESSMENT — REFRACTION_MANIFEST
OS_VA3: 20/
OD_VA1: 20/
OS_VA2: 20/
OD_VA1: 20/
OD_VA3: 20/
OD_VA2: 20/
OS_VA3: 20/
OS_VA1: 20/
OD_VA2: 20/
OS_VA1: 20/
OU_VA: 20/
OS_VA2: 20/
OD_VA3: 20/
OU_VA: 20/

## 2019-12-11 ASSESSMENT — DRY EYES - PHYSICIAN NOTES: OD_GENERALCOMMENTS: INFERIORLY

## 2019-12-11 ASSESSMENT — LID EXAM ASSESSMENTS
OD_TRICHIASIS: RUL T
OS_TRICHIASIS: LUL T
OD_BLEPHARITIS: 1+

## 2019-12-11 ASSESSMENT — SPHEQUIV_DERIVED
OS_SPHEQUIV: -0.625
OD_SPHEQUIV: -1.5

## 2019-12-11 ASSESSMENT — SUPERFICIAL PUNCTATE KERATITIS (SPK)
OD_SPK: 2+
OS_SPK: 2+

## 2019-12-11 ASSESSMENT — REFRACTION_CURRENTRX
OD_OVR_VA: 20/
OS_OVR_VA: 20/

## 2019-12-11 ASSESSMENT — VISUAL ACUITY
OS_BCVA: 20/200
OD_BCVA: 20/60-2

## 2019-12-11 ASSESSMENT — LID POSITION - COMMENTS: OS_COMMENTS: EARLY

## 2019-12-11 ASSESSMENT — LID POSITION - ENTROPION: OS_ENTROPION: LLL T

## 2019-12-17 ENCOUNTER — HOSPITAL ENCOUNTER (OUTPATIENT)
Dept: NEUROLOGY | Facility: CLINIC | Age: 84
Discharge: HOME | End: 2019-12-17
Payer: MEDICARE

## 2019-12-17 DIAGNOSIS — G89.29 CHRONIC HIP PAIN, LEFT: Primary | ICD-10-CM

## 2019-12-17 DIAGNOSIS — M54.16 RADICULOPATHY, LUMBAR REGION: ICD-10-CM

## 2019-12-17 DIAGNOSIS — M25.552 CHRONIC HIP PAIN, LEFT: Primary | ICD-10-CM

## 2019-12-17 PROCEDURE — 95886 MUSC TEST DONE W/N TEST COMP: CPT | Performed by: PHYSICAL MEDICINE & REHABILITATION

## 2019-12-17 PROCEDURE — 95910 NRV CNDJ TEST 7-8 STUDIES: CPT | Performed by: PHYSICAL MEDICINE & REHABILITATION

## 2019-12-19 ENCOUNTER — CLINICAL SUPPORT (OUTPATIENT)
Dept: FAMILY MEDICINE CLINIC | Facility: CLINIC | Age: 84
End: 2019-12-19
Payer: MEDICARE

## 2019-12-19 DIAGNOSIS — D50.9 IRON DEFICIENCY ANEMIA, UNSPECIFIED IRON DEFICIENCY ANEMIA TYPE: Primary | ICD-10-CM

## 2019-12-19 PROCEDURE — 96372 THER/PROPH/DIAG INJ SC/IM: CPT | Performed by: FAMILY MEDICINE

## 2019-12-19 RX ORDER — CYANOCOBALAMIN 1000 UG/ML
1000 INJECTION INTRAMUSCULAR; SUBCUTANEOUS
Status: SHIPPED | OUTPATIENT
Start: 2019-12-19 | End: 2020-06-16

## 2019-12-19 RX ADMIN — CYANOCOBALAMIN 1000 MCG: 1000 INJECTION INTRAMUSCULAR; SUBCUTANEOUS at 16:25

## 2019-12-26 ENCOUNTER — DOCTOR'S OFFICE (OUTPATIENT)
Dept: URBAN - METROPOLITAN AREA CLINIC 136 | Facility: CLINIC | Age: 84
Setting detail: OPHTHALMOLOGY
End: 2019-12-26
Payer: COMMERCIAL

## 2019-12-26 DIAGNOSIS — H43.11: ICD-10-CM

## 2019-12-26 DIAGNOSIS — H33.021: ICD-10-CM

## 2019-12-26 DIAGNOSIS — H35.3221: ICD-10-CM

## 2019-12-26 DIAGNOSIS — H31.002: ICD-10-CM

## 2019-12-26 DIAGNOSIS — H35.3231: ICD-10-CM

## 2019-12-26 PROCEDURE — 92134 CPTRZ OPH DX IMG PST SGM RTA: CPT | Performed by: OPHTHALMOLOGY

## 2019-12-26 PROCEDURE — 67028 INJECTION EYE DRUG: CPT | Performed by: OPHTHALMOLOGY

## 2019-12-26 PROCEDURE — 92012 INTRM OPH EXAM EST PATIENT: CPT | Performed by: OPHTHALMOLOGY

## 2019-12-26 ASSESSMENT — REFRACTION_AUTOREFRACTION
OS_AXIS: 074
OS_CYLINDER: -1.75
OD_CYLINDER: -1.00
OD_SPHERE: -1.00
OS_SPHERE: +0.25
OD_AXIS: 087

## 2019-12-26 ASSESSMENT — CONFRONTATIONAL VISUAL FIELD TEST (CVF)
OS_FINDINGS: INFEROTEMPORAL DEFECT
OD_FINDINGS: SUPEROTEMPORAL DEFECT, INFEROTEMPORAL DEFECT, SUPERONASAL DEFECT, INFERONASAL DEFECT

## 2019-12-26 ASSESSMENT — LID EXAM ASSESSMENTS
OD_BLEPHARITIS: 1+
OD_TRICHIASIS: RUL T
OS_TRICHIASIS: LUL T

## 2019-12-26 ASSESSMENT — VISUAL ACUITY
OD_BCVA: 20/70
OS_BCVA: 20/150

## 2019-12-26 ASSESSMENT — DRY EYES - PHYSICIAN NOTES: OD_GENERALCOMMENTS: INFERIORLY

## 2019-12-26 ASSESSMENT — LID POSITION - ENTROPION: OS_ENTROPION: LLL T

## 2019-12-26 ASSESSMENT — SPHEQUIV_DERIVED
OD_SPHEQUIV: -1.5
OS_SPHEQUIV: -0.625

## 2019-12-26 ASSESSMENT — LID POSITION - COMMENTS: OS_COMMENTS: EARLY

## 2019-12-26 ASSESSMENT — SUPERFICIAL PUNCTATE KERATITIS (SPK)
OS_SPK: 2+
OD_SPK: 2+

## 2020-01-02 ENCOUNTER — TELEPHONE (OUTPATIENT)
Dept: FAMILY MEDICINE CLINIC | Facility: CLINIC | Age: 85
End: 2020-01-02

## 2020-01-03 DIAGNOSIS — F40.240 CLAUSTROPHOBIA: Primary | ICD-10-CM

## 2020-01-03 DIAGNOSIS — F40.240 CLAUSTROPHOBIA: ICD-10-CM

## 2020-01-03 RX ORDER — ALPRAZOLAM 0.25 MG/1
TABLET ORAL
Qty: 2 TABLET | Refills: 0 | Status: ON HOLD | OUTPATIENT
Start: 2020-01-03 | End: 2021-01-05

## 2020-01-03 RX ORDER — ALPRAZOLAM 0.25 MG/1
TABLET ORAL
Qty: 2 TABLET | Refills: 0 | Status: SHIPPED | OUTPATIENT
Start: 2020-01-03 | End: 2020-01-03 | Stop reason: SDUPTHER

## 2020-01-07 DIAGNOSIS — J30.1 ALLERGIC RHINITIS DUE TO POLLEN, UNSPECIFIED SEASONALITY: ICD-10-CM

## 2020-01-07 RX ORDER — CETIRIZINE HYDROCHLORIDE 10 MG/1
TABLET ORAL
Qty: 90 TABLET | Refills: 0 | Status: SHIPPED | OUTPATIENT
Start: 2020-01-07 | End: 2020-01-14

## 2020-01-09 ENCOUNTER — DOCTOR'S OFFICE (OUTPATIENT)
Dept: URBAN - METROPOLITAN AREA CLINIC 136 | Facility: CLINIC | Age: 85
Setting detail: OPHTHALMOLOGY
End: 2020-01-09
Payer: COMMERCIAL

## 2020-01-09 DIAGNOSIS — H35.3231: ICD-10-CM

## 2020-01-09 DIAGNOSIS — H35.3211: ICD-10-CM

## 2020-01-09 PROCEDURE — 67028 INJECTION EYE DRUG: CPT | Performed by: OPHTHALMOLOGY

## 2020-01-09 PROCEDURE — 92134 CPTRZ OPH DX IMG PST SGM RTA: CPT | Performed by: OPHTHALMOLOGY

## 2020-01-09 ASSESSMENT — SPHEQUIV_DERIVED
OD_SPHEQUIV: -1.5
OS_SPHEQUIV: -0.625

## 2020-01-09 ASSESSMENT — VISUAL ACUITY
OS_BCVA: 20/150+1
OD_BCVA: 20/70-1

## 2020-01-09 ASSESSMENT — REFRACTION_AUTOREFRACTION
OS_AXIS: 074
OD_CYLINDER: -1.00
OD_AXIS: 087
OD_SPHERE: -1.00
OS_SPHERE: +0.25
OS_CYLINDER: -1.75

## 2020-01-14 ENCOUNTER — HOSPITAL ENCOUNTER (OUTPATIENT)
Dept: MRI IMAGING | Facility: HOSPITAL | Age: 85
Discharge: HOME | End: 2020-01-14
Attending: PHYSICAL MEDICINE & REHABILITATION
Payer: MEDICARE

## 2020-01-14 DIAGNOSIS — G89.29 CHRONIC HIP PAIN, LEFT: ICD-10-CM

## 2020-01-14 DIAGNOSIS — J30.1 ALLERGIC RHINITIS DUE TO POLLEN, UNSPECIFIED SEASONALITY: ICD-10-CM

## 2020-01-14 DIAGNOSIS — M25.552 CHRONIC HIP PAIN, LEFT: ICD-10-CM

## 2020-01-14 PROCEDURE — 73721 MRI JNT OF LWR EXTRE W/O DYE: CPT

## 2020-01-14 RX ORDER — CETIRIZINE HYDROCHLORIDE 10 MG/1
TABLET ORAL
Qty: 90 TABLET | Refills: 0 | Status: SHIPPED | OUTPATIENT
Start: 2020-01-14 | End: 2020-07-01

## 2020-01-15 DIAGNOSIS — M87.052 AVASCULAR NECROSIS OF LEFT FEMORAL HEAD (HCC): Primary | ICD-10-CM

## 2020-01-16 ENCOUNTER — TELEPHONE (OUTPATIENT)
Dept: NEUROLOGY | Facility: CLINIC | Age: 85
End: 2020-01-16

## 2020-01-16 DIAGNOSIS — M87.052 AVASCULAR NECROSIS OF LEFT FEMORAL HEAD (HCC): Primary | ICD-10-CM

## 2020-01-16 NOTE — TELEPHONE ENCOUNTER
Patient calling requesting clarification on MRI report  Questions answered  Patient would like to go to [facility] to Dr Blaire Wall for evaluation  Dr Radha Rangel- I have placed a new referral for your signature  Please sign if agreeable  I will fax to provider's office when complete      TY

## 2020-01-30 ENCOUNTER — TRANSCRIBE ORDERS (OUTPATIENT)
Dept: ADMINISTRATIVE | Facility: HOSPITAL | Age: 85
End: 2020-01-30

## 2020-01-30 DIAGNOSIS — Z13.820 SPECIAL SCREENING FOR OSTEOPOROSIS: Primary | ICD-10-CM

## 2020-02-04 ENCOUNTER — TELEPHONE (OUTPATIENT)
Dept: NEUROLOGY | Facility: CLINIC | Age: 85
End: 2020-02-04

## 2020-02-07 ENCOUNTER — HOSPITAL ENCOUNTER (OUTPATIENT)
Dept: BONE DENSITY | Facility: HOSPITAL | Age: 85
Discharge: HOME | End: 2020-02-07
Attending: ORTHOPAEDIC SURGERY
Payer: MEDICARE

## 2020-02-07 DIAGNOSIS — Z13.820 SPECIAL SCREENING FOR OSTEOPOROSIS: ICD-10-CM

## 2020-02-07 PROCEDURE — 77080 DXA BONE DENSITY AXIAL: CPT

## 2020-02-11 ENCOUNTER — CLINICAL SUPPORT (OUTPATIENT)
Dept: FAMILY MEDICINE CLINIC | Facility: CLINIC | Age: 85
End: 2020-02-11
Payer: MEDICARE

## 2020-02-11 DIAGNOSIS — D50.9 IRON DEFICIENCY ANEMIA, UNSPECIFIED IRON DEFICIENCY ANEMIA TYPE: Primary | ICD-10-CM

## 2020-02-11 PROCEDURE — 96372 THER/PROPH/DIAG INJ SC/IM: CPT | Performed by: FAMILY MEDICINE

## 2020-02-11 RX ADMIN — CYANOCOBALAMIN 1000 MCG: 1000 INJECTION INTRAMUSCULAR; SUBCUTANEOUS at 10:44

## 2020-02-12 ENCOUNTER — TELEPHONE (OUTPATIENT)
Dept: FAMILY MEDICINE CLINIC | Facility: CLINIC | Age: 85
End: 2020-02-12

## 2020-02-13 ENCOUNTER — DOCTOR'S OFFICE (OUTPATIENT)
Dept: URBAN - METROPOLITAN AREA CLINIC 136 | Facility: CLINIC | Age: 85
Setting detail: OPHTHALMOLOGY
End: 2020-02-13
Payer: COMMERCIAL

## 2020-02-13 VITALS — HEIGHT: 60 IN

## 2020-02-13 DIAGNOSIS — H35.3231: ICD-10-CM

## 2020-02-13 DIAGNOSIS — H43.11: ICD-10-CM

## 2020-02-13 PROCEDURE — 92012 INTRM OPH EXAM EST PATIENT: CPT | Performed by: OPHTHALMOLOGY

## 2020-02-13 PROCEDURE — 92134 CPTRZ OPH DX IMG PST SGM RTA: CPT | Performed by: OPHTHALMOLOGY

## 2020-02-13 PROCEDURE — 67028 INJECTION EYE DRUG: CPT | Performed by: OPHTHALMOLOGY

## 2020-02-13 ASSESSMENT — LID POSITION - ENTROPION: OS_ENTROPION: LLL T

## 2020-02-13 ASSESSMENT — SPHEQUIV_DERIVED
OD_SPHEQUIV: -1.5
OS_SPHEQUIV: -0.625

## 2020-02-13 ASSESSMENT — DRY EYES - PHYSICIAN NOTES: OD_GENERALCOMMENTS: INFERIORLY

## 2020-02-13 ASSESSMENT — VISUAL ACUITY
OD_BCVA: 20/150+1
OS_BCVA: 20/400+1

## 2020-02-13 ASSESSMENT — REFRACTION_AUTOREFRACTION
OD_AXIS: 087
OS_AXIS: 074
OS_SPHERE: +0.25
OS_CYLINDER: -1.75
OD_SPHERE: -1.00
OD_CYLINDER: -1.00

## 2020-02-13 ASSESSMENT — SUPERFICIAL PUNCTATE KERATITIS (SPK)
OS_SPK: 2+
OD_SPK: 2+

## 2020-02-13 ASSESSMENT — LID EXAM ASSESSMENTS
OD_TRICHIASIS: RUL T
OD_BLEPHARITIS: 1+
OS_TRICHIASIS: LUL T

## 2020-02-13 ASSESSMENT — LID POSITION - COMMENTS: OS_COMMENTS: EARLY

## 2020-02-14 NOTE — TELEPHONE ENCOUNTER
He would be interested in getting the prolia, and he would like you prescribe    He takes the vitamin d 3 and he does not take calcium

## 2020-02-14 NOTE — TELEPHONE ENCOUNTER
So while his hip density was reduced his arm density showed osteoporosis I am not sure her on bar ease going to offer him treatment for it I would certainly recommend a vitamin D 1000 units of D3 every day and I would also recommend 1500 mg of elemental calcium daily Citracal seems to be a good product because it does not cause a lot of gas the question is does he need other treatment for osteoporosis such as Prolia I will leave that up to Dr Giovanna liang

## 2020-02-21 ENCOUNTER — TELEPHONE (OUTPATIENT)
Dept: FAMILY MEDICINE CLINIC | Facility: CLINIC | Age: 85
End: 2020-02-21

## 2020-02-21 NOTE — TELEPHONE ENCOUNTER
PT WILL CHECK WITH DR Alverna Libman REGARDING THE PROLIA INJECTIONS  IF HE WANTS PT TO HAVE ARE YOU GOING TO ORDER OR DO YOU WANT THERE OFFICE?

## 2020-03-11 ENCOUNTER — TELEPHONE (OUTPATIENT)
Dept: FAMILY MEDICINE CLINIC | Facility: CLINIC | Age: 85
End: 2020-03-11

## 2020-03-11 NOTE — TELEPHONE ENCOUNTER
F/U Dr Antoinette Burden is going to have Dr Shen Velázquez take control of Amna Hair has put in order & waiting on a response

## 2020-03-18 ENCOUNTER — DOCTOR'S OFFICE (OUTPATIENT)
Dept: URBAN - METROPOLITAN AREA CLINIC 136 | Facility: CLINIC | Age: 85
Setting detail: OPHTHALMOLOGY
End: 2020-03-18
Payer: COMMERCIAL

## 2020-03-18 ENCOUNTER — TELEPHONE (OUTPATIENT)
Dept: UROLOGY | Facility: CLINIC | Age: 85
End: 2020-03-18

## 2020-03-18 DIAGNOSIS — H04.123: ICD-10-CM

## 2020-03-18 DIAGNOSIS — H35.3211: ICD-10-CM

## 2020-03-18 DIAGNOSIS — H33.021: ICD-10-CM

## 2020-03-18 DIAGNOSIS — H31.002: ICD-10-CM

## 2020-03-18 DIAGNOSIS — H43.11: ICD-10-CM

## 2020-03-18 DIAGNOSIS — H35.3231: ICD-10-CM

## 2020-03-18 PROCEDURE — 67028 INJECTION EYE DRUG: CPT | Performed by: OPHTHALMOLOGY

## 2020-03-18 PROCEDURE — 92134 CPTRZ OPH DX IMG PST SGM RTA: CPT | Performed by: OPHTHALMOLOGY

## 2020-03-18 PROCEDURE — 92012 INTRM OPH EXAM EST PATIENT: CPT | Performed by: OPHTHALMOLOGY

## 2020-03-18 ASSESSMENT — LID POSITION - ENTROPION: OS_ENTROPION: LLL T

## 2020-03-18 ASSESSMENT — LID EXAM ASSESSMENTS
OD_BLEPHARITIS: 1+
OS_TRICHIASIS: LUL T
OD_TRICHIASIS: RUL T

## 2020-03-18 ASSESSMENT — VISUAL ACUITY
OD_BCVA: 20/80-1
OS_BCVA: 20/300

## 2020-03-18 ASSESSMENT — REFRACTION_AUTOREFRACTION
OD_SPHERE: -1.00
OD_AXIS: 087
OS_CYLINDER: -1.75
OS_AXIS: 074
OS_SPHERE: +0.25
OD_CYLINDER: -1.00

## 2020-03-18 ASSESSMENT — LID POSITION - COMMENTS: OS_COMMENTS: EARLY

## 2020-03-18 ASSESSMENT — SPHEQUIV_DERIVED
OD_SPHEQUIV: -1.5
OS_SPHEQUIV: -0.625

## 2020-03-18 ASSESSMENT — DRY EYES - PHYSICIAN NOTES: OD_GENERALCOMMENTS: INFERIORLY

## 2020-03-18 ASSESSMENT — SUPERFICIAL PUNCTATE KERATITIS (SPK)
OS_SPK: 2+
OD_SPK: 2+

## 2020-03-18 NOTE — TELEPHONE ENCOUNTER
So we can close the loop on this a communication did he get any feedback from [facility] on whether he should take Prolia or not and is the doctor going to prescribe it or move are we ?

## 2020-03-18 NOTE — TELEPHONE ENCOUNTER
Called [phone] and left message for patient stating the [facility] office is closed for an indefinite period of time  Offering [facility] office for an appointment  Asked patient to contact office to reschedule

## 2020-03-19 NOTE — TELEPHONE ENCOUNTER
Called and spoke with patient  Informed patient that his appointment for 03/23/20 has been cancelled due to covid-19  Informed patient that office will place him on a recall list for when the [facility] office opens back up  Please place patient on the recall list for [facility] for a cysto

## 2020-03-23 NOTE — TELEPHONE ENCOUNTER
Patient stated he did get message on changing appointments but at this time he does not want to leave his home due to virus  Patient states he will call office once all this clears to schedule appointment

## 2020-03-30 DIAGNOSIS — K21.9 GASTROESOPHAGEAL REFLUX DISEASE, ESOPHAGITIS PRESENCE NOT SPECIFIED: ICD-10-CM

## 2020-03-30 RX ORDER — FAMOTIDINE 40 MG/1
40 TABLET, FILM COATED ORAL
Qty: 90 TABLET | Refills: 1 | Status: SHIPPED | OUTPATIENT
Start: 2020-03-30 | End: 2021-10-08 | Stop reason: SDUPTHER

## 2020-04-23 ENCOUNTER — RX ONLY (RX ONLY)
Age: 85
End: 2020-04-23

## 2020-04-23 ENCOUNTER — DOCTOR'S OFFICE (OUTPATIENT)
Dept: URBAN - METROPOLITAN AREA CLINIC 136 | Facility: CLINIC | Age: 85
Setting detail: OPHTHALMOLOGY
End: 2020-04-23
Payer: COMMERCIAL

## 2020-04-23 DIAGNOSIS — H35.3231: ICD-10-CM

## 2020-04-23 PROCEDURE — 92134 CPTRZ OPH DX IMG PST SGM RTA: CPT | Performed by: OPHTHALMOLOGY

## 2020-04-23 PROCEDURE — 67028 INJECTION EYE DRUG: CPT | Performed by: OPHTHALMOLOGY

## 2020-04-23 PROCEDURE — SAMPLE SAMPLE MEDICATION: Performed by: OPHTHALMOLOGY

## 2020-04-23 ASSESSMENT — REFRACTION_AUTOREFRACTION
OD_SPHERE: -1.00
OD_AXIS: 087
OS_SPHERE: +0.25
OD_CYLINDER: -1.00
OS_AXIS: 074
OS_CYLINDER: -1.75

## 2020-04-23 ASSESSMENT — VISUAL ACUITY
OS_BCVA: 20/400
OD_BCVA: 20/50

## 2020-04-23 ASSESSMENT — SPHEQUIV_DERIVED
OS_SPHEQUIV: -0.625
OD_SPHEQUIV: -1.5

## 2020-05-11 DIAGNOSIS — K21.9 GASTROESOPHAGEAL REFLUX DISEASE WITHOUT ESOPHAGITIS: ICD-10-CM

## 2020-05-12 RX ORDER — OMEPRAZOLE 40 MG/1
CAPSULE, DELAYED RELEASE ORAL
Qty: 180 CAPSULE | Refills: 1 | Status: SHIPPED | OUTPATIENT
Start: 2020-05-12 | End: 2020-10-26

## 2020-05-12 RX ORDER — GABAPENTIN 600 MG/1
TABLET ORAL
Qty: 180 TABLET | Refills: 1 | Status: SHIPPED | OUTPATIENT
Start: 2020-05-12 | End: 2020-10-26

## 2020-05-19 ENCOUNTER — DOCTOR'S OFFICE (OUTPATIENT)
Dept: URBAN - METROPOLITAN AREA CLINIC 136 | Facility: CLINIC | Age: 85
Setting detail: OPHTHALMOLOGY
End: 2020-05-19
Payer: COMMERCIAL

## 2020-05-19 DIAGNOSIS — H35.3231: ICD-10-CM

## 2020-05-19 PROCEDURE — 92134 CPTRZ OPH DX IMG PST SGM RTA: CPT | Performed by: OPHTHALMOLOGY

## 2020-05-19 ASSESSMENT — REFRACTION_AUTOREFRACTION
OS_AXIS: 074
OD_CYLINDER: -1.00
OS_CYLINDER: -1.75
OD_SPHERE: -1.00
OS_SPHERE: +0.25
OD_AXIS: 087

## 2020-05-19 ASSESSMENT — SPHEQUIV_DERIVED
OS_SPHEQUIV: -0.625
OD_SPHEQUIV: -1.5

## 2020-05-19 ASSESSMENT — VISUAL ACUITY
OS_BCVA: 20/400
OD_BCVA: 20/50

## 2020-05-28 ENCOUNTER — DOCTOR'S OFFICE (OUTPATIENT)
Dept: URBAN - METROPOLITAN AREA CLINIC 136 | Facility: CLINIC | Age: 85
Setting detail: OPHTHALMOLOGY
End: 2020-05-28
Payer: COMMERCIAL

## 2020-05-28 DIAGNOSIS — H35.3211: ICD-10-CM

## 2020-05-28 PROCEDURE — 67028 INJECTION EYE DRUG: CPT | Performed by: OPHTHALMOLOGY

## 2020-05-28 ASSESSMENT — REFRACTION_AUTOREFRACTION
OS_CYLINDER: -1.75
OS_SPHERE: +0.25
OD_SPHERE: -1.00
OD_CYLINDER: -1.00
OS_AXIS: 074
OD_AXIS: 087

## 2020-05-28 ASSESSMENT — VISUAL ACUITY
OS_BCVA: 20/250
OD_BCVA: 20/50

## 2020-05-28 ASSESSMENT — SPHEQUIV_DERIVED
OS_SPHEQUIV: -0.625
OD_SPHEQUIV: -1.5

## 2020-06-22 ENCOUNTER — PROCEDURE VISIT (OUTPATIENT)
Dept: UROLOGY | Facility: CLINIC | Age: 85
End: 2020-06-22
Payer: MEDICARE

## 2020-06-22 VITALS
WEIGHT: 171.96 LBS | DIASTOLIC BLOOD PRESSURE: 78 MMHG | SYSTOLIC BLOOD PRESSURE: 150 MMHG | HEART RATE: 52 BPM | TEMPERATURE: 98.2 F | BODY MASS INDEX: 23.32 KG/M2

## 2020-06-22 DIAGNOSIS — Z12.5 ENCOUNTER FOR PROSTATE CANCER SCREENING: ICD-10-CM

## 2020-06-22 DIAGNOSIS — N52.9 ERECTILE DYSFUNCTION, UNSPECIFIED ERECTILE DYSFUNCTION TYPE: ICD-10-CM

## 2020-06-22 DIAGNOSIS — Z85.51 HISTORY OF BLADDER CANCER: Primary | ICD-10-CM

## 2020-06-22 PROCEDURE — 3078F DIAST BP <80 MM HG: CPT | Performed by: UROLOGY

## 2020-06-22 PROCEDURE — 3077F SYST BP >= 140 MM HG: CPT | Performed by: UROLOGY

## 2020-06-22 PROCEDURE — 4040F PNEUMOC VAC/ADMIN/RCVD: CPT | Performed by: UROLOGY

## 2020-06-22 PROCEDURE — 52000 CYSTOURETHROSCOPY: CPT | Performed by: UROLOGY

## 2020-06-22 PROCEDURE — 4004F PT TOBACCO SCREEN RCVD TLK: CPT | Performed by: UROLOGY

## 2020-06-22 PROCEDURE — 1160F RVW MEDS BY RX/DR IN RCRD: CPT | Performed by: UROLOGY

## 2020-06-22 PROCEDURE — 99213 OFFICE O/P EST LOW 20 MIN: CPT | Performed by: UROLOGY

## 2020-06-22 RX ORDER — CIPROFLOXACIN 500 MG/1
500 TABLET, FILM COATED ORAL ONCE
Qty: 1 TABLET | Refills: 0 | Status: SHIPPED | OUTPATIENT
Start: 2020-06-22 | End: 2020-06-22

## 2020-06-22 RX ORDER — TADALAFIL 20 MG/1
20 TABLET ORAL DAILY PRN
Qty: 18 TABLET | Refills: 3 | Status: SHIPPED | OUTPATIENT
Start: 2020-06-22 | End: 2022-04-06

## 2020-06-30 DIAGNOSIS — J30.1 ALLERGIC RHINITIS DUE TO POLLEN, UNSPECIFIED SEASONALITY: ICD-10-CM

## 2020-07-01 RX ORDER — CETIRIZINE HYDROCHLORIDE 10 MG/1
TABLET ORAL
Qty: 90 TABLET | Refills: 0 | Status: SHIPPED | OUTPATIENT
Start: 2020-07-01 | End: 2020-10-06

## 2020-07-02 ENCOUNTER — DOCTOR'S OFFICE (OUTPATIENT)
Dept: URBAN - METROPOLITAN AREA CLINIC 136 | Facility: CLINIC | Age: 85
Setting detail: OPHTHALMOLOGY
End: 2020-07-02
Payer: COMMERCIAL

## 2020-07-02 DIAGNOSIS — H35.3231: ICD-10-CM

## 2020-07-02 DIAGNOSIS — H43.11: ICD-10-CM

## 2020-07-02 DIAGNOSIS — H35.3211: ICD-10-CM

## 2020-07-02 DIAGNOSIS — H33.021: ICD-10-CM

## 2020-07-02 PROCEDURE — 67028 INJECTION EYE DRUG: CPT | Performed by: OPHTHALMOLOGY

## 2020-07-02 PROCEDURE — 92134 CPTRZ OPH DX IMG PST SGM RTA: CPT | Performed by: OPHTHALMOLOGY

## 2020-07-02 PROCEDURE — 92014 COMPRE OPH EXAM EST PT 1/>: CPT | Performed by: OPHTHALMOLOGY

## 2020-07-02 ASSESSMENT — REFRACTION_AUTOREFRACTION
OS_SPHERE: +0.25
OS_CYLINDER: -1.75
OD_AXIS: 087
OS_AXIS: 074
OD_SPHERE: -1.00
OD_CYLINDER: -1.00

## 2020-07-02 ASSESSMENT — VISUAL ACUITY
OS_BCVA: 20/300
OD_BCVA: 20/80+1

## 2020-07-02 ASSESSMENT — SUPERFICIAL PUNCTATE KERATITIS (SPK)
OD_SPK: 2+
OS_SPK: 2+

## 2020-07-02 ASSESSMENT — SPHEQUIV_DERIVED
OD_SPHEQUIV: -1.5
OS_SPHEQUIV: -0.625

## 2020-07-02 ASSESSMENT — DRY EYES - PHYSICIAN NOTES: OD_GENERALCOMMENTS: INFERIORLY

## 2020-07-02 ASSESSMENT — LID EXAM ASSESSMENTS
OD_BLEPHARITIS: 1+
OS_TRICHIASIS: LUL T
OD_TRICHIASIS: RUL T

## 2020-07-02 ASSESSMENT — LID POSITION - ENTROPION: OS_ENTROPION: LLL T

## 2020-07-02 ASSESSMENT — LID POSITION - COMMENTS: OS_COMMENTS: EARLY

## 2020-07-31 ENCOUNTER — DOCTOR'S OFFICE (OUTPATIENT)
Dept: URBAN - METROPOLITAN AREA CLINIC 136 | Facility: CLINIC | Age: 85
Setting detail: OPHTHALMOLOGY
End: 2020-07-31
Payer: COMMERCIAL

## 2020-07-31 DIAGNOSIS — H35.3231: ICD-10-CM

## 2020-07-31 DIAGNOSIS — H43.11: ICD-10-CM

## 2020-07-31 DIAGNOSIS — H33.021: ICD-10-CM

## 2020-07-31 DIAGNOSIS — H35.3211: ICD-10-CM

## 2020-07-31 DIAGNOSIS — H04.123: ICD-10-CM

## 2020-07-31 PROCEDURE — 92012 INTRM OPH EXAM EST PATIENT: CPT | Performed by: OPHTHALMOLOGY

## 2020-07-31 PROCEDURE — 92134 CPTRZ OPH DX IMG PST SGM RTA: CPT | Performed by: OPHTHALMOLOGY

## 2020-07-31 PROCEDURE — 67028 INJECTION EYE DRUG: CPT | Performed by: OPHTHALMOLOGY

## 2020-07-31 ASSESSMENT — LID EXAM ASSESSMENTS
OD_TRICHIASIS: RUL T
OD_BLEPHARITIS: 1+
OS_TRICHIASIS: LUL T

## 2020-07-31 ASSESSMENT — VISUAL ACUITY
OD_BCVA: 20/70+1
OS_BCVA: 20/80+1

## 2020-07-31 ASSESSMENT — REFRACTION_AUTOREFRACTION
OD_AXIS: 087
OD_CYLINDER: -1.00
OS_AXIS: 074
OS_CYLINDER: -1.75
OD_SPHERE: -1.00
OS_SPHERE: +0.25

## 2020-07-31 ASSESSMENT — LID POSITION - ENTROPION: OS_ENTROPION: LLL T

## 2020-07-31 ASSESSMENT — DRY EYES - PHYSICIAN NOTES: OD_GENERALCOMMENTS: INFERIORLY

## 2020-07-31 ASSESSMENT — LID POSITION - COMMENTS: OS_COMMENTS: EARLY

## 2020-07-31 ASSESSMENT — SPHEQUIV_DERIVED
OD_SPHEQUIV: -1.5
OS_SPHEQUIV: -0.625

## 2020-07-31 ASSESSMENT — SUPERFICIAL PUNCTATE KERATITIS (SPK)
OD_SPK: 2+
OS_SPK: 2+

## 2020-08-04 DIAGNOSIS — I10 ESSENTIAL HYPERTENSION: ICD-10-CM

## 2020-08-13 ENCOUNTER — DOCTOR'S OFFICE (OUTPATIENT)
Dept: URBAN - METROPOLITAN AREA CLINIC 136 | Facility: CLINIC | Age: 85
Setting detail: OPHTHALMOLOGY
End: 2020-08-13
Payer: COMMERCIAL

## 2020-08-13 DIAGNOSIS — H35.3221: ICD-10-CM

## 2020-08-13 PROCEDURE — 67028 INJECTION EYE DRUG: CPT | Performed by: OPHTHALMOLOGY

## 2020-08-13 ASSESSMENT — SPHEQUIV_DERIVED
OD_SPHEQUIV: -1.5
OS_SPHEQUIV: -0.625

## 2020-08-13 ASSESSMENT — REFRACTION_AUTOREFRACTION
OS_SPHERE: +0.25
OD_SPHERE: -1.00
OD_CYLINDER: -1.00
OS_AXIS: 074
OS_CYLINDER: -1.75
OD_AXIS: 087

## 2020-08-13 ASSESSMENT — VISUAL ACUITY
OS_BCVA: 20/80+1
OD_BCVA: 20/70+1

## 2020-09-04 ENCOUNTER — DOCTOR'S OFFICE (OUTPATIENT)
Dept: URBAN - METROPOLITAN AREA CLINIC 136 | Facility: CLINIC | Age: 85
Setting detail: OPHTHALMOLOGY
End: 2020-09-04
Payer: COMMERCIAL

## 2020-09-04 DIAGNOSIS — H35.3231: ICD-10-CM

## 2020-09-04 DIAGNOSIS — H35.3211: ICD-10-CM

## 2020-09-04 PROCEDURE — 67028 INJECTION EYE DRUG: CPT | Performed by: OPHTHALMOLOGY

## 2020-09-04 PROCEDURE — 92134 CPTRZ OPH DX IMG PST SGM RTA: CPT | Performed by: OPHTHALMOLOGY

## 2020-09-04 ASSESSMENT — VISUAL ACUITY
OS_BCVA: 20/150+1
OD_BCVA: 20/70+1

## 2020-09-04 ASSESSMENT — REFRACTION_AUTOREFRACTION
OS_AXIS: 074
OS_CYLINDER: -1.75
OS_SPHERE: +0.25
OD_CYLINDER: -1.00
OD_SPHERE: -1.00
OD_AXIS: 087

## 2020-09-04 ASSESSMENT — SPHEQUIV_DERIVED
OS_SPHEQUIV: -0.625
OD_SPHEQUIV: -1.5

## 2020-09-22 ENCOUNTER — OFFICE VISIT (OUTPATIENT)
Dept: FAMILY MEDICINE CLINIC | Facility: CLINIC | Age: 85
End: 2020-09-22
Payer: MEDICARE

## 2020-09-22 VITALS
SYSTOLIC BLOOD PRESSURE: 128 MMHG | BODY MASS INDEX: 24.42 KG/M2 | DIASTOLIC BLOOD PRESSURE: 74 MMHG | TEMPERATURE: 97.2 F | HEIGHT: 70 IN | WEIGHT: 170.6 LBS

## 2020-09-22 DIAGNOSIS — Z00.00 MEDICARE ANNUAL WELLNESS VISIT, SUBSEQUENT: ICD-10-CM

## 2020-09-22 DIAGNOSIS — Z23 NEED FOR IMMUNIZATION AGAINST INFLUENZA: Primary | ICD-10-CM

## 2020-09-22 PROCEDURE — G0438 PPPS, INITIAL VISIT: HCPCS | Performed by: FAMILY MEDICINE

## 2020-09-22 PROCEDURE — G0008 ADMIN INFLUENZA VIRUS VAC: HCPCS | Performed by: FAMILY MEDICINE

## 2020-09-22 PROCEDURE — 90662 IIV NO PRSV INCREASED AG IM: CPT | Performed by: FAMILY MEDICINE

## 2020-09-22 NOTE — PATIENT INSTRUCTIONS
Medicare Preventive Visit Patient Instructions  Thank you for completing your Welcome to Medicare Visit or Medicare Annual Wellness Visit today  Your next wellness visit will be due in one year (9/22/2021)  The screening/preventive services that you may require over the next 5-10 years are detailed below  Some tests may not apply to you based off risk factors and/or age  Screening tests ordered at today's visit but not completed yet may show as past due  Also, please note that scanned in results may not display below  Preventive Screenings:  Service Recommendations Previous Testing/Comments   Colorectal Cancer Screening  · Colonoscopy    · Fecal Occult Blood Test (FOBT)/Fecal Immunochemical Test (FIT)  · Fecal DNA/Cologuard Test  · Flexible Sigmoidoscopy Age: 54-65 years old   Colonoscopy: every 10 years (May be performed more frequently if at higher risk)  OR  FOBT/FIT: every 1 year  OR  Cologuard: every 3 years  OR  Sigmoidoscopy: every 5 years  Screening may be recommended earlier than age 48 if at higher risk for colorectal cancer  Also, an individualized decision between you and your healthcare provider will decide whether screening between the ages of 74-80 would be appropriate   Colonoscopy: Not on file  FOBT/FIT: Not on file  Cologuard: Not on file  Sigmoidoscopy: Not on file         Prostate Cancer Screening Individualized decision between patient and health care provider in men between ages of 53-78   Medicare will cover every 12 months beginning on the day after your 50th birthday PSA: 1 3 ng/mL     Screening Not Indicated     Hepatitis C Screening Once for adults born between [facility]  More frequently in patients at high risk for Hepatitis C Hep C Antibody: Not on file       Diabetes Screening 1-2 times per year if you're at risk for diabetes or have pre-diabetes Fasting glucose: 82 mg/dL   A1C: 6 1 %    Screening Current   Cholesterol Screening Once every 5 years if you don't have a lipid disorder  May order more often based on risk factors  Lipid panel: 10/08/2019    Screening Not Indicated  History Lipid Disorder      Other Preventive Screenings Covered by Medicare:  1  Abdominal Aortic Aneurysm (AAA) Screening: covered once if your at risk  You're considered to be at risk if you have a family history of AAA or a male between the age of 73-68 who smoking at least 100 cigarettes in your lifetime  2  Lung Cancer Screening: covers low dose CT scan once per year if you meet all of the following conditions: (1) Age 50-69; (2) No signs or symptoms of lung cancer; (3) Current smoker or have quit smoking within the last 15 years; (4) You have a tobacco smoking history of at least 30 pack years (packs per day x number of years you smoked); (5) You get a written order from a healthcare provider  3  Glaucoma Screening: covered annually if you're considered high risk: (1) You have diabetes OR (2) Family history of glaucoma OR (3)  aged 48 and older OR (3)  American aged 72 and older  3  Osteoporosis Screening: covered every 2 years if you meet one of the following conditions: (1) Have a vertebral abnormality; (2) On glucocorticoid therapy for more than 3 months; (3) Have primary hyperparathyroidism; (4) On osteoporosis medications and need to assess response to drug therapy  5  HIV Screening: covered annually if you're between the age of 12-76  Also covered annually if you are younger than 13 and older than 72 with risk factors for HIV infection  For pregnant patients, it is covered up to 3 times per pregnancy      Immunizations:  Immunization Recommendations   Influenza Vaccine Annual influenza vaccination during flu season is recommended for all persons aged >= 6 months who do not have contraindications   Pneumococcal Vaccine (Prevnar and Pneumovax)  * Prevnar = PCV13  * Pneumovax = PPSV23 Adults 25-60 years old: 1-3 doses may be recommended based on certain risk factors  Adults 65+ years old: Prevnar (PCV13) vaccine recommended followed by Pneumovax (PPSV23) vaccine  If already received PPSV23 since turning 65, then PCV13 recommended at least one year after PPSV23 dose  Hepatitis B Vaccine 3 dose series if at intermediate or high risk (ex: diabetes, end stage renal disease, liver disease)   Tetanus (Td) Vaccine - COST NOT COVERED BY MEDICARE PART B Following completion of primary series, a booster dose should be given every 10 years to maintain immunity against tetanus  Td may also be given as tetanus wound prophylaxis  Tdap Vaccine - COST NOT COVERED BY MEDICARE PART B Recommended at least once for all adults  For pregnant patients, recommended with each pregnancy  Shingles Vaccine (Shingrix) - COST NOT COVERED BY MEDICARE PART B  2 shot series recommended in those aged 48 and above     Health Maintenance Due:  There are no preventive care reminders to display for this patient  Immunizations Due:      Topic Date Due    DTaP,Tdap,and Td Vaccines (1 - Tdap) 11/21/1956    Influenza Vaccine  07/01/2020     Advance Directives   What are advance directives? Advance directives are legal documents that state your wishes and plans for medical care  These plans are made ahead of time in case you lose your ability to make decisions for yourself  Advance directives can apply to any medical decision, such as the treatments you want, and if you want to donate organs  What are the types of advance directives? There are many types of advance directives, and each state has rules about how to use them  You may choose a combination of any of the following:  · Living will: This is a written record of the treatment you want  You can also choose which treatments you do not want, which to limit, and which to stop at a certain time  This includes surgery, medicine, IV fluid, and tube feedings  · Durable power of  for healthcare [facility]):   This is a written record that states who you want to make healthcare choices for you when you are unable to make them for yourself  This person, called a proxy, is usually a family member or a friend  You may choose more than 1 proxy  · Do not resuscitate (DNR) order:  A DNR order is used in case your heart stops beating or you stop breathing  It is a request not to have certain forms of treatment, such as CPR  A DNR order may be included in other types of advance directives  · Medical directive: This covers the care that you want if you are in a coma, near death, or unable to make decisions for yourself  You can list the treatments you want for each condition  Treatment may include pain medicine, surgery, blood transfusions, dialysis, IV or tube feedings, and a ventilator (breathing machine)  · Values history: This document has questions about your views, beliefs, and how you feel and think about life  This information can help others choose the care that you would choose  Why are advance directives important? An advance directive helps you control your care  Although spoken wishes may be used, it is better to have your wishes written down  Spoken wishes can be misunderstood, or not followed  Treatments may be given even if you do not want them  An advance directive may make it easier for your family to make difficult choices about your care  Fall Prevention    Fall prevention  includes ways to make your home and other areas safer  It also includes ways you can move more carefully to prevent a fall  Health conditions that cause changes in your blood pressure, vision, or muscle strength and coordination may increase your risk for falls  Medicines may also increase your risk for falls if they make you dizzy, weak, or sleepy  Fall prevention tips:   · Stand or sit up slowly  · Use assistive devices as directed  · Wear shoes that fit well and have soles that   · Wear a personal alarm  · Stay active  · Manage your medical conditions      Home Safety Tips:  · Add items to prevent falls in the bathroom  · Keep paths clear  · Install bright lights in your home  · Keep items you use often on shelves within reach  · Paint or place reflective tape on the edges of your stairs  Cigarette Smoking and Your Health   Risks to your health if you smoke:  Nicotine and other chemicals found in tobacco damage every cell in your body  Even if you are a light smoker, you have an increased risk for cancer, heart disease, and lung disease  If you are pregnant or have diabetes, smoking increases your risk for complications  Benefits to your health if you stop smoking:   · You decrease respiratory symptoms such as coughing, wheezing, and shortness of breath  · You reduce your risk for cancers of the lung, mouth, throat, kidney, bladder, pancreas, stomach, and cervix  If you already have cancer, you increase the benefits of chemotherapy  You also reduce your risk for cancer returning or a second cancer from developing  · You reduce your risk for heart disease, blood clots, heart attack, and stroke  · You reduce your risk for lung infections, and diseases such as pneumonia, asthma, chronic bronchitis, and emphysema  · Your circulation improves  More oxygen can be delivered to your body  If you have diabetes, you lower your risk for complications, such as kidney, artery, and eye diseases  You also lower your risk for nerve damage  Nerve damage can lead to amputations, poor vision, and blindness  · You improve your body's ability to heal and to fight infections  For more information and support to stop smoking:   · Smokefree  gov  Phone: 0- 956 - 127-9792  Web Address: www [employer]   Lynette Marquezjorge luis 2018 Information is for End User's use only and may not be sold, redistributed or otherwise used for commercial purposes   All illustrations and images included in CareNotes® are the copyrighted property of A [employer] M [employer]  or [employer]

## 2020-09-22 NOTE — PROGRESS NOTES
Assessment and Plan:     Problem List Items Addressed This Visit     None          Falls Plan of Care: balance, strength, and gait training instructions were provided  Preventive health issues were discussed with patient, and age appropriate screening tests were ordered as noted in patient's After Visit Summary  Personalized health advice and appropriate referrals for health education or preventive services given if needed, as noted in patient's After Visit Summary  History of Present Illness:     Patient presents for Welcome to Medicare visit  Patient Care Team:  Margarita Casanova DO as PCP - MD Remy Mcguire MD Lafaye Lucks, Belia Bors, MD Janeen Fly, MD as Endoscopist     Review of Systems:     Review of Systems   Constitutional: Negative for activity change, appetite change, diaphoresis, fatigue and fever  HENT: Positive for hearing loss  Negative for dental problem  Eyes: Positive for visual disturbance  Macular degeneration   Respiratory: Positive for shortness of breath  Negative for apnea, cough, chest tightness and wheezing  Cardiovascular: Negative for chest pain, palpitations and leg swelling  Gastrointestinal: Negative for abdominal distention, abdominal pain, anal bleeding, constipation, diarrhea, nausea and vomiting  Recent colonoscopy had polyps for repeat colonoscopy in 2 years   Endocrine: Negative for cold intolerance, heat intolerance, polydipsia, polyphagia and polyuria  Genitourinary: Negative for difficulty urinating, dysuria, flank pain, hematuria and urgency  Musculoskeletal: Positive for arthralgias and back pain  Negative for gait problem, joint swelling and myalgias  Skin: Negative for color change, rash and wound  Allergic/Immunologic: Negative for environmental allergies, food allergies and immunocompromised state  Neurological: Positive for weakness   Negative for dizziness, seizures, syncope, speech difficulty, numbness and headaches  Hematological: Negative for adenopathy  Does not bruise/bleed easily  Psychiatric/Behavioral: Negative for agitation, behavioral problems, hallucinations, sleep disturbance and suicidal ideas        Problem List:     Patient Active Problem List   Diagnosis    Bladder cancer ([facility]    Lung nodule, solitary    Erectile dysfunction    Gait instability    High blood pressure    Hypothyroidism    Idiopathic peripheral neuropathy    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)    Cervical stenosis of spinal canal    Spinal stenosis of lumbar region    Spondylosis 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    Hoarse voice quality    Appetite loss    Weight loss    Gastroesophageal reflux disease without esophagitis    Alternating constipation and diarrhea    Weight loss, non-intentional    Nausea    Chronic obstructive pulmonary disease with acute exacerbation ([facility] )    [facility]    Radiculopathy, lumbar region      Past Medical and Surgical History:     Past Medical History:   Diagnosis Date    Anemia     Appendicitis     Coronary artery disease     Detached retina     GERD (gastroesophageal reflux disease)     Hyperlipidemia     Hypertension     Macular degeneration     Neuropathy     Von Willebrand disease ([facility] )      Past Surgical History:   Procedure Laterality Date    ADENOIDECTOMY      APPENDECTOMY      ARTHRODESIS  01/15/2014    Lumbar     BACK SURGERY      CATARACT EXTRACTION      COLONOSCOPY  12/09/2016    fiberoptic     CYSTOSCOPY      with resection of tumor / 1/12/17, 10/2/17 / diagnostic 12/8/16, 5/30/17     CYSTOSCOPY  09/20/2018    CYSTOSCOPY  06/22/2020    EGD AND COLONOSCOPY N/A 12/9/2016    Procedure: EGD AND COLONOSCOPY;  Surgeon: Dieter Hutchison MD;  [facility];  Service:    Luba Breaux EYE SURGERY      FRACTURE SURGERY Bilateral     ARM    NECK SURGERY      NEUROPLASTY / TRANSPOSITION MEDIAN NERVE AT CARPAL TUNNEL Right 04/2015    GA COLONOSCOPY FLX DX W/COLLJ SPEC WHEN PFRMD N/A 2/5/2019    Procedure: COLONOSCOPY;  Surgeon: Tai Santana MD;  [facility];  Service: Gastroenterology    GA CYSTOURETHROSCOPY,FULGUR <0 5 CM LESN N/A 1/12/2017    Procedure: CYSTOSCOPY, BLADDER BIOPSY WITH FULGRATION, 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;  Surgeon: Radha Gipson MD;  [facility];  Service: Urology    GA CYSTOURETHROSCOPY,FULGUR <0 5 CM LESN N/A 10/2/2017    Procedure: CYSTOSCOPY WITH  BLADDER BIOPSIES WITH FULGURATION;  Surgeon: Radha Gipson MD;  [facility];  Service: Urology    GA ESOPHAGOGASTRODUODENOSCOPY TRANSORAL DIAGNOSTIC N/A 8/3/2018    Procedure: ESOPHAGOGASTRODUODENOSCOPY (EGD); Surgeon: Tai Santana MD;  [facility];  Service: Gastroenterology    GA INSTILL ANTICANCER AGENT IN BLADDER N/A 1/12/2017    Procedure: Carry Maha;  Surgeon: Radha Gipson MD;  [facility];  Service: Urology    GA INSTILL ANTICANCER AGENT IN BLADDER N/A 10/2/2017    Procedure: Carry Maha;  Surgeon: Radha Gipson MD;  [facility];  Service: Urology    RETINAL DETACHMENT SURGERY Right 03/2016    complete air fill confirmed    [address] SURGERY  03/03/2015    proximal humerus fracture / [place]    3/6/15   R    1/3/18     TONSILECTOMY AND ADNOIDECTOMY      TONSILLECTOMY      TRANSURETHRAL RESECTION OF BLADDER TUMOR N/A 10/2/2017    Procedure: TRANSURETHRAL RESECTION OF BLADDER TUMOR (TURBT);   Surgeon: Radha Gipson MD;  [facility];  Service: Urology    [address] (HISTORICAL)        Family History:     Family History   Problem Relation Age of Onset    Ulcers Father         acute gastric    Heart disease Mother    [facility]       Social History:        Social History     Socioeconomic History    Marital status:      Spouse name: None    Number of children: None    Years of education: None    Highest education level: None   Occupational History    Occupation:    retired   Social Needs    Financial resource strain: None    Food insecurity     Worry: None     Inability: None    Transportation needs     Medical: None     Non-medical: None   Tobacco Use    Smoking status: Current Every Day Smoker     Packs/day: 0 25     Years: 60 00     Pack years: 15 00     Types: Cigarettes    Smokeless tobacco: Never Used    Tobacco comment: smokes a pipe -resolved    Substance and Sexual Activity    Alcohol use:  Yes     Alcohol/week: 2 0 standard drinks     Types: 1 Glasses of wine, 1 Shots of liquor per week     Frequency: 4 or more times a week     Drinks per session: 1 or 2     Comment: DAILY     Drug use: No    Sexual activity: None   Lifestyle    Physical activity     Days per week: None     Minutes per session: None    Stress: None   Relationships    Social connections     Talks on phone: None     Gets together: None     Attends Congregation service: None     Active member of club or organization: None     Attends meetings of clubs or organizations: None     Relationship status: None    Intimate partner violence     Fear of current or ex partner: None     Emotionally abused: None     Physically abused: None     Forced sexual activity: None   Other Topics Concern    None   Social History Narrative    No advance directives     Patient has living will       Medications and Allergies:     Current Outpatient Medications   Medication Sig Dispense Refill    ALPRAZolam (XANAX) 0 25 mg tablet Take 1 or 2 tablets 1 hour before MRI do not drive or operate a motor vehicle while taking this medication 2 tablet 0    atorvastatin (LIPITOR) 10 mg tablet TAKE 1 TABLET BY MOUTH  DAILY 90 tablet 2    B COMPLEX VITAMINS PO Take 1 tablet by mouth daily      cetirizine (ZyrTEC) 10 mg tablet TAKE ONE TABLET BY MOUTH DAILY 90 tablet 0    Cholecalciferol (VITAMIN D3) 2000 UNITS capsule Take 1 capsule by mouth 2 (two) times a day 5000 units       Coenzyme Q10 (CO Q10) 60 MG CAPS Take 1 capsule by mouth daily      famotidine (PEPCID) 40 MG tablet Take 1 tablet (40 mg total) by mouth daily at bedtime 90 tablet 1    gabapentin (NEURONTIN) 600 MG tablet TAKE 1 TABLET BY MOUTH  TWICE A  tablet 1    Ginkgo Biloba 120 MG TABS Take 1 tablet by mouth daily      Inositol 500 MG TABS Take 1 tablet by mouth 2 (two) times a day      ipratropium (ATROVENT) 0 06 % nasal spray USE 2 SPRAYS IN EACH  NOSTRIL 3 TIMES A DAY (Patient taking differently: 2 (two) times a day ) 75 mL 3    lutein 6 mg Take 1 capsule by mouth daily      metoprolol tartrate (LOPRESSOR) 25 mg tablet TAKE 1 TABLET BY MOUTH  EVERY 12 HOURS 180 tablet 3    MILK THISTLE PO Take 1 tablet by mouth daily      Misc Natural Products (SUPER SNOOZE) CAPS Take 1 capsule by mouth daily Bed time       Multiple Vitamins-Minerals (OCUVITE-LUTEIN) CAPS Take by mouth daily      omeprazole (PriLOSEC) 40 MG capsule TAKE 1 CAPSULE BY MOUTH  TWICE DAILY 180 capsule 1    sildenafil (VIAGRA) 50 MG tablet Viagra 50 mg tablet   TAKE 1 TABLET BY MOUTH UP TO 4 HOURS BEFORE INTERCOURSE AND MAX OF 1 TABLET IN 24 HOURS      tadalafil (CIALIS) 20 MG tablet Take 1 tablet (20 mg total) by mouth daily as needed for erectile dysfunction 18 tablet 3     No current facility-administered medications for this visit  No Known Allergies   Immunizations:     Immunization History   Administered Date(s) Administered    INFLUENZA 10/26/2017, 09/12/2018    Influenza Split High Dose Preservative Free IM 09/17/2012, 10/15/2013, 11/04/2014, 10/15/2015, 09/01/2016, 10/26/2017    Influenza, high dose seasonal 0 7 mL 09/12/2018, 09/17/2019    Pneumococcal Conjugate 13-Valent 12/09/2014    Pneumococcal Polysaccharide PPV23 01/12/2018    Zoster 12/12/2014      Health Maintenance:      There are no preventive care reminders to display for this patient  Topic Date Due    DTaP,Tdap,and Td Vaccines (1 - Tdap) 11/21/1956    Influenza Vaccine  07/01/2020      Medicare Screening Tests and Risk Assessments:     Adriana Chakraborty is here for his Subsequent Wellness visit  Health Risk Assessment:   Patient rates overall health as good  Patient feels that their physical health rating is same  Eyesight was rated as same  Hearing was rated as same  Patient feels that their emotional and mental health rating is same  Pain experienced in the last 7 days has been none  Patient states that he has experienced no weight loss or gain in last 6 months  Fall Risk Screening: In the past year, patient has experienced: history of falling in past year    Number of falls: 2 or more  Injured during fall?: No    Feels unsteady when standing or walking?: Yes    Worried about falling?: Yes      Home Safety:  Patient does not have trouble with stairs inside or outside of their home  Patient has working smoke alarms and has no working carbon monoxide detector  Home safety hazards include: none  Nutrition:   Current diet is Regular and Limited junk food  Medications:   Patient is currently taking over-the-counter supplements  OTC medications include: see medication list  Patient is able to manage medications  Activities of Daily Living (ADLs)/Instrumental Activities of Daily Living (IADLs):   Walk and transfer into and out of bed and chair?: Yes  Dress and groom yourself?: Yes    Bathe or shower yourself?: Yes    Feed yourself?  Yes  Do your laundry/housekeeping?: Yes  Manage your money, pay your bills and track your expenses?: Yes  Make your own meals?: Yes    Do your own shopping?: Yes    Previous Hospitalizations:   Any hospitalizations or ED visits within the last 12 months?: No      Advance Care Planning:   Living will: Yes    Durable POA for healthcare: No    Advanced directive: Yes    Advanced directive counseling given: Yes    Patient declined ACP directive: No    End of Life Decisions reviewed with patient: Yes    Provider agrees with end of life decisions: Yes      Cognitive Screening:   Provider or family/friend/caregiver concerned regarding cognition?: No    PREVENTIVE SCREENINGS      Cardiovascular Screening:    General: Screening Not Indicated and History Lipid Disorder      Diabetes Screening:     General: Screening Current      Prostate Cancer Screening:    General: Screening Not Indicated      Abdominal Aortic Aneurysm (AAA) Screening:    Risk factors include: tobacco use        Lung Cancer Screening:     General: Screening Not Indicated    No exam data present     Physical Exam:     /74 (BP Location: Right arm, Patient Position: Sitting, Cuff Size: Standard)   Temp (!) 97 2 °F (36 2 °C) (Temporal)   Ht 5' 10" (1 778 m)   Wt 77 4 kg (170 lb 9 6 oz)   BMI 24 48 kg/m²     Physical Exam  Constitutional:       Appearance: He is well-developed  HENT:      Head: Normocephalic and atraumatic  Right Ear: External ear normal       Left Ear: External ear normal       Nose: Nose normal    Eyes:      Conjunctiva/sclera: Conjunctivae normal       Pupils: Pupils are equal, round, and reactive to light  Neck:      Musculoskeletal: Normal range of motion and neck supple  Cardiovascular:      Rate and Rhythm: Normal rate and regular rhythm  Heart sounds: Normal heart sounds  No murmur  No friction rub  Pulmonary:      Effort: Pulmonary effort is normal  No respiratory distress  Breath sounds: Normal breath sounds  No wheezing or rales  Chest:      Chest wall: No tenderness  Abdominal:      General: Bowel sounds are normal       Palpations: Abdomen is soft  Musculoskeletal: Normal range of motion  Skin:     General: Skin is warm and dry  Capillary Refill: Capillary refill takes 2 to 3 seconds  Neurological:      Mental Status: He is alert and oriented to person, place, and time  Psychiatric:         Behavior: Behavior normal          Thought Content:  Thought content normal          Judgment: Judgment normal           Kirsty Key DO

## 2020-10-02 ENCOUNTER — DOCTOR'S OFFICE (OUTPATIENT)
Dept: URBAN - METROPOLITAN AREA CLINIC 136 | Facility: CLINIC | Age: 85
Setting detail: OPHTHALMOLOGY
End: 2020-10-02
Payer: COMMERCIAL

## 2020-10-02 DIAGNOSIS — Z96.1: ICD-10-CM

## 2020-10-02 DIAGNOSIS — H35.3211: ICD-10-CM

## 2020-10-02 DIAGNOSIS — H04.123: ICD-10-CM

## 2020-10-02 DIAGNOSIS — H35.3231: ICD-10-CM

## 2020-10-02 DIAGNOSIS — H33.021: ICD-10-CM

## 2020-10-02 PROCEDURE — 67028 INJECTION EYE DRUG: CPT | Performed by: OPHTHALMOLOGY

## 2020-10-02 PROCEDURE — 92012 INTRM OPH EXAM EST PATIENT: CPT | Performed by: OPHTHALMOLOGY

## 2020-10-02 PROCEDURE — 92134 CPTRZ OPH DX IMG PST SGM RTA: CPT | Performed by: OPHTHALMOLOGY

## 2020-10-02 ASSESSMENT — REFRACTION_AUTOREFRACTION
OS_AXIS: 074
OD_AXIS: 087
OD_SPHERE: -1.00
OS_SPHERE: +0.25
OS_CYLINDER: -1.75
OD_CYLINDER: -1.00

## 2020-10-02 ASSESSMENT — LID POSITION - COMMENTS: OS_COMMENTS: EARLY

## 2020-10-02 ASSESSMENT — VISUAL ACUITY
OD_BCVA: 20/60-2
OS_BCVA: 20/200

## 2020-10-02 ASSESSMENT — SPHEQUIV_DERIVED
OS_SPHEQUIV: -0.625
OD_SPHEQUIV: -1.5

## 2020-10-02 ASSESSMENT — LID EXAM ASSESSMENTS
OD_TRICHIASIS: RUL T
OD_BLEPHARITIS: 1+
OS_TRICHIASIS: LUL T

## 2020-10-02 ASSESSMENT — SUPERFICIAL PUNCTATE KERATITIS (SPK)
OD_SPK: 2+
OS_SPK: 2+

## 2020-10-02 ASSESSMENT — DRY EYES - PHYSICIAN NOTES: OD_GENERALCOMMENTS: INFERIORLY

## 2020-10-02 ASSESSMENT — LID POSITION - ENTROPION: OS_ENTROPION: LLL T

## 2020-10-06 DIAGNOSIS — E78.2 MIXED HYPERLIPIDEMIA: ICD-10-CM

## 2020-10-06 DIAGNOSIS — J30.1 ALLERGIC RHINITIS DUE TO POLLEN, UNSPECIFIED SEASONALITY: ICD-10-CM

## 2020-10-06 RX ORDER — CETIRIZINE HYDROCHLORIDE 10 MG/1
TABLET ORAL
Qty: 90 TABLET | Refills: 2 | Status: SHIPPED | OUTPATIENT
Start: 2020-10-06 | End: 2021-09-02

## 2020-10-07 RX ORDER — ATORVASTATIN CALCIUM 10 MG/1
TABLET, FILM COATED ORAL
Qty: 90 TABLET | Refills: 3 | Status: SHIPPED | OUTPATIENT
Start: 2020-10-07 | End: 2021-08-23

## 2020-10-24 DIAGNOSIS — K21.9 GASTROESOPHAGEAL REFLUX DISEASE WITHOUT ESOPHAGITIS: ICD-10-CM

## 2020-10-26 RX ORDER — OMEPRAZOLE 40 MG/1
CAPSULE, DELAYED RELEASE ORAL
Qty: 180 CAPSULE | Refills: 3 | Status: SHIPPED | OUTPATIENT
Start: 2020-10-26 | End: 2021-09-09

## 2020-10-26 RX ORDER — GABAPENTIN 600 MG/1
TABLET ORAL
Qty: 180 TABLET | Refills: 3 | Status: SHIPPED | OUTPATIENT
Start: 2020-10-26 | End: 2021-09-09

## 2020-11-05 ENCOUNTER — DOCTOR'S OFFICE (OUTPATIENT)
Dept: URBAN - NONMETROPOLITAN AREA CLINIC 1 | Facility: CLINIC | Age: 85
Setting detail: OPHTHALMOLOGY
End: 2020-11-05
Payer: COMMERCIAL

## 2020-11-05 DIAGNOSIS — H35.3211: ICD-10-CM

## 2020-11-05 DIAGNOSIS — H04.123: ICD-10-CM

## 2020-11-05 DIAGNOSIS — H33.021: ICD-10-CM

## 2020-11-05 DIAGNOSIS — H35.3221: ICD-10-CM

## 2020-11-05 DIAGNOSIS — H01.001: ICD-10-CM

## 2020-11-05 DIAGNOSIS — H01.004: ICD-10-CM

## 2020-11-05 DIAGNOSIS — H01.002: ICD-10-CM

## 2020-11-05 DIAGNOSIS — H00.15: ICD-10-CM

## 2020-11-05 DIAGNOSIS — H01.005: ICD-10-CM

## 2020-11-05 PROCEDURE — 67028 INJECTION EYE DRUG: CPT | Performed by: OPHTHALMOLOGY

## 2020-11-05 PROCEDURE — 92014 COMPRE OPH EXAM EST PT 1/>: CPT | Performed by: OPHTHALMOLOGY

## 2020-11-05 PROCEDURE — 92134 CPTRZ OPH DX IMG PST SGM RTA: CPT | Performed by: OPHTHALMOLOGY

## 2020-11-05 ASSESSMENT — SPHEQUIV_DERIVED
OS_SPHEQUIV: -0.75
OD_SPHEQUIV: -1.375

## 2020-11-05 ASSESSMENT — LID EXAM ASSESSMENTS
OD_BLEPHARITIS: 1+
OS_TRICHIASIS: LUL T
OD_TRICHIASIS: RUL T

## 2020-11-05 ASSESSMENT — KERATOMETRY
OS_K1POWER_DIOPTERS: 40.00
OS_AXISANGLE_DEGREES: 157
OD_K2POWER_DIOPTERS: 41.75
OD_K1POWER_DIOPTERS: 41.50
OS_K2POWER_DIOPTERS: 42.50
OD_AXISANGLE_DEGREES: 169
METHOD_AUTO_MANUAL: MANUAL

## 2020-11-05 ASSESSMENT — AXIALLENGTH_DERIVED
OS_AL: 24.7662
OD_AL: 24.8833

## 2020-11-05 ASSESSMENT — REFRACTION_AUTOREFRACTION
OD_AXIS: 092
OS_CYLINDER: -2.00
OD_CYLINDER: -1.25
OD_SPHERE: -0.75
OS_SPHERE: +0.25
OS_AXIS: 04

## 2020-11-05 ASSESSMENT — LID POSITION - ENTROPION: OS_ENTROPION: LLL T

## 2020-11-05 ASSESSMENT — VISUAL ACUITY
OD_BCVA: 20/80-1
OS_BCVA: 20/200-1

## 2020-11-05 ASSESSMENT — LID POSITION - COMMENTS: OS_COMMENTS: EARLY

## 2020-12-01 ENCOUNTER — DOCTOR'S OFFICE (OUTPATIENT)
Dept: URBAN - NONMETROPOLITAN AREA CLINIC 1 | Facility: CLINIC | Age: 85
Setting detail: OPHTHALMOLOGY
End: 2020-12-01
Payer: COMMERCIAL

## 2020-12-01 ENCOUNTER — RX ONLY (RX ONLY)
Age: 85
End: 2020-12-01

## 2020-12-01 DIAGNOSIS — H35.3221: ICD-10-CM

## 2020-12-01 PROCEDURE — 67028 INJECTION EYE DRUG: CPT | Performed by: OPHTHALMOLOGY

## 2020-12-01 ASSESSMENT — REFRACTION_AUTOREFRACTION
OS_CYLINDER: -2.00
OD_AXIS: 092
OS_AXIS: 04
OD_SPHERE: -0.75
OD_CYLINDER: -1.25
OS_SPHERE: +0.25

## 2020-12-01 ASSESSMENT — KERATOMETRY
METHOD_AUTO_MANUAL: MANUAL
OD_K2POWER_DIOPTERS: 41.75
OD_AXISANGLE_DEGREES: 169
OS_AXISANGLE_DEGREES: 157
OS_K1POWER_DIOPTERS: 40.00
OD_K1POWER_DIOPTERS: 41.50
OS_K2POWER_DIOPTERS: 42.50

## 2020-12-01 ASSESSMENT — VISUAL ACUITY
OS_BCVA: 20/200-1
OD_BCVA: 20/200+1

## 2020-12-01 ASSESSMENT — AXIALLENGTH_DERIVED
OD_AL: 24.8833
OS_AL: 24.7662

## 2020-12-01 ASSESSMENT — SPHEQUIV_DERIVED
OS_SPHEQUIV: -0.75
OD_SPHEQUIV: -1.375

## 2020-12-08 ENCOUNTER — DOCTOR'S OFFICE (OUTPATIENT)
Dept: URBAN - NONMETROPOLITAN AREA CLINIC 1 | Facility: CLINIC | Age: 85
Setting detail: OPHTHALMOLOGY
End: 2020-12-08
Payer: COMMERCIAL

## 2020-12-08 DIAGNOSIS — H35.3211: ICD-10-CM

## 2020-12-08 DIAGNOSIS — H35.3221: ICD-10-CM

## 2020-12-08 PROCEDURE — 92134 CPTRZ OPH DX IMG PST SGM RTA: CPT | Performed by: OPHTHALMOLOGY

## 2020-12-08 PROCEDURE — 92014 COMPRE OPH EXAM EST PT 1/>: CPT | Performed by: OPHTHALMOLOGY

## 2020-12-08 PROCEDURE — 67028 INJECTION EYE DRUG: CPT | Performed by: OPHTHALMOLOGY

## 2020-12-08 ASSESSMENT — KERATOMETRY
OS_AXISANGLE_DEGREES: 174
OD_AXISANGLE_DEGREES: 107
OD_K2POWER_DIOPTERS: 43.75
METHOD_AUTO_MANUAL: MANUAL
OS_K2POWER_DIOPTERS: 42.25
OD_K1POWER_DIOPTERS: 42.50
OS_K1POWER_DIOPTERS: 40.50

## 2020-12-08 ASSESSMENT — LID EXAM ASSESSMENTS
OD_BLEPHARITIS: 1+
OS_TRICHIASIS: LUL T
OD_TRICHIASIS: RUL T

## 2020-12-08 ASSESSMENT — AXIALLENGTH_DERIVED
OS_AL: 24.7157
OD_AL: 24.2851

## 2020-12-08 ASSESSMENT — REFRACTION_AUTOREFRACTION
OD_CYLINDER: -1.25
OS_CYLINDER: -2.00
OS_AXIS: 04
OD_SPHERE: -0.75
OS_SPHERE: +0.25
OD_AXIS: 092

## 2020-12-08 ASSESSMENT — TONOMETRY
OD_IOP_MMHG: 10
OS_IOP_MMHG: 11

## 2020-12-08 ASSESSMENT — VISUAL ACUITY
OS_BCVA: 20/200-1
OD_BCVA: 20/150-1

## 2020-12-08 ASSESSMENT — SPHEQUIV_DERIVED
OS_SPHEQUIV: -0.75
OD_SPHEQUIV: -1.375

## 2020-12-08 ASSESSMENT — CONFRONTATIONAL VISUAL FIELD TEST (CVF)
OS_FINDINGS: FULL
OD_FINDINGS: FULL

## 2020-12-08 ASSESSMENT — LID POSITION - COMMENTS: OS_COMMENTS: EARLY

## 2020-12-08 ASSESSMENT — LID POSITION - ENTROPION: OS_ENTROPION: LLL T

## 2021-01-05 ENCOUNTER — APPOINTMENT (EMERGENCY)
Dept: RADIOLOGY | Facility: HOSPITAL | Age: 86
DRG: 536 | End: 2021-01-05
Payer: MEDICARE

## 2021-01-05 ENCOUNTER — APPOINTMENT (EMERGENCY)
Dept: CT IMAGING | Facility: HOSPITAL | Age: 86
DRG: 536 | End: 2021-01-05
Payer: MEDICARE

## 2021-01-05 ENCOUNTER — RX ONLY (RX ONLY)
Age: 86
End: 2021-01-05

## 2021-01-05 ENCOUNTER — HOSPITAL ENCOUNTER (INPATIENT)
Facility: HOSPITAL | Age: 86
LOS: 2 days | Discharge: HOME HEALTH | DRG: 536 | End: 2021-01-07
Attending: EMERGENCY MEDICINE | Admitting: FAMILY MEDICINE
Payer: MEDICARE

## 2021-01-05 ENCOUNTER — DOCTOR'S OFFICE (OUTPATIENT)
Dept: URBAN - NONMETROPOLITAN AREA CLINIC 1 | Facility: CLINIC | Age: 86
Setting detail: OPHTHALMOLOGY
End: 2021-01-05
Payer: COMMERCIAL

## 2021-01-05 DIAGNOSIS — S32.599A: ICD-10-CM

## 2021-01-05 DIAGNOSIS — G60.9 IDIOPATHIC PERIPHERAL NEUROPATHY: ICD-10-CM

## 2021-01-05 DIAGNOSIS — H35.3211: ICD-10-CM

## 2021-01-05 DIAGNOSIS — H35.3221: ICD-10-CM

## 2021-01-05 DIAGNOSIS — D64.9 ANEMIA: ICD-10-CM
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
## 2021-01-05 DIAGNOSIS — S32.512A: ICD-10-CM

## 2021-01-05 DIAGNOSIS — Z96.1: ICD-10-CM

## 2021-01-05 DIAGNOSIS — H04.123: ICD-10-CM

## 2021-01-05 DIAGNOSIS — S32.519A: ICD-10-CM

## 2021-01-05 LAB
ALBUMIN SERPL BCP-MCNC: 3.6 G/DL (ref 3.5–5)
ALP SERPL-CCNC: 82 U/L (ref 46–116)
ALT SERPL W P-5'-P-CCNC: 33 U/L (ref 12–78)
ANION GAP SERPL CALCULATED.3IONS-SCNC: 9 MMOL/L (ref 4–13)
APTT PPP: 38 SECONDS (ref 23–37)
AST SERPL W P-5'-P-CCNC: 20 U/L (ref 5–45)
BASOPHILS # BLD AUTO: 0.02 THOUSANDS/ΜL (ref 0–0.1)
BASOPHILS NFR BLD AUTO: 0 % (ref 0–1)
BILIRUB SERPL-MCNC: 0.6 MG/DL (ref 0.2–1)
BUN SERPL-MCNC: 23 MG/DL (ref 5–25)
CALCIUM SERPL-MCNC: 9.1 MG/DL (ref 8.3–10.1)
CHLORIDE SERPL-SCNC: 104 MMOL/L (ref 100–108)
CO2 SERPL-SCNC: 27 MMOL/L (ref 21–32)
CREAT SERPL-MCNC: 0.99 MG/DL (ref 0.6–1.3)
EOSINOPHIL # BLD AUTO: 0 THOUSAND/ΜL (ref 0–0.61)
EOSINOPHIL NFR BLD AUTO: 0 % (ref 0–6)
ERYTHROCYTE [DISTWIDTH] IN BLOOD BY AUTOMATED COUNT: 14.1 % (ref 11.6–15.1)
GFR SERPL CREATININE-BSD FRML MDRD: 70 ML/MIN/1.73SQ M
GLUCOSE SERPL-MCNC: 96 MG/DL (ref 65–140)
HCT VFR BLD AUTO: 43.2 % (ref 36.5–49.3)
HGB BLD-MCNC: 13.9 G/DL (ref 12–17)
IMM GRANULOCYTES # BLD AUTO: 0.11 THOUSAND/UL (ref 0–0.2)
IMM GRANULOCYTES NFR BLD AUTO: 1 % (ref 0–2)
INR PPP: 1.05 (ref 0.84–1.19)
LYMPHOCYTES # BLD AUTO: 1.17 THOUSANDS/ΜL (ref 0.6–4.47)
LYMPHOCYTES NFR BLD AUTO: 8 % (ref 14–44)
MCH RBC QN AUTO: 29.7 PG (ref 26.8–34.3)
MCHC RBC AUTO-ENTMCNC: 32.2 G/DL (ref 31.4–37.4)
MCV RBC AUTO: 92 FL (ref 82–98)
MONOCYTES # BLD AUTO: 1.33 THOUSAND/ΜL (ref 0.17–1.22)
MONOCYTES NFR BLD AUTO: 10 % (ref 4–12)
NEUTROPHILS # BLD AUTO: 11.43 THOUSANDS/ΜL (ref 1.85–7.62)
NEUTS SEG NFR BLD AUTO: 81 % (ref 43–75)
NRBC BLD AUTO-RTO: 0 /100 WBCS
PLATELET # BLD AUTO: 216 THOUSANDS/UL (ref 149–390)
PMV BLD AUTO: 10.4 FL (ref 8.9–12.7)
POTASSIUM SERPL-SCNC: 4.2 MMOL/L (ref 3.5–5.3)
PROT SERPL-MCNC: 7.4 G/DL (ref 6.4–8.2)
PROTHROMBIN TIME: 13.5 SECONDS (ref 11.6–14.5)
RBC # BLD AUTO: 4.68 MILLION/UL (ref 3.88–5.62)
SODIUM SERPL-SCNC: 140 MMOL/L (ref 136–145)
WBC # BLD AUTO: 14.06 THOUSAND/UL (ref 4.31–10.16)

## 2021-01-05 PROCEDURE — 73700 CT LOWER EXTREMITY W/O DYE: CPT

## 2021-01-05 PROCEDURE — 1123F ACP DISCUSS/DSCN MKR DOCD: CPT | Performed by: PHYSICIAN ASSISTANT

## 2021-01-05 PROCEDURE — 85730 THROMBOPLASTIN TIME PARTIAL: CPT | Performed by: PHYSICIAN ASSISTANT

## 2021-01-05 PROCEDURE — 67028 INJECTION EYE DRUG: CPT | Performed by: OPHTHALMOLOGY

## 2021-01-05 PROCEDURE — 72170 X-RAY EXAM OF PELVIS: CPT

## 2021-01-05 PROCEDURE — 85025 COMPLETE CBC W/AUTO DIFF WBC: CPT | Performed by: PHYSICIAN ASSISTANT

## 2021-01-05 PROCEDURE — 99223 1ST HOSP IP/OBS HIGH 75: CPT | Performed by: NURSE PRACTITIONER

## 2021-01-05 PROCEDURE — 92134 CPTRZ OPH DX IMG PST SGM RTA: CPT | Performed by: OPHTHALMOLOGY

## 2021-01-05 PROCEDURE — 73552 X-RAY EXAM OF FEMUR 2/>: CPT

## 2021-01-05 PROCEDURE — 99285 EMERGENCY DEPT VISIT HI MDM: CPT

## 2021-01-05 PROCEDURE — 99285 EMERGENCY DEPT VISIT HI MDM: CPT | Performed by: PHYSICIAN ASSISTANT

## 2021-01-05 PROCEDURE — 73080 X-RAY EXAM OF ELBOW: CPT

## 2021-01-05 PROCEDURE — 92014 COMPRE OPH EXAM EST PT 1/>: CPT | Performed by: OPHTHALMOLOGY

## 2021-01-05 PROCEDURE — 80053 COMPREHEN METABOLIC PANEL: CPT | Performed by: PHYSICIAN ASSISTANT

## 2021-01-05 PROCEDURE — 85610 PROTHROMBIN TIME: CPT | Performed by: PHYSICIAN ASSISTANT

## 2021-01-05 PROCEDURE — 36415 COLL VENOUS BLD VENIPUNCTURE: CPT | Performed by: PHYSICIAN ASSISTANT

## 2021-01-05 RX ORDER — PANTOPRAZOLE SODIUM 40 MG/1
40 TABLET, DELAYED RELEASE ORAL
Status: DISCONTINUED | OUTPATIENT
Start: 2021-01-06 | End: 2021-01-07 | Stop reason: HOSPADM

## 2021-01-05 RX ORDER — INOSITOL 500 MG
1 TABLET ORAL 2 TIMES DAILY
Status: DISCONTINUED | OUTPATIENT
Start: 2021-01-06 | End: 2021-01-06

## 2021-01-05 RX ORDER — ACETAMINOPHEN 325 MG/1
650 TABLET ORAL EVERY 6 HOURS PRN
Status: DISCONTINUED | OUTPATIENT
Start: 2021-01-05 | End: 2021-01-07 | Stop reason: HOSPADM

## 2021-01-05 RX ORDER — HEPARIN SODIUM 5000 [USP'U]/ML
5000 INJECTION, SOLUTION INTRAVENOUS; SUBCUTANEOUS EVERY 8 HOURS SCHEDULED
Status: DISCONTINUED | OUTPATIENT
Start: 2021-01-05 | End: 2021-01-07

## 2021-01-05 RX ORDER — ATORVASTATIN CALCIUM 10 MG/1
10 TABLET, FILM COATED ORAL DAILY
Status: DISCONTINUED | OUTPATIENT
Start: 2021-01-06 | End: 2021-01-07 | Stop reason: HOSPADM

## 2021-01-05 RX ORDER — PREDNISONE 10 MG/1
10 TABLET ORAL DAILY
Status: ON HOLD | COMMUNITY
Start: 2021-01-03 | End: 2021-01-07

## 2021-01-05 RX ORDER — LANOLIN ALCOHOL/MO/W.PET/CERES
3 CREAM (GRAM) TOPICAL ONCE
Status: COMPLETED | OUTPATIENT
Start: 2021-01-05 | End: 2021-01-05

## 2021-01-05 RX ORDER — NICOTINE 21 MG/24HR
1 PATCH, TRANSDERMAL 24 HOURS TRANSDERMAL DAILY
Status: DISCONTINUED | OUTPATIENT
Start: 2021-01-06 | End: 2021-01-07 | Stop reason: HOSPADM

## 2021-01-05 RX ADMIN — ACETAMINOPHEN 650 MG: 325 TABLET, FILM COATED ORAL at 23:26

## 2021-01-05 RX ADMIN — MELATONIN TAB 3 MG 3 MG: 3 TAB at 23:26

## 2021-01-05 RX ADMIN — METOPROLOL TARTRATE 25 MG: 25 TABLET, FILM COATED ORAL at 23:26

## 2021-01-05 RX ADMIN — HEPARIN SODIUM 5000 UNITS: 5000 INJECTION INTRAVENOUS; SUBCUTANEOUS at 23:33

## 2021-01-05 ASSESSMENT — CONFRONTATIONAL VISUAL FIELD TEST (CVF)
OS_FINDINGS: FULL
OD_FINDINGS: FULL

## 2021-01-05 ASSESSMENT — REFRACTION_AUTOREFRACTION
OD_SPHERE: -0.75
OS_CYLINDER: -2.00
OD_AXIS: 092
OD_CYLINDER: -1.25
OS_SPHERE: +0.25
OS_AXIS: 04

## 2021-01-05 ASSESSMENT — VISUAL ACUITY
OS_BCVA: 20/200-1
OD_BCVA: 20/150-1

## 2021-01-05 ASSESSMENT — SPHEQUIV_DERIVED
OS_SPHEQUIV: -0.75
OD_SPHEQUIV: -1.375

## 2021-01-05 ASSESSMENT — LID EXAM ASSESSMENTS
OD_BLEPHARITIS: 1+
OS_TRICHIASIS: LUL T
OD_TRICHIASIS: RUL T

## 2021-01-05 ASSESSMENT — KERATOMETRY
OS_AXISANGLE_DEGREES: 174
OS_K2POWER_DIOPTERS: 42.25
OS_K1POWER_DIOPTERS: 40.50
METHOD_AUTO_MANUAL: MANUAL
OD_AXISANGLE_DEGREES: 107
OD_K2POWER_DIOPTERS: 43.75
OD_K1POWER_DIOPTERS: 42.50

## 2021-01-05 ASSESSMENT — AXIALLENGTH_DERIVED
OD_AL: 24.2851
OS_AL: 24.7157

## 2021-01-05 ASSESSMENT — LID POSITION - ENTROPION: OS_ENTROPION: LLL T

## 2021-01-05 ASSESSMENT — LID POSITION - COMMENTS: OS_COMMENTS: EARLY

## 2021-01-06 LAB
ANION GAP SERPL CALCULATED.3IONS-SCNC: 5 MMOL/L (ref 4–13)
BUN SERPL-MCNC: 23 MG/DL (ref 5–25)
CALCIUM SERPL-MCNC: 8.4 MG/DL (ref 8.3–10.1)
CHLORIDE SERPL-SCNC: 110 MMOL/L (ref 100–108)
CO2 SERPL-SCNC: 26 MMOL/L (ref 21–32)
CREAT SERPL-MCNC: 0.92 MG/DL (ref 0.6–1.3)
ERYTHROCYTE [DISTWIDTH] IN BLOOD BY AUTOMATED COUNT: 14 % (ref 11.6–15.1)
GFR SERPL CREATININE-BSD FRML MDRD: 77 ML/MIN/1.73SQ M
GLUCOSE SERPL-MCNC: 108 MG/DL (ref 65–140)
HCT VFR BLD AUTO: 34.6 % (ref 36.5–49.3)
HGB BLD-MCNC: 11.1 G/DL (ref 12–17)
MAGNESIUM SERPL-MCNC: 2.2 MG/DL (ref 1.6–2.6)
MCH RBC QN AUTO: 29.2 PG (ref 26.8–34.3)
MCHC RBC AUTO-ENTMCNC: 32.1 G/DL (ref 31.4–37.4)
MCV RBC AUTO: 91 FL (ref 82–98)
PHOSPHATE SERPL-MCNC: 3.1 MG/DL (ref 2.3–4.1)
PLATELET # BLD AUTO: 185 THOUSANDS/UL (ref 149–390)
PMV BLD AUTO: 11 FL (ref 8.9–12.7)
POTASSIUM SERPL-SCNC: 4.3 MMOL/L (ref 3.5–5.3)
RBC # BLD AUTO: 3.8 MILLION/UL (ref 3.88–5.62)
SODIUM SERPL-SCNC: 141 MMOL/L (ref 136–145)
WBC # BLD AUTO: 12.86 THOUSAND/UL (ref 4.31–10.16)

## 2021-01-06 PROCEDURE — 84100 ASSAY OF PHOSPHORUS: CPT | Performed by: NURSE PRACTITIONER

## 2021-01-06 PROCEDURE — 85027 COMPLETE CBC AUTOMATED: CPT | Performed by: NURSE PRACTITIONER

## 2021-01-06 PROCEDURE — 99233 SBSQ HOSP IP/OBS HIGH 50: CPT | Performed by: INTERNAL MEDICINE

## 2021-01-06 PROCEDURE — 97167 OT EVAL HIGH COMPLEX 60 MIN: CPT

## 2021-01-06 PROCEDURE — 97163 PT EVAL HIGH COMPLEX 45 MIN: CPT

## 2021-01-06 PROCEDURE — 99222 1ST HOSP IP/OBS MODERATE 55: CPT | Performed by: ORTHOPAEDIC SURGERY

## 2021-01-06 PROCEDURE — 83735 ASSAY OF MAGNESIUM: CPT | Performed by: NURSE PRACTITIONER

## 2021-01-06 PROCEDURE — 97116 GAIT TRAINING THERAPY: CPT

## 2021-01-06 PROCEDURE — 97535 SELF CARE MNGMENT TRAINING: CPT

## 2021-01-06 PROCEDURE — 80048 BASIC METABOLIC PNL TOTAL CA: CPT | Performed by: NURSE PRACTITIONER

## 2021-01-06 RX ORDER — OXYCODONE HYDROCHLORIDE 10 MG/1
10 TABLET ORAL EVERY 6 HOURS PRN
Status: DISCONTINUED | OUTPATIENT
Start: 2021-01-06 | End: 2021-01-06

## 2021-01-06 RX ORDER — MAGNESIUM HYDROXIDE/ALUMINUM HYDROXICE/SIMETHICONE 120; 1200; 1200 MG/30ML; MG/30ML; MG/30ML
30 SUSPENSION ORAL EVERY 4 HOURS PRN
Status: DISCONTINUED | OUTPATIENT
Start: 2021-01-06 | End: 2021-01-07 | Stop reason: HOSPADM

## 2021-01-06 RX ORDER — OXYCODONE HYDROCHLORIDE 5 MG/1
5 TABLET ORAL EVERY 6 HOURS PRN
Status: DISCONTINUED | OUTPATIENT
Start: 2021-01-06 | End: 2021-01-06

## 2021-01-06 RX ORDER — ONDANSETRON 2 MG/ML
4 INJECTION INTRAMUSCULAR; INTRAVENOUS EVERY 4 HOURS PRN
Status: DISCONTINUED | OUTPATIENT
Start: 2021-01-06 | End: 2021-01-07 | Stop reason: HOSPADM

## 2021-01-06 RX ORDER — FAMOTIDINE 20 MG/1
40 TABLET, FILM COATED ORAL
Status: DISCONTINUED | OUTPATIENT
Start: 2021-01-06 | End: 2021-01-07 | Stop reason: HOSPADM

## 2021-01-06 RX ORDER — OXYCODONE HYDROCHLORIDE 5 MG/1
5 TABLET ORAL EVERY 4 HOURS PRN
Status: DISCONTINUED | OUTPATIENT
Start: 2021-01-06 | End: 2021-01-07 | Stop reason: HOSPADM

## 2021-01-06 RX ORDER — GABAPENTIN 300 MG/1
600 CAPSULE ORAL 2 TIMES DAILY
Status: DISCONTINUED | OUTPATIENT
Start: 2021-01-06 | End: 2021-01-07 | Stop reason: HOSPADM

## 2021-01-06 RX ADMIN — METOPROLOL TARTRATE 25 MG: 25 TABLET, FILM COATED ORAL at 21:20

## 2021-01-06 RX ADMIN — HEPARIN SODIUM 5000 UNITS: 5000 INJECTION INTRAVENOUS; SUBCUTANEOUS at 05:45

## 2021-01-06 RX ADMIN — GABAPENTIN 600 MG: 300 CAPSULE ORAL at 17:09

## 2021-01-06 RX ADMIN — ATORVASTATIN CALCIUM 10 MG: 10 TABLET, FILM COATED ORAL at 08:22

## 2021-01-06 RX ADMIN — HEPARIN SODIUM 5000 UNITS: 5000 INJECTION INTRAVENOUS; SUBCUTANEOUS at 21:20

## 2021-01-06 RX ADMIN — NICOTINE 1 PATCH: 21 PATCH, EXTENDED RELEASE TRANSDERMAL at 21:25

## 2021-01-06 RX ADMIN — HEPARIN SODIUM 5000 UNITS: 5000 INJECTION INTRAVENOUS; SUBCUTANEOUS at 13:27

## 2021-01-06 RX ADMIN — PANTOPRAZOLE SODIUM 40 MG: 40 TABLET, DELAYED RELEASE ORAL at 05:45

## 2021-01-06 RX ADMIN — FAMOTIDINE 40 MG: 20 TABLET, FILM COATED ORAL at 21:20

## 2021-01-06 RX ADMIN — NICOTINE 1 PATCH: 21 PATCH, EXTENDED RELEASE TRANSDERMAL at 00:24

## 2021-01-06 RX ADMIN — ACETAMINOPHEN 650 MG: 325 TABLET, FILM COATED ORAL at 17:10

## 2021-01-06 NOTE — PROGRESS NOTES
Progress Note - Romy Craig 1935, 82 y o  male MRN: [id number]    Unit/Bed#: 233-48 Encounter: [id number]    Primary Care Provider: Julia Mendez DO   Date and time admitted to hospital: 1/5/2021  7:45 PM        * Closed fracture of superior ramus of left pubis [facility])  Assessment & Plan  Patient is status post mechanical fall, with resultant imaging showing comminuted and mildly displaced fractures of the left superior and inferior pubic rami  Patient also with extraperitoneal hematoma in the anterior pelvic wall  Officially evaluated by Orthopedics, with recommendations for weight-bearing as tolerated without restrictions, as well as pain control  For repeat evaluation by PT/OT in the morning, and determination of need for either home with [facility] versus SNF placement for short-term rehab  Fall  Assessment & Plan  Patient with mechanical fall at home  He has self-reported imbalance chronically, with multiple falls in the past   Given current fall with fracture, need careful evaluation by physical and occupational therapy prior to termination of final disposition  High blood pressure  Assessment & Plan  Blood pressure stable  Continue beta-blocker therapy  Gastroesophageal reflux disease without esophagitis  Assessment & Plan  Continue PPI therapy  VTE Pharmacologic Prophylaxis:   Pharmacologic: Heparin  Mechanical VTE Prophylaxis in Place: Yes    Patient Centered Rounds: I have performed bedside rounds with nursing staff today  Discussions with Specialists or Other Care Team Provider: Ortho    Education and Discussions with Family / Patient: Yes    Time Spent for Care: 45 minutes  More than 50% of total time spent on counseling and coordination of care as described above      Current Length of Stay: 1 day(s)    Current Patient Status: Inpatient   Certification Statement: The patient will continue to require additional inpatient hospital stay due to Further evaluation given history of imbalance and falls, current pubic fracture  Discharge Plan:  Likely discharge   Code Status: Level 1 - Full Code      Subjective:   Patient seen and examined - reports a mechanical fall with tripping as cause for his of current fall  He also admits to be a chronic imbalance and multiple falls in the past   No overnight events reported  Remains afebrile  Objective:     Vitals:   Temp (24hrs), Av 3 °F (36 8 °C), Min:98 °F (36 7 °C), Max:98 5 °F (36 9 °C)    Temp:  [98 °F (36 7 °C)-98 5 °F (36 9 °C)] 98 4 °F (36 9 °C)  HR:  [57-84] 67  Resp:  [18] 18  BP: ()/(61-73) 136/72  SpO2:  [95 %-98 %] 95 %  Body mass index is 23 49 kg/m²  Input and Output Summary (last 24 hours): Intake/Output Summary (Last 24 hours) at 2021 1557  Last data filed at 2021 1300  Gross per 24 hour   Intake 240 ml   Output 575 ml   Net -335 ml       Physical Exam:     Physical Exam  Vitals signs and nursing note reviewed  Constitutional:       Appearance: Normal appearance  He is well-developed and normal weight  HENT:      Head: Normocephalic and atraumatic  Eyes:      Conjunctiva/sclera: Conjunctivae normal    Neck:      Musculoskeletal: Neck supple  Cardiovascular:      Rate and Rhythm: Normal rate and regular rhythm  Heart sounds: No murmur  No gallop  Pulmonary:      Effort: Pulmonary effort is normal  No respiratory distress  Breath sounds: Normal breath sounds  No wheezing or rhonchi  Abdominal:      Palpations: Abdomen is soft  Tenderness: There is no abdominal tenderness  There is no guarding or rebound  Musculoskeletal:         General: No swelling or tenderness  Skin:     General: Skin is warm and dry  Neurological:      General: No focal deficit present  Mental Status: He is alert and oriented to person, place, and time  Psychiatric:         Mood and Affect: Mood normal          Behavior: Behavior normal          Thought Content:  Thought content normal          Judgment: Judgment normal        Additional Data:     Labs:    Results from last 7 days   Lab Units 01/06/21  0449 01/05/21 1959   WBC Thousand/uL 12 86* 14 06*   HEMOGLOBIN g/dL 11 1* 13 9   HEMATOCRIT % 34 6* 43 2   PLATELETS Thousands/uL 185 216   NEUTROS PCT %  --  81*   LYMPHS PCT %  --  8*   MONOS PCT %  --  10   EOS PCT %  --  0     Results from last 7 days   Lab Units 01/06/21 0449 01/05/21 1959   SODIUM mmol/L 141 140   POTASSIUM mmol/L 4 3 4 2   CHLORIDE mmol/L 110* 104   CO2 mmol/L 26 27   BUN mg/dL 23 23   CREATININE mg/dL 0 92 0 99   ANION GAP mmol/L 5 9   CALCIUM mg/dL 8 4 9 1   ALBUMIN g/dL  --  3 6   TOTAL BILIRUBIN mg/dL  --  0 60   ALK PHOS U/L  --  82   ALT U/L  --  33   AST U/L  --  20   GLUCOSE RANDOM mg/dL 108 96     Results from last 7 days   Lab Units 01/05/21 1959   INR  1 05                       * I Have Reviewed All Lab Data Listed Above  * Additional Pertinent Lab Tests Reviewed:  Tia [facility] Admission Reviewed    Imaging:    Imaging Reports Reviewed Today Include: Xray Pelvis, elbow, femur, CT LLE     Recent Cultures (last 7 days):           Last 24 Hours Medication List:   Current Facility-Administered Medications   Medication Dose Route Frequency Provider Last Rate    acetaminophen  650 mg Oral Q6H PRN SUKI Olvera      aluminum-magnesium hydroxide-simethicone  30 mL Oral Q4H PRN Gillian Benjamin MD      atorvastatin  10 mg Oral Daily NoySUKI Gastelum      famotidine  40 mg Oral HS Gillian Benjamin MD      gabapentin  600 mg Oral BID Gillian Benjamin MD      heparin (porcine)  5,000 Units Subcutaneous [facility] SUKI Olvera      metoprolol tartrate  25 mg Oral Q12H [address] SUKI [place]      nicotine  1 patch Transdermal Daily SUKI Olvera      ondansetron  4 mg Intravenous Q4H PRN Gillian Benjamin MD      oxyCODONE  5 mg Oral Q4H PRN Gillian Benjamin MD      pantoprazole  40 mg Oral Early SUKI Aguirre          Today, Patient Was Seen By: Chuck Chiu MD    ** Please Note: Dictation voice to text software may have been used in the creation of this document   **

## 2021-01-06 NOTE — CASE MANAGEMENT
During inner disciplinary discharge planning meeting PT is recommending that patient go to STR  Pt is refusing to go to STR  Pt is agreeable to me talking to His Daughter Mariaa Rivera  I called Alfredo Parsons and notified her that PT is recommending STR but patient is refusing  Alfredo Parsons Is aware and agreeable to Patient not going to STR  I offered to arrange to help get a hospital bed to set up on first floor if that will help her  Alfredo Parsons said that he does not need a hospital bed   Once they get patient into the house and on second floor he will probably stay up there for awhile  She is agreeable to having home care PT and OT   A post acute care recommendation was made by your care team for [facility]  Discussed Freedom of Choice with POA  List of agencies given to patient via telephone  POA aware the list is custom filtered for them by preference  and that [facility]'s post acute providers are designated  Referral made to [facility] as requested

## 2021-01-06 NOTE — ED PROVIDER NOTES
History  Chief Complaint   Patient presents with    Fall     Loss balance in home and fell over  C/O l-hip/pelvic pain     Patient presents to the emergency department today via [facility]  He is alert orient x4 provides his own history stating earlier this evening he lost his balance which she typically does on a somewhat regular basis, he sustained a fall landing on the left hip  He denies striking his head neck or back  Denies head pain neck pain or back pain  He denies any chest or abdominal pain  He admits to a minor amount of left elbow pain as well as primary pain in the left hip region  He states he is unable to weightbear but does have some good range of motion noted on physical examination  Does not take blood thinners  Denies any other injuries associated with this incident  He does have a 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 Taking?    ALPRAZolam (XANAX) 0 25 mg tablet Not Taking at Unknown time Self No No   Sig: Take 1 or 2 tablets 1 hour before MRI do not drive or operate a motor vehicle while taking this medication   Patient not taking: Reported on 1/5/2021   B COMPLEX VITAMINS PO 1/5/2021 at Unknown time Self Yes Yes   Sig: Take 1 tablet by mouth daily   Cholecalciferol (VITAMIN D3) 2000 UNITS capsule 1/5/2021 at Unknown time Self Yes Yes   Sig: Take 1 capsule by mouth 2 (two) times a day 5000 units    Coenzyme Q10 (CO Q10) 60 MG CAPS 1/5/2021 at Unknown time Self Yes Yes   Sig: Take 1 capsule by mouth daily   Ginkgo Biloba 120 MG TABS 1/5/2021 at Unknown time Self Yes Yes   Sig: Take 1 tablet by mouth daily   Inositol 500 MG TABS  Self Yes No   Sig: Take 1 tablet by mouth 2 (two) times a day   MILK THISTLE PO 1/5/2021 at Unknown time Self Yes Yes   Sig: Take 1 tablet by mouth daily   Misc Natural Products (SUPER SNOOZE) CAPS  Self Yes No   Sig: Take 1 capsule by mouth daily Bed time    Multiple Vitamins-Minerals (OCUVITE-LUTEIN) CAPS 1/5/2021 at Unknown time Self Yes Yes   Sig: Take by mouth daily   atorvastatin (LIPITOR) 10 mg tablet 1/4/2021 at Unknown time  No Yes   Sig: TAKE 1 TABLET BY MOUTH  DAILY   cetirizine (ZyrTEC) 10 mg tablet 1/5/2021 at Unknown time  No Yes   Sig: TAKE ONE TABLET BY MOUTH DAILY   famotidine (PEPCID) 40 MG tablet Not Taking at Unknown time Self No No   Sig: Take 1 tablet (40 mg total) by mouth daily at bedtime   Patient not taking: Reported on 1/5/2021   gabapentin (NEURONTIN) 600 MG tablet 1/5/2021 at Unknown time  No Yes   Sig: TAKE 1 TABLET BY MOUTH  TWICE DAILY   lutein 6 mg  Self Yes No   Sig: Take 1 capsule by mouth daily   metoprolol tartrate (LOPRESSOR) 25 mg tablet 1/5/2021 at Unknown time  No Yes   Sig: TAKE 1 TABLET BY MOUTH  EVERY 12 HOURS   omeprazole (PriLOSEC) 40 MG capsule 1/5/2021 at Unknown time  No Yes   Sig: TAKE 1 CAPSULE BY MOUTH  TWICE DAILY   sildenafil (VIAGRA) 50 MG tablet Past Week at Unknown time Self Yes Yes   Sig: Viagra 50 mg tablet   TAKE 1 TABLET BY MOUTH UP TO 4 HOURS BEFORE INTERCOURSE AND MAX OF 1 TABLET IN 24 HOURS   tadalafil (CIALIS) 20 MG tablet Not Taking at Unknown time  No No   Sig: Take 1 tablet (20 mg total) by mouth daily as needed for erectile dysfunction   Patient not taking: Reported on 1/5/2021      Facility-Administered Medications: None       Past Medical History:   Diagnosis Date    Anemia     Appendicitis     Coronary artery disease     Detached retina     GERD (gastroesophageal reflux disease)     Hyperlipidemia     Hypertension     Macular degeneration     Neuropathy     Von Willebrand disease ([facility] [address] )        Past Surgical History:   Procedure Laterality Date    ADENOIDECTOMY      APPENDECTOMY      ARTHRODESIS  01/15/2014    Lumbar     BACK SURGERY      CATARACT EXTRACTION      COLONOSCOPY  12/09/2016    fiberoptic     CYSTOSCOPY with resection of tumor / 1/12/17, 10/2/17 / diagnostic 12/8/16, 5/30/17     CYSTOSCOPY  09/20/2018    CYSTOSCOPY  06/22/2020    EGD AND COLONOSCOPY N/A 12/9/2016    Procedure: EGD AND COLONOSCOPY;  Surgeon: Delmis Chicas MD;  [facility];  Service:    [employer] EYE SURGERY      FRACTURE SURGERY Bilateral     ARM    NECK SURGERY      [place] / [place] [facility] [place]    NV COLONOSCOPY FLX DX W/COLLJ SPEC WHEN PFRMD N/A 2/5/2019    Procedure: COLONOSCOPY;  Surgeon: Ray Dumont MD;  [facility];  Service: Gastroenterology    NV CYSTOURETHROSCOPY,FULGUR <0 5 CM LESN N/A 1/12/2017    Procedure: CYSTOSCOPY, BLADDER BIOPSY WITH FULGRATION, 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;  Surgeon: Senait Bhatti MD;  [facility];  Service: Urology    NV CYSTOURETHROSCOPY,FULGUR <0 5 CM LESN N/A 10/2/2017    Procedure: CYSTOSCOPY WITH  BLADDER BIOPSIES WITH FULGURATION;  Surgeon: Senait Bhatti MD;  [facility];  Service: Urology    NV ESOPHAGOGASTRODUODENOSCOPY TRANSORAL DIAGNOSTIC N/A 8/3/2018    Procedure: ESOPHAGOGASTRODUODENOSCOPY (EGD); Surgeon: Ray Dumont MD;  [facility];  Service: Gastroenterology    NV INSTILL ANTICANCER AGENT IN BLADDER N/A 1/12/2017    Procedure: Aleyda Mendez;  Surgeon: Senait Bhatti MD;  [facility];  Service: Urology    NV INSTILL ANTICANCER AGENT IN BLADDER N/A 10/2/2017    Procedure: Aleyda Mendez;  Surgeon: Senait Bhatti MD;  [facility];  Service: Urology    RETINAL DETACHMENT SURGERY Right 03/2016    complete air fill confirmed    [address] SURGERY  03/03/2015    proximal humerus fracture / [place]    3/6/15   R    1/3/18     TONSILECTOMY AND ADNOIDECTOMY      TONSILLECTOMY      TRANSURETHRAL RESECTION OF BLADDER TUMOR N/A 10/2/2017    Procedure: TRANSURETHRAL RESECTION OF BLADDER TUMOR (TURBT);   Surgeon: Funmi Schaffer MD;  [facility];  Service: Urology    [address] (HISTORICAL)         Family History   Problem Relation Age of Onset    Ulcers Father         acute gastric    Heart disease Mother    Anthony Hammondysm Sister      I have reviewed and agree with the history as documented  E-Cigarette/Vaping    E-Cigarette Use Never User      E-Cigarette/Vaping Substances     Social History     Tobacco Use    Smoking status: Current Every Day Smoker     Packs/day: 0 25     Years: 60 00     Pack years: 15 00     Types: Cigarettes    Smokeless tobacco: Never Used    Tobacco comment: smokes a pipe -resolved    Substance Use Topics    Alcohol use: Yes     Alcohol/week: 2 0 standard drinks     Types: 1 Glasses of wine, 1 Shots of liquor per week     Frequency: 4 or more times a week     Drinks per session: 1 or 2     Comment: DAILY     Drug use: No       Review of Systems   Constitutional: Negative  Negative for activity change, appetite change, chills, diaphoresis, fatigue, fever and unexpected weight change  HENT: Negative  Negative for sore throat, trouble swallowing and voice change  Eyes: Negative  Respiratory: Negative  Negative for cough, chest tightness, shortness of breath and wheezing  Cardiovascular: Negative  Negative for chest pain, palpitations and leg swelling  Gastrointestinal: Negative  Negative for abdominal pain, blood in stool, nausea and vomiting  Endocrine: Negative  Genitourinary: Negative  Negative for flank pain and hematuria  Musculoskeletal: Negative  Negative for arthralgias, back pain, gait problem, joint swelling, myalgias, neck pain and neck stiffness  Left hip pain, left elbow pain   Skin: Negative  Negative for rash and wound  Allergic/Immunologic: Negative  Neurological: Negative  Negative for dizziness, seizures, syncope, weakness, light-headedness and headaches  Hematological: Negative      Psychiatric/Behavioral: Negative  All other systems reviewed and are negative  Physical Exam  Patient's airway is patent clear  The breathing examination reveals lungs are clear to auscultation bilaterally  No respiratory distress  Circulation reveals 4/4 pedal pulses as well as radial pulses  There is no evidence of disability patient was fully exposed for the examination  Physical Exam  Vitals signs reviewed  Constitutional:       General: He is not in acute distress  Appearance: He is well-developed  He is not ill-appearing, toxic-appearing or diaphoretic  HENT:      Head: Normocephalic  Comments: Negative intraoral trauma  Negative hemotympanum bilaterally  Negative septal hematoma     Right Ear: Tympanic membrane and external ear normal  No swelling  Tympanic membrane is not bulging  Left Ear: Tympanic membrane and external ear normal  No swelling  Tympanic membrane is not bulging  Nose: Nose normal       Mouth/Throat:      Pharynx: No oropharyngeal exudate  Eyes:      General: Lids are normal       Conjunctiva/sclera: Conjunctivae normal       Pupils: Pupils are equal, round, and reactive to light  Neck:      Musculoskeletal: Normal range of motion and neck supple  Normal range of motion  No edema  Thyroid: No thyromegaly  Vascular: No JVD  Trachea: No tracheal deviation  Cardiovascular:      Rate and Rhythm: Normal rate and regular rhythm  Pulses: Normal pulses  Heart sounds: Normal heart sounds  No murmur  No friction rub  No gallop  Pulmonary:      Effort: Pulmonary effort is normal  No respiratory distress  Breath sounds: Normal breath sounds  No stridor  No wheezing or rales  Chest:      Chest wall: No tenderness  Abdominal:      General: Bowel sounds are normal  There is no distension  Palpations: Abdomen is soft  There is no mass  Tenderness: There is no abdominal tenderness  There is no guarding or rebound   Negative signs include Judge's sign  Hernia: No hernia is present  Musculoskeletal: Normal range of motion  General: No tenderness  Comments: Tenderness minimal left elbow normal neurovascular motor exams distally  Tenderness left hip  Patient does have normal pedal pulse, there is no shortening or angulation  He is able to flex and extend the hip  Lymphadenopathy:      Cervical: No cervical adenopathy  Skin:     General: Skin is warm and dry  Capillary Refill: Capillary refill takes less than 2 seconds  Coloration: Skin is not pale  Findings: No erythema or rash  Neurological:      General: No focal deficit present  Mental Status: He is alert and oriented to person, place, and time  GCS: GCS eye subscore is 4  GCS verbal subscore is 5  GCS motor subscore is 6  Cranial Nerves: No cranial nerve deficit  Sensory: No sensory deficit  Deep Tendon Reflexes: Reflexes are normal and symmetric     Psychiatric:         Mood and Affect: Mood normal          Speech: Speech normal          Behavior: Behavior normal          Vital Signs  ED Triage Vitals [01/05/21 1951]   Temperature Pulse Respirations Blood Pressure SpO2   98 5 °F (36 9 °C) 57 18 159/66 98 %      Temp src Heart Rate Source Patient Position - Orthostatic VS BP Location FiO2 (%)   -- Monitor Lying Left arm --      Pain Score       2           Vitals:    01/05/21 1951   BP: 159/66   Pulse: 57   Patient Position - Orthostatic VS: Lying         Visual Acuity      ED Medications  Medications - No data to display    Diagnostic Studies  Results Reviewed     Procedure Component Value Units Date/Time    Comprehensive metabolic panel [[id number]] Collected: 01/05/21 1959    Lab Status: Final result Specimen: Blood from Arm, Right Updated: 01/05/21 2019     Sodium 140 mmol/L      Potassium 4 2 mmol/L      Chloride 104 mmol/L      CO2 27 mmol/L      ANION GAP 9 mmol/L      BUN 23 mg/dL      Creatinine 0 99 mg/dL      Glucose 96 mg/dL      Calcium 9 1 mg/dL      AST 20 U/L      ALT 33 U/L      Alkaline Phosphatase 82 U/L      Total Protein 7 4 g/dL      Albumin 3 6 g/dL      Total Bilirubin 0 60 mg/dL      eGFR 70 ml/min/1 73sq m     Narrative:      [place] guidelines for Chronic Kidney Disease (CKD):     Stage 1 with normal or high GFR (GFR > 90 mL/min/1 73 square meters)    Stage 2 Mild CKD (GFR = 60-89 mL/min/1 73 square meters)    Stage 3A Moderate CKD (GFR = 45-59 mL/min/1 73 square meters)    Stage 3B Moderate CKD (GFR = 30-44 mL/min/1 73 square meters)    Stage 4 Severe CKD (GFR = 15-29 mL/min/1 73 square meters)    Stage 5 End Stage CKD (GFR <15 mL/min/1 73 square meters)  Note: GFR calculation is accurate only with a steady state creatinine    Protime-INR [[id number]]  (Normal) Collected: 01/05/21 1959    Lab Status: Final result Specimen: Blood from Arm, Right Updated: 01/05/21 2018     Protime 13 5 seconds      INR 1 05    APTT [[id number]]  (Abnormal) Collected: 01/05/21 1959    Lab Status: Final result Specimen: Blood from Arm, Right Updated: 01/05/21 2018     PTT 38 seconds     CBC and differential [[id number]]  (Abnormal) Collected: 01/05/21 1959    Lab Status: Final result Specimen: Blood from Arm, Right Updated: 01/05/21 2005     WBC 14 06 Thousand/uL      RBC 4 68 Million/uL      Hemoglobin 13 9 g/dL      Hematocrit 43 2 %      MCV 92 fL      MCH 29 7 pg      MCHC 32 2 g/dL      RDW 14 1 %      MPV 10 4 fL      Platelets 215 Thousands/uL      nRBC 0 /100 WBCs      Neutrophils Relative 81 %      Immat GRANS % 1 %      Lymphocytes Relative 8 %      Monocytes Relative 10 %      Eosinophils Relative 0 %      Basophils Relative 0 %      Neutrophils Absolute 11 43 Thousands/µL      Immature Grans Absolute 0 11 Thousand/uL      Lymphocytes Absolute 1 17 Thousands/µL      Monocytes Absolute 1 33 Thousand/µL      Eosinophils Absolute 0 00 Thousand/µL      Basophils Absolute 0 02 Thousands/µL CT lower extremity wo contrast left   Final Result by Keith Monroe MD (01/05 2045)         1  Comminuted and mildly displaced fractures of the left superior and inferior pubic rami  2   Small to moderate extraperitoneal hematoma in the anterior pelvic wall extending into the space of Retzius  3   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, similar to the prior MRI examination  No subchondral collapse  Workstation performed: [id number]         XR femur 2 views LEFT   ED Interpretation by Josette Canales PA-C (01/05 2029)   I do not see any evidence of acute femur fracture  Evidence inferior ramus fracture is noted on this plain film will further evaluate on CT  XR elbow 3+ vw LEFT   ED Interpretation by Josette Canales PA-C (01/05 2029)   Extensive chronic deformity however I do not note evidence of acute osseous abnormality  XR pelvis ap only 1 or 2 vw    (Results Pending)              Procedures  Procedures         ED Course  ED Course as of Jan 05 2121   Tue Jan 05, 2021 2005 Blood Pressure: 159/66   2005 Temperature: 98 5 °F (36 9 °C)   2005 Pulse: 57   2005 Respirations: 18   2005 SpO2: 98 %   2027 WBC(!): 14 06   2027 Hemoglobin: 13 9   2027 Platelet Count: 946   2027 INR: 1 05   2027 PTT(!): 38   2027 Sodium: 140   2027 TOTAL BILIRUBIN: 0 60   2027 eGFR: 70   2048 IMPRESSION:        1  Comminuted and mildly displaced fractures of the left superior and inferior pubic rami      2   Small to moderate extraperitoneal hematoma in the anterior pelvic wall extending into the space of Retzius      3  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, similar to the prior MRI examination  No subchondral collapse          2102 I did consult on-call orthopedics Dr Jack Zimmerman, who recommends admit to Medicine  Likely non operative treatment    He requested pelvis x-ray which I will complete  TT sent to medicine                                              MDM    Disposition  Final diagnoses:   Fall, initial encounter   Closed fracture of superior pubic ramus ([address] )   Closed fracture of inferior pubic ramus ([address] )     Time reflects when diagnosis was documented in both MDM as applicable and the Disposition within this note     Time User Action Codes Description Comment    1/5/2021  8:51 PM Bertram Wendi Add [[id number]] Fall, initial encounter     1/5/2021  8:52 PM Bertram Wendi Add [[id number]] Closed fracture of superior pubic ramus ([facility] [address] )     1/5/2021  8:52 PM Bertram Wendi Add [[id number]] Closed fracture of inferior pubic ramus [facility])       ED Disposition     ED Disposition Condition Date/Time Comment    Admit Stable Tue Jan 5, 2021  9:18 PM Case was discussed with [address] [place] [address] and the patient's admission status was agreed to be Admission Status: inpatient status to the service of Dr Celene Kayser  Follow-up Information    None         Patient's Medications   Discharge Prescriptions    No medications on file     No discharge procedures on file      PDMP Review       Value Time User    PDMP Reviewed  Yes 1/3/2020  6:17 AM Doug Beard DO          ED Provider  Electronically Signed by           Val Londono PA-C  01/05/21 2121

## 2021-01-06 NOTE — H&P
H&P- Deirdre Li 1935, 82 y o  male MRN: [id number]    Unit/Bed#: [facility] Encounter: [id number]    Primary Care Provider: Jake Duvall DO   Date and time admitted to hospital: 1/5/2021  7:45 PM        Closed fracture of inferior pubic ramus [facility])  Assessment & Plan  Ct Lower Extremity Wo Contrast Left- 1/5/2021  Impression: 1  Comminuted and mildly displaced fractures of the left superior and inferior pubic rami  2   Small to moderate extraperitoneal hematoma in the anterior pelvic wall extending into the space of Retzius  3   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, similar to the prior MRI examination  PT and OT consulted  Ortho consulted  Non weight bearing until seen by Ortho  Provide analgesia as needed      * Closed fracture of superior ramus of left pubis [facility])  Assessment & Plan  Ct Lower Extremity Wo Contrast Left- 1/5/2021  Impression: 1  Comminuted and mildly displaced fractures of the left superior and inferior pubic rami  2   Small to moderate extraperitoneal hematoma in the anterior pelvic wall extending into the space of Retzius  3   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, similar to the prior MRI examination        PT and OT consulted  Ortho consulted  Non weight bearing until seen by Ortho  Provide analgesia as needed        Fall  Assessment & Plan  Mechanical fall     Please see treatment plan for pubis fracture plan     Gastroesophageal reflux disease without esophagitis  Assessment & Plan  Continue PTA medication     Hypothyroidism  Assessment & Plan  Continue PTA medication     High blood pressure  Assessment & Plan  Blood pressure currently stable   Admit to med surge  Monitor per protocol  Continue PTA medication       VTE Prophylaxis: Heparin  / sequential compression device   Code Status: FULL  POLST: POLST is not applicable to this patient    Anticipated Length of Stay:  Patient will be admitted on an Inpatient basis with an anticipated length of stay of  Greater than 2 midnights  Justification for Hospital Stay: fall resulting in pubic fracture    Total Time for Visit, including Counseling / Coordination of Care: 45 minutes  Greater than 50% of this total time spent on direct patient counseling and coordination of care  Chief Complaint:  S/p  Fall resulting in pubic fracture    History of Present Illness:    Cathleen Walker is a 82 y o  male  with a past medical history including von Willebrand's disease, neuropathy, macular degeneration, hypertension, hyperlipidemia, GERD, coronary artery disease presented to the emergency room status post mechanical fall  Patient reports an unsteady gait that is in chronic in nature  Patient had mechanical fall immediately felt left hip pain  Patient denies lightheadedness dizziness fevers chills vision changes denies abdominal pain nausea vomiting diarrhea denies dysuria urgency or frequency denies melena or hematochezia  Patient does admit to chronic gait disturbances which sure increased now  Patient is unable to bear weight  Patient will be [facility] to [facility] with Ob consulted PT and OT will be consulted    Review of Systems:    Review of Systems   Musculoskeletal: Positive for arthralgias ( left hip pain) and gait problem (States chronic)  All other systems reviewed and are negative        Past Medical and Surgical History:     Past Medical History:   Diagnosis Date    Anemia     Appendicitis     Coronary artery disease     Detached retina     GERD (gastroesophageal reflux disease)     Hyperlipidemia     Hypertension     Macular degeneration     Neuropathy     Von Willebrand disease ([facility] [address] )        Past Surgical History:   Procedure Laterality Date    ADENOIDECTOMY      APPENDECTOMY      ARTHRODESIS  01/15/2014    Lumbar     BACK SURGERY      CATARACT EXTRACTION  COLONOSCOPY  12/09/2016    fiberoptic     CYSTOSCOPY      with resection of tumor / 1/12/17, 10/2/17 / diagnostic 12/8/16, 5/30/17     CYSTOSCOPY  09/20/2018    CYSTOSCOPY  06/22/2020    EGD AND COLONOSCOPY N/A 12/9/2016    Procedure: EGD AND COLONOSCOPY;  Surgeon: Rajni De La Paz MD;  Location: [facility];  Service:    [facility] EYE SURGERY      FRACTURE SURGERY Bilateral     ARM    NECK SURGERY      NEUROPLASTY / TRANSPOSITION MEDIAN NERVE AT [facility] Right 04/2015    FL COLONOSCOPY FLX DX W/COLLJ SPEC WHEN PFRMD N/A 2/5/2019    Procedure: COLONOSCOPY;  Surgeon: Anna Rashid MD;  [facility]: [facility];  Service: Gastroenterology    FL CYSTOURETHROSCOPY,FULGUR <0 5 CM LESN N/A 1/12/2017    Procedure: CYSTOSCOPY, BLADDER BIOPSY WITH FULGRATION, 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;  Surgeon: Mar Eaton MD;  [facility];  Service: Urology    FL CYSTOURETHROSCOPY,FULGUR <0 5 CM LESN N/A 10/2/2017    Procedure: CYSTOSCOPY WITH  BLADDER BIOPSIES WITH FULGURATION;  Surgeon: Mar Eaton MD;  [facility];  Service: Urology    FL ESOPHAGOGASTRODUODENOSCOPY TRANSORAL DIAGNOSTIC N/A 8/3/2018    Procedure: ESOPHAGOGASTRODUODENOSCOPY (EGD); Surgeon: Anna Rashid MD;  [facility];  Service: Gastroenterology    FL INSTILL ANTICANCER AGENT IN BLADDER N/A 1/12/2017    Procedure: Yvon Escamilla;  Surgeon: Mar Eaton MD;  [facility];  Service: Urology    FL INSTILL ANTICANCER AGENT IN BLADDER N/A 10/2/2017    Procedure: Yvon Escamilla;  Surgeon: Mar Eaton MD;  [facility];  Service: Urology    RETINAL DETACHMENT SURGERY Right 03/2016    complete air fill confirmed    [address] SURGERY  03/03/2015    proximal humerus fracture / [place]    3/6/15   R    1/3/18     TONSILECTOMY AND ADNOIDECTOMY      TONSILLECTOMY      TRANSURETHRAL RESECTION OF BLADDER TUMOR N/A 10/2/2017    Procedure: TRANSURETHRAL RESECTION OF BLADDER TUMOR (TURBT); Surgeon: Funmi Schaffer MD;  [facility]: [facility];  Service: Urology    [address] (HISTORICAL)         Meds/Allergies:    Prior to Admission medications    Medication Sig Start Date End Date Taking?  Authorizing Provider   atorvastatin (LIPITOR) 10 mg tablet TAKE 1 TABLET BY MOUTH  DAILY 10/7/20  Yes Ailin Calle DO   B COMPLEX VITAMINS PO Take 1 tablet by mouth daily   Yes Historical Provider, MD   cetirizine (ZyrTEC) 10 mg tablet TAKE ONE TABLET BY MOUTH DAILY 10/6/20  Yes Ailin Calle DO   Cholecalciferol (VITAMIN D3) 2000 UNITS capsule Take 1 capsule by mouth 2 (two) times a day 5000 units    Yes Historical Provider, MD   Coenzyme Q10 (CO Q10) 60 MG CAPS Take 1 capsule by mouth daily   Yes Historical Provider, MD   gabapentin (NEURONTIN) 600 MG tablet TAKE 1 TABLET BY MOUTH  TWICE DAILY 10/26/20  Yes Ailin Calle DO   Ginkgo Biloba 120 MG TABS Take 1 tablet by mouth daily   Yes Historical Provider, MD   metoprolol tartrate (LOPRESSOR) 25 mg tablet TAKE 1 TABLET BY MOUTH  EVERY 12 HOURS 8/5/20  Yes Ailin Calle DO   MILK THISTLE PO Take 1 tablet by mouth daily   Yes Historical Provider, MD   Multiple Vitamins-Minerals (OCUVITE-LUTEIN) CAPS Take by mouth daily   Yes Historical Provider, MD   omeprazole (PriLOSEC) 40 MG capsule TAKE 1 CAPSULE BY MOUTH  TWICE DAILY 10/26/20  Yes Ailin Calle DO   sildenafil (VIAGRA) 50 MG tablet Viagra 50 mg tablet   TAKE 1 TABLET BY MOUTH UP TO 4 HOURS BEFORE INTERCOURSE AND MAX OF 1 TABLET IN 24 HOURS   Yes Historical Provider, MD   ALPRAZolam Loni Yu) 0 25 mg tablet Take 1 or 2 tablets 1 hour before MRI do not drive or operate a motor vehicle while taking this medication  Patient not taking: Reported on 1/5/2021 1/3/20   Ailin Calle DO   famotidine (PEPCID) 40 MG tablet Take 1 tablet (40 mg total) by mouth daily at bedtime  Patient not taking: Reported on 1/5/2021 3/30/20   Ailin Calle DO   Inositol 500 MG TABS Take 1 tablet by mouth 2 (two) times a day    Historical Provider, MD   lutein 6 mg Take 1 capsule by mouth daily    Historical Provider, MD   Misc Natural Products (SUPER SNOOZE) CAPS Take 1 capsule by mouth daily Bed time     Historical Provider, MD   tadalafil (CIALIS) 20 MG tablet Take 1 tablet (20 mg total) by mouth daily as needed for erectile dysfunction  Patient not taking: Reported on 1/5/2021 6/22/20   Martin Lawrence MD   ipratropium (ATROVENT) 0 06 % nasal spray USE 2 SPRAYS IN EACH  NOSTRIL 3 TIMES A DAY  Patient taking differently: 2 (two) times a day  10/31/19 1/5/21  Taran Courts, DO     I have reviewed home medications using allscripts  Allergies: No Known Allergies    Social History:     Marital Status:    Occupation: retired    Substance Use History:   Social History     Substance and Sexual Activity   Alcohol Use Yes    Alcohol/week: 2 0 standard drinks    Types: 1 Glasses of wine, 1 Shots of liquor per week    Frequency: 4 or more times a week    Drinks per session: 1 or 2    Comment: DAILY      Social History     Tobacco Use   Smoking Status Current Every Day Smoker    Packs/day: 0 25    Years: 60 00    Pack years: 15 00    Types: Cigarettes   Smokeless Tobacco Never Used   Tobacco Comment    smokes a pipe -resolved      Social History     Substance and Sexual Activity   Drug Use No       Family History:    Family History   Problem Relation Age of Onset    Ulcers Father         acute gastric    Heart disease Mother    Kobe Izaguirre Sister        Physical Exam:     Vitals:   Blood Pressure: 159/66 (01/05/21 1951)  Pulse: 57 (01/05/21 1951)  Temperature: 98 5 °F (36 9 °C) (01/05/21 1951)  Respirations: 18 (01/05/21 1951)  Weight - Scale: 77 4 kg (170 lb 10 2 oz) (01/05/21 1951)  SpO2: 98 % (01/05/21 1951)    Physical Exam  Constitutional:       Appearance: Normal appearance  HENT:      Head: Normocephalic and atraumatic        Mouth/Throat:      Mouth: Mucous membranes are dry  Pharynx: Oropharynx is clear  Eyes:      Extraocular Movements: Extraocular movements intact  Conjunctiva/sclera: Conjunctivae normal       Pupils: Pupils are equal, round, and reactive to light  Cardiovascular:      Rate and Rhythm: Normal rate and regular rhythm  Pulmonary:      Effort: Pulmonary effort is normal       Breath sounds: Normal breath sounds  Abdominal:      General: Abdomen is flat  Bowel sounds are normal       Palpations: Abdomen is soft  Musculoskeletal:         General: Tenderness (Left lower extremity) present  Skin:     General: Skin is warm and dry  Capillary Refill: Capillary refill takes 2 to 3 seconds  Neurological:      General: No focal deficit present  Mental Status: He is alert and oriented to person, place, and time  GCS: GCS eye subscore is 4  GCS verbal subscore is 5  GCS motor subscore is 6  Psychiatric:         Mood and Affect: Mood normal          Behavior: Behavior normal          Thought Content: Thought content normal          Judgment: Judgment normal          Additional Data:     Lab Results: I have personally reviewed pertinent reports  Results from last 7 days   Lab Units 01/05/21 1959   WBC Thousand/uL 14 06*   HEMOGLOBIN g/dL 13 9   HEMATOCRIT % 43 2   PLATELETS Thousands/uL 216   NEUTROS PCT % 81*   LYMPHS PCT % 8*   MONOS PCT % 10   EOS PCT % 0     Results from last 7 days   Lab Units 01/05/21 1959   POTASSIUM mmol/L 4 2   CHLORIDE mmol/L 104   CO2 mmol/L 27   BUN mg/dL 23   CREATININE mg/dL 0 99   CALCIUM mg/dL 9 1   ALK PHOS U/L 82   ALT U/L 33   AST U/L 20     Results from last 7 days   Lab Units 01/05/21 1959   INR  1 05       Imaging: I have personally reviewed pertinent reports  Ct Lower Extremity Wo Contrast Left    Result Date: 1/5/2021  Narrative: CT left hip without IV contrast INDICATION: trauma  COMPARISON: X-ray left femur dated January 5, 2021, MRI left hip dated January 14, 2020   TECHNIQUE: CT examination of the left hip was performed  This examination, like all CT scans performed in the [facility], was performed utilizing techniques to minimize radiation dose exposure, including the use of iterative reconstruction and automated exposure control software  Sagittal and coronal two dimensional reconstructed images were also submitted for interpretation  Rad dose  351 89 mGy-cm FINDINGS: OSSEOUS STRUCTURES:  -There are comminuted and mildly displaced fractures of the left superior and inferior pubic rami (series 601 image 33 -34, and series 601 image 59)  - 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, similar to the prior MRI examination  No subchondral collapse  VISUALIZED MUSCULATURE:  Unremarkable  SOFT TISSUES:  Small to moderate extraperitoneal hematoma in the anterior pelvic wall extending into the space of Retzius (series 3 images 19 - 30)  OTHER PERTINENT FINDINGS:  Atherosclerotic calcifications  Small fat-containing left inguinal hernia  Sigmoid diverticulosis  Impression: 1  Comminuted and mildly displaced fractures of the left superior and inferior pubic rami  2   Small to moderate extraperitoneal hematoma in the anterior pelvic wall extending into the space of Retzius  3   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

## 2021-01-06 NOTE — PLAN OF CARE
Problem: OCCUPATIONAL THERAPY ADULT  Goal: Performs self-care activities at highest level of function for planned discharge setting  See evaluation for individualized goals  Description: Treatment Interventions: ADL retraining, Functional transfer training, UE strengthening/ROM, Endurance training, Equipment evaluation/education, Patient/family training, Compensatory technique education, Activityengagement          See flowsheet documentation for full assessment, interventions and recommendations  Note: Limitation: Decreased Safe judgement during ADL, Decreased endurance, Decreased self-care trans, Decreased high-level ADLs, Decreased ADL status, Decreased UE strength     Assessment: Pt is a 82 y o  male seen for OT evaluation s/p admit to [facility] on 1/5/2021 w/ Closed fracture of superior ramus of left pubis ([address] )  Comorbidities affecting pt's functional performance at time of assessment include: GERD, CAD, hypothyroidism, macular degeneration  Personal factors affecting pt at time of IE include:steps to enter environment, limited home support, difficulty performing ADLS, difficulty performing IADLS , limited insight into deficits, compliance, flat affect, decreased initiation and engagement , health management  and environment  Prior to admission, pt was (I) for ADLs, (A) for IADLs, and FM with no device  Upon evaluation: Pt requires min (A) for bed mobility, (S) for transfers, min (A) for FM with RW  2* the following deficits impacting occupational performance: weakness, decreased strength, decreased balance, decreased tolerance, impaired GMC, impaired initiation, impulsivity, decreased safety awareness, increased pain and orthopedic restrictions  Pt to benefit from continued skilled OT tx while in the hospital to address deficits as defined above and maximize level of functional independence w ADL's and functional mobility   Occupational Performance areas to address include: grooming, bathing/shower, toilet hygiene, dressing, health maintenance, functional mobility, community mobility and clothing management  From OT standpoint, recommendation at time of d/c would be HHOT vs  STR, dependent on continued assessment of FM/stairs         OT Discharge Recommendation: Other (Comment)(HHOT vs  STR dependent on continued progress with FM/stairs )

## 2021-01-06 NOTE — PLAN OF CARE
Problem: Potential for Falls  Goal: Patient will remain free of falls  Description: INTERVENTIONS:  - Assess patient frequently for physical needs  -  Identify cognitive and physical deficits and behaviors that affect risk of falls    -  [place] fall precautions as indicated by assessment   - Educate patient/family on patient safety including physical limitations  - Instruct patient to call for assistance with activity based on assessment  - Modify environment to reduce risk of injury  - Consider OT/PT consult to assist with strengthening/mobility  Outcome: Progressing     Problem: METABOLIC, FLUID AND ELECTROLYTES - ADULT  Goal: Electrolytes maintained within normal limits  Description: INTERVENTIONS:  - Monitor labs and assess patient for signs and symptoms of electrolyte imbalances  - Administer electrolyte replacement as ordered  - Monitor response to electrolyte replacements, including repeat lab results as appropriate  - Instruct patient on fluid and nutrition as appropriate  Outcome: Progressing  Goal: Fluid balance maintained  Description: INTERVENTIONS:  - Monitor labs   - Monitor I/O and WT  - Instruct patient on fluid and nutrition as appropriate  - Assess for signs & symptoms of volume excess or deficit  Outcome: Progressing  Goal: Glucose maintained within target range  Description: INTERVENTIONS:  - Monitor Blood Glucose as ordered  - Assess for signs and symptoms of hyperglycemia and hypoglycemia  - Administer ordered medications to maintain glucose within target range  - Assess nutritional intake and initiate nutrition service referral as needed  Outcome: Progressing     Problem: SKIN/TISSUE INTEGRITY - ADULT  Goal: Skin integrity remains intact  Description: INTERVENTIONS  - Identify patients at risk for skin breakdown  - Assess and monitor skin integrity  - Assess and monitor nutrition and hydration status  - Monitor labs (i e  albumin)  - Assess for incontinence   - Turn and reposition patient  - Assist with mobility/ambulation  - Relieve pressure over bony prominences  - Avoid friction and shearing  - Provide appropriate hygiene as needed including keeping skin clean and dry  - Evaluate need for skin moisturizer/barrier cream  - Collaborate with interdisciplinary team (i e  Nutrition, Rehabilitation, etc )   - Patient/family teaching  Outcome: Progressing  Goal: Incision(s), wounds(s) or drain site(s) healing without S/S of infection  Description: INTERVENTIONS  - Assess and document risk factors for skin impairment   - Assess and document dressing, incision, wound bed, drain sites and surrounding tissue  - Consider nutrition services referral as needed  - Oral mucous membranes remain intact  - Provide patient/ family education  Outcome: Progressing  Goal: Oral mucous membranes remain intact  Description: INTERVENTIONS  - Assess oral mucosa and hygiene practices  - Implement preventative oral hygiene regimen  - Implement oral medicated treatments as ordered  - Initiate Nutrition services referral as needed  Outcome: Progressing     Problem: MUSCULOSKELETAL - ADULT  Goal: Maintain or return mobility to safest level of function  Description: INTERVENTIONS:  - Assess patient's ability to carry out ADLs; assess patient's baseline for ADL function and identify physical deficits which impact ability to perform ADLs (bathing, care of mouth/teeth, toileting, grooming, dressing, etc )  - Assess/evaluate cause of self-care deficits   - Assess range of motion  - Assess patient's mobility  - Assess patient's need for assistive devices and provide as appropriate  - Encourage maximum independence but intervene and supervise when necessary  - Involve family in performance of ADLs  - Assess for home care needs following discharge   - Consider OT consult to assist with ADL evaluation and planning for discharge  - Provide patient education as appropriate  Outcome: Progressing  Goal: Maintain proper alignment of affected body part  Description: INTERVENTIONS:  - Support, maintain and protect limb and body alignment  - Provide patient/ family with appropriate education  Outcome: Progressing     Problem: PAIN - ADULT  Goal: Verbalizes/displays adequate comfort level or baseline comfort level  Description: Interventions:  - Encourage patient to monitor pain and request assistance  - Assess pain using appropriate pain scale  - Administer analgesics based on type and severity of pain and evaluate response  - Implement non-pharmacological measures as appropriate and evaluate response  - Consider cultural and social influences on pain and pain management  - Notify physician/advanced practitioner if interventions unsuccessful or patient reports new pain  Outcome: Progressing     Problem: INFECTION - ADULT  Goal: Absence or prevention of progression during hospitalization  Description: INTERVENTIONS:  - Assess and monitor for signs and symptoms of infection  - Monitor lab/diagnostic results  - Monitor all insertion sites, i e  indwelling lines, tubes, and drains  - Administer medications as ordered  - Instruct and encourage patient and family to use good hand hygiene technique  - Identify and instruct in appropriate isolation precautions for identified infection/condition  Outcome: Progressing  Goal: Absence of fever/infection during neutropenic period  Description: INTERVENTIONS:  - Monitor WBC    Outcome: Progressing     Problem: SAFETY ADULT  Goal: Patient will remain free of falls  Description: INTERVENTIONS:  - Assess patient frequently for physical needs  -  Identify cognitive and physical deficits and behaviors that affect risk of falls    -  [place] fall precautions as indicated by assessment   - Educate patient/family on patient safety including physical limitations  - Instruct patient to call for assistance with activity based on assessment  - Modify environment to reduce risk of injury  - Consider OT/PT consult to assist with strengthening/mobility  Outcome: Progressing  Goal: Maintain or return to baseline ADL function  Description: INTERVENTIONS:  -  Assess patient's ability to carry out ADLs; assess patient's baseline for ADL function and identify physical deficits which impact ability to perform ADLs (bathing, care of mouth/teeth, toileting, grooming, dressing, etc )  - Assess/evaluate cause of self-care deficits   - Assess range of motion  - Assess patient's mobility; develop plan if impaired  - Assess patient's need for assistive devices and provide as appropriate  - Encourage maximum independence but intervene and supervise when necessary  - Involve family in performance of ADLs  - Assess for home care needs following discharge   - Consider OT consult to assist with ADL evaluation and planning for discharge  - Provide patient education as appropriate  Outcome: Progressing  Goal: Maintain or return mobility status to optimal level  Description: INTERVENTIONS:  - Assess patient's baseline mobility status (ambulation, transfers, stairs, etc )    - Identify cognitive and physical deficits and behaviors that affect mobility  - Identify mobility aids required to assist with transfers and/or ambulation (gait belt, sit-to-stand, lift, walker, cane, etc )  - [place] fall precautions as indicated by assessment  - Record patient progress and toleration of activity level on Mobility SBAR; progress patient to next Phase/Stage  - Instruct patient to call for assistance with activity based on assessment  - Consider rehabilitation consult to assist with strengthening/weightbearing, etc   Outcome: Progressing     Problem: DISCHARGE PLANNING  Goal: Discharge to home or other facility with appropriate resources  Description: INTERVENTIONS:  - Identify barriers to discharge w/patient and caregiver  - Arrange for needed discharge resources and transportation as appropriate  - Identify discharge learning needs (meds, wound care, etc )  - Refer to Case Management Department for coordinating discharge planning if the patient needs post-hospital services based on physician/advanced practitioner order or complex needs related to functional status, cognitive ability, or social support system  Outcome: Progressing     Problem: Knowledge Deficit  Goal: Patient/family/caregiver demonstrates understanding of disease process, treatment plan, medications, and discharge instructions  Description: Complete learning assessment and assess knowledge base    Interventions:  - Provide teaching at level of understanding  - Provide teaching via preferred learning methods  Outcome: Progressing

## 2021-01-06 NOTE — ASSESSMENT & PLAN NOTE
Patient with mechanical fall at home  He has self-reported imbalance chronically, with multiple falls in the past   Given current fall with fracture, need careful evaluation by physical and occupational therapy prior to termination of final disposition

## 2021-01-06 NOTE — OCCUPATIONAL THERAPY NOTE
Occupational Therapy Evaluation     Patient Name: Nora Garcia  PVGVA'L Date: 1/6/2021  Problem List  Principal Problem:    Closed fracture of superior ramus of left pubis ([address] )  Active Problems:    High blood pressure    Hypothyroidism    Gastroesophageal reflux disease without esophagitis    Closed fracture of inferior pubic ramus ([address] )    Fall    Past Medical History  Past Medical History:   Diagnosis Date    Anemia     Appendicitis     Coronary artery disease     Detached retina     GERD (gastroesophageal reflux disease)     Hyperlipidemia     Hypertension     Macular degeneration     Neuropathy     Von Willebrand disease ([address] )      Past Surgical History  Past Surgical History:   Procedure Laterality Date    ADENOIDECTOMY      APPENDECTOMY      ARTHRODESIS  01/15/2014    Lumbar     BACK SURGERY      CATARACT EXTRACTION      COLONOSCOPY  12/09/2016    fiberoptic     CYSTOSCOPY      with resection of tumor / 1/12/17, 10/2/17 / diagnostic 12/8/16, 5/30/17     CYSTOSCOPY  09/20/2018    CYSTOSCOPY  06/22/2020    EGD AND COLONOSCOPY N/A 12/9/2016    Procedure: EGD AND COLONOSCOPY;  Surgeon: Katarina Lopez MD;  [facility]: [facility];  Service:    Deanne Foster EYE SURGERY      FRACTURE SURGERY Bilateral     ARM    NECK SURGERY      NEUROPLASTY / TRANSPOSITION MEDIAN [facility]ERVE AT [facility] Right 04/2015    NE COLONOSCOPY FLX DX W/COLLJ SPEC WHEN PFRMD N/A 2/5/2019    Procedure: COLONOSCOPY;  Surgeon: Gerri Hutchison MD;  [facility];  Service: Gastroenterology    NE CYSTOURETHROSCOPY,FULGUR <0 5 CM LESN N/A 1/12/2017    Procedure: CYSTOSCOPY, BLADDER BIOPSY WITH FULGRATION, 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;  Surgeon: Yolanda Diaz MD;  [facility];  Service: Urology    NE CYSTOURETHROSCOPY,FULGUR <0 5 CM LESN N/A 10/2/2017    Procedure: CYSTOSCOPY WITH  BLADDER BIOPSIES WITH FULGURATION;  Surgeon: Yolanda Diaz MD;  [facility];  Service: Urology    GA ESOPHAGOGASTRODUODENOSCOPY TRANSORAL DIAGNOSTIC N/A 8/3/2018    Procedure: ESOPHAGOGASTRODUODENOSCOPY (EGD); Surgeon: Anna Reece MD;  [facility];  Service: Gastroenterology    GA INSTILL ANTICANCER AGENT IN BLADDER N/A 1/12/2017    Procedure: Mendel Oh;  Surgeon: Francia Martinez MD;  [facility];  Service: Urology    GA INSTILL ANTICANCER AGENT IN BLADDER N/A 10/2/2017    Procedure: Mendel Oh;  Surgeon: Francia Martinez MD;  [facility];  Service: Urology    RETINAL DETACHMENT SURGERY Right 03/2016    complete air fill confirmed    [address] SURGERY  03/03/2015    proximal humerus fracture / [place]    3/6/15   R    1/3/18     TONSILECTOMY AND ADNOIDECTOMY      TONSILLECTOMY      TRANSURETHRAL RESECTION OF BLADDER TUMOR N/A 10/2/2017    Procedure: TRANSURETHRAL RESECTION OF BLADDER TUMOR (TURBT); Surgeon: Francia Martinez MD;  [facility];  Service: Urology   [address] (HISTORICAL)             01/06/21 0957   OT Last Visit   OT Visit Date 01/06/21   Note Type   Note type Evaluation   Restrictions/Precautions   Weight Bearing Precautions Per Order Yes   LLE Weight Bearing Per Order WBAT   Other Precautions Pain; Fall Risk; Chair Alarm   Home Living   Type of [facility] Multi-level;1/2 bath on main level;Bed/bath upstairs  (bed on 3rd floor; 0 [place]ARISTO; 5 steps to 2nd; 8 steps to 3rd)   Bathroom Shower/Tub Tub/shower unit  (full bath on 2nd floor)   Bathroom Toilet Standard  (on 1st floor; raised toilet on 2nd floor)   [address] bars in shower;Grab bars around toilet   [address] Walker;Cane   Additional Comments Pt reports no use of AD at baseline   Prior Function   Level of [place] Independent with ADLs and functional mobility   Lives With Significant other   Receives Help From Family;Friend(s)   ADL Assistance Independent   IADLs Needs assistance   Falls in the last 6 months 5 to 10   Comments Pt does not drive; Pt reports son drives   Psychosocial   Psychosocial (WDL) WDL   Subjective   Subjective "Hey therapists, I need to pull my pants up now"   ADL   Where Assessed Supine, bed   Toileting Assistance  5  Supervision/Setup   Toileting Deficit Use of bedpan/urinal setup; Increased time to complete   Additional Comments Pt reports unable to perform FM to bathroom to complete toileting, due to pain and soreness in LLE; Pt refusal to attempt and request use of bedside urinal     Bed Mobility   Supine to Sit 4  Minimal assistance   Additional items Assist x 1;Bedrails;Verbal cues   Additional Comments Pt supine at start of session; Pt seated in chair at end of session   Transfers   Sit to Stand 5  Supervision   Additional items Impulsive   Stand to Sit 5  Supervision   Additional items Impulsive   Additional Comments Pt utilized RW to complete functional transfer; no LOB or instability; SPO2 WFL throughout session; Pt impulsive during transfers, required verbal cues to remain seated while completing set-up of RW; Pt with poor safety awareness and impulsivity when completing stand to sit transfer into chair, Pt quick to sit without being turned fully in front of chair; Pt physical guarded for safety and educated on proper transfers to ensure safety    Functional Mobility   Functional Mobility 4  Minimal assistance   Additional Comments Pt completed ~10 ft FM from EOB to chair within room; no LOB or instability;  Pt educated on WBS and use of RW prior to FM; Pt demonstrated understanding of WBAT, attempting some weight throught LLE; Pt encouraged to maintain forward alignment and lift head up while performing FM   Additional items Rolling walker   Balance   Static Sitting Fair   Dynamic Sitting Fair -   Static Standing Fair -   Dynamic Standing Poor +   Ambulatory Poor +   Activity Tolerance   Activity Tolerance Patient tolerated treatment well;Patient limited by pain   RUE Assessment   RUE Assessment WFL   LUE Assessment   LUE Assessment WFL  (Pt reports soreness and bruise on LUE from fall)   Hand Function   Gross Motor Coordination Functional   Fine Motor Coordination Functional   Sensation   Light Touch No apparent deficits   Cognition   Overall Cognitive Status WFL   Arousal/Participation Alert; Cooperative   Attention Within functional limits   Orientation Level Oriented X4   Memory Within functional limits   Following Commands Follows one step commands inconsistently   Comments Pt appeared impulsive and quick to attempt, without prior instruction; Pt improved safety awareness with verbal cues given   Assessment   Limitation Decreased Safe judgement during ADL;Decreased endurance;Decreased self-care trans;Decreased high-level ADLs; Decreased ADL status; Decreased UE strength   Assessment Pt is a 82 y o  male seen for OT evaluation s/p [facility] w/ Closed fracture of superior ramus of left pubis ([address] )  Comorbidities affecting pt's functional performance at time of assessment include: GERD, CAD, hypothyroidism, macular degeneration  Personal factors affecting pt at time of IE include:steps to enter environment, limited home support, difficulty performing ADLS, difficulty performing IADLS , limited insight into deficits, compliance, flat affect, decreased initiation and engagement , health management  and environment  Prior to admission, pt was (I) for ADLs, (A) for IADLs, and FM with no device  Upon evaluation: Pt requires min (A) for bed mobility, (S) for transfers, min (A) for FM with RW  2* the following deficits impacting occupational performance: weakness, decreased strength, decreased balance, decreased tolerance, impaired GMC, impaired initiation, impulsivity, decreased safety awareness, increased pain and orthopedic restrictions   Pt to benefit from continued skilled OT tx while in the hospital to address deficits as defined above and maximize level of functional independence w ADL's and functional mobility  Occupational Performance areas to address include: grooming, bathing/shower, toilet hygiene, dressing, health maintenance, functional mobility, community mobility and clothing management  From OT standpoint, recommendation at time of d/c would be HHOT vs  STR, dependent on continued assessment of FM/stairs  Goals   Patient Goals to go home today   Short Term Goal  Pt will perform UE strengthening exercises   Long Term Goal #1 Pt will demonstrate FM with RW at mod (I) level   Long Term Goal #2 Pt will demonstrate UB/LB dressing and bathing tasks at (S) level   Long Term Goal Pt will demonstrate toilet transfer and hygiene at mod (I) level   Plan   Treatment Interventions ADL retraining;Functional transfer training;UE strengthening/ROM; Endurance training;Equipment evaluation/education;Patient/family training; Compensatory technique education; Activityengagement   Goal Expiration Date 01/20/21   OT Frequency 3-5x/wk   Recommendation   OT Discharge Recommendation Other (Comment)  (HHOT vs  STR dependent on continued progress with FM/stairs )     Pt will benefit from continued OT services in order to maximize (I) c ADL performance, FM c RW, and improve overall endurance/strength required to complete functional tasks in preparation for d/c  Pt left seated in chair at end of session; all needs within reach; all lines intact; scds connected and turned on

## 2021-01-06 NOTE — CASE MANAGEMENT
As per Demetrio Cole at [facility] they will transport the patient home at 1:30 pm -2pm on 1/7/20  [facility] pass wheel chair Bettye Feliz is aware that patient has 13 steps and they will need to bring the stair climber

## 2021-01-06 NOTE — PLAN OF CARE
Problem: PHYSICAL THERAPY ADULT  Goal: Performs mobility at highest level of function for planned discharge setting  See evaluation for individualized goals  Description: Treatment/Interventions: Functional transfer training, ADL retraining, LE strengthening/ROM, Therapeutic exercise, Endurance training, Equipment eval/education, Bed mobility, Gait training          See flowsheet documentation for full assessment, interventions and recommendations  Note: Prognosis: Good  Problem List: Decreased strength, Decreased endurance, Impaired balance, Decreased mobility, Orthopedic restrictions, Pain  Assessment: Patient is a 82 y o  male evaluated by Physical Therapy s/p admit to [address] [place] [address],[address] on 1/5/2021 with admitting diagnosis of:Pain in left hip, Closed fracture of superior pubic ramus, Closed fracture of inferior pubic ramus, and principal problem of: Closed fracture of superior ramus of left pubis  PT was consulted to assess patient's functional mobility and discharge needs  Ordered are PT Evaluation and treatment with activity level of: up and OOB as tolerated  Comorbidities affecting patient's physical performance at time of assessment include: COPD, HTN and cancer history and/or treatment  Personal factors affecting the patient at time of IE include: inaccessible home environment, lives in 3 story home, ambulating with assistive device, home alone for periods of time, inability to navigate community distances, unable to perform dynamic tasks in community, history of fall(s) and impulsivity  Please locate objective findings from PT assessment regarding body systems outlined above  Upon evaluation, pt denied any pain when sitting in the bed, but reported that he experiences pain upon weightbearing  Pt reported having to use the bathroom, but refused to ambulate into the bathroom, opting instead to use the bedside urinal  Pt performed bed mobility with min(A)x1, including rolling L and supine to sit  Pt displayed RLE strength of 4/5; LLE strength was 3/5 AROM tested due to pubic ramus fracture  Pt was educated on WBAT weightbearing status on LLE  Pt displayed fair seated and standing balance but displayed impulsivity when attempting to ambulate before being educated on proper gait pattern  Pt was unable to ambulate when attempting to bear full weight through LLE; after being educated on the proper technique for ambulation using the RW, pt was able to ambulate 5' with min(A)x1 using RW  Pt used a step to gait pattern with a forward flexion, short stride, and decreased stance time on the LLE  Pt required verbal and tactile cues during ambulation to keep the RW in front of him at all times to maintain safety  Pt will benefit from continued PT intervention during LOS to address current deficits, increase LOF, and facilitate safe d/c home when medically appropriate  D/c recommendation at this time is STR vs home with skilled therapy dependent upon pt's success with stair navigation due to the fact that the pt resides in a multi level house and needs to ascend/descend steps to reach his full bathroom and bedroom  Barriers to Discharge: Inaccessible home environment     PT Discharge Recommendation: Other (Comment)(STR vs home health dependent on success with stair navig )     PT - OK to Discharge: Yes    See flowsheet documentation for full assessment

## 2021-01-06 NOTE — PHYSICAL THERAPY NOTE
Physical Therapy Evaluation    Patient Name: Twyla Renner    DRQOT'L Date: 1/6/2021     Problem List  Principal Problem:    Closed fracture of superior ramus of left pubis ([address] )  Active Problems:    High blood pressure    Hypothyroidism    Gastroesophageal reflux disease without esophagitis    Closed fracture of inferior pubic ramus ([address] )    Fall       Past Medical History  Past Medical History:   Diagnosis Date    Anemia     Appendicitis     Coronary artery disease     Detached retina     GERD (gastroesophageal reflux disease)     Hyperlipidemia     Hypertension     Macular degeneration     Neuropathy     Von Willebrand disease ([address] )         Past Surgical History  Past Surgical History:   Procedure Laterality Date    ADENOIDECTOMY      APPENDECTOMY      ARTHRODESIS  01/15/2014    Lumbar     BACK SURGERY      CATARACT EXTRACTION      COLONOSCOPY  12/09/2016    fiberoptic     CYSTOSCOPY      with resection of tumor / 1/12/17, 10/2/17 / diagnostic 12/8/16, 5/30/17     CYSTOSCOPY  09/20/2018    CYSTOSCOPY  06/22/2020    EGD AND COLONOSCOPY N/A 12/9/2016    Procedure: EGD AND COLONOSCOPY;  Surgeon: Teresa Yepez MD;  [facility];  Service:    Daryl Moose EYE SURGERY      FRACTURE SURGERY Bilateral     ARM    NECK SURGERY      NEUROPLASTY / TRANSPOSITION MEDIAN NERVE AT CARPAL TUNNEL Right 04/2015    AL COLONOSCOPY FLX DX W/COLLJ SPEC WHEN PFRMD N/A 2/5/2019    Procedure: COLONOSCOPY;  Surgeon: Rosibel Collado MD;  [facility];  Service: Gastroenterology    AL CYSTOURETHROSCOPY,FULGUR <0 5 CM LESN N/A 1/12/2017    Procedure: CYSTOSCOPY, BLADDER BIOPSY WITH FULGRATION, 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;  Surgeon: Tu Wall MD;  [facility];  Service: Urology    AL CYSTOURETHROSCOPY,FULGUR <0 5 CM LESN N/A 10/2/2017    Procedure: CYSTOSCOPY WITH  BLADDER BIOPSIES WITH FULGURATION;  Surgeon: Mar Eaton MD;  [facility];  Service: Urology    IA ESOPHAGOGASTRODUODENOSCOPY TRANSORAL DIAGNOSTIC N/A 8/3/2018    Procedure: ESOPHAGOGASTRODUODENOSCOPY (EGD); Surgeon: Anna Rashid MD;  [facility];  Service: Gastroenterology    IA INSTILL ANTICANCER AGENT IN BLADDER N/A 1/12/2017    Procedure: Gisel Justin;  Surgeon: Mar Eaton MD;  [facility];  Service: Urology    IA INSTILL ANTICANCER AGENT IN BLADDER N/A 10/2/2017    Procedure: Olmitzkannan Justin;  Surgeon: Mar Eaton MD;  [facility];  Service: Urology    RETINAL DETACHMENT SURGERY Right 03/2016    complete air fill confirmed    [address] SURGERY  03/03/2015    proximal humerus fracture / [place]    3/6/15   R    1/3/18     TONSILECTOMY AND ADNOIDECTOMY      TONSILLECTOMY      TRANSURETHRAL RESECTION OF BLADDER TUMOR N/A 10/2/2017    Procedure: TRANSURETHRAL RESECTION OF BLADDER TUMOR (TURBT);   Surgeon: Mar Eaton MD;  [facility];  Service: Urology   [facility] ANTIGEN (HISTORICAL)          01/06/21 0956   PT Last Visit   PT Visit Date 01/06/21   Note Type   Note type Evaluation   Pain Assessment   Pain Assessment Tool FLACC   Pain Rating: FLACC (Rest) - Face 0   Pain Rating: FLACC (Rest) - Legs 0   Pain Rating: FLACC (Rest) - Activity 0   Pain Rating: FLACC (Rest) - Cry 0   Pain Rating: FLACC (Rest) - Consolability 0   Score: FLACC (Rest) 0   Pain Rating: FLACC (Activity) - Face 1   Pain Rating: FLACC (Activity) - Legs 1   Pain Rating: FLACC (Activity) - Activity 0   Pain Rating: FLACC (Activity) - Cry 1   Pain Rating: FLACC (Activity) - Consolability 1   Score: FLACC (Activity) 4   Home Living   Type of Home House   Home Layout Multi-level;1/2 bath on main level;Bed/bath upstairs  (3 lvl;full bath 2nd flr ;bed 3rd flr;0STE;5 to 2nd, 8 to 3rd)   Bathroom Shower/Tub Tub/shower unit   [employer] Standard  (std toilet 1st floor; raised on 2nd floor)   [address] bars in shower;Grab bars around toilet   [address] Walker;Cane  (cane for outside home, walker for uneven terrain outside)   Additional Comments no AD at baseline inside home   Prior Function   Level of [place] Independent with ADLs and functional mobility; Needs assistance with IADLs   Lives With Significant other   Receives Help From Friend(s); Family   ADL Assistance Independent   IADLs Needs assistance   Falls in the last 6 months 5 to 10   Comments (-) driving   Restrictions/Precautions   Weight Bearing Precautions Per Order Yes   LLE Weight Bearing Per Order WBAT   Other Precautions Pain; Fall Risk; Chair Alarm   General   Family/Caregiver Present No   Cognition   Overall Cognitive Status WFL   Arousal/Participation Alert   Orientation Level Oriented X4   Memory Within functional limits   Following Commands Follows one step commands with increased time or repetition   RLE Assessment   RLE Assessment WFL  (4/5 grossly)   LLE Assessment   LLE Assessment X   Strength LLE   LLE Overall Strength 3/5  (AROM hip flexion, knee ext/flex due to fx; 4/5 L PF)   Light Touch   RLE Light Touch Grossly intact   LLE Light Touch Grossly intact   Bed Mobility   Rolling L 4  Minimal assistance   Additional items Assist x 1   Supine to Sit 4  Minimal assistance   Additional items Assist x 1   Additional Comments pt seated OOB at end of session   Transfers   Sit to Stand 5  Supervision   Stand to Sit 5  Supervision   Additional Comments RW used   Ambulation/Elevation   Gait pattern Step to;Short stride;Decreased L stance; Antalgic   Gait Assistance 4  Minimal assist   Additional items Assist x 1   Assistive Device Rolling walker   Distance 5'   Balance   Static Sitting Fair +   Dynamic Sitting Fair   Static Standing Fair   Dynamic Standing Fair -   Ambulatory Fair -   Endurance Deficit   Endurance Deficit Yes   Endurance Deficit Description ambulatory distance limited due to pain   Activity Tolerance   Activity Tolerance Patient limited by pain   Assessment   Prognosis Good   Problem List Decreased strength;Decreased endurance; Impaired balance;Decreased mobility;Orthopedic restrictions;Pain   Assessment Patient is a 82 y o  male evaluated by Physical Therapy s/p admit to [address] [facility] on 1/5/2021 with admitting diagnosis of:Pain in left hip, Closed fracture of superior pubic ramus, Closed fracture of inferior pubic ramus, and principal problem of: Closed fracture of superior ramus of left pubis  PT was consulted to assess patient's functional mobility and discharge needs  Ordered are PT Evaluation and treatment with activity level of: up and OOB as tolerated  Comorbidities affecting patient's physical performance at time of assessment include: COPD, HTN and cancer history and/or treatment  Personal factors affecting the patient at time of IE include: inaccessible home environment, lives in 3 story home, ambulating with assistive device, home alone for periods of time, inability to navigate community distances, unable to perform dynamic tasks in community, history of fall(s) and impulsivity  Please locate objective findings from PT assessment regarding body systems outlined above  Upon evaluation, pt denied any pain when sitting in the bed, but reported that he experiences pain upon weightbearing  Pt reported having to use the bathroom, but refused to ambulate into the bathroom, opting instead to use the bedside urinal  Pt performed bed mobility with min(A)x1, including rolling L and supine to sit  Pt displayed RLE strength of 4/5; LLE strength was 3/5 AROM tested due to pubic ramus fracture  Pt was educated on WBAT weightbearing status on LLE  Pt displayed fair seated and standing balance but displayed impulsivity when attempting to ambulate before being educated on proper gait pattern   Pt was unable to ambulate when attempting to bear full weight through LLE; after being educated on the proper technique for ambulation using the RW, pt was able to ambulate 5' with min(A)x1 using RW  Pt used a step to gait pattern with a forward flexion, short stride, and decreased stance time on the LLE  Pt required verbal and tactile cues during ambulation to keep the RW in front of him at all times to maintain safety  Pt will benefit from continued PT intervention during LOS to address current deficits, increase LOF, and facilitate safe d/c home when medically appropriate  D/c recommendation at this time is STR vs home with skilled therapy dependent upon pt's success with stair navigation due to the fact that the pt resides in a multi level house and needs to ascend/descend steps to reach his full bathroom and bedroom  Barriers to Discharge Inaccessible home environment   Goals   Patient Goals to go home   Short Term Goal #1 Pt will participate in strengthening exercises to increase strength and independence with functional mobility  Short Term Goal #2 Pt will perform bed mobility and transfers mod(I) to allow for increased household independence  LTG Expiration Date 01/20/21   Long Term Goal #1 Pt will ambulate 300' mod(I) using RW to allow household navigation  Long Term Goal #2 Pt will ascend/descend 5 steps mod(I) using HR to allow access to his second floor bathroom  Plan   Treatment/Interventions Functional transfer training;ADL retraining;LE strengthening/ROM; Therapeutic exercise; Endurance training;Equipment eval/education; Bed mobility;Gait training   PT Frequency Other (Comment)  (3-5x/wk)   Recommendation   PT Discharge Recommendation Other (Comment)  (STR vs home health dependent on success with stair navig )   PT - OK to Discharge Yes     Pt seated OOB at end of session with all needs within reach

## 2021-01-06 NOTE — ASSESSMENT & PLAN NOTE
Patient is status post mechanical fall, with resultant imaging showing comminuted and mildly displaced fractures of the left superior and inferior pubic rami  Patient also with extraperitoneal hematoma in the anterior pelvic wall  Officially evaluated by Orthopedics, with recommendations for weight-bearing as tolerated without restrictions, as well as pain control  For repeat evaluation by PT/OT in the morning, and determination of need for either home with [facility] versus SNF placement for short-term rehab

## 2021-01-06 NOTE — PLAN OF CARE
Problem: Potential for Falls  Goal: Patient will remain free of falls  Description: INTERVENTIONS:  - Assess patient frequently for physical needs  -  Identify cognitive and physical deficits and behaviors that affect risk of falls    -  [place] fall precautions as indicated by assessment   - Educate patient/family on patient safety including physical limitations  - Instruct patient to call for assistance with activity based on assessment  - Modify environment to reduce risk of injury  - Consider OT/PT consult to assist with strengthening/mobility  Outcome: Progressing     Problem: METABOLIC, FLUID AND ELECTROLYTES - ADULT  Goal: Electrolytes maintained within normal limits  Description: INTERVENTIONS:  - Monitor labs and assess patient for signs and symptoms of electrolyte imbalances  - Administer electrolyte replacement as ordered  - Monitor response to electrolyte replacements, including repeat lab results as appropriate  - Instruct patient on fluid and nutrition as appropriate  Outcome: Progressing  Goal: Fluid balance maintained  Description: INTERVENTIONS:  - Monitor labs   - Monitor I/O and WT  - Instruct patient on fluid and nutrition as appropriate  - Assess for signs & symptoms of volume excess or deficit  Outcome: Progressing  Goal: Glucose maintained within target range  Description: INTERVENTIONS:  - Monitor Blood Glucose as ordered  - Assess for signs and symptoms of hyperglycemia and hypoglycemia  - Administer ordered medications to maintain glucose within target range  - Assess nutritional intake and initiate nutrition service referral as needed  Outcome: Progressing     Problem: SKIN/TISSUE INTEGRITY - ADULT  Goal: Skin integrity remains intact  Description: INTERVENTIONS  - Identify patients at risk for skin breakdown  - Assess and monitor skin integrity  - Assess and monitor nutrition and hydration status  - Monitor labs (i e  albumin)  - Assess for incontinence   - Turn and reposition patient  - Assist with mobility/ambulation  - Relieve pressure over bony prominences  - Avoid friction and shearing  - Provide appropriate hygiene as needed including keeping skin clean and dry  - Evaluate need for skin moisturizer/barrier cream  - Collaborate with interdisciplinary team (i e  Nutrition, Rehabilitation, etc )   - Patient/family teaching  Outcome: Progressing  Goal: Incision(s), wounds(s) or drain site(s) healing without S/S of infection  Description: INTERVENTIONS  - Assess and document risk factors for skin impairment   - Assess and document dressing, incision, wound bed, drain sites and surrounding tissue  - Consider nutrition services referral as needed  - Oral mucous membranes remain intact  - Provide patient/ family education  Outcome: Progressing  Goal: Oral mucous membranes remain intact  Description: INTERVENTIONS  - Assess oral mucosa and hygiene practices  - Implement preventative oral hygiene regimen  - Implement oral medicated treatments as ordered  - Initiate Nutrition services referral as needed  Outcome: Progressing     Problem: MUSCULOSKELETAL - ADULT  Goal: Maintain or return mobility to safest level of function  Description: INTERVENTIONS:  - Assess patient's ability to carry out ADLs; assess patient's baseline for ADL function and identify physical deficits which impact ability to perform ADLs (bathing, care of mouth/teeth, toileting, grooming, dressing, etc )  - Assess/evaluate cause of self-care deficits   - Assess range of motion  - Assess patient's mobility  - Assess patient's need for assistive devices and provide as appropriate  - Encourage maximum independence but intervene and supervise when necessary  - Involve family in performance of ADLs  - Assess for home care needs following discharge   - Consider OT consult to assist with ADL evaluation and planning for discharge  - Provide patient education as appropriate  Outcome: Progressing  Goal: Maintain proper alignment of affected body part  Description: INTERVENTIONS:  - Support, maintain and protect limb and body alignment  - Provide patient/ family with appropriate education  Outcome: Progressing     Problem: PAIN - ADULT  Goal: Verbalizes/displays adequate comfort level or baseline comfort level  Description: Interventions:  - Encourage patient to monitor pain and request assistance  - Assess pain using appropriate pain scale  - Administer analgesics based on type and severity of pain and evaluate response  - Implement non-pharmacological measures as appropriate and evaluate response  - Consider cultural and social influences on pain and pain management  - Notify physician/advanced practitioner if interventions unsuccessful or patient reports new pain  Outcome: Progressing     Problem: INFECTION - ADULT  Goal: Absence or prevention of progression during hospitalization  Description: INTERVENTIONS:  - Assess and monitor for signs and symptoms of infection  - Monitor lab/diagnostic results  - Monitor all insertion sites, i e  indwelling lines, tubes, and drains  - Monitor endotracheal if appropriate and nasal secretions for changes in amount and color  - [place] appropriate cooling/warming therapies per order  - Administer medications as ordered  - Instruct and encourage patient and family to use good hand hygiene technique  - Identify and instruct in appropriate isolation precautions for identified infection/condition  Outcome: Progressing  Goal: Absence of fever/infection during neutropenic period  Description: INTERVENTIONS:  - Monitor WBC    Outcome: Progressing     Problem: SAFETY ADULT  Goal: Patient will remain free of falls  Description: INTERVENTIONS:  - Assess patient frequently for physical needs  -  Identify cognitive and physical deficits and behaviors that affect risk of falls    -  [place] fall precautions as indicated by assessment   - Educate patient/family on patient safety including physical limitations  - Instruct patient to call for assistance with activity based on assessment  - Modify environment to reduce risk of injury  - Consider OT/PT consult to assist with strengthening/mobility  Outcome: Progressing  Goal: Maintain or return to baseline ADL function  Description: INTERVENTIONS:  -  Assess patient's ability to carry out ADLs; assess patient's baseline for ADL function and identify physical deficits which impact ability to perform ADLs (bathing, care of mouth/teeth, toileting, grooming, dressing, etc )  - Assess/evaluate cause of self-care deficits   - Assess range of motion  - Assess patient's mobility; develop plan if impaired  - Assess patient's need for assistive devices and provide as appropriate  - Encourage maximum independence but intervene and supervise when necessary  - Involve family in performance of ADLs  - Assess for home care needs following discharge   - Consider OT consult to assist with ADL evaluation and planning for discharge  - Provide patient education as appropriate  Outcome: Progressing  Goal: Maintain or return mobility status to optimal level  Description: INTERVENTIONS:  - Assess patient's baseline mobility status (ambulation, transfers, stairs, etc )    - Identify cognitive and physical deficits and behaviors that affect mobility  - Identify mobility aids required to assist with transfers and/or ambulation (gait belt, sit-to-stand, lift, walker, cane, etc )  - [place] fall precautions as indicated by assessment  - Record patient progress and toleration of activity level on Mobility SBAR; progress patient to next Phase/Stage  - Instruct patient to call for assistance with activity based on assessment  - Consider rehabilitation consult to assist with strengthening/weightbearing, etc   Outcome: Progressing     Problem: DISCHARGE PLANNING  Goal: Discharge to home or other facility with appropriate resources  Description: INTERVENTIONS:  - Identify barriers to discharge w/patient and caregiver  - Arrange for needed discharge resources and transportation as appropriate  - Identify discharge learning needs (meds, wound care, etc )  - Arrange for interpretive services to assist at discharge as needed  - Refer to Case Management Department for coordinating discharge planning if the patient needs post-hospital services based on physician/advanced practitioner order or complex needs related to functional status, cognitive ability, or social support system  Outcome: Progressing     Problem: Knowledge Deficit  Goal: Patient/family/caregiver demonstrates understanding of disease process, treatment plan, medications, and discharge instructions  Description: Complete learning assessment and assess knowledge base    Interventions:  - Provide teaching at level of understanding  - Provide teaching via preferred learning methods  Outcome: Progressing

## 2021-01-06 NOTE — ASSESSMENT & PLAN NOTE
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

## 2021-01-06 NOTE — CASE MANAGEMENT
Met with pt to discuss role as  in helping pt to develop discharge plan and to help pt carry out their plan  Pt's current LOS is 1 day    Pt is a Risk of Unplanned Readmission score of    13   Pt lives in a house with his SO  Pt has 0 EVARISTO  Pt has 13 steps on the inside  Pt has his bedroom and bathroom on the 2nd floor  Pt has a powder room on the 1rst floor  Pt has a cane and a walker at home   Pt uses no device when she is on the inside  Pt uses a cane when he is outside  Pt's SO does the cooking and cleaning  Pt has a daughter that lives close by and helps as needed   Pt had home care in the past but is not sure what agency it is   Pt's PCP is Dr Shen Velázquez  PT uses [facility]  I will continue to follow to see if PT is recommending STR vs Home care

## 2021-01-06 NOTE — CONSULTS
Orthopedics   Moises Wall 82 y o  male MRN: [id number]  Unit/Bed#: 669-62      Chief Complaint:   left hip pain groin pain    HPI:   82 y o  male community ambulator status post fall at home complaining of left hip pain and inability to bear weight  Pain is well localized to the hip and is made worse with motion or contact to the area  Denies numbness or tingling  Patient use a walker to ambulate at home he tripped on the carpeting and fell injuring his left side of the hip and the pelvis  Patient is unable to weightbear    He was brought to the emergency room was diagnosed with the left superior inferior pubic rami fractures    Review Of Systems:   · Skin: Normal  · Neuro: See HPI  · Musculoskeletal: See HPI  · 14 point review of systems negative except as stated above     Past Medical History:   Past Medical History:   Diagnosis Date    Anemia     Appendicitis     Coronary artery disease     Detached retina     GERD (gastroesophageal reflux disease)     Hyperlipidemia     Hypertension     Macular degeneration     Neuropathy     Von Willebrand disease ([address] )        Past Surgical History:   Past Surgical History:   Procedure Laterality Date    ADENOIDECTOMY      APPENDECTOMY      ARTHRODESIS  01/15/2014    Lumbar     BACK SURGERY      CATARACT EXTRACTION      COLONOSCOPY  12/09/2016    fiberoptic     CYSTOSCOPY      with resection of tumor / 1/12/17, 10/2/17 / diagnostic 12/8/16, 5/30/17     CYSTOSCOPY  09/20/2018    CYSTOSCOPY  06/22/2020    EGD AND COLONOSCOPY N/A 12/9/2016    Procedure: EGD AND COLONOSCOPY;  Surgeon: Sally Severe, MD;  [facility]: [facility];  Service:    [facility] EYE SURGERY      FRACTURE SURGERY Bilateral     ARM    NECK SURGERY      NEUROPLASTY / TRANSPOSITION MEDIAN NERVE AT CARPAL TUNNEL Right 04/2015    [place] COLONOSCOPY FLX DX W/COLLJ SPEC WHEN PFRMD N/A 2/5/2019    Procedure: COLONOSCOPY;  Surgeon: Kenny Brandt MD;  [facility];  Service: Gastroenterology  [place] CYSTOURETHROSCOPY,FULGUR <0 5 CM LESN N/A 1/12/2017    Procedure: CYSTOSCOPY, BLADDER BIOPSY WITH FULGRATION, 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;  Surgeon: Yolanda Diaz MD;  [facility];  Service: Urology    [place] CYSTOURETHROSCOPY,FULGUR <0 5 CM LESN N/A 10/2/2017    Procedure: CYSTOSCOPY WITH  BLADDER BIOPSIES WITH FULGURATION;  Surgeon: Yolanda Diaz MD;  [facility];  Service: Urology    [place] ESOPHAGOGASTRODUODENOSCOPY TRANSORAL DIAGNOSTIC N/A 8/3/2018    Procedure: ESOPHAGOGASTRODUODENOSCOPY (EGD); Surgeon: Gerri Hutchison MD;  [facility];  Service: Gastroenterology    [place] INSTILL ANTICANCER AGENT IN BLADDER N/A 1/12/2017    Procedure: Hermilo Escort;  Surgeon: Yolanda Diaz MD;  [facility];  Service: Urology    [place] INSTILL ANTICANCER AGENT IN BLADDER N/A 10/2/2017    Procedure: Hermilo Bauerort;  Surgeon: Yolanda Diaz MD;  [facility];  Service: Urology    RETINAL DETACHMENT SURGERY Right 03/2016    complete air fill confirmed    [address] SURGERY  03/03/2015    proximal humerus fracture / [place]    3/6/15   R    1/3/18     TONSILECTOMY AND ADNOIDECTOMY      TONSILLECTOMY      TRANSURETHRAL RESECTION OF BLADDER TUMOR N/A 10/2/2017    Procedure: TRANSURETHRAL RESECTION OF BLADDER TUMOR (TURBT); Surgeon: Yolanda Diaz MD;  [facility];  Service: Urology    [address] (HISTORICAL)         Family History:  Family history reviewed and non-contributory  Family History   Problem Relation Age of Onset    Ulcers Father         acute gastric    Heart disease Mother    Deanne Foster Anuerysm Sister        Social History:  Social History     Socioeconomic History    Marital status:       Spouse name: None    Number of children: None    Years of education: None    Highest education level: None   Occupational History    Occupation:    retired   Social Needs    Financial resource strain: None    Food insecurity     Worry: None     Inability: None    Transportation needs     Medical: None     Non-medical: None   Tobacco Use    Smoking status: Current Every Day Smoker     Packs/day: 0 25     Years: 60 00     Pack years: 15 00     Types: Cigarettes    Smokeless tobacco: Never Used    Tobacco comment: smokes a pipe -resolved    Substance and Sexual Activity    Alcohol use:  Yes     Alcohol/week: 2 0 standard drinks     Types: 1 Glasses of wine, 1 Shots of liquor per week     Frequency: 4 or more times a week     Drinks per session: 1 or 2     Comment: DAILY     Drug use: No    Sexual activity: None   Lifestyle    Physical activity     Days per week: None     Minutes per session: None    Stress: None   Relationships    Social connections     Talks on phone: None     Gets together: None     Attends [employer] service: None     Active member of club or organization: None     Attends meetings of clubs or organizations: None     Relationship status: None    Intimate partner violence     Fear of current or ex partner: None     Emotionally abused: None     Physically abused: None     Forced sexual activity: None   Other Topics Concern    None   Social History Narrative    No advance directives     Patient has living will        Allergies:   No Known Allergies        Labs:  0   Lab Value Date/Time    HCT 34 6 (L) 01/06/2021 0449    HCT 43 2 01/05/2021 1959    HCT 48 5 12/11/2018 1253    HCT 43 5 07/30/2015 0924    HGB 11 1 (L) 01/06/2021 0449    HGB 13 9 01/05/2021 1959    HGB 15 8 12/11/2018 1253    HGB 15 0 07/30/2015 0924    INR 1 05 01/05/2021 1959    WBC 12 86 (H) 01/06/2021 0449    WBC 14 06 (H) 01/05/2021 1959    WBC 10 93 (H) 12/11/2018 1253    WBC 7 14 07/30/2015 0924    ESR 15 (H) 06/07/2018 1046       Meds:    Current Facility-Administered Medications:     acetaminophen (TYLENOL) tablet 650 mg, 650 mg, Oral, Q6H PRN, SUKI Jimenez, 650 mg at 01/05/21 2326    aluminum-magnesium hydroxide-simethicone (MYLANTA) oral suspension 30 mL, 30 mL, Oral, Q4H PRN, Andria Robertson MD    atorvastatin (LIPITOR) tablet 10 mg, 10 mg, Oral, Daily, Saundra Y Swank, CRNP, 10 mg at 01/06/21 7344    heparin (porcine) subcutaneous injection 5,000 Units, 5,000 Units, Subcutaneous, Q8H [facility], Saundra Y Swank, CRNP, 5,000 Units at 01/06/21 0545    Inositol TABS 500 mg, 1 tablet, Oral, BID, Noy Y Swank, CRNP    metoprolol tartrate (LOPRESSOR) tablet 25 mg, 25 mg, Oral, Q12H DHIRAJ, Saundra Y Swank, CRNP, 25 mg at 01/05/21 2326    nicotine (NICODERM CQ) 21 mg/24 hr TD 24 hr patch 1 patch, 1 patch, Transdermal, Daily, Saundra Y Swank, CRNP, 1 patch at 01/06/21 0024    ondansetron (ZOFRAN) injection 4 mg, 4 mg, Intravenous, Q4H PRN, Andria Robertson MD    oxyCODONE (ROXICODONE) immediate release tablet 10 mg, 10 mg, Oral, Q6H PRN, Saundra Y Swank, CRNP    oxyCODONE (ROXICODONE) IR tablet 5 mg, 5 mg, Oral, Q6H PRN, Noy Y Swank, CRNP    pantoprazole (PROTONIX) EC tablet 40 mg, 40 mg, Oral, Early Morning, Saundra Y Swank, CRNP, 40 mg at 01/06/21 0545    Blood Culture:   No results found for: BLOODCX    Wound Culture:   No results found for: WOUNDCULT    Ins and Outs:  I/O last 24 hours: In: -   Out: 325 [Urine:325]          Physical Exam:   BP 90/61 (BP Location: Right arm)   Pulse 84   Temp 98 °F (36 7 °C) (Temporal)   Resp 18   Ht 5' 11" (1 803 m)   Wt 76 4 kg (168 lb 6 9 oz)   SpO2 96%   BMI 23 49 kg/m²   Gen: Alert and oriented to person, place, time  HEENT: EOMI, eyes clear, moist mucus membranes, hearing intact  Respiratory: Bilateral chest rise  No audible wheezing found  Cardiovascular: Regular Rate and Rhythm  Abdomen: soft nontender/nondistended  · Both lower extremities straight leg raise intact hip rotations mild discomfort  · Pelvic compression reproduces symptoms    Radiology:   I personally reviewed the films    X-rays left hip and pelvis and CT of the pelvis shows left superior inferior pubic ramus fractures minimally displaced with severe osteoarthritis affecting both hips      Assessment:  85 y o male status post fall with left superior inferior pubic rami fractures minimally displaced nonoperative treatment    Plan:   · Weight-bearing as tolerated both lower extremities no restrictions  · Analgesics for pain as needed  ·  Body mass index is 23 49 kg/m²  mildly obese  Recommend nutrition    · Dispo: Ortho signing off  · Patient needs to follow up with Orthopedics in 2-4 weeks time  · Please call if Services needed again during this visit    Torsten Bañuelos MD

## 2021-01-07 VITALS
HEART RATE: 62 BPM | OXYGEN SATURATION: 96 % | TEMPERATURE: 98 F | DIASTOLIC BLOOD PRESSURE: 68 MMHG | SYSTOLIC BLOOD PRESSURE: 138 MMHG | RESPIRATION RATE: 18 BRPM | BODY MASS INDEX: 23.58 KG/M2 | WEIGHT: 168.43 LBS | HEIGHT: 71 IN

## 2021-01-07 PROBLEM — D64.9 ANEMIA: Status: ACTIVE | Noted: 2021-01-07

## 2021-01-07 LAB
ANION GAP SERPL CALCULATED.3IONS-SCNC: 5 MMOL/L (ref 4–13)
BASOPHILS # BLD AUTO: 0.03 THOUSANDS/ΜL (ref 0–0.1)
BASOPHILS NFR BLD AUTO: 0 % (ref 0–1)
BUN SERPL-MCNC: 23 MG/DL (ref 5–25)
CALCIUM SERPL-MCNC: 8.3 MG/DL (ref 8.3–10.1)
CHLORIDE SERPL-SCNC: 106 MMOL/L (ref 100–108)
CO2 SERPL-SCNC: 26 MMOL/L (ref 21–32)
CREAT SERPL-MCNC: 0.94 MG/DL (ref 0.6–1.3)
EOSINOPHIL # BLD AUTO: 0.06 THOUSAND/ΜL (ref 0–0.61)
EOSINOPHIL NFR BLD AUTO: 1 % (ref 0–6)
ERYTHROCYTE [DISTWIDTH] IN BLOOD BY AUTOMATED COUNT: 13.9 % (ref 11.6–15.1)
GFR SERPL CREATININE-BSD FRML MDRD: 75 ML/MIN/1.73SQ M
GLUCOSE SERPL-MCNC: 92 MG/DL (ref 65–140)
HCT VFR BLD AUTO: 33.9 % (ref 36.5–49.3)
HGB BLD-MCNC: 10.9 G/DL (ref 12–17)
IMM GRANULOCYTES # BLD AUTO: 0.03 THOUSAND/UL (ref 0–0.2)
IMM GRANULOCYTES NFR BLD AUTO: 0 % (ref 0–2)
LYMPHOCYTES # BLD AUTO: 3.19 THOUSANDS/ΜL (ref 0.6–4.47)
LYMPHOCYTES NFR BLD AUTO: 30 % (ref 14–44)
MAGNESIUM SERPL-MCNC: 2.1 MG/DL (ref 1.6–2.6)
MCH RBC QN AUTO: 29.1 PG (ref 26.8–34.3)
MCHC RBC AUTO-ENTMCNC: 32.2 G/DL (ref 31.4–37.4)
MCV RBC AUTO: 91 FL (ref 82–98)
MONOCYTES # BLD AUTO: 1.87 THOUSAND/ΜL (ref 0.17–1.22)
MONOCYTES NFR BLD AUTO: 17 % (ref 4–12)
NEUTROPHILS # BLD AUTO: 5.64 THOUSANDS/ΜL (ref 1.85–7.62)
NEUTS SEG NFR BLD AUTO: 52 % (ref 43–75)
NRBC BLD AUTO-RTO: 0 /100 WBCS
PLATELET # BLD AUTO: 173 THOUSANDS/UL (ref 149–390)
PMV BLD AUTO: 11.3 FL (ref 8.9–12.7)
POTASSIUM SERPL-SCNC: 3.7 MMOL/L (ref 3.5–5.3)
RBC # BLD AUTO: 3.74 MILLION/UL (ref 3.88–5.62)
SODIUM SERPL-SCNC: 137 MMOL/L (ref 136–145)
WBC # BLD AUTO: 10.82 THOUSAND/UL (ref 4.31–10.16)

## 2021-01-07 PROCEDURE — 83735 ASSAY OF MAGNESIUM: CPT | Performed by: INTERNAL MEDICINE

## 2021-01-07 PROCEDURE — 97530 THERAPEUTIC ACTIVITIES: CPT

## 2021-01-07 PROCEDURE — 80048 BASIC METABOLIC PNL TOTAL CA: CPT | Performed by: INTERNAL MEDICINE

## 2021-01-07 PROCEDURE — 85025 COMPLETE CBC W/AUTO DIFF WBC: CPT | Performed by: INTERNAL MEDICINE

## 2021-01-07 PROCEDURE — 99239 HOSP IP/OBS DSCHRG MGMT >30: CPT | Performed by: INTERNAL MEDICINE

## 2021-01-07 PROCEDURE — 97535 SELF CARE MNGMENT TRAINING: CPT

## 2021-01-07 PROCEDURE — 97116 GAIT TRAINING THERAPY: CPT

## 2021-01-07 RX ORDER — PREDNISONE 10 MG/1
10 TABLET ORAL DAILY
Qty: 3 TABLET | Refills: 0 | Status: SHIPPED | OUTPATIENT
Start: 2021-01-07 | End: 2021-01-10

## 2021-01-07 RX ORDER — ACETAMINOPHEN 325 MG/1
650 TABLET ORAL EVERY 6 HOURS PRN
Refills: 0
Start: 2021-01-07

## 2021-01-07 RX ADMIN — POTASSIUM CHLORIDE 30 MEQ: 1500 TABLET, EXTENDED RELEASE ORAL at 09:03

## 2021-01-07 RX ADMIN — HEPARIN SODIUM 5000 UNITS: 5000 INJECTION INTRAVENOUS; SUBCUTANEOUS at 06:02

## 2021-01-07 RX ADMIN — GABAPENTIN 600 MG: 300 CAPSULE ORAL at 09:03

## 2021-01-07 RX ADMIN — METOPROLOL TARTRATE 25 MG: 25 TABLET, FILM COATED ORAL at 09:03

## 2021-01-07 RX ADMIN — ATORVASTATIN CALCIUM 10 MG: 10 TABLET, FILM COATED ORAL at 09:03

## 2021-01-07 RX ADMIN — PANTOPRAZOLE SODIUM 40 MG: 40 TABLET, DELAYED RELEASE ORAL at 06:02

## 2021-01-07 RX ADMIN — ACETAMINOPHEN 650 MG: 325 TABLET, FILM COATED ORAL at 06:02

## 2021-01-07 NOTE — PLAN OF CARE
Problem: OCCUPATIONAL THERAPY ADULT  Goal: Performs self-care activities at highest level of function for planned discharge setting  See evaluation for individualized goals  Description: Treatment Interventions: ADL retraining, Functional transfer training, UE strengthening/ROM, Endurance training, Equipment evaluation/education, Patient/family training, Compensatory technique education, Activityengagement          See flowsheet documentation for full assessment, interventions and recommendations  Note: Limitation: Decreased Safe judgement during ADL, Decreased endurance, Decreased self-care trans, Decreased high-level ADLs, Decreased ADL status, Decreased UE strength     Assessment: Pt present on 1/7/2021 to OT treatment day 1 s/p admit to [facility] on 1/5/2021 w/ closed fracture of superior ramus of left pubis, closed fracture of inferior pubic ramus, fall  OT treatment to address decreased safe judgment during ADL, decreased endurance, decreased self-care ADLs, decreased high-level ADLs, decreased ADL status, and decreased UE strength through ADL retraining, functional transfer training, UE strengthening/ROM, endurance training, equipment evaluation and education, patient/family training, compensatory techniques, and activity engagement  Pt agreeable to session; however, Pt appeared unmotivated to participate in session, reporting that he should stay rested and not move around for 1-2 weeks due to pain  Pt educated on importance of functional mobility and functional engagement to assist in healing  Pt seated at start of session  Pt educated via verbal instruction and demonstration on use of RW, WBS, and safe transfer and FM sequencing  Pt verbalized understanding and willingness to attempt  Pt required min (A) x1 for transfers  Pt performed FM within room ~10 ft x2 with RW at min (A) x1  Pt required mod-max verbal cues and physical cues to maintain close proximity to RW, upright alignment, and sequencing   Pt performed trial of 5 stairs, please refer to PT treatment note for details regarding Pt performance on stairs  Pt performed toilet transfer with min (A) x1 and RW; Pt required verbal cues and physical guidance to align self in front of toilet and ensure that Pt is fully in front of toilet prior to attempting toilet transfer  Pt performed toileting and LB dressing at min (A) x1, as Pt required steadying for clothing management  Pt completed grooming while standing at sink to wash hands; Pt required min (A) to complete safely  Pt demonstrated inability to maintain upright posture while washing hands, as Pt rested BUE onto counter to support and steady self; Pt able to correct to upright posture when verbally cued  Pt performed FM to chair and required min (A) to complete stand to sit transfer into chair  Pt educated on importance of ensuring close proximity to chair/toilet while completing transfer  Pt verbalized understanding, however, Pt continues to demonstrate poor safety awareness and poor understanding of instructions  Pt impulsive throughout session  Pt reports leaving for home today around 1:30 pm and will receive HHOT  Pt educated on recommendations to d/c to STR due to FM, ADL independence, and safety awareness  Pt reports he will return home and stay on 2nd floor of his home until healed  A course of skilled occupational therapy remains warranted to address deficits in ADL independence, FM with RW, and safety awareness/impulsivity  At this time, it is recommended that Pt continue progress towards functional outcomes at [facility]        OT Discharge Recommendation: Post-Acute Rehabilitation Services  OT - OK to Discharge: Yes(When medically cleared)

## 2021-01-07 NOTE — PLAN OF CARE
Problem: Potential for Falls  Goal: Patient will remain free of falls  Description: INTERVENTIONS:  - Assess patient frequently for physical needs  -  Identify cognitive and physical deficits and behaviors that affect risk of falls    -  [place] fall precautions as indicated by assessment   - Educate patient/family on patient safety including physical limitations  - Instruct patient to call for assistance with activity based on assessment  - Modify environment to reduce risk of injury  - Consider OT/PT consult to assist with strengthening/mobility  1/7/2021 1246 by Sharyn Ayala RN  Outcome: Adequate for Discharge  1/7/2021 0721 by Sharyn Ayala RN  Outcome: Progressing     Problem: METABOLIC, FLUID AND ELECTROLYTES - ADULT  Goal: Electrolytes maintained within normal limits  Description: INTERVENTIONS:  - Monitor labs and assess patient for signs and symptoms of electrolyte imbalances  - Administer electrolyte replacement as ordered  - Monitor response to electrolyte replacements, including repeat lab results as appropriate  - Instruct patient on fluid and nutrition as appropriate  1/7/2021 1246 by Sharyn Ayala RN  Outcome: Adequate for Discharge  1/7/2021 4811 by Sharyn Ayala RN  Outcome: Progressing  Goal: Fluid balance maintained  Description: INTERVENTIONS:  - Monitor labs   - Monitor I/O and WT  - Instruct patient on fluid and nutrition as appropriate  - Assess for signs & symptoms of volume excess or deficit  1/7/2021 1246 by Sharyn Ayala RN  Outcome: Adequate for Discharge  1/7/2021 9245 by Sharyn Ayala RN  Outcome: Progressing  Goal: Glucose maintained within target range  Description: INTERVENTIONS:  - Monitor Blood Glucose as ordered  - Assess for signs and symptoms of hyperglycemia and hypoglycemia  - Administer ordered medications to maintain glucose within target range  - Assess nutritional intake and initiate nutrition service referral as needed  1/7/2021 1246 by Carine Mejias RN  Outcome: Adequate for Discharge  1/7/2021 7361 by Carine Mejias RN  Outcome: Progressing     Problem: SKIN/TISSUE INTEGRITY - ADULT  Goal: Skin integrity remains intact  Description: INTERVENTIONS  - Identify patients at risk for skin breakdown  - Assess and monitor skin integrity  - Assess and monitor nutrition and hydration status  - Monitor labs (i e  albumin)  - Assess for incontinence   - Turn and reposition patient  - Assist with mobility/ambulation  - Relieve pressure over bony prominences  - Avoid friction and shearing  - Provide appropriate hygiene as needed including keeping skin clean and dry  - Evaluate need for skin moisturizer/barrier cream  - Collaborate with interdisciplinary team (i e  Nutrition, Rehabilitation, etc )   - Patient/family teaching  1/7/2021 1246 by Carine Mejias RN  Outcome: Adequate for Discharge  1/7/2021 0721 by Carine Mejias RN  Outcome: Progressing  Goal: Incision(s), wounds(s) or drain site(s) healing without S/S of infection  Description: INTERVENTIONS  - Assess and document risk factors for skin impairment   - Assess and document dressing, incision, wound bed, drain sites and surrounding tissue  - Consider nutrition services referral as needed  - Oral mucous membranes remain intact  - Provide patient/ family education  1/7/2021 1246 by Carine Mejias RN  Outcome: Adequate for Discharge  1/7/2021 7965 by Carine Mejias RN  Outcome: Progressing  Goal: Oral mucous membranes remain intact  Description: INTERVENTIONS  - Assess oral mucosa and hygiene practices  - Implement preventative oral hygiene regimen  - Implement oral medicated treatments as ordered  - Initiate Nutrition services referral as needed  1/7/2021 1246 by Carine Mejias RN  Outcome: Adequate for Discharge  1/7/2021 8143 by Carine Mejias RN  Outcome: Progressing     Problem: MUSCULOSKELETAL - ADULT  Goal: Maintain or return mobility to safest level of function  Description: INTERVENTIONS:  - Assess patient's ability to carry out ADLs; assess patient's baseline for ADL function and identify physical deficits which impact ability to perform ADLs (bathing, care of mouth/teeth, toileting, grooming, dressing, etc )  - Assess/evaluate cause of self-care deficits   - Assess range of motion  - Assess patient's mobility  - Assess patient's need for assistive devices and provide as appropriate  - Encourage maximum independence but intervene and supervise when necessary  - Involve family in performance of ADLs  - Assess for home care needs following discharge   - Consider OT consult to assist with ADL evaluation and planning for discharge  - Provide patient education as appropriate  1/7/2021 1246 by Joaquin Mclean RN  Outcome: Adequate for Discharge  1/7/2021 5157 by Joaquin Mclean RN  Outcome: Progressing  Goal: Maintain proper alignment of affected body part  Description: INTERVENTIONS:  - Support, maintain and protect limb and body alignment  - Provide patient/ family with appropriate education  1/7/2021 1246 by Joaquin Mclena RN  Outcome: Adequate for Discharge  1/7/2021 1377 by Joaquin Mclean RN  Outcome: Progressing     Problem: PAIN - ADULT  Goal: Verbalizes/displays adequate comfort level or baseline comfort level  Description: Interventions:  - Encourage patient to monitor pain and request assistance  - Assess pain using appropriate pain scale  - Administer analgesics based on type and severity of pain and evaluate response  - Implement non-pharmacological measures as appropriate and evaluate response  - Consider cultural and social influences on pain and pain management  - Notify physician/advanced practitioner if interventions unsuccessful or patient reports new pain  1/7/2021 1246 by Joaquin Mclean RN  Outcome: Adequate for Discharge  1/7/2021 5831 by Joaquin Mclean RN  Outcome: Progressing     Problem: INFECTION - ADULT  Goal: Absence or prevention of progression during hospitalization  Description: INTERVENTIONS:  - Assess and monitor for signs and symptoms of infection  - Monitor lab/diagnostic results  - Monitor all insertion sites, i e  indwelling lines, tubes, and drains  - Administer medications as ordered  - Instruct and encourage patient and family to use good hand hygiene technique  - Identify and instruct in appropriate isolation precautions for identified infection/condition  1/7/2021 1246 by Taj Man RN  Outcome: Adequate for Discharge  1/7/2021 6104 by Taj Man RN  Outcome: Progressing  Goal: Absence of fever/infection during neutropenic period  Description: INTERVENTIONS:  - Monitor WBC    1/7/2021 1246 by Taj Man RN  Outcome: Adequate for Discharge  1/7/2021 0721 by Taj Man RN  Outcome: Progressing     Problem: SAFETY ADULT  Goal: Patient will remain free of falls  Description: INTERVENTIONS:  - Assess patient frequently for physical needs  -  Identify cognitive and physical deficits and behaviors that affect risk of falls    -  [place] fall precautions as indicated by assessment   - Educate patient/family on patient safety including physical limitations  - Instruct patient to call for assistance with activity based on assessment  - Modify environment to reduce risk of injury  - Consider OT/PT consult to assist with strengthening/mobility  1/7/2021 1246 by Taj Man RN  Outcome: Adequate for Discharge  1/7/2021 9597 by Taj Man RN  Outcome: Progressing  Goal: Maintain or return to baseline ADL function  Description: INTERVENTIONS:  -  Assess patient's ability to carry out ADLs; assess patient's baseline for ADL function and identify physical deficits which impact ability to perform ADLs (bathing, care of mouth/teeth, toileting, grooming, dressing, etc )  - Assess/evaluate cause of self-care deficits   - Assess range of motion  - Assess patient's mobility; develop plan if impaired  - Assess patient's need for assistive devices and provide as appropriate  - Encourage maximum independence but intervene and supervise when necessary  - Involve family in performance of ADLs  - Assess for home care needs following discharge   - Consider OT consult to assist with ADL evaluation and planning for discharge  - Provide patient education as appropriate  1/7/2021 1246 by Claribel Reeves RN  Outcome: Adequate for Discharge  1/7/2021 0721 by Claribel Reeves RN  Outcome: Progressing  Goal: Maintain or return mobility status to optimal level  Description: INTERVENTIONS:  - Assess patient's baseline mobility status (ambulation, transfers, stairs, etc )    - Identify cognitive and physical deficits and behaviors that affect mobility  - Identify mobility aids required to assist with transfers and/or ambulation (gait belt, sit-to-stand, lift, walker, cane, etc )  - [place] fall precautions as indicated by assessment  - Record patient progress and toleration of activity level on Mobility SBAR; progress patient to next Phase/Stage  - Instruct patient to call for assistance with activity based on assessment  - Consider rehabilitation consult to assist with strengthening/weightbearing, etc   1/7/2021 1246 by Claribel Reeves RN  Outcome: Adequate for Discharge  1/7/2021 0721 by Claribel Reeves RN  Outcome: Progressing     Problem: DISCHARGE PLANNING  Goal: Discharge to home or other facility with appropriate resources  Description: INTERVENTIONS:  - Identify barriers to discharge w/patient and caregiver  - Arrange for needed discharge resources and transportation as appropriate  - Identify discharge learning needs (meds, wound care, etc )  - Refer to Case Management Department for coordinating discharge planning if the patient needs post-hospital services based on physician/advanced practitioner order or complex needs related to functional status, cognitive ability, or social support system  1/7/2021 1246 by Dorene Gibson RN  Outcome: Adequate for Discharge  1/7/2021 7892 by Dorene Gibson RN  Outcome: Progressing     Problem: Knowledge Deficit  Goal: Patient/family/caregiver demonstrates understanding of disease process, treatment plan, medications, and discharge instructions  Description: Complete learning assessment and assess knowledge base    Interventions:  - Provide teaching at level of understanding  - Provide teaching via preferred learning methods  1/7/2021 1246 by Dorene Gibson RN  Outcome: Adequate for Discharge  1/7/2021 8262 by Dorene Gibson RN  Outcome: Progressing

## 2021-01-07 NOTE — DISCHARGE SUMMARY
Discharge- Burgess Sandoval 1935, 82 y o  male MRN: [id number]    Unit/Bed#: 346-60 Encounter: [id number]    Primary Care Provider: Bacilio Rich DO   Date and time admitted to hospital: 1/5/2021  7:45 PM        * Closed fracture of superior ramus of left pubis [facility])  Assessment & Plan  Patient is status post mechanical fall, with resultant imaging showing comminuted and mildly displaced fractures of the left superior and inferior pubic rami  Patient also with extraperitoneal hematoma in the anterior pelvic wall  Officially evaluated by Orthopedics, with recommendations for weight-bearing as tolerated without restrictions, as well as pain control  Repeat evaluation by PT/OT with continued recommendations for short-term rehab, again adamantly refused by patient  There is concern regarding his safety given multiple falls and baseline ambulatory dysfunction, but the patient is refusing placement  There is also concern regarding dropping hemoglobin in the setting of anterior pelvic wall hematoma, as further anemia may exacerbate patient's symptoms and increase his risk for falls  This was explained in detail to both him and his daughter  Plan will be for repeat H&H in 2 days, home health referral for both nursing and PT/OT, and close monitoring upon return home  He is also advised to utilize a walker at all times, and restrict ambulation to 1 floor and avoid stairs  Fall  Assessment & Plan  As above  High blood pressure  Assessment & Plan  Blood pressure remains stable despite fall in hemoglobin  Continue beta-blocker therapy for now and monitor closely at home  Gastroesophageal reflux disease without esophagitis  Assessment & Plan  Continue PPI therapy      Anemia  Assessment & Plan  Patient is status post mechanical fall, with resultant imaging showing comminuted and mildly displaced fractures of the left superior and inferior pubic rami, with concurrent extraperitoneal hematoma in the anterior pelvic wall  Hemoglobin appears to be 14-15, dropped to 11 1 and now 10 9 this morning  Plan as above is to repeat H&H in 2 days via home health, and monitor closely  Discharging Physician / Practitioner: Mookie Beltran MD  PCP: Phuong Valencia DO  Admission Date:   Admission Orders (From admission, onward)     Ordered        01/05/21 2119  Inpatient Admission  Once                   Discharge Date: 01/07/21    Resolved Problems  Date Reviewed: 1/7/2021    None          Consultations During Hospital Stay:  · Ortho    Procedures Performed:   · None    Significant Findings / Test Results:   Xr Elbow 3+ Vw Left    Result Date: 1/6/2021  Impression: 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  Findings concur with ED results as provided in PACS viewer/EPIC at the time of interpretation  Workstation performed: [id number]     Xr Femur 2 Views Left    Result Date: 1/6/2021  Impression: Fairly visible left superior and inferior pubic rami fractures No acute femoral osseous abnormality  Workstation performed: [id number]     Xr Pelvis Ap Only 1 Or 2 Vw    Result Date: 1/6/2021  Impression: Acute minimally displaced left superior and inferior pubic rami fractures, better visualized by CT Degenerative arthritis both hips Workstation performed: [id number]     Ct Lower Extremity Wo Contrast Left    Result Date: 1/5/2021  Impression: 1  Comminuted and mildly displaced fractures of the left superior and inferior pubic rami  2   Small to moderate extraperitoneal hematoma in the anterior pelvic wall extending into the space of Retzius  3   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, similar to the prior MRI examination  No subchondral collapse  Workstation performed: [id number]       Incidental Findings:   · As above     Test Results Pending at Discharge (will require follow up):    · None Outpatient Tests Requested:  · CBC    Complications:  None    Reason for Admission:  Fall with pubic rami fracture  Hospital Course:     62-year-old man with past medical history significant for neuropathy and ambulatory dysfunction with frequent falls, admitted from [facility] ED on 01/05 with diagnosis of a fall with resultant pubic fracture  Mr Augusta Albarran has past medical history significant for GERD, COPD, previously noted bladder cancer, hypothyroidism, and spinal stenosis  The patient also has a history of peripheral neuropathy, with resultant ambulatory dysfunction and frequent falls in the past   He had suffered a mechanical fall, presented to the ED for evaluation secondary to pain  Imaging showed mildly displaced fractures of the left superior and inferior pubic rami  He was admitted to the hospital for further evaluation and treatment  The patient was evaluated by orthopedic surgery, with recommendations for weight-bearing as tolerated on both lower extremities without restrictions  He was also evaluated by PT/OT, with recommendations for skilled nursing facility for short-term rehab  The patient also exhibited signs of blood loss, with his hemoglobin dropping down to 10 9 on the day of discharge (baseline at 14-15)  CT had also shown evidence of a small to moderate extraperitoneal hematoma in the anterior pelvic wall  Recommendations were made for an additional night of monitoring, repeat H&H in the morning, and skilled nursing facility placement, all adamantly refused by the patient  Risks were explained in detail, the patient is being discharged home with home health nursing for repeat blood work in 2 days, and PT/OT for further treatment  He is also advised to use his walker at all times, stay on 1 floor in his house, and avoid stairs  The patient will also follow-up with his PCP and orthopedic surgery      Please see above list of diagnoses and related plan for additional information  Condition at Discharge: stable     Discharge Day Visit / Exam:     * Please refer to separate progress note for these details *    Discussion with Family: Yes    Discharge instructions/Information to patient and family:   See after visit summary for information provided to patient and family  Provisions for Follow-Up Care:  See after visit summary for information related to follow-up care and any pertinent home health orders  Disposition:     Home with VNA Services (Reminder: Complete face to face encounter)    For Discharges to [facility] SNF:   · Not Applicable to this Patient - Not Applicable to this Patient    Planned Readmission: No     Discharge Statement:  I spent 35 minutes discharging the patient  This time was spent on the day of discharge  I had direct contact with the patient on the day of discharge  Greater than 50% of the total time was spent examining patient, answering all patient questions, arranging and discussing plan of care with patient as well as directly providing post-discharge instructions  Additional time then spent on discharge activities  Discharge Medications:  See after visit summary for reconciled discharge medications provided to patient and family        ** Please Note: This note has been constructed using a voice recognition system **

## 2021-01-07 NOTE — ASSESSMENT & PLAN NOTE
Patient is status post mechanical fall, with resultant imaging showing comminuted and mildly displaced fractures of the left superior and inferior pubic rami, with concurrent extraperitoneal hematoma in the anterior pelvic wall  Hemoglobin appears to be 14-15, dropped to 11 1 and now 10 9 this morning  Plan as above is to repeat H&H in 2 days via home health, and monitor closely

## 2021-01-07 NOTE — PLAN OF CARE
Problem: Potential for Falls  Goal: Patient will remain free of falls  Description: INTERVENTIONS:  - Assess patient frequently for physical needs  -  Identify cognitive and physical deficits and behaviors that affect risk of falls    -  [place] fall precautions as indicated by assessment   - Educate patient/family on patient safety including physical limitations  - Instruct patient to call for assistance with activity based on assessment  - Modify environment to reduce risk of injury  - Consider OT/PT consult to assist with strengthening/mobility  Outcome: Progressing     Problem: METABOLIC, FLUID AND ELECTROLYTES - ADULT  Goal: Electrolytes maintained within normal limits  Description: INTERVENTIONS:  - Monitor labs and assess patient for signs and symptoms of electrolyte imbalances  - Administer electrolyte replacement as ordered  - Monitor response to electrolyte replacements, including repeat lab results as appropriate  - Instruct patient on fluid and nutrition as appropriate  Outcome: Progressing  Goal: Fluid balance maintained  Description: INTERVENTIONS:  - Monitor labs   - Monitor I/O and WT  - Instruct patient on fluid and nutrition as appropriate  - Assess for signs & symptoms of volume excess or deficit  Outcome: Progressing  Goal: Glucose maintained within target range  Description: INTERVENTIONS:  - Monitor Blood Glucose as ordered  - Assess for signs and symptoms of hyperglycemia and hypoglycemia  - Administer ordered medications to maintain glucose within target range  - Assess nutritional intake and initiate nutrition service referral as needed  Outcome: Progressing     Problem: SKIN/TISSUE INTEGRITY - ADULT  Goal: Skin integrity remains intact  Description: INTERVENTIONS  - Identify patients at risk for skin breakdown  - Assess and monitor skin integrity  - Assess and monitor nutrition and hydration status  - Monitor labs (i e  albumin)  - Assess for incontinence   - Turn and reposition patient  - Assist with mobility/ambulation  - Relieve pressure over bony prominences  - Avoid friction and shearing  - Provide appropriate hygiene as needed including keeping skin clean and dry  - Evaluate need for skin moisturizer/barrier cream  - Collaborate with interdisciplinary team (i e  Nutrition, Rehabilitation, etc )   - Patient/family teaching  Outcome: Progressing  Goal: Incision(s), wounds(s) or drain site(s) healing without S/S of infection  Description: INTERVENTIONS  - Assess and document risk factors for skin impairment   - Assess and document dressing, incision, wound bed, drain sites and surrounding tissue  - Consider nutrition services referral as needed  - Oral mucous membranes remain intact  - Provide patient/ family education  Outcome: Progressing  Goal: Oral mucous membranes remain intact  Description: INTERVENTIONS  - Assess oral mucosa and hygiene practices  - Implement preventative oral hygiene regimen  - Implement oral medicated treatments as ordered  - Initiate Nutrition services referral as needed  Outcome: Progressing     Problem: MUSCULOSKELETAL - ADULT  Goal: Maintain or return mobility to safest level of function  Description: INTERVENTIONS:  - Assess patient's ability to carry out ADLs; assess patient's baseline for ADL function and identify physical deficits which impact ability to perform ADLs (bathing, care of mouth/teeth, toileting, grooming, dressing, etc )  - Assess/evaluate cause of self-care deficits   - Assess range of motion  - Assess patient's mobility  - Assess patient's need for assistive devices and provide as appropriate  - Encourage maximum independence but intervene and supervise when necessary  - Involve family in performance of ADLs  - Assess for home care needs following discharge   - Consider OT consult to assist with ADL evaluation and planning for discharge  - Provide patient education as appropriate  Outcome: Progressing  Goal: Maintain proper alignment of affected body part  Description: INTERVENTIONS:  - Support, maintain and protect limb and body alignment  - Provide patient/ family with appropriate education  Outcome: Progressing     Problem: PAIN - ADULT  Goal: Verbalizes/displays adequate comfort level or baseline comfort level  Description: Interventions:  - Encourage patient to monitor pain and request assistance  - Assess pain using appropriate pain scale  - Administer analgesics based on type and severity of pain and evaluate response  - Implement non-pharmacological measures as appropriate and evaluate response  - Consider cultural and social influences on pain and pain management  - Notify physician/advanced practitioner if interventions unsuccessful or patient reports new pain  Outcome: Progressing     Problem: INFECTION - ADULT  Goal: Absence or prevention of progression during hospitalization  Description: INTERVENTIONS:  - Assess and monitor for signs and symptoms of infection  - Monitor lab/diagnostic results  - Monitor all insertion sites, i e  indwelling lines, tubes, and drains  - Administer medications as ordered  - Instruct and encourage patient and family to use good hand hygiene technique  - Identify and instruct in appropriate isolation precautions for identified infection/condition  Outcome: Progressing  Goal: Absence of fever/infection during neutropenic period  Description: INTERVENTIONS:  - Monitor WBC    Outcome: Progressing     Problem: SAFETY ADULT  Goal: Patient will remain free of falls  Description: INTERVENTIONS:  - Assess patient frequently for physical needs  -  Identify cognitive and physical deficits and behaviors that affect risk of falls    -  [place] fall precautions as indicated by assessment   - Educate patient/family on patient safety including physical limitations  - Instruct patient to call for assistance with activity based on assessment  - Modify environment to reduce risk of injury  - Consider OT/PT consult to assist with strengthening/mobility  Outcome: Progressing  Goal: Maintain or return to baseline ADL function  Description: INTERVENTIONS:  -  Assess patient's ability to carry out ADLs; assess patient's baseline for ADL function and identify physical deficits which impact ability to perform ADLs (bathing, care of mouth/teeth, toileting, grooming, dressing, etc )  - Assess/evaluate cause of self-care deficits   - Assess range of motion  - Assess patient's mobility; develop plan if impaired  - Assess patient's need for assistive devices and provide as appropriate  - Encourage maximum independence but intervene and supervise when necessary  - Involve family in performance of ADLs  - Assess for home care needs following discharge   - Consider OT consult to assist with ADL evaluation and planning for discharge  - Provide patient education as appropriate  Outcome: Progressing  Goal: Maintain or return mobility status to optimal level  Description: INTERVENTIONS:  - Assess patient's baseline mobility status (ambulation, transfers, stairs, etc )    - Identify cognitive and physical deficits and behaviors that affect mobility  - Identify mobility aids required to assist with transfers and/or ambulation (gait belt, sit-to-stand, lift, walker, cane, etc )  - [place] fall precautions as indicated by assessment  - Record patient progress and toleration of activity level on Mobility SBAR; progress patient to next Phase/Stage  - Instruct patient to call for assistance with activity based on assessment  - Consider rehabilitation consult to assist with strengthening/weightbearing, etc   Outcome: Progressing     Problem: DISCHARGE PLANNING  Goal: Discharge to home or other facility with appropriate resources  Description: INTERVENTIONS:  - Identify barriers to discharge w/patient and caregiver  - Arrange for needed discharge resources and transportation as appropriate  - Identify discharge learning needs (meds, wound care, etc )  - Refer to Case Management Department for coordinating discharge planning if the patient needs post-hospital services based on physician/advanced practitioner order or complex needs related to functional status, cognitive ability, or social support system  Outcome: Progressing     Problem: Knowledge Deficit  Goal: Patient/family/caregiver demonstrates understanding of disease process, treatment plan, medications, and discharge instructions  Description: Complete learning assessment and assess knowledge base    Interventions:  - Provide teaching at level of understanding  - Provide teaching via preferred learning methods  Outcome: Progressing

## 2021-01-07 NOTE — CASE MANAGEMENT
Pt is being discharged today  [address] [facility] wheel chair Vivian Ray will pick the patient up around 1:45  I notified [address] [facility] wheel chair Vivian Ray that patient has 13 steps in the house and they will need to bring stair climber  I notified [facility] that patient is being discharged today  I in basket messaged Dr Star Chacon office and [facility] ortho to call patient at home with a follow up appointment  AVS and discharge instructions reviewed with patient with good understanding  Case Management reviewed discharge planning process including the following: identifying help that is needed at home, pt's preference for discharge needs and Meds at [facility]  Reviewed with Pt that any member of the healthcare team can answer questions regarding : medications, jmportance of recognizing  Signs and symptoms of any  medical problems  Case Management also encouraged pt to follow up with all recommended appointments after discharge

## 2021-01-07 NOTE — ASSESSMENT & PLAN NOTE
Blood pressure remains stable despite fall in hemoglobin  Continue beta-blocker therapy for now and monitor closely at home

## 2021-01-07 NOTE — ASSESSMENT & PLAN NOTE
Patient is status post mechanical fall, with resultant imaging showing comminuted and mildly displaced fractures of the left superior and inferior pubic rami  Patient also with extraperitoneal hematoma in the anterior pelvic wall  Officially evaluated by Orthopedics, with recommendations for weight-bearing as tolerated without restrictions, as well as pain control  Repeat evaluation by PT/OT with continued recommendations for short-term rehab, again adamantly refused by patient  There is concern regarding his safety given multiple falls and baseline ambulatory dysfunction, but the patient is refusing placement  There is also concern regarding dropping hemoglobin in the setting of anterior pelvic wall hematoma, as further anemia may exacerbate patient's symptoms and increase his risk for falls  This was explained in detail to both him and his daughter  Plan will be for repeat H&H in 2 days, home health referral for both nursing and PT/OT, and close monitoring upon return home  He is also advised to utilize a walker at all times, and restrict ambulation to 1 floor and avoid stairs

## 2021-01-07 NOTE — PLAN OF CARE
Problem: PHYSICAL THERAPY ADULT  Goal: Performs mobility at highest level of function for planned discharge setting  See evaluation for individualized goals  Description: Treatment/Interventions: Functional transfer training, ADL retraining, LE strengthening/ROM, Therapeutic exercise, Endurance training, Equipment eval/education, Bed mobility, Gait training          See flowsheet documentation for full assessment, interventions and recommendations  Outcome: Progressing  Note: Prognosis: Guarded  Problem List: Decreased strength, Decreased endurance, Impaired balance, Decreased mobility, Impaired judgement, Decreased safety awareness, Pain, Orthopedic restrictions  Assessment: Pt seen this day for PT treatment session to increase strength and independence with functional mobility, as well as improve balance and endurance  Pt was seated at the beginning of session  Pt was re-educated on weightbearing status of WBAT for LLE, as well as gait pattern while using the RW  Pt performed transfers with min(A)x1 and RW, including sit to stand, stand to sit, and toilet transfer to standard toilet  Pt requires increased verbal cues with pivoting when transferring and tactile cues to guide his hips due to the fact that he is impulsive with sitting  Pt ambulated 10' with min(A)x1 and RW with a step to gait pattern, slow gait speed, and decreased L stance time  Pt was educated on the proper technique to ascend/descend the steps using BHR  A gait belt was used with mod(A)x1 when ascending/descending 5 steps; verbal cues were used for safe placement of feet on steps when ascending/descending  Pt displayed decreased hip flexion strength when advancing his limb for stair ascension, as well as decrease LE strength when buckling during stair descent  Continued PT is required to increase strength and independence with functional mobility, as well as improve balance and endurance   Though pt was able to ascend/descend the steps required to access his house, the pt required mod(A), verbal and tactile cues, and displayed decreased strength and safety awareness  Due to this, the pt is not safe to return home and the dc recommendation continues to be post-acute rehab  Despite recommendations, pt continues to refuse post-acute rehab and wants to return home  Barriers to Discharge: Inaccessible home environment, Decreased caregiver support     PT Discharge Recommendation: [id number]     PT - OK to Discharge: Yes    See flowsheet documentation for full assessment

## 2021-01-07 NOTE — OCCUPATIONAL THERAPY NOTE
Occupational Therapy Progress Note     Patient Name: Sammi Burkett  MYAMC'A Date: 1/7/2021  Problem List  Principal Problem:    Closed fracture of superior ramus of left pubis ([address] )  Active Problems:    High blood pressure    Gastroesophageal reflux disease without esophagitis    Fall              01/07/21 0846   OT Last Visit   OT Visit Date 01/07/21   Note Type   Note Type Treatment   Restrictions/Precautions   Weight Bearing Precautions Per Order Yes   LLE Weight Bearing Per Order WBAT   Other Precautions Pain; Fall Risk;WBS;Chair Alarm; Impulsive   Pain Assessment   Pain Assessment Tool FLACC   Pain Rating: FLACC (Rest) - Face 0   Pain Rating: FLACC (Rest) - Legs 0   Pain Rating: FLACC (Rest) - Activity 0   Pain Rating: FLACC (Rest) - Cry 0   Pain Rating: FLACC (Rest) - Consolability 0   Score: FLACC (Rest) 0   Pain Rating: FLACC (Activity) - Face 1   Pain Rating: FLACC (Activity) - Legs 0   Pain Rating: FLACC (Activity) - Activity 0   Pain Rating: FLACC (Activity) - Cry 0   Pain Rating: FLACC (Activity) - Consolability 1   Score: FLACC (Activity) 2   ADL   Where Assessed Standing at sink   Grooming Assistance 4  Minimal Assistance   Grooming Deficit Wash/dry hands;Standing with assistive device;Verbal cueing; Impulsive;Supervision/safety;Setup;Steadying   Grooming Comments Pt unable to maintain upright posture during hand washing at sink; Pt forward flex to support BUE onto counter during washing; Pt corrected and took upright posture when verbal cued to correct; Pt reports he is used to sitting at home in front of sink when completing ADLs at sink   LB Dressing Assistance 4  Minimal Assistance   LB Dressing Deficit Pull up over hips; Requires assistive device for steadying; Impulsive;Verbal cueing;Supervision/safety   LB Dressing Comments Pt required (A) to pull briefs and pants over hips upon completion of toileting;  Pt required verbal cues to perform safely and maintain steadiness with use of RW Toileting Assistance  4  Minimal Assistance   Toileting Deficit Clothing management down; Increased time to complete;Supervison/safety;Verbal cueing; Impulsive;Steadying   Toileting Comments Pt performed toileting in bathroom on standard toilet; Pt required verbal cues to complete safely with physical guidance to align self in front of toilet   Bed Mobility   Additional Comments Pt seated in chair at start and end of session   Transfers   Sit to Stand 4  Minimal assistance   Additional items Assist x 1; Impulsive   Stand to Sit 4  Minimal assistance   Additional items Assist x 1; Impulsive   Toilet transfer 4  Minimal assistance   Additional items Assist x 1; Impulsive;Standard toilet   Additional Comments Pt completed transfers with RW; Pt demonstrates poor safety awareness and poor direction following; Pt required mod-max verbal cues throughout entirety of session to maintain safety; Pt continues to attempt stand to sit transfer without ensuring proper alignment and close proximity to chair prior to attempting x3 during session; Pt corrected with physical guidance and cues, as verbal cues did not reinforce concepts and Pt unable to demonstrate understanding at this time   Functional Mobility   Functional Mobility 4  Minimal assistance   Additional Comments Pt performed ~10 ft FM x2 with use of RW; no LOB or instability; SPO2 WFL, on RA throughout; Pt required mod-max verbal cues and physical cues to complete proper sequence of RW and FM and maintain close proximity to RW for safety; Pt continues to not demonstrate understanding of safety precautions during FM; Pt also performed stairs during session, please refer to PT treatment note for more details on Pt performance   Additional items Rolling walker   Cognition   Overall Cognitive Status [facility]   Arousal/Participation Alert; Uncooperative  (Pt unmotivated to participate in session initially)   Attention Attends with cues to redirect   Orientation Level Oriented X4 Memory Within functional limits   Following Commands Follows one step commands inconsistently   Comments Pt demonstrates impulsivity and continued lack of safety awareness; Pt required repeated cues to correct; Pt unmotivated to participate initially, as Pt reports that "I don't think I should be moving around much for a week or two"    Activity Tolerance   Activity Tolerance Patient limited by pain; Patient limited by fatigue   Assessment   Assessment Pt present on 1/7/2021 to OT treatment day 1 s/p admit to [facility] on 1/5/2021 w/ closed fracture of superior ramus of left pubis, closed fracture of inferior pubic ramus, fall  OT treatment to address decreased safe judgment during ADL, decreased endurance, decreased self-care ADLs, decreased high-level ADLs, decreased ADL status, and decreased UE strength through ADL retraining, functional transfer training, UE strengthening/ROM, endurance training, equipment evaluation and education, patient/family training, compensatory techniques, and activity engagement  Pt agreeable to session; however, Pt appeared unmotivated to participate in session, reporting that he should stay rested and not move around for 1-2 weeks due to pain  Pt educated on importance of functional mobility and functional engagement to assist in healing  Pt seated at start of session  Pt educated via verbal instruction and demonstration on use of RW, WBS, and safe transfer and FM sequencing  Pt verbalized understanding and willingness to attempt  Pt required min (A) x1 for transfers  Pt performed FM within room ~10 ft x2 with RW at min (A) x1  Pt required mod-max verbal cues and physical cues to maintain close proximity to RW, upright alignment, and sequencing  Pt performed trial of 5 stairs, please refer to PT treatment note for details regarding Pt performance on stairs   Pt performed toilet transfer with min (A) x1 and RW; Pt required verbal cues and physical guidance to align self in front of toilet and ensure that Pt is fully in front of toilet prior to attempting toilet transfer  Pt performed toileting and LB dressing at min (A) x1, as Pt required steadying for clothing management  Pt completed grooming while standing at sink to wash hands; Pt required min (A) to complete safely  Pt demonstrated inability to maintain upright posture while washing hands, as Pt rested BUE onto counter to support and steady self; Pt able to correct to upright posture when verbally cued  Pt performed FM to chair and required min (A) to complete stand to sit transfer into chair  Pt educated on importance of ensuring close proximity to chair/toilet while completing transfer  Pt verbalized understanding, however, Pt continues to demonstrate poor safety awareness and poor understanding of instructions  Pt impulsive throughout session  Pt reports leaving for home today around 1:30 pm and will receive HHOT  Pt educated on recommendations to d/c to STR due to FM, ADL independence, and safety awareness  Pt reports he will return home and stay on 2nd floor of his home until healed  A course of skilled occupational therapy remains warranted to address deficits in ADL independence, FM with RW, and safety awareness/impulsivity  At this time, it is recommended that Pt continue progress towards [facility] outcomes at [facility]  Plan   Goal Expiration Date 01/20/21   OT Treatment Day 1   OT Frequency 3-5x/wk   Recommendation   OT Discharge Recommendation Post-Acute Rehabilitation Services   OT - OK to Discharge Yes  (When medically cleared)   Additional Comments  Pt educated on d/c recommendation to STR; Pt continues to report that he will return home and receive HHOT upon d/c     Pt will benefit from continued OT services in order to maximize (I) c ADL performance, FM c RW, and improve overall endurance/strength required to complete functional tasks in preparation for d/c      Pt left seated in chair at end of session; all needs within reach; all lines intact; scds connected and turned on

## 2021-01-07 NOTE — PHYSICAL THERAPY NOTE
Physical Therapy Note    Patient Name: Julia Ruiz    ROQCF'D Date: 1/7/2021     Problem List  Principal Problem:    Closed fracture of superior ramus of left pubis ([address] )  Active Problems:    High blood pressure    Gastroesophageal reflux disease without esophagitis    Fall       Past Medical History  Past Medical History:   Diagnosis Date    Anemia     Appendicitis     Coronary artery disease     Detached retina     GERD (gastroesophageal reflux disease)     Hyperlipidemia     Hypertension     Macular degeneration     Neuropathy     Von Willebrand disease ([address] )         Past Surgical History  Past Surgical History:   Procedure Laterality Date    ADENOIDECTOMY      APPENDECTOMY      ARTHRODESIS  01/15/2014    Lumbar     BACK SURGERY      CATARACT EXTRACTION      COLONOSCOPY  12/09/2016    fiberoptic     CYSTOSCOPY      with resection of tumor / 1/12/17, 10/2/17 / diagnostic 12/8/16, 5/30/17     CYSTOSCOPY  09/20/2018    CYSTOSCOPY  06/22/2020    EGD AND COLONOSCOPY N/A 12/9/2016    Procedure: EGD AND COLONOSCOPY;  Surgeon: Yousuf Fuentes MD;  Location: [facility];  Service:    Edward Spinner EYE SURGERY      FRACTURE SURGERY Bilateral     ARM    NECK SURGERY      NEUROPLASTY / TRANSPOSITION MEDIAN NERVE AT [facility] Right 04/2015    MO COLONOSCOPY FLX DX W/COLLJ SPEC WHEN PFRMD N/A 2/5/2019    Procedure: COLONOSCOPY;  Surgeon: Charmaine Stewart MD;  [facility]: [facility];  Service: Gastroenterology    MO CYSTOURETHROSCOPY,FULGUR <0 5 CM LESN N/A 1/12/2017    Procedure: CYSTOSCOPY, BLADDER BIOPSY WITH FULGRATION, 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;  Surgeon: Ayaka Rodriguez MD;  [facility]: [facility];  Service: Urology    MO CYSTOURETHROSCOPY,FULGUR <0 5 CM LESN N/A 10/2/2017    Procedure: CYSTOSCOPY WITH  BLADDER BIOPSIES WITH FULGURATION;  Surgeon: Ayaka Rodriguez MD;  [facility]: [facility];  Service: Urology    MO ESOPHAGOGASTRODUODENOSCOPY TRANSORAL DIAGNOSTIC N/A 8/3/2018    Procedure: ESOPHAGOGASTRODUODENOSCOPY (EGD); Surgeon: Tammy Duke MD;  [facility]: [facility];  Service: Gastroenterology    IA INSTILL ANTICANCER AGENT IN BLADDER N/A 1/12/2017    Procedure: [place];  Surgeon: Kleber Maurer MD;  [facility]: [facility];  Service: Urology    IA INSTILL ANTICANCER AGENT IN BLADDER N/A 10/2/2017    Procedure: [place];  Surgeon: Kleber Maurer MD;  [facility];  Service: Urology    RETINAL DETACHMENT SURGERY Right 03/2016    complete air fill confirmed    [address] SURGERY  03/03/2015    proximal humerus fracture / [place]    3/6/15   R    1/3/18     TONSILECTOMY AND ADNOIDECTOMY      TONSILLECTOMY      TRANSURETHRAL RESECTION OF BLADDER TUMOR N/A 10/2/2017    Procedure: TRANSURETHRAL RESECTION OF BLADDER TUMOR (TURBT); Surgeon: Kleber Maurer MD;  [facility]: [facility];  Service: Urology   [facility] ANTIGEN (HISTORICAL)          01/07/21 0845   PT Last Visit   PT Visit Date 01/07/21   Note Type   Note Type Treatment   Pain Assessment   Pain Assessment Tool FLACC   Pain Rating: FLACC (Rest) - Face 0   Pain Rating: FLACC (Rest) - Legs 0   Pain Rating: FLACC (Rest) - Activity 0   Pain Rating: FLACC (Rest) - Cry 0   Pain Rating: FLACC (Rest) - Consolability 0   Score: FLACC (Rest) 0   Pain Rating: FLACC (Activity) - Face 1   Pain Rating: FLACC (Activity) - Legs 0   Pain Rating: FLACC (Activity) - Activity 0   Pain Rating: FLACC (Activity) - Cry 0   Pain Rating: FLACC (Activity) - Consolability 1   Score: FLACC (Activity) 2   Restrictions/Precautions   Weight Bearing Precautions Per Order Yes   LLE Weight Bearing Per Order WBAT   Other Precautions Pain; Fall Risk;WBS;Chair Alarm; Impulsive   General   Chart Reviewed Yes   Response to Previous Treatment Patient with no complaints from previous session     Family/Caregiver Present No Cognition   Overall Cognitive Status WFL   Arousal/Participation Alert   Attention Attends with cues to redirect   Orientation Level Oriented X4   Memory Within functional limits   Following Commands Follows one step commands with increased time or repetition   Comments pt impulsive and stubborn; needs repeated cues to attend to directions   Subjective   Subjective "I'm not going up the stairs"   Bed Mobility   Additional Comments pt seated at beginning and end of session   Transfers   Sit to Stand 4  Minimal assistance   Additional items Assist x 1   Stand to Sit 4  Minimal assistance  (required continuous cues and hip guidance to safely transfer)   Additional items Assist x 1   Toilet transfer 4  Minimal assistance   Additional items Assist x 1;Standard toilet   Additional Comments RW used   Ambulation/Elevation   Gait pattern Step to;Excessively slow; Short stride; Foward flexed;Decreased L stance   Gait Assistance 4  Minimal assist   Additional items Assist x 1   Assistive Device Rolling walker   Distance 10'   Stair Management Assistance 3  Moderate assist   Additional items Assist x 1   Stair Management Technique Two rails; Step to pattern; With gait belt; Foreward  (backwards to descend steps)   Number of Stairs 5   Balance   Static Sitting Fair +   Dynamic Sitting Fair   Static Standing Fair -   Dynamic Standing Poor +   Ambulatory Poor +   Endurance Deficit   Endurance Deficit Yes   Endurance Deficit Description pt limited by fatigue and pain   Activity Tolerance   Activity Tolerance Patient limited by fatigue;Patient limited by pain   Assessment   Prognosis Guarded   Problem List Decreased strength;Decreased endurance; Impaired balance;Decreased mobility; Impaired judgement;Decreased safety awareness;Pain;Orthopedic restrictions   Assessment Pt seen this day for PT treatment session to increase strength and independence with functional mobility, as well as improve balance and endurance   Pt was seated at the beginning of session  Pt was re-educated on weightbearing status of WBAT for LLE, as well as gait pattern while using the RW  Pt performed transfers with min(A)x1 and RW, including sit to stand, stand to sit, and toilet transfer to standard toilet  Pt requires increased verbal cues with pivoting when transferring and tactile cues to guide his hips due to the fact that he is impulsive with sitting  Pt ambulated 10' with min(A)x1 and RW with a step to gait pattern, slow gait speed, and decreased L stance time  Pt was educated on the proper technique to ascend/descend the steps using BHR  A gait belt was used with mod(A)x1 when ascending/descending 5 steps; verbal cues were used for safe placement of feet on steps when ascending/descending  Pt displayed decreased hip flexion strength when advancing his limb for stair ascension, as well as decrease LE strength when buckling during stair descent  Continued PT is required to increase strength and independence with functional mobility, as well as improve balance and endurance  Though pt was able to ascend/descend the steps required to access his house, the pt required mod(A), verbal and tactile cues, and displayed decreased strength and safety awareness  Due to this, the pt is not safe to return home and the dc recommendation continues to be post-acute rehab  Despite recommendations, pt continues to refuse post-acute rehab and wants to return home  Barriers to Discharge Inaccessible home environment;Decreased caregiver support   Goals   Patient Goals to go home   LTG Expiration Date 01/20/21   PT Treatment Day 1   Plan   Treatment/Interventions ADL retraining;Functional transfer training;LE strengthening/ROM; Therapeutic exercise; Endurance training;Bed mobility;Gait training   Progress Progressing toward goals   PT Frequency Other (Comment)  (3-5x/wk)   Recommendation   PT Discharge Recommendation Post-Acute Rehabilitation Services   PT - OK to Discharge Yes     Pt seated OOB at end of session with all needs within reach

## 2021-01-08 ENCOUNTER — TRANSITIONAL CARE MANAGEMENT (OUTPATIENT)
Dept: FAMILY MEDICINE CLINIC | Facility: CLINIC | Age: 86
End: 2021-01-08

## 2021-01-08 ENCOUNTER — APPOINTMENT (OUTPATIENT)
Dept: LAB | Facility: MEDICAL CENTER | Age: 86
End: 2021-01-08
Payer: MEDICARE

## 2021-01-08 ENCOUNTER — TELEPHONE (OUTPATIENT)
Dept: OBGYN CLINIC | Facility: HOSPITAL | Age: 86
End: 2021-01-08

## 2021-01-08 DIAGNOSIS — D64.9 ANEMIA: ICD-10-CM

## 2021-01-08 LAB
ERYTHROCYTE [DISTWIDTH] IN BLOOD BY AUTOMATED COUNT: 14.2 % (ref 11.6–15.1)
HCT VFR BLD AUTO: 32.1 % (ref 36.5–49.3)
HGB BLD-MCNC: 10.1 G/DL (ref 12–17)
MCH RBC QN AUTO: 29 PG (ref 26.8–34.3)
MCHC RBC AUTO-ENTMCNC: 31.5 G/DL (ref 31.4–37.4)
MCV RBC AUTO: 92 FL (ref 82–98)
PLATELET # BLD AUTO: 155 THOUSANDS/UL (ref 149–390)
PMV BLD AUTO: 11.8 FL (ref 8.9–12.7)
RBC # BLD AUTO: 3.48 MILLION/UL (ref 3.88–5.62)
WBC # BLD AUTO: 10.9 THOUSAND/UL (ref 4.31–10.16)

## 2021-01-08 PROCEDURE — 36415 COLL VENOUS BLD VENIPUNCTURE: CPT

## 2021-01-08 PROCEDURE — 85027 COMPLETE CBC AUTOMATED: CPT

## 2021-01-08 NOTE — TELEPHONE ENCOUNTER
Patient was seen for consult for Pubic fx while admitted  Patient will need an appt with Dr Leonor Silva in 2 - 4 weeks

## 2021-01-12 ENCOUNTER — PATIENT OUTREACH (OUTPATIENT)
Dept: FAMILY MEDICINE CLINIC | Facility: CLINIC | Age: 86
End: 2021-01-12

## 2021-01-12 NOTE — PROGRESS NOTES
Outpatient Care Management Note:  In basket BPCI referral received  -fracture of femur, hip or pelvis  Chart review completed

## 2021-01-14 ENCOUNTER — TELEPHONE (OUTPATIENT)
Dept: LAB | Facility: HOSPITAL | Age: 86
End: 2021-01-14

## 2021-01-14 ENCOUNTER — TELEPHONE (OUTPATIENT)
Dept: FAMILY MEDICINE CLINIC | Facility: CLINIC | Age: 86
End: 2021-01-14

## 2021-01-14 ENCOUNTER — PATIENT OUTREACH (OUTPATIENT)
Dept: FAMILY MEDICINE CLINIC | Facility: CLINIC | Age: 86
End: 2021-01-14

## 2021-01-14 DIAGNOSIS — I10 ESSENTIAL HYPERTENSION: Primary | ICD-10-CM

## 2021-01-14 DIAGNOSIS — D62 ACUTE BLOOD LOSS AS CAUSE OF POSTOPERATIVE ANEMIA: ICD-10-CM

## 2021-01-14 NOTE — PROGRESS NOTES
Outpatient Care Management Note:  Outreach call placed to Reyes Valenzuela  Introduced myself and my role  He is agreeable to outreach at this time  Reyes Valenzuela feels that he is doing well since discharge  He is staying on the second floor of his home as he is unable to go up and down stairs at this time  He is ambulating on level surfaces with a walker  He complains of soreness but not pain  He is active with [facility] and both nursing and therapies have begun  He has a follow up appointment with orthopedic surgery on 1/22/2021 that he will attend in person if he is able to mange the stairs  His SO has been providing assistance as needed and his diaghter lives near by  Denies any needs at this time  Encouraged to call with any questions or concerns

## 2021-01-14 NOTE — TELEPHONE ENCOUNTER
Patient is having a nurse come out tomorrow, will ask if she can take blood  If not, will call us back

## 2021-01-15 ENCOUNTER — LAB REQUISITION (OUTPATIENT)
Dept: LAB | Facility: HOSPITAL | Age: 86
End: 2021-01-15
Payer: MEDICARE

## 2021-01-15 DIAGNOSIS — D62 ACUTE POSTHEMORRHAGIC ANEMIA: ICD-10-CM

## 2021-01-15 LAB
ANION GAP SERPL CALCULATED.3IONS-SCNC: 7 MMOL/L (ref 4–13)
BASOPHILS # BLD AUTO: 0 THOUSANDS/ΜL (ref 0–0.1)
BASOPHILS NFR BLD AUTO: 0 % (ref 0–2)
BUN SERPL-MCNC: 19 MG/DL (ref 7–25)
CALCIUM SERPL-MCNC: 9 MG/DL (ref 8.6–10.5)
CHLORIDE SERPL-SCNC: 103 MMOL/L (ref 98–107)
CO2 SERPL-SCNC: 28 MMOL/L (ref 21–31)
CREAT SERPL-MCNC: 0.91 MG/DL (ref 0.7–1.3)
EOSINOPHIL # BLD AUTO: 0.1 THOUSAND/ΜL (ref 0–0.61)
EOSINOPHIL NFR BLD AUTO: 1 % (ref 0–5)
ERYTHROCYTE [DISTWIDTH] IN BLOOD BY AUTOMATED COUNT: 14.6 % (ref 11.5–14.5)
GFR SERPL CREATININE-BSD FRML MDRD: 78 ML/MIN/1.73SQ M
GLUCOSE SERPL-MCNC: 83 MG/DL (ref 65–99)
HCT VFR BLD AUTO: 37.9 % (ref 42–47)
HGB BLD-MCNC: 12.1 G/DL (ref 14–18)
LYMPHOCYTES # BLD AUTO: 1.7 THOUSANDS/ΜL (ref 0.6–4.47)
LYMPHOCYTES NFR BLD AUTO: 12 % (ref 21–51)
MCH RBC QN AUTO: 29.6 PG (ref 26–34)
MCHC RBC AUTO-ENTMCNC: 32 G/DL (ref 31–37)
MCV RBC AUTO: 92 FL (ref 81–99)
MONOCYTES # BLD AUTO: 1 THOUSAND/ΜL (ref 0.17–1.22)
MONOCYTES NFR BLD AUTO: 7 % (ref 2–12)
NEUTROPHILS # BLD AUTO: 11.6 THOUSANDS/ΜL (ref 1.4–6.5)
NEUTS SEG NFR BLD AUTO: 81 % (ref 42–75)
PLATELET # BLD AUTO: 376 THOUSANDS/UL (ref 149–390)
PMV BLD AUTO: 8.3 FL (ref 8.6–11.7)
POTASSIUM SERPL-SCNC: 4.1 MMOL/L (ref 3.5–5.5)
RBC # BLD AUTO: 4.11 MILLION/UL (ref 4.3–5.9)
SODIUM SERPL-SCNC: 138 MMOL/L (ref 134–143)
WBC # BLD AUTO: 14.3 THOUSAND/UL (ref 4.8–10.8)

## 2021-01-15 PROCEDURE — 80048 BASIC METABOLIC PNL TOTAL CA: CPT | Performed by: FAMILY MEDICINE

## 2021-01-15 PROCEDURE — 85025 COMPLETE CBC W/AUTO DIFF WBC: CPT | Performed by: FAMILY MEDICINE

## 2021-01-15 NOTE — RESULT ENCOUNTER NOTE
Call patient to notify normal results hemoglobin is fine kidney function BMP sugar are all good white blood count slightly elevated but that probably from the stress of the fracture and the fall

## 2021-01-22 ENCOUNTER — OFFICE VISIT (OUTPATIENT)
Dept: OBGYN CLINIC | Facility: CLINIC | Age: 86
End: 2021-01-22
Payer: MEDICARE

## 2021-01-22 ENCOUNTER — IMMUNIZATIONS (OUTPATIENT)
Dept: FAMILY MEDICINE CLINIC | Facility: HOSPITAL | Age: 86
End: 2021-01-22
Payer: MEDICARE

## 2021-01-22 ENCOUNTER — APPOINTMENT (OUTPATIENT)
Dept: RADIOLOGY | Facility: MEDICAL CENTER | Age: 86
End: 2021-01-22
Payer: MEDICARE

## 2021-01-22 VITALS — BODY MASS INDEX: 23.52 KG/M2 | WEIGHT: 168 LBS | HEIGHT: 71 IN

## 2021-01-22 DIAGNOSIS — M16.0 BILATERAL HIP JOINT ARTHRITIS: ICD-10-CM

## 2021-01-22 DIAGNOSIS — Z23 ENCOUNTER FOR IMMUNIZATION: Primary | ICD-10-CM

## 2021-01-22 DIAGNOSIS — S32.592D PUBIC RAMUS FRACTURE, LEFT, WITH ROUTINE HEALING, SUBSEQUENT ENCOUNTER: ICD-10-CM

## 2021-01-22 DIAGNOSIS — S32.512A: ICD-10-CM

## 2021-01-22 DIAGNOSIS — S32.592D PUBIC RAMUS FRACTURE, LEFT, WITH ROUTINE HEALING, SUBSEQUENT ENCOUNTER: Primary | ICD-10-CM

## 2021-01-22 PROCEDURE — 91301 SARS-COV-2 / COVID-19 MRNA VACCINE (MODERNA) 100 MCG: CPT

## 2021-01-22 PROCEDURE — 0011A SARS-COV-2 / COVID-19 MRNA VACCINE (MODERNA) 100 MCG: CPT

## 2021-01-22 PROCEDURE — 72170 X-RAY EXAM OF PELVIS: CPT

## 2021-01-22 PROCEDURE — 99203 OFFICE O/P NEW LOW 30 MIN: CPT | Performed by: ORTHOPAEDIC SURGERY

## 2021-01-22 NOTE — PATIENT INSTRUCTIONS
Patient is currently ambulating with a walker and will continue ambulate with a walker weight-bearing as tolerated  We will see him back in 6 weeks for repeat x-ray and follow-up  Tylenol on an as-needed basis  No surgical intervention, conservative treatment only  Patient may continue to use calcium and vitamin-D supplementation  He has had previous bone densities through his PCP with recommendation to follow up with PCP for further testing  Let pain guide his activity and avoid things that cause pain

## 2021-01-22 NOTE — PROGRESS NOTES
82 y o male presents for follow-up from hospital admission for left superior and inferior pubic rami fracture, date of admission 01/05/2021  He has been treated conservatively      Review of Systems  Review of systems negative unless otherwise specified in HPI    Past Medical History  Past Medical History:   Diagnosis Date    Anemia     Appendicitis     Coronary artery disease     Detached retina     GERD (gastroesophageal reflux disease)     Hyperlipidemia     Hypertension     Macular degeneration     Neuropathy     Von Willebrand disease ([address] )      Past Surgical History  Past Surgical History:   Procedure Laterality Date    ADENOIDECTOMY      APPENDECTOMY      ARTHRODESIS  01/15/2014    Lumbar     BACK SURGERY      CATARACT EXTRACTION      COLONOSCOPY  12/09/2016    fiberoptic     CYSTOSCOPY      with resection of tumor / 1/12/17, 10/2/17 / diagnostic 12/8/16, 5/30/17     CYSTOSCOPY  09/20/2018    CYSTOSCOPY  06/22/2020    EGD AND COLONOSCOPY N/A 12/9/2016    Procedure: EGD AND COLONOSCOPY;  Surgeon: Neha Beal MD;  [facility]: [facility];  Service:    [facility] EYE SURGERY      FRACTURE SURGERY Bilateral     ARM    NECK SURGERY      NEUROPLASTY / TRANSPOSITION MEDIAN NERVE AT [facility] Right 04/2015    [place] COLONOSCOPY FLX DX W/COLLJ SPEC WHEN PFRMD N/A 2/5/2019    Procedure: COLONOSCOPY;  Surgeon: Angelia Natarajan MD;  [facility]: [facility];  Service: Gastroenterology    [place] CYSTOURETHROSCOPY,FULGUR <0 5 CM LESN N/A 1/12/2017    Procedure: CYSTOSCOPY, BLADDER BIOPSY WITH FULGRATION, 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;  Surgeon: Shannon Kilpatrick MD;  [facility];  Service: Urology    [place] CYSTOURETHROSCOPY,FULGUR <0 5 CM LESN N/A 10/2/2017    Procedure: CYSTOSCOPY WITH  BLADDER BIOPSIES WITH FULGURATION;  Surgeon: Shannon Kilpatrick MD;  [facility];  Service: Urology    [place] ESOPHAGOGASTRODUODENOSCOPY TRANSORAL DIAGNOSTIC N/A 8/3/2018    Procedure: ESOPHAGOGASTRODUODENOSCOPY (EGD); Surgeon: Inez Donato MD;  [facility];  Service: Gastroenterology    [place] INSTILL ANTICANCER AGENT IN BLADDER N/A 1/12/2017    Procedure: Sammi Flores;  Surgeon: Laverne Tyson MD;  [facility];  Service: Urology    [place] INSTILL ANTICANCER AGENT IN BLADDER N/A 10/2/2017    Procedure: Sammi Flores;  Surgeon: Laverne Tyson MD;  [facility];  Service: Urology    RETINAL DETACHMENT SURGERY Right 03/2016    complete air fill confirmed    [address] SURGERY  03/03/2015    proximal humerus fracture / [place]    3/6/15   R    1/3/18     TONSILECTOMY AND ADNOIDECTOMY      TONSILLECTOMY      TRANSURETHRAL RESECTION OF BLADDER TUMOR N/A 10/2/2017    Procedure: TRANSURETHRAL RESECTION OF BLADDER TUMOR (TURBT);   Surgeon: Laverne Tyson MD;  [facility];  Service: Urology    [address] (HISTORICAL)       Current Medications  Current Outpatient Medications on File Prior to Visit   Medication Sig Dispense Refill    acetaminophen (TYLENOL) 325 mg tablet Take 2 tablets (650 mg total) by mouth every 6 (six) hours as needed for mild pain, headaches or fever  0    atorvastatin (LIPITOR) 10 mg tablet TAKE 1 TABLET BY MOUTH  DAILY 90 tablet 3    B COMPLEX VITAMINS PO Take 1 tablet by mouth daily      cetirizine (ZyrTEC) 10 mg tablet TAKE ONE TABLET BY MOUTH DAILY 90 tablet 2    Cholecalciferol (VITAMIN D3) 2000 UNITS capsule Take 1 capsule by mouth 2 (two) times a day 5000 units       Coenzyme Q10 (CO Q10) 60 MG CAPS Take 1 capsule by mouth daily      famotidine (PEPCID) 40 MG tablet Take 1 tablet (40 mg total) by mouth daily at bedtime (Patient not taking: Reported on 1/5/2021) 90 tablet 1    gabapentin (NEURONTIN) 600 MG tablet TAKE 1 TABLET BY MOUTH  TWICE DAILY 180 tablet 3    Ginkgo Biloba 120 MG TABS Take 1 tablet by mouth daily      Inositol 500 MG TABS Take 1 tablet by mouth 2 (two) times a day      lutein 6 mg Take 1 capsule by mouth daily      metoprolol tartrate (LOPRESSOR) 25 mg tablet TAKE 1 TABLET BY MOUTH  EVERY 12 HOURS 180 tablet 3    MILK THISTLE PO Take 1 tablet by mouth daily      Misc Natural Products (SUPER SNOOZE) CAPS Take 1 capsule by mouth daily Bed time       Multiple Vitamins-Minerals (OCUVITE-LUTEIN) CAPS Take by mouth daily      omeprazole (PriLOSEC) 40 MG capsule TAKE 1 CAPSULE BY MOUTH  TWICE DAILY (Patient not taking: Reported on 1/5/2021) 180 capsule 3    sildenafil (VIAGRA) 50 MG tablet Viagra 50 mg tablet   TAKE 1 TABLET BY MOUTH UP TO 4 HOURS BEFORE INTERCOURSE AND MAX OF 1 TABLET IN 24 HOURS      tadalafil (CIALIS) 20 MG tablet Take 1 tablet (20 mg total) by mouth daily as needed for erectile dysfunction (Patient not taking: Reported on 1/5/2021) 18 tablet 3     No current facility-administered medications on file prior to visit  Recent Labs [facility])  0   Lab Value Date/Time    HCT 37 9 (L) 01/15/2021 1245    HCT 43 5 07/30/2015 0924    HGB 12 1 (L) 01/15/2021 1245    HGB 15 0 07/30/2015 0924    WBC 14 30 (H) 01/15/2021 1245    WBC 7 14 07/30/2015 0924    INR 1 05 01/05/2021 1959    ESR 15 (H) 06/07/2018 1046    GLUCOSE 90 07/30/2015 0924    HGBA1C 6 1 06/07/2018 1046     Physical exam  There is no height or weight on file to calculate BMI  · General: Awake, Alert, Oriented  · Eyes: Pupils equal, round and reactive to light  · Heart: regular rate and rhythm  · Lungs: No audible wheezing  · Abdomen: soft  left Lower extremity  · Patient ambulates with slight antalgic gait with a walker  Left hip external rotation 35° without pain  Internal rotation limited to approximately 8°  This coincides with his hip arthritis  This is not reproducing any significant pelvic pain  He rises from a seated position without much difficulty  Light touch sensation is intact  Minimal discomfort about the left knee lateral joint  Grossly neurovascular intact distally  Procedure  None    Imaging  I reviewed previous x-rays and CT scan which are consistent with minimally displaced left superior and inferior pubic rami fractures with bilateral hip arthritis  Minimal change from initial x-ray  1  Pubic ramus fracture, left, with routine healing, subsequent encounter    2  Bilateral hip joint arthritis    3  Closed fracture of superior ramus of left pubis (HCC)      Assessment:  left hip arthritis with superior and inferior minimally displaced pubic rami fractures and right hip arthritis as well  Plan:  Patient is currently ambulating with a walker and will continue ambulate with a walker weight-bearing as tolerated  We will see him back in 6 weeks for repeat x-ray and follow-up  Tylenol on an as-needed basis  No surgical intervention, conservative treatment only  Patient may continue to use calcium and vitamin-D supplementation  He has had previous bone densities through his PCP with recommendation to follow up with PCP for further testing  Let pain guide his activity and avoid things that cause pain  This note was created with voice recognition software

## 2021-01-28 ENCOUNTER — PATIENT OUTREACH (OUTPATIENT)
Dept: FAMILY MEDICINE CLINIC | Facility: CLINIC | Age: 86
End: 2021-01-28

## 2021-01-28 NOTE — PROGRESS NOTES
Outpatient Care Management Note:  Follow up call placed to [facility]  He is doing well and is continuing to improve  He had his follow up appointment with orthopedics and they are pleased with his progress  He is ambulating with a walker and is able to go up and down the stairs after working with physical therapy  Occupational therapy was in today and assisted him in showering  His has minimal pain at this time  Denies any needs  Encouraged to call with any questions or concerns

## 2021-02-11 ENCOUNTER — PATIENT OUTREACH (OUTPATIENT)
Dept: FAMILY MEDICINE CLINIC | Facility: CLINIC | Age: 86
End: 2021-02-11

## 2021-02-11 DIAGNOSIS — S32.512D CLOSED FRACTURE OF SUPERIOR RAMUS OF LEFT PUBIS WITH ROUTINE HEALING, SUBSEQUENT ENCOUNTER: ICD-10-CM

## 2021-02-11 DIAGNOSIS — S32.599D CLOSED FRACTURE OF INFERIOR PUBIC RAMUS, UNSPECIFIED LATERALITY, WITH ROUTINE HEALING, SUBSEQUENT ENCOUNTER: ICD-10-CM

## 2021-02-11 DIAGNOSIS — R26.2 AMBULATORY DYSFUNCTION: Primary | ICD-10-CM

## 2021-02-11 NOTE — PROGRESS NOTES
Outpatient Care Management Note:  Follow up call placed to Alda Rose  He is doing well  He continues to use the walker for ambulation  He has no pain  He was discharged from physical and occupational therapy  Alda Chaveser has a follow up appointment with orthopedics on 3/5/2021  Arranged a follow up call after that appointment  Encouraged to reach out if he has any questions or concerns prior to that call

## 2021-02-19 ENCOUNTER — IMMUNIZATIONS (OUTPATIENT)
Dept: FAMILY MEDICINE CLINIC | Facility: HOSPITAL | Age: 86
End: 2021-02-19

## 2021-02-19 DIAGNOSIS — Z23 ENCOUNTER FOR IMMUNIZATION: Primary | ICD-10-CM

## 2021-02-19 PROCEDURE — 0012A SARS-COV-2 / COVID-19 MRNA VACCINE (MODERNA) 100 MCG: CPT

## 2021-02-19 PROCEDURE — 91301 SARS-COV-2 / COVID-19 MRNA VACCINE (MODERNA) 100 MCG: CPT

## 2021-02-25 DIAGNOSIS — J30.1 ALLERGIC RHINITIS DUE TO POLLEN, UNSPECIFIED SEASONALITY: Primary | ICD-10-CM

## 2021-02-25 RX ORDER — IPRATROPIUM BROMIDE 21 UG/1
1 SPRAY, METERED NASAL 2 TIMES DAILY
Qty: 90 ML | Refills: 1 | Status: SHIPPED | OUTPATIENT
Start: 2021-02-25 | End: 2021-02-26 | Stop reason: SDUPTHER

## 2021-02-25 RX ORDER — IPRATROPIUM BROMIDE 21 UG/1
SPRAY, METERED NASAL DAILY
COMMUNITY
End: 2021-02-25 | Stop reason: SDUPTHER

## 2021-02-26 DIAGNOSIS — J30.1 ALLERGIC RHINITIS DUE TO POLLEN, UNSPECIFIED SEASONALITY: ICD-10-CM

## 2021-02-26 RX ORDER — IPRATROPIUM BROMIDE 21 UG/1
2 SPRAY, METERED NASAL 3 TIMES DAILY
Qty: 90 ML | Refills: 1 | Status: SHIPPED | OUTPATIENT
Start: 2021-02-26 | End: 2021-07-19

## 2021-03-01 ENCOUNTER — DOCTOR'S OFFICE (OUTPATIENT)
Dept: URBAN - NONMETROPOLITAN AREA CLINIC 1 | Facility: CLINIC | Age: 86
Setting detail: OPHTHALMOLOGY
End: 2021-03-01

## 2021-03-01 DIAGNOSIS — Z96.1: ICD-10-CM

## 2021-03-01 DIAGNOSIS — H52.4: ICD-10-CM

## 2021-03-01 PROBLEM — H01.001 BLEPHARITIS; RIGHT UPPER LID, RIGHT LOWER LID, LEFT UPPER LID, LEFT LOWER LID: Status: ACTIVE | Noted: 2019-02-25

## 2021-03-01 PROBLEM — H01.004 BLEPHARITIS; RIGHT UPPER LID, RIGHT LOWER LID, LEFT UPPER LID, LEFT LOWER LID: Status: ACTIVE | Noted: 2019-02-25

## 2021-03-01 PROBLEM — H33.021: Status: ACTIVE | Noted: 2020-07-31

## 2021-03-01 PROBLEM — H02.005 ENTROPION; LEFT LOWER LID: Status: ACTIVE | Noted: 2019-02-25

## 2021-03-01 PROBLEM — H01.002 BLEPHARITIS; RIGHT UPPER LID, RIGHT LOWER LID, LEFT UPPER LID, LEFT LOWER LID: Status: ACTIVE | Noted: 2019-02-25

## 2021-03-01 PROBLEM — H00.15 CHALAZION; LEFT LOWER LID: Status: ACTIVE | Noted: 2020-11-05

## 2021-03-01 PROBLEM — H10.12 ALLERGIC CONJUNCTIVITIS; LEFT EYE: Status: ACTIVE | Noted: 2017-12-28

## 2021-03-01 PROBLEM — H04.123 DRY EYE; BOTH EYES: Status: ACTIVE | Noted: 2019-06-06

## 2021-03-01 PROBLEM — H31.002: Status: ACTIVE | Noted: 2017-03-22

## 2021-03-01 PROBLEM — H01.005 BLEPHARITIS; RIGHT UPPER LID, RIGHT LOWER LID, LEFT UPPER LID, LEFT LOWER LID: Status: ACTIVE | Noted: 2019-02-25

## 2021-03-01 PROCEDURE — 92015 DETERMINE REFRACTIVE STATE: CPT | Performed by: OPTOMETRIST

## 2021-03-01 ASSESSMENT — REFRACTION_CURRENTRX
OS_OVR_VA: 20/
OS_CYLINDER: -1.25
OD_CYLINDER: -0.50
OS_AXIS: 040
OD_ADD: +2.75
OD_AXIS: 090
OD_OVR_VA: 20/
OS_SPHERE: -0.50
OD_SPHERE: -1.75
OS_VPRISM_DIRECTION: BF
OS_ADD: +2.75
OD_VPRISM_DIRECTION: BF

## 2021-03-01 ASSESSMENT — REFRACTION_MANIFEST
OS_ADD: +2.75
OD_VA1: 20/100
OS_VA1: 20/50
OD_ADD: +2.75
OS_AXIS: 080
OD_CYLINDER: -0.75
OS_VA2: 20/50
OD_AXIS: 085
OS_CYLINDER: -0.50
OD_VA2: 20/80-2
OS_SPHERE: +0.75
OD_SPHERE: -1.25

## 2021-03-01 ASSESSMENT — KERATOMETRY
OD_K1POWER_DIOPTERS: 42.50
OD_AXISANGLE_DEGREES: 107
OD_K2POWER_DIOPTERS: 43.75
OS_K1POWER_DIOPTERS: 40.50
METHOD_AUTO_MANUAL: MANUAL
OS_AXISANGLE_DEGREES: 174
OS_K2POWER_DIOPTERS: 42.25

## 2021-03-01 ASSESSMENT — SPHEQUIV_DERIVED
OS_SPHEQUIV: 0.5
OS_SPHEQUIV: -0.875
OD_SPHEQUIV: -1.625
OD_SPHEQUIV: -1.625

## 2021-03-01 ASSESSMENT — AXIALLENGTH_DERIVED
OS_AL: 24.7693
OD_AL: 24.3888
OS_AL: 24.1925
OD_AL: 24.3888

## 2021-03-01 ASSESSMENT — VISUAL ACUITY
OD_BCVA: 20/70+2
OS_BCVA: 20/200

## 2021-03-01 ASSESSMENT — REFRACTION_AUTOREFRACTION
OD_SPHERE: -1.25
OS_AXIS: 079
OD_AXIS: 081
OS_SPHERE: +2.00
OD_CYLINDER: -0.75
OS_CYLINDER: -5.75

## 2021-03-05 ENCOUNTER — APPOINTMENT (OUTPATIENT)
Dept: RADIOLOGY | Facility: MEDICAL CENTER | Age: 86
End: 2021-03-05
Payer: MEDICARE

## 2021-03-05 ENCOUNTER — OFFICE VISIT (OUTPATIENT)
Dept: OBGYN CLINIC | Facility: CLINIC | Age: 86
End: 2021-03-05
Payer: MEDICARE

## 2021-03-05 VITALS
SYSTOLIC BLOOD PRESSURE: 117 MMHG | DIASTOLIC BLOOD PRESSURE: 70 MMHG | HEART RATE: 89 BPM | BODY MASS INDEX: 23.52 KG/M2 | HEIGHT: 71 IN | WEIGHT: 168 LBS

## 2021-03-05 DIAGNOSIS — S32.592D PUBIC RAMUS FRACTURE, LEFT, WITH ROUTINE HEALING, SUBSEQUENT ENCOUNTER: ICD-10-CM

## 2021-03-05 DIAGNOSIS — M54.16 LUMBAR RADICULOPATHY: ICD-10-CM

## 2021-03-05 DIAGNOSIS — S32.592D PUBIC RAMUS FRACTURE, LEFT, WITH ROUTINE HEALING, SUBSEQUENT ENCOUNTER: Primary | ICD-10-CM

## 2021-03-05 PROCEDURE — 72100 X-RAY EXAM L-S SPINE 2/3 VWS: CPT

## 2021-03-05 PROCEDURE — 99213 OFFICE O/P EST LOW 20 MIN: CPT | Performed by: ORTHOPAEDIC SURGERY

## 2021-03-05 PROCEDURE — 72170 X-RAY EXAM OF PELVIS: CPT

## 2021-03-08 ENCOUNTER — PATIENT OUTREACH (OUTPATIENT)
Dept: FAMILY MEDICINE CLINIC | Facility: CLINIC | Age: 86
End: 2021-03-08

## 2021-03-08 NOTE — PROGRESS NOTES
Outpatient Care Management Note:  Follow up call placed to Adeola Ramirez after recent orthopedic visit  Adeola Ramirez is not available  Spoke with his SO, Parmjit Jim  Adeola Ashley continues to do well  He was discharged from all therapies  He is ambulating with a walker and able to navigate stairs without difficulty  He has no pain and is full weight bearing  No new orders from the orthopedic appointment  He can follow up in three months if needed  Denies any needs at this time  Encouraged to call with any questions or concerns

## 2021-04-06 ENCOUNTER — PATIENT OUTREACH (OUTPATIENT)
Dept: FAMILY MEDICINE CLINIC | Facility: CLINIC | Age: 86
End: 2021-04-06

## 2021-04-06 NOTE — PROGRESS NOTES
Outpatient Care Management Note:  Follow up call placed to [facility]  He continues to do well and feels that he is fully recovered from his fracture  Denies any needs  Aware the BPCI episode is closing  He has no need for further outreach

## 2021-04-13 ENCOUNTER — OFFICE VISIT (OUTPATIENT)
Dept: FAMILY MEDICINE CLINIC | Facility: CLINIC | Age: 86
End: 2021-04-13
Payer: MEDICARE

## 2021-04-13 VITALS
SYSTOLIC BLOOD PRESSURE: 158 MMHG | TEMPERATURE: 97.3 F | HEIGHT: 71 IN | BODY MASS INDEX: 24.22 KG/M2 | WEIGHT: 173 LBS | DIASTOLIC BLOOD PRESSURE: 84 MMHG

## 2021-04-13 DIAGNOSIS — H35.3230 BILATERAL EXUDATIVE AGE-RELATED MACULAR DEGENERATION, UNSPECIFIED STAGE (HCC): ICD-10-CM

## 2021-04-13 DIAGNOSIS — E03.9 ACQUIRED HYPOTHYROIDISM: ICD-10-CM

## 2021-04-13 DIAGNOSIS — J44.1 CHRONIC OBSTRUCTIVE PULMONARY DISEASE WITH ACUTE EXACERBATION (HCC): Primary | ICD-10-CM

## 2021-04-13 DIAGNOSIS — M47.812 SPONDYLOSIS OF CERVICAL REGION WITHOUT MYELOPATHY OR RADICULOPATHY: ICD-10-CM

## 2021-04-13 DIAGNOSIS — M54.16 RADICULOPATHY, LUMBAR REGION: ICD-10-CM

## 2021-04-13 PROCEDURE — 99214 OFFICE O/P EST MOD 30 MIN: CPT | Performed by: FAMILY MEDICINE

## 2021-04-13 RX ORDER — ZINC GLUCONATE 50 MG
1 TABLET ORAL DAILY
COMMUNITY

## 2021-04-13 RX ORDER — POLYETHYLENE GLYCOL 3350 17 G/17G
17 POWDER, FOR SOLUTION ORAL DAILY
COMMUNITY
End: 2022-04-06

## 2021-04-13 NOTE — PROGRESS NOTES
Assessment/Plan:    Problem List Items Addressed This Visit        Endocrine    Hypothyroidism       Respiratory    Chronic obstructive pulmonary disease with acute exacerbation (HCC) - Primary       Nervous and Auditory    Radiculopathy, lumbar region       Musculoskeletal and Integument    Spondylosis 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       Other    Bilateral exudative age-related macular degeneration ([address] )           Diagnoses and all orders for this visit:    Chronic obstructive pulmonary disease with acute exacerbation (HCC)    Acquired hypothyroidism    Radiculopathy, lumbar region    Spondylosis 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    Bilateral exudative age-related macular degeneration, unspecified stage (HCC)    Other orders  -     Zinc 50 MG TABS; Take 1 tablet by mouth daily  -     polyethylene glycol (MiraLax) 17 GM/SCOOP powder; Take 17 g by mouth daily        No problem-specific Assessment & Plan notes found for this encounter  Subjective:      Patient ID: Jsaon Amanda is a 82 y o  male      Mr  Urine 0 which is here for a follow-up visit today he looks very well for his age we had a discussion about his balance which I think is a combination of neuropathy from his lumbar spine and he also has issues with grab  strength and that is most likely neuropathy from his cervical spine and in the other thing he is experiencing is neurogenic claudication when he walks but he really does make an effort to walk an and walks almost a mi several times a week on when and he has noticed that at the end of the mi his legs are like lead and he has to rest to recoup      The following portions of the patient's history were reviewed and updated as appropriate:   He has a past medical history of Anemia, Appendicitis, Coronary artery disease, Detached retina, GERD (gastroesophageal reflux disease), Hyperlipidemia, Hypertension, Macular degeneration, Neuropathy, and Von Willebrand disease ([facility] [address] )  ,  does not have any pertinent problems on file  ,   has a past surgical history that includes Back surgery; Neck surgery; Eye surgery; Appendectomy; TONSILECTOMY AND ADNOIDECTOMY; Fracture surgery (Bilateral); VON WILLEBRAND ANTIGEN (HISTORICAL); Tonsillectomy; Cataract extraction; Rotator cuff repair; pr cystourethroscopy,fulgur <0 5 cm lesn (N/A, 1/12/2017); pr instill anticancer agent in bladder (N/A, 1/12/2017); EGD AND COLONOSCOPY (N/A, 12/9/2016); pr cystourethroscopy,fulgur <0 5 cm lesn (N/A, 10/2/2017); pr instill anticancer agent in bladder (N/A, 10/2/2017); Transurethral resection of bladder tumor (N/A, 10/2/2017); Shoulder surgery (03/03/2015); Adenoidectomy; Arthrodesis (01/15/2014); Colonoscopy (12/09/2016); Neuroplasty / transposition median nerve at carpal tunnel (Right, 04/2015); Retinal detachment surgery (Right, 03/2016); pr esophagogastroduodenoscopy transoral diagnostic (N/A, 8/3/2018); pr colonoscopy flx dx w/collj spec when pfrmd (N/A, 2/5/2019); Cystoscopy; Cystoscopy (09/20/2018); and Cystoscopy (06/22/2020)  ,  family history includes Anuerysm in his sister; Heart disease in his mother; Ulcers in his father  ,   reports that he has been smoking cigarettes  He has a 15 00 pack-year smoking history  He has never used smokeless tobacco  He reports current alcohol use of about 2 0 standard drinks of alcohol per week  He reports that he does not use drugs  ,  has No Known Allergies     Current Outpatient Medications   Medication Sig Dispense Refill    acetaminophen (TYLENOL) 325 mg tablet Take 2 tablets (650 mg total) by mouth every 6 (six) hours as needed for mild pain, headaches or fever  0    atorvastatin (LIPITOR) 10 mg tablet TAKE 1 TABLET BY MOUTH  DAILY 90 tablet 3    B COMPLEX VITAMINS PO Take 1 tablet by mouth daily      cetirizine (ZyrTEC) 10 mg tablet TAKE ONE TABLET BY MOUTH DAILY 90 tablet 2    Cholecalciferol (VITAMIN D3) 2000 UNITS capsule Take 1 capsule by mouth 2 (two) times a day 5000 units       Coenzyme Q10 (CO Q10) 60 MG CAPS Take 1 capsule by mouth daily      gabapentin (NEURONTIN) 600 MG tablet TAKE 1 TABLET BY MOUTH  TWICE DAILY 180 tablet 3    Ginkgo Biloba 120 MG TABS Take 1 tablet by mouth daily      Inositol 500 MG TABS Take 1 tablet by mouth 2 (two) times a day      ipratropium (ATROVENT) 0 03 % nasal spray 2 sprays into each nostril 3 (three) times a day 90 mL 1    lutein 6 mg Take 1 capsule by mouth daily      metoprolol tartrate (LOPRESSOR) 25 mg tablet TAKE 1 TABLET BY MOUTH  EVERY 12 HOURS 180 tablet 3    MILK THISTLE PO Take 1 tablet by mouth daily      Misc Natural Products (SUPER SNOOZE) CAPS Take 1 capsule by mouth daily Bed time       Multiple Vitamins-Minerals (OCUVITE-LUTEIN) CAPS Take by mouth daily      omeprazole (PriLOSEC) 40 MG capsule TAKE 1 CAPSULE BY MOUTH  TWICE DAILY 180 capsule 3    polyethylene glycol (MiraLax) 17 GM/SCOOP powder Take 17 g by mouth daily      sildenafil (VIAGRA) 50 MG tablet Viagra 50 mg tablet   TAKE 1 TABLET BY MOUTH UP TO 4 HOURS BEFORE INTERCOURSE AND MAX OF 1 TABLET IN 24 HOURS      Zinc 50 MG TABS Take 1 tablet by mouth daily      famotidine (PEPCID) 40 MG tablet Take 1 tablet (40 mg total) by mouth daily at bedtime (Patient not taking: Reported on 1/5/2021) 90 tablet 1    tadalafil (CIALIS) 20 MG tablet Take 1 tablet (20 mg total) by mouth daily as needed for erectile dysfunction (Patient not taking: Reported on 4/6/2021) 18 tablet 3     No current facility-administered medications for this visit  Review of Systems   Constitutional: Negative for activity change, appetite change, diaphoresis, fatigue and fever  HENT: Negative  Negative for dental problem  Eyes: Negative  Respiratory: Negative for apnea, cough, chest tightness, shortness of breath and wheezing  Cardiovascular: Negative for chest pain, palpitations and leg swelling     Gastrointestinal: Negative for abdominal distention, abdominal pain, anal bleeding, constipation, diarrhea, nausea and vomiting  Endocrine: Negative for cold intolerance, heat intolerance, polydipsia, polyphagia and polyuria  Genitourinary: Negative for difficulty urinating, dysuria, flank pain, hematuria and urgency  Musculoskeletal: Negative for arthralgias, back pain, gait problem, joint swelling and myalgias  Skin: Negative for color change, rash and wound  Allergic/Immunologic: Negative for environmental allergies, food allergies and immunocompromised state  Neurological: Negative for dizziness, seizures, syncope, speech difficulty, numbness and headaches  Hematological: Negative for adenopathy  Does not bruise/bleed easily  Psychiatric/Behavioral: Negative for agitation, behavioral problems, hallucinations, sleep disturbance and suicidal ideas  Objective:  Vitals:    04/13/21 1101 04/13/21 1109   BP: 164/70 158/84   BP Location: Left arm Left arm   Patient Position: Sitting Sitting   Cuff Size: Standard Standard   Temp: (!) 97 3 °F (36 3 °C)    TempSrc: Temporal    Weight: 78 5 kg (173 lb)    Height: 5' 11" (1 803 m)      Body mass index is 24 13 kg/m²  Physical Exam  Constitutional:       General: He is not in acute distress  Appearance: He is well-developed  He is not diaphoretic  HENT:      Head: Normocephalic  Right Ear: External ear normal       Left Ear: External ear normal       Nose: Nose normal    Eyes:      General: No scleral icterus  Right eye: No discharge  Left eye: No discharge  Conjunctiva/sclera: Conjunctivae normal       Pupils: Pupils are equal, round, and reactive to light  Neck:      Musculoskeletal: Normal range of motion  Thyroid: No thyromegaly  Trachea: No tracheal deviation  Cardiovascular:      Rate and Rhythm: Normal rate and regular rhythm  Heart sounds: Normal heart sounds  No murmur  No friction rub  No gallop      Pulmonary: Effort: Pulmonary effort is normal  No respiratory distress  Breath sounds: Normal breath sounds  No wheezing  Abdominal:      General: Bowel sounds are normal       Palpations: Abdomen is soft  There is no mass  Tenderness: There is no abdominal tenderness  There is no guarding  Musculoskeletal:         General: No deformity  Lymphadenopathy:      Cervical: No cervical adenopathy  Skin:     General: Skin is warm and dry  Findings: No erythema or rash  Neurological:      Mental Status: He is alert and oriented to person, place, and time  Cranial Nerves: No cranial nerve deficit  Psychiatric:         Thought Content:  Thought content normal

## 2021-04-16 DIAGNOSIS — R26.81 GAIT INSTABILITY: ICD-10-CM

## 2021-04-16 DIAGNOSIS — R26.2 AMBULATORY DYSFUNCTION: Primary | ICD-10-CM

## 2021-05-05 ENCOUNTER — TELEPHONE (OUTPATIENT)
Dept: FAMILY MEDICINE CLINIC | Facility: CLINIC | Age: 86
End: 2021-05-05
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
## 2021-05-05 NOTE — TELEPHONE ENCOUNTER
Henri Hayes about 5 days ago, walking with the walker, the wind knocked him down  Has left shoulder pain, getting worse, radiating into the neck, worried because he has a neck fusion    Would like a neck and shoulder x ray

## 2021-05-06 ENCOUNTER — APPOINTMENT (OUTPATIENT)
Dept: RADIOLOGY | Facility: MEDICAL CENTER | Age: 86
End: 2021-05-06
Payer: MEDICARE
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
## 2021-05-06 PROCEDURE — 73030 X-RAY EXAM OF SHOULDER: CPT

## 2021-05-06 PROCEDURE — 72040 X-RAY EXAM NECK SPINE 2-3 VW: CPT

## 2021-06-19 DIAGNOSIS — I10 ESSENTIAL HYPERTENSION: ICD-10-CM

## 2021-07-19 DIAGNOSIS — J30.1 ALLERGIC RHINITIS DUE TO POLLEN, UNSPECIFIED SEASONALITY: ICD-10-CM

## 2021-07-19 RX ORDER — IPRATROPIUM BROMIDE 21 UG/1
SPRAY, METERED NASAL
Qty: 60 ML | Refills: 5 | Status: SHIPPED | OUTPATIENT
Start: 2021-07-19 | End: 2022-08-08

## 2021-07-26 ENCOUNTER — PROCEDURE VISIT (OUTPATIENT)
Dept: UROLOGY | Facility: CLINIC | Age: 86
End: 2021-07-26
Payer: MEDICARE

## 2021-07-26 VITALS
DIASTOLIC BLOOD PRESSURE: 80 MMHG | HEIGHT: 71 IN | SYSTOLIC BLOOD PRESSURE: 124 MMHG | HEART RATE: 66 BPM | WEIGHT: 165.12 LBS | BODY MASS INDEX: 23.12 KG/M2

## 2021-07-26 DIAGNOSIS — Z85.51 HISTORY OF BLADDER CANCER: Primary | ICD-10-CM

## 2021-07-26 DIAGNOSIS — C67.0 MALIGNANT NEOPLASM OF TRIGONE OF URINARY BLADDER (HCC): ICD-10-CM

## 2021-07-26 LAB
SL AMB  POCT GLUCOSE, UA: ABNORMAL
SL AMB LEUKOCYTE ESTERASE,UA: ABNORMAL
SL AMB POCT BILIRUBIN,UA: ABNORMAL
SL AMB POCT BLOOD,UA: ABNORMAL
SL AMB POCT CLARITY,UA: CLEAR
SL AMB POCT COLOR,UA: YELLOW
SL AMB POCT KETONES,UA: ABNORMAL
SL AMB POCT NITRITE,UA: ABNORMAL
SL AMB POCT PH,UA: 5
SL AMB POCT SPECIFIC GRAVITY,UA: 1.02
SL AMB POCT URINE PROTEIN: ABNORMAL
SL AMB POCT UROBILINOGEN: ABNORMAL

## 2021-07-26 PROCEDURE — 99213 OFFICE O/P EST LOW 20 MIN: CPT | Performed by: UROLOGY

## 2021-07-26 PROCEDURE — 52000 CYSTOURETHROSCOPY: CPT | Performed by: UROLOGY

## 2021-07-26 PROCEDURE — 81002 URINALYSIS NONAUTO W/O SCOPE: CPT | Performed by: UROLOGY

## 2021-07-26 RX ORDER — CIPROFLOXACIN 500 MG/1
500 TABLET, FILM COATED ORAL ONCE
Qty: 1 TABLET | Refills: 0 | Status: SHIPPED | OUTPATIENT
Start: 2021-07-26 | End: 2021-07-26

## 2021-07-26 NOTE — PROGRESS NOTES
[address] [facility]    [address] [facility]  [place], [address] [place] [address]  [phone]  www  [facility]  org      NAME: Rosina Valdez  AGE: 82 y o  SEX: male  : 1935   MRN: [id number]    DATE: 2021  TIME: 2:03 PM    Assessment and Plan:    Bladder cancer with superficial papillary recurrence over the right trigone:  Given how superficial this is, we decided we will observe it and recheck another cystoscopy in 1 year  This is given his age of 82 and other medical problems  He will call if he develops actual blood in his urine that he can see or any other problems  Chief Complaint   No chief complaint on file  History of Present Illness     Bladder cancer:  History of low-grade papillary TCC diagnosis in 2017, here for surveillance cystoscopy  Prostate cancer screening:  PSA 1 3 in 2018, nothing recent  Erectile dysfunction last year had failed Viagra 100 mg, and we decided to try Cialis 20 mg  This did not work very well for him  Cystoscopy     Date/Time 2021 1:14 PM     Performed by  Susanna Gallardo MD     Authorized by Susanna Gallardo MD      Universal Protocol:  Consent: Verbal consent obtained  Written consent obtained  Procedure Details:  Procedure type: cystoscopy    Additional Procedure Details: Cystoscopy Procedure Note        Pre-operative Diagnosis:  Bladder cancer surveillance    Post-operative Diagnosis:  same, with superficial papillary recurrence of the lining over the right trigone    Procedure: Flexible cystoscopy    Surgeon: Norberto Casanova MD    Anesthesia: 1% Xylocaine per urethra    EBL: Minimal    Complications: none    Procedure Details   The risks, benefits, complications, treatment options, and expected outcomes were discussed with the patient  The patient concurred with the proposed plan, giving informed consent      Cystoscopy was performed today under local anesthesia, using sterile technique  The patient was placed in the supine position, prepped with Betadine, and draped in the usual sterile fashion  The flexible cystocope was used to inspect both the urethra and bladder    Findings:  Urethra:  Normal without stricture  Prostate nonobstructive  Bladder:  3+ trabeculated and there were no stones but there papillary superficial recurrences over the right trigone  Total diameter is perhaps 1-2 cm     The orifices were orthotopic and intact             Specimens: none                 Complications:  None           Disposition: To home            Condition:  Stable          The following portions of the patient's history were reviewed and updated as appropriate: allergies, current medications, past family history, past medical history, past social history, past surgical history and problem list   Past Medical History:   Diagnosis Date    Anemia     Appendicitis     Coronary artery disease     Detached retina     GERD (gastroesophageal reflux disease)     Hyperlipidemia     Hypertension     Macular degeneration     Neuropathy     Pelvis fracture ([facility] [address] ) 01/2021    Von Willebrand disease ([facility] )      Past Surgical History:   Procedure Laterality Date    ADENOIDECTOMY      APPENDECTOMY      ARTHRODESIS  01/15/2014    Lumbar     BACK SURGERY      CATARACT EXTRACTION      COLONOSCOPY  12/09/2016    fiberoptic     CYSTOSCOPY      with resection of tumor / 1/12/17, 10/2/17 / diagnostic 12/8/16, 5/30/17     CYSTOSCOPY  09/20/2018    CYSTOSCOPY  06/22/2020    EGD AND COLONOSCOPY N/A 12/9/2016    Procedure: EGD AND COLONOSCOPY;  Surgeon: Oleg Villanueva MD;  [facility];  Service:    [facility] EYE SURGERY      FRACTURE SURGERY Bilateral     ARM    NECK SURGERY      NEUROPLASTY / TRANSPOSITION MEDIAN NERVE AT [employer] Right 04/2015    [place] COLONOSCOPY FLX DX W/COLLJ SPEC WHEN PFRMD N/A 2/5/2019    Procedure: COLONOSCOPY;  Surgeon: Greg David MD;  [facility]; Service: Gastroenterology    DC CYSTOURETHROSCOPY,FULGUR <0 5 CM LESN N/A 1/12/2017    Procedure: CYSTOSCOPY, BLADDER BIOPSY WITH FULGRATION, 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;  Surgeon: José Miguel Marinelli MD;  [facility];  Service: Urology    DC CYSTOURETHROSCOPY,FULGUR <0 5 CM LESN N/A 10/2/2017    Procedure: CYSTOSCOPY WITH  BLADDER BIOPSIES WITH FULGURATION;  Surgeon: José Miguel Marinelli MD;  [facility];  Service: Urology    DC ESOPHAGOGASTRODUODENOSCOPY TRANSORAL DIAGNOSTIC N/A 8/3/2018    Procedure: ESOPHAGOGASTRODUODENOSCOPY (EGD); Surgeon: Phan Cherry MD;  [facility];  Service: Gastroenterology    DC INSTILL ANTICANCER AGENT IN BLADDER N/A 1/12/2017    Procedure: [place];  Surgeon: José Miguel Marinelli MD;  [facility];  Service: Urology    DC INSTILL ANTICANCER AGENT IN BLADDER N/A 10/2/2017    Procedure: [place];  Surgeon: José Miguel Marinelli MD;  [facility];  Service: Urology    RETINAL DETACHMENT SURGERY Right 03/2016    complete air fill confirmed    [address] SURGERY  03/03/2015    proximal humerus fracture / LA    3/6/15   R    1/3/18     TONSILECTOMY AND ADNOIDECTOMY      TONSILLECTOMY      TRANSURETHRAL RESECTION OF BLADDER TUMOR N/A 10/2/2017    Procedure: TRANSURETHRAL RESECTION OF BLADDER TUMOR (TURBT); Surgeon: José Miguel Marinelli MD;  [facility];  Service: Urology    [address] (HISTORICAL)       shoulder  Review of Systems   Review of Systems   Constitutional: Negative for fever  Respiratory: Negative for shortness of breath  Cardiovascular: Negative for chest pain  Genitourinary: Negative  Musculoskeletal: Positive for gait problem     Neurological:        Balance issues       Active Problem List     Patient Active Problem List   Diagnosis    Bladder cancer ([address] )    Lung nodule, solitary    Erectile dysfunction    Gait instability    High blood pressure    Hypothyroidism    Idiopathic peripheral neuropathy    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)    Cervical stenosis of spinal canal    Spinal stenosis of lumbar region    Spondylosis 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    Hoarse voice quality    Appetite loss    Weight loss    Gastroesophageal reflux disease without esophagitis    Alternating constipation and diarrhea    Weight loss, non-intentional    Nausea    Chronic obstructive pulmonary disease with acute exacerbation (HCC)    [address]illebrand's disease ([address] )    Radiculopathy, lumbar region    Closed fracture of superior ramus of left pubis (HCC)    Closed fracture of inferior pubic ramus (HCC)    Fall    Anemia       Objective   /80   Pulse 66   Ht 5' 11" (1 803 m)   Wt 74 9 kg (165 lb 2 oz)   BMI 23 03 kg/m²     Physical Exam  Constitutional:       Appearance: Normal appearance  HENT:      Head: Normocephalic and atraumatic  Eyes:      Extraocular Movements: Extraocular movements intact  Pulmonary:      Effort: Pulmonary effort is normal    Musculoskeletal:         General: Normal range of motion  Cervical back: Normal range of motion and neck supple  Skin:     Coloration: Skin is not jaundiced or pale  Neurological:      General: No focal deficit present  Mental Status: He is alert and oriented to person, place, and time  Mental status is at baseline  Psychiatric:         Mood and Affect: Mood normal          Behavior: Behavior normal          Thought Content:  Thought content normal          Judgment: Judgment normal              Current Medications     Current Outpatient Medications:     acetaminophen (TYLENOL) 325 mg tablet, Take 2 tablets (650 mg total) by mouth every 6 (six) hours as needed for mild pain, headaches or fever, Disp:  , Rfl: 0    atorvastatin (LIPITOR) 10 mg tablet, TAKE 1 TABLET BY MOUTH  DAILY, Disp: 90 tablet, Rfl: 3    B COMPLEX VITAMINS PO, Take 1 tablet by mouth daily, Disp: , Rfl:     cetirizine (ZyrTEC) 10 mg tablet, TAKE ONE TABLET BY MOUTH DAILY, Disp: 90 tablet, Rfl: 2    Cholecalciferol (VITAMIN D3) 2000 UNITS capsule, Take 1 capsule by mouth 2 (two) times a day 5000 units , Disp: , Rfl:     Coenzyme Q10 (CO Q10) 60 MG CAPS, Take 1 capsule by mouth daily, Disp: , Rfl:     gabapentin (NEURONTIN) 600 MG tablet, TAKE 1 TABLET BY MOUTH  TWICE DAILY, Disp: 180 tablet, Rfl: 3    Ginkgo Biloba 120 MG TABS, Take 1 tablet by mouth daily, Disp: , Rfl:     Inositol 500 MG TABS, Take 1 tablet by mouth 2 (two) times a day, Disp: , Rfl:     ipratropium (ATROVENT) 0 03 % nasal spray, USE 2 SPRAYS IN BOTH  NOSTRILS 3 TIMES DAILY, Disp: 60 mL, Rfl: 5    lutein 6 mg, Take 1 capsule by mouth daily, Disp: , Rfl:     metoprolol tartrate (LOPRESSOR) 25 mg tablet, TAKE 1 TABLET BY MOUTH  EVERY 12 HOURS, Disp: 180 tablet, Rfl: 3    MILK THISTLE PO, Take 1 tablet by mouth daily, Disp: , Rfl:     Misc Natural Products (SUPER SNOOZE) CAPS, Take 1 capsule by mouth daily Bed time , Disp: , Rfl:     omeprazole (PriLOSEC) 40 MG capsule, TAKE 1 CAPSULE BY MOUTH  TWICE DAILY, Disp: 180 capsule, Rfl: 3    polyethylene glycol (MiraLax) 17 GM/SCOOP powder, Take 17 g by mouth daily, Disp: , Rfl:     sildenafil (VIAGRA) 50 MG tablet, Viagra 50 mg tablet  TAKE 1 TABLET BY MOUTH UP TO 4 HOURS BEFORE INTERCOURSE AND MAX OF 1 TABLET IN 24 HOURS, Disp: , Rfl:     Zinc 50 MG TABS, Take 1 tablet by mouth daily, Disp: , Rfl:     ciprofloxacin (Cipro) 500 mg tablet, Take 1 tablet (500 mg total) by mouth once for 1 dose Take 1 dose after  procedure, Disp: 1 tablet, Rfl: 0    famotidine (PEPCID) 40 MG tablet, Take 1 tablet (40 mg total) by mouth daily at bedtime (Patient not taking: Reported on 1/5/2021), Disp: 90 tablet, Rfl: 1    Multiple Vitamins-Minerals (OCUVITE-LUTEIN) CAPS, Take by mouth daily (Patient not taking: Reported on 7/26/2021), Disp: , Rfl:     tadalafil (CIALIS) 20 MG tablet, Take 1 tablet (20 mg total) by mouth daily as needed for erectile dysfunction (Patient not taking: Reported on 4/6/2021), Disp: 18 tablet, Rfl: 3        Rancho Becker MD

## 2021-07-26 NOTE — LETTER
2021     Rosenda Walker, DO  [address] [place] [phone]    Patient: Skip Tinoco   YOB: 1935   Date of Visit: 2021       Dear Dr Randell Jimenez: Thank you for referring Nancy Morgan to me for evaluation  Below are my notes for this consultation  If you have questions, please do not hesitate to call me  I look forward to following your patient along with you  Sincerely,        Cortes Chaudhry MD        CC: No Recipients  Cortes Chaudhry MD  2021  2:13 PM  Sign when Signing Visit  [address] [facility]  [address], [address] [place] [phone]  www  [facility]  org      NAME: Skip Tinoco  AGE: 82 y o  SEX: male  : 1935   MRN: [id number]    DATE: 2021  TIME: 2:03 PM    Assessment and Plan:    Bladder cancer with superficial papillary recurrence over the right trigone:  Given how superficial this is, we decided we will observe it and recheck another cystoscopy in 1 year  This is given his age of 82 and other medical problems  He will call if he develops actual blood in his urine that he can see or any other problems  Chief Complaint   No chief complaint on file  History of Present Illness     Bladder cancer:  History of low-grade papillary TCC diagnosis in 2017, here for surveillance cystoscopy  Prostate cancer screening:  PSA 1 3 in 2018, nothing recent  Erectile dysfunction last year had failed Viagra 100 mg, and we decided to try Cialis 20 mg  Cystoscopy     Date/Time 2021 1:14 PM     Performed by  Cortes Chaudhry MD     Authorized by Cortes Chaudhry MD      Universal Protocol:  Consent: Verbal consent obtained  Written consent obtained        Procedure Details:  Procedure type: cystoscopy    Additional Procedure Details: Cystoscopy Procedure Note        Pre-operative Diagnosis:  Bladder cancer surveillance    Post-operative Diagnosis:  same, with superficial papillary recurrence of the lining over the right trigone    Procedure: Flexible cystoscopy    Surgeon: Chaitanya Mendez MD    Anesthesia: 1% Xylocaine per urethra    EBL: Minimal    Complications: none    Procedure Details   The risks, benefits, complications, treatment options, and expected outcomes were discussed with the patient  The patient concurred with the proposed plan, giving informed consent  Cystoscopy was performed today under local anesthesia, using sterile technique  The patient was placed in the supine position, prepped with Betadine, and draped in the usual sterile fashion  The flexible cystocope was used to inspect both the urethra and bladder    Findings:  Urethra:  Normal without stricture  Prostate nonobstructive  Bladder:  3+ trabeculated and there were no stones but there papillary superficial recurrences over the right trigone  Total diameter is perhaps 1-2 cm     The orifices were orthotopic and intact             Specimens: none                 Complications:  None           Disposition: To home            Condition:  Stable          The following portions of the patient's history were reviewed and updated as appropriate: allergies, current medications, past family history, past medical history, past social history, past surgical history and problem list   Past Medical History:   Diagnosis Date    Anemia     Appendicitis     Coronary artery disease     Detached retina     GERD (gastroesophageal reflux disease)     Hyperlipidemia     Hypertension     Macular degeneration     Neuropathy     [facility]vis fracture ([facility] [address] ) 01/2021    Von Willebrand disease ([facility] [address] )      Past Surgical History:   Procedure Laterality Date    ADENOIDECTOMY      APPENDECTOMY      ARTHRODESIS  01/15/2014    Lumbar     BACK SURGERY      CATARACT EXTRACTION      COLONOSCOPY  12/09/2016    fiberoptic     CYSTOSCOPY      with resection of tumor / 1/12/17, 10/2/17 / diagnostic 12/8/16, 5/30/17     CYSTOSCOPY  09/20/2018    CYSTOSCOPY  06/22/2020    EGD AND COLONOSCOPY N/A 12/9/2016    Procedure: EGD AND COLONOSCOPY;  Surgeon: Alla Sarkra MD;  [facility];  Service:     EYE SURGERY      FRACTURE SURGERY Bilateral     ARM    NECK SURGERY      NEUROPLASTY / TRANSPOSITION MEDIAN NERVE AT [facility] Right 04/2015    [place] COLONOSCOPY FLX DX W/COLLJ SPEC WHEN PFRMD N/A 2/5/2019    Procedure: COLONOSCOPY;  Surgeon: Reji Kirk MD;  [facility];  Service: Gastroenterology    [place] CYSTOURETHROSCOPY,FULGUR <0 5 CM LESN N/A 1/12/2017    Procedure: CYSTOSCOPY, BLADDER BIOPSY WITH FULGRATION, 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;  Surgeon: Dacia Francois MD;  [facility];  Service: Urology    [place] CYSTOURETHROSCOPY,FULGUR <0 5 CM LESN N/A 10/2/2017    Procedure: CYSTOSCOPY WITH  BLADDER BIOPSIES WITH FULGURATION;  Surgeon: Dacia Francois MD;  [facility];  Service: Urology    [place] ESOPHAGOGASTRODUODENOSCOPY TRANSORAL DIAGNOSTIC N/A 8/3/2018    Procedure: ESOPHAGOGASTRODUODENOSCOPY (EGD); Surgeon: eRji Kirk MD;  [facility];  Service: Gastroenterology    [place] INSTILL ANTICANCER AGENT IN BLADDER N/A 1/12/2017    Procedure: Tito Parisi;  Surgeon: Dacia Francois MD;  [facility];  Service: Urology    [place] INSTILL ANTICANCER AGENT IN BLADDER N/A 10/2/2017    Procedure: [place] Parisi;  Surgeon: Dacia Francois MD;  [facility];  Service: Urology    RETINAL DETACHMENT SURGERY Right 03/2016    complete air fill confirmed    [address] SURGERY  03/03/2015    proximal humerus fracture / LA    3/6/15   R    1/3/18     TONSILECTOMY AND ADNOIDECTOMY      TONSILLECTOMY      TRANSURETHRAL RESECTION OF BLADDER TUMOR N/A 10/2/2017    Procedure: TRANSURETHRAL RESECTION OF BLADDER TUMOR (TURBT);   Surgeon: Dacia Francois MD; [facility];  Service: Urology   [facility]TIGEN (HISTORICAL)       shoulder  Review of Systems   Review of Systems   Constitutional: Negative for fever  Respiratory: Negative for shortness of breath  Cardiovascular: Negative for chest pain  Genitourinary: Negative  Musculoskeletal: Positive for gait problem  Neurological:        Balance issues       Active Problem List     Patient Active Problem List   Diagnosis    Bladder cancer (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 [address] )    Radiculopathy, lumbar region    Closed fracture of superior ramus of left pubis (HCC)    Closed fracture of inferior pubic ramus (HCC)    Fall    Anemia       Objective   /80   Pulse 66   Ht 5' 11" (1 803 m)   Wt 74 9 kg (165 lb 2 oz)   BMI 23 03 kg/m²     Physical Exam  Constitutional:       Appearance: Normal appearance  HENT:      Head: Normocephalic and atraumatic  Eyes:      Extraocular Movements: Extraocular movements intact  Pulmonary:      Effort: Pulmonary effort is normal    Musculoskeletal:         General: Normal range of motion  Cervical back: Normal range of motion and neck supple  Skin:     Coloration: Skin is not jaundiced or pale  Neurological:      General: No focal deficit present        Mental Status: He is alert and oriented to person, place, and time  Mental status is at baseline  Psychiatric:         Mood and Affect: Mood normal          Behavior: Behavior normal          Thought Content:  Thought content normal          Judgment: Judgment normal              Current Medications     Current Outpatient Medications:     acetaminophen (TYLENOL) 325 mg tablet, Take 2 tablets (650 mg total) by mouth every 6 (six) hours as needed for mild pain, headaches or fever, Disp:  , Rfl: 0    atorvastatin (LIPITOR) 10 mg tablet, TAKE 1 TABLET BY MOUTH  DAILY, Disp: 90 tablet, Rfl: 3    B COMPLEX VITAMINS PO, Take 1 tablet by mouth daily, Disp: , Rfl:     cetirizine (ZyrTEC) 10 mg tablet, TAKE ONE TABLET BY MOUTH DAILY, Disp: 90 tablet, Rfl: 2    Cholecalciferol (VITAMIN D3) 2000 UNITS capsule, Take 1 capsule by mouth 2 (two) times a day 5000 units , Disp: , Rfl:     Coenzyme Q10 (CO Q10) 60 MG CAPS, Take 1 capsule by mouth daily, Disp: , Rfl:     gabapentin (NEURONTIN) 600 MG tablet, TAKE 1 TABLET BY MOUTH  TWICE DAILY, Disp: 180 tablet, Rfl: 3    Ginkgo Biloba 120 MG TABS, Take 1 tablet by mouth daily, Disp: , Rfl:     Inositol 500 MG TABS, Take 1 tablet by mouth 2 (two) times a day, Disp: , Rfl:     ipratropium (ATROVENT) 0 03 % nasal spray, USE 2 SPRAYS IN BOTH  NOSTRILS 3 TIMES DAILY, Disp: 60 mL, Rfl: 5    lutein 6 mg, Take 1 capsule by mouth daily, Disp: , Rfl:     metoprolol tartrate (LOPRESSOR) 25 mg tablet, TAKE 1 TABLET BY MOUTH  EVERY 12 HOURS, Disp: 180 tablet, Rfl: 3    MILK THISTLE PO, Take 1 tablet by mouth daily, Disp: , Rfl:     Misc Natural Products (SUPER SNOOZE) CAPS, Take 1 capsule by mouth daily Bed time , Disp: , Rfl:     omeprazole (PriLOSEC) 40 MG capsule, TAKE 1 CAPSULE BY MOUTH  TWICE DAILY, Disp: 180 capsule, Rfl: 3    polyethylene glycol (MiraLax) 17 GM/SCOOP powder, Take 17 g by mouth daily, Disp: , Rfl:     sildenafil (VIAGRA) 50 MG tablet, Viagra 50 mg tablet  TAKE 1 TABLET BY MOUTH UP TO 4 HOURS BEFORE INTERCOURSE AND MAX OF 1 TABLET IN 24 HOURS, Disp: , Rfl:     Zinc 50 MG TABS, Take 1 tablet by mouth daily, Disp: , Rfl:     ciprofloxacin (Cipro) 500 mg tablet, Take 1 tablet (500 mg total) by mouth once for 1 dose Take 1 dose after  procedure, Disp: 1 tablet, Rfl: 0    famotidine (PEPCID) 40 MG tablet, Take 1 tablet (40 mg total) by mouth daily at bedtime (Patient not taking: Reported on 1/5/2021), Disp: 90 tablet, Rfl: 1    Multiple Vitamins-Minerals (OCUVITE-LUTEIN) CAPS, Take by mouth daily (Patient not taking: Reported on 7/26/2021), Disp: , Rfl:     tadalafil (CIALIS) 20 MG tablet, Take 1 tablet (20 mg total) by mouth daily as needed for erectile dysfunction (Patient not taking: Reported on 4/6/2021), Disp: 18 tablet, Rfl: 3        Marci Lan MD

## 2021-08-04 DIAGNOSIS — R26.89 BALANCE DISORDER: Primary | ICD-10-CM

## 2021-08-21 DIAGNOSIS — E78.2 MIXED HYPERLIPIDEMIA: ICD-10-CM

## 2021-08-23 RX ORDER — ATORVASTATIN CALCIUM 10 MG/1
TABLET, FILM COATED ORAL
Qty: 90 TABLET | Refills: 3 | Status: SHIPPED | OUTPATIENT
Start: 2021-08-23 | End: 2022-07-08

## 2021-09-02 DIAGNOSIS — J30.1 ALLERGIC RHINITIS DUE TO POLLEN, UNSPECIFIED SEASONALITY: ICD-10-CM

## 2021-09-02 RX ORDER — CETIRIZINE HYDROCHLORIDE 10 MG/1
TABLET ORAL
Qty: 90 TABLET | Refills: 5 | Status: SHIPPED | OUTPATIENT
Start: 2021-09-02

## 2021-09-08 DIAGNOSIS — K21.9 GASTROESOPHAGEAL REFLUX DISEASE WITHOUT ESOPHAGITIS: ICD-10-CM

## 2021-09-09 DIAGNOSIS — K21.9 GASTROESOPHAGEAL REFLUX DISEASE WITHOUT ESOPHAGITIS: ICD-10-CM

## 2021-09-09 RX ORDER — OMEPRAZOLE 40 MG/1
CAPSULE, DELAYED RELEASE ORAL
Qty: 180 CAPSULE | Refills: 3 | Status: SHIPPED | OUTPATIENT
Start: 2021-09-09 | End: 2022-07-25

## 2021-09-09 RX ORDER — GABAPENTIN 600 MG/1
TABLET ORAL
Qty: 180 TABLET | Refills: 3 | Status: SHIPPED | OUTPATIENT
Start: 2021-09-09 | End: 2022-07-25

## 2021-09-30 ENCOUNTER — OFFICE VISIT (OUTPATIENT)
Dept: FAMILY MEDICINE CLINIC | Facility: CLINIC | Age: 86
End: 2021-09-30

## 2021-09-30 VITALS
WEIGHT: 167 LBS | HEIGHT: 71 IN | TEMPERATURE: 97.1 F | BODY MASS INDEX: 23.38 KG/M2 | SYSTOLIC BLOOD PRESSURE: 126 MMHG | DIASTOLIC BLOOD PRESSURE: 68 MMHG

## 2021-09-30 DIAGNOSIS — E74.39 GLUCOSE INTOLERANCE: ICD-10-CM

## 2021-09-30 DIAGNOSIS — Z23 NEED FOR INFLUENZA VACCINATION: Primary | ICD-10-CM

## 2021-09-30 DIAGNOSIS — C67.9 MALIGNANT NEOPLASM OF URINARY BLADDER, UNSPECIFIED SITE (HCC): ICD-10-CM

## 2021-09-30 DIAGNOSIS — D68.00 VON WILLEBRAND'S DISEASE: ICD-10-CM

## 2021-09-30 DIAGNOSIS — E78.2 MIXED HYPERLIPIDEMIA: ICD-10-CM

## 2021-09-30 DIAGNOSIS — S06.2XAA: ICD-10-CM

## 2021-09-30 DIAGNOSIS — L57.0 ACTINIC KERATOSIS: ICD-10-CM

## 2021-09-30 DIAGNOSIS — Z00.00 MEDICARE ANNUAL WELLNESS VISIT, SUBSEQUENT: ICD-10-CM

## 2021-09-30 DIAGNOSIS — R53.82 CHRONIC FATIGUE: ICD-10-CM

## 2021-09-30 PROBLEM — S06.2X9A: Status: ACTIVE | Noted: 2021-09-30

## 2021-09-30 NOTE — PROGRESS NOTES
Assessment and Plan:     Problem List Items Addressed This Visit     None      Visit Diagnoses     Need for influenza vaccination    -  Primary    Relevant Orders    influenza vaccine, high-dose, PF 0 7 mL (FLUZONE HIGH-DOSE)          Depression Screening and Follow-up Plan:   Patient was screened for depression during today's encounter  They screened negative with a PHQ-2 score of 0  Falls Plan of Care: balance, strength, and gait training instructions were provided  Preventive health issues were discussed with patient, and age appropriate screening tests were ordered as noted in patient's After Visit Summary  Personalized health advice and appropriate referrals for health education or preventive services given if needed, as noted in patient's After Visit Summary  History of Present Illness:     Patient presents for Welcome to Medicare visit  Patient Care Team:  Dahiana Walker DO as PCP - MD Carolyn Christina MD Martina Hikes, Eustacio Grana, MD Sherrian Lemmings, MD as Endoscopist     Review of Systems:     Review of Systems   Constitutional: Negative for activity change, appetite change, diaphoresis, fatigue and fever  HENT: Positive for hearing loss  Negative for dental problem  Eyes: Positive for visual disturbance  Macular degeneration   Respiratory: Negative for apnea, cough, chest tightness, shortness of breath and wheezing  Cardiovascular: Negative for chest pain, palpitations and leg swelling  Gastrointestinal: Positive for diarrhea  Negative for abdominal distention, abdominal pain, anal bleeding, constipation, nausea and vomiting  Endocrine: Negative for cold intolerance, heat intolerance, polydipsia, polyphagia and polyuria  Genitourinary: Negative for difficulty urinating, dysuria, flank pain, hematuria and urgency  Musculoskeletal: Negative for arthralgias, back pain, gait problem, joint swelling and myalgias     Skin: Negative for color change, rash and wound  Allergic/Immunologic: Negative for environmental allergies, food allergies and immunocompromised state  Neurological: Positive for weakness and numbness  Negative for dizziness, seizures, syncope, speech difficulty and headaches  Ambulatory dysfunction   Hematological: Negative for adenopathy  Does not bruise/bleed easily  Psychiatric/Behavioral: Negative for agitation, behavioral problems, hallucinations, sleep disturbance and suicidal ideas        Problem List:     Patient Active Problem List   Diagnosis   • Bladder cancer ([place] [address] )   • Lung nodule, solitary   • Erectile dysfunction   • Gait instability   • High blood pressure   • Hypothyroidism   • Idiopathic peripheral neuropathy   • 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)   • Cervical stenosis of spinal canal   • Spinal stenosis of lumbar region   • Spondylosis 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   • Hoarse voice quality   • Appetite loss   • Weight loss   • Gastroesophageal reflux disease without esophagitis   • Alternating constipation and diarrhea   • Weight loss, non-intentional   • Nausea   • Chronic obstructive pulmonary disease with acute exacerbation ([facility])   • Von Willebrand's disease ([place] [address] )   • Radiculopathy, lumbar region   • Closed fracture of superior ramus of left pubis ([facility])   • Closed fracture of inferior pubic ramus ([place] [address] )   • Fall   • Anemia      Past Medical and Surgical History:     Past Medical History:   Diagnosis Date   • Anemia    • Appendicitis    • Coronary artery disease    • Detached retina    • GERD (gastroesophageal reflux disease)    • Hyperlipidemia    • Hypertension    • Macular degeneration    • Neuropathy    • Pelvis fracture ([place] [address] ) 01/2021   • Von Willebrand disease ([place] [address] )      Past Surgical History:   Procedure Laterality Date   • ADENOIDECTOMY     • APPENDECTOMY     • ARTHRODESIS  01/15/2014    Lumbar    • BACK SURGERY     • CATARACT EXTRACTION     • COLONOSCOPY  12/09/2016    fiberoptic    • CYSTOSCOPY      with resection of tumor / 1/12/17, 10/2/17 / diagnostic 12/8/16, 5/30/17    • CYSTOSCOPY  09/20/2018   • CYSTOSCOPY  06/22/2020   • EGD AND COLONOSCOPY N/A 12/9/2016    Procedure: EGD AND COLONOSCOPY;  Surgeon: Charmaine Amaya MD;  Location: [facility];  Service:    • EYE SURGERY     • FRACTURE SURGERY Bilateral     ARM   • NECK SURGERY     • NEUROPLASTY / TRANSPOSITION MEDIAN NERVE AT CARPAL TUNNEL Right 04/2015   • MA COLONOSCOPY FLX DX W/COLLJ SPEC WHEN PFRMD N/A 2/5/2019    Procedure: COLONOSCOPY;  Surgeon: Sagar Rodriguez MD;  Location: [facility];  Service: Gastroenterology   • MA CYSTOURETHROSCOPY,FULGUR <0 5 CM LESN N/A 1/12/2017    Procedure: CYSTOSCOPY, BLADDER BIOPSY WITH FULGRATION, 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;  Surgeon: Colleen Bernal MD;  [facility]: [facility];  Service: Urology   • MA CYSTOURETHROSCOPY,FULGUR <0 5 CM LESN N/A 10/2/2017    Procedure: CYSTOSCOPY WITH  BLADDER BIOPSIES WITH FULGURATION;  Surgeon: Colleen Bernal MD;  [facility];  Service: Urology   • MA ESOPHAGOGASTRODUODENOSCOPY TRANSORAL DIAGNOSTIC N/A 8/3/2018    Procedure: ESOPHAGOGASTRODUODENOSCOPY (EGD); Surgeon: Sagar Rodriguez MD;  [facility]: [facility];  Service: Gastroenterology   • MA INSTILL ANTICANCER AGENT IN BLADDER N/A 1/12/2017    Procedure: Radha Miller;  Surgeon: Colleen Bernal MD;  [facility];  Service: Urology   • MA INSTILL ANTICANCER AGENT IN BLADDER N/A 10/2/2017    Procedure: Radha Miller;  Surgeon: Colleen Bernal MD;  [facility];  Service: Urology   • RETINAL DETACHMENT SURGERY Right 03/2016    complete air fill confirmed    • ROTATOR CUFF REPAIR     • SHOULDER SURGERY  03/03/2015    proximal humerus fracture / [place]    3/6/15   R    1/3/18    • TONSILECTOMY AND ADNOIDECTOMY     • TONSILLECTOMY     • TRANSURETHRAL RESECTION OF BLADDER TUMOR N/A 10/2/2017    Procedure: TRANSURETHRAL RESECTION OF BLADDER TUMOR (TURBT); Surgeon: Cinthya Bell MD;  Location: [facility];  Service: Urology   • [facility] ANTIGEN (HISTORICAL)        Family History:     Family History   Problem Relation Age of Onset   • Ulcers Father         acute gastric   • Heart disease Mother    • Anuerysm Sister       Social History:     Social History     Socioeconomic History   • Marital status:      Spouse name: None   • Number of children: None   • Years of education: None   • Highest education level: None   Occupational History   • Occupation:    retired   Tobacco Use   • Smoking status: Current Every Day Smoker     Packs/day: 0 25     Years: 60 00     Pack years: 15 00     Types: Cigarettes   • Smokeless tobacco: Never Used   • Tobacco comment: smokes a pipe -resolved    Vaping Use   • Vaping Use: Never used   Substance and Sexual Activity   • Alcohol use: Yes     Alcohol/week: 2 0 standard drinks     Types: 1 Glasses of wine, 1 Shots of liquor per week     Comment: DAILY    • Drug use: Never   • Sexual activity: None   Other Topics Concern   • None   Social History Narrative    No advance directives     Patient has living will      Social Determinants of Health     Financial Resource Strain:    • Difficulty of Paying Living Expenses:    Food Insecurity: No Food Insecurity   • Worried About Running Out of Food in the Last Year: Never true   • Ran Out of Food in the Last Year: Never true   Transportation Needs: No Transportation Needs   • Lack of Transportation (Medical): No   • Lack of Transportation (Non-Medical):  No   Physical Activity:    • Days of Exercise per Week:    • Minutes of Exercise per Session:    Stress:    • Feeling of Stress :    Social Connections: Unknown   • Frequency of Communication with Friends and Family: Not on file   • Frequency of Social Gatherings with Friends and Family: Not on file   • Attends Mosque Services: Not on file   • Active Member of Clubs or Organizations: Not on file   • Attends Club or Organization Meetings: Not on file   • Marital Status: Living with partner   Intimate Partner Violence:    • Fear of Current or Ex-Partner:    • Emotionally Abused:    • Physically Abused:    • Sexually Abused:       Medications and Allergies:     Current Outpatient Medications   Medication Sig Dispense Refill   • acetaminophen (TYLENOL) 325 mg tablet Take 2 tablets (650 mg total) by mouth every 6 (six) hours as needed for mild pain, headaches or fever  0   • atorvastatin (LIPITOR) 10 mg tablet TAKE 1 TABLET BY MOUTH  DAILY 90 tablet 3   • B COMPLEX VITAMINS PO Take 1 tablet by mouth daily     • cetirizine (ZyrTEC) 10 mg tablet TAKE ONE TABLET BY MOUTH DAILY 90 tablet 5   • Cholecalciferol (VITAMIN D3) 2000 UNITS capsule Take 1 capsule by mouth 2 (two) times a day 5000 units      • Coenzyme Q10 (CO Q10) 60 MG CAPS Take 1 capsule by mouth daily     • gabapentin (NEURONTIN) 600 MG tablet TAKE 1 TABLET BY MOUTH  TWICE DAILY 180 tablet 3   • Ginkgo Biloba 120 MG TABS Take 1 tablet by mouth daily     • Inositol 500 MG TABS Take 1 tablet by mouth 2 (two) times a day     • ipratropium (ATROVENT) 0 03 % nasal spray USE 2 SPRAYS IN BOTH  NOSTRILS 3 TIMES DAILY 60 mL 5   • lutein 6 mg Take 1 capsule by mouth daily     • metoprolol tartrate (LOPRESSOR) 25 mg tablet TAKE 1 TABLET BY MOUTH  EVERY 12 HOURS 180 tablet 3   • MILK THISTLE PO Take 1 tablet by mouth daily     • Misc Natural Products (SUPER SNOOZE) CAPS Take 1 capsule by mouth daily Bed time      • Multiple Vitamins-Minerals (OCUVITE-LUTEIN) CAPS Take by mouth daily      • omeprazole (PriLOSEC) 40 MG capsule TAKE 1 CAPSULE BY MOUTH  TWICE DAILY 180 capsule 3   • polyethylene glycol (MiraLax) 17 GM/SCOOP powder Take 17 g by mouth daily     • sildenafil (VIAGRA) 50 MG tablet Viagra 50 mg tablet   TAKE 1 TABLET BY MOUTH UP TO 4 HOURS BEFORE INTERCOURSE AND MAX OF 1 TABLET IN 24 HOURS     • Zinc 50 MG TABS Take 1 tablet by mouth daily     • famotidine (PEPCID) 40 MG tablet Take 1 tablet (40 mg total) by mouth daily at bedtime (Patient not taking: Reported on 9/30/2021) 90 tablet 1   • tadalafil (CIALIS) 20 MG tablet Take 1 tablet (20 mg total) by mouth daily as needed for erectile dysfunction (Patient not taking: Reported on 4/6/2021) 18 tablet 3     No current facility-administered medications for this visit  No Known Allergies   Immunizations:     Immunization History   Administered Date(s) Administered   • INFLUENZA 10/26/2017, 09/12/2018   • Influenza Split High Dose Preservative Free IM 09/17/2012, 10/15/2013, 11/04/2014, 10/15/2015, 09/01/2016, 10/26/2017   • Influenza, high dose seasonal 0 7 mL 09/12/2018, 09/17/2019, 09/22/2020   • Pneumococcal Conjugate 13-Valent 12/09/2014   • Pneumococcal Polysaccharide PPV23 01/12/2018   • SARS-CoV-2 / COVID-19 mRNA IM ([facility]) 01/22/2021, 02/19/2021   • Zoster 12/12/2014      Health Maintenance:         Topic Date Due   • Colorectal Cancer Screening  02/05/2024         Topic Date Due   • DTaP,Tdap,and Td Vaccines (1 - Tdap) Never done   • Influenza Vaccine (1) 09/01/2021      Medicare Screening Tests and Risk Assessments:     Kami Hardin is here for his Subsequent Wellness visit  Health Risk Assessment:   Patient rates overall health as good  Patient feels that their physical health rating is slightly worse  Patient is very satisfied with their life  Eyesight was rated as slightly worse  Hearing was rated as slightly worse  Patient feels that their emotional and mental health rating is same  Patients states they are never, rarely angry  Patient states they are sometimes unusually tired/fatigued  Pain experienced in the last 7 days has been some  Patient's pain rating has been 3/10  Patient states that he has experienced no weight loss or gain in last 6 months  Depression Screening:   PHQ-2 Score: 0      Fall Risk Screening:    In the past year, patient has experienced: history of falling in past year    Number of falls: 2 or more  Injured during fall?: Yes    Feels unsteady when standing or walking?: Yes    Worried about falling?: Yes      Home Safety:  Patient does not have trouble with stairs inside or outside of their home  Patient has working smoke alarms and has working carbon monoxide detector  Home safety hazards include: none  Pt is taking Physical Therapy for balance & walking    Nutrition:   Current diet is Regular and Limited junk food  Medications:   Patient is not currently taking any over-the-counter supplements  Patient is able to manage medications  Activities of Daily Living (ADLs)/Instrumental Activities of Daily Living (IADLs):   Walk and transfer into and out of bed and chair?: Yes  Dress and groom yourself?: Yes    Bathe or shower yourself?: Yes    Feed yourself? Yes  Do your laundry/housekeeping?: Yes  Manage your money, pay your bills and track your expenses?: Yes  Make your own meals?: Yes    Do your own shopping?: Yes    ADL comments: Pt has a  that helps with the household chores, as well as his family    Previous Hospitalizations:   Any hospitalizations or ED visits within the last 12 months?: Yes    How many hospitalizations have you had in the last year?: 1-2    Hospitalization Comments: Pelvic fracture    Advance Care Planning:   Living will: Yes    Durable POA for healthcare:  Yes    Advanced directive: Yes    Advanced directive counseling given: Yes    Five wishes given: Yes    Patient declined ACP directive: No    End of Life Decisions reviewed with patient: Yes    Provider agrees with end of life decisions: Yes      Cognitive Screening:   Provider or family/friend/caregiver concerned regarding cognition?: No    PREVENTIVE SCREENINGS      Cardiovascular Screening:    General: Screening Not Indicated and History Lipid Disorder      Diabetes Screening:     General: Screening Current      Colorectal Cancer Screening:     General: Screening Current      Prostate Cancer Screening:    General: Screening Not Indicated      Osteoporosis Screening:    General: Screening Not Indicated      Abdominal Aortic Aneurysm (AAA) Screening:    Risk factors include: tobacco use        Lung Cancer Screening:     General: Screening Not Indicated    Screening, Brief Intervention, and Referral to Treatment (SBIRT)    Screening  Typical number of drinks in a day: 1  Typical number of drinks in a week: 7  Interpretation: Low risk drinking behavior  Single Item Drug Screening:  How often have you used an illegal drug (including marijuana) or a prescription medication for non-medical reasons in the past year? never    Single Item Drug Screen Score: 0  Interpretation: Negative screen for possible drug use disorder    No exam data present     Physical Exam:     /68 (BP Location: Left arm, Patient Position: Sitting, Cuff Size: Standard)   Temp (!) 97 1 °F (36 2 °C) (Temporal)   Ht 5' 11" (1 803 m)   Wt 75 8 kg (167 lb)   BMI 23 29 kg/m²     Physical Exam  Constitutional:       Appearance: He is well-developed  HENT:      Head: Normocephalic and atraumatic  Right Ear: External ear normal       Left Ear: External ear normal       Nose: Nose normal    Eyes:      Conjunctiva/sclera: Conjunctivae normal       Pupils: Pupils are equal, round, and reactive to light  Cardiovascular:      Rate and Rhythm: Normal rate and regular rhythm  Heart sounds: Normal heart sounds  No murmur heard  No friction rub  Pulmonary:      Effort: Pulmonary effort is normal  No respiratory distress  Breath sounds: Normal breath sounds  No wheezing or rales  Chest:      Chest wall: No tenderness  Abdominal:      General: Bowel sounds are normal       Palpations: Abdomen is soft  Musculoskeletal:         General: Normal range of motion  Cervical back: Normal range of motion and neck supple  Skin:     General: Skin is warm and dry        Capillary Refill: Capillary refill takes 2 to 3 seconds  Neurological:      Mental Status: He is alert and oriented to person, place, and time  Psychiatric:         Behavior: Behavior normal          Thought Content:  Thought content normal          Judgment: Judgment normal           Eleno Villegas DO

## 2021-09-30 NOTE — PATIENT INSTRUCTIONS
Medicare Preventive Visit Patient Instructions  Thank you for completing your Welcome to Medicare Visit or Medicare Annual Wellness Visit today  Your next wellness visit will be due in one year (10/1/2022)  The screening/preventive services that you may require over the next 5-10 years are detailed below  Some tests may not apply to you based off risk factors and/or age  Screening tests ordered at today's visit but not completed yet may show as past due  Also, please note that scanned in results may not display below  Preventive Screenings:  Service Recommendations Previous Testing/Comments   Colorectal Cancer Screening  · Colonoscopy    · Fecal Occult Blood Test (FOBT)/Fecal Immunochemical Test (FIT)  · Fecal DNA/Cologuard Test  · Flexible Sigmoidoscopy Age: 54-65 years old   Colonoscopy: every 10 years (May be performed more frequently if at higher risk)  OR  FOBT/FIT: every 1 year  OR  Cologuard: every 3 years  OR  Sigmoidoscopy: every 5 years  Screening may be recommended earlier than age 48 if at higher risk for colorectal cancer  Also, an individualized decision between you and your healthcare provider will decide whether screening between the ages of 74-80 would be appropriate   Colonoscopy: 02/05/2019  FOBT/FIT: Not on file  Cologuard: Not on file  Sigmoidoscopy: Not on file    Screening Current     Prostate Cancer Screening Individualized decision between patient and health care provider in men between ages of 53-78   Medicare will cover every 12 months beginning on the day after your 50th birthday PSA: 1 3 ng/mL     Screening Not Indicated     Hepatitis C Screening Once for adults born between 1945 and 1965  More frequently in patients at high risk for Hepatitis C Hep C Antibody: Not on file        Diabetes Screening 1-2 times per year if you're at risk for diabetes or have pre-diabetes Fasting glucose: 82 mg/dL   A1C: 6 1 %    Screening Current   Cholesterol Screening Once every 5 years if you don't have a lipid disorder  May order more often based on risk factors  Lipid panel: 10/08/2019    Screening Not Indicated  History Lipid Disorder      Other Preventive Screenings Covered by Medicare:  1  Abdominal Aortic Aneurysm (AAA) Screening: covered once if your at risk  You're considered to be at risk if you have a family history of AAA or a male between the age of 73-68 who smoking at least 100 cigarettes in your lifetime  2  Lung Cancer Screening: covers low dose CT scan once per year if you meet all of the following conditions: (1) Age 50-69; (2) No signs or symptoms of lung cancer; (3) Current smoker or have quit smoking within the last 15 years; (4) You have a tobacco smoking history of at least 30 pack years (packs per day x number of years you smoked); (5) You get a written order from a healthcare provider  3  Glaucoma Screening: covered annually if you're considered high risk: (1) You have diabetes OR (2) Family history of glaucoma OR (3)  aged 48 and older OR (3)  American aged 72 and older  3  Osteoporosis Screening: covered every 2 years if you meet one of the following conditions: (1) Have a vertebral abnormality; (2) On glucocorticoid therapy for more than 3 months; (3) Have primary hyperparathyroidism; (4) On osteoporosis medications and need to assess response to drug therapy  5  HIV Screening: covered annually if you're between the age of 12-76  Also covered annually if you are younger than 13 and older than 72 with risk factors for HIV infection  For pregnant patients, it is covered up to 3 times per pregnancy      Immunizations:  Immunization Recommendations   Influenza Vaccine Annual influenza vaccination during flu season is recommended for all persons aged >= 6 months who do not have contraindications   Pneumococcal Vaccine (Prevnar and Pneumovax)  * Prevnar = PCV13  * Pneumovax = PPSV23 Adults 25-60 years old: 1-3 doses may be recommended based on certain risk factors  Adults 72 years old: Prevnar (PCV13) vaccine recommended followed by Pneumovax (PPSV23) vaccine  If already received PPSV23 since turning 65, then PCV13 recommended at least one year after PPSV23 dose  Hepatitis B Vaccine 3 dose series if at intermediate or high risk (ex: diabetes, end stage renal disease, liver disease)   Tetanus (Td) Vaccine - COST NOT COVERED BY MEDICARE PART B Following completion of primary series, a booster dose should be given every 10 years to maintain immunity against tetanus  Td may also be given as tetanus wound prophylaxis  Tdap Vaccine - COST NOT COVERED BY MEDICARE PART B Recommended at least once for all adults  For pregnant patients, recommended with each pregnancy  Shingles Vaccine (Shingrix) - COST NOT COVERED BY MEDICARE PART B  2 shot series recommended in those aged 48 and above     Health Maintenance Due:      Topic Date Due   • Colorectal Cancer Screening  02/05/2024     Immunizations Due:      Topic Date Due   • DTaP,Tdap,and Td Vaccines (1 - Tdap) Never done   • Influenza Vaccine (1) 09/01/2021     Advance Directives   What are advance directives? Advance directives are legal documents that state your wishes and plans for medical care  These plans are made ahead of time in case you lose your ability to make decisions for yourself  Advance directives can apply to any medical decision, such as the treatments you want, and if you want to donate organs  What are the types of advance directives? There are many types of advance directives, and each state has rules about how to use them  You may choose a combination of any of the following:  · Living will: This is a written record of the treatment you want  You can also choose which treatments you do not want, which to limit, and which to stop at a certain time  This includes surgery, medicine, IV fluid, and tube feedings  · Durable power of  for healthcare [facility]):   This is a written record that states who you want to make healthcare choices for you when you are unable to make them for yourself  This person, called a proxy, is usually a family member or a friend  You may choose more than 1 proxy  · Do not resuscitate (DNR) order:  A DNR order is used in case your heart stops beating or you stop breathing  It is a request not to have certain forms of treatment, such as CPR  A DNR order may be included in other types of advance directives  · Medical directive: This covers the care that you want if you are in a coma, near death, or unable to make decisions for yourself  You can list the treatments you want for each condition  Treatment may include pain medicine, surgery, blood transfusions, dialysis, IV or tube feedings, and a ventilator (breathing machine)  · Values history: This document has questions about your views, beliefs, and how you feel and think about life  This information can help others choose the care that you would choose  Why are advance directives important? An advance directive helps you control your care  Although spoken wishes may be used, it is better to have your wishes written down  Spoken wishes can be misunderstood, or not followed  Treatments may be given even if you do not want them  An advance directive may make it easier for your family to make difficult choices about your care  Fall Prevention    Fall prevention  includes ways to make your home and other areas safer  It also includes ways you can move more carefully to prevent a fall  Health conditions that cause changes in your blood pressure, vision, or muscle strength and coordination may increase your risk for falls  Medicines may also increase your risk for falls if they make you dizzy, weak, or sleepy  Fall prevention tips:   · Stand or sit up slowly  · Use assistive devices as directed  · Wear shoes that fit well and have soles that   · Wear a personal alarm  · Stay active      · Manage your medical conditions  Home Safety Tips:  · Add items to prevent falls in the bathroom  · Keep paths clear  · Install bright lights in your home  · Keep items you use often on shelves within reach  · Paint or place reflective tape on the edges of your stairs  Cigarette Smoking and Your Health   Risks to your health if you smoke:  Nicotine and other chemicals found in tobacco damage every cell in your body  Even if you are a light smoker, you have an increased risk for cancer, heart disease, and lung disease  If you are pregnant or have diabetes, smoking increases your risk for complications  Benefits to your health if you stop smoking:   · You decrease respiratory symptoms such as coughing, wheezing, and shortness of breath  · You reduce your risk for cancers of the lung, mouth, throat, kidney, bladder, pancreas, stomach, and cervix  If you already have cancer, you increase the benefits of chemotherapy  You also reduce your risk for cancer returning or a second cancer from developing  · You reduce your risk for heart disease, blood clots, heart attack, and stroke  · You reduce your risk for lung infections, and diseases such as pneumonia, asthma, chronic bronchitis, and emphysema  · Your circulation improves  More oxygen can be delivered to your body  If you have diabetes, you lower your risk for complications, such as kidney, artery, and eye diseases  You also lower your risk for nerve damage  Nerve damage can lead to amputations, poor vision, and blindness  · You improve your body's ability to heal and to fight infections  For more information and support to stop smoking:   · Smokefree  gov  Phone: 6- 800 - 734-2196  Web Address: www [employer]  [address] [place], [place] [address]barbara Yarbrough 2018 Information is for End User's use only and may not be sold, redistributed or otherwise used for commercial purposes   All illustrations and images included in CareNotes® are the copyrighted property of [employer] M [employer]  or [address] [facility]

## 2021-10-06 ENCOUNTER — LAB (OUTPATIENT)
Dept: LAB | Facility: MEDICAL CENTER | Age: 86
End: 2021-10-06
Payer: MEDICARE

## 2021-10-06 DIAGNOSIS — I10 ESSENTIAL HYPERTENSION: ICD-10-CM

## 2021-10-06 DIAGNOSIS — D62 ACUTE BLOOD LOSS AS CAUSE OF POSTOPERATIVE ANEMIA: ICD-10-CM

## 2021-10-06 LAB
ALBUMIN SERPL BCP-MCNC: 3.4 G/DL (ref 3.5–5)
ALP SERPL-CCNC: 85 U/L (ref 46–116)
ALT SERPL W P-5'-P-CCNC: 29 U/L (ref 12–78)
ANION GAP SERPL CALCULATED.3IONS-SCNC: 2 MMOL/L (ref 4–13)
AST SERPL W P-5'-P-CCNC: 26 U/L (ref 5–45)
BASOPHILS # BLD AUTO: 0.04 THOUSANDS/ΜL (ref 0–0.1)
BASOPHILS NFR BLD AUTO: 1 % (ref 0–1)
BILIRUB SERPL-MCNC: 1.08 MG/DL (ref 0.2–1)
BUN SERPL-MCNC: 17 MG/DL (ref 5–25)
CALCIUM ALBUM COR SERPL-MCNC: 9.9 MG/DL (ref 8.3–10.1)
CALCIUM SERPL-MCNC: 9.4 MG/DL (ref 8.3–10.1)
CHLORIDE SERPL-SCNC: 105 MMOL/L (ref 100–108)
CHOLEST SERPL-MCNC: 134 MG/DL (ref 50–200)
CO2 SERPL-SCNC: 30 MMOL/L (ref 21–32)
CREAT SERPL-MCNC: 0.99 MG/DL (ref 0.6–1.3)
EOSINOPHIL # BLD AUTO: 0.37 THOUSAND/ΜL (ref 0–0.61)
EOSINOPHIL NFR BLD AUTO: 5 % (ref 0–6)
ERYTHROCYTE [DISTWIDTH] IN BLOOD BY AUTOMATED COUNT: 14.8 % (ref 11.6–15.1)
GFR SERPL CREATININE-BSD FRML MDRD: 70 ML/MIN/1.73SQ M
GLUCOSE P FAST SERPL-MCNC: 85 MG/DL (ref 65–99)
HCT VFR BLD AUTO: 45.6 % (ref 36.5–49.3)
HDLC SERPL-MCNC: 43 MG/DL
HGB BLD-MCNC: 14.8 G/DL (ref 12–17)
IMM GRANULOCYTES # BLD AUTO: 0.02 THOUSAND/UL (ref 0–0.2)
IMM GRANULOCYTES NFR BLD AUTO: 0 % (ref 0–2)
LDLC SERPL CALC-MCNC: 66 MG/DL (ref 0–100)
LYMPHOCYTES # BLD AUTO: 2.96 THOUSANDS/ΜL (ref 0.6–4.47)
LYMPHOCYTES NFR BLD AUTO: 39 % (ref 14–44)
MCH RBC QN AUTO: 30.5 PG (ref 26.8–34.3)
MCHC RBC AUTO-ENTMCNC: 32.5 G/DL (ref 31.4–37.4)
MCV RBC AUTO: 94 FL (ref 82–98)
MONOCYTES # BLD AUTO: 1.05 THOUSAND/ΜL (ref 0.17–1.22)
MONOCYTES NFR BLD AUTO: 14 % (ref 4–12)
NEUTROPHILS # BLD AUTO: 3.25 THOUSANDS/ΜL (ref 1.85–7.62)
NEUTS SEG NFR BLD AUTO: 41 % (ref 43–75)
NONHDLC SERPL-MCNC: 91 MG/DL
NRBC BLD AUTO-RTO: 0 /100 WBCS
PLATELET # BLD AUTO: 224 THOUSANDS/UL (ref 149–390)
PMV BLD AUTO: 11.1 FL (ref 8.9–12.7)
POTASSIUM SERPL-SCNC: 4 MMOL/L (ref 3.5–5.3)
PROT SERPL-MCNC: 7.4 G/DL (ref 6.4–8.2)
RBC # BLD AUTO: 4.86 MILLION/UL (ref 3.88–5.62)
SODIUM SERPL-SCNC: 137 MMOL/L (ref 136–145)
TRIGL SERPL-MCNC: 126 MG/DL
TSH SERPL DL<=0.05 MIU/L-ACNC: 1.88 UIU/ML (ref 0.36–3.74)
WBC # BLD AUTO: 7.69 THOUSAND/UL (ref 4.31–10.16)

## 2021-10-06 PROCEDURE — 85025 COMPLETE CBC W/AUTO DIFF WBC: CPT

## 2021-10-07 ENCOUNTER — TELEPHONE (OUTPATIENT)
Dept: UROLOGY | Facility: CLINIC | Age: 86
End: 2021-10-07

## 2021-10-07 DIAGNOSIS — R35.1 NOCTURIA: Primary | ICD-10-CM

## 2021-10-08 DIAGNOSIS — K21.9 GASTROESOPHAGEAL REFLUX DISEASE: ICD-10-CM

## 2021-10-08 RX ORDER — FAMOTIDINE 40 MG/1
40 TABLET, FILM COATED ORAL
Qty: 90 TABLET | Refills: 1 | Status: SHIPPED | OUTPATIENT
Start: 2021-10-08 | End: 2022-03-14

## 2021-10-09 ENCOUNTER — LAB (OUTPATIENT)
Dept: LAB | Facility: MEDICAL CENTER | Age: 86
End: 2021-10-09
Payer: MEDICARE

## 2021-10-09 DIAGNOSIS — R35.1 NOCTURIA: ICD-10-CM

## 2021-10-09 LAB
BACTERIA UR QL AUTO: NORMAL /HPF
BILIRUB UR QL STRIP: NEGATIVE
CLARITY UR: CLEAR
COLOR UR: YELLOW
GLUCOSE UR STRIP-MCNC: NEGATIVE MG/DL
HGB UR QL STRIP.AUTO: NEGATIVE
HYALINE CASTS #/AREA URNS LPF: NORMAL /LPF
KETONES UR STRIP-MCNC: NEGATIVE MG/DL
LEUKOCYTE ESTERASE UR QL STRIP: NEGATIVE
NITRITE UR QL STRIP: NEGATIVE
NON-SQ EPI CELLS URNS QL MICRO: NORMAL /HPF
PH UR STRIP.AUTO: 6 [PH]
PROT UR STRIP-MCNC: NEGATIVE MG/DL
RBC #/AREA URNS AUTO: NORMAL /HPF
SP GR UR STRIP.AUTO: 1.01 (ref 1–1.03)
UROBILINOGEN UR QL STRIP.AUTO: 0.2 E.U./DL
WBC #/AREA URNS AUTO: NORMAL /HPF

## 2021-10-09 PROCEDURE — 87086 URINE CULTURE/COLONY COUNT: CPT

## 2021-10-09 PROCEDURE — 81001 URINALYSIS AUTO W/SCOPE: CPT

## 2021-10-12 ENCOUNTER — TELEPHONE (OUTPATIENT)
Dept: UROLOGY | Facility: CLINIC | Age: 86
End: 2021-10-12

## 2021-10-12 DIAGNOSIS — N32.81 OAB (OVERACTIVE BLADDER): Primary | ICD-10-CM

## 2021-10-12 LAB — BACTERIA UR CULT: NORMAL

## 2021-10-21 ENCOUNTER — APPOINTMENT (OUTPATIENT)
Dept: RADIOLOGY | Facility: MEDICAL CENTER | Age: 86
End: 2021-10-21
Payer: MEDICARE

## 2021-10-21 ENCOUNTER — OFFICE VISIT (OUTPATIENT)
Dept: FAMILY MEDICINE CLINIC | Facility: CLINIC | Age: 86
End: 2021-10-21
Payer: MEDICARE

## 2021-10-21 DIAGNOSIS — S70.02XA CONTUSION OF LEFT HIP, INITIAL ENCOUNTER: ICD-10-CM

## 2021-10-21 DIAGNOSIS — S70.02XA CONTUSION OF LEFT HIP, INITIAL ENCOUNTER: Primary | ICD-10-CM

## 2021-10-21 PROCEDURE — 99213 OFFICE O/P EST LOW 20 MIN: CPT | Performed by: FAMILY MEDICINE

## 2021-10-21 PROCEDURE — 73502 X-RAY EXAM HIP UNI 2-3 VIEWS: CPT

## 2021-11-17 DIAGNOSIS — H91.93 BILATERAL HEARING LOSS, UNSPECIFIED HEARING LOSS TYPE: Primary | ICD-10-CM

## 2021-11-19 RX ORDER — SOLIFENACIN SUCCINATE 5 MG/1
5 TABLET, FILM COATED ORAL DAILY
Qty: 30 TABLET | Refills: 3 | Status: SHIPPED | OUTPATIENT
Start: 2021-11-19 | End: 2022-04-06

## 2021-11-30 ENCOUNTER — APPOINTMENT (OUTPATIENT)
Dept: RADIOLOGY | Facility: MEDICAL CENTER | Age: 86
End: 2021-11-30
Payer: MEDICARE

## 2021-11-30 DIAGNOSIS — M25.552 PAIN IN JOINT OF LEFT HIP: ICD-10-CM

## 2021-11-30 DIAGNOSIS — M25.552 PAIN IN JOINT OF LEFT HIP: Primary | ICD-10-CM

## 2021-11-30 PROCEDURE — 73502 X-RAY EXAM HIP UNI 2-3 VIEWS: CPT

## 2021-12-13 ENCOUNTER — OFFICE VISIT (OUTPATIENT)
Dept: FAMILY MEDICINE CLINIC | Facility: CLINIC | Age: 86
End: 2021-12-13
Payer: MEDICARE

## 2021-12-13 ENCOUNTER — TELEPHONE (OUTPATIENT)
Dept: FAMILY MEDICINE CLINIC | Facility: CLINIC | Age: 86
End: 2021-12-13

## 2021-12-13 VITALS
BODY MASS INDEX: 24.19 KG/M2 | DIASTOLIC BLOOD PRESSURE: 80 MMHG | SYSTOLIC BLOOD PRESSURE: 138 MMHG | HEIGHT: 71 IN | WEIGHT: 172.8 LBS | TEMPERATURE: 97.2 F

## 2021-12-13 DIAGNOSIS — K59.04 CHRONIC IDIOPATHIC CONSTIPATION: ICD-10-CM

## 2021-12-13 DIAGNOSIS — M70.62 TROCHANTERIC BURSITIS OF LEFT HIP: Primary | ICD-10-CM

## 2021-12-13 PROCEDURE — 99214 OFFICE O/P EST MOD 30 MIN: CPT | Performed by: FAMILY MEDICINE

## 2021-12-13 RX ORDER — LUBIPROSTONE 8 UG/1
8 CAPSULE, GELATIN COATED ORAL
Qty: 30 CAPSULE | Refills: 2 | Status: SHIPPED | OUTPATIENT
Start: 2021-12-13 | End: 2022-04-06 | Stop reason: ALTCHOICE

## 2021-12-14 ENCOUNTER — TELEPHONE (OUTPATIENT)
Dept: FAMILY MEDICINE CLINIC | Facility: CLINIC | Age: 86
End: 2021-12-14

## 2021-12-16 ENCOUNTER — TELEPHONE (OUTPATIENT)
Dept: OTHER | Facility: OTHER | Age: 86
End: 2021-12-16

## 2021-12-27 ENCOUNTER — OFFICE VISIT (OUTPATIENT)
Dept: GASTROENTEROLOGY | Facility: CLINIC | Age: 86
End: 2021-12-27
Payer: MEDICARE

## 2021-12-27 VITALS
BODY MASS INDEX: 23 KG/M2 | HEART RATE: 68 BPM | DIASTOLIC BLOOD PRESSURE: 89 MMHG | TEMPERATURE: 96 F | SYSTOLIC BLOOD PRESSURE: 149 MMHG | WEIGHT: 169.8 LBS | HEIGHT: 72 IN

## 2021-12-27 DIAGNOSIS — K59.00 CONSTIPATION, UNSPECIFIED CONSTIPATION TYPE: ICD-10-CM

## 2021-12-27 DIAGNOSIS — Z86.010 HISTORY OF COLON POLYPS: Primary | ICD-10-CM

## 2021-12-27 DIAGNOSIS — K21.9 GASTROESOPHAGEAL REFLUX DISEASE, UNSPECIFIED WHETHER ESOPHAGITIS PRESENT: ICD-10-CM

## 2021-12-27 PROCEDURE — 1123F ACP DISCUSS/DSCN MKR DOCD: CPT | Performed by: NURSE PRACTITIONER

## 2021-12-27 PROCEDURE — 99214 OFFICE O/P EST MOD 30 MIN: CPT | Performed by: NURSE PRACTITIONER

## 2021-12-27 RX ORDER — SODIUM PICOSULFATE, MAGNESIUM OXIDE, AND ANHYDROUS CITRIC ACID 10; 3.5; 12 MG/160ML; G/160ML; G/160ML
2 LIQUID ORAL 2 TIMES DAILY
Qty: 320 ML | Refills: 0 | Status: SHIPPED | OUTPATIENT
Start: 2021-12-27 | End: 2022-04-06 | Stop reason: ALTCHOICE

## 2022-01-13 ENCOUNTER — OFFICE VISIT (OUTPATIENT)
Dept: UROLOGY | Facility: CLINIC | Age: 87
End: 2022-01-13
Payer: MEDICARE

## 2022-01-13 VITALS
WEIGHT: 174 LBS | DIASTOLIC BLOOD PRESSURE: 80 MMHG | SYSTOLIC BLOOD PRESSURE: 132 MMHG | BODY MASS INDEX: 23.6 KG/M2 | HEART RATE: 74 BPM

## 2022-01-13 DIAGNOSIS — R35.0 URINARY FREQUENCY: ICD-10-CM

## 2022-01-13 DIAGNOSIS — C67.8 MALIGNANT NEOPLASM OF OVERLAPPING SITES OF BLADDER (HCC): Primary | ICD-10-CM

## 2022-01-13 PROCEDURE — 99213 OFFICE O/P EST LOW 20 MIN: CPT | Performed by: NURSE PRACTITIONER

## 2022-01-13 NOTE — ASSESSMENT & PLAN NOTE
· Negative urine testing October 2021  · Started on solifenacin 5 milligrams which was cost prohibitive  · Would like to try behavioral changes  · Follow-up in July for cystoscopy as scheduled

## 2022-01-13 NOTE — PATIENT INSTRUCTIONS
BEHAVIORAL THERAPY FOR IMPROVED BLADDER CONTROL      1  Urge Control Techniques       A  Stop whatever you are doing and concentrate on sera your pelvic floor muscles  B   Contract your pelvic floor muscles repetitively (as in your "flick" exercises)  C   Once the urgency has subsided, realize that sometimes the urgency is because you have a             full bladder and have to urinate  Other times the urgency is a false signal and you do not have a full bladder  D  If you have urgency triggered by running water, "key in the door," getting up from a sitting position, etc , contract your pelvic floor muscles prior to       initiating the activity  2   Daily Fluid Intake       A  Avoid drinking too little (less than 3 cups/day) or drinking too much (more than 6             cups/day)  B   Avoid caffeinated beverages  C   If voiding frequently at night, limit your liquid intake after 7 p m  3   Bowel Regularity--Constipation Makes Bladder Symptoms Worse       A  Drink enough liquids, eat plenty of high fiber food  B  Exercise       C  Avoid laxatives and enemas on a regular basis, this decreases the bowel's normal function  4   Voiding Schedules       A  Empty your bladder at 1½ to 2 hour intervals even if you may not have the urge to urinate             at that time  You may gradually increase the time between voidings to 30  minutes, until you can comfortably void every 3 hours  B  If you are voiding more frequently than every 1½ to 2 hours, resist the urge to go  by using urge control techniques (described in 1 )  BLADDER HEALTH    WHAT IS CONSIDERED NORMAL?  The average bladder can hold about 2 cups of urine before it needs to be emptied   The normal range of voiding urine is 6 to 8 times during a 24 hour period   As we get older, our bladder capacity can get smaller and we may need to pass urine more frequently but usually not more than every 2 hours   Urine should flow easily without discomfort in a good, steady stream until the bladder is empty  No pushing or straining is necessary to empty the bladder   An urge is a signal that you feel as the bladder stretches to fill with urine  Urges can be felt even if the bladder is not full  Urges are not commands to go to the toilet, merely a signal and can be controlled  WHAT ARE GOOD BLADDER HABITS?  Take your time when emptying your bladder  Dont strain or push to empty your bladder  Make sure you empty your bladder completely each time you pass urine  Do not rush the process   Consistently ignoring the urge to go (waiting more than 4 hours between toileting) or urinating too infrequently may be convenient but not healthy for your bladder   Avoid going to the toilet just in case or more often than every 2 hours  It is usually not necessary to go when you feel the first urge  Try to go only when your bladder is full  Urgency and frequency of urination can be improved by retraining the bladder and spacing your fluid intake throughout the day  Practice good toilet habits  Dont let your bladder control your life  TIPS TO MAINTAIN GOOD BLADDER HABITS   Maintain a good fluid intake  Depending on your body size and environment, drink 6 -8 cups (8 oz each) of fluid per day unless otherwise advised by your doctor  Not enough fluid creates a foul odor and dark color of the urine   Limit the amount of caffeine (coffee, cola, chocolate or tea) and citrus foods that you consume as these foods can be associated with increased sensation of urinary urgency and frequency   Limit the amount of alcohol you drink  Alcohol increases urine production and makes it difficult for the brain to coordinate bladder control   Avoid constipation by maintaining a balanced diet of dietary fiber       Cigarette smoking is also irritating to the bladder surface and is associated with bladder cancer  In addition, the coughing associated with smoking may lead to increased incontinent episodes because of the increased pressure  HOW DIET CAN AFFECT YOUR BLADDER  Although there is no particular "diet" that can cure bladder control, there are certain dietary suggestions you can use to help control the problem  There are 2 points to consider when evaluating how your habits and diet may affect your bladder:    1  Foods and fluids can irritate the bladder  Some foods and beverages are thought to contribute to bladder leakage and irritability  However their effect on the bladder is not completely understood and you may want to see if eliminating one or all of these items improves your bladder control  If you are unable to give them up completely, it is recommended that you use the following items in moderation:   Acidic beverages and foods (orange juice, grapefruit juice, lemonade etc)   Alcoholic beverages   Vinegar   Coffee (regular and decaf)  Overactive Bladder   WHAT YOU NEED TO KNOW:   What is overactive bladder? Overactive bladder is a sudden urge to urinate that is difficult for you to control  It occurs when the muscles of the bladder contract (tighten) more than normal  This causes a frequent or sudden need to urinate  You usually have to urinate more than 8 times in 24 hours  You may need to get up more than once in the middle of the night to urinate  You may also leak urine before you are able to make it to the bathroom  What increases my risk for overactive bladder? Your risk for overactive bladder increases as you get older  Previous vaginal birth, chronic constipation, and diabetes increase your risk  Obesity, nerve injury, stroke, and spinal cord problems also increase your risk  How is overactive bladder diagnosed? Your healthcare provider will ask about your symptoms, medical history, and current medicines   He or she will examine your pelvic area and abdomen to look for problems that may be causing your symptoms  You may need blood and imaging tests to help find the cause of your symptoms  You may be asked to keep a log of your urination patterns  Write down the number of times you urinate over 24 hours, the amount, and if you have urine leakage  How is overactive bladder managed? Train your bladder  Go to the bathroom at set times, such as every 2 hours, even if you do not feel the urge to go  You can also try to hold your urine when you feel the urge to go  For example, hold your urine for 5 minutes when you feel the urge to go  As that becomes easier, hold your urine for 10 minutes  Work up to every 3 or 4 hours to help control your bladder  Limit liquids as directed  This may help decrease the amount you urinate  Ask how much liquid to drink each day and which liquids are best for you  Avoid liquids several hours before you go to sleep  Limit caffeine and alcohol  Exercise regularly and maintain a healthy weight  Ask your healthcare provider how much you should weigh and about the best exercise plan for you  Extra weight puts pressure on your bladder and may make your symptoms worse  Ask him or her to help you create a weight loss plan if you are overweight  Do pelvic muscle exercises often  Your pelvic muscles help you stop urinating  Squeeze these muscles tightly for 5 seconds, then relax for 5 seconds  Gradually work up to squeezing for 10 seconds  Do 3 sets of 15 repetitions a day, or as directed  This will help strengthen your pelvic muscles and improve bladder control  How is overactive bladder treated? The following treatments may be done if other methods are not working:  Medicines  may be given to relax your bladder and decrease urination  Sacral nerve stimulation sends electrical signals to your sacral nerve through a small device implanted under your skin  Your sacral nerve controls your bladder, sphincter, and pelvic floor muscles  Surgery  may be done if all other treatments do not help you control your bladder  When should I call my doctor? Your urine is pink, or you notice blood in your urine  You have pain with urination  You continue to have symptoms even after you take your medicine  You have questions or concerns about your condition or care  CARE AGREEMENT:   You have the right to help plan your care  Learn about your health condition and how it may be treated  Discuss treatment options with your healthcare providers to decide what care you want to receive  You always have the right to refuse treatment  The above information is an  only  It is not intended as medical advice for individual conditions or treatments  Talk to your doctor, nurse or pharmacist before following any medical regimen to see if it is safe and effective for you  © Copyright [employer] 2021 Information is for End User's use only and may not be sold, redistributed or otherwise used for commercial purposes  All illustrations and images included in CareNotes® are the copyrighted property of [employer] M [employer] Inc  or [address]   Tea (regular and decaf)   Caffeinated beverages   Carbonated beverages          2  Drinking enough and the right kinds of fluids  Many people with bladder control issues decrease their intake of liquids in hope that they will need to urinate less frequently or have less urinary leakage   You should not restrict fluids to control your bladder  While a decrease in liquid intake does result in a decrease in the volume of urine, the smaller amount of urine may be more highly concentrated   Highly concentrated, dark yellow urine is irritating to the bladder surface and may actually cause you to go to the bathroom more frequently   It also encourages the growth of bacteria, which may lead to infections resulting in incontinence         Substitutions for Bladder Irritants: water is always the best beverage choice    Grape and apple juice are thirst quenchers are good selections and are not as irritating to the bladder   o Low acid fruits:  Pears, apricots, papaya, watermelon  o For coffee drinkers: KAVA®, Postum®, Ty®, [place]®  o For tea drinkers:  non-citrus or herbal and sun brewed tea

## 2022-01-13 NOTE — PROGRESS NOTES
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 trigone  · Schedule surveillance cystoscopy July 2022          SUKI Yi    History of Present Illness     Ralph Aguilera is a 82 y o  male with history of low-grade TCC bladder cancer diagnosed January 2017 with superficial papillary recurrence over the right trigone  Due to the superficial nature it was recommended observation and he is going to have a cystoscopy around July 2022  He reports he was going to cancel this appointment because he does not feel that he needs any further cystoscopies  I did review the results of his past cystoscopy and recommended he have a neither 1 in July and can discuss that further with Dr Mejia Simms at that time  He presents today in follow-up after starting solifenacin for urinary frequency and increased nocturia  He was ordered Myrbetriq but this was too expensive he was then switched solifenacin 5 mg which was also costly to him  He drinks wine at supper and one susana at night  He took the medication for a short time and stopped  He felt it was not working  We reviewed that it has to be taken for at least 6 months to be fully effective  He would like to avoid medications  He has only been drinking about 3 cups of water a day  He will work on behavioral changes (i e  avoiding bladder irritants and increasing water intake) and see how things go until his upcoming cystoscopy with Dr Mejia Simms in the summer of 2022  His urine culture and microscopic were negative in October 2021  He was unable to provide a urine sample in the office today        He has previously undergone prostate cancer screening with a PSA 1 3 in 2018    Laboratory     Lab Results   Component Value Date    BUN 17 10/06/2021    CREATININE 0 99 10/06/2021       No components found for: GFR    Lab Results   Component Value Date    GLUCOSE 90 07/30/2015    CALCIUM 9 4 10/06/2021     07/30/2015    K 4 0 10/06/2021    CO2 30 10/06/2021     10/06/2021       Lab Results   Component Value Date    WBC 7 69 10/06/2021    HGB 14 8 10/06/2021    HCT 45 6 10/06/2021    MCV 94 10/06/2021     10/06/2021       Lab Results   Component Value Date    PSA 1 3 08/21/2018    PSA 1 0 08/29/2017    PSA 1 1 08/18/2016       No results found for this or any previous visit (from the past 1 hour(s))  @RESULT(URINEMICROSCOPIC)@    @RESULT(URINECULTURE)@    Radiology       Review of Systems     Review of Systems   Constitutional: Negative for activity change, appetite change, chills, fatigue, fever and unexpected weight change  HENT: Negative for facial swelling  Eyes: Negative for discharge  Respiratory: Negative  Negative for cough and shortness of breath  Cardiovascular: Negative for chest pain and leg swelling  Gastrointestinal: Positive for constipation  Negative for abdominal distention, abdominal pain, diarrhea, nausea and vomiting  Endocrine: Negative  Genitourinary: Positive for frequency  Negative for decreased urine volume, difficulty urinating, dysuria, enuresis, flank pain, genital sores, hematuria and urgency  Nocturia   Musculoskeletal: Positive for gait problem  Negative for back pain and myalgias  Ambulates with a cane   Skin: Negative for pallor and rash  Allergic/Immunologic: Negative  Negative for immunocompromised state  Neurological: Negative for facial asymmetry and speech difficulty  Psychiatric/Behavioral: Negative for agitation and confusion  Allergies     No Known Allergies    Physical Exam     Physical Exam  Vitals reviewed     Constitutional:       General: He is not in acute distress  Appearance: Normal appearance  He is normal weight  He is not ill-appearing, toxic-appearing or diaphoretic  HENT:      Head: Normocephalic and atraumatic  Eyes:      General: No scleral icterus  Cardiovascular:      Rate and Rhythm: Normal rate  Pulmonary:      Effort: Pulmonary effort is normal  No respiratory distress  Abdominal:      General: Abdomen is flat  There is no distension  Musculoskeletal:         General: No swelling  Cervical back: Normal range of motion  Skin:     General: Skin is warm and dry  Coloration: Skin is not jaundiced or pale  Findings: No rash  Neurological:      General: No focal deficit present  Mental Status: He is alert and oriented to person, place, and time  Gait: Gait abnormal       Comments: Ambulates with cane   Psychiatric:         Mood and Affect: Mood normal          Behavior: Behavior normal          Thought Content:  Thought content normal          Judgment: Judgment normal          Vital Signs     Vitals:    01/13/22 1015   BP: 132/80   Pulse: 74   Weight: 78 9 kg (174 lb)       Current Medications       Current Outpatient Medications:     acetaminophen (TYLENOL) 325 mg tablet, Take 2 tablets (650 mg total) by mouth every 6 (six) hours as needed for mild pain, headaches or fever, Disp:  , Rfl: 0    atorvastatin (LIPITOR) 10 mg tablet, TAKE 1 TABLET BY MOUTH  DAILY, Disp: 90 tablet, Rfl: 3    B COMPLEX VITAMINS PO, Take 1 tablet by mouth daily, Disp: , Rfl:     cetirizine (ZyrTEC) 10 mg tablet, TAKE ONE TABLET BY MOUTH DAILY, Disp: 90 tablet, Rfl: 5    Cholecalciferol (VITAMIN D3) 2000 UNITS capsule, Take 1 capsule by mouth 2 (two) times a day 5000 units , Disp: , Rfl:     Coenzyme Q10 (CO Q10) 60 MG CAPS, Take 1 capsule by mouth daily, Disp: , Rfl:     famotidine (PEPCID) 40 MG tablet, Take 1 tablet (40 mg total) by mouth daily at bedtime, Disp: 90 tablet, Rfl: 1    gabapentin (NEURONTIN) 600 MG tablet, TAKE 1 TABLET BY MOUTH  TWICE DAILY, Disp: 180 tablet, Rfl: 3    Ginkgo Biloba 120 MG TABS, Take 1 tablet by mouth daily, Disp: , Rfl:     Inositol 500 MG TABS, Take 1 tablet by mouth 2 (two) times a day, Disp: , Rfl:     ipratropium (ATROVENT) 0 03 % nasal spray, USE 2 SPRAYS IN BOTH  NOSTRILS 3 TIMES DAILY, Disp: 60 mL, Rfl: 5    lubiprostone (AMITIZA) 8 mcg capsule, Take 1 capsule (8 mcg total) by mouth daily with breakfast, Disp: 30 capsule, Rfl: 2    lutein 6 mg, Take 1 capsule by mouth daily, Disp: , Rfl:     metoprolol tartrate (LOPRESSOR) 25 mg tablet, TAKE 1 TABLET BY MOUTH  EVERY 12 HOURS, Disp: 180 tablet, Rfl: 3    MILK THISTLE PO, Take 1 tablet by mouth daily, Disp: , Rfl:     Mirabegron ER 25 MG TB24, Take 25 mg by mouth daily, Disp: 30 tablet, Rfl: 3    Misc Natural Products (SUPER SNOOZE) CAPS, Take 1 capsule by mouth daily Bed time , Disp: , Rfl:     Multiple Vitamins-Minerals (OCUVITE-LUTEIN) CAPS, Take by mouth daily , Disp: , Rfl:     omeprazole (PriLOSEC) 40 MG capsule, TAKE 1 CAPSULE BY MOUTH  TWICE DAILY, Disp: 180 capsule, Rfl: 3    polyethylene glycol (MiraLax) 17 GM/SCOOP powder, Take 17 g by mouth daily, Disp: , Rfl:     sildenafil (VIAGRA) 50 MG tablet, Viagra 50 mg tablet  TAKE 1 TABLET BY MOUTH UP TO 4 HOURS BEFORE INTERCOURSE AND MAX OF 1 TABLET IN 24 HOURS, Disp: , Rfl:     Sod Picosulfate-Mag Ox-Cit Acd (Clenpiq) 10-3 5-12 MG-GM -GM/160ML SOLN, Take 2 Bottles by mouth 2 (two) times a day Take as directed for bowel prep for colonoscopy, Disp: 320 mL, Rfl: 0    tadalafil (CIALIS) 20 MG tablet, Take 1 tablet (20 mg total) by mouth daily as needed for erectile dysfunction, Disp: 18 tablet, Rfl: 3    Zinc 50 MG TABS, Take 1 tablet by mouth daily, Disp: , Rfl:     solifenacin (VESICARE) 5 mg tablet, Take 1 tablet (5 mg total) by mouth daily, Disp: 30 tablet, Rfl: 3    Active Problems     Patient Active Problem List   Diagnosis    [facility]    Lung nodule, solitary    Erectile dysfunction    Gait instability    High blood pressure    Hypothyroidism    Idiopathic peripheral neuropathy    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)    Cervical stenosis of spinal canal    Spinal stenosis of lumbar region    Spondylosis 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    Hoarse voice quality    Appetite loss    Weight loss    Gastroesophageal reflux disease without esophagitis    Alternating constipation and diarrhea    Weight loss, non-intentional    Nausea    Chronic obstructive pulmonary disease with acute exacerbation (HCC)    [facility]brand's disease [facility]    Radiculopathy, lumbar region    Closed fracture of superior ramus of left pubis (HCC)    Closed fracture of inferior pubic ramus (HCC)    Fall    Anemia    Contusion of cerebral cortex with concussion ([facility] )    Urinary frequency       Past Medical History     Past Medical History:   Diagnosis Date    Anemia     Appendicitis     Coronary artery disease     Detached retina     GERD (gastroesophageal reflux disease)     Hyperlipidemia     Hypertension     Macular degeneration     Neuropathy     Pelvis fracture ([facility] ) 01/2021    Von Willebrand disease ([facility] )        Surgical History     Past Surgical History:   Procedure Laterality Date    ADENOIDECTOMY      APPENDECTOMY      ARTHRODESIS  01/15/2014    Lumbar     BACK SURGERY      CATARACT EXTRACTION      COLONOSCOPY  12/09/2016    fiberoptic     CYSTOSCOPY      with resection of tumor / 1/12/17, 10/2/17 / diagnostic 12/8/16, 5/30/17     CYSTOSCOPY  09/20/2018    CYSTOSCOPY  06/22/2020    EGD AND COLONOSCOPY N/A 12/9/2016    Procedure: EGD AND COLONOSCOPY;  Surgeon: Alyce Barillas MD;  [facility];  Service:    Justina Saeed EYE SURGERY      FRACTURE SURGERY Bilateral     ARM    NECK SURGERY      NEUROPLASTY / TRANSPOSITION MEDIAN NERVE AT CARPAL TUNNEL Right 04/2015    MI COLONOSCOPY FLX DX W/COLLJ SPEC WHEN PFRMD N/A 2/5/2019    Procedure: COLONOSCOPY;  Surgeon: Heraclio Yanez MD;  [facility];  Service: Gastroenterology    VT CYSTOURETHROSCOPY,FULGUR <0 5 CM LESN N/A 1/12/2017    Procedure: CYSTOSCOPY, BLADDER BIOPSY WITH FULGRATION, 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;  Surgeon: Cinthya Bell MD;  [facility];  Service: Urology    VT CYSTOURETHROSCOPY,FULGUR <0 5 CM LESN N/A 10/2/2017    Procedure: CYSTOSCOPY WITH  BLADDER BIOPSIES WITH FULGURATION;  Surgeon: Cinthya Bell MD;  [facility];  Service: Urology    VT ESOPHAGOGASTRODUODENOSCOPY TRANSORAL DIAGNOSTIC N/A 8/3/2018    Procedure: ESOPHAGOGASTRODUODENOSCOPY (EGD); Surgeon: Heraclio Yanez MD;  [facility];  Service: Gastroenterology    VT INSTILL ANTICANCER AGENT IN BLADDER N/A 1/12/2017    Procedure: Faby Moreno;  Surgeon: Cinthya Bell MD;  [facility];  Service: Urology    VT INSTILL ANTICANCER AGENT IN BLADDER N/A 10/2/2017    Procedure: Faby Moreno;  Surgeon: Cinthya Bell MD;  [facility];  Service: Urology    RETINAL DETACHMENT SURGERY Right 03/2016    complete air fill confirmed    [address] SURGERY  03/03/2015    proximal humerus fracture / [place]    3/6/15   R    1/3/18     TONSILECTOMY AND ADNOIDECTOMY      TONSILLECTOMY      TRANSURETHRAL RESECTION OF BLADDER TUMOR N/A 10/2/2017    Procedure: TRANSURETHRAL RESECTION OF BLADDER TUMOR (TURBT);   Surgeon: Cinthya Bell MD;  [facility];  Service: Urology    [address] (HISTORICAL)         Family History     Family History   Problem Relation Age of Onset    Ulcers Father         acute gastric    Heart disease Mother     Anuerysm Sister        Social History     Social History     Social History     Tobacco Use   Smoking Status Current Every Day Smoker    Packs/day: 0 25    Years: 60 00    Pack years: 15 00    Types: Cigarettes   Smokeless Tobacco Never Used   Tobacco Comment    smokes a pipe -resolved        Past Surgical History:   Procedure Laterality Date    ADENOIDECTOMY      APPENDECTOMY      ARTHRODESIS  01/15/2014    Lumbar     BACK SURGERY      CATARACT EXTRACTION      COLONOSCOPY  12/09/2016    fiberoptic     CYSTOSCOPY      with resection of tumor / 1/12/17, 10/2/17 / diagnostic 12/8/16, 5/30/17     CYSTOSCOPY  09/20/2018    CYSTOSCOPY  06/22/2020    EGD AND COLONOSCOPY N/A 12/9/2016    Procedure: EGD AND COLONOSCOPY;  Surgeon: Dieter Hutchison MD;  [facility];  Service:    Luba Breaux EYE SURGERY      FRACTURE SURGERY Bilateral     ARM    NECK SURGERY      NEUROPLASTY / TRANSPOSITION [facility]IAN [facility]ERVE AT [facility] Right 04/2015    ME COLONOSCOPY FLX DX W/COLLJ SPEC WHEN PFRMD N/A 2/5/2019    Procedure: COLONOSCOPY;  Surgeon: Salbador Bolaños MD;  [facility];  Service: Gastroenterology    ME CYSTOURETHROSCOPY,FULGUR <0 5 CM LESN N/A 1/12/2017    Procedure: CYSTOSCOPY, BLADDER BIOPSY WITH FULGRATION, 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;  Surgeon: Didier Levy MD;  [facility];  Service: Urology    ME CYSTOURETHROSCOPY,FULGUR <0 5 CM LESN N/A 10/2/2017    Procedure: CYSTOSCOPY WITH  BLADDER BIOPSIES WITH FULGURATION;  Surgeon: Didier Levy MD;  [facility];  Service: Urology    ME ESOPHAGOGASTRODUODENOSCOPY TRANSORAL DIAGNOSTIC N/A 8/3/2018    Procedure: ESOPHAGOGASTRODUODENOSCOPY (EGD);   Surgeon: Salbador Bolaños MD;  [facility];  Service: Gastroenterology    ME INSTILL ANTICANCER AGENT IN BLADDER N/A 1/12/2017    Procedure: Miller Franz;  Surgeon: Didier Levy MD;  [facility];  Service: Urology    ME INSTILL ANTICANCER AGENT IN BLADDER N/A 10/2/2017    Procedure: Miller Franz;  Surgeon: Didier Levy MD;  [facility];  Service: Urology    RETINAL DETACHMENT SURGERY Right 03/2016 complete air fill confirmed     ROTATOR CUFF REPAIR      SHOULDER SURGERY  03/03/2015    proximal humerus fracture / LA    3/6/15   R    1/3/18     TONSILECTOMY AND ADNOIDECTOMY      TONSILLECTOMY      TRANSURETHRAL RESECTION OF BLADDER TUMOR N/A 10/2/2017    Procedure: TRANSURETHRAL RESECTION OF BLADDER TUMOR (TURBT); Surgeon: Matilde Elizalde MD;  [facility];  Service: Urology    [address] [place] [address] ([facility])           The following portions of the patient's history were reviewed and updated as appropriate: allergies, current medications, past family history, past medical history, past social history, past surgical history and problem list    Please note :  Voice dictation software has been used to create this document  There may be inadvertent transcription errors      [id number] [address]

## 2022-01-13 NOTE — ASSESSMENT & PLAN NOTE
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

## 2022-02-28 ENCOUNTER — TELEPHONE (OUTPATIENT)
Dept: GASTROENTEROLOGY | Facility: CLINIC | Age: 87
End: 2022-02-28

## 2022-03-08 ENCOUNTER — NURSE TRIAGE (OUTPATIENT)
Dept: OTHER | Facility: OTHER | Age: 87
End: 2022-03-08

## 2022-03-08 NOTE — TELEPHONE ENCOUNTER
Reason for Disposition   Non-urgent call redirected to specialist's office because it is open    Protocols used: NO CONTACT OR DUPLICATE CONTACT CALL-ADULT-[place]

## 2022-03-08 NOTE — TELEPHONE ENCOUNTER
Regarding: colonoscopy prep questions  ----- Message from [facility] sent at 3/8/2022  3:03 PM EST -----  "i have two questions - my two questions, with cleanpiq drink can i refridgerate it? theres a confliction there, the direction say you cant but the direction i was given say you can    2nd question i am on metamucil everday, can i take that the day up to i have to fast? its orange material, i take it everyday, can i take it tomorrow? i take it to be regular "

## 2022-03-11 ENCOUNTER — HOSPITAL ENCOUNTER (OUTPATIENT)
Dept: PERIOP | Facility: HOSPITAL | Age: 87
Setting detail: OUTPATIENT SURGERY
Discharge: HOME | End: 2022-03-11
Admitting: NURSE PRACTITIONER
Payer: MEDICARE

## 2022-03-11 ENCOUNTER — ANESTHESIA EVENT (OUTPATIENT)
Dept: PERIOP | Facility: HOSPITAL | Age: 87
End: 2022-03-11

## 2022-03-11 ENCOUNTER — ANESTHESIA (OUTPATIENT)
Dept: PERIOP | Facility: HOSPITAL | Age: 87
End: 2022-03-11

## 2022-03-11 VITALS
OXYGEN SATURATION: 96 % | SYSTOLIC BLOOD PRESSURE: 139 MMHG | RESPIRATION RATE: 18 BRPM | HEART RATE: 68 BPM | DIASTOLIC BLOOD PRESSURE: 70 MMHG | TEMPERATURE: 97.8 F

## 2022-03-11 DIAGNOSIS — Z86.010 HISTORY OF COLON POLYPS: ICD-10-CM

## 2022-03-11 DIAGNOSIS — K21.9 GASTROESOPHAGEAL REFLUX DISEASE, UNSPECIFIED WHETHER ESOPHAGITIS PRESENT: ICD-10-CM

## 2022-03-11 PROCEDURE — 88305 TISSUE EXAM BY PATHOLOGIST: CPT | Performed by: PATHOLOGY

## 2022-03-11 PROCEDURE — 45380 COLONOSCOPY AND BIOPSY: CPT | Performed by: INTERNAL MEDICINE

## 2022-03-11 PROCEDURE — 43239 EGD BIOPSY SINGLE/MULTIPLE: CPT | Performed by: INTERNAL MEDICINE

## 2022-03-11 RX ORDER — LIDOCAINE HYDROCHLORIDE 20 MG/ML
INJECTION, SOLUTION EPIDURAL; INFILTRATION; INTRACAUDAL; PERINEURAL AS NEEDED
Status: DISCONTINUED | OUTPATIENT
Start: 2022-03-11 | End: 2022-03-11

## 2022-03-11 RX ORDER — PROPOFOL 10 MG/ML
INJECTION, EMULSION INTRAVENOUS CONTINUOUS PRN
Status: DISCONTINUED | OUTPATIENT
Start: 2022-03-11 | End: 2022-03-11

## 2022-03-11 RX ORDER — PROPOFOL 10 MG/ML
INJECTION, EMULSION INTRAVENOUS AS NEEDED
Status: DISCONTINUED | OUTPATIENT
Start: 2022-03-11 | End: 2022-03-11

## 2022-03-11 RX ORDER — EPHEDRINE SULFATE 50 MG/ML
INJECTION INTRAVENOUS AS NEEDED
Status: DISCONTINUED | OUTPATIENT
Start: 2022-03-11 | End: 2022-03-11

## 2022-03-11 RX ORDER — SODIUM CHLORIDE, SODIUM LACTATE, POTASSIUM CHLORIDE, CALCIUM CHLORIDE 600; 310; 30; 20 MG/100ML; MG/100ML; MG/100ML; MG/100ML
INJECTION, SOLUTION INTRAVENOUS CONTINUOUS PRN
Status: DISCONTINUED | OUTPATIENT
Start: 2022-03-11 | End: 2022-03-11

## 2022-03-11 RX ORDER — ONDANSETRON 2 MG/ML
4 INJECTION INTRAMUSCULAR; INTRAVENOUS ONCE AS NEEDED
Status: DISCONTINUED | OUTPATIENT
Start: 2022-03-11 | End: 2022-03-15 | Stop reason: HOSPADM

## 2022-03-11 RX ADMIN — PROPOFOL 110 MCG/KG/MIN: 10 INJECTION, EMULSION INTRAVENOUS at 11:36

## 2022-03-11 RX ADMIN — LIDOCAINE HYDROCHLORIDE 100 MG: 20 INJECTION, SOLUTION EPIDURAL; INFILTRATION; INTRACAUDAL at 11:36

## 2022-03-11 RX ADMIN — PROPOFOL 100 MG: 10 INJECTION, EMULSION INTRAVENOUS at 11:36

## 2022-03-11 RX ADMIN — SODIUM CHLORIDE, SODIUM LACTATE, POTASSIUM CHLORIDE, AND CALCIUM CHLORIDE: .6; .31; .03; .02 INJECTION, SOLUTION INTRAVENOUS at 11:30

## 2022-03-11 RX ADMIN — EPHEDRINE SULFATE 10 MG: 50 INJECTION, SOLUTION INTRAVENOUS at 11:50

## 2022-03-11 NOTE — ANESTHESIA POSTPROCEDURE EVALUATION
Post-Op Assessment Note    CV Status:  Stable  Pain Score: 0    Pain management: adequate     Mental Status:  Awake and sleepy   Hydration Status:  Euvolemic   PONV Controlled:  Controlled   Airway Patency:  Patent      Post Op Vitals Reviewed: Yes      Staff: CRNA         No complications documented      BP  105/72   Temp (P) 99 1 °F (37 3 °C) (03/11/22 1215)    Pulse  85   Resp   19   SpO2   99

## 2022-03-11 NOTE — ANESTHESIA PREPROCEDURE EVALUATION
Procedure:  COLONOSCOPY  EGD    Relevant Problems   CARDIO   (+) High blood pressure      ENDO   (+) Hypothyroidism      GI/HEPATIC   (+) Gastroesophageal reflux disease without esophagitis      HEMATOLOGY   (+) Anemia   (+) Von Willebrand's disease ([facility] )      MUSCULOSKELETAL   (+) Spondylosis 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      PULMONARY   (+) Chronic obstructive pulmonary disease with acute exacerbation (HCC)             Anesthesia Plan  ASA Score- 3     Anesthesia Type- IV sedation with anesthesia with ASA Monitors  Additional Monitors:   Airway Plan:           Plan Factors-    Chart reviewed  Induction- intravenous  Postoperative Plan-     Informed Consent- Anesthetic plan and risks discussed with patient  I personally reviewed this patient with the CRNA  Discussed and agreed on the Anesthesia Plan with the CRNA  Yeny Newton

## 2022-03-11 NOTE — H&P
History and Physical - [facility]  Jodi Singh 82 y o  male MRN: [id number]                  HPI: Jodi Singh is a 82y o  year old male who presents for a history of GAVE and history of colon polyps  REVIEW OF SYSTEMS: Per the HPI, and otherwise unremarkable      Historical Information   Past Medical History:   Diagnosis Date    Anemia     Appendicitis     Coronary artery disease     Detached retina     GERD (gastroesophageal reflux disease)     Hyperlipidemia     Hypertension     Macular degeneration     Neuropathy     Pelvis fracture ([facility] ) 01/2021    Von Willebrand disease ([facility] )      Past Surgical History:   Procedure Laterality Date    ADENOIDECTOMY      APPENDECTOMY      ARTHRODESIS  01/15/2014    Lumbar     BACK SURGERY      CATARACT EXTRACTION      COLONOSCOPY  12/09/2016    fiberoptic     CYSTOSCOPY      with resection of tumor / 1/12/17, 10/2/17 / diagnostic 12/8/16, 5/30/17     CYSTOSCOPY  09/20/2018    CYSTOSCOPY  06/22/2020    EGD AND COLONOSCOPY N/A 12/9/2016    Procedure: EGD AND COLONOSCOPY;  Surgeon: Brina Luevano MD;  [facility];  Service:    [facility] EYE SURGERY      FRACTURE SURGERY Bilateral     ARM    NECK SURGERY      NEUROPLASTY / TRANSPOSITION [facility]IAN [facility]ERVE AT [facility] Right 04/2015    MT COLONOSCOPY FLX DX W/COLLJ SPEC WHEN PFRMD N/A 2/5/2019    Procedure: COLONOSCOPY;  Surgeon: Sayra Stockton MD;  [facility];  Service: Gastroenterology    MT CYSTOURETHROSCOPY,FULGUR <0 5 CM LESN N/A 1/12/2017    Procedure: CYSTOSCOPY, BLADDER BIOPSY WITH FULGRATION, 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;  Surgeon: Marcos Velasquez MD;  [facility];  Service: Urology    MT CYSTOURETHROSCOPY,FULGUR <0 5 CM LESN N/A 10/2/2017    Procedure: CYSTOSCOPY WITH  BLADDER BIOPSIES WITH FULGURATION;  Surgeon: Marcos Velasquez MD;  [facility];  Service: Urology    MT ESOPHAGOGASTRODUODENOSCOPY TRANSORAL DIAGNOSTIC N/A 8/3/2018    Procedure: ESOPHAGOGASTRODUODENOSCOPY (EGD); Surgeon: Setwart Parikh MD;  [facility];  Service: Gastroenterology    TN INSTILL ANTICANCER AGENT IN BLADDER N/A 1/12/2017    Procedure: Juan Mora;  Surgeon: Ana Dial MD;  [facility];  Service: Urology    TN INSTILL ANTICANCER AGENT IN BLADDER N/A 10/2/2017    Procedure: Juan Mora;  Surgeon: Ana Dial MD;  [facility];  Service: Urology    RETINAL DETACHMENT SURGERY Right 03/2016    complete air fill confirmed    [address] SURGERY  03/03/2015    proximal humerus fracture / LA    3/6/15   R    1/3/18     TONSILECTOMY AND ADNOIDECTOMY      TONSILLECTOMY      TRANSURETHRAL RESECTION OF BLADDER TUMOR N/A 10/2/2017    Procedure: TRANSURETHRAL RESECTION OF BLADDER TUMOR (TURBT);   Surgeon: Ana Dial MD;  [facility];  Service: Urology    [facility] [address] (HISTORICAL)       Social History   Social History     Substance and Sexual Activity   Alcohol Use Yes    Alcohol/week: 2 0 standard drinks    Types: 1 Glasses of wine, 1 Shots of liquor per week    Comment: DAILY      Social History     Substance and Sexual Activity   Drug Use Never     Social History     Tobacco Use   Smoking Status Current Every Day Smoker    Packs/day: 0 25    Years: 60 00    Pack years: 15 00    Types: Cigarettes   Smokeless Tobacco Never Used   Tobacco Comment    smokes a pipe -resolved      Family History   Problem Relation Age of Onset    Ulcers Father         acute gastric    Heart disease Mother    Susannah Hammondysm Sister        Meds/Allergies       Current Outpatient Medications:     atorvastatin (LIPITOR) 10 mg tablet    B COMPLEX VITAMINS PO    cetirizine (ZyrTEC) 10 mg tablet    Cholecalciferol (VITAMIN D3) 2000 UNITS capsule    Coenzyme Q10 (CO Q10) 60 MG CAPS    famotidine (PEPCID) 40 MG tablet    gabapentin (NEURONTIN) 600 MG tablet    Ginkgo Biloba 120 MG TABS    Inositol 500 MG TABS    ipratropium (ATROVENT) 0 03 % nasal spray    lubiprostone (AMITIZA) 8 mcg capsule    lutein 6 mg    metoprolol tartrate (LOPRESSOR) 25 mg tablet    MILK THISTLE PO    Mirabegron ER 25 MG TB24    Misc Natural Products (SUPER SNOOZE) CAPS    Multiple Vitamins-Minerals (OCUVITE-LUTEIN) CAPS    omeprazole (PriLOSEC) 40 MG capsule    polyethylene glycol (MiraLax) 17 GM/SCOOP powder    Sod Picosulfate-Mag Ox-Cit Acd (Clenpiq) 10-3 5-12 MG-GM -GM/160ML SOLN    acetaminophen (TYLENOL) 325 mg tablet    sildenafil (VIAGRA) 50 MG tablet    solifenacin (VESICARE) 5 mg tablet    tadalafil (CIALIS) 20 MG tablet    Zinc 50 MG TABS    No Known Allergies    Objective     BP (!) 172/78   Pulse 62   Temp 97 5 °F (36 4 °C) (Tympanic)   Resp 20   SpO2 98%       PHYSICAL EXAM    Gen: NAD  Head: NCAT  CV: RRR  CHEST: Clear  ABD: soft, NT/ND  EXT: no edema      ASSESSMENT/PLAN:  This is a 82y o  year old male here for colonoscopy and endoscopy, and he is stable and optimized for his procedure

## 2022-03-13 DIAGNOSIS — K21.9 GASTROESOPHAGEAL REFLUX DISEASE: ICD-10-CM

## 2022-03-14 RX ORDER — FAMOTIDINE 40 MG/1
TABLET, FILM COATED ORAL
Qty: 90 TABLET | Refills: 3 | Status: SHIPPED | OUTPATIENT
Start: 2022-03-14 | End: 2022-04-06

## 2022-04-04 ENCOUNTER — OFFICE VISIT (OUTPATIENT)
Dept: GASTROENTEROLOGY | Facility: CLINIC | Age: 87
End: 2022-04-04
Payer: MEDICARE

## 2022-04-04 VITALS
DIASTOLIC BLOOD PRESSURE: 80 MMHG | WEIGHT: 171.4 LBS | SYSTOLIC BLOOD PRESSURE: 160 MMHG | HEART RATE: 90 BPM | TEMPERATURE: 96.5 F | HEIGHT: 72 IN | BODY MASS INDEX: 23.22 KG/M2

## 2022-04-04 DIAGNOSIS — Z86.010 HISTORY OF COLON POLYPS: ICD-10-CM

## 2022-04-04 DIAGNOSIS — K21.9 GASTROESOPHAGEAL REFLUX DISEASE WITHOUT ESOPHAGITIS: Primary | ICD-10-CM

## 2022-04-04 PROCEDURE — 99213 OFFICE O/P EST LOW 20 MIN: CPT | Performed by: INTERNAL MEDICINE

## 2022-04-04 RX ORDER — KETOCONAZOLE 20 MG/ML
SHAMPOO TOPICAL
COMMUNITY
Start: 2022-03-03

## 2022-04-04 NOTE — PROGRESS NOTES
[facility] - Outpatient Follow-up Note  Jennifer Farnsworth 82 y o  male MRN: [id number]  Encounter: [id number]          ASSESSMENT AND PLAN:      1  Gastroesophageal reflux disease without esophagitis    2  History of colon polyps    I reviewed his endoscopy and colonoscopy reports as well as his pathology reports with the patient and he appears to be satisfied with my explanations  He can follow up with the office as needed  ______________________________________________________________________    SUBJECTIVEShelagh Felling returns for follow-up after his colonoscopy  Other than complaining of gas he is otherwise fairly asymptomatic  He is here to discuss the results of his colonoscopy and endoscopy  REVIEW OF SYSTEMS IS OTHERWISE NEGATIVE        Historical Information   Past Medical History:   Diagnosis Date    Anemia     Appendicitis     Coronary artery disease     Detached retina     GERD (gastroesophageal reflux disease)     Hyperlipidemia     Hypertension     Macular degeneration     Neuropathy     Pelvis fracture ([facility] [address] ) 01/2021    Von Willebrand disease ([facility] )      Past Surgical History:   Procedure Laterality Date    ADENOIDECTOMY      APPENDECTOMY      ARTHRODESIS  01/15/2014    Lumbar     BACK SURGERY      CATARACT EXTRACTION      COLONOSCOPY  12/09/2016    fiberoptic     CYSTOSCOPY      with resection of tumor / 1/12/17, 10/2/17 / diagnostic 12/8/16, 5/30/17     CYSTOSCOPY  09/20/2018    CYSTOSCOPY  06/22/2020    EGD AND COLONOSCOPY N/A 12/9/2016    Procedure: EGD AND COLONOSCOPY;  Surgeon: Gerri Cross MD;  [facility];  Service:    [facility] EYE SURGERY      FRACTURE SURGERY Bilateral     ARM    NECK SURGERY      NEUROPLASTY / TRANSPOSITION MEDIAN NERVE AT CARPAL TUNNEL Right 04/2015    MN COLONOSCOPY FLX DX W/COLLJ SPEC WHEN PFRMD N/A 2/5/2019    Procedure: COLONOSCOPY;  Surgeon: Trang Berman MD;  [facility];  Service: Gastroenterology  VA CYSTOURETHROSCOPY,FULGUR <0 5 CM LESN N/A 1/12/2017    Procedure: CYSTOSCOPY, BLADDER BIOPSY WITH FULGRATION, 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;  Surgeon: Nae Nunez MD;  [facility];  Service: Urology    VA CYSTOURETHROSCOPY,FULGUR <0 5 CM LESN N/A 10/2/2017    Procedure: CYSTOSCOPY WITH  BLADDER BIOPSIES WITH FULGURATION;  Surgeon: Nae Nunez MD;  [facility];  Service: Urology    VA ESOPHAGOGASTRODUODENOSCOPY TRANSORAL DIAGNOSTIC N/A 8/3/2018    Procedure: ESOPHAGOGASTRODUODENOSCOPY (EGD); Surgeon: Anaya Chen MD;  [facility];  Service: Gastroenterology    VA INSTILL ANTICANCER AGENT IN BLADDER N/A 1/12/2017    Procedure: Amy Pierce;  Surgeon: Nae Nunez MD;  [facility];  Service: Urology    VA INSTILL ANTICANCER AGENT IN BLADDER N/A 10/2/2017    Procedure: Amy Pierce;  Surgeon: Nae Nunez MD;  [facility];  Service: Urology    RETINAL DETACHMENT SURGERY Right 03/2016    complete air fill confirmed    [address] SURGERY  03/03/2015    proximal humerus fracture / [place]    3/6/15   R    1/3/18     TONSILECTOMY AND ADNOIDECTOMY      TONSILLECTOMY      TRANSURETHRAL RESECTION OF BLADDER TUMOR N/A 10/2/2017    Procedure: TRANSURETHRAL RESECTION OF BLADDER TUMOR (TURBT);   Surgeon: Nae Nunez MD;  [facility];  Service: Urology    [address] (HISTORICAL)       Social History   Social History     Substance and Sexual Activity   Alcohol Use Yes    Alcohol/week: 2 0 standard drinks    Types: 1 Glasses of wine, 1 Shots of liquor per week    Comment: DAILY      Social History     Substance and Sexual Activity   Drug Use Never     Social History     Tobacco Use   Smoking Status Current Every Day Smoker    Packs/day: 0 25    Years: 60 00    Pack years: 15 00    Types: Cigarettes   Smokeless Tobacco Never Used   Tobacco Comment    smokes a pipe -resolved      Family History   Problem Relation Age of Onset    Ulcers Father         acute gastric    Heart disease Mother    Ashley Hammondysm Sister        Meds/Allergies       Current Outpatient Medications:     acetaminophen (TYLENOL) 325 mg tablet    atorvastatin (LIPITOR) 10 mg tablet    B COMPLEX VITAMINS PO    cetirizine (ZyrTEC) 10 mg tablet    Cholecalciferol (VITAMIN D3) 2000 UNITS capsule    Coenzyme Q10 (CO Q10) 60 MG CAPS    gabapentin (NEURONTIN) 600 MG tablet    Ginkgo Biloba 120 MG TABS    Inositol 500 MG TABS    ipratropium (ATROVENT) 0 03 % nasal spray    ketoconazole (NIZORAL) 2 % shampoo    lubiprostone (AMITIZA) 8 mcg capsule    lutein 6 mg    metoprolol tartrate (LOPRESSOR) 25 mg tablet    MILK THISTLE PO    Mirabegron ER 25 MG TB24    Misc Natural Products (SUPER SNOOZE) CAPS    Multiple Vitamins-Minerals (OCUVITE-LUTEIN) CAPS    omeprazole (PriLOSEC) 40 MG capsule    polyethylene glycol (MiraLax) 17 GM/SCOOP powder    sildenafil (VIAGRA) 50 MG tablet    Sod Picosulfate-Mag Ox-Cit Acd (Clenpiq) 10-3 5-12 MG-GM -GM/160ML SOLN    tadalafil (CIALIS) 20 MG tablet    Zinc 50 MG TABS    famotidine (PEPCID) 40 MG tablet    solifenacin (VESICARE) 5 mg tablet    No Known Allergies        Objective     Blood pressure 160/80, pulse 90, temperature (!) 96 5 °F (35 8 °C), temperature source Tympanic, height 6' (1 829 m), weight 77 7 kg (171 lb 6 4 oz)  Body mass index is 23 25 kg/m²  PHYSICAL EXAM:      General Appearance:   Alert, cooperative, no distress   HEENT:   Normocephalic, atraumatic, anicteric  Neck:  Supple, symmetrical, trachea midline   Lungs:   Clear to auscultation bilaterally; no rales, rhonchi or wheezing; respirations unlabored    Heart[de-identified]   Regular rate and rhythm; no murmur, rub, or gallop     Abdomen:   Soft, non-tender, non-distended; normal bowel sounds; no masses, no organomegaly    Genitalia:   Deferred    Rectal:   Deferred Extremities:  No cyanosis, clubbing or edema    Pulses:  2+ and symmetric    Skin:  No jaundice, rashes, or lesions    Lymph nodes:  No palpable cervical lymphadenopathy        Lab Results:   No visits with results within 1 Day(s) from this visit  Latest known visit with results is:   Hospital Outpatient Visit on 03/11/2022   Component Date Value    Case Report 03/11/2022                      Value:Surgical Pathology Report                         Case: [id number]                                   Authorizing Provider:  Saima Solorzano MD          Collected:           03/11/2022 1140              Ordering Location:     [facility] Received:            03/11/2022 26                                     Operating Room                                                               Pathologist:           Reno Villagomez MD                                                                 Specimens:   A) - Stomach, bx  anterum cold r/o h pylori                                                         B) - Large Intestine, Sigmoid Colon, polyp x2 cold forcep                                  Final Diagnosis 03/11/2022                      Value: This result contains rich text formatting which cannot be displayed here   Additional Information 03/11/2022                      Value: This result contains rich text formatting which cannot be displayed here  [facility] Description 03/11/2022                      Value: This result contains rich text formatting which cannot be displayed here           Radiology Results:   EGD    Result Date: 3/11/2022  Narrative: [facility] Operating Room [address] [phone] DATE OF SERVICE: 3/11/22 PHYSICIAN(S): Saima Solorzano MD Proceduralist INDICATION: Gastroesophageal reflux disease, unspecified whether esophagitis present POST-OP DIAGNOSIS: See the impression below  PREPROCEDURE: Informed consent was obtained for the procedure, including sedation  Risks of perforation, hemorrhage, adverse drug reaction and aspiration were discussed  The patient was placed in the left lateral decubitus position  Patient was explained about the risks and benefits of the procedure  Risks including but not limited to bleeding, infection, and perforation were explained in detail  Also explained about less than 100% sensitivity with the exam and other alternatives  DETAILS OF PROCEDURE: Patient was taken to the procedure room where a time out was performed to confirm correct patient and correct procedure  The patient underwent monitored anesthesia care, which was administered by an anesthesia professional  The patient's blood pressure, heart rate, level of consciousness, respirations and oxygen were monitored throughout the procedure  The scope was advanced to the second part of the duodenum  Retroflexion was performed in the fundus  The patient experienced no blood loss  The procedure was not difficult  The patient tolerated the procedure well  There were no apparent complications  ANESTHESIA INFORMATION: ASA: III Anesthesia Type: Anesthesia type not filed in the log   MEDICATIONS: No administrations occurring from 1128 to 1145 on 03/11/22 FINDINGS: The duodenum appeared normal  Moderate, generalized erythematous mucosa in the body of the stomach and antrum; performed cold forceps biopsy to rule out H  pylori The esophagus appeared normal  SPECIMENS: ID Type Source Tests Collected by Time Destination 1 : bx  anterum cold r/o h pylori  Tissue Stomach TISSUE EXAM Adrien Severin, MD 3/11/2022 11:40 AM      Impression: The duodenum appeared normal  Moderate, generalized erythematous mucosa in the body of the stomach and antrum; performed cold forceps biopsy to rule out H  pylori The esophagus appeared normal  RECOMMENDATION: Await pathology results   Adrien Severin, MD Colonoscopy    Result Date: 3/11/2022  Narrative: [facility] Operating Room [address] [phone] DATE OF SERVICE: 3/11/22 PHYSICIAN(S): Natalie Graff MD Proceduralist INDICATION: History of colon polyps POST-OP DIAGNOSIS: See the impression below  HISTORY: Prior colonoscopy: 5 years ago  BOWEL PREPARATION: Suprep PREPROCEDURE: Informed consent was obtained for the procedure, including sedation  Risks including but not limited to bleeding, infection, perforation, adverse drug reaction and aspiration were explained in detail  Also explained about less than 100% sensitivity with the exam and other alternatives  The patient was placed in the left lateral decubitus position  DETAILS OF PROCEDURE: Patient was taken to the procedure room where a time out was performed to confirm correct patient and correct procedure  The patient underwent monitored anesthesia care, which was administered by an anesthesia professional  The patient's blood pressure, heart rate, level of consciousness, oxygen and respirations were monitored throughout the procedure  A digital rectal exam was performed  The scope was introduced through the anus and advanced to the cecum  Retroflexion was performed in the rectum  The quality of bowel preparation was evaluated using the [place] Bowel Preparation Scale with scores of: right colon = 3, transverse colon = 3, left colon = 2  The total BBPS score was 8  Bowel prep was adequate  The patient experienced no blood loss  The procedure was difficult due to angulation  In response to procedure difficulty, counter pressure was applied  The patient tolerated the procedure well  There were no apparent complications  ANESTHESIA INFORMATION: ASA: III Anesthesia Type: Anesthesia type not filed in the log   MEDICATIONS: No administrations occurring from 1128 to 1214 on 03/11/22 FINDINGS: The cecum, ascending colon and hepatic flexure appeared normal  Few diverticula in the transverse colon and descending colon Two polyps measuring smaller than 5 mm in the proximal sigmoid colon; performed complete en bloc removal by cold forceps biopsy The rectum appeared normal  Large, internal hemorrhoids EVENTS: Procedure Events Event Event Time ENDO SCOPE OUT TIME 3/11/2022 11:41 AM ENDO CECUM REACHED 3/11/2022 12:05 PM ENDO SCOPE OUT TIME 3/11/2022 12:11 PM SPECIMENS: ID Type Source Tests Collected by Time Destination 1 : bx  anterum cold r/o h pylori  Tissue Stomach TISSUE EXAM Valeria Licona MD 3/11/2022 11:40 AM  2 : polyp x2 cold forcep  Tissue Large Intestine, Sigmoid Colon TISSUE EXAM Valeria Licona MD 3/11/2022 11:48 AM  EQUIPMENT: Colonoscope -     Impression: The cecum, ascending colon and hepatic flexure appeared normal  Few diverticula in the transverse colon and descending colon Two polyps measuring smaller than 5 mm in the proximal sigmoid colon; performed complete en bloc removal by cold forceps biopsy The rectum appeared normal  Large, internal hemorrhoids RECOMMENDATION: Await pathology results No further screening colonoscopies necessary due to age (age = 77 or greater)    Valeria Licona MD

## 2022-04-06 ENCOUNTER — OFFICE VISIT (OUTPATIENT)
Dept: FAMILY MEDICINE CLINIC | Facility: CLINIC | Age: 87
End: 2022-04-06
Payer: MEDICARE

## 2022-04-06 VITALS
HEIGHT: 72 IN | DIASTOLIC BLOOD PRESSURE: 82 MMHG | TEMPERATURE: 96.4 F | WEIGHT: 170 LBS | SYSTOLIC BLOOD PRESSURE: 164 MMHG | BODY MASS INDEX: 23.03 KG/M2

## 2022-04-06 DIAGNOSIS — E03.9 ACQUIRED HYPOTHYROIDISM: Primary | ICD-10-CM

## 2022-04-06 DIAGNOSIS — J44.1 CHRONIC OBSTRUCTIVE PULMONARY DISEASE WITH ACUTE EXACERBATION (HCC): ICD-10-CM

## 2022-04-06 DIAGNOSIS — E78.2 MIXED HYPERLIPIDEMIA: ICD-10-CM

## 2022-04-06 DIAGNOSIS — H35.3230 BILATERAL EXUDATIVE AGE-RELATED MACULAR DEGENERATION, UNSPECIFIED STAGE (HCC): ICD-10-CM

## 2022-04-06 DIAGNOSIS — D68.0 VON WILLEBRAND'S DISEASE (HCC): ICD-10-CM

## 2022-04-06 DIAGNOSIS — R26.81 GAIT INSTABILITY: ICD-10-CM

## 2022-04-06 DIAGNOSIS — S06.2X9A: ICD-10-CM

## 2022-04-06 DIAGNOSIS — M54.16 RADICULOPATHY, LUMBAR REGION: ICD-10-CM

## 2022-04-06 DIAGNOSIS — G60.9 IDIOPATHIC PERIPHERAL NEUROPATHY: ICD-10-CM

## 2022-04-06 DIAGNOSIS — I10 ESSENTIAL HYPERTENSION: ICD-10-CM

## 2022-04-06 PROBLEM — S32.599A CLOSED FRACTURE OF INFERIOR PUBIC RAMUS (HCC): Status: RESOLVED | Noted: 2021-01-05 | Resolved: 2022-04-06

## 2022-04-06 PROBLEM — S32.512A CLOSED FRACTURE OF SUPERIOR RAMUS OF LEFT PUBIS (HCC): Status: RESOLVED | Noted: 2021-01-05 | Resolved: 2022-04-06

## 2022-04-06 PROCEDURE — 99214 OFFICE O/P EST MOD 30 MIN: CPT | Performed by: FAMILY MEDICINE

## 2022-04-06 NOTE — PROGRESS NOTES
Depression Screening and Follow-up Plan: Patient was screened for depression during today's encounter  They screened negative with a PHQ-2 score of 0  Tobacco Cessation Counseling: Tobacco cessation counseling was not provided  The patient is sincerely urged to quit consumption of tobacco  He is not ready to quit tobacco  Side effects of medication discussed  Medication options not discussed  Patient refused medication  Nicotine patch was prescribed  Assessment/Plan:    Problem List Items Addressed This Visit        Endocrine    Hypothyroidism - Primary       Respiratory    Chronic obstructive pulmonary disease with acute exacerbation (HCC)       Nervous and Auditory    Idiopathic peripheral neuropathy    Radiculopathy, lumbar region       Hematopoietic and Hemostatic    Von Willebrand's disease ([facility] [address] )       Other    Gait instability      Other Visit Diagnoses     Mixed hyperlipidemia        Essential hypertension               Diagnoses and all orders for this visit:    Acquired hypothyroidism    Chronic obstructive pulmonary disease with acute exacerbation (HCC)    Idiopathic peripheral neuropathy    Radiculopathy, lumbar region    Von Willebrand's disease ([facility] [address] )    Gait instability    Mixed hyperlipidemia    Essential hypertension    Other orders  -     psyllium (METAMUCIL) 58 6 % packet; Take 1 packet by mouth daily        No problem-specific Assessment & Plan notes found for this encounter  Subjective:      Patient ID: Stevo Rockwell is a 82 y o  male  Follow up on hypertension  Falling       The following portions of the patient's history were reviewed and updated as appropriate:   He has a past medical history of Anemia, Appendicitis, Coronary artery disease, Detached retina, GERD (gastroesophageal reflux disease), Hyperlipidemia, Hypertension, Macular degeneration, Neuropathy, Pelvis fracture ([facility] [address] ) (01/2021), and Emilie Selma [facility] )  ,  does not have any pertinent problems on file ,   has a past surgical history that includes Back surgery; Neck surgery; Eye surgery; Appendectomy; TONSILECTOMY AND ADNOIDECTOMY; Fracture surgery (Bilateral); VON WILLEBRAND ANTIGEN (HISTORICAL); Tonsillectomy; Cataract extraction; Rotator cuff repair; pr cystourethroscopy,fulgur <0 5 cm lesn (N/A, 1/12/2017); pr instill anticancer agent in bladder (N/A, 1/12/2017); EGD AND COLONOSCOPY (N/A, 12/9/2016); pr cystourethroscopy,fulgur <0 5 cm lesn (N/A, 10/2/2017); pr instill anticancer agent in bladder (N/A, 10/2/2017); Transurethral resection of bladder tumor (N/A, 10/2/2017); Shoulder surgery (03/03/2015); Adenoidectomy; Arthrodesis (01/15/2014); Colonoscopy (12/09/2016); Neuroplasty / transposition median nerve at carpal tunnel (Right, 04/2015); Retinal detachment surgery (Right, 03/2016); pr esophagogastroduodenoscopy transoral diagnostic (N/A, 8/3/2018); pr colonoscopy flx dx w/collj spec when pfrmd (N/A, 2/5/2019); Cystoscopy; Cystoscopy (09/20/2018); and Cystoscopy (06/22/2020)  ,  family history includes Anuerysm in his sister; Heart disease in his mother; Ulcers in his father  ,   reports that he has been smoking cigarettes  He has a 15 00 pack-year smoking history  He has never used smokeless tobacco  He reports current alcohol use of about 2 0 standard drinks of alcohol per week  He reports that he does not use drugs  ,  has No Known Allergies     Current Outpatient Medications   Medication Sig Dispense Refill    acetaminophen (TYLENOL) 325 mg tablet Take 2 tablets (650 mg total) by mouth every 6 (six) hours as needed for mild pain, headaches or fever  0    atorvastatin (LIPITOR) 10 mg tablet TAKE 1 TABLET BY MOUTH  DAILY (Patient taking differently: Take 10 mg by mouth every other day  ) 90 tablet 3    B COMPLEX VITAMINS PO Take 1 tablet by mouth daily      cetirizine (ZyrTEC) 10 mg tablet TAKE ONE TABLET BY MOUTH DAILY 90 tablet 5    Cholecalciferol (VITAMIN D3) 2000 UNITS capsule Take 1 capsule by mouth 2 (two) times a day 5000 units       Coenzyme Q10 (CO Q10) 60 MG CAPS Take 1 capsule by mouth daily      gabapentin (NEURONTIN) 600 MG tablet TAKE 1 TABLET BY MOUTH  TWICE DAILY 180 tablet 3    Ginkgo Biloba 120 MG TABS Take 1 tablet by mouth daily      Inositol 500 MG TABS Take 1 tablet by mouth 2 (two) times a day      ipratropium (ATROVENT) 0 03 % nasal spray USE 2 SPRAYS IN BOTH  NOSTRILS 3 TIMES DAILY 60 mL 5    ketoconazole (NIZORAL) 2 % shampoo       lutein 6 mg Take 1 capsule by mouth daily      metoprolol tartrate (LOPRESSOR) 25 mg tablet TAKE 1 TABLET BY MOUTH  EVERY 12 HOURS 180 tablet 3    MILK THISTLE PO Take 1 tablet by mouth daily      Misc Natural Products (SUPER SNOOZE) CAPS Take 1 capsule by mouth daily Bed time       Multiple Vitamins-Minerals (OCUVITE-LUTEIN) CAPS Take by mouth daily       omeprazole (PriLOSEC) 40 MG capsule TAKE 1 CAPSULE BY MOUTH  TWICE DAILY 180 capsule 3    psyllium (METAMUCIL) 58 6 % packet Take 1 packet by mouth daily      sildenafil (VIAGRA) 50 MG tablet Viagra 50 mg tablet   TAKE 1 TABLET BY MOUTH UP TO 4 HOURS BEFORE INTERCOURSE AND MAX OF 1 TABLET IN 24 HOURS      Zinc 50 MG TABS Take 1 tablet by mouth daily      famotidine (PEPCID) 40 MG tablet TAKE 1 TABLET BY MOUTH  DAILY AT BEDTIME (Patient not taking: Reported on 4/4/2022) 90 tablet 3    lubiprostone (AMITIZA) 8 mcg capsule Take 1 capsule (8 mcg total) by mouth daily with breakfast (Patient not taking: Reported on 4/6/2022 ) 30 capsule 2    Mirabegron ER 25 MG TB24 Take 25 mg by mouth daily 30 tablet 3    solifenacin (VESICARE) 5 mg tablet Take 1 tablet (5 mg total) by mouth daily 30 tablet 3    tadalafil (CIALIS) 20 MG tablet Take 1 tablet (20 mg total) by mouth daily as needed for erectile dysfunction (Patient not taking: Reported on 4/6/2022 ) 18 tablet 3     No current facility-administered medications for this visit         Review of Systems   Constitutional: Negative for activity change, appetite change, diaphoresis, fatigue and fever  HENT: Positive for hearing loss  Negative for dental problem  Eyes: Positive for visual disturbance  Severe macular degeneration   Respiratory: Negative for apnea, cough, chest tightness, shortness of breath and wheezing  Cardiovascular: Negative for chest pain, palpitations and leg swelling  Gastrointestinal: Negative for abdominal distention, abdominal pain, anal bleeding, constipation, diarrhea, nausea and vomiting  Endocrine: Negative for cold intolerance, heat intolerance, polydipsia, polyphagia and polyuria  Genitourinary: Negative for difficulty urinating, dysuria, flank pain, hematuria and urgency  Musculoskeletal: Positive for arthralgias and gait problem  Negative for back pain, joint swelling and myalgias  Skin: Negative for color change, rash and wound  Allergic/Immunologic: Negative for environmental allergies, food allergies and immunocompromised state  Neurological: Negative for dizziness, seizures, syncope, speech difficulty, numbness and headaches  Hematological: Negative for adenopathy  Does not bruise/bleed easily  Psychiatric/Behavioral: Negative for agitation, behavioral problems, hallucinations, sleep disturbance and suicidal ideas  Objective:  Vitals:    04/06/22 1120   BP: 164/82   BP Location: Right arm   Patient Position: Sitting   Cuff Size: Standard   Temp: (!) 96 4 °F (35 8 °C)   TempSrc: Temporal   Weight: 77 1 kg (170 lb)   Height: 6' (1 829 m)     Body mass index is 23 06 kg/m²  Physical Exam  Constitutional:       General: He is not in acute distress  Appearance: He is well-developed  He is not diaphoretic  HENT:      Head: Normocephalic  Right Ear: External ear normal       Left Ear: External ear normal       Nose: Nose normal    Eyes:      General: No scleral icterus  Right eye: No discharge  Left eye: No discharge        Conjunctiva/sclera: Conjunctivae normal       Pupils: Pupils are equal, round, and reactive to light  Neck:      Thyroid: No thyromegaly  Trachea: No tracheal deviation  Cardiovascular:      Rate and Rhythm: Normal rate and regular rhythm  Heart sounds: Normal heart sounds  No murmur heard  No friction rub  No gallop  Pulmonary:      Effort: Pulmonary effort is normal  No respiratory distress  Breath sounds: Normal breath sounds  No wheezing  Abdominal:      General: Bowel sounds are normal       Palpations: Abdomen is soft  There is no mass  Tenderness: There is no abdominal tenderness  There is no guarding  Musculoskeletal:         General: No deformity  Cervical back: Normal range of motion  Lymphadenopathy:      Cervical: No cervical adenopathy  Skin:     General: Skin is warm and dry  Findings: No erythema or rash  Neurological:      Mental Status: He is alert and oriented to person, place, and time  Cranial Nerves: No cranial nerve deficit  Psychiatric:         Mood and Affect: Mood normal          Behavior: Behavior normal          Thought Content:  Thought content normal          Judgment: Judgment normal

## 2022-04-16 ENCOUNTER — LAB (OUTPATIENT)
Dept: LAB | Facility: MEDICAL CENTER | Age: 87
End: 2022-04-16
Payer: MEDICARE

## 2022-04-16 DIAGNOSIS — I10 ESSENTIAL HYPERTENSION: ICD-10-CM

## 2022-04-16 DIAGNOSIS — E78.2 MIXED HYPERLIPIDEMIA: ICD-10-CM

## 2022-04-16 LAB
ALBUMIN SERPL BCP-MCNC: 3.7 G/DL (ref 3.5–5)
ALP SERPL-CCNC: 77 U/L (ref 46–116)
ALT SERPL W P-5'-P-CCNC: 30 U/L (ref 12–78)
ANION GAP SERPL CALCULATED.3IONS-SCNC: 1 MMOL/L (ref 4–13)
AST SERPL W P-5'-P-CCNC: 20 U/L (ref 5–45)
BILIRUB SERPL-MCNC: 1.13 MG/DL (ref 0.2–1)
BUN SERPL-MCNC: 16 MG/DL (ref 5–25)
CALCIUM SERPL-MCNC: 9.3 MG/DL (ref 8.3–10.1)
CHLORIDE SERPL-SCNC: 109 MMOL/L (ref 100–108)
CHOLEST SERPL-MCNC: 123 MG/DL
CO2 SERPL-SCNC: 32 MMOL/L (ref 21–32)
CREAT SERPL-MCNC: 1.03 MG/DL (ref 0.6–1.3)
GFR SERPL CREATININE-BSD FRML MDRD: 66 ML/MIN/1.73SQ M
GLUCOSE P FAST SERPL-MCNC: 82 MG/DL (ref 65–99)
HDLC SERPL-MCNC: 52 MG/DL
LDLC SERPL CALC-MCNC: 60 MG/DL (ref 0–100)
NONHDLC SERPL-MCNC: 71 MG/DL
POTASSIUM SERPL-SCNC: 4.2 MMOL/L (ref 3.5–5.3)
PROT SERPL-MCNC: 7.3 G/DL (ref 6.4–8.2)
SODIUM SERPL-SCNC: 142 MMOL/L (ref 136–145)
TRIGL SERPL-MCNC: 57 MG/DL

## 2022-04-16 PROCEDURE — 80053 COMPREHEN METABOLIC PANEL: CPT

## 2022-04-16 PROCEDURE — 36415 COLL VENOUS BLD VENIPUNCTURE: CPT

## 2022-04-16 PROCEDURE — 80061 LIPID PANEL: CPT | Performed by: FAMILY MEDICINE

## 2022-05-04 DIAGNOSIS — I10 ESSENTIAL HYPERTENSION: ICD-10-CM

## 2022-05-22 ENCOUNTER — APPOINTMENT (EMERGENCY)
Dept: CT IMAGING | Facility: HOSPITAL | Age: 87
End: 2022-05-22
Payer: MEDICARE

## 2022-05-22 ENCOUNTER — HOSPITAL ENCOUNTER (EMERGENCY)
Facility: HOSPITAL | Age: 87
Discharge: HOME | End: 2022-05-22
Attending: EMERGENCY MEDICINE
Payer: MEDICARE

## 2022-05-22 ENCOUNTER — APPOINTMENT (EMERGENCY)
Dept: RADIOLOGY | Facility: HOSPITAL | Age: 87
End: 2022-05-22
Payer: MEDICARE

## 2022-05-22 VITALS
RESPIRATION RATE: 16 BRPM | DIASTOLIC BLOOD PRESSURE: 75 MMHG | BODY MASS INDEX: 22.99 KG/M2 | HEIGHT: 72 IN | SYSTOLIC BLOOD PRESSURE: 176 MMHG | OXYGEN SATURATION: 94 % | WEIGHT: 169.75 LBS | HEART RATE: 60 BPM | TEMPERATURE: 97.5 F

## 2022-05-22 DIAGNOSIS — S32.402A LEFT ACETABULAR FRACTURE (HCC): Primary | ICD-10-CM

## 2022-05-22 LAB
ANION GAP SERPL CALCULATED.3IONS-SCNC: 13 MMOL/L (ref 4–13)
BASOPHILS # BLD AUTO: 0.02 THOUSANDS/ΜL (ref 0–0.1)
BASOPHILS NFR BLD AUTO: 0 % (ref 0–1)
BUN SERPL-MCNC: 14 MG/DL (ref 5–25)
CALCIUM SERPL-MCNC: 9 MG/DL (ref 8.3–10.1)
CHLORIDE SERPL-SCNC: 104 MMOL/L (ref 100–108)
CK SERPL-CCNC: 101 U/L (ref 39–308)
CO2 SERPL-SCNC: 25 MMOL/L (ref 21–32)
CREAT SERPL-MCNC: 1.1 MG/DL (ref 0.6–1.3)
EOSINOPHIL # BLD AUTO: 0.09 THOUSAND/ΜL (ref 0–0.61)
EOSINOPHIL NFR BLD AUTO: 1 % (ref 0–6)
ERYTHROCYTE [DISTWIDTH] IN BLOOD BY AUTOMATED COUNT: 15.4 % (ref 11.6–15.1)
GFR SERPL CREATININE-BSD FRML MDRD: 61 ML/MIN/1.73SQ M
GLUCOSE SERPL-MCNC: 96 MG/DL (ref 65–140)
HCT VFR BLD AUTO: 43.1 % (ref 36.5–49.3)
HGB BLD-MCNC: 14.1 G/DL (ref 12–17)
IMM GRANULOCYTES # BLD AUTO: 0.03 THOUSAND/UL (ref 0–0.2)
IMM GRANULOCYTES NFR BLD AUTO: 0 % (ref 0–2)
LYMPHOCYTES # BLD AUTO: 2.43 THOUSANDS/ΜL (ref 0.6–4.47)
LYMPHOCYTES NFR BLD AUTO: 23 % (ref 14–44)
MCH RBC QN AUTO: 28.7 PG (ref 26.8–34.3)
MCHC RBC AUTO-ENTMCNC: 32.7 G/DL (ref 31.4–37.4)
MCV RBC AUTO: 88 FL (ref 82–98)
MONOCYTES # BLD AUTO: 1.54 THOUSAND/ΜL (ref 0.17–1.22)
MONOCYTES NFR BLD AUTO: 14 % (ref 4–12)
NEUTROPHILS # BLD AUTO: 6.55 THOUSANDS/ΜL (ref 1.85–7.62)
NEUTS SEG NFR BLD AUTO: 62 % (ref 43–75)
NRBC BLD AUTO-RTO: 0 /100 WBCS
PLATELET # BLD AUTO: 188 THOUSANDS/UL (ref 149–390)
PMV BLD AUTO: 10.2 FL (ref 8.9–12.7)
POTASSIUM SERPL-SCNC: 3.9 MMOL/L (ref 3.5–5.3)
RBC # BLD AUTO: 4.91 MILLION/UL (ref 3.88–5.62)
SODIUM SERPL-SCNC: 142 MMOL/L (ref 136–145)
WBC # BLD AUTO: 10.66 THOUSAND/UL (ref 4.31–10.16)

## 2022-05-22 PROCEDURE — 99284 EMERGENCY DEPT VISIT MOD MDM: CPT

## 2022-05-22 PROCEDURE — 80048 BASIC METABOLIC PNL TOTAL CA: CPT | Performed by: EMERGENCY MEDICINE

## 2022-05-22 PROCEDURE — 36415 COLL VENOUS BLD VENIPUNCTURE: CPT | Performed by: EMERGENCY MEDICINE

## 2022-05-22 PROCEDURE — 73502 X-RAY EXAM HIP UNI 2-3 VIEWS: CPT

## 2022-05-22 PROCEDURE — G1004 CDSM NDSC: HCPCS

## 2022-05-22 PROCEDURE — 72192 CT PELVIS W/O DYE: CPT

## 2022-05-22 PROCEDURE — 85025 COMPLETE CBC W/AUTO DIFF WBC: CPT | Performed by: EMERGENCY MEDICINE

## 2022-05-22 PROCEDURE — 90715 TDAP VACCINE 7 YRS/> IM: CPT | Performed by: EMERGENCY MEDICINE

## 2022-05-22 PROCEDURE — 82550 ASSAY OF CK (CPK): CPT | Performed by: EMERGENCY MEDICINE

## 2022-05-22 PROCEDURE — 99285 EMERGENCY DEPT VISIT HI MDM: CPT | Performed by: EMERGENCY MEDICINE

## 2022-05-22 PROCEDURE — 90471 IMMUNIZATION ADMIN: CPT

## 2022-05-22 RX ORDER — GINSENG 100 MG
1 CAPSULE ORAL ONCE
Status: COMPLETED | OUTPATIENT
Start: 2022-05-22 | End: 2022-05-22

## 2022-05-22 RX ORDER — ACETAMINOPHEN 325 MG/1
975 TABLET ORAL ONCE
Status: COMPLETED | OUTPATIENT
Start: 2022-05-22 | End: 2022-05-22

## 2022-05-22 RX ADMIN — TETANUS TOXOID, REDUCED DIPHTHERIA TOXOID AND ACELLULAR PERTUSSIS VACCINE, ADSORBED 0.5 ML: 5; 2.5; 8; 8; 2.5 SUSPENSION INTRAMUSCULAR at 14:47

## 2022-05-22 RX ADMIN — BACITRACIN 1 SMALL APPLICATION: 500 OINTMENT TOPICAL at 17:40

## 2022-05-22 RX ADMIN — ACETAMINOPHEN 975 MG: 325 TABLET, FILM COATED ORAL at 14:49

## 2022-05-22 RX ADMIN — METOPROLOL TARTRATE 25 MG: 25 TABLET, FILM COATED ORAL at 14:49

## 2022-05-22 NOTE — ED PROVIDER NOTES
History  Chief Complaint   Patient presents with    Fall     Patient fell last night on left side  No LOC, blood thinners, head strike  Patient c/o increased pain and unable to put pressure on left side  46-year-old male presents for evaluation after fall  Patient reports yesterday he was exiting his car when he went to shut the door and fell to the ground landing on his left hip  Patient did not strike his head or lose consciousness, he denies blood thinning medications  Patient was unable to get off the ground and crawled to the entrance of his house  With crawling he suffered superficial abrasions to his bilateral upper extremities and left lower extremity  Patient was able to get off the ground in his house and ambulate up 3 sets of stairs with the assistance of a cane to his bedroom where he laid down  Patient lives at home with his significant other, they placed bandages and clean the wounds  Today patient was unable to get out of bed and EMS was called  Patient is complaining of pain in his left inguinal area, denies numbness or tingling down the extremity  He denies neck or back pain, chest pain, dyspnea, abdominal pain  Patient has a chronic deformity to his left elbow after previous fracture when he was younger  Prior to Admission Medications   Prescriptions Last Dose Informant Patient Reported? Taking?    B COMPLEX VITAMINS PO  Self Yes No   Sig: Take 1 tablet by mouth daily   Cholecalciferol (VITAMIN D3) 2000 UNITS capsule  Self Yes No   Sig: Take 1 capsule by mouth 2 (two) times a day 5000 units    Coenzyme Q10 (CO Q10) 60 MG CAPS  Self Yes No   Sig: Take 1 capsule by mouth daily   Ginkgo Biloba 120 MG TABS  Self Yes No   Sig: Take 1 tablet by mouth daily   Inositol 500 MG TABS  Self Yes No   Sig: Take 1 tablet by mouth 2 (two) times a day   MILK THISTLE PO  Self Yes No   Sig: Take 1 tablet by mouth daily   Misc Natural Products (SUPER SNOOZE) CAPS  Self Yes No   Sig: Take 1 capsule by mouth daily Bed time    Multiple Vitamins-Minerals (OCUVITE-LUTEIN) CAPS  Self Yes No   Sig: Take by mouth daily    Zinc 50 MG TABS  Self Yes No   Sig: Take 1 tablet by mouth daily   acetaminophen (TYLENOL) 325 mg tablet  Self No No   Sig: Take 2 tablets (650 mg total) by mouth every 6 (six) hours as needed for mild pain, headaches or fever   atorvastatin (LIPITOR) 10 mg tablet  Self No No   Sig: TAKE 1 TABLET BY MOUTH  DAILY   Patient taking differently: Take 10 mg by mouth every other day     cetirizine (ZyrTEC) 10 mg tablet  Self No No   Sig: TAKE ONE TABLET BY MOUTH DAILY   gabapentin (NEURONTIN) 600 MG tablet  Self No No   Sig: TAKE 1 TABLET BY MOUTH  TWICE DAILY   ipratropium (ATROVENT) 0 03 % nasal spray  Self No No   Sig: USE 2 SPRAYS IN BOTH  NOSTRILS 3 TIMES DAILY   ketoconazole (NIZORAL) 2 % shampoo  Self Yes No   lutein 6 mg  Self Yes No   Sig: Take 1 capsule by mouth daily   metoprolol tartrate (LOPRESSOR) 25 mg tablet   No No   Sig: TAKE 1 TABLET BY MOUTH  EVERY 12 HOURS   omeprazole (PriLOSEC) 40 MG capsule  Self No No   Sig: TAKE 1 CAPSULE BY MOUTH  TWICE DAILY   psyllium (METAMUCIL) 58 6 % packet   Yes No   Sig: Take 1 packet by mouth daily   sildenafil (VIAGRA) 50 MG tablet  Self Yes No   Sig: Viagra 50 mg tablet   TAKE 1 TABLET BY MOUTH UP TO 4 HOURS BEFORE INTERCOURSE AND MAX OF 1 TABLET IN 24 HOURS      Facility-Administered Medications: None       Past Medical History:   Diagnosis Date    Anemia     Appendicitis     Coronary artery disease     Detached retina     GERD (gastroesophageal reflux disease)     Hyperlipidemia     Hypertension     Macular degeneration     Neuropathy     Pelvis fracture (HCC) 01/2021    Von Willebrand disease ([facility] [address] )        Past Surgical History:   Procedure Laterality Date    ADENOIDECTOMY      APPENDECTOMY      ARTHRODESIS  01/15/2014    Lumbar     BACK SURGERY      CATARACT EXTRACTION      COLONOSCOPY  12/09/2016    fiberoptic     CYSTOSCOPY      with resection of tumor / 1/12/17, 10/2/17 / diagnostic 12/8/16, 5/30/17     CYSTOSCOPY  09/20/2018    CYSTOSCOPY  06/22/2020    EGD AND COLONOSCOPY N/A 12/9/2016    Procedure: EGD AND COLONOSCOPY;  Surgeon: Jesika Felder MD;  [facility]: [facility];  Service:    [facility] EYE SURGERY      FRACTURE SURGERY Bilateral     ARM    NECK SURGERY      NEUROPLASTY / TRANSPOSITION MEDIAN NERVE AT [facility] Right 04/2015    AL COLONOSCOPY FLX DX W/COLLJ SPEC WHEN PFRMD N/A 2/5/2019    Procedure: COLONOSCOPY;  Surgeon: Analilia Russ MD;  [facility];  Service: Gastroenterology    AL CYSTOURETHROSCOPY,FULGUR <0 5 CM LESN N/A 1/12/2017    Procedure: CYSTOSCOPY, BLADDER BIOPSY WITH FULGRATION, 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;  Surgeon: Tayler Lee MD;  [facility];  Service: Urology    AL CYSTOURETHROSCOPY,FULGUR <0 5 CM LESN N/A 10/2/2017    Procedure: CYSTOSCOPY WITH  BLADDER BIOPSIES WITH FULGURATION;  Surgeon: Tayler Lee MD;  [facility];  Service: Urology    AL ESOPHAGOGASTRODUODENOSCOPY TRANSORAL DIAGNOSTIC N/A 8/3/2018    Procedure: ESOPHAGOGASTRODUODENOSCOPY (EGD); Surgeon: Analilia Russ MD;  [facility];  Service: Gastroenterology    AL INSTILL ANTICANCER AGENT IN BLADDER N/A 1/12/2017    Procedure: Denia Stack;  Surgeon: Tayler Lee MD;  [facility];  Service: Urology    AL INSTILL ANTICANCER AGENT IN BLADDER N/A 10/2/2017    Procedure: Denia Stack;  Surgeon: Tayler Lee MD;  [facility];  Service: Urology    RETINAL DETACHMENT SURGERY Right 03/2016    complete air fill confirmed    [address] SURGERY  03/03/2015    proximal humerus fracture / [place]    3/6/15   R    1/3/18     TONSILECTOMY AND ADNOIDECTOMY      TONSILLECTOMY      TRANSURETHRAL RESECTION OF BLADDER TUMOR N/A 10/2/2017    Procedure: TRANSURETHRAL RESECTION OF BLADDER TUMOR (TURBT); Surgeon: Rain Vidal MD;  [facility];  Service: Urology    [address] (HISTORICAL)         Family History   Problem Relation Age of Onset    Ulcers Father         acute gastric    Heart disease Mother    Juma Hammondysm Sister      I have reviewed and agree with the history as documented  E-Cigarette/Vaping    E-Cigarette Use Never User      E-Cigarette/Vaping Substances    Nicotine No     THC No     CBD No     Flavoring No     Other No     Unknown No      Social History     Tobacco Use    Smoking status: Current Every Day Smoker     Packs/day: 0 25     Years: 60 00     Pack years: 15 00     Types: Pipe    Smokeless tobacco: Never Used    Tobacco comment: smokes a pipe -resolved    Vaping Use    Vaping Use: Never used   Substance Use Topics    Alcohol use: Yes     Alcohol/week: 2 0 standard drinks     Types: 1 Glasses of wine, 1 Shots of liquor per week     Comment: DAILY     Drug use: Never       Review of Systems   Respiratory: Negative for shortness of breath  Cardiovascular: Negative for chest pain  Gastrointestinal: Negative for abdominal pain, nausea and vomiting  Musculoskeletal: Positive for arthralgias and gait problem  Negative for back pain and neck pain  Skin: Positive for wound  Neurological: Negative for syncope, weakness and numbness  All other systems reviewed and are negative  Physical Exam  Physical Exam  Vitals reviewed  Constitutional:       General: He is not in acute distress  Appearance: Normal appearance  He is not ill-appearing, toxic-appearing or diaphoretic  HENT:      Head: Normocephalic and atraumatic  Right Ear: External ear normal       Left Ear: External ear normal       Nose: Nose normal       Mouth/Throat:      Mouth: Mucous membranes are moist    Eyes:      General:         Right eye: No discharge  Left eye: No discharge  Extraocular Movements: Extraocular movements intact  Cardiovascular:      Rate and Rhythm: Normal rate  Pulmonary:      Effort: Pulmonary effort is normal  No respiratory distress  Chest:      Chest wall: No tenderness  Abdominal:      General: There is no distension  Palpations: Abdomen is soft  Tenderness: There is no abdominal tenderness  There is no guarding or rebound  Musculoskeletal:         General: Tenderness present  No swelling  Comments: No midline c/t/l spine tenderness, no L lateral hip tenderness, mild tenderness in L inguinal area; patient is able to sit forward on bed without assistance, he is able to flex L knee towards abdomen, moves L foot freely; moves arms freely, no tenderness to b/l elbows, L elbow deformity, no tenderness to b/l knees, ankles   Skin:     General: Skin is warm  Comments: Superficial abrasions to b/l elbows, L hand, LLE   Neurological:      General: No focal deficit present  Mental Status: He is alert and oriented to person, place, and time  Mental status is at baseline  Cranial Nerves: No cranial nerve deficit  Sensory: No sensory deficit  Motor: No weakness           Vital Signs  ED Triage Vitals [05/22/22 1400]   Temperature Pulse Respirations Blood Pressure SpO2   97 5 °F (36 4 °C) 75 16 (!) 200/81 94 %      Temp src Heart Rate Source Patient Position - Orthostatic VS BP Location FiO2 (%)   -- Monitor Lying Right arm --      Pain Score       2           Vitals:    05/22/22 1515 05/22/22 1530 05/22/22 1615 05/22/22 1715   BP: 164/72 167/71 (!) 178/77 (!) 176/75   Pulse: 57 56 55 60   Patient Position - Orthostatic VS: Lying Lying Lying Lying         Visual Acuity      ED Medications  Medications   metoprolol tartrate (LOPRESSOR) tablet 25 mg (25 mg Oral Given 5/22/22 1449)   acetaminophen (TYLENOL) tablet 975 mg (975 mg Oral Given 5/22/22 1449)   tetanus-diphtheria-acellular pertussis (BOOSTRIX) IM injection 0 5 mL (0 5 mL Intramuscular Given 5/22/22 1447)   bacitracin topical ointment 1 small application (1 small application Topical Given 5/22/22 1740)       Diagnostic Studies  Results Reviewed     Procedure Component Value Units Date/Time    Basic metabolic panel [[id number]] Collected: 05/22/22 1445    Lab Status: Final result Specimen: Blood from Arm, Right Updated: 05/22/22 1515     Sodium 142 mmol/L      Potassium 3 9 mmol/L      Chloride 104 mmol/L      CO2 25 mmol/L      ANION GAP 13 mmol/L      BUN 14 mg/dL      Creatinine 1 10 mg/dL      Glucose 96 mg/dL      Calcium 9 0 mg/dL      eGFR 61 ml/min/1 73sq m     Narrative:      [place] guidelines for Chronic Kidney Disease (CKD):     Stage 1 with normal or high GFR (GFR > 90 mL/min/1 73 square meters)    Stage 2 Mild CKD (GFR = 60-89 mL/min/1 73 square meters)    Stage 3A Moderate CKD (GFR = 45-59 mL/min/1 73 square meters)    Stage 3B Moderate CKD (GFR = 30-44 mL/min/1 73 square meters)    Stage 4 Severe CKD (GFR = 15-29 mL/min/1 73 square meters)    Stage 5 End Stage CKD (GFR <15 mL/min/1 73 square meters)  Note: GFR calculation is accurate only with a steady state creatinine    CK Total with Reflex CKMB [[id number]]  (Normal) Collected: 05/22/22 1445    Lab Status: Final result Specimen: Blood from Arm, Right Updated: 05/22/22 1515     Total  U/L     CBC and differential [[id number]]  (Abnormal) Collected: 05/22/22 1445    Lab Status: Final result Specimen: Blood from Arm, Right Updated: 05/22/22 1459     WBC 10 66 Thousand/uL      RBC 4 91 Million/uL      Hemoglobin 14 1 g/dL      Hematocrit 43 1 %      MCV 88 fL      MCH 28 7 pg      MCHC 32 7 g/dL      RDW 15 4 %      MPV 10 2 fL      Platelets 887 Thousands/uL      nRBC 0 /100 WBCs      Neutrophils Relative 62 %      Immat GRANS % 0 %      Lymphocytes Relative 23 %      Monocytes Relative 14 %      Eosinophils Relative 1 %      Basophils Relative 0 %      Neutrophils Absolute 6 55 Thousands/µL      Immature Grans Absolute 0 03 Thousand/uL Lymphocytes Absolute 2 43 Thousands/µL      Monocytes Absolute 1 54 Thousand/µL      Eosinophils Absolute 0 09 Thousand/µL      Basophils Absolute 0 02 Thousands/µL                  CT pelvis wo contrast   Final Result by Jazmyne Kovacs MD (05/22 1659)      Acute nondisplaced fracture in the left acetabulum  No acute fracture  Healed left pubic rami fractures  Bilateral hip osteoarthritis, left more severe than right  The study was marked in [facility] for immediate notification  Workstation performed: [id number]         XR hip/pelv 2-3 vws left   ED Interpretation by Joann Chamberlain DO (05/22 1514)   Previous fx to L pubic rami seen and comparable alignment to previous XR                 Procedures  Procedures         ED Course  ED Course as of 05/23/22 0046   Sun May 22, 2022   1514 XR hip/pelv 2-3 vws left  Previous fracture seen to L pubic rami, no other obvious fracture; will obtain CT imaging   1705 CT pelvis wo contrast  IMPRESSION:     Acute nondisplaced fracture in the left acetabulum  No acute fracture  Healed left pubic rami fractures  Bilateral hip osteoarthritis, left more severe than right  [address] to stay per ortho   1726 Discussed result with patient, he would like to go home  Has wheelchair and walkers at home  Will reach out to ortho again  1 Ortho states okay for discharge  SBIRT 20yo+    Flowsheet Row Most Recent Value   SBIRT (23 yo +)    In order to provide better care to our patients, we are screening all of our patients for alcohol and drug use  Would it be okay to ask you these screening questions? Yes Filed at: 05/22/2022 1418   Initial Alcohol Screen: US AUDIT-C     1  How often do you have a drink containing alcohol? 3 Filed at: 05/22/2022 1418   2  How many drinks containing alcohol do you have on a typical day you are drinking? 0 Filed at: 05/22/2022 1418   3a  Male UNDER 65:  How often do you have five or more drinks on one occasion? 0 Filed at: 05/22/2022 1418   3b  FEMALE Any Age, or MALE 65+: How often do you have 4 or more drinks on one occassion? 0 Filed at: 05/22/2022 1418   Audit-C Score 3 Filed at: 05/22/2022 1418   DION: How many times in the past year have you    Used an illegal drug or used a prescription medication for non-medical reasons? Never Filed at: 05/22/2022 1418                    [facility]  Number of Diagnoses or Management Options  Left acetabular fracture [facility])  Diagnosis management comments: 53-year-old male presents for evaluation of left hip pain  Patient fell yesterday landing on his left hip  He is not on blood thinners, no head strike or loss of consciousness  He was at 1 point able to ambulate with a cane at home however today unable to bear weight  Will evaluate with x-rays with basic labs  possible suprapubic rami fracture, will attempt to ambulate with walker as however patient may require hospitalization for ambulatory dysfunction  Disposition  Final diagnoses:   Left acetabular fracture ([address] )     Time reflects when diagnosis was documented in both MDM as applicable and the Disposition within this note     Time User Action Codes Description Comment    5/22/2022  5:28 PM Michelle Bassett Add [[id number]] Left acetabular fracture [facility])       ED Disposition     ED Disposition   Discharge    Condition   Stable    Date/Time   Sun May 22, 2022  5:28 PM    Comment   Deirdre Li discharge to home/self care                 Follow-up Information     Follow up With Specialties Details Why Contact Info Additional [address] [facility]ists [place] [facility] Schedule an appointment as soon as possible for a visit   [address] [facility] [id number]  [address] [place] [address] Specialists [facility] [address] [place], [place], [address]    [facility] Emergency Department Emergency Medicine  If symptoms worsen Sarah [address] Emergency Department, [facility], [place], [place], [address]          Discharge Medication List as of 5/22/2022  5:29 PM      CONTINUE these medications which have NOT CHANGED    Details   acetaminophen (TYLENOL) 325 mg tablet Take 2 tablets (650 mg total) by mouth every 6 (six) hours as needed for mild pain, headaches or fever, Starting u 1/7/2021, No Print      atorvastatin (LIPITOR) 10 mg tablet TAKE 1 TABLET BY MOUTH  DAILY, Normal      B COMPLEX VITAMINS PO Take 1 tablet by mouth daily, Historical Med      cetirizine (ZyrTEC) 10 mg tablet TAKE ONE TABLET BY MOUTH DAILY, Normal      Cholecalciferol (VITAMIN D3) 2000 UNITS capsule Take 1 capsule by mouth 2 (two) times a day 5000 units , Historical Med      Coenzyme Q10 (CO Q10) 60 MG CAPS Take 1 capsule by mouth daily, Historical Med      gabapentin (NEURONTIN) 600 MG tablet TAKE 1 TABLET BY MOUTH  TWICE DAILY, Normal      Ginkgo Biloba 120 MG TABS Take 1 tablet by mouth daily, Historical Med      Inositol 500 MG TABS Take 1 tablet by mouth 2 (two) times a day, Historical Med      ipratropium (ATROVENT) 0 03 % nasal spray USE 2 SPRAYS IN BOTH  NOSTRILS 3 TIMES DAILY, Normal      ketoconazole (NIZORAL) 2 % shampoo Starting Thu 3/3/2022, Historical Med      lutein 6 mg Take 1 capsule by mouth daily, Historical Med      metoprolol tartrate (LOPRESSOR) 25 mg tablet TAKE 1 TABLET BY MOUTH  EVERY 12 HOURS, Normal      MILK THISTLE PO Take 1 tablet by mouth daily, Historical Med      Misc Natural Products (SUPER SNOOZE) CAPS Take 1 capsule by mouth daily Bed time , Historical Med      Multiple Vitamins-Minerals (OCUVITE-LUTEIN) CAPS Take by mouth daily , Historical Med      omeprazole (PriLOSEC) 40 MG capsule TAKE 1 CAPSULE BY MOUTH  TWICE DAILY, Normal      psyllium (METAMUCIL) 58 6 % packet Take 1 packet by mouth daily, Historical Med      sildenafil (VIAGRA) 50 MG tablet Viagra 50 mg tablet   TAKE 1 TABLET BY MOUTH UP TO 4 HOURS BEFORE INTERCOURSE AND MAX OF 1 TABLET IN 24 HOURS, Historical Med      Zinc 50 MG TABS Take 1 tablet by mouth daily, Historical Med             No discharge procedures on file      PDMP Review       Value Time User    PDMP Reviewed  Yes 1/3/2020  6:17 AM Phuong Valencia DO          ED Provider  Electronically Signed by           Lizett Fernandez DO  05/23/22 [id number]

## 2022-05-23 ENCOUNTER — TRANSITIONAL CARE MANAGEMENT (OUTPATIENT)
Dept: FAMILY MEDICINE CLINIC | Facility: CLINIC | Age: 87
End: 2022-05-23

## 2022-05-23 DIAGNOSIS — S32.409A CLOSED NONDISPLACED FRACTURE OF ACETABULUM, UNSPECIFIED PORTION OF ACETABULUM, UNSPECIFIED LATERALITY, INITIAL ENCOUNTER (HCC): Primary | ICD-10-CM

## 2022-06-03 ENCOUNTER — TELEPHONE (OUTPATIENT)
Dept: FAMILY MEDICINE CLINIC | Facility: CLINIC | Age: 87
End: 2022-06-03

## 2022-06-03 DIAGNOSIS — S32.409A CLOSED NONDISPLACED FRACTURE OF ACETABULUM, UNSPECIFIED PORTION OF ACETABULUM, UNSPECIFIED LATERALITY, INITIAL ENCOUNTER (HCC): Primary | ICD-10-CM

## 2022-06-03 NOTE — TELEPHONE ENCOUNTER
Can you put another order in for home physical therapy to [facility] rehab  Our home care refused him due to medicare guidelines of not being seen in office for this  [place] bills different  Will you order for him? Can fax to [phone]

## 2022-07-07 DIAGNOSIS — E78.2 MIXED HYPERLIPIDEMIA: ICD-10-CM

## 2022-07-08 RX ORDER — ATORVASTATIN CALCIUM 10 MG/1
10 TABLET, FILM COATED ORAL EVERY OTHER DAY
Qty: 45 TABLET | Refills: 1 | Status: SHIPPED | OUTPATIENT
Start: 2022-07-08

## 2022-07-15 ENCOUNTER — APPOINTMENT (OUTPATIENT)
Dept: RADIOLOGY | Facility: MEDICAL CENTER | Age: 87
End: 2022-07-15
Payer: MEDICARE

## 2022-07-15 DIAGNOSIS — R60.9 SWELLING: ICD-10-CM

## 2022-07-15 DIAGNOSIS — S92.515A CLOSED NONDISPLACED FRACTURE OF PROXIMAL PHALANX OF LESSER TOE OF LEFT FOOT, INITIAL ENCOUNTER: ICD-10-CM

## 2022-07-15 PROCEDURE — 73630 X-RAY EXAM OF FOOT: CPT

## 2022-07-24 DIAGNOSIS — K21.9 GASTROESOPHAGEAL REFLUX DISEASE WITHOUT ESOPHAGITIS: ICD-10-CM

## 2022-07-25 RX ORDER — OMEPRAZOLE 40 MG/1
CAPSULE, DELAYED RELEASE ORAL
Qty: 180 CAPSULE | Refills: 3 | Status: SHIPPED | OUTPATIENT
Start: 2022-07-25

## 2022-07-25 RX ORDER — GABAPENTIN 600 MG/1
TABLET ORAL
Qty: 180 TABLET | Refills: 3 | Status: SHIPPED | OUTPATIENT
Start: 2022-07-25

## 2022-07-27 ENCOUNTER — PROCEDURE VISIT (OUTPATIENT)
Dept: UROLOGY | Facility: CLINIC | Age: 87
End: 2022-07-27
Payer: MEDICARE

## 2022-07-27 VITALS
SYSTOLIC BLOOD PRESSURE: 124 MMHG | DIASTOLIC BLOOD PRESSURE: 80 MMHG | WEIGHT: 166 LBS | BODY MASS INDEX: 22.48 KG/M2 | HEIGHT: 72 IN

## 2022-07-27 DIAGNOSIS — N32.81 OAB (OVERACTIVE BLADDER): ICD-10-CM

## 2022-07-27 DIAGNOSIS — R35.1 NOCTURIA: ICD-10-CM

## 2022-07-27 DIAGNOSIS — C67.8 MALIGNANT NEOPLASM OF OVERLAPPING SITES OF BLADDER (HCC): Primary | ICD-10-CM

## 2022-07-27 DIAGNOSIS — R35.0 URINARY FREQUENCY: ICD-10-CM

## 2022-07-27 PROCEDURE — 52000 CYSTOURETHROSCOPY: CPT | Performed by: UROLOGY

## 2022-07-27 PROCEDURE — 99213 OFFICE O/P EST LOW 20 MIN: CPT | Performed by: UROLOGY

## 2022-07-27 RX ORDER — CEPHALEXIN 500 MG/1
500 CAPSULE ORAL ONCE
Qty: 1 CAPSULE | Refills: 0 | Status: SHIPPED | OUTPATIENT
Start: 2022-07-27 | End: 2022-07-27

## 2022-07-27 NOTE — PROGRESS NOTES
[address] [facility]  [facility]  [address] [facility] [address]  [place], [address] [facility]  [phone]  www  [facility]  org      NAME: Ralph Aguilera  AGE: 82 y o  SEX: male  : 1935   MRN: [id number]    DATE: 2022  TIME: 1:40 PM    Assessment and Plan:    Superficial bladder cancer with stable recurrence over right lateral trigone:  No change since last year, continue to observe  Repeat cystoscopy in 1 year  Nocturia as below:  Medications too expensive  I wish to avoid oxybutynin due to possible memory issues  I recommend PTNS and he will think about this  If he wants this he can call in and we will set up upon his request     ED:  He can get a partial erection, can sometimes penetrate  Recommend that he only take the 100 mg dose of sildenafil  If this does not work and he wishes to continue pursuing better erections it will have to be shots or IPP  He will think about this  Chief Complaint   No chief complaint on file  History of Present Illness   Bladder cancer surveillance:  History of low-grade papillary TCC diagnosed in 2017, here for surveillance cystoscopy  Last cystoscopy was by me 2021  There was superficial papillary recurrence over the right trigone  Given his age and other medical problems, we decided to observe this  He saw Cecille Montana 2022 with increased urinary frequency and nocturia  He was ordered Mirabegron but this was too expensive so he was switched to solifenacin 5 mg which was also expensive  She prescribed behavioral changes and follow-up for cystoscopy as scheduled  Currently has q2hr nocturia  Not as frequent during daytime  We discussed PTNS and Oxybutinin  Had CT of pelvis  due to fall with left hip pain- bladder normal, had acute nondisplaced hip fracture  ED- has taken 200mg of Viagra before- developed neck pain for 10-15 minutes  told him this is 2x the max dose   Other txs are shots/IPP  He can get a partial erection, regarding penetration, "yes and no"  Cystoscopy     Date/Time 7/27/2022 8:58 AM     Performed by  Tenisha Jarquin MD     Authorized by Tenisha Jarquin MD      Universal Protocol:  Consent: Verbal consent obtained  Written consent obtained  Procedure Details:  Procedure type: cystoscopy    Additional Procedure Details: Cystoscopy Procedure Note        Pre-operative Diagnosis:  Bladder cancer surveillance    Post-operative Diagnosis:  Same, possible mild recurrence which is stable from last year over the right lateral trigone    Procedure: Flexible cystoscopy    Surgeon: César Truong MD    Anesthesia: 1% Xylocaine per urethra    EBL: Minimal    Complications: none    Procedure Details   The risks, benefits, complications, treatment options, and expected outcomes were discussed with the patient  The patient concurred with the proposed plan, giving informed consent  Cystoscopy was performed today under local anesthesia, using sterile technique  The patient was placed in the supine position, prepped with Betadine, and draped in the usual sterile fashion  The flexible cystocope was used to inspect both the urethra and bladder    Findings:  Urethra:  Normal without stricture  The prostate is nonocclusive  Bladder:  Smooth, not trabeculated and there is very superficial papillary growth over the mucosa of the bladder over the right lateral trigone and orifice  This has not changed from last year     The orifices were orthotopic and intact             Specimens:  None                 Complications:  None           Disposition: To home            Condition:  Stable        The following portions of the patient's history were reviewed and updated as appropriate: allergies, current medications, past family history, past medical history, past social history, past surgical history and problem list   Past Medical History:   Diagnosis Date    Anemia     Appendicitis     Coronary artery disease     Detached retina     GERD (gastroesophageal reflux disease)     Hyperlipidemia     Hypertension     Macular degeneration     Neuropathy     Pelvis fracture ([facility] ) 01/2021    Von Willebrand disease ([facility] )      Past Surgical History:   Procedure Laterality Date    ADENOIDECTOMY      APPENDECTOMY      ARTHRODESIS  01/15/2014    Lumbar     BACK SURGERY      CATARACT EXTRACTION      COLONOSCOPY  12/09/2016    fiberoptic     CYSTOSCOPY      with resection of tumor / 1/12/17, 10/2/17 / diagnostic 12/8/16, 5/30/17     CYSTOSCOPY  09/20/2018    CYSTOSCOPY  06/22/2020    EGD AND COLONOSCOPY N/A 12/9/2016    Procedure: EGD AND COLONOSCOPY;  Surgeon: Alexa Estes MD;  [facility]: [facility];  Service:    Skylar Mare EYE SURGERY      FRACTURE SURGERY Bilateral     ARM    NECK SURGERY      NEUROPLASTY / TRANSPOSITION MEDIAN NERVE AT [facility] Right 04/2015    WI COLONOSCOPY FLX DX W/COLLJ SPEC WHEN PFRMD N/A 2/5/2019    Procedure: COLONOSCOPY;  Surgeon: Magaly Sullivan MD;  [facility];  Service: Gastroenterology    WI CYSTOURETHROSCOPY,FULGUR <0 5 CM LESN N/A 1/12/2017    Procedure: CYSTOSCOPY, BLADDER BIOPSY WITH FULGRATION, 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;  Surgeon: Mariah Burnett MD;  [facility];  Service: Urology    WI CYSTOURETHROSCOPY,FULGUR <0 5 CM LESN N/A 10/2/2017    Procedure: CYSTOSCOPY WITH  BLADDER BIOPSIES WITH FULGURATION;  Surgeon: Mariah Burnett MD;  [facility];  Service: Urology    WI ESOPHAGOGASTRODUODENOSCOPY TRANSORAL DIAGNOSTIC N/A 8/3/2018    Procedure: ESOPHAGOGASTRODUODENOSCOPY (EGD);   Surgeon: Magaly Sullivan MD;  [facility];  Service: Gastroenterology    WI INSTILL ANTICANCER AGENT IN BLADDER N/A 1/12/2017    Procedure: Mejia Conn;  Surgeon: Mariah Burnett MD;  [facility];  Service: Urology    WI INSTILL ANTICANCER AGENT IN BLADDER N/A 10/2/2017    Procedure: INSTILLATION MYTOMYCIN;  Surgeon: Dulce Juarez MD;  [facility];  Service: Urology    RETINAL DETACHMENT SURGERY Right 03/2016    complete air fill confirmed     ROTATOR CUFF REPAIR      SHOULDER SURGERY  03/03/2015    proximal humerus fracture / [place]    3/6/15   R    1/3/18     TONSILECTOMY AND ADNOIDECTOMY      TONSILLECTOMY      TRANSURETHRAL RESECTION OF BLADDER TUMOR N/A 10/2/2017    Procedure: TRANSURETHRAL RESECTION OF BLADDER TUMOR (TURBT); Surgeon: Dulce Juarez MD;  [facility];  Service: Urology    [address] [place] [address] ([facility])       shoulder  Review of Systems   Review of Systems   Genitourinary:        As per HPI       Active Problem List     Patient Active Problem List   Diagnosis    Bladder cancer ([address] )    Lung nodule, solitary    Erectile dysfunction    Gait instability    High blood pressure    Hypothyroidism    Idiopathic peripheral neuropathy    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)    Cervical stenosis of spinal canal    Spinal stenosis of lumbar region    Spondylosis 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    Hoarse voice quality    Appetite loss    Weight loss    Gastroesophageal reflux disease without esophagitis    Alternating constipation and diarrhea    Weight loss, non-intentional    Nausea    Chronic obstructive pulmonary disease with acute exacerbation (HCC)    Von Willebrand's disease ([address] )    Radiculopathy, lumbar region    Fall    Anemia    Contusion of cerebral cortex with concussion ([address] )    Urinary frequency       Objective   /80   Ht 6' (1 829 m)   Wt 75 3 kg (166 lb)   BMI 22 51 kg/m²     Physical Exam  Vitals reviewed  Constitutional:       Appearance: Normal appearance  He is normal weight  HENT:      Head: Normocephalic and atraumatic  Eyes:      Extraocular Movements: Extraocular movements intact     Pulmonary:      Effort: Pulmonary effort is normal  Genitourinary:     Penis: Normal     Musculoskeletal:         General: Normal range of motion  Cervical back: Normal range of motion  Skin:     Coloration: Skin is not jaundiced or pale  Neurological:      General: No focal deficit present  Mental Status: He is alert and oriented to person, place, and time  Mental status is at baseline  Psychiatric:         Mood and Affect: Mood normal          Behavior: Behavior normal          Thought Content:  Thought content normal          Judgment: Judgment normal              Current Medications     Current Outpatient Medications:     acetaminophen (TYLENOL) 325 mg tablet, Take 2 tablets (650 mg total) by mouth every 6 (six) hours as needed for mild pain, headaches or fever, Disp:  , Rfl: 0    atorvastatin (LIPITOR) 10 mg tablet, Take 1 tablet (10 mg total) by mouth every other day, Disp: 45 tablet, Rfl: 1    B COMPLEX VITAMINS PO, Take 1 tablet by mouth daily, Disp: , Rfl:     cephalexin (KEFLEX) 500 mg capsule, Take 1 capsule (500 mg total) by mouth 1 (one) time for 1 dose Take after procedure, Disp: 1 capsule, Rfl: 0    cetirizine (ZyrTEC) 10 mg tablet, TAKE ONE TABLET BY MOUTH DAILY, Disp: 90 tablet, Rfl: 5    Cholecalciferol (VITAMIN D3) 2000 UNITS capsule, Take 1 capsule by mouth 2 (two) times a day 5000 units , Disp: , Rfl:     Coenzyme Q10 (CO Q10) 60 MG CAPS, Take 1 capsule by mouth daily, Disp: , Rfl:     gabapentin (NEURONTIN) 600 MG tablet, TAKE 1 TABLET BY MOUTH  TWICE DAILY, Disp: 180 tablet, Rfl: 3    Ginkgo Biloba 120 MG TABS, Take 1 tablet by mouth daily, Disp: , Rfl:     Inositol 500 MG TABS, Take 1 tablet by mouth 2 (two) times a day, Disp: , Rfl:     ipratropium (ATROVENT) 0 03 % nasal spray, USE 2 SPRAYS IN BOTH  NOSTRILS 3 TIMES DAILY, Disp: 60 mL, Rfl: 5    ketoconazole (NIZORAL) 2 % shampoo, , Disp: , Rfl:     lutein 6 mg, Take 1 capsule by mouth daily, Disp: , Rfl:     metoprolol tartrate (LOPRESSOR) 25 mg tablet, TAKE 1 TABLET BY MOUTH  EVERY 12 HOURS, Disp: 180 tablet, Rfl: 3    MILK THISTLE PO, Take 1 tablet by mouth daily, Disp: , Rfl:     Misc Natural Products (SUPER SNOOZE) CAPS, Take 1 capsule by mouth daily Bed time , Disp: , Rfl:     Multiple Vitamins-Minerals (OCUVITE-LUTEIN) CAPS, Take by mouth daily , Disp: , Rfl:     omeprazole (PriLOSEC) 40 MG capsule, TAKE 1 CAPSULE BY MOUTH  TWICE DAILY, Disp: 180 capsule, Rfl: 3    psyllium (METAMUCIL) 58 6 % packet, Take 1 packet by mouth daily, Disp: , Rfl:     sildenafil (VIAGRA) 50 MG tablet, Viagra 50 mg tablet  TAKE 1 TABLET BY MOUTH UP TO 4 HOURS BEFORE INTERCOURSE AND MAX OF 1 TABLET IN 24 HOURS, Disp: , Rfl:     Zinc 50 MG TABS, Take 1 tablet by mouth daily, Disp: , Rfl:         Jade Turcios MD

## 2022-08-06 DIAGNOSIS — J30.1 ALLERGIC RHINITIS DUE TO POLLEN, UNSPECIFIED SEASONALITY: ICD-10-CM

## 2022-08-08 RX ORDER — IPRATROPIUM BROMIDE 21 UG/1
SPRAY, METERED NASAL
Qty: 120 ML | Refills: 3 | Status: SHIPPED | OUTPATIENT
Start: 2022-08-08

## 2022-10-03 ENCOUNTER — OFFICE VISIT (OUTPATIENT)
Dept: FAMILY MEDICINE CLINIC | Facility: CLINIC | Age: 87
End: 2022-10-03
Payer: MEDICARE

## 2022-10-03 VITALS
OXYGEN SATURATION: 97 % | HEART RATE: 52 BPM | BODY MASS INDEX: 23.16 KG/M2 | TEMPERATURE: 97.3 F | DIASTOLIC BLOOD PRESSURE: 68 MMHG | WEIGHT: 171 LBS | SYSTOLIC BLOOD PRESSURE: 162 MMHG | HEIGHT: 72 IN

## 2022-10-03 DIAGNOSIS — Z23 NEED FOR IMMUNIZATION AGAINST INFLUENZA: Primary | ICD-10-CM

## 2022-10-03 DIAGNOSIS — Z00.00 MEDICARE ANNUAL WELLNESS VISIT, SUBSEQUENT: ICD-10-CM

## 2022-10-03 PROCEDURE — G0439 PPPS, SUBSEQ VISIT: HCPCS | Performed by: FAMILY MEDICINE

## 2022-10-03 PROCEDURE — G0008 ADMIN INFLUENZA VIRUS VAC: HCPCS | Performed by: FAMILY MEDICINE

## 2022-10-03 PROCEDURE — 90662 IIV NO PRSV INCREASED AG IM: CPT | Performed by: FAMILY MEDICINE

## 2022-10-03 RX ORDER — FLUOCINOLONE ACETONIDE 0.1 MG/ML
SOLUTION TOPICAL
COMMUNITY
Start: 2022-08-26

## 2022-10-03 NOTE — PATIENT INSTRUCTIONS
Medicare Preventive Visit Patient Instructions  Thank you for completing your Welcome to Medicare Visit or Medicare Annual Wellness Visit today  Your next wellness visit will be due in one year (10/4/2023)  The screening/preventive services that you may require over the next 5-10 years are detailed below  Some tests may not apply to you based off risk factors and/or age  Screening tests ordered at today's visit but not completed yet may show as past due  Also, please note that scanned in results may not display below  Preventive Screenings:  Service Recommendations Previous Testing/Comments   Colorectal Cancer Screening  · Colonoscopy    · Fecal Occult Blood Test (FOBT)/Fecal Immunochemical Test (FIT)  · Fecal DNA/Cologuard Test  · Flexible Sigmoidoscopy Age: 39-70 years old   Colonoscopy: every 10 years (May be performed more frequently if at higher risk)  OR  FOBT/FIT: every 1 year  OR  Cologuard: every 3 years  OR  Sigmoidoscopy: every 5 years  Screening may be recommended earlier than age 39 if at higher risk for colorectal cancer  Also, an individualized decision between you and your healthcare provider will decide whether screening between the ages of 74-80 would be appropriate   Colonoscopy: 03/11/2022  FOBT/FIT: Not on file  Cologuard: Not on file  Sigmoidoscopy: Not on file    Screening Current     Prostate Cancer Screening Individualized decision between patient and health care provider in men between ages of 53-78   Medicare will cover every 12 months beginning on the day after your 50th birthday PSA: 1 3 ng/mL     Screening Not Indicated     Hepatitis C Screening Once for adults born between 1945 and 1965  More frequently in patients at high risk for Hepatitis C Hep C Antibody: Not on file        Diabetes Screening 1-2 times per year if you're at risk for diabetes or have pre-diabetes Fasting glucose: 82 mg/dL (4/16/2022)  A1C: 6 1 % (6/7/2018)  Screening Current   Cholesterol Screening Once every 5 years if you don't have a lipid disorder  May order more often based on risk factors  Lipid panel: 04/16/2022  Screening Not Indicated  History Lipid Disorder      Other Preventive Screenings Covered by Medicare:  1  Abdominal Aortic Aneurysm (AAA) Screening: covered once if your at risk  You're considered to be at risk if you have a family history of AAA or a male between the age of 73-68 who smoking at least 100 cigarettes in your lifetime  2  Lung Cancer Screening: covers low dose CT scan once per year if you meet all of the following conditions: (1) Age 50-69; (2) No signs or symptoms of lung cancer; (3) Current smoker or have quit smoking within the last 15 years; (4) You have a tobacco smoking history of at least 20 pack years (packs per day x number of years you smoked); (5) You get a written order from a healthcare provider  3  Glaucoma Screening: covered annually if you're considered high risk: (1) You have diabetes OR (2) Family history of glaucoma OR (3)  aged 48 and older OR (3)  American aged 72 and older  3  Osteoporosis Screening: covered every 2 years if you meet one of the following conditions: (1) Have a vertebral abnormality; (2) On glucocorticoid therapy for more than 3 months; (3) Have primary hyperparathyroidism; (4) On osteoporosis medications and need to assess response to drug therapy  5  HIV Screening: covered annually if you're between the age of 12-76  Also covered annually if you are younger than 13 and older than 72 with risk factors for HIV infection  For pregnant patients, it is covered up to 3 times per pregnancy      Immunizations:  Immunization Recommendations   Influenza Vaccine Annual influenza vaccination during flu season is recommended for all persons aged >= 6 months who do not have contraindications   Pneumococcal Vaccine   * Pneumococcal conjugate vaccine = PCV13 (Prevnar 13), PCV15 (Vaxneuvance), PCV20 (Prevnar 20)  * Pneumococcal polysaccharide vaccine = PPSV23 (Pneumovax) Adults 2364 years old: 1-3 doses may be recommended based on certain risk factors  Adults 72 years old: 1-2 doses may be recommended based off what pneumonia vaccine you previously received   Hepatitis B Vaccine 3 dose series if at intermediate or high risk (ex: diabetes, end stage renal disease, liver disease)   Tetanus (Td) Vaccine - COST NOT COVERED BY MEDICARE PART B Following completion of primary series, a booster dose should be given every 10 years to maintain immunity against tetanus  Td may also be given as tetanus wound prophylaxis  Tdap Vaccine - COST NOT COVERED BY MEDICARE PART B Recommended at least once for all adults  For pregnant patients, recommended with each pregnancy  Shingles Vaccine (Shingrix) - COST NOT COVERED BY MEDICARE PART B  2 shot series recommended in those aged 48 and above     Health Maintenance Due:      Topic Date Due    Colorectal Cancer Screening  Discontinued     Immunizations Due:      Topic Date Due    Influenza Vaccine (1) 09/01/2022     Advance Directives   What are advance directives? Advance directives are legal documents that state your wishes and plans for medical care  These plans are made ahead of time in case you lose your ability to make decisions for yourself  Advance directives can apply to any medical decision, such as the treatments you want, and if you want to donate organs  What are the types of advance directives? There are many types of advance directives, and each state has rules about how to use them  You may choose a combination of any of the following:  · Living will: This is a written record of the treatment you want  You can also choose which treatments you do not want, which to limit, and which to stop at a certain time  This includes surgery, medicine, IV fluid, and tube feedings  · Durable power of  for healthcare [facility]):   This is a written record that states who you want to make healthcare choices for you when you are unable to make them for yourself  This person, called a proxy, is usually a family member or a friend  You may choose more than 1 proxy  · Do not resuscitate (DNR) order:  A DNR order is used in case your heart stops beating or you stop breathing  It is a request not to have certain forms of treatment, such as CPR  A DNR order may be included in other types of advance directives  · Medical directive: This covers the care that you want if you are in a coma, near death, or unable to make decisions for yourself  You can list the treatments you want for each condition  Treatment may include pain medicine, surgery, blood transfusions, dialysis, IV or tube feedings, and a ventilator (breathing machine)  · Values history: This document has questions about your views, beliefs, and how you feel and think about life  This information can help others choose the care that you would choose  Why are advance directives important? An advance directive helps you control your care  Although spoken wishes may be used, it is better to have your wishes written down  Spoken wishes can be misunderstood, or not followed  Treatments may be given even if you do not want them  An advance directive may make it easier for your family to make difficult choices about your care  Fall Prevention    Fall prevention  includes ways to make your home and other areas safer  It also includes ways you can move more carefully to prevent a fall  Health conditions that cause changes in your blood pressure, vision, or muscle strength and coordination may increase your risk for falls  Medicines may also increase your risk for falls if they make you dizzy, weak, or sleepy  Fall prevention tips:   · Stand or sit up slowly  · Use assistive devices as directed  · Wear shoes that fit well and have soles that   · Wear a personal alarm  · Stay active  · Manage your medical conditions      Home Safety Tips:  · Add items to prevent falls in the bathroom  · Keep paths clear  · Install bright lights in your home  · Keep items you use often on shelves within reach  · Paint or place reflective tape on the edges of your stairs  Cigarette Smoking and Your Health   Risks to your health if you smoke:  Nicotine and other chemicals found in tobacco damage every cell in your body  Even if you are a light smoker, you have an increased risk for cancer, heart disease, and lung disease  If you are pregnant or have diabetes, smoking increases your risk for complications  Benefits to your health if you stop smoking:   · You decrease respiratory symptoms such as coughing, wheezing, and shortness of breath  · You reduce your risk for cancers of the lung, mouth, throat, kidney, bladder, pancreas, stomach, and cervix  If you already have cancer, you increase the benefits of chemotherapy  You also reduce your risk for cancer returning or a second cancer from developing  · You reduce your risk for heart disease, blood clots, heart attack, and stroke  · You reduce your risk for lung infections, and diseases such as pneumonia, asthma, chronic bronchitis, and emphysema  · Your circulation improves  More oxygen can be delivered to your body  If you have diabetes, you lower your risk for complications, such as kidney, artery, and eye diseases  You also lower your risk for nerve damage  Nerve damage can lead to amputations, poor vision, and blindness  · You improve your body's ability to heal and to fight infections  For more information and support to stop smoking:   · Smokefree  gov  Phone: 4- 314 - 107-3966  Web Address: www [employer]   Lynette Marquezjorge luis 2018 Information is for End User's use only and may not be sold, redistributed or otherwise used for commercial purposes   All illustrations and images included in CareNotes® are the copyrighted property of [employer] [facility]  or [facility] [employer]

## 2022-10-03 NOTE — PROGRESS NOTES
Assessment and Plan:     Problem List Items Addressed This Visit    None     Visit Diagnoses     Need for immunization against influenza    -  Primary    Relevant Orders    influenza vaccine, high-dose, PF 0 7 mL (FLUZONE HIGH-DOSE)          Depression Screening and Follow-up Plan: Patient was screened for depression during today's encounter  They screened negative with a PHQ-2 score of 0  Falls Plan of Care: balance, strength, and gait training instructions were provided  Preventive health issues were discussed with patient, and age appropriate screening tests were ordered as noted in patient's After Visit Summary  Personalized health advice and appropriate referrals for health education or preventive services given if needed, as noted in patient's After Visit Summary  History of Present Illness:     Patient presents for a Medicare Wellness Visit    medicare well exam     Patient Care Team:  Kirsty Key DO as PCP - MD Leona Galloway MD Lurena Kiel, MD Lacey Valladares MD as Endoscopist     Review of Systems:     Review of Systems   Constitutional: Negative for activity change, appetite change, diaphoresis, fatigue and fever  HENT: Negative  Negative for dental problem and hearing loss  Eyes: Positive for visual disturbance  Macular degeneration   Respiratory: Negative for apnea, cough, chest tightness, shortness of breath and wheezing  Copd  Continues to smoke   Cardiovascular: Negative for chest pain, palpitations and leg swelling  Gastrointestinal: Negative for abdominal distention, abdominal pain, anal bleeding, constipation, diarrhea, nausea and vomiting  Endocrine: Negative for cold intolerance, heat intolerance, polydipsia, polyphagia and polyuria  Genitourinary: Negative for difficulty urinating, dysuria, flank pain, hematuria and urgency     Musculoskeletal: Negative for arthralgias, back pain, gait problem, joint swelling and myalgias  Skin: Negative for color change, rash and wound  Allergic/Immunologic: Negative for environmental allergies, food allergies and immunocompromised state  Neurological: Negative for dizziness, seizures, syncope, speech difficulty, numbness and headaches  Hematological: Negative for adenopathy  Does not bruise/bleed easily  Psychiatric/Behavioral: Negative for agitation, behavioral problems, hallucinations, sleep disturbance and suicidal ideas          Problem List:     Patient Active Problem List   Diagnosis    Bladder cancer ([facility] )    Lung nodule, solitary    Erectile dysfunction    Gait instability    High blood pressure    Hypothyroidism    Idiopathic peripheral neuropathy    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)    Cervical stenosis of spinal canal    Spinal stenosis of lumbar region    Spondylosis 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    Hoarse voice quality    Appetite loss    Weight loss    Gastroesophageal reflux disease without esophagitis    Alternating constipation and diarrhea    Weight loss, non-intentional    Nausea    Chronic obstructive pulmonary disease with acute exacerbation (HCC)    Von Willebrand's disease    Radiculopathy, lumbar region    Fall    Anemia    Contusion of cerebral cortex with concussion    Urinary frequency      Past Medical and Surgical History:     Past Medical History:   Diagnosis Date    Anemia     Appendicitis     Coronary artery disease     Detached retina     GERD (gastroesophageal reflux disease)     Hyperlipidemia     Hypertension     Macular degeneration     Neuropathy     Pelvis fracture ([facility] ) 01/2021    Von Willebrand disease      Past Surgical History:   Procedure Laterality Date    ADENOIDECTOMY      APPENDECTOMY      ARTHRODESIS  01/15/2014    Lumbar     BACK SURGERY      CATARACT EXTRACTION      COLONOSCOPY  12/09/2016    fiberoptic     CYSTOSCOPY with resection of tumor / 1/12/17, 10/2/17 / diagnostic 12/8/16, 5/30/17     CYSTOSCOPY  09/20/2018    CYSTOSCOPY  06/22/2020    EGD AND COLONOSCOPY N/A 12/9/2016    Procedure: EGD AND COLONOSCOPY;  Surgeon: Mario Dick MD;  [facility];  Service:    Marie Slade EYE SURGERY      FRACTURE SURGERY Bilateral     ARM    NECK SURGERY      NEUROPLASTY / TRANSPOSITION MEDIAN NERVE AT [facility] Right 04/2015    LA COLONOSCOPY FLX DX W/COLLJ SPEC WHEN PFRMD N/A 2/5/2019    Procedure: COLONOSCOPY;  Surgeon: Elmer Easley MD;  [facility];  Service: Gastroenterology    LA CYSTOURETHROSCOPY,FULGUR <0 5 CM LESN N/A 1/12/2017    Procedure: CYSTOSCOPY, BLADDER BIOPSY WITH FULGRATION, 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;  Surgeon: Cecily Kent MD;  [facility];  Service: Urology    LA CYSTOURETHROSCOPY,FULGUR <0 5 CM LESN N/A 10/2/2017    Procedure: CYSTOSCOPY WITH  BLADDER BIOPSIES WITH FULGURATION;  Surgeon: Cecily Kent MD;  [facility];  Service: Urology    LA ESOPHAGOGASTRODUODENOSCOPY TRANSORAL DIAGNOSTIC N/A 8/3/2018    Procedure: ESOPHAGOGASTRODUODENOSCOPY (EGD); Surgeon: Elmer Easley MD;  [facility];  Service: Gastroenterology    LA INSTILL ANTICANCER AGENT IN BLADDER N/A 1/12/2017    Procedure: Dora Parks;  Surgeon: Cecily Kent MD;  [facility];  Service: Urology    LA INSTILL ANTICANCER AGENT IN BLADDER N/A 10/2/2017    Procedure: Dora Parks;  Surgeon: Cecily Kent MD;  [facility];  Service: Urology    RETINAL DETACHMENT SURGERY Right 03/2016    complete air fill confirmed    [address] SURGERY  03/03/2015    proximal humerus fracture / LA    3/6/15   R    1/3/18     TONSILECTOMY AND ADNOIDECTOMY      TONSILLECTOMY      TRANSURETHRAL RESECTION OF BLADDER TUMOR N/A 10/2/2017    Procedure: TRANSURETHRAL RESECTION OF BLADDER TUMOR (TURBT);   Surgeon: Brooklyn Corley MD;  [facility];  Service: Urology    [address] (HISTORICAL)        Family History:     Family History   Problem Relation Age of Onset    Ulcers Father         acute gastric    Heart disease Mother    [facility]       Social History:     Social History     Socioeconomic History    Marital status:      Spouse name: None    Number of children: None    Years of education: None    Highest education level: None   Occupational History    Occupation:    retired   Tobacco Use    Smoking status: Current Every Day Smoker     Packs/day: 0 25     Years: 60 00     Pack years: 15 00     Types: Pipe, Cigarettes    Smokeless tobacco: Never Used    Tobacco comment: smokes a pipe   Vaping Use    Vaping Use: Never used   Substance and Sexual Activity    Alcohol use:  Yes     Alcohol/week: 2 0 standard drinks     Types: 1 Glasses of wine, 1 Shots of liquor per week     Comment: DAILY     Drug use: Never    Sexual activity: None   Other Topics Concern    None   Social History Narrative    No advance directives     Patient has living will      Social Determinants of Health     Financial Resource Strain: Not on file   Food Insecurity: Not on file   Transportation Needs: Not on file   Physical Activity: Not on file   Stress: Not on file   Social Connections: Not on file   Intimate Partner Violence: Not on file   Housing Stability: Not on file      Medications and Allergies:     Current Outpatient Medications   Medication Sig Dispense Refill    acetaminophen (TYLENOL) 325 mg tablet Take 2 tablets (650 mg total) by mouth every 6 (six) hours as needed for mild pain, headaches or fever  0    atorvastatin (LIPITOR) 10 mg tablet Take 1 tablet (10 mg total) by mouth every other day 45 tablet 1    B COMPLEX VITAMINS PO Take 1 tablet by mouth daily      cetirizine (ZyrTEC) 10 mg tablet TAKE ONE TABLET BY MOUTH DAILY 90 tablet 5    Cholecalciferol (VITAMIN D3) 2000 UNITS capsule Take 1 capsule by mouth 2 (two) times a day 5000 units       Coenzyme Q10 (CO Q10) 60 MG CAPS Take 1 capsule by mouth daily      fluocinolone (SYNALAR) 0 01 % external solution       gabapentin (NEURONTIN) 600 MG tablet TAKE 1 TABLET BY MOUTH  TWICE DAILY 180 tablet 3    Ginkgo Biloba 120 MG TABS Take 1 tablet by mouth daily      ipratropium (ATROVENT) 0 03 % nasal spray USE 2 SPRAYS IN BOTH  NOSTRILS 3 TIMES DAILY 120 mL 3    ketoconazole (NIZORAL) 2 % shampoo       lutein 6 mg Take 1 capsule by mouth daily      metoprolol tartrate (LOPRESSOR) 25 mg tablet TAKE 1 TABLET BY MOUTH  EVERY 12 HOURS 180 tablet 3    MILK THISTLE PO Take 1 tablet by mouth daily      Misc Natural Products (SUPER SNOOZE) CAPS Take 1 capsule by mouth daily Bed time       Multiple Vitamins-Minerals (OCUVITE-LUTEIN) CAPS Take by mouth daily       omeprazole (PriLOSEC) 40 MG capsule TAKE 1 CAPSULE BY MOUTH  TWICE DAILY 180 capsule 3    psyllium (METAMUCIL) 58 6 % packet Take 1 packet by mouth daily      sildenafil (VIAGRA) 50 MG tablet Viagra 50 mg tablet   TAKE 1 TABLET BY MOUTH UP TO 4 HOURS BEFORE INTERCOURSE AND MAX OF 1 TABLET IN 24 HOURS      Zinc 50 MG TABS Take 1 tablet by mouth daily      Inositol 500 MG TABS Take 1 tablet by mouth 2 (two) times a day      mupirocin (BACTROBAN) 2 % ointment  (Patient not taking: Reported on 10/3/2022)       No current facility-administered medications for this visit       No Known Allergies   Immunizations:     Immunization History   Administered Date(s) Administered    COVID-19 MODERNA VACC 0 5 ML IM 01/22/2021, 02/19/2021, 11/07/2021, 07/27/2022    INFLUENZA 09/30/2021    Influenza Split High Dose Preservative Free IM 09/17/2012, 10/15/2013, 11/04/2014, 10/15/2015, 09/01/2016, 10/26/2017    Influenza, high dose seasonal 0 7 mL 09/12/2018, 09/17/2019, 09/22/2020    Pneumococcal Conjugate 13-Valent 12/09/2014    Pneumococcal Polysaccharide PPV23 01/12/2018    Tdap 05/22/2022    Zoster 12/12/2014      Health Maintenance:         Topic Date Due    Colorectal Cancer Screening  Discontinued         Topic Date Due    Influenza Vaccine (1) 09/01/2022      Medicare Screening Tests and Risk Assessments:     Adriana Chakraborty is here for his Subsequent Wellness visit  Health Risk Assessment:   Patient rates overall health as good  Patient feels that their physical health rating is same  Patient is satisfied with their life  Eyesight was rated as same  Hearing was rated as same  Patient feels that their emotional and mental health rating is same  Patients states they are never, rarely angry  Patient states they are sometimes unusually tired/fatigued  Pain experienced in the last 7 days has been none  Patient states that he has experienced no weight loss or gain in last 6 months  Depression Screening:   PHQ-2 Score: 0      Fall Risk Screening: In the past year, patient has experienced: history of falling in past year    Number of falls: 2 or more  Injured during fall?: Yes    Feels unsteady when standing or walking?: Yes    Worried about falling?: Yes      Home Safety:  Patient has trouble with stairs inside or outside of their home  Patient has working smoke alarms and has working carbon monoxide detector  Home safety hazards include: none  Nutrition:   Current diet is Regular and Limited junk food  Medications:   Patient is currently taking over-the-counter supplements  OTC medications include: see medication list  Patient is able to manage medications  Activities of Daily Living (ADLs)/Instrumental Activities of Daily Living (IADLs):   Walk and transfer into and out of bed and chair?: Yes  Dress and groom yourself?: Yes    Bathe or shower yourself?: Yes    Feed yourself?  Yes  Do your laundry/housekeeping?: Yes  Manage your money, pay your bills and track your expenses?: Yes  Make your own meals?: Yes    Do your own shopping?: Yes    Durable Medical Equipment Suppliers  [employer]    Previous Hospitalizations:   Any hospitalizations or ED visits within the last 12 months?: Yes    How many hospitalizations have you had in the last year?: 1-2    Advance Care Planning:   Living will: Yes    Durable POA for healthcare: Yes    Advanced directive: Yes    Advanced directive counseling given: Yes    Five wishes given: No    Patient declined ACP directive: No    End of Life Decisions reviewed with patient: Yes    Provider agrees with end of life decisions: Yes      Cognitive Screening:   Provider or family/friend/caregiver concerned regarding cognition?: No    PREVENTIVE SCREENINGS      Cardiovascular Screening:    General: Screening Not Indicated and History Lipid Disorder      Diabetes Screening:     General: Screening Current      Colorectal Cancer Screening:     General: Screening Current      Prostate Cancer Screening:    General: Screening Not Indicated      Osteoporosis Screening:    General: Screening Not Indicated      Abdominal Aortic Aneurysm (AAA) Screening:    Risk factors include: tobacco use        Lung Cancer Screening:     General: Screening Not Indicated      Hepatitis C Screening:    General: Screening Not Indicated    Screening, Brief Intervention, and Referral to Treatment (SBIRT)    Screening  Typical number of drinks in a day: 2  Typical number of drinks in a week: 14  Interpretation: Low risk drinking behavior      Single Item Drug Screening:  How often have you used an illegal drug (including marijuana) or a prescription medication for non-medical reasons in the past year? never    Single Item Drug Screen Score: 0  Interpretation: Negative screen for possible drug use disorder    No exam data present     Physical Exam:     /68 (BP Location: Left arm, Patient Position: Sitting, Cuff Size: Standard)   Pulse (!) 52   Temp (!) 97 3 °F (36 3 °C) (Temporal)   Ht 6' (1 829 m)   Wt 77 6 kg (171 lb)   SpO2 97%   BMI 23 19 kg/m²     Physical Exam  Constitutional:       Appearance: He is well-developed  HENT:      Head: Normocephalic and atraumatic  Right Ear: External ear normal       Left Ear: External ear normal       Nose: Nose normal    Eyes:      Conjunctiva/sclera: Conjunctivae normal       Pupils: Pupils are equal, round, and reactive to light  Cardiovascular:      Rate and Rhythm: Normal rate and regular rhythm  Heart sounds: Normal heart sounds  No murmur heard  No friction rub  Pulmonary:      Effort: Pulmonary effort is normal  No respiratory distress  Breath sounds: Normal breath sounds  No wheezing or rales  Chest:      Chest wall: No tenderness  Abdominal:      General: Bowel sounds are normal       Palpations: Abdomen is soft  Musculoskeletal:         General: Normal range of motion  Cervical back: Normal range of motion and neck supple  Skin:     General: Skin is warm and dry  Capillary Refill: Capillary refill takes 2 to 3 seconds  Neurological:      Mental Status: He is alert and oriented to person, place, and time  Psychiatric:         Behavior: Behavior normal          Thought Content:  Thought content normal          Judgment: Judgment normal           Farhat Freshwater, DO

## 2022-11-21 NOTE — TELEPHONE ENCOUNTER
1   Omeprazole is not working, something was supposed to be sent to [facility]  (Nothing new in EMR)    2  WM Result Positive (+) - is there Tx for this? 3    Pt was called that "Baseline was normal" is this B12 results & does it mean he does not need any inj?
Patient needs a prescription for Pepcid 40 milligrams b I d  1 month supply 3 refills patient has positive WM I have written an order to refer him to a rheumatologist and the B12 level although was normal id if they wish to try the B12 injections they will be given once a week but I do not know if his insurance will pay for it since his B12 is normal
Patients daughter aware and script sent
No

## 2022-11-26 DIAGNOSIS — J30.1 ALLERGIC RHINITIS DUE TO POLLEN, UNSPECIFIED SEASONALITY: ICD-10-CM

## 2022-11-28 RX ORDER — CETIRIZINE HYDROCHLORIDE 10 MG/1
TABLET ORAL
Qty: 90 TABLET | Refills: 0 | Status: SHIPPED | OUTPATIENT
Start: 2022-11-28

## 2022-12-06 DIAGNOSIS — E78.2 MIXED HYPERLIPIDEMIA: ICD-10-CM

## 2022-12-10 RX ORDER — ATORVASTATIN CALCIUM 10 MG/1
TABLET, FILM COATED ORAL
Qty: 45 TABLET | Refills: 3 | Status: SHIPPED | OUTPATIENT
Start: 2022-12-10

## 2023-01-03 ENCOUNTER — TELEPHONE (OUTPATIENT)
Dept: FAMILY MEDICINE CLINIC | Facility: CLINIC | Age: 88
End: 2023-01-03

## 2023-01-03 DIAGNOSIS — J01.01 ACUTE RECURRENT MAXILLARY SINUSITIS: Primary | ICD-10-CM

## 2023-01-03 RX ORDER — AMOXICILLIN AND CLAVULANATE POTASSIUM 875; 125 MG/1; MG/1
1 TABLET, FILM COATED ORAL EVERY 12 HOURS SCHEDULED
Qty: 14 TABLET | Refills: 0 | Status: SHIPPED | OUTPATIENT
Start: 2023-01-03 | End: 2023-01-10

## 2023-01-23 DIAGNOSIS — L98.9 SKIN LESIONS: Primary | ICD-10-CM

## 2023-03-09 DIAGNOSIS — J30.1 ALLERGIC RHINITIS DUE TO POLLEN, UNSPECIFIED SEASONALITY: ICD-10-CM

## 2023-03-09 RX ORDER — CETIRIZINE HYDROCHLORIDE 10 MG/1
TABLET ORAL
Qty: 90 TABLET | Refills: 0 | Status: SHIPPED | OUTPATIENT
Start: 2023-03-09

## 2023-04-04 DIAGNOSIS — I10 ESSENTIAL HYPERTENSION: ICD-10-CM

## 2023-04-06 ENCOUNTER — APPOINTMENT (OUTPATIENT)
Dept: RADIOLOGY | Facility: MEDICAL CENTER | Age: 88
End: 2023-04-06

## 2023-04-06 ENCOUNTER — TRANSCRIBE ORDERS (OUTPATIENT)
Dept: URGENT CARE | Facility: MEDICAL CENTER | Age: 88
End: 2023-04-06

## 2023-04-06 ENCOUNTER — OFFICE VISIT (OUTPATIENT)
Dept: FAMILY MEDICINE CLINIC | Facility: CLINIC | Age: 88
End: 2023-04-06

## 2023-04-06 VITALS
HEIGHT: 72 IN | WEIGHT: 170 LBS | DIASTOLIC BLOOD PRESSURE: 80 MMHG | BODY MASS INDEX: 23.03 KG/M2 | HEART RATE: 75 BPM | TEMPERATURE: 96.6 F | OXYGEN SATURATION: 99 % | SYSTOLIC BLOOD PRESSURE: 150 MMHG

## 2023-04-06 DIAGNOSIS — J44.1 CHRONIC OBSTRUCTIVE PULMONARY DISEASE WITH ACUTE EXACERBATION (HCC): ICD-10-CM

## 2023-04-06 DIAGNOSIS — G60.9 IDIOPATHIC PERIPHERAL NEUROPATHY: ICD-10-CM

## 2023-04-06 DIAGNOSIS — E03.9 ACQUIRED HYPOTHYROIDISM: ICD-10-CM

## 2023-04-06 DIAGNOSIS — S90.02XA CONTUSION OF LEFT ANKLE, INITIAL ENCOUNTER: Primary | ICD-10-CM

## 2023-04-06 DIAGNOSIS — H35.3230 BILATERAL EXUDATIVE AGE-RELATED MACULAR DEGENERATION, UNSPECIFIED STAGE (HCC): ICD-10-CM

## 2023-04-06 DIAGNOSIS — E87.8 DISEQUILIBRIUM SYNDROME: Primary | ICD-10-CM

## 2023-04-06 DIAGNOSIS — C67.8 MALIGNANT NEOPLASM OF OVERLAPPING SITES OF BLADDER (HCC): ICD-10-CM

## 2023-04-06 DIAGNOSIS — S06.2X9A: ICD-10-CM

## 2023-04-06 DIAGNOSIS — M48.02 CERVICAL STENOSIS OF SPINAL CANAL: ICD-10-CM

## 2023-04-06 DIAGNOSIS — S90.02XA CONTUSION OF LEFT ANKLE, INITIAL ENCOUNTER: ICD-10-CM

## 2023-04-06 NOTE — PROGRESS NOTES
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)    Cervical stenosis of spinal canal   Other Visit Diagnoses     Disequilibrium syndrome    -  Primary           Diagnoses and all orders for this visit:    Disequilibrium syndrome    Cervical stenosis of spinal canal    Bilateral exudative age-related macular degeneration, unspecified stage (HCC)    Chronic obstructive pulmonary disease with acute exacerbation (HCC)    Acquired hypothyroidism    Idiopathic peripheral neuropathy        No problem-specific Assessment & Plan notes found for this encounter  Subjective:      Patient ID: Reagan Honeycutt is a 82 y o  male  Follow up on balance, recent contusion of left ankle and foot      The following portions of the patient's history were reviewed and updated as appropriate:   He has a past medical history of Anemia, Appendicitis, Coronary artery disease, Detached retina, GERD (gastroesophageal reflux disease), Hyperlipidemia, Hypertension, Macular degeneration, Neuropathy, Pelvis fracture ([facility] ) (01/2021), and 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[facility] )  ,  does not have any pertinent problems on file  ,   has a past surgical history that includes Back surgery; Neck surgery; Eye surgery; Appendectomy; TONSILECTOMY AND ADNOIDECTOMY; Fracture surgery (Bilateral); VON WILLEBRAND ANTIGEN (HISTORICAL); Tonsillectomy;  Cataract extraction; Rotator cuff repair; pr cysto w/removal of lesions small (N/A, 1/12/2017); pr bladder instillation anticarcinogenic agent (N/A, 1/12/2017); EGD AND COLONOSCOPY (N/A, 12/9/2016); pr cysto w/removal of lesions small (N/A, 10/2/2017); pr bladder instillation anticarcinogenic agent (N/A, 10/2/2017); Transurethral resection of bladder tumor (N/A, 10/2/2017); Shoulder surgery (03/03/2015); Adenoidectomy; Arthrodesis (01/15/2014); Colonoscopy (12/09/2016); Neuroplasty / transposition median nerve at carpal tunnel (Right, 04/2015); Retinal detachment surgery (Right, 03/2016); pr esophagogastroduodenoscopy transoral diagnostic (N/A, 8/3/2018); pr colonoscopy flx dx w/collj spec when pfrmd (N/A, 2/5/2019); Cystoscopy; Cystoscopy (09/20/2018); and Cystoscopy (06/22/2020)  ,  family history includes Anuerysm in his sister; Heart disease in his mother; Ulcers in his father  ,   reports that he has been smoking pipe and cigarettes  He has a 15 00 pack-year smoking history  He has never used smokeless tobacco  He reports current alcohol use of about 2 0 standard drinks per week  He reports that he does not use drugs  ,  has No Known Allergies     Current Outpatient Medications   Medication Sig Dispense Refill   • acetaminophen (TYLENOL) 325 mg tablet Take 2 tablets (650 mg total) by mouth every 6 (six) hours as needed for mild pain, headaches or fever  0   • atorvastatin (LIPITOR) 10 mg tablet TAKE 1 TABLET BY MOUTH  EVERY OTHER DAY 45 tablet 3   • B COMPLEX VITAMINS PO Take 1 tablet by mouth daily     • cetirizine (ZyrTEC) 10 mg tablet TAKE ONE TABLET BY MOUTH DAILY 90 tablet 0   • Cholecalciferol (VITAMIN D3) 2000 UNITS capsule Take 1 capsule by mouth 2 (two) times a day 5000 units      • Coenzyme Q10 (CO Q10) 60 MG CAPS Take 1 capsule by mouth daily     • gabapentin (NEURONTIN) 600 MG tablet TAKE 1 TABLET BY MOUTH  TWICE DAILY 180 tablet 3   • Ginkgo Biloba 120 MG TABS Take 1 tablet by mouth daily     • ipratropium (ATROVENT) 0 03 % nasal spray USE 2 SPRAYS IN BOTH  NOSTRILS 3 TIMES DAILY 120 mL 3   • ketoconazole (NIZORAL) 2 % shampoo      • lutein 6 mg Take 1 capsule by mouth daily     • metoprolol tartrate (LOPRESSOR) 25 mg tablet TAKE 1 TABLET BY MOUTH  EVERY 12 HOURS 180 tablet 3   • MILK THISTLE PO Take 1 tablet by mouth daily     • Misc Natural Products (SUPER SNOOZE) CAPS Take 1 capsule by mouth daily Bed time      • Multiple Vitamins-Minerals (OCUVITE-LUTEIN) CAPS Take by mouth daily      • omeprazole (PriLOSEC) 40 MG capsule TAKE 1 CAPSULE BY MOUTH  TWICE DAILY 180 capsule 3   • psyllium (METAMUCIL) 58 6 % packet Take 1 packet by mouth daily     • sildenafil (VIAGRA) 50 MG tablet Viagra 50 mg tablet   TAKE 1 TABLET BY MOUTH UP TO 4 HOURS BEFORE INTERCOURSE AND MAX OF 1 TABLET IN 24 HOURS     • Zinc 50 MG TABS Take 1 tablet by mouth daily     • fluocinolone (SYNALAR) 0 01 % external solution      • Inositol 500 MG TABS Take 1 tablet by mouth 2 (two) times a day     • mupirocin (BACTROBAN) 2 % ointment  (Patient not taking: Reported on 10/3/2022)       No current facility-administered medications for this visit  Review of Systems   Constitutional: Negative for activity change, appetite change, diaphoresis, fatigue and fever  HENT: Positive for dental problem  Eyes: Positive for visual disturbance  Macular degeneration   Respiratory: Negative for apnea, cough, chest tightness, shortness of breath and wheezing  Cardiovascular: Negative for chest pain, palpitations and leg swelling  Gastrointestinal: Negative for abdominal distention, abdominal pain, anal bleeding, constipation, diarrhea, nausea and vomiting  Endocrine: Negative for cold intolerance, heat intolerance, polydipsia, polyphagia and polyuria  Genitourinary: Negative for difficulty urinating, dysuria, flank pain, hematuria and urgency  Musculoskeletal: Positive for arthralgias, back pain and gait problem  Negative for joint swelling and myalgias     Skin: Negative for color change, rash and wound  Allergic/Immunologic: Negative for environmental allergies, food allergies and immunocompromised state  Neurological: Positive for dizziness  Negative for seizures, syncope, speech difficulty, numbness and headaches  Hematological: Negative for adenopathy  Does not bruise/bleed easily  Psychiatric/Behavioral: Negative for agitation, behavioral problems, hallucinations, sleep disturbance and suicidal ideas  Objective:  Vitals:    04/06/23 1144   BP: 150/80   BP Location: Left arm   Patient Position: Sitting   Cuff Size: Standard   Pulse: 75   Temp: (!) 96 6 °F (35 9 °C)   TempSrc: Temporal   SpO2: 99%   Weight: 77 1 kg (170 lb)   Height: 6' (1 829 m)     Body mass index is 23 06 kg/m²  Physical Exam  Constitutional:       General: He is not in acute distress  Appearance: Normal appearance  He is well-developed  He is not diaphoretic  HENT:      Head: Normocephalic  Right Ear: External ear normal       Left Ear: External ear normal       Nose: Nose normal    Eyes:      General: No scleral icterus  Right eye: No discharge  Left eye: No discharge  Conjunctiva/sclera: Conjunctivae normal       Pupils: Pupils are equal, round, and reactive to light  Neck:      Thyroid: No thyromegaly  Trachea: No tracheal deviation  Cardiovascular:      Rate and Rhythm: Normal rate and regular rhythm  Heart sounds: Normal heart sounds  No murmur heard  No friction rub  No gallop  Pulmonary:      Effort: Pulmonary effort is normal  No respiratory distress  Breath sounds: Normal breath sounds  No wheezing  Abdominal:      General: Bowel sounds are normal       Palpations: Abdomen is soft  There is no mass  Tenderness: There is no abdominal tenderness  There is no guarding  Musculoskeletal:         General: No deformity  Cervical back: Normal range of motion  Lymphadenopathy:      Cervical: No cervical adenopathy  Skin:     General: Skin is warm and dry  Findings: No erythema or rash  Neurological:      Mental Status: He is alert and oriented to person, place, and time  Cranial Nerves: No cranial nerve deficit  Psychiatric:         Thought Content:  Thought content normal

## 2023-04-25 ENCOUNTER — APPOINTMENT (OUTPATIENT)
Dept: RADIOLOGY | Facility: MEDICAL CENTER | Age: 88
End: 2023-04-25

## 2023-04-25 ENCOUNTER — OFFICE VISIT (OUTPATIENT)
Dept: FAMILY MEDICINE CLINIC | Facility: CLINIC | Age: 88
End: 2023-04-25

## 2023-04-25 VITALS
HEIGHT: 72 IN | SYSTOLIC BLOOD PRESSURE: 152 MMHG | DIASTOLIC BLOOD PRESSURE: 86 MMHG | BODY MASS INDEX: 23.43 KG/M2 | TEMPERATURE: 96.8 F | HEART RATE: 74 BPM | WEIGHT: 173 LBS | OXYGEN SATURATION: 99 %

## 2023-04-25 DIAGNOSIS — M54.2 CERVICAL PAIN: ICD-10-CM

## 2023-04-25 DIAGNOSIS — M54.2 CERVICAL PAIN: Primary | ICD-10-CM

## 2023-04-25 NOTE — PROGRESS NOTES
Assessment/Plan:xray of c spine  Continue current meds    Problem List Items Addressed This Visit    None  Visit Diagnoses     Cervical pain    -  Primary    Relevant Orders    XR spine cervical complete 4 or 5 vw non injury           Diagnoses and all orders for this visit:    Cervical pain  -     XR spine cervical complete 4 or 5 vw non injury; Future    Other orders  -     ALPHA LIPOIC ACID-BIOTIN PO; Take by mouth        No problem-specific Assessment & Plan notes found for this encounter  Subjective:      Patient ID: Daniel Rueda is a 82 y o  male  Mr  Urine overage is here couple of problems he has had several falls he is doing x-ray after he is doing exercises for his hips he did explain exactly what his exercises involve he was concerned that he might be harming his hips or his back by doing those exercises but actually I think they are perfectly suited to his condition he has a 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 his pelvis is low back and now is also experiencing some discomfort in the cervical spine especially if he rotates his neck      The following portions of the patient's history were reviewed and updated as appropriate:   He has a past medical history of Anemia, Appendicitis, Coronary artery disease, Detached retina, GERD (gastroesophageal reflux disease), Hyperlipidemia, Hypertension, Macular degeneration, Neuropathy, Pelvis fracture ([facility] [address] ) (01/2021), and Von Willebrand disease ([facility] )  ,  does not have any pertinent problems on file  ,   has a past surgical history that includes Back surgery; Neck surgery; Eye surgery; Appendectomy; TONSILECTOMY AND ADNOIDECTOMY; Fracture surgery (Bilateral); VON WILLEBRAND ANTIGEN (HISTORICAL); Tonsillectomy;  Cataract extraction; Rotator cuff repair; pr cysto w/removal of lesions small (N/A, 1/12/2017); pr bladder instillation anticarcinogenic agent (N/A, 1/12/2017); EGD AND COLONOSCOPY (N/A, 12/9/2016); pr cysto w/removal of lesions small (N/A, 10/2/2017); pr bladder instillation anticarcinogenic agent (N/A, 10/2/2017); Transurethral resection of bladder tumor (N/A, 10/2/2017); Shoulder surgery (03/03/2015); Adenoidectomy; Arthrodesis (01/15/2014); Colonoscopy (12/09/2016); Neuroplasty / transposition median nerve at carpal tunnel (Right, 04/2015); Retinal detachment surgery (Right, 03/2016); pr esophagogastroduodenoscopy transoral diagnostic (N/A, 8/3/2018); pr colonoscopy flx dx w/collj spec when pfrmd (N/A, 2/5/2019); Cystoscopy; Cystoscopy (09/20/2018); and Cystoscopy (06/22/2020)  ,  family history includes Anuerysm in his sister; Heart disease in his mother; Ulcers in his father  ,   reports that he has been smoking pipe and cigarettes  He has a 15 00 pack-year smoking history  He has never used smokeless tobacco  He reports current alcohol use of about 2 0 standard drinks per week  He reports that he does not use drugs  ,  has No Known Allergies     Current Outpatient Medications   Medication Sig Dispense Refill   • acetaminophen (TYLENOL) 325 mg tablet Take 2 tablets (650 mg total) by mouth every 6 (six) hours as needed for mild pain, headaches or fever  0   • ALPHA LIPOIC ACID-BIOTIN PO Take by mouth     • atorvastatin (LIPITOR) 10 mg tablet TAKE 1 TABLET BY MOUTH  EVERY OTHER DAY 45 tablet 3   • B COMPLEX VITAMINS PO Take 1 tablet by mouth daily     • cetirizine (ZyrTEC) 10 mg tablet TAKE ONE TABLET BY MOUTH DAILY 90 tablet 0   • Cholecalciferol (VITAMIN D3) 2000 UNITS capsule Take 1 capsule by mouth 2 (two) times a day 5000 units      • Coenzyme Q10 (CO Q10) 60 MG CAPS Take 1 capsule by mouth daily     • fluocinolone (SYNALAR) 0 01 % external solution      • gabapentin (NEURONTIN) 600 MG tablet TAKE 1 TABLET BY MOUTH  TWICE DAILY 180 tablet 3   • Ginkgo Biloba 120 MG TABS Take 1 tablet by mouth daily     • hydrocortisone-pramoxine (PRAMOSONE) 1-2 5 % LOTN Apply topically 3 (three) times a day 118 mL 2   • Inositol 500 MG TABS Take 1 tablet by mouth 2 (two) times a day     • ipratropium (ATROVENT) 0 03 % nasal spray USE 2 SPRAYS IN BOTH  NOSTRILS 3 TIMES DAILY 120 mL 3   • ketoconazole (NIZORAL) 2 % shampoo      • lutein 6 mg Take 1 capsule by mouth daily     • metoprolol tartrate (LOPRESSOR) 25 mg tablet TAKE 1 TABLET BY MOUTH  EVERY 12 HOURS 180 tablet 3   • MILK THISTLE PO Take 1 tablet by mouth daily     • Misc Natural Products (SUPER SNOOZE) CAPS Take 1 capsule by mouth daily Bed time      • Multiple Vitamins-Minerals (OCUVITE-LUTEIN) CAPS Take by mouth daily      • omeprazole (PriLOSEC) 40 MG capsule TAKE 1 CAPSULE BY MOUTH  TWICE DAILY 180 capsule 3   • psyllium (METAMUCIL) 58 6 % packet Take 1 packet by mouth daily     • sildenafil (VIAGRA) 50 MG tablet Viagra 50 mg tablet   TAKE 1 TABLET BY MOUTH UP TO 4 HOURS BEFORE INTERCOURSE AND MAX OF 1 TABLET IN 24 HOURS     • Zinc 50 MG TABS Take 1 tablet by mouth daily     • mupirocin (BACTROBAN) 2 % ointment  (Patient not taking: Reported on 10/3/2022)       No current facility-administered medications for this visit  Review of Systems   Constitutional: Positive for activity change and fatigue  Negative for appetite change, diaphoresis and fever  HENT: Negative  Eyes: Negative  Respiratory: Negative for apnea, cough, chest tightness, shortness of breath and wheezing  Cardiovascular: Negative for chest pain, palpitations and leg swelling  Gastrointestinal: Negative for abdominal distention, abdominal pain, anal bleeding, constipation, diarrhea, nausea and vomiting  Endocrine: Negative for cold intolerance, heat intolerance, polydipsia, polyphagia and polyuria  Genitourinary: Negative for difficulty urinating, dysuria, flank pain, hematuria and urgency  Musculoskeletal: Positive for arthralgias, back pain and neck pain  Negative for gait problem, joint swelling and myalgias  Skin: Negative for color change, rash and wound     Allergic/Immunologic: Negative for environmental allergies, food allergies and immunocompromised state  Neurological: Negative for dizziness, seizures, syncope, speech difficulty, numbness and headaches  Hematological: Negative for adenopathy  Does not bruise/bleed easily  Psychiatric/Behavioral: Negative for agitation, behavioral problems, hallucinations, sleep disturbance and suicidal ideas  Objective:  Vitals:    04/25/23 1450   BP: 152/86   BP Location: Left arm   Patient Position: Sitting   Cuff Size: Standard   Pulse: 74   Temp: (!) 96 8 °F (36 °C)   TempSrc: Tympanic   SpO2: 99%   Weight: 78 5 kg (173 lb)   Height: 6' (1 829 m)     Body mass index is 23 46 kg/m²  Physical Exam  Constitutional:       General: He is not in acute distress  Appearance: He is well-developed  He is not diaphoretic  HENT:      Head: Normocephalic  Right Ear: External ear normal       Left Ear: External ear normal       Nose: Nose normal    Eyes:      General: No scleral icterus  Right eye: No discharge  Left eye: No discharge  Conjunctiva/sclera: Conjunctivae normal       Pupils: Pupils are equal, round, and reactive to light  Neck:      Thyroid: No thyromegaly  Trachea: No tracheal deviation  Cardiovascular:      Rate and Rhythm: Normal rate and regular rhythm  Heart sounds: Normal heart sounds  No murmur heard  No friction rub  No gallop  Pulmonary:      Effort: Pulmonary effort is normal  No respiratory distress  Breath sounds: Normal breath sounds  No wheezing  Abdominal:      General: Bowel sounds are normal       Palpations: Abdomen is soft  There is no mass  Tenderness: There is no abdominal tenderness  There is no guarding  Musculoskeletal:         General: Tenderness present  No deformity  Cervical back: Normal range of motion  Rigidity and tenderness present  Lymphadenopathy:      Cervical: No cervical adenopathy  Skin:     General: Skin is warm and dry  Findings: No erythema or rash  Neurological:      Mental Status: He is alert and oriented to person, place, and time  Cranial Nerves: No cranial nerve deficit  Psychiatric:         Thought Content:  Thought content normal

## 2023-05-01 NOTE — RESULT ENCOUNTER NOTE
Please call the patient regarding his abnormal result    His fusion of his cervical spine is stable nothing is loose or displaced he has a tremendous amount of arthritis

## 2023-05-02 DIAGNOSIS — G60.9 IDIOPATHIC PERIPHERAL NEUROPATHY: ICD-10-CM

## 2023-05-02 DIAGNOSIS — L30.9 ECZEMA, UNSPECIFIED TYPE: Primary | ICD-10-CM

## 2023-05-03 DIAGNOSIS — L30.9 ECZEMA, UNSPECIFIED TYPE: ICD-10-CM

## 2023-06-01 DIAGNOSIS — J30.1 ALLERGIC RHINITIS DUE TO POLLEN, UNSPECIFIED SEASONALITY: ICD-10-CM

## 2023-06-01 RX ORDER — CETIRIZINE HYDROCHLORIDE 10 MG/1
TABLET ORAL
Qty: 90 TABLET | Refills: 0 | Status: SHIPPED | OUTPATIENT
Start: 2023-06-01

## 2023-06-30 ENCOUNTER — APPOINTMENT (OUTPATIENT)
Dept: LAB | Facility: MEDICAL CENTER | Age: 88
End: 2023-06-30
Payer: MEDICARE

## 2023-06-30 DIAGNOSIS — K21.9 GASTROESOPHAGEAL REFLUX DISEASE WITHOUT ESOPHAGITIS: ICD-10-CM

## 2023-06-30 DIAGNOSIS — N39.0 URINARY TRACT INFECTION WITHOUT HEMATURIA, SITE UNSPECIFIED: Primary | ICD-10-CM

## 2023-06-30 LAB
BACTERIA UR QL AUTO: NORMAL /HPF
BILIRUB UR QL STRIP: NEGATIVE
CLARITY UR: CLEAR
COLOR UR: NORMAL
GLUCOSE UR STRIP-MCNC: NEGATIVE MG/DL
HGB UR QL STRIP.AUTO: NEGATIVE
KETONES UR STRIP-MCNC: NEGATIVE MG/DL
LEUKOCYTE ESTERASE UR QL STRIP: NEGATIVE
NITRITE UR QL STRIP: NEGATIVE
NON-SQ EPI CELLS URNS QL MICRO: NORMAL /HPF
PH UR STRIP.AUTO: 6.5 [PH]
PROT UR STRIP-MCNC: NEGATIVE MG/DL
RBC #/AREA URNS AUTO: NORMAL /HPF
SP GR UR STRIP.AUTO: 1.01 (ref 1–1.03)
UROBILINOGEN UR STRIP-ACNC: <2 MG/DL
WBC #/AREA URNS AUTO: NORMAL /HPF

## 2023-06-30 PROCEDURE — 87086 URINE CULTURE/COLONY COUNT: CPT | Performed by: FAMILY MEDICINE

## 2023-06-30 PROCEDURE — 81001 URINALYSIS AUTO W/SCOPE: CPT | Performed by: FAMILY MEDICINE

## 2023-06-30 RX ORDER — GABAPENTIN 600 MG/1
600 TABLET ORAL 2 TIMES DAILY
Qty: 180 TABLET | Refills: 3 | Status: SHIPPED | OUTPATIENT
Start: 2023-06-30

## 2023-06-30 RX ORDER — OMEPRAZOLE 40 MG/1
40 CAPSULE, DELAYED RELEASE ORAL 2 TIMES DAILY
Qty: 180 CAPSULE | Refills: 3 | Status: SHIPPED | OUTPATIENT
Start: 2023-06-30

## 2023-07-01 LAB — BACTERIA UR CULT: NORMAL

## 2023-07-06 ENCOUNTER — OFFICE VISIT (OUTPATIENT)
Dept: UROLOGY | Facility: CLINIC | Age: 88
End: 2023-07-06
Payer: MEDICARE

## 2023-07-06 VITALS
WEIGHT: 166 LBS | HEIGHT: 72 IN | SYSTOLIC BLOOD PRESSURE: 134 MMHG | BODY MASS INDEX: 22.48 KG/M2 | DIASTOLIC BLOOD PRESSURE: 68 MMHG | HEART RATE: 68 BPM

## 2023-07-06 DIAGNOSIS — N32.81 OAB (OVERACTIVE BLADDER): ICD-10-CM

## 2023-07-06 DIAGNOSIS — C67.8 MALIGNANT NEOPLASM OF OVERLAPPING SITES OF BLADDER (HCC): Primary | ICD-10-CM

## 2023-07-06 PROCEDURE — 99213 OFFICE O/P EST LOW 20 MIN: CPT

## 2023-07-06 RX ORDER — TROSPIUM CHLORIDE 20 MG/1
20 TABLET, FILM COATED ORAL
Qty: 30 TABLET | Refills: 1 | Status: SHIPPED | OUTPATIENT
Start: 2023-07-06 | End: 2023-07-06

## 2023-07-06 RX ORDER — TROSPIUM CHLORIDE 20 MG/1
20 TABLET, FILM COATED ORAL
Qty: 30 TABLET | Refills: 1 | Status: SHIPPED | OUTPATIENT
Start: 2023-07-06

## 2023-07-06 NOTE — PROGRESS NOTES
7/6/2023    No chief complaint on file. Assessment and Plan    82 y.o. male manage by    1. Bladder cancer  · Superficial bladder cancer with recurrence over right lateral trigone. · Last surveillance cystoscopy 7/27/2022  · We will schedule him for annual surveillance cystoscopy with Dr. Marysol Spring in the near future. 2.  Nocturia  · This has been an ongoing issue for several years and patient had previously been recommended to try Vesicare and Myrbetriq however these were too expensive. During his last office visit with Dr. Marysol Spring he was recommended to try PTNS which he is not interested in. Dr. Marysol Spring recommended against oxybutynin due to concerns for possible memory issues. · In effort to provide him with some improved quality of life I am recommending that we try him on trospium 20 mg at bedtime as this has the fewest CNS side effects. He understands that he will likely continue with daytime frequency but this is manageable. We will reassess his symptoms at the time of his cystoscopy for follow-up. Subjective:      Patient presents today with his daughter reporting overall doing well. His main concern is his continued urinary urgency, frequency, and nocturia which has been an ongoing problem for several years. He is still unsure whether he feels PTNS would be helpful to him and would like to rediscuss oral therapy. I had a lengthy discussion with both him and his daughter today regarding different treatment options related to overactive bladder. In the past Myrbetriq and Vesicare were cost prohibitive and oxybutynin was recommended to avoid due to concerns for memory issues. He is mostly bothered by his nocturia and feels that his daytime frequency is manageable. History of Present Illness  Debbie Sparrow is a 82 y.o. male here for evaluation of increase urinary urgency.      Established patient of Dr. 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 Patient was found to have superficial papillary recurrence over the right trigone during surveillance cystoscopy in July 2021. This was stable at the time of his last cystoscopy on 7/27/2022. Due to his age and other medical problems, patient and Dr. Genaro Tong decided on observation. He also has a complaint of increased urinary frequency and nocturia. He had been ordered mirabegron but this was too expensive so he was switched to Solifenacin 5 mg which was also expensive. Dr. Genaro Tong recommended PTNS as he would like to avoid use of oxybutynin due to possible memory issues. Patient was uncertain if he wanted to try PTNS and was instructed to call if he changed his mind. Review of Systems   Constitutional: Negative for chills and fever. HENT: Negative for congestion and sore throat. Respiratory: Negative for cough and shortness of breath. Cardiovascular: Negative for chest pain and leg swelling. Gastrointestinal: Negative for abdominal pain, constipation and diarrhea. Genitourinary: Positive for frequency and urgency. Negative for difficulty urinating, dysuria and hematuria. Nocturia   Musculoskeletal: Negative for back pain and gait problem. Skin: Negative for wound. Allergic/Immunologic: Negative for immunocompromised state. Hematological: Does not bruise/bleed easily. Vitals  Vitals:    07/06/23 1503   BP: 134/68   BP Location: Left arm   Patient Position: Sitting   Cuff Size: Adult   Pulse: 68   Weight: 75.3 kg (166 lb)   Height: 6' (1.829 m)       Physical Exam  Vitals reviewed. Constitutional:       General: He is not in acute distress. Appearance: Normal appearance. He is not ill-appearing or toxic-appearing. HENT:      Head: Normocephalic and atraumatic. Eyes:      General: No scleral icterus. Conjunctiva/sclera: Conjunctivae normal.   Cardiovascular:      Rate and Rhythm: Normal rate. Pulmonary:      Effort: Pulmonary effort is normal. No respiratory distress. Abdominal:      Tenderness: There is no right CVA tenderness or left CVA tenderness. Hernia: No hernia is present. Musculoskeletal:      Cervical back: Normal range of motion. Right lower leg: No edema. Left lower leg: No edema. Skin:     General: Skin is warm and dry. Coloration: Skin is not jaundiced or pale. Neurological:      General: No focal deficit present. Mental Status: He is alert and oriented to person, place, and time. Mental status is at baseline. Gait: Gait normal.   Psychiatric:         Mood and Affect: Mood normal.         Behavior: Behavior normal.         Thought Content: Thought content normal.         Judgment: Judgment normal.         Past History  Past Medical History:   Diagnosis Date   • Anemia    • Appendicitis    • Coronary artery disease    • Detached retina    • GERD (gastroesophageal reflux disease)    • Hyperlipidemia    • Hypertension    • Macular degeneration    • Neuropathy    • Pelvis fracture ([address]) 01/2021   • Von Willebrand disease ([address])      Social History     Socioeconomic History   • Marital status:      Spouse name: None   • Number of children: None   • Years of education: None   • Highest education level: None   Occupational History   • Occupation:    retired   Tobacco Use   • Smoking status: Every Day     Packs/day: 0.25     Years: 60.00     Total pack years: 15.00     Types: Pipe, Cigarettes   • Smokeless tobacco: Never   • Tobacco comments:     smokes a pipe   Vaping Use   • Vaping Use: Never used   Substance and Sexual Activity   • Alcohol use:  Yes     Alcohol/week: 2.0 standard drinks of alcohol     Types: 1 Glasses of wine, 1 Shots of liquor per week     Comment: DAILY    • Drug use: Never   • Sexual activity: None   Other Topics Concern   • None   Social History Narrative    No advance directives     Patient has living will      Social Determinants of Health     Financial Resource Strain: Low Risk  (10/3/2022)    Overall Financial Resource Strain (CARDIA)    • Difficulty of Paying Living Expenses: Not hard at all   Food Insecurity: No Food Insecurity (4/13/2021)    Hunger Vital Sign    • Worried About Running Out of Food in the Last Year: Never true    • Ran Out of Food in the Last Year: Never true   Transportation Needs: Unknown (10/3/2022)    PRAPARE - Transportation    • Lack of Transportation (Medical): Patient refused    • Lack of Transportation (Non-Medical): Patient refused   Physical Activity: Not on file   Stress: Not on file   Social Connections: Unknown (1/14/2021)    Social Connection and Isolation Panel [NHANES]    • Frequency of Communication with Friends and Family: Not on file    • Frequency of Social Gatherings with Friends and Family: Not on file    • Attends Jewish Services: Not on file    • Active Member of Clubs or Organizations: Not on file    • Attends Club or Organization Meetings: Not on file    • Marital Status: Living with partner   Intimate Partner Violence: Not on file   Housing Stability: Not on file     Social History     Tobacco Use   Smoking Status Every Day   • Packs/day: 0.25   • Years: 60.00   • Total pack years: 15.00   • Types: Pipe, Cigarettes   Smokeless Tobacco Never   Tobacco Comments    smokes a pipe     Family History   Problem Relation Age of Onset   • Ulcers Father         acute gastric   • Heart disease Mother    • Anuerysm Sister        The following portions of the patient's history were reviewed and updated as appropriate allergies, current medications, past medical history, past social history, past surgical history and problem list    Imaging:    Results  No results found for this or any previous visit (from the past 1 hour(s)).]  Lab Results   Component Value Date    PSA 1.3 08/21/2018    PSA 1.0 08/29/2017    PSA 1.1 08/18/2016     Lab Results   Component Value Date    GLUCOSE 90 07/30/2015 CALCIUM 9.0 05/22/2022     07/30/2015    K 3.9 05/22/2022    CO2 25 05/22/2022     05/22/2022    BUN 14 05/22/2022    CREATININE 1.10 05/22/2022     Lab Results   Component Value Date    WBC 10.66 (H) 05/22/2022    HGB 14.1 05/22/2022    HCT 43.1 05/22/2022    MCV 88 05/22/2022     05/22/2022       Please Note:  Voice dictation software has been used to create this document. There may be inadvertent transcriptions errors.      SUKI Henderson 07/06/23

## 2023-07-08 DIAGNOSIS — J30.1 ALLERGIC RHINITIS DUE TO POLLEN, UNSPECIFIED SEASONALITY: ICD-10-CM

## 2023-07-10 RX ORDER — IPRATROPIUM BROMIDE 21 UG/1
SPRAY, METERED NASAL
Qty: 120 ML | Refills: 3 | Status: SHIPPED | OUTPATIENT
Start: 2023-07-10

## 2023-08-31 DIAGNOSIS — J30.1 ALLERGIC RHINITIS DUE TO POLLEN, UNSPECIFIED SEASONALITY: ICD-10-CM

## 2023-09-14 ENCOUNTER — PROCEDURE VISIT (OUTPATIENT)
Dept: UROLOGY | Facility: CLINIC | Age: 88
End: 2023-09-14
Payer: MEDICARE

## 2023-09-14 VITALS
DIASTOLIC BLOOD PRESSURE: 88 MMHG | SYSTOLIC BLOOD PRESSURE: 138 MMHG | BODY MASS INDEX: 22.51 KG/M2 | HEART RATE: 74 BPM | WEIGHT: 166 LBS

## 2023-09-14 DIAGNOSIS — N52.9 ERECTILE DYSFUNCTION, UNSPECIFIED ERECTILE DYSFUNCTION TYPE: ICD-10-CM

## 2023-09-14 DIAGNOSIS — N32.81 OAB (OVERACTIVE BLADDER): ICD-10-CM

## 2023-09-14 DIAGNOSIS — C67.8 MALIGNANT NEOPLASM OF OVERLAPPING SITES OF BLADDER (HCC): Primary | ICD-10-CM

## 2023-09-14 DIAGNOSIS — R35.0 URINARY FREQUENCY: ICD-10-CM

## 2023-09-14 PROCEDURE — 99214 OFFICE O/P EST MOD 30 MIN: CPT | Performed by: UROLOGY

## 2023-09-14 PROCEDURE — 52000 CYSTOURETHROSCOPY: CPT | Performed by: UROLOGY

## 2023-09-14 RX ORDER — CEPHALEXIN 500 MG/1
500 CAPSULE ORAL ONCE
Qty: 1 CAPSULE | Refills: 0 | Status: SHIPPED | OUTPATIENT
Start: 2023-09-14 | End: 2023-09-14

## 2023-09-14 RX ORDER — CEPHALEXIN 500 MG/1
500 CAPSULE ORAL ONCE
Qty: 1 CAPSULE | Refills: 0 | Status: SHIPPED | OUTPATIENT
Start: 2023-09-14 | End: 2023-09-14 | Stop reason: SDUPTHER

## 2023-09-14 RX ORDER — TADALAFIL 20 MG/1
20 TABLET ORAL
Qty: 18 TABLET | Refills: 3 | Status: SHIPPED | OUTPATIENT
Start: 2023-09-14

## 2023-09-14 NOTE — H&P
[address]  [facility]  [address] [place] [address]  [phone]  [email or web address]      NAME: Chai Vega  AGE: 82 y.o. SEX: male  : 1935   MRN: [id number]    DATE: 2023  TIME: 3:21 PM    Assessment and Plan:    Bladder cancer with recurrence of trigone, needs cystoscopy, biopsy and fulguration in the operating room. The procedure as well as the risks of bleeding infection need for additional procedures were explained he gives informed consent. ED: Viagra not working, I sent Cialis to his pharmacy for 20 mg with instructions. Chief Complaint   No chief complaint on file. History of Present Illness   Bladder cancer surveillance:  History of low-grade papillary TCC diagnosed in 2017, here for surveillance cystoscopy. Last cystoscopy was by me 2022 which showed stable possible mild recurrence over the right lateral trigone. .  There was superficial papillary recurrence over the right trigone. Given his age and other medical problems, we decided to observe this. He saw Arminda Da Silva 2022 with increased urinary frequency and nocturia. He was ordered Mirabegron but this was too expensive so he was switched to solifenacin 5 mg which was also expensive. She prescribed behavioral changes and follow-up for cystoscopy as scheduled. Currently has q2hr nocturia-last office visit the medications were too expensive and I wished to avoid oxybutynin due to possible memory issues so I recommended PTNS. . Not as frequent during daytime. We discussed PTNS and Oxybutinin. He saw Mr. Alicia Hinojosa 2023 and with regards to nocturia he said he is not interested in PTNS. He was placed on trospium 20 mg at bedtime. He tried this for a month and it did not work. ED: Taking sildenafil 150 mg helps to some extent but "not as much as it should be". Has not tried Cialis.        Cystoscopy     Date/Time 9/14/2023 3:00 PM     Performed by  Antoine Wade MD   Authorized by SUKI Mae     Universal Protocol:  Consent: Verbal consent obtained. Written consent obtained. Procedure Details:  Procedure type: cystoscopy    Additional Procedure Details: Cystoscopy Procedure Note        Pre-operative Diagnosis: Bladder cancer surveillance    Post-operative Diagnosis: Same , with recurrence right lateral trigone    Procedure: Flexible cystoscopy    Surgeon: Gwen Bah MD    Anesthesia: 1% Xylocaine per urethra    EBL: Minimal    Complications: none    Procedure Details   The risks, benefits, complications, treatment options, and expected outcomes were discussed with the patient. The patient concurred with the proposed plan, giving informed consent. Cystoscopy was performed today under local anesthesia, using sterile technique. The patient was placed in the supine position, prepped with Betadine, and draped in the usual sterile fashion. The flexible cystocope was used to inspect both the urethra and bladder    Findings:  Urethra: Normal without stricture. Prostate nonocclusive. Bladder:  Smooth, not trabeculated and the recurrence in the right lateral trigone was more extensive superficially. Too large for me to fulgurate in the office. .  The orifices were orthotopic and intact.            Specimens: none                 Complications:  None           Disposition: To home            Condition:  Stable          The following portions of the patient's history were reviewed and updated as appropriate: allergies, current medications, past family history, past medical history, past social history, past surgical history and problem list.  Past Medical History:   Diagnosis Date   • Anemia    • Appendicitis    • Coronary artery disease    • Detached retina    • GERD (gastroesophageal reflux disease)    • Hyperlipidemia    • Hypertension    • Macular degeneration    • Neuropathy    • Pelvis fracture ([address]) 01/2021 • Von Willebrand disease [facility])      Past Surgical History:   Procedure Laterality Date   • ADENOIDECTOMY     • APPENDECTOMY     • ARTHRODESIS  01/15/2014    Lumbar    • BACK SURGERY     • CATARACT EXTRACTION     • COLONOSCOPY  12/09/2016    fiberoptic    • CYSTOSCOPY      with resection of tumor / 1/12/17, 10/2/17 / diagnostic 12/8/16, 5/30/17    • CYSTOSCOPY  09/20/2018   • CYSTOSCOPY  06/22/2020   • EGD AND COLONOSCOPY N/A 12/9/2016    Procedure: EGD AND COLONOSCOPY;  Surgeon: Parker Henriquez MD;  [facility];  Service:    • EYE SURGERY     • FRACTURE SURGERY Bilateral     ARM   • NECK SURGERY     • NEUROPLASTY / TRANSPOSITION MEDIAN NERVE AT CARPAL TUNNEL Right 04/2015   • SC BLADDER INSTILLATION ANTICARCINOGENIC AGENT N/A 1/12/2017    Procedure: Zafar Baird;  Surgeon: Bert Bowser MD;  [facility];  Service: Urology   • SC BLADDER INSTILLATION ANTICARCINOGENIC AGENT N/A 10/2/2017    Procedure: Zafar Baird;  Surgeon: Bert Bowser MD;  [facility];  Service: Urology   • SC COLONOSCOPY FLX DX W/COLLJ SPEC WHEN PFRMD N/A 2/5/2019    Procedure: COLONOSCOPY;  Surgeon: Manjula Menendez MD;  [facility];  Service: Gastroenterology   • SC CYSTO W/REMOVAL OF LESIONS SMALL N/A 1/12/2017    Procedure: CYSTOSCOPY, BLADDER BIOPSY WITH FULGRATION, 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;  Surgeon: Bert Bowser MD;  [facility];  Service: Urology   • SC CYSTO W/REMOVAL OF LESIONS SMALL N/A 10/2/2017    Procedure: CYSTOSCOPY WITH  BLADDER BIOPSIES WITH FULGURATION;  Surgeon: Bert Bowser MD;  [facility];  Service: Urology   • SC ESOPHAGOGASTRODUODENOSCOPY TRANSORAL DIAGNOSTIC N/A 8/3/2018    Procedure: ESOPHAGOGASTRODUODENOSCOPY (EGD);   Surgeon: Manjula Menendez MD;  [facility];  Service: Gastroenterology   • RETINAL DETACHMENT SURGERY Right 03/2016    complete air fill confirmed    • ROTATOR CUFF REPAIR • SHOULDER SURGERY  03/03/2015    proximal humerus fracture / LA. .. 3/6/15   R. .. 1/3/18    • TONSILECTOMY AND ADNOIDECTOMY     • TONSILLECTOMY     • TRANSURETHRAL RESECTION OF BLADDER TUMOR N/A 10/2/2017    Procedure: TRANSURETHRAL RESECTION OF BLADDER TUMOR (TURBT); Surgeon: Poly Amezcua MD;  [facility];  Service: Urology   • [facility] ANTIGEN (HISTORICAL)       shoulder  Review of Systems   Review of Systems   Constitutional: Negative for fever. Respiratory: Negative for shortness of breath. Cardiovascular: Negative for chest pain. 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)   • Cervical stenosis of spinal canal   • Spinal stenosis of lumbar region   • Spondylosis 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   • Hoarse voice quality   • Appetite loss   • Weight loss   • Gastroesophageal reflux disease without esophagitis   • Alternating constipation and diarrhea   • Weight loss, non-intentional   • Nausea   • Chronic obstructive pulmonary disease with acute exacerbation ([facility])   • Von Willebrand's disease ([address])   • Radiculopathy, lumbar region   • Fall   • Anemia   • Contusion of cerebral cortex with concussion ([address])   • Urinary frequency   • 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 Constitutional:       Appearance: Normal appearance. HENT:      Head: Normocephalic and atraumatic. Eyes:      Extraocular Movements: Extraocular movements intact.    Cardiovascular:      Rate and Rhythm: Normal rate and regular rhythm. Heart sounds: Normal heart sounds. Pulmonary:      Effort: Pulmonary effort is normal.      Breath sounds: Normal breath sounds. Abdominal:      Palpations: Abdomen is soft. Genitourinary:     Penis: Normal.    Musculoskeletal:         General: Normal range of motion. Cervical back: Normal range of motion. Right lower leg: No edema. Left lower leg: No edema. Skin:     Coloration: Skin is not jaundiced or pale. Neurological:      General: No focal deficit present. Mental Status: He is alert and oriented to person, place, and time. Mental status is at baseline. Psychiatric:         Mood and Affect: Mood normal.         Behavior: Behavior normal.         Thought Content:  Thought content normal.         Judgment: Judgment normal.             Current Medications     Current Outpatient Medications:   •  acetaminophen (TYLENOL) 325 mg tablet, Take 2 tablets (650 mg total) by mouth every 6 (six) hours as needed for mild pain, headaches or fever, Disp:  , Rfl: 0  •  Allergy Relief Cetirizine 10 MG tablet, TAKE ONE TABLET BY MOUTH DAILY, Disp: 90 tablet, Rfl: 2  •  ALPHA LIPOIC ACID-BIOTIN PO, Take by mouth, Disp: , Rfl:   •  atorvastatin (LIPITOR) 10 mg tablet, TAKE 1 TABLET BY MOUTH  EVERY OTHER DAY, Disp: 45 tablet, Rfl: 3  •  B COMPLEX VITAMINS PO, Take 1 tablet by mouth daily, Disp: , Rfl:   •  cephalexin (KEFLEX) 500 mg capsule, Take 1 capsule (500 mg total) by mouth 1 (one) time for 1 dose Take after procedure, Disp: 1 capsule, Rfl: 0  •  Cholecalciferol (VITAMIN D3) 2000 UNITS capsule, Take 1 capsule by mouth 2 (two) times a day 5000 units , Disp: , Rfl:   •  Coenzyme Q10 (CO Q10) 60 MG CAPS, Take 1 capsule by mouth daily, Disp: , Rfl:   •  fluocinolone (SYNALAR) 0.01 % external solution, , Disp: , Rfl:   •  gabapentin (NEURONTIN) 600 MG tablet, Take 1 tablet (600 mg total) by mouth 2 (two) times a day, Disp: 180 tablet, Rfl: 3  •  Ginkgo Biloba 120 MG TABS, Take 1 tablet by mouth daily, Disp: , Rfl:   •  hydrocortisone-pramoxine (PRAMOSONE) 1-2.5 % LOTN, Apply topically 3 (three) times a day, Disp: 118 mL, Rfl: 2  •  Inositol 500 MG TABS, Take 1 tablet by mouth 2 (two) times a day, Disp: , Rfl:   •  ipratropium (ATROVENT) 0.03 % nasal spray, USE 2 SPRAYS IN BOTH  NOSTRILS 3 TIMES DAILY, Disp: 120 mL, Rfl: 3  •  ketoconazole (NIZORAL) 2 % shampoo, , Disp: , Rfl:   •  lutein 6 mg, Take 1 capsule by mouth daily, Disp: , Rfl:   •  metoprolol tartrate (LOPRESSOR) 25 mg tablet, TAKE 1 TABLET BY MOUTH  EVERY 12 HOURS, Disp: 180 tablet, Rfl: 3  •  MILK THISTLE PO, Take 1 tablet by mouth daily, Disp: , Rfl:   •  Misc Natural Products (SUPER SNOOZE) CAPS, Take 1 capsule by mouth daily Bed time , Disp: , Rfl:   •  Multiple Vitamins-Minerals (OCUVITE-LUTEIN) CAPS, Take by mouth daily , Disp: , Rfl:   •  omeprazole (PriLOSEC) 40 MG capsule, Take 1 capsule (40 mg total) by mouth 2 (two) times a day, Disp: 180 capsule, Rfl: 3  •  psyllium (METAMUCIL) 58.6 % packet, Take 1 packet by mouth daily, Disp: , Rfl:   •  sildenafil (VIAGRA) 50 MG tablet, Viagra 50 mg tablet  TAKE 1 TABLET BY MOUTH UP TO 4 HOURS BEFORE INTERCOURSE AND MAX OF 1 TABLET IN 24 HOURS, Disp: , Rfl:   •  trospium chloride (SANCTURA) 20 mg tablet, Take 1 tablet (20 mg total) by mouth daily at bedtime, Disp: 30 tablet, Rfl: 1  •  Zinc 50 MG TABS, Take 1 tablet by mouth daily, Disp: , Rfl:   •  mupirocin (BACTROBAN) 2 % ointment, , Disp: , Rfl:         Zo Park MD

## 2023-09-14 NOTE — PROGRESS NOTES
[address]  [facility]  [address] [place] [address]  [phone]  [email or web address]      NAME: Greg Alvarez  AGE: 82 y.o. SEX: male  : 1935   MRN: [id number]    DATE: 2023  TIME: 3:21 PM    Assessment and Plan:    Bladder cancer with recurrence of trigone, needs cystoscopy, biopsy and fulguration in the operating room. The procedure as well as the risks of bleeding infection need for additional procedures were explained he gives informed consent. ED: Viagra not working, I sent Cialis to his pharmacy for 20 mg with instructions. Chief Complaint   No chief complaint on file. History of Present Illness   Bladder cancer surveillance:  History of low-grade papillary TCC diagnosed in 2017, here for surveillance cystoscopy. Last cystoscopy was by me 2022 which showed stable possible mild recurrence over the right lateral trigone. .  There was superficial papillary recurrence over the right trigone. Given his age and other medical problems, we decided to observe this. He saw Angélica Conde 2022 with increased urinary frequency and nocturia. He was ordered Mirabegron but this was too expensive so he was switched to solifenacin 5 mg which was also expensive. She prescribed behavioral changes and follow-up for cystoscopy as scheduled. Currently has q2hr nocturia-last office visit the medications were too expensive and I wished to avoid oxybutynin due to possible memory issues so I recommended PTNS. . Not as frequent during daytime. We discussed PTNS and Oxybutinin. He saw Mr. Mervin Moreau 2023 and with regards to nocturia he said he is not interested in PTNS. He was placed on trospium 20 mg at bedtime. He tried this for a month and it did not work. ED: Taking sildenafil 150 mg helps to some extent but "not as much as it should be". Has not tried Cialis.        Cystoscopy     Date/Time 9/14/2023 3:00 PM     Performed by  Evonnie Ahumada, MD   Authorized by SUKI Birmingham     Universal Protocol:  Consent: Verbal consent obtained. Written consent obtained. Procedure Details:  Procedure type: cystoscopy    Additional Procedure Details: Cystoscopy Procedure Note        Pre-operative Diagnosis: Bladder cancer surveillance    Post-operative Diagnosis: Same , with recurrence right lateral trigone    Procedure: Flexible cystoscopy    Surgeon: Lc Castaneda MD    Anesthesia: 1% Xylocaine per urethra    EBL: Minimal    Complications: none    Procedure Details   The risks, benefits, complications, treatment options, and expected outcomes were discussed with the patient. The patient concurred with the proposed plan, giving informed consent. Cystoscopy was performed today under local anesthesia, using sterile technique. The patient was placed in the supine position, prepped with Betadine, and draped in the usual sterile fashion. The flexible cystocope was used to inspect both the urethra and bladder    Findings:  Urethra: Normal without stricture. Prostate nonocclusive. Bladder:  Smooth, not trabeculated and the recurrence in the right lateral trigone was more extensive superficially. Too large for me to fulgurate in the office. .  The orifices were orthotopic and intact.            Specimens: none                 Complications:  None           Disposition: To home            Condition:  Stable          The following portions of the patient's history were reviewed and updated as appropriate: allergies, current medications, past family history, past medical history, past social history, past surgical history and problem list.  Past Medical History:   Diagnosis Date   • Anemia    • Appendicitis    • Coronary artery disease    • Detached retina    • GERD (gastroesophageal reflux disease)    • Hyperlipidemia    • Hypertension    • Macular degeneration    • Neuropathy    • Pelvis fracture ([address]) 01/2021 • Von Willebrand disease [facility])      Past Surgical History:   Procedure Laterality Date   • ADENOIDECTOMY     • APPENDECTOMY     • ARTHRODESIS  01/15/2014    Lumbar    • BACK SURGERY     • CATARACT EXTRACTION     • COLONOSCOPY  12/09/2016    fiberoptic    • CYSTOSCOPY      with resection of tumor / 1/12/17, 10/2/17 / diagnostic 12/8/16, 5/30/17    • CYSTOSCOPY  09/20/2018   • CYSTOSCOPY  06/22/2020   • EGD AND COLONOSCOPY N/A 12/9/2016    Procedure: EGD AND COLONOSCOPY;  Surgeon: Renae Fernandez MD;  [facility];  Service:    • EYE SURGERY     • FRACTURE SURGERY Bilateral     ARM   • NECK SURGERY     • NEUROPLASTY / TRANSPOSITION MEDIAN NERVE AT CARPAL TUNNEL Right 04/2015   • KS BLADDER INSTILLATION ANTICARCINOGENIC AGENT N/A 1/12/2017    Procedure: Gin Vargas;  Surgeon: Roxy Lira MD;  [facility];  Service: Urology   • KS BLADDER INSTILLATION ANTICARCINOGENIC AGENT N/A 10/2/2017    Procedure: Gin Vargas;  Surgeon: Roxy Lira MD;  [facility];  Service: Urology   • KS COLONOSCOPY FLX DX W/COLLJ SPEC WHEN PFRMD N/A 2/5/2019    Procedure: COLONOSCOPY;  Surgeon: Johnathan Hughes MD;  [facility];  Service: Gastroenterology   • KS CYSTO W/REMOVAL OF LESIONS SMALL N/A 1/12/2017    Procedure: CYSTOSCOPY, BLADDER BIOPSY WITH FULGRATION, 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;  Surgeon: Roxy Lira MD;  [facility];  Service: Urology   • KS CYSTO W/REMOVAL OF LESIONS SMALL N/A 10/2/2017    Procedure: CYSTOSCOPY WITH  BLADDER BIOPSIES WITH FULGURATION;  Surgeon: Roxy Lira MD;  [facility];  Service: Urology   • KS ESOPHAGOGASTRODUODENOSCOPY TRANSORAL DIAGNOSTIC N/A 8/3/2018    Procedure: ESOPHAGOGASTRODUODENOSCOPY (EGD);   Surgeon: Johnathan Hughes MD;  [facility];  Service: Gastroenterology   • RETINAL DETACHMENT SURGERY Right 03/2016    complete air fill confirmed    • ROTATOR CUFF REPAIR • SHOULDER SURGERY  03/03/2015    proximal humerus fracture / LA. .. 3/6/15   R. .. 1/3/18    • TONSILECTOMY AND ADNOIDECTOMY     • TONSILLECTOMY     • TRANSURETHRAL RESECTION OF BLADDER TUMOR N/A 10/2/2017    Procedure: TRANSURETHRAL RESECTION OF BLADDER TUMOR (TURBT); Surgeon: Griselda Pereyra MD;  [facility];  Service: Urology   • [facility] ANTIGEN (HISTORICAL)       shoulder  Review of Systems   Review of Systems   Constitutional: Negative for fever. Respiratory: Negative for shortness of breath. Cardiovascular: Negative for chest pain. 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)   • Cervical stenosis of spinal canal   • Spinal stenosis of lumbar region   • Spondylosis 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   • Hoarse voice quality   • Appetite loss   • Weight loss   • Gastroesophageal reflux disease without esophagitis   • Alternating constipation and diarrhea   • Weight loss, non-intentional   • Nausea   • Chronic obstructive pulmonary disease with acute exacerbation ([facility])   • Von Willebrand's disease ([address])   • Radiculopathy, lumbar region   • Fall   • Anemia   • Contusion of cerebral cortex with concussion ([address])   • Urinary frequency   • 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 Constitutional:       Appearance: Normal appearance. HENT:      Head: Normocephalic and atraumatic. Eyes:      Extraocular Movements: Extraocular movements intact.    Cardiovascular:      Rate and Rhythm: Normal rate and regular rhythm. Heart sounds: Normal heart sounds. Pulmonary:      Effort: Pulmonary effort is normal.      Breath sounds: Normal breath sounds. Abdominal:      Palpations: Abdomen is soft. Genitourinary:     Penis: Normal.    Musculoskeletal:         General: Normal range of motion. Cervical back: Normal range of motion. Right lower leg: No edema. Left lower leg: No edema. Skin:     Coloration: Skin is not jaundiced or pale. Neurological:      General: No focal deficit present. Mental Status: He is alert and oriented to person, place, and time. Mental status is at baseline. Psychiatric:         Mood and Affect: Mood normal.         Behavior: Behavior normal.         Thought Content:  Thought content normal.         Judgment: Judgment normal.             Current Medications     Current Outpatient Medications:   •  acetaminophen (TYLENOL) 325 mg tablet, Take 2 tablets (650 mg total) by mouth every 6 (six) hours as needed for mild pain, headaches or fever, Disp:  , Rfl: 0  •  Allergy Relief Cetirizine 10 MG tablet, TAKE ONE TABLET BY MOUTH DAILY, Disp: 90 tablet, Rfl: 2  •  ALPHA LIPOIC ACID-BIOTIN PO, Take by mouth, Disp: , Rfl:   •  atorvastatin (LIPITOR) 10 mg tablet, TAKE 1 TABLET BY MOUTH  EVERY OTHER DAY, Disp: 45 tablet, Rfl: 3  •  B COMPLEX VITAMINS PO, Take 1 tablet by mouth daily, Disp: , Rfl:   •  cephalexin (KEFLEX) 500 mg capsule, Take 1 capsule (500 mg total) by mouth 1 (one) time for 1 dose Take after procedure, Disp: 1 capsule, Rfl: 0  •  Cholecalciferol (VITAMIN D3) 2000 UNITS capsule, Take 1 capsule by mouth 2 (two) times a day 5000 units , Disp: , Rfl:   •  Coenzyme Q10 (CO Q10) 60 MG CAPS, Take 1 capsule by mouth daily, Disp: , Rfl:   •  fluocinolone (SYNALAR) 0.01 % external solution, , Disp: , Rfl:   •  gabapentin (NEURONTIN) 600 MG tablet, Take 1 tablet (600 mg total) by mouth 2 (two) times a day, Disp: 180 tablet, Rfl: 3  •  Ginkgo Biloba 120 MG TABS, Take 1 tablet by mouth daily, Disp: , Rfl:   •  hydrocortisone-pramoxine (PRAMOSONE) 1-2.5 % LOTN, Apply topically 3 (three) times a day, Disp: 118 mL, Rfl: 2  •  Inositol 500 MG TABS, Take 1 tablet by mouth 2 (two) times a day, Disp: , Rfl:   •  ipratropium (ATROVENT) 0.03 % nasal spray, USE 2 SPRAYS IN BOTH  NOSTRILS 3 TIMES DAILY, Disp: 120 mL, Rfl: 3  •  ketoconazole (NIZORAL) 2 % shampoo, , Disp: , Rfl:   •  lutein 6 mg, Take 1 capsule by mouth daily, Disp: , Rfl:   •  metoprolol tartrate (LOPRESSOR) 25 mg tablet, TAKE 1 TABLET BY MOUTH  EVERY 12 HOURS, Disp: 180 tablet, Rfl: 3  •  MILK THISTLE PO, Take 1 tablet by mouth daily, Disp: , Rfl:   •  Misc Natural Products (SUPER SNOOZE) CAPS, Take 1 capsule by mouth daily Bed time , Disp: , Rfl:   •  Multiple Vitamins-Minerals (OCUVITE-LUTEIN) CAPS, Take by mouth daily , Disp: , Rfl:   •  omeprazole (PriLOSEC) 40 MG capsule, Take 1 capsule (40 mg total) by mouth 2 (two) times a day, Disp: 180 capsule, Rfl: 3  •  psyllium (METAMUCIL) 58.6 % packet, Take 1 packet by mouth daily, Disp: , Rfl:   •  sildenafil (VIAGRA) 50 MG tablet, Viagra 50 mg tablet  TAKE 1 TABLET BY MOUTH UP TO 4 HOURS BEFORE INTERCOURSE AND MAX OF 1 TABLET IN 24 HOURS, Disp: , Rfl:   •  trospium chloride (SANCTURA) 20 mg tablet, Take 1 tablet (20 mg total) by mouth daily at bedtime, Disp: 30 tablet, Rfl: 1  •  Zinc 50 MG TABS, Take 1 tablet by mouth daily, Disp: , Rfl:   •  mupirocin (BACTROBAN) 2 % ointment, , Disp: , Rfl:         Sal Donato MD

## 2023-09-14 NOTE — LETTER
2023     Mimi Bravo, DO  [address]    Patient: Shashank Romero   YOB: 1935   Date of Visit: 2023       Dear Dr. Jason Raymundo: Thank you for referring Kennedy Garcia to me for evaluation. Below are my notes for this consultation. If you have questions, please do not hesitate to call me. I look forward to following your patient along with you. Sincerely,        Ashu Garza MD        CC: No Recipients    Ashu Garza MD  2023  3:29 PM  Sign when Signing Visit  [facility]  [address] [address]  [phone]  [email or web address]      NAME: Shashank Romero  AGE: 82 y.o. SEX: male  : 1935   MRN: [id number]    DATE: 2023  TIME: 3:21 PM    Assessment and Plan:    Bladder cancer with recurrence of trigone, needs cystoscopy, biopsy and fulguration in the operating room. The procedure as well as the risks of bleeding infection need for additional procedures were explained he gives informed consent. ED: Viagra not working, I sent Cialis to his pharmacy for 20 mg with instructions. Chief Complaint   No chief complaint on file. History of Present Illness   Bladder cancer surveillance:  History of low-grade papillary TCC diagnosed in 2017, here for surveillance cystoscopy. Last cystoscopy was by me 2022 which showed stable possible mild recurrence over the right lateral trigone. .  There was superficial papillary recurrence over the right trigone. Given his age and other medical problems, we decided to observe this. He saw Derrick Black 2022 with increased urinary frequency and nocturia. He was ordered Mirabegron but this was too expensive so he was switched to solifenacin 5 mg which was also expensive. She prescribed behavioral changes and follow-up for cystoscopy as scheduled. Currently has q2hr nocturia-last office visit the medications were too expensive and I wished to avoid oxybutynin due to possible memory issues so I recommended PTNS. . Not as frequent during daytime. We discussed PTNS and Oxybutinin. He saw Mr. Shena Link July 6, 2023 and with regards to nocturia he said he is not interested in PTNS. He was placed on trospium 20 mg at bedtime. He tried this for a month and it did not work. ED: Taking sildenafil 150 mg helps to some extent but "not as much as it should be". Has not tried Cialis. Cystoscopy     Date/Time 9/14/2023 3:00 PM     Performed by  Lencho Nguyen MD   Authorized by SUKI Joshi     Universal Protocol:  Consent: Verbal consent obtained. Written consent obtained. Procedure Details:  Procedure type: cystoscopy    Additional Procedure Details: Cystoscopy Procedure Note        Pre-operative Diagnosis: Bladder cancer surveillance    Post-operative Diagnosis: Same , with recurrence right lateral trigone    Procedure: Flexible cystoscopy    Surgeon: Demetrio Herbert MD    Anesthesia: 1% Xylocaine per urethra    EBL: Minimal    Complications: none    Procedure Details   The risks, benefits, complications, treatment options, and expected outcomes were discussed with the patient. The patient concurred with the proposed plan, giving informed consent. Cystoscopy was performed today under local anesthesia, using sterile technique. The patient was placed in the supine position, prepped with Betadine, and draped in the usual sterile fashion. The flexible cystocope was used to inspect both the urethra and bladder    Findings:  Urethra: Normal without stricture. Prostate nonocclusive. Bladder:  Smooth, not trabeculated and the recurrence in the right lateral trigone was more extensive superficially. Too large for me to fulgurate in the office. .  The orifices were orthotopic and intact.            Specimens: none                 Complications:  None           Disposition: To home            Condition:  Stable          The following portions of the patient's history were reviewed and updated as appropriate: allergies, current medications, past family history, past medical history, past social history, past surgical history and problem list.  Past Medical History:   Diagnosis Date   • Anemia    • Appendicitis    • Coronary artery disease    • Detached retina    • GERD (gastroesophageal reflux disease)    • Hyperlipidemia    • Hypertension    • Macular degeneration    • Neuropathy    • Pelvis fracture ([address]) 01/2021   • Von Willebrand disease ([address])      Past Surgical History:   Procedure Laterality Date   • ADENOIDECTOMY     • APPENDECTOMY     • ARTHRODESIS  01/15/2014    Lumbar    • BACK SURGERY     • CATARACT EXTRACTION     • COLONOSCOPY  12/09/2016    fiberoptic    • CYSTOSCOPY      with resection of tumor / 1/12/17, 10/2/17 / diagnostic 12/8/16, 5/30/17    • CYSTOSCOPY  09/20/2018   • CYSTOSCOPY  06/22/2020   • EGD AND COLONOSCOPY N/A 12/9/2016    Procedure: EGD AND COLONOSCOPY;  Surgeon: Hannah Machado MD;  Location: [facility];  Service:    • EYE SURGERY     • FRACTURE SURGERY Bilateral     ARM   • NECK SURGERY     • NEUROPLASTY / TRANSPOSITION MEDIAN NERVE AT CARPAL TUNNEL Right 04/2015   • PA BLADDER INSTILLATION ANTICARCINOGENIC AGENT N/A 1/12/2017    Procedure: INSTILLATION MYTOMYCIN;  Surgeon: Lamar Hoff MD;  Location: [facility];  Service: Urology   • PA BLADDER INSTILLATION ANTICARCINOGENIC AGENT N/A 10/2/2017    Procedure: Elaine Moses;  Surgeon: Lamar Hoff MD;  [facility]: [facility];  Service: Urology   • PA COLONOSCOPY FLX DX W/COLLJ SPEC WHEN PFRMD N/A 2/5/2019    Procedure: COLONOSCOPY;  Surgeon: Bethany Torres MD;  Location: [facility];  Service: Gastroenterology   • PA CYSTO W/REMOVAL OF LESIONS SMALL N/A 1/12/2017    Procedure: CYSTOSCOPY, BLADDER BIOPSY WITH FULGRATION, 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;  Surgeon: Tigre Erwin Kelly Koch MD;  Location: [facility];  Service: Urology   • MD CYSTO W/REMOVAL OF LESIONS SMALL N/A 10/2/2017    Procedure: CYSTOSCOPY WITH  BLADDER BIOPSIES WITH FULGURATION;  Surgeon: Poly Amezcua MD;  [facility]: [facility];  Service: Urology   • MD ESOPHAGOGASTRODUODENOSCOPY TRANSORAL DIAGNOSTIC N/A 8/3/2018    Procedure: ESOPHAGOGASTRODUODENOSCOPY (EGD); Surgeon: Jemal Mccollum MD;  Location: [facility];  Service: Gastroenterology   • RETINAL DETACHMENT SURGERY Right 03/2016    complete air fill confirmed    • ROTATOR CUFF REPAIR     • SHOULDER SURGERY  03/03/2015    proximal humerus fracture / LA. .. 3/6/15   R. .. 1/3/18    • TONSILECTOMY AND ADNOIDECTOMY     • TONSILLECTOMY     • TRANSURETHRAL RESECTION OF BLADDER TUMOR N/A 10/2/2017    Procedure: TRANSURETHRAL RESECTION OF BLADDER TUMOR (TURBT); Surgeon: Poly Amezcua MD;  Location: [facility];  Service: Urology   • [facility] ANTIGEN (HISTORICAL)       shoulder  Review of Systems   Review of Systems   Constitutional: Negative for fever. Respiratory: Negative for shortness of breath. Cardiovascular: Negative for chest pain.    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)   • Cervical stenosis of spinal canal   • Spinal stenosis of lumbar region   • Spondylosis 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   • Hoarse voice quality   • Appetite loss   • Weight loss   • Gastroesophageal reflux disease without esophagitis   • Alternating constipation and diarrhea   • Weight loss, non-intentional   • Nausea   • Chronic obstructive pulmonary disease with acute exacerbation (HCC)   • Von Willebrand's disease ([address])   • Radiculopathy, lumbar region   • Fall   • Anemia   • Contusion of cerebral cortex with concussion ([address])   • Urinary frequency   • 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 Constitutional:       Appearance: Normal appearance. HENT:      Head: Normocephalic and atraumatic. Eyes:      Extraocular Movements: Extraocular movements intact. Cardiovascular:      Rate and Rhythm: Normal rate and regular rhythm. Heart sounds: Normal heart sounds. Pulmonary:      Effort: Pulmonary effort is normal.      Breath sounds: Normal breath sounds. Abdominal:      Palpations: Abdomen is soft. Genitourinary:     Penis: Normal.    Musculoskeletal:         General: Normal range of motion. Cervical back: Normal range of motion. Right lower leg: No edema. Left lower leg: No edema. Skin:     Coloration: Skin is not jaundiced or pale. Neurological:      General: No focal deficit present. Mental Status: He is alert and oriented to person, place, and time. Mental status is at baseline. Psychiatric:         Mood and Affect: Mood normal.         Behavior: Behavior normal.         Thought Content:  Thought content normal.         Judgment: Judgment normal.             Current Medications     Current Outpatient Medications:   •  acetaminophen (TYLENOL) 325 mg tablet, Take 2 tablets (650 mg total) by mouth every 6 (six) hours as needed for mild pain, headaches or fever, Disp:  , Rfl: 0  •  Allergy Relief Cetirizine 10 MG tablet, TAKE ONE TABLET BY MOUTH DAILY, Disp: 90 tablet, Rfl: 2  •  ALPHA LIPOIC ACID-BIOTIN PO, Take by mouth, Disp: , Rfl:   •  atorvastatin (LIPITOR) 10 mg tablet, TAKE 1 TABLET BY MOUTH  EVERY OTHER DAY, Disp: 45 tablet, Rfl: 3  •  B COMPLEX VITAMINS PO, Take 1 tablet by mouth daily, Disp: , Rfl:   •  cephalexin (KEFLEX) 500 mg capsule, Take 1 capsule (500 mg total) by mouth 1 (one) time for 1 dose Take after procedure, Disp: 1 capsule, Rfl: 0  •  Cholecalciferol (VITAMIN D3) 2000 UNITS capsule, Take 1 capsule by mouth 2 (two) times a day 5000 units , Disp: , Rfl:   •  Coenzyme Q10 (CO Q10) 60 MG CAPS, Take 1 capsule by mouth daily, Disp: , Rfl:   •  fluocinolone (SYNALAR) 0.01 % external solution, , Disp: , Rfl:   •  gabapentin (NEURONTIN) 600 MG tablet, Take 1 tablet (600 mg total) by mouth 2 (two) times a day, Disp: 180 tablet, Rfl: 3  •  Ginkgo Biloba 120 MG TABS, Take 1 tablet by mouth daily, Disp: , Rfl:   •  hydrocortisone-pramoxine (PRAMOSONE) 1-2.5 % LOTN, Apply topically 3 (three) times a day, Disp: 118 mL, Rfl: 2  •  Inositol 500 MG TABS, Take 1 tablet by mouth 2 (two) times a day, Disp: , Rfl:   •  ipratropium (ATROVENT) 0.03 % nasal spray, USE 2 SPRAYS IN BOTH  NOSTRILS 3 TIMES DAILY, Disp: 120 mL, Rfl: 3  •  ketoconazole (NIZORAL) 2 % shampoo, , Disp: , Rfl:   •  lutein 6 mg, Take 1 capsule by mouth daily, Disp: , Rfl:   •  metoprolol tartrate (LOPRESSOR) 25 mg tablet, TAKE 1 TABLET BY MOUTH  EVERY 12 HOURS, Disp: 180 tablet, Rfl: 3  •  MILK THISTLE PO, Take 1 tablet by mouth daily, Disp: , Rfl:   •  Misc Natural Products (SUPER SNOOZE) CAPS, Take 1 capsule by mouth daily Bed time , Disp: , Rfl:   •  Multiple Vitamins-Minerals (OCUVITE-LUTEIN) CAPS, Take by mouth daily , Disp: , Rfl:   •  omeprazole (PriLOSEC) 40 MG capsule, Take 1 capsule (40 mg total) by mouth 2 (two) times a day, Disp: 180 capsule, Rfl: 3  •  psyllium (METAMUCIL) 58.6 % packet, Take 1 packet by mouth daily, Disp: , Rfl:   •  sildenafil (VIAGRA) 50 MG tablet, Viagra 50 mg tablet  TAKE 1 TABLET BY MOUTH UP TO 4 HOURS BEFORE INTERCOURSE AND MAX OF 1 TABLET IN 24 HOURS, Disp: , Rfl:   •  trospium chloride (SANCTURA) 20 mg tablet, Take 1 tablet (20 mg total) by mouth daily at bedtime, Disp: 30 tablet, Rfl: 1  •  Zinc 50 MG TABS, Take 1 tablet by mouth daily, Disp: , Rfl:   •  mupirocin (BACTROBAN) 2 % ointment, , Disp: , Rfl:         Sally Mayorga MD

## 2023-09-15 NOTE — TELEPHONE ENCOUNTER
Called Pt with message September 15, 2023     Patient: Harish Rodriguez   YOB: 2003   Date of Visit: 9/15/2023       To Whom it May Concern:    Harish Rodriguez was seen in my clinic on 9/15/2023. Please excuse him for school from 9/12 to 9/15.  He is able to return to school on 9/18.      If you have any questions or concerns, please don't hesitate to call.         Sincerely,          Galen Gay, DO

## 2023-09-21 ENCOUNTER — TELEPHONE (OUTPATIENT)
Dept: FAMILY MEDICINE CLINIC | Facility: CLINIC | Age: 88
End: 2023-09-21

## 2023-09-21 NOTE — TELEPHONE ENCOUNTER
Asking if he can get procedure done by Dr Perera Prim - At [address] - light Anesthesia? Dr told Pt no more surgeries at [address] [place] [address] Dr    Is it okay for him to Follow through.

## 2023-09-29 ENCOUNTER — TELEPHONE (OUTPATIENT)
Dept: UROLOGY | Facility: CLINIC | Age: 88
End: 2023-09-29

## 2023-10-02 ENCOUNTER — PREP FOR PROCEDURE (OUTPATIENT)
Dept: UROLOGY | Facility: CLINIC | Age: 88
End: 2023-10-02

## 2023-10-02 DIAGNOSIS — R39.89 SUSPECTED UTI: Primary | ICD-10-CM

## 2023-10-02 DIAGNOSIS — Z01.818 ENCOUNTER FOR PREADMISSION TESTING: ICD-10-CM

## 2023-10-02 NOTE — TELEPHONE ENCOUNTER
Pt returning missed call from [facility] and was transferred to [facility]

## 2023-10-05 ENCOUNTER — OFFICE VISIT (OUTPATIENT)
Dept: FAMILY MEDICINE CLINIC | Facility: CLINIC | Age: 88
End: 2023-10-05
Payer: MEDICARE

## 2023-10-05 ENCOUNTER — TELEPHONE (OUTPATIENT)
Dept: FAMILY MEDICINE CLINIC | Facility: CLINIC | Age: 88
End: 2023-10-05

## 2023-10-05 VITALS
HEART RATE: 60 BPM | DIASTOLIC BLOOD PRESSURE: 80 MMHG | WEIGHT: 170 LBS | BODY MASS INDEX: 23.03 KG/M2 | TEMPERATURE: 97 F | OXYGEN SATURATION: 98 % | HEIGHT: 72 IN | SYSTOLIC BLOOD PRESSURE: 130 MMHG

## 2023-10-05 DIAGNOSIS — D68.00 VON WILLEBRAND'S DISEASE (HCC): ICD-10-CM

## 2023-10-05 DIAGNOSIS — Z00.00 MEDICARE ANNUAL WELLNESS VISIT, SUBSEQUENT: ICD-10-CM

## 2023-10-05 DIAGNOSIS — R58 ECCHYMOSIS: ICD-10-CM

## 2023-10-05 DIAGNOSIS — Z23 NEED FOR INFLUENZA VACCINATION: Primary | ICD-10-CM

## 2023-10-05 DIAGNOSIS — R53.82 CHRONIC FATIGUE: ICD-10-CM

## 2023-10-05 DIAGNOSIS — H91.93 HEARING DEFICIT, BILATERAL: Primary | ICD-10-CM

## 2023-10-05 PROCEDURE — G0439 PPPS, SUBSEQ VISIT: HCPCS | Performed by: FAMILY MEDICINE

## 2023-10-05 PROCEDURE — 90662 IIV NO PRSV INCREASED AG IM: CPT | Performed by: FAMILY MEDICINE

## 2023-10-05 PROCEDURE — G0008 ADMIN INFLUENZA VIRUS VAC: HCPCS | Performed by: FAMILY MEDICINE

## 2023-10-05 NOTE — PROGRESS NOTES
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)    Chronic obstructive pulmonary disease with acute exacerbation (HCC)    Malignant neoplasm of urinary bladder, unspecified site ([facility] [address] )    [facility]'s [facility] )    Lumbar pain  -     XR spine lumbar minimum 4 views non injury; Future    Primary osteoarthritis of right knee  -     XR knee 3 vw right non injury; Future    Other orders  -     Misc Natural Products ([address]) CAPS; Take 3 capsules by mouth daily Bed time  -     Multiple Vitamins-Minerals (OCUVITE-LUTEIN) CAPS; Take by mouth daily          Subjective:      Patient ID: Iveth Garzon is a 82 y o  male  Multiple arthritic complaints multiple joints      The following portions of the patient's history were reviewed and updated as appropriate:   He  has a past medical history of Anemia, Appendicitis, Coronary artery disease, Detached retina, GERD (gastroesophageal reflux disease), Hyperlipidemia, Hypertension, Macular degeneration, Neuropathy, and Von Willebrand disease ([facility] )    He   Patient Active Problem List    Diagnosis Date Noted    Chronic obstructive pulmonary disease with acute exacerbation ([facility] ) 03/19/2019    Von [facility]illebrand's disease ([facility] ) 03/19/2019    Alternating constipation and diarrhea 08/29/2018    Weight loss, non-intentional 08/29/2018    Nausea 08/29/2018    Hoarse voice quality 08/01/2018    Appetite loss 08/01/2018    Weight loss 08/01/2018    Gastroesophageal reflux disease without esophagitis 08/01/2018    Idiopathic peripheral neuropathy 06/29/2018    Cervical stenosis of spinal canal 06/29/2018    Spinal stenosis of lumbar region 06/29/2018    Lung nodule, solitary 02/20/2018    Bladder cancer ([facility] ) 02/13/2018    High blood pressure 12/28/2016    Gait instability 05/31/2016    Erectile dysfunction 12/09/2014    Hypothyroidism 06/07/2013    Spondylosis 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 12/31/2012    Bilateral exudative age[facility]related macular [facility] 06/26/2012     He  has a past surgical history that includes Back surgery; Neck surgery; Eye surgery; Appendectomy; TONSILECTOMY AND ADNOIDECTOMY; Fracture surgery (Bilateral); VON WILLEBRAND ANTIGEN (HISTORICAL); Tonsillectomy; Cataract extraction; Rotator cuff repair; pr cystourethroscopy,fulgur <0 5 cm lesn (N/A, 1/12/2017); pr instill anticancer agent in bladder (N/A, 1/12/2017); EGD AND COLONOSCOPY (N/A, 12/9/2016); pr cystourethroscopy,fulgur <0 5 cm lesn (N/A, 10/2/2017); pr instill anticancer agent in bladder (N/A, 10/2/2017); Transurethral resection of bladder tumor (N/A, 10/2/2017); Shoulder surgery (03/03/2015); Adenoidectomy; Arthrodesis (01/15/2014); Colonoscopy (12/09/2016); Cystoscopy; Neuroplasty / transposition median nerve at carpal tunnel (Right, 04/2015); Retinal detachment surgery (Right, 03/2016); pr esophagogastroduodenoscopy transoral diagnostic (N/A, 8/3/2018); Cystoscopy (09/20/2018); and pr colonoscopy flx dx w/collj spec when pfrmd (N/A, 2/5/2019)  His family history includes Anuerysm in his sister; Heart disease in his mother; Ulcers in his father  He  reports that he has been smoking pipe  He has a 15 00 pack-year smoking history  He has never used smokeless tobacco  He reports that he drinks about 1 2 oz of alcohol per week  He reports that he does not use drugs    Current Outpatient Medications   Medication Sig Dispense Refill    atorvastatin (LIPITOR) 10 mg tablet TAKE 1 TABLET BY MOUTH  DAILY (Patient taking differently: TAKE 1 TABLET BY MOUTH  DAILY EVERY OTHER DAY) 90 tablet 2    B COMPLEX VITAMINS PO Take 1 tablet by mouth daily      cetirizine (ZyrTEC) 10 mg tablet Take 1 tablet (10 mg total) by mouth daily for 90 days 90 tablet 1    Cholecalciferol (VITAMIN D3) 2000 UNITS capsule Take 1 capsule by mouth 2 (two) times a day 5000 units       Coenzyme Q10 (CO Q10) 60 MG CAPS Take 1 capsule by mouth daily      famotidine (PEPCID) 40 MG tablet Take 1 tablet (40 mg total) by mouth daily at bedtime 90 tablet 2    Ginkgo Biloba 120 MG TABS Take 1 tablet by mouth daily      Inositol 500 MG TABS Take 1 tablet by mouth 2 (two) times a day      ipratropium (ATROVENT) 0 06 % nasal spray 2 sprays into each nostril 3 (three) times a day for 90 days 60 mL 1    lutein 6 mg Take 1 capsule by mouth daily      MILK THISTLE PO Take 1 tablet by mouth daily      Misc Natural Products (SUPER SNOOZE) CAPS Take 3 capsules by mouth daily Bed time      Multiple Vitamins-Minerals (OCUVITE-LUTEIN) CAPS Take by mouth daily      omeprazole (PriLOSEC) 40 MG capsule Take 40 mg by mouth 2 (two) times a day      sildenafil (VIAGRA) 50 MG tablet Viagra 50 mg tablet   TAKE 1 TABLET BY MOUTH UP TO 4 HOURS BEFORE INTERCOURSE AND MAX OF 1 TABLET IN 24 HOURS      desmopressin (DDAVP) 0 01 % solution 0 1 mL       No current facility-administered medications for this visit        Current Outpatient Medications on File Prior to Visit   Medication Sig    atorvastatin (LIPITOR) 10 mg tablet TAKE 1 TABLET BY MOUTH  DAILY (Patient taking differently: TAKE 1 TABLET BY MOUTH  DAILY EVERY OTHER DAY)    B COMPLEX VITAMINS PO Take 1 tablet by mouth daily    cetirizine (ZyrTEC) 10 mg tablet Take 1 tablet (10 mg total) by mouth daily for 90 days    Cholecalciferol (VITAMIN D3) 2000 UNITS capsule Take 1 capsule by mouth 2 (two) times a day 5000 units     Coenzyme Q10 (CO Q10) 60 MG CAPS Take 1 capsule by mouth daily    Ginkgo Biloba 120 MG TABS Take 1 tablet by mouth daily    Inositol 500 MG TABS Take 1 tablet by mouth 2 (two) times a day    ipratropium (ATROVENT) 0 06 % nasal spray 2 sprays into each nostril 3 (three) times a day for 90 days    lutein 6 mg Take 1 capsule by mouth daily    MILK THISTLE PO Take 1 tablet by mouth daily    Misc Natural Products (SUPER SNOOZE) CAPS Take 3 capsules by mouth daily Bed time    Multiple Vitamins-Minerals (OCUVITE-LUTEIN) CAPS Take by mouth daily    omeprazole (PriLOSEC) 40 MG capsule Take 40 mg by mouth 2 (two) times a day    sildenafil (VIAGRA) 50 MG tablet Viagra 50 mg tablet   TAKE 1 TABLET BY MOUTH UP TO 4 HOURS BEFORE INTERCOURSE AND MAX OF 1 TABLET IN 24 HOURS    [DISCONTINUED] famotidine (PEPCID) 40 MG tablet Take 1 tablet (40 mg total) by mouth daily at bedtime    desmopressin (DDAVP) 0 01 % solution 0 1 mL    [DISCONTINUED] chlorhexidine (HIBICLENS) 4 % external liquid Hibiclens 4 % topical liquid   Wash surgical site once daily for 5 days prior to surgery    [DISCONTINUED] ipratropium (ATROVENT) 0 03 % nasal spray Daily    [DISCONTINUED] Melatonin 10 MG TABS Take 1 tablet by mouth daily at bedtime    [DISCONTINUED] Misc Natural Products (ENERGY FOCUS) TABS Take 1 tablet by mouth daily    [DISCONTINUED] Na Sulfate-K Sulfate-Mg Sulf (SUPREP BOWEL PREP KIT) 17 5-3 13-1 6 GM/177ML SOLN Take as directed by the office (Patient not taking: Reported on 2/15/2019)     No current facility-administered medications on file prior to visit  He has No Known Allergies       Review of Systems   Constitutional: Positive for activity change  Negative for appetite change, diaphoresis, fatigue and fever  HENT: Negative  Eyes: Negative  Respiratory: Negative for apnea, cough, chest tightness, shortness of breath and wheezing  Cardiovascular: Negative for chest pain, palpitations and leg swelling     Gastrointestinal: Negative for abdominal distention, abdominal pain, anal bleeding, constipation, diarrhea, nausea and vomiting  Endocrine: Negative for cold intolerance, heat intolerance, polydipsia, polyphagia and polyuria  Genitourinary: Negative for difficulty urinating, dysuria, flank pain, hematuria and urgency  Musculoskeletal: Positive for arthralgias  Negative for back pain, gait problem, joint swelling and myalgias  Skin: Negative for color change, rash and wound  Allergic/Immunologic: Negative for environmental allergies, food allergies and immunocompromised state  Neurological: Negative for dizziness, seizures, syncope, speech difficulty, numbness and headaches  Hematological: Negative for adenopathy  Does not bruise/bleed easily  Psychiatric/Behavioral: Negative for agitation, behavioral problems, hallucinations, sleep disturbance and suicidal ideas  Objective:      BP (!) 182/102 (BP Location: Left arm, Patient Position: Sitting, Cuff Size: Standard)   Ht 6' (1 829 m)   Wt 73 5 kg (162 lb)   BMI 21 97 kg/m²          Physical Exam   Constitutional: He is oriented to person, place, and time  He appears well-developed and well-nourished  No distress  HENT:   Head: Normocephalic  Right Ear: External ear normal    Left Ear: External ear normal    Nose: Nose normal    Mouth/Throat: Oropharynx is clear and moist    Eyes: Pupils are equal, round, and reactive to light  Conjunctivae and EOM are normal  Right eye exhibits no discharge  Left eye exhibits no discharge  No scleral icterus  Neck: Normal range of motion  No tracheal deviation present  No thyromegaly present  Cardiovascular: Normal rate, regular rhythm and normal heart sounds  Exam reveals no gallop and no friction rub  No murmur heard  Pulmonary/Chest: Effort normal and breath sounds normal  No respiratory distress  He has no wheezes  Abdominal: Soft  Bowel sounds are normal  He exhibits no mass  There is no tenderness  There is no guarding     Musculoskeletal: He exhibits no edema or deformity  Arthritic complaints of  Multiple joints   Lymphadenopathy:     He has no cervical adenopathy  Neurological: He is alert and oriented to person, place, and time  No cranial nerve deficit  Skin: Skin is warm and dry  No rash noted  He is not diaphoretic  No erythema  Psychiatric: He has a normal mood and affect   Thought content normal  Complex Requirements: Extensive Undermining Performed?: Yes

## 2023-10-05 NOTE — PROGRESS NOTES
Assessment and Plan:     Problem List Items Addressed This Visit    None      Depression Screening and Follow-up Plan: Patient was screened for depression during today's encounter. They screened negative with a PHQ-2 score of 0. Falls Plan of Care: balance, strength, and gait training instructions were provided. Recommended assistive device to help with gait and balance. Medications that increase falls were reviewed. Vitamin D supplementation was recommended. Preventive health issues were discussed with patient, and age appropriate screening tests were ordered as noted in patient's After Visit Summary. Personalized health advice and appropriate referrals for health education or preventive services given if needed, as noted in patient's After Visit Summary. History of Present Illness:     Patient presents for a Medicare Wellness Visit    Medicare well visit today any acute illness will be addressed     Patient Care Team:  Kimberley Mitchell DO as PCP - MD Gael Alva MD Debrah Schooner, Delvis Soriano MD as Endoscopist     Review of Systems:     Review of Systems   Constitutional: Positive for activity change and fatigue. Negative for appetite change, diaphoresis and fever. HENT: Positive for dental problem and hearing loss. Eyes: Positive for visual disturbance. Mr. 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 Had a flu shot recommended covid shot also   Cardiovascular: Negative for chest pain, palpitations and leg swelling. Gastrointestinal: Negative for abdominal distention, abdominal pain, anal bleeding, constipation, diarrhea, nausea and vomiting. Endocrine: Negative for cold intolerance, heat intolerance, polydipsia, polyphagia and polyuria. Genitourinary: Negative for difficulty urinating, dysuria, flank pain, hematuria and urgency. Bladder tumor to be removed   Musculoskeletal: Positive for arthralgias, back pain and gait problem. Negative for joint swelling and myalgias. Balance dysfunction   Skin: Negative for color change, rash and wound. Allergic/Immunologic: Negative for environmental allergies, food allergies and immunocompromised state. Neurological: Positive for dizziness and weakness. Negative for seizures, syncope, speech difficulty, numbness and headaches. Hematological: Negative for adenopathy. Does not bruise/bleed easily. Psychiatric/Behavioral: Positive for sleep disturbance. Negative for agitation, behavioral problems, hallucinations and suicidal ideas.         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)   • Cervical stenosis of spinal canal   • Spinal stenosis of lumbar region   • Spondylosis 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   • Hoarse voice quality   • Appetite loss   • Weight loss   • Gastroesophageal reflux disease without esophagitis   • Alternating constipation and diarrhea   • Weight loss, non-intentional   • Nausea   • Chronic obstructive pulmonary disease with acute exacerbation (HCC)   • Von Willebrand's disease ([address])   • Radiculopathy, lumbar region   • Fall   • Anemia   • Contusion of cerebral cortex with concussion ([address])   • Urinary frequency   • 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)      Past Medical and Surgical History:     Past Medical History:   Diagnosis Date   • Anemia    • Appendicitis    • Coronary artery disease    • Detached retina    • GERD (gastroesophageal reflux disease)    • Hyperlipidemia    • Hypertension    • Macular degeneration    • Neuropathy    • Pelvis fracture ([address]) 01/2021   • Von Willebrand disease ([address])      Past Surgical History:   Procedure Laterality Date   • ADENOIDECTOMY     • APPENDECTOMY     • ARTHRODESIS  01/15/2014    Lumbar    • BACK SURGERY     • CATARACT EXTRACTION     • COLONOSCOPY  12/09/2016    fiberoptic    • CYSTOSCOPY      with resection of tumor / 1/12/17, 10/2/17 / diagnostic 12/8/16, 5/30/17    • CYSTOSCOPY  09/20/2018   • CYSTOSCOPY  06/22/2020   • EGD AND COLONOSCOPY N/A 12/9/2016    Procedure: EGD AND COLONOSCOPY;  Surgeon: Carine Taylor MD;  Location: [facility];  Service:    • EYE SURGERY     • FRACTURE SURGERY Bilateral     ARM   • NECK SURGERY     • NEUROPLASTY / TRANSPOSITION MEDIAN NERVE AT CARPAL TUNNEL Right 04/2015   • NE BLADDER INSTILLATION ANTICARCINOGENIC AGENT N/A 1/12/2017    Procedure: Annette Plater;  Surgeon: Master Welch MD;  Location: [facility];  Service: Urology   • NE BLADDER INSTILLATION ANTICARCINOGENIC AGENT N/A 10/2/2017    Procedure: Annette Plater;  Surgeon: Master Welch MD;  [facility]: [facility];  Service: Urology   • NE COLONOSCOPY FLX DX W/COLLJ SPEC WHEN PFRMD N/A 2/5/2019    Procedure: COLONOSCOPY;  Surgeon: Olimpia Armando MD;  Location: [facility];  Service: Gastroenterology   • NE CYSTO W/REMOVAL OF LESIONS SMALL N/A 1/12/2017    Procedure: CYSTOSCOPY, BLADDER BIOPSY WITH FULGRATION, 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;  Surgeon: Master Welch MD;  Location: [facility];  Service: Urology   • NE CYSTO W/REMOVAL OF LESIONS SMALL N/A 10/2/2017    Procedure: CYSTOSCOPY WITH  BLADDER BIOPSIES WITH FULGURATION;  Surgeon: Master Welch MD;  Location: [facility];  Service: Urology   • NE ESOPHAGOGASTRODUODENOSCOPY TRANSORAL DIAGNOSTIC N/A 8/3/2018    Procedure: ESOPHAGOGASTRODUODENOSCOPY (EGD);   Surgeon: Olimpia Armando MD; Location: [facility];  Service: Gastroenterology   • RETINAL DETACHMENT SURGERY Right 03/2016    complete air fill confirmed    • ROTATOR CUFF REPAIR     • SHOULDER SURGERY  03/03/2015    proximal humerus fracture / LA. .. 3/6/15   R. .. 1/3/18    • TONSILECTOMY AND ADNOIDECTOMY     • TONSILLECTOMY     • TRANSURETHRAL RESECTION OF BLADDER TUMOR N/A 10/2/2017    Procedure: TRANSURETHRAL RESECTION OF BLADDER TUMOR (TURBT); Surgeon: Susan Painter MD;  Location: [facility];  Service: Urology   • [facility] ANTIGEN (HISTORICAL)        Family History:     Family History   Problem Relation Age of Onset   • Ulcers Father         acute gastric   • Heart disease Mother    • Anuerysm Sister       Social History:     Social History     Socioeconomic History   • Marital status:      Spouse name: None   • Number of children: None   • Years of education: None   • Highest education level: None   Occupational History   • Occupation:    retired   Tobacco Use   • Smoking status: Every Day     Packs/day: 0.25     Years: 60.00     Total pack years: 15.00     Types: Pipe, Cigarettes   • Smokeless tobacco: Never   • Tobacco comments:     smokes a pipe   Vaping Use   • Vaping Use: Never used   Substance and Sexual Activity   • Alcohol use:  Yes     Alcohol/week: 2.0 standard drinks of alcohol     Types: 1 Glasses of wine, 1 Shots of liquor per week     Comment: DAILY    • Drug use: Never   • Sexual activity: None   Other Topics Concern   • None   Social History Narrative    No advance directives     Patient has living will      Social Determinants of Health     Financial Resource Strain: Low Risk  (10/3/2022)    Overall Financial Resource Strain (CARDIA)    • Difficulty of Paying Living Expenses: Not hard at all   Food Insecurity: No Food Insecurity (4/13/2021)    Hunger Vital Sign    • Worried About Running Out of Food in the Last Year: Never true    • Ran Out of Food in the Last Year: Never true   Transportation Needs: Unknown (10/3/2022)    PRAPARE - Transportation    • Lack of Transportation (Medical): Patient refused    • Lack of Transportation (Non-Medical): Patient refused   Physical Activity: Not on file   Stress: Not on file   Social Connections: Unknown (1/14/2021)    Social Connection and Isolation Panel [NHANES]    • Frequency of Communication with Friends and Family: Not on file    • Frequency of Social Gatherings with Friends and Family: Not on file    • Attends [employer] Services: Not on file    • Active Member of Clubs or Organizations: Not on file    • Attends Club or Organization Meetings: Not on file    • Marital Status: Living with partner   Intimate Partner Violence: Not on file   Housing Stability: Not on file      Medications and Allergies:     Current Outpatient Medications   Medication Sig Dispense Refill   • acetaminophen (TYLENOL) 325 mg tablet Take 2 tablets (650 mg total) by mouth every 6 (six) hours as needed for mild pain, headaches or fever  0   • Allergy Relief Cetirizine 10 MG tablet TAKE ONE TABLET BY MOUTH DAILY 90 tablet 2   • ALPHA LIPOIC ACID-BIOTIN PO Take by mouth     • atorvastatin (LIPITOR) 10 mg tablet TAKE 1 TABLET BY MOUTH  EVERY OTHER DAY 45 tablet 3   • B COMPLEX VITAMINS PO Take 1 tablet by mouth daily     • Cholecalciferol (VITAMIN D3) 2000 UNITS capsule Take 1 capsule by mouth 2 (two) times a day 5000 units      • Coenzyme Q10 (CO Q10) 60 MG CAPS Take 1 capsule by mouth daily     • fluocinolone (SYNALAR) 0.01 % external solution      • gabapentin (NEURONTIN) 600 MG tablet Take 1 tablet (600 mg total) by mouth 2 (two) times a day 180 tablet 3   • Ginkgo Biloba 120 MG TABS Take 1 tablet by mouth daily     • hydrocortisone-pramoxine (PRAMOSONE) 1-2.5 % LOTN Apply topically 3 (three) times a day 118 mL 2   • Inositol 500 MG TABS Take 1 tablet by mouth 2 (two) times a day     • ipratropium (ATROVENT) 0.03 % nasal spray USE 2 SPRAYS IN BOTH  NOSTRILS 3 TIMES DAILY 120 mL 3   • ketoconazole (NIZORAL) 2 % shampoo      • lutein 6 mg Take 1 capsule by mouth daily     • metoprolol tartrate (LOPRESSOR) 25 mg tablet TAKE 1 TABLET BY MOUTH  EVERY 12 HOURS 180 tablet 3   • MILK THISTLE PO Take 1 tablet by mouth daily     • Misc Natural Products (SUPER SNOOZE) CAPS Take 1 capsule by mouth daily Bed time      • Multiple Vitamins-Minerals (OCUVITE-LUTEIN) CAPS Take by mouth daily      • mupirocin (BACTROBAN) 2 % ointment      • omeprazole (PriLOSEC) 40 MG capsule Take 1 capsule (40 mg total) by mouth 2 (two) times a day 180 capsule 3   • psyllium (METAMUCIL) 58.6 % packet Take 1 packet by mouth daily     • tadalafil (CIALIS) 20 MG tablet Take 1 tablet (20 mg total) by mouth every other day as needed for erectile dysfunction 18 tablet 3   • trospium chloride (SANCTURA) 20 mg tablet Take 1 tablet (20 mg total) by mouth daily at bedtime 30 tablet 1   • Zinc 50 MG TABS Take 1 tablet by mouth daily     • sildenafil (VIAGRA) 50 MG tablet Viagra 50 mg tablet   TAKE 1 TABLET BY MOUTH UP TO 4 HOURS BEFORE INTERCOURSE AND MAX OF 1 TABLET IN 24 HOURS (Patient not taking: Reported on 10/5/2023)       No current facility-administered medications for this visit.      No Known Allergies   Immunizations:     Immunization History   Administered Date(s) Administered   • COVID-19 MODERNA VACC 0.5 ML IM 01/22/2021, 02/19/2021, 11/07/2021, 07/27/2022   • COVID-19 Pfizer Vac BIVALENT Todd-sucrose 12 Yr+ IM 12/17/2022   • INFLUENZA 09/30/2021, 10/03/2022   • Influenza Split High Dose Preservative Free IM 09/17/2012, 10/15/2013, 11/04/2014, 10/15/2015, 09/01/2016, 10/26/2017   • Influenza, high dose seasonal 0.7 mL 09/12/2018, 09/17/2019, 09/22/2020, 10/03/2022   • Pneumococcal Conjugate 13-Valent 12/09/2014   • Pneumococcal Conjugate Vaccine 20-valent (Pcv20), Polysace 12/17/2022   • Pneumococcal Polysaccharide PPV23 01/12/2018   • Tdap 05/22/2022   • Zoster 12/12/2014      Health Maintenance:         Topic Date Due   • Colorectal Cancer Screening  Discontinued         Topic Date Due   • COVID-19 Vaccine (6 - Moderna series) 04/17/2023   • Influenza Vaccine (1) 09/01/2023      Medicare Screening Tests and Risk Assessments:     Derek Ahmadi is here for his Subsequent Wellness visit. Health Risk Assessment:   Patient rates overall health as good. Patient feels that their physical health rating is same. Patient is satisfied with their life. Eyesight was rated as slightly worse. Hearing was rated as slightly worse. Patient feels that their emotional and mental health rating is same. Patients states they are never, rarely angry. Patient states they are never, rarely unusually tired/fatigued. Pain experienced in the last 7 days has been none. Patient states that he has experienced no weight loss or gain in last 6 months. Depression Screening:   PHQ-2 Score: 0      Fall Risk Screening: In the past year, patient has experienced: history of falling in past year    Number of falls: 2 or more  Injured during fall?: No    Feels unsteady when standing or walking?: Yes    Worried about falling?: Yes      Home Safety:  Patient does not have trouble with stairs inside or outside of their home. Patient has working smoke alarms and has working carbon monoxide detector. Home safety hazards include: none. Nutrition:   Current diet is Regular and Frequent junk food. Medications:   Patient is currently taking over-the-counter supplements. OTC medications include: see medication list. Patient is able to manage medications. Activities of Daily Living (ADLs)/Instrumental Activities of Daily Living (IADLs):   Walk and transfer into and out of bed and chair?: Yes  Dress and groom yourself?: Yes    Bathe or shower yourself?: Yes    Feed yourself?  Yes  Do your laundry/housekeeping?: No  Manage your money, pay your bills and track your expenses?: Yes  Make your own meals?: Yes    Do your own shopping?: Yes    ADL comments: Has a house keeper, has a      Previous Hospitalizations:   Any hospitalizations or ED visits within the last 12 months?: No      Advance Care Planning:   Living will: Yes    Durable POA for healthcare: Yes    Advanced directive: Yes    Advanced directive counseling given: Yes    Five wishes given: No    Patient declined ACP directive: No    End of Life Decisions reviewed with patient: Yes    Provider agrees with end of life decisions: Yes      Cognitive Screening:   Provider or family/friend/caregiver concerned regarding cognition?: No    PREVENTIVE SCREENINGS      Cardiovascular Screening:    General: Screening Not Indicated and History Lipid Disorder      Colorectal Cancer Screening:     General: Screening Not Indicated      Prostate Cancer Screening:    General: Screening Not Indicated      Abdominal Aortic Aneurysm (AAA) Screening:    Risk factors include: tobacco use        Lung Cancer Screening:     General: Screening Not Indicated      Hepatitis C Screening:    General: Screening Not Indicated    Screening, Brief Intervention, and Referral to Treatment (SBIRT)    Screening  Typical number of drinks in a day: 1  Typical number of drinks in a week: 7  Interpretation: Low risk drinking behavior. Single Item Drug Screening:  How often have you used an illegal drug (including marijuana) or a prescription medication for non-medical reasons in the past year? never    Single Item Drug Screen Score: 0  Interpretation: Negative screen for possible drug use disorder    No results found. Physical Exam:     /80 (BP Location: Left arm, Patient Position: Sitting, Cuff Size: Standard)   Pulse 60   Temp (!) 97 °F (36.1 °C) (Temporal)   Ht 6' (1.829 m)   Wt 77.1 kg (170 lb)   SpO2 98%   BMI 23.06 kg/m²     Physical Exam  Constitutional:       Appearance: He is well-developed. HENT:      Head: Normocephalic and atraumatic.       Right Ear: External ear normal.      Left Ear: External ear normal.      Nose: Nose normal.   Eyes:      Conjunctiva/sclera: Conjunctivae normal.      Pupils: Pupils are equal, round, and reactive to light. Cardiovascular:      Rate and Rhythm: Normal rate and regular rhythm. Heart sounds: Normal heart sounds. No murmur heard. No friction rub. Pulmonary:      Effort: Pulmonary effort is normal. No respiratory distress. Breath sounds: Normal breath sounds. No wheezing or rales. Chest:      Chest wall: No tenderness. Abdominal:      General: Bowel sounds are normal.      Palpations: Abdomen is soft. Musculoskeletal:         General: Normal range of motion. Cervical back: Normal range of motion and neck supple. Skin:     General: Skin is warm and dry. Capillary Refill: Capillary refill takes 2 to 3 seconds. Neurological:      Mental Status: He is alert and oriented to person, place, and time. Psychiatric:         Behavior: Behavior normal.         Thought Content:  Thought content normal.         Judgment: Judgment normal.          Jyoti James DO

## 2023-10-05 NOTE — PATIENT INSTRUCTIONS
Medicare Preventive Visit Patient Instructions  Thank you for completing your Welcome to Medicare Visit or Medicare Annual Wellness Visit today. Your next wellness visit will be due in one year (10/5/2024). The screening/preventive services that you may require over the next 5-10 years are detailed below. Some tests may not apply to you based off risk factors and/or age. Screening tests ordered at today's visit but not completed yet may show as past due. Also, please note that scanned in results may not display below. Preventive Screenings:  Service Recommendations Previous Testing/Comments   Colorectal Cancer Screening  · Colonoscopy    · Fecal Occult Blood Test (FOBT)/Fecal Immunochemical Test (FIT)  · Fecal DNA/Cologuard Test  · Flexible Sigmoidoscopy Age: 43-73 years old   Colonoscopy: every 10 years (May be performed more frequently if at higher risk)  OR  FOBT/FIT: every 1 year  OR  Cologuard: every 3 years  OR  Sigmoidoscopy: every 5 years  Screening may be recommended earlier than age 39 if at higher risk for colorectal cancer. Also, an individualized decision between you and your healthcare provider will decide whether screening between the ages of 77-80 would be appropriate.  Colonoscopy: 03/11/2022  FOBT/FIT: Not on file  Cologuard: Not on file  Sigmoidoscopy: Not on file    Screening Not Indicated     Prostate Cancer Screening Individualized decision between patient and health care provider in men between ages of 53-66   Medicare will cover every 12 months beginning on the day after your 50th birthday PSA: No results in last 5 years     Screening Not Indicated     Hepatitis C Screening Once for adults born between 1945 and 1965  More frequently in patients at high risk for Hepatitis C Hep C Antibody: Not on file        Diabetes Screening 1-2 times per year if you're at risk for diabetes or have pre-diabetes Fasting glucose: 82 mg/dL (4/16/2022)  A1C: No results in last 5 years (No results in last 5 years)      Cholesterol Screening Once every 5 years if you don't have a lipid disorder. May order more often based on risk factors. Lipid panel: 04/16/2022  Screening Not Indicated  History Lipid Disorder      Other Preventive Screenings Covered by Medicare:  1. Abdominal Aortic Aneurysm (AAA) Screening: covered once if your at risk. You're considered to be at risk if you have a family history of AAA or a male between the age of 70-76 who smoking at least 100 cigarettes in your lifetime. 2. Lung Cancer Screening: covers low dose CT scan once per year if you meet all of the following conditions: (1) Age 48-67; (2) No signs or symptoms of lung cancer; (3) Current smoker or have quit smoking within the last 15 years; (4) You have a tobacco smoking history of at least 20 pack years (packs per day x number of years you smoked); (5) You get a written order from a healthcare provider. 3. Glaucoma Screening: covered annually if you're considered high risk: (1) You have diabetes OR (2) Family history of glaucoma OR (3)  aged 48 and older OR (3)  American aged 72 and older  3. Osteoporosis Screening: covered every 2 years if you meet one of the following conditions: (1) Have a vertebral abnormality; (2) On glucocorticoid therapy for more than 3 months; (3) Have primary hyperparathyroidism; (4) On osteoporosis medications and need to assess response to drug therapy. 5. HIV Screening: covered annually if you're between the age of 14-79. Also covered annually if you are younger than 13 and older than 72 with risk factors for HIV infection. For pregnant patients, it is covered up to 3 times per pregnancy.     Immunizations:  Immunization Recommendations   Influenza Vaccine Annual influenza vaccination during flu season is recommended for all persons aged >= 6 months who do not have contraindications   Pneumococcal Vaccine   * Pneumococcal conjugate vaccine = PCV13 (Prevnar 13), PCV15 (Vaxneuvance), PCV20 (Prevnar 20)  * Pneumococcal polysaccharide vaccine = PPSV23 (Pneumovax) Adults 2364 years old: 1-3 doses may be recommended based on certain risk factors  Adults 72 years old: 1-2 doses may be recommended based off what pneumonia vaccine you previously received   Hepatitis B Vaccine 3 dose series if at intermediate or high risk (ex: diabetes, end stage renal disease, liver disease)   Tetanus (Td) Vaccine - COST NOT COVERED BY MEDICARE PART B Following completion of primary series, a booster dose should be given every 10 years to maintain immunity against tetanus. Td may also be given as tetanus wound prophylaxis. Tdap Vaccine - COST NOT COVERED BY MEDICARE PART B Recommended at least once for all adults. For pregnant patients, recommended with each pregnancy. Shingles Vaccine (Shingrix) - COST NOT COVERED BY MEDICARE PART B  2 shot series recommended in those aged 48 and above     Health Maintenance Due:      Topic Date Due   • Colorectal Cancer Screening  Discontinued     Immunizations Due:      Topic Date Due   • COVID-19 Vaccine (6 - Moderna series) 04/17/2023   • Influenza Vaccine (1) 09/01/2023     Advance Directives   What are advance directives? Advance directives are legal documents that state your wishes and plans for medical care. These plans are made ahead of time in case you lose your ability to make decisions for yourself. Advance directives can apply to any medical decision, such as the treatments you want, and if you want to donate organs. What are the types of advance directives? There are many types of advance directives, and each state has rules about how to use them. You may choose a combination of any of the following:  · Living will: This is a written record of the treatment you want. You can also choose which treatments you do not want, which to limit, and which to stop at a certain time. This includes surgery, medicine, IV fluid, and tube feedings.    · Durable power of  for [facility]): This is a written record that states who you want to make healthcare choices for you when you are unable to make them for yourself. This person, called a proxy, is usually a family member or a friend. You may choose more than 1 proxy. · Do not resuscitate (DNR) order:  A DNR order is used in case your heart stops beating or you stop breathing. It is a request not to have certain forms of treatment, such as CPR. A DNR order may be included in other types of advance directives. · Medical directive: This covers the care that you want if you are in a coma, near death, or unable to make decisions for yourself. You can list the treatments you want for each condition. Treatment may include pain medicine, surgery, blood transfusions, dialysis, IV or tube feedings, and a ventilator (breathing machine). · Values history: This document has questions about your views, beliefs, and how you feel and think about life. This information can help others choose the care that you would choose. Why are advance directives important? An advance directive helps you control your care. Although spoken wishes may be used, it is better to have your wishes written down. Spoken wishes can be misunderstood, or not followed. Treatments may be given even if you do not want them. An advance directive may make it easier for your family to make difficult choices about your care. Fall Prevention    Fall prevention  includes ways to make your home and other areas safer. It also includes ways you can move more carefully to prevent a fall. Health conditions that cause changes in your blood pressure, vision, or muscle strength and coordination may increase your risk for falls. Medicines may also increase your risk for falls if they make you dizzy, weak, or sleepy. Fall prevention tips:   · Stand or sit up slowly. · Use assistive devices as directed. · Wear shoes that fit well and have soles that . · Wear a personal alarm. · Stay active. · Manage your medical conditions. Home Safety Tips:  · Add items to prevent falls in the bathroom. · Keep paths clear. · Install bright lights in your home. · Keep items you use often on shelves within reach. · Paint or place reflective tape on the edges of your stairs. Cigarette Smoking and Your Health   Risks to your health if you smoke:  Nicotine and other chemicals found in tobacco damage every cell in your body. Even if you are a light smoker, you have an increased risk for cancer, heart disease, and lung disease. If you are pregnant or have diabetes, smoking increases your risk for complications. Benefits to your health if you stop smoking:   · You decrease respiratory symptoms such as coughing, wheezing, and shortness of breath. · You reduce your risk for cancers of the lung, mouth, throat, kidney, bladder, pancreas, stomach, and cervix. If you already have cancer, you increase the benefits of chemotherapy. You also reduce your risk for cancer returning or a second cancer from developing. · You reduce your risk for heart disease, blood clots, heart attack, and stroke. · You reduce your risk for lung infections, and diseases such as pneumonia, asthma, chronic bronchitis, and emphysema. · Your circulation improves. More oxygen can be delivered to your body. If you have diabetes, you lower your risk for complications, such as kidney, artery, and eye diseases. You also lower your risk for nerve damage. Nerve damage can lead to amputations, poor vision, and blindness. · You improve your body's ability to heal and to fight infections. For more information and support to stop smoking:   · Smokefree. gov  Phone: 2- 902 - 902-2766  Web Address: www.[employer]. [address] [address] Information is for End User's use only and may not be sold, redistributed or otherwise used for commercial purposes.  All illustrations and images included in [facility] are the copyrighted property of [employer] Inc[employer] or [address] [place] [address]

## 2023-10-09 ENCOUNTER — OFFICE VISIT (OUTPATIENT)
Dept: AUDIOLOGY | Facility: CLINIC | Age: 88
End: 2023-10-09
Payer: MEDICARE

## 2023-10-09 DIAGNOSIS — H90.3 SENSORY HEARING LOSS, BILATERAL: Primary | ICD-10-CM

## 2023-10-09 DIAGNOSIS — H91.93 HEARING DEFICIT, BILATERAL: ICD-10-CM

## 2023-10-09 PROCEDURE — 92557 COMPREHENSIVE HEARING TEST: CPT | Performed by: AUDIOLOGIST-HEARING AID FITTER

## 2023-10-09 PROCEDURE — 92567 TYMPANOMETRY: CPT | Performed by: AUDIOLOGIST-HEARING AID FITTER

## 2023-10-09 NOTE — PROGRESS NOTES
Hearing Aid Evaluation  Name:  Edna Gan  :  1935  Age:  82 y.o. Date of Evaluation: 10/09/23     Hearing Aid Evaluation:  Hearing aid styles, technology, and price were discussed with the patient. Possible hearing aid options, programs, and accessories were discussed. Pricing options and technology levels were discussed to determine the appropriate device to recommend. Recommendation/Quote: The first hearing aid recommendation is  Oticon Zircon 1 miniRITE-R. See attached quote sheet*    Selection:   The patient selected the [address] [facility] 1 miniRITE-R hearing aids. Level: -   Color:CB    size: Right 2-85 / Left 2-85   Dome size: 8mm DB   Accessories:     Patient will call with decision.         Denise Esteban, FLORY-A  Clinical Audiologist

## 2023-10-09 NOTE — PROGRESS NOTES
HEARING EVALUATION    Name:  Irene Taylor  :  1935  Age:  82 y.o. Date of Evaluation: 10/09/23     History: Difficulty Understanding  Reason for visit: Irene Taylor is being seen today at the request of Dr. Susan Kimball for an evaluation of hearing. Patient reports trouble hearing on the phone and in crowds. Reported bilateral tinnitus. Denied ear pain, aural fullness, and a history of loud noise exposure. EVALUATION:    Otoscopic Evaluation:   Right Ear: Clear and healthy ear canal and tympanic membrane   Left Ear: Clear and healthy ear canal and tympanic membrane    Tympanometry:   Right: Type A - normal middle ear pressure and compliance   Left: Type A - normal middle ear pressure and compliance    Audiogram Results:  Pure tone testing revealed a mild sloping to moderately severe sensorineural hearing loss bilaterally. SRT and PTA are in agreement indicating good test reliability. Word recognition scores were excellent bilaterally. *see attached audiogram      RECOMMENDATIONS:  Annual hearing eval, Return to [facility]. for F/U and Hearing Aid Evaluation    PATIENT EDUCATION:   Discussed results and recommendations with Mendel Hoes. Discussed hearing aids. He will call with a decision. Questions were addressed and the patient was encouraged to contact our department should concerns arise.       Denise Tafoya, CCC-A  Clinical Audiologist

## 2023-10-10 DIAGNOSIS — J30.1 ALLERGIC RHINITIS DUE TO POLLEN, UNSPECIFIED SEASONALITY: ICD-10-CM

## 2023-10-10 RX ORDER — CETIRIZINE HYDROCHLORIDE 10 MG/1
10 TABLET ORAL DAILY
Qty: 90 TABLET | Refills: 2 | Status: SHIPPED | OUTPATIENT
Start: 2023-10-10

## 2023-10-13 ENCOUNTER — OFFICE VISIT (OUTPATIENT)
Dept: FAMILY MEDICINE CLINIC | Facility: CLINIC | Age: 88
End: 2023-10-13
Payer: MEDICARE

## 2023-10-13 VITALS
HEIGHT: 72 IN | DIASTOLIC BLOOD PRESSURE: 78 MMHG | WEIGHT: 170 LBS | BODY MASS INDEX: 23.03 KG/M2 | TEMPERATURE: 97.1 F | OXYGEN SATURATION: 98 % | SYSTOLIC BLOOD PRESSURE: 142 MMHG | HEART RATE: 62 BPM

## 2023-10-13 DIAGNOSIS — R60.0 LEG EDEMA, LEFT: Primary | ICD-10-CM

## 2023-10-13 PROCEDURE — 99213 OFFICE O/P EST LOW 20 MIN: CPT | Performed by: FAMILY MEDICINE

## 2023-10-13 NOTE — PROGRESS NOTES
Assessment/Plan:    Problem List Items Addressed This Visit          Other    Leg edema, left - Primary        Diagnoses and all orders for this visit:    Leg edema, left        No problem-specific Assessment & Plan notes found for this encounter. Subjective:      Patient ID: Pooja Portillo is a 82 y.o. male. Mr. Urine of which is here with leg edema of left leg it is pitting edema he has no edema in the right leg there is no cyanosis involved with the leg it is warm to the touch        The following portions of the patient's history were reviewed and updated as appropriate:   He has a past medical history of Anemia, Appendicitis, Coronary artery disease, Detached retina, GERD (gastroesophageal reflux disease), Hyperlipidemia, Hypertension, Macular degeneration, Neuropathy, Pelvis fracture ([address]) (01/2021), and Von Willebrand disease ([address]). ,  does not have any pertinent problems on file. ,   has a past surgical history that includes Back surgery; Neck surgery; Eye surgery; Appendectomy; TONSILECTOMY AND ADNOIDECTOMY; Fracture surgery (Bilateral); VON WILLEBRAND ANTIGEN (HISTORICAL); Tonsillectomy; Cataract extraction; Rotator cuff repair; pr cysto w/removal of lesions small (N/A, 1/12/2017); pr bladder instillation anticarcinogenic agent (N/A, 1/12/2017); EGD AND COLONOSCOPY (N/A, 12/9/2016); pr cysto w/removal of lesions small (N/A, 10/2/2017); pr bladder instillation anticarcinogenic agent (N/A, 10/2/2017); Transurethral resection of bladder tumor (N/A, 10/2/2017); Shoulder surgery (03/03/2015); Adenoidectomy; Arthrodesis (01/15/2014); Colonoscopy (12/09/2016); Neuroplasty / transposition median nerve at carpal tunnel (Right, 04/2015); Retinal detachment surgery (Right, 03/2016); pr esophagogastroduodenoscopy transoral diagnostic (N/A, 8/3/2018); pr colonoscopy flx dx w/collj spec when pfrmd (N/A, 2/5/2019); Cystoscopy; Cystoscopy (09/20/2018); and Cystoscopy (06/22/2020). ,  family history includes Anuerysm in his sister; Heart disease in his mother; Ulcers in his father. ,   reports that he has been smoking pipe and cigarettes. He has a 15.00 pack-year smoking history. He has never used smokeless tobacco. He reports current alcohol use of about 2.0 standard drinks of alcohol per week. He reports that he does not use drugs. ,  has No Known Allergies. .  Current Outpatient Medications   Medication Sig Dispense Refill    acetaminophen (TYLENOL) 325 mg tablet Take 2 tablets (650 mg total) by mouth every 6 (six) hours as needed for mild pain, headaches or fever  0    ALPHA LIPOIC ACID-BIOTIN PO Take by mouth      atorvastatin (LIPITOR) 10 mg tablet TAKE 1 TABLET BY MOUTH  EVERY OTHER DAY 45 tablet 3    B COMPLEX VITAMINS PO Take 1 tablet by mouth daily      cetirizine (Allergy Relief Cetirizine) 10 mg tablet Take 1 tablet (10 mg total) by mouth daily 90 tablet 2    Cholecalciferol (VITAMIN D3) 2000 UNITS capsule Take 1 capsule by mouth 2 (two) times a day 5000 units       Coenzyme Q10 (CO Q10) 60 MG CAPS Take 1 capsule by mouth daily      fluocinolone (SYNALAR) 0.01 % external solution       gabapentin (NEURONTIN) 600 MG tablet Take 1 tablet (600 mg total) by mouth 2 (two) times a day 180 tablet 3    Ginkgo Biloba 120 MG TABS Take 1 tablet by mouth daily      hydrocortisone-pramoxine (PRAMOSONE) 1-2.5 % LOTN Apply topically 3 (three) times a day 118 mL 2    Inositol 500 MG TABS Take 1 tablet by mouth 2 (two) times a day      ipratropium (ATROVENT) 0.03 % nasal spray USE 2 SPRAYS IN BOTH  NOSTRILS 3 TIMES DAILY 120 mL 3    ketoconazole (NIZORAL) 2 % shampoo       lutein 6 mg Take 1 capsule by mouth daily      metoprolol tartrate (LOPRESSOR) 25 mg tablet TAKE 1 TABLET BY MOUTH  EVERY 12 HOURS 180 tablet 3    MILK THISTLE PO Take 1 tablet by mouth daily      Misc Natural Products (SUPER SNOOZE) CAPS Take 1 capsule by mouth daily Bed time       Multiple Vitamins-Minerals (OCUVITE-LUTEIN) CAPS Take by mouth daily mupirocin (BACTROBAN) 2 % ointment       omeprazole (PriLOSEC) 40 MG capsule Take 1 capsule (40 mg total) by mouth 2 (two) times a day 180 capsule 3    psyllium (METAMUCIL) 58.6 % packet Take 1 packet by mouth daily      sildenafil (VIAGRA) 50 MG tablet       tadalafil (CIALIS) 20 MG tablet Take 1 tablet (20 mg total) by mouth every other day as needed for erectile dysfunction 18 tablet 3    trospium chloride (SANCTURA) 20 mg tablet Take 1 tablet (20 mg total) by mouth daily at bedtime 30 tablet 1    Zinc 50 MG TABS Take 1 tablet by mouth daily       No current facility-administered medications for this visit. Review of Systems   Constitutional:  Negative for activity change, appetite change, diaphoresis, fatigue and fever. HENT: Negative. Eyes: Negative. Respiratory:  Negative for apnea, cough, chest tightness, shortness of breath and wheezing. Cardiovascular:  Positive for leg swelling. Negative for chest pain and palpitations. Gastrointestinal:  Negative for abdominal distention, abdominal pain, anal bleeding, constipation, diarrhea, nausea and vomiting. Endocrine: Negative for cold intolerance, heat intolerance, polydipsia, polyphagia and polyuria. Genitourinary:  Negative for difficulty urinating, dysuria, flank pain, hematuria and urgency. Musculoskeletal:  Negative for arthralgias, back pain, gait problem, joint swelling and myalgias. Skin:  Negative for color change, rash and wound. Allergic/Immunologic: Negative for environmental allergies, food allergies and immunocompromised state. Neurological:  Negative for dizziness, seizures, syncope, speech difficulty, numbness and headaches. Hematological:  Negative for adenopathy. Does not bruise/bleed easily. Psychiatric/Behavioral:  Negative for agitation, behavioral problems, hallucinations, sleep disturbance and suicidal ideas.           Objective:  Vitals:    10/13/23 1225   BP: 142/78   BP Location: Left arm   Patient Position: Sitting   Cuff Size: Standard   Pulse: 62   Temp: (!) 97.1 °F (36.2 °C)   TempSrc: Temporal   SpO2: 98%   Weight: 77.1 kg (170 lb)   Height: 6' (1.829 m)     Body mass index is 23.06 kg/m². Physical Exam  Constitutional:       General: He is not in acute distress. Appearance: He is well-developed. He is not diaphoretic. HENT:      Head: Normocephalic. Right Ear: External ear normal.      Left Ear: External ear normal.      Nose: Nose normal.   Eyes:      General: No scleral icterus. Right eye: No discharge. Left eye: No discharge. Conjunctiva/sclera: Conjunctivae normal.      Pupils: Pupils are equal, round, and reactive to light. Neck:      Thyroid: No thyromegaly. Trachea: No tracheal deviation. Cardiovascular:      Rate and Rhythm: Normal rate and regular rhythm. Heart sounds: Normal heart sounds. No murmur heard. No friction rub. No gallop. Pulmonary:      Effort: Pulmonary effort is normal. No respiratory distress. Breath sounds: Normal breath sounds. No wheezing. Abdominal:      General: Bowel sounds are normal.      Palpations: Abdomen is soft. There is no mass. Tenderness: There is no abdominal tenderness. There is no guarding. Musculoskeletal:         General: Swelling present. No deformity. Cervical back: Normal range of motion. Lymphadenopathy:      Cervical: No cervical adenopathy. Skin:     General: Skin is warm and dry. Findings: No erythema or rash. Neurological:      Mental Status: He is alert and oriented to person, place, and time. Cranial Nerves: No cranial nerve deficit. Psychiatric:         Thought Content:  Thought content normal.

## 2023-10-18 DIAGNOSIS — N32.81 OAB (OVERACTIVE BLADDER): ICD-10-CM

## 2023-10-18 RX ORDER — TROSPIUM CHLORIDE 20 MG/1
20 TABLET, FILM COATED ORAL
Qty: 30 TABLET | Refills: 1 | Status: SHIPPED | OUTPATIENT
Start: 2023-10-18

## 2023-10-23 ENCOUNTER — OFFICE VISIT (OUTPATIENT)
Dept: AUDIOLOGY | Facility: CLINIC | Age: 88
End: 2023-10-23
Payer: MEDICARE

## 2023-10-23 ENCOUNTER — HOSPITAL ENCOUNTER (OUTPATIENT)
Dept: NON INVASIVE DIAGNOSTICS | Facility: HOSPITAL | Age: 88
Discharge: HOME | End: 2023-10-23
Attending: FAMILY MEDICINE
Payer: MEDICARE

## 2023-10-23 DIAGNOSIS — H90.3 SENSORY HEARING LOSS, BILATERAL: Primary | ICD-10-CM

## 2023-10-23 DIAGNOSIS — R60.0 LEG EDEMA, LEFT: ICD-10-CM

## 2023-10-23 PROCEDURE — 93971 EXTREMITY STUDY: CPT

## 2023-10-23 PROCEDURE — 93971 EXTREMITY STUDY: CPT | Performed by: SURGERY

## 2023-10-23 PROCEDURE — V5261 HEARING AID, DIGIT, BIN, BTE: HCPCS | Performed by: AUDIOLOGIST-HEARING AID FITTER

## 2023-10-23 PROCEDURE — V5160 DISPENSING FEE BINAURAL: HCPCS | Performed by: AUDIOLOGIST-HEARING AID FITTER

## 2023-10-23 NOTE — PROGRESS NOTES
Progress Note    Name:  Shashank Romero  :  1935  Age:  82 y.o. Date of Evaluation: 10/23/23     Patient paid $1300.00 today.  Scheduled HAP    Denise Del Real, FLORY-A  Clinical Audiologist

## 2023-11-02 DIAGNOSIS — E78.2 MIXED HYPERLIPIDEMIA: ICD-10-CM

## 2023-11-02 RX ORDER — ATORVASTATIN CALCIUM 10 MG/1
TABLET, FILM COATED ORAL
Qty: 45 TABLET | Refills: 3 | Status: SHIPPED | OUTPATIENT
Start: 2023-11-02

## 2023-11-10 ENCOUNTER — TELEPHONE (OUTPATIENT)
Dept: FAMILY MEDICINE CLINIC | Facility: CLINIC | Age: 88
End: 2023-11-10

## 2023-11-10 NOTE — TELEPHONE ENCOUNTER
Pt keeps asking Daughter about getting a Shingrix (Zoster Vac) 2 shot series    Hx: Zoster Vax 12/12/2014    Questions:      Can he get the Shingrix 2 shot series here in the office? 2. What if he had it at a pharmacy & has no record of it anywhere, would getting it again be a second time be a problem?

## 2023-11-13 ENCOUNTER — APPOINTMENT (OUTPATIENT)
Dept: LAB | Facility: HOSPITAL | Age: 88
End: 2023-11-13
Payer: MEDICARE

## 2023-11-13 ENCOUNTER — OFFICE VISIT (OUTPATIENT)
Dept: AUDIOLOGY | Facility: CLINIC | Age: 88
End: 2023-11-13

## 2023-11-13 ENCOUNTER — OFFICE VISIT (OUTPATIENT)
Dept: LAB | Facility: HOSPITAL | Age: 88
End: 2023-11-13
Payer: MEDICARE

## 2023-11-13 DIAGNOSIS — Z01.818 ENCOUNTER FOR PREADMISSION TESTING: ICD-10-CM

## 2023-11-13 DIAGNOSIS — H90.3 SENSORY HEARING LOSS, BILATERAL: Primary | ICD-10-CM

## 2023-11-13 DIAGNOSIS — C67.8 MALIGNANT NEOPLASM OF OVERLAPPING SITES OF BLADDER (HCC): ICD-10-CM

## 2023-11-13 DIAGNOSIS — R39.89 SUSPECTED UTI: ICD-10-CM

## 2023-11-13 LAB
ANION GAP SERPL CALCULATED.3IONS-SCNC: 7 MMOL/L
BACTERIA UR QL AUTO: ABNORMAL /HPF
BASOPHILS # BLD AUTO: 0.02 THOUSANDS/ÂΜL (ref 0–0.1)
BASOPHILS NFR BLD AUTO: 0 % (ref 0–1)
BILIRUB UR QL STRIP: NEGATIVE
BUN SERPL-MCNC: 19 MG/DL (ref 5–25)
CALCIUM SERPL-MCNC: 10 MG/DL (ref 8.4–10.2)
CHLORIDE SERPL-SCNC: 101 MMOL/L (ref 96–108)
CLARITY UR: CLEAR
CO2 SERPL-SCNC: 28 MMOL/L (ref 21–32)
COLOR UR: ABNORMAL
CREAT SERPL-MCNC: 0.94 MG/DL (ref 0.6–1.3)
EOSINOPHIL # BLD AUTO: 0.12 THOUSAND/ÂΜL (ref 0–0.61)
EOSINOPHIL NFR BLD AUTO: 2 % (ref 0–6)
ERYTHROCYTE [DISTWIDTH] IN BLOOD BY AUTOMATED COUNT: 13.6 % (ref 11.6–15.1)
GFR SERPL CREATININE-BSD FRML MDRD: 73 ML/MIN/1.73SQ M
GLUCOSE SERPL-MCNC: 97 MG/DL (ref 65–140)
GLUCOSE UR STRIP-MCNC: NEGATIVE MG/DL
HCT VFR BLD AUTO: 45.6 % (ref 36.5–49.3)
HGB BLD-MCNC: 14.7 G/DL (ref 12–17)
HGB UR QL STRIP.AUTO: NEGATIVE
IMM GRANULOCYTES # BLD AUTO: 0.02 THOUSAND/UL (ref 0–0.2)
IMM GRANULOCYTES NFR BLD AUTO: 0 % (ref 0–2)
KETONES UR STRIP-MCNC: NEGATIVE MG/DL
LEUKOCYTE ESTERASE UR QL STRIP: NEGATIVE
LYMPHOCYTES # BLD AUTO: 2.24 THOUSANDS/ÂΜL (ref 0.6–4.47)
LYMPHOCYTES NFR BLD AUTO: 29 % (ref 14–44)
MCH RBC QN AUTO: 29.9 PG (ref 26.8–34.3)
MCHC RBC AUTO-ENTMCNC: 32.2 G/DL (ref 31.4–37.4)
MCV RBC AUTO: 93 FL (ref 82–98)
MONOCYTES # BLD AUTO: 0.77 THOUSAND/ÂΜL (ref 0.17–1.22)
MONOCYTES NFR BLD AUTO: 10 % (ref 4–12)
NEUTROPHILS # BLD AUTO: 4.51 THOUSANDS/ÂΜL (ref 1.85–7.62)
NEUTS SEG NFR BLD AUTO: 59 % (ref 43–75)
NITRITE UR QL STRIP: NEGATIVE
NON-SQ EPI CELLS URNS QL MICRO: ABNORMAL /HPF
NRBC BLD AUTO-RTO: 0 /100 WBCS
PH UR STRIP.AUTO: 5.5 [PH]
PLATELET # BLD AUTO: 197 THOUSANDS/UL (ref 149–390)
PMV BLD AUTO: 11 FL (ref 8.9–12.7)
POTASSIUM SERPL-SCNC: 4.4 MMOL/L (ref 3.5–5.3)
PROT UR STRIP-MCNC: NEGATIVE MG/DL
RBC # BLD AUTO: 4.91 MILLION/UL (ref 3.88–5.62)
RBC #/AREA URNS AUTO: ABNORMAL /HPF
SODIUM SERPL-SCNC: 136 MMOL/L (ref 135–147)
SP GR UR STRIP.AUTO: 1.01 (ref 1–1.03)
UROBILINOGEN UR QL STRIP.AUTO: 0.2 E.U./DL
WBC # BLD AUTO: 7.68 THOUSAND/UL (ref 4.31–10.16)
WBC #/AREA URNS AUTO: ABNORMAL /HPF

## 2023-11-13 PROCEDURE — 80048 BASIC METABOLIC PNL TOTAL CA: CPT

## 2023-11-13 PROCEDURE — 87086 URINE CULTURE/COLONY COUNT: CPT

## 2023-11-13 PROCEDURE — 36415 COLL VENOUS BLD VENIPUNCTURE: CPT

## 2023-11-13 PROCEDURE — 93005 ELECTROCARDIOGRAM TRACING: CPT

## 2023-11-13 PROCEDURE — 85025 COMPLETE CBC W/AUTO DIFF WBC: CPT

## 2023-11-13 NOTE — PROGRESS NOTES
Progress Note    Name:  Britt King  :  1935  Age:  82 y.o. Date of Evaluation: 23     Patient was seen for hearing aid fitting. Due to dexterity issues, it was decided to order custom earmolds for his miniRITE hearing aids. Hearing aids are in office in the meantime, will schedule HAP when mold are in.      [employer] [phone] Denise Cary, CCC-A  Clinical Audiologist

## 2023-11-14 LAB
ATRIAL RATE: 53 BPM
P AXIS: 68 DEGREES
PR INTERVAL: 278 MS
QRS AXIS: -71 DEGREES
QRSD INTERVAL: 148 MS
QT INTERVAL: 476 MS
QTC INTERVAL: 446 MS
T WAVE AXIS: -3 DEGREES
VENTRICULAR RATE: 53 BPM

## 2023-11-14 PROCEDURE — 93010 ELECTROCARDIOGRAM REPORT: CPT | Performed by: INTERNAL MEDICINE

## 2023-11-15 DIAGNOSIS — N32.81 OAB (OVERACTIVE BLADDER): ICD-10-CM

## 2023-11-15 LAB — BACTERIA UR CULT: NORMAL

## 2023-11-15 RX ORDER — TROSPIUM CHLORIDE 20 MG/1
TABLET, FILM COATED ORAL
Qty: 30 TABLET | Refills: 1 | Status: SHIPPED | OUTPATIENT
Start: 2023-11-15

## 2023-11-17 ENCOUNTER — HOSPITAL ENCOUNTER (OUTPATIENT)
Dept: NON INVASIVE DIAGNOSTICS | Facility: HOSPITAL | Age: 88
Discharge: HOME | End: 2023-11-17
Attending: FAMILY MEDICINE
Payer: MEDICARE

## 2023-11-17 DIAGNOSIS — R60.0 LEG EDEMA, LEFT: ICD-10-CM

## 2023-11-17 PROCEDURE — 93926 LOWER EXTREMITY STUDY: CPT

## 2023-11-18 PROCEDURE — 93922 UPR/L XTREMITY ART 2 LEVELS: CPT | Performed by: SURGERY

## 2023-11-18 PROCEDURE — 93926 LOWER EXTREMITY STUDY: CPT | Performed by: SURGERY

## 2023-11-20 DIAGNOSIS — I73.9 PERIPHERAL VASCULAR OCCLUSIVE DISEASE (HCC): Primary | ICD-10-CM

## 2023-11-20 RX ORDER — PENTOXIFYLLINE 400 MG/1
400 TABLET, EXTENDED RELEASE ORAL
Qty: 270 TABLET | Refills: 3 | Status: SHIPPED | OUTPATIENT
Start: 2023-11-20

## 2023-11-20 NOTE — RESULT ENCOUNTER NOTE
Please call the patient regarding his abnormal result.   Left leg has some narrowing of the arteries and heavy calcification of the vessels so he does have circulation issues in the leg I would suggest we start him on a medication called Trintellix to try to improve circulation

## 2023-11-20 NOTE — RESULT ENCOUNTER NOTE
Please call the patient regarding his abnormal result.   So the compression stockings do not address the issue of the arterial hardening of the arteries that is addressed with the medication

## 2023-11-24 ENCOUNTER — ANESTHESIA EVENT (OUTPATIENT)
Dept: PERIOP | Facility: HOSPITAL | Age: 88
End: 2023-11-24
Payer: MEDICARE

## 2023-11-24 NOTE — PRE-PROCEDURE INSTRUCTIONS
Pre-Surgery Instructions:   Medication Instructions    acetaminophen (TYLENOL) 325 mg tablet Uses PRN- OK to take day of surgery    ALPHA LIPOIC ACID-BIOTIN PO Stop taking 7 days prior to surgery. atorvastatin (LIPITOR) 10 mg tablet Take day of surgery. B COMPLEX VITAMINS PO Stop taking 7 days prior to surgery. cetirizine (Allergy Relief Cetirizine) 10 mg tablet Uses PRN- OK to take day of surgery    Cholecalciferol (VITAMIN D3) 2000 UNITS capsule Stop taking 7 days prior to surgery. Coenzyme Q10 (CO Q10) 60 MG CAPS Stop taking 7 days prior to surgery. fluocinolone (SYNALAR) 0.01 % external solution Hold day of surgery. gabapentin (NEURONTIN) 600 MG tablet Take day of surgery. Ginkgo Biloba 120 MG TABS Stop taking 7 days prior to surgery. hydrocortisone-pramoxine (PRAMOSONE) 1-2.5 % LOTN Hold day of surgery. Inositol 500 MG TABS Stop taking 7 days prior to surgery. ipratropium (ATROVENT) 0.03 % nasal spray Uses PRN- OK to take day of surgery    ketoconazole (NIZORAL) 2 % shampoo Hold day of surgery. lutein 6 mg Stop taking 7 days prior to surgery. metoprolol tartrate (LOPRESSOR) 25 mg tablet Take day of surgery. MILK THISTLE PO Stop taking 7 days prior to surgery. Misc Natural Products (SUPER SNOOZE) CAPS PRN HS    Multiple Vitamins-Minerals (OCUVITE-LUTEIN) CAPS Stop taking 7 days prior to surgery. mupirocin (BACTROBAN) 2 % ointment Hold day of surgery. omeprazole (PriLOSEC) 40 MG capsule Take day of surgery. pentoxifylline (TRENtal) 400 mg ER tablet New med- pt has not started - ok to take DOS    psyllium (METAMUCIL) 58.6 % packet Hold day of surgery. sildenafil (VIAGRA) 50 MG tablet Uses PRN- DO NOT take day of surgery    tadalafil (CIALIS) 20 MG tablet Uses PRN- DO NOT take day of surgery    trospium chloride (SANCTURA) 20 mg tablet Take night before surgery    Zinc 50 MG TABS Stop taking 7 days prior to surgery. Spoke with pt via phone.     Medication instructions for day surgery reviewed. Please use only a sip of water to take your instructed medications. Avoid all over the counter vitamins, supplements and NSAIDS for one week prior to surgery per anesthesia guidelines. Tylenol is ok to take as needed. You will receive a call one business day prior to surgery with an arrival time and hospital directions. If your surgery is scheduled on a Monday, the hospital will be calling you on the Friday prior to your surgery. If you have not heard from anyone by 8pm, please call the hospital supervisor through the hospital  at [phone]. Eun Villegas [phone]). Do not eat or drink anything after midnight the night before your surgery, including candy, mints, lifesavers, or chewing gum. Do not drink alcohol 24hrs before your surgery. Try not to smoke at least 24hrs before your surgery. Follow the pre surgery showering instructions as listed in the [facility] Surgical Experience Booklet” or otherwise provided by your surgeon's office. Do not use a blade to shave the surgical area 1 week before surgery. It is okay to use a clean electric clippers up to 24 hours before surgery. Do not apply any lotions, creams, including makeup, cologne, deodorant, or perfumes after showering on the day of your surgery. Do not use dry shampoo, hair spray, hair gel, or any type of hair products. No contact lenses, eye make-up, or artificial eyelashes. Remove nail polish, including gel polish, and any artificial, gel, or acrylic nails if possible. Remove all jewelry including rings and body piercing jewelry. Wear causal clothing that is easy to take on and off. Consider your type of surgery. Keep any valuables, jewelry, piercings at home. Please bring any specially ordered equipment (sling, braces) if indicated. Arrange for a responsible person to drive you to and from the hospital on the day of your surgery. Visitor Guidelines discussed.      Call the surgeon's office with any new illnesses, exposures, or additional questions prior to surgery. Please reference your [facility] Surgical Experience Booklet” for additional information to prepare for your upcoming surgery.

## 2023-11-29 NOTE — H&P
Zo Park MD  Physician  Specialty: Urology     H&P   updated by me 2023 no changes  Signed     Encounter Date: 2023     Signed         [address] [facility]  [address] [address] [place] [address]  [phone]  [email or web address]        NAME: Luisa Kiran  AGE: 82 y.o. SEX: male  : 1935          MRN: [id number]     DATE: 2023  TIME: 3:21 PM     Assessment and Plan:     Bladder cancer with recurrence of trigone, needs cystoscopy, biopsy and fulguration in the operating room. The procedure as well as the risks of bleeding infection need for additional procedures were explained he gives informed consent. ED: Viagra not working, I sent Cialis to his pharmacy for 20 mg with instructions. Chief Complaint   No chief complaint on file. History of Present Illness   Bladder cancer surveillance:  History of low-grade papillary TCC diagnosed in 2017, here for surveillance cystoscopy. Last cystoscopy was by me 2022 which showed stable possible mild recurrence over the right lateral trigone. .  There was superficial papillary recurrence over the right trigone. Given his age and other medical problems, we decided to observe this. He saw Renata Mcpherson 2022 with increased urinary frequency and nocturia. He was ordered Mirabegron but this was too expensive so he was switched to solifenacin 5 mg which was also expensive. She prescribed behavioral changes and follow-up for cystoscopy as scheduled. Currently has q2hr nocturia-last office visit the medications were too expensive and I wished to avoid oxybutynin due to possible memory issues so I recommended PTNS. . Not as frequent during daytime. We discussed PTNS and Oxybutinin. He saw Mr. Adams Milling 2023 and with regards to nocturia he said he is not interested in PTNS. He was placed on trospium 20 mg at bedtime.   He tried this for a month and it did not work. ED: Taking sildenafil 150 mg helps to some extent but "not as much as it should be". Has not tried Cialis. Cystoscopy      Date/Time 9/14/2023 3:00 PM      Performed by  Soham Uribe MD   Authorized by SUKI Valdivia     Universal Protocol:  Consent: Verbal consent obtained. Written consent obtained. Procedure Details:  Procedure type: cystoscopy    Additional Procedure Details: Cystoscopy Procedure Note           Pre-operative Diagnosis: Bladder cancer surveillance     Post-operative Diagnosis: Same , with recurrence right lateral trigone     Procedure: Flexible cystoscopy     Surgeon: Harris Mahmood MD     Anesthesia: 1% Xylocaine per urethra     EBL: Minimal     Complications: none     Procedure Details   The risks, benefits, complications, treatment options, and expected outcomes were discussed with the patient. The patient concurred with the proposed plan, giving informed consent. Cystoscopy was performed today under local anesthesia, using sterile technique. The patient was placed in the supine position, prepped with Betadine, and draped in the usual sterile fashion. The flexible cystocope was used to inspect both the urethra and bladder     Findings:  Urethra: Normal without stricture. Prostate nonocclusive. Bladder:  Smooth, not trabeculated and the recurrence in the right lateral trigone was more extensive superficially. Too large for me to fulgurate in the office. .  The orifices were orthotopic and intact.            Specimens: none                 Complications:  None           Disposition: To home            Condition:  Stable              The following portions of the patient's history were reviewed and updated as appropriate: allergies, current medications, past family history, past medical history, past social history, past surgical history and problem list.       Past Medical History:   Diagnosis Date    Anemia      Appendicitis Coronary artery disease      Detached retina      GERD (gastroesophageal reflux disease)      Hyperlipidemia      Hypertension      Macular degeneration      Neuropathy      Pelvis fracture ([address]) 01/2021    Von Willebrand disease ([address])              Past Surgical History:   Procedure Laterality Date    ADENOIDECTOMY        APPENDECTOMY        ARTHRODESIS   01/15/2014     Lumbar     BACK SURGERY        CATARACT EXTRACTION        COLONOSCOPY   12/09/2016     fiberoptic     CYSTOSCOPY         with resection of tumor / 1/12/17, 10/2/17 / diagnostic 12/8/16, 5/30/17     CYSTOSCOPY   09/20/2018    CYSTOSCOPY   06/22/2020    EGD AND COLONOSCOPY N/A 12/9/2016     Procedure: EGD AND COLONOSCOPY;  Surgeon: Sandra Wolfe MD;  Location: [facility];  Service:     EYE SURGERY        FRACTURE SURGERY Bilateral       ARM    NECK SURGERY        [facility]  [address] [facility]/2015    IN BLADDER INSTILLATION ANTICARCINOGENIC AGENT N/A 1/12/2017     Procedure: INSTILLATION MYTOMYCIN;  Surgeon: Josephine Tay MD;  Location: [facility];  Service: Urology    IN BLADDER INSTILLATION ANTICARCINOGENIC AGENT N/A 10/2/2017     Procedure: Elma Delgado;  Surgeon: Josephine Tay MD;  [facility]: [facility];  Service: Urology    IN COLONOSCOPY FLX DX W/COLLJ SPEC WHEN PFRMD N/A 2/5/2019     Procedure: COLONOSCOPY;  Surgeon: Andrade Pink MD;  Location: [facility];  Service: Gastroenterology    IN CYSTO W/REMOVAL OF LESIONS SMALL N/A 1/12/2017     Procedure: CYSTOSCOPY, BLADDER BIOPSY WITH Catheline Harsh, 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;  Surgeon: Josephine Tay MD;  [facility]: [facility];  Service: Urology    IN CYSTO W/REMOVAL OF LESIONS SMALL N/A 10/2/2017     Procedure: CYSTOSCOPY WITH  BLADDER BIOPSIES WITH FULGURATION;  Surgeon: Josephine Tay MD;  [facility];  Service: Urology    IN ESOPHAGOGASTRODUODENOSCOPY TRANSORAL DIAGNOSTIC N/A 8/3/2018 Procedure: ESOPHAGOGASTRODUODENOSCOPY (EGD); Surgeon: Ashley Allen MD;  Location: [facility];  Service: Gastroenterology    RETINAL DETACHMENT SURGERY Right 03/2016     complete air fill confirmed     [address] [facility] SURGERY   03/03/2015     proximal humerus fracture / LA. .. 3/6/15   R. .. 1/3/18     TONSILECTOMY AND ADNOIDECTOMY        TONSILLECTOMY        TRANSURETHRAL RESECTION OF BLADDER TUMOR N/A 10/2/2017     Procedure: TRANSURETHRAL RESECTION OF BLADDER TUMOR (TURBT); Surgeon: Carmen Nicole MD;  Location: [facility];  Service: Urology    [facility] ANTIGEN (HISTORICAL)          shoulder  Review of Systems   Review of Systems   Constitutional: Negative for fever. Respiratory: Negative for shortness of breath. Cardiovascular: Negative for chest pain. Genitourinary:        As per HPI         Active Problem List          Patient Active Problem List   Diagnosis    Bladder cancer ([address])    Lung nodule, solitary    Erectile dysfunction    Gait instability    High blood pressure    Hypothyroidism    Idiopathic peripheral neuropathy    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)    Cervical stenosis of spinal canal    Spinal stenosis of lumbar region    Spondylosis 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    Hoarse voice quality    Appetite loss    Weight loss    Gastroesophageal reflux disease without esophagitis    Alternating constipation and diarrhea    Weight loss, non-intentional    Nausea    Chronic obstructive pulmonary disease with acute exacerbation (HCC)    Von Willebrand's disease ([address])    Radiculopathy, lumbar region    Fall    Anemia    Contusion of cerebral cortex with concussion ([address])    Urinary frequency    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 Constitutional:       Appearance: Normal appearance. HENT:      Head: Normocephalic and atraumatic. Eyes:      Extraocular Movements: Extraocular movements intact. Cardiovascular:      Rate and Rhythm: Normal rate and regular rhythm. Heart sounds: Normal heart sounds. Pulmonary:      Effort: Pulmonary effort is normal.      Breath sounds: Normal breath sounds. Abdominal:      Palpations: Abdomen is soft. Genitourinary:     Penis: Normal.    Musculoskeletal:         General: Normal range of motion. Cervical back: Normal range of motion. Right lower leg: No edema. Left lower leg: No edema. Skin:     Coloration: Skin is not jaundiced or pale. Neurological:      General: No focal deficit present. Mental Status: He is alert and oriented to person, place, and time. Mental status is at baseline. Psychiatric:         Mood and Affect: Mood normal.         Behavior: Behavior normal.         Thought Content:  Thought content normal.         Judgment: Judgment normal.                  Current Medications      Current Outpatient Medications:     acetaminophen (TYLENOL) 325 mg tablet, Take 2 tablets (650 mg total) by mouth every 6 (six) hours as needed for mild pain, headaches or fever, Disp:  , Rfl: 0    Allergy Relief Cetirizine 10 MG tablet, TAKE ONE TABLET BY MOUTH DAILY, Disp: 90 tablet, Rfl: 2    ALPHA LIPOIC ACID-BIOTIN PO, Take by mouth, Disp: , Rfl:     atorvastatin (LIPITOR) 10 mg tablet, TAKE 1 TABLET BY MOUTH  EVERY OTHER DAY, Disp: 45 tablet, Rfl: 3    B COMPLEX VITAMINS PO, Take 1 tablet by mouth daily, Disp: , Rfl:     cephalexin (KEFLEX) 500 mg capsule, Take 1 capsule (500 mg total) by mouth 1 (one) time for 1 dose Take after procedure, Disp: 1 capsule, Rfl: 0    Cholecalciferol (VITAMIN D3) 2000 UNITS capsule, Take 1 capsule by mouth 2 (two) times a day 5000 units , Disp: , Rfl:     Coenzyme Q10 (CO Q10) 60 MG CAPS, Take 1 capsule by mouth daily, Disp: , Rfl: fluocinolone (SYNALAR) 0.01 % external solution, , Disp: , Rfl:     gabapentin (NEURONTIN) 600 MG tablet, Take 1 tablet (600 mg total) by mouth 2 (two) times a day, Disp: 180 tablet, Rfl: 3    Ginkgo Biloba 120 MG TABS, Take 1 tablet by mouth daily, Disp: , Rfl:     hydrocortisone-pramoxine (PRAMOSONE) 1-2.5 % LOTN, Apply topically 3 (three) times a day, Disp: 118 mL, Rfl: 2    Inositol 500 MG TABS, Take 1 tablet by mouth 2 (two) times a day, Disp: , Rfl:     ipratropium (ATROVENT) 0.03 % nasal spray, USE 2 SPRAYS IN BOTH  NOSTRILS 3 TIMES DAILY, Disp: 120 mL, Rfl: 3    ketoconazole (NIZORAL) 2 % shampoo, , Disp: , Rfl:     lutein 6 mg, Take 1 capsule by mouth daily, Disp: , Rfl:     metoprolol tartrate (LOPRESSOR) 25 mg tablet, TAKE 1 TABLET BY MOUTH  EVERY 12 HOURS, Disp: 180 tablet, Rfl: 3    MILK THISTLE PO, Take 1 tablet by mouth daily, Disp: , Rfl:     Misc Natural Products (SUPER SNOOZE) CAPS, Take 1 capsule by mouth daily Bed time , Disp: , Rfl:     Multiple Vitamins-Minerals (OCUVITE-LUTEIN) CAPS, Take by mouth daily , Disp: , Rfl:     omeprazole (PriLOSEC) 40 MG capsule, Take 1 capsule (40 mg total) by mouth 2 (two) times a day, Disp: 180 capsule, Rfl: 3    psyllium (METAMUCIL) 58.6 % packet, Take 1 packet by mouth daily, Disp: , Rfl:     sildenafil (VIAGRA) 50 MG tablet, Viagra 50 mg tablet  TAKE 1 TABLET BY MOUTH UP TO 4 HOURS BEFORE INTERCOURSE AND MAX OF 1 TABLET IN 24 HOURS, Disp: , Rfl:     trospium chloride (SANCTURA) 20 mg tablet, Take 1 tablet (20 mg total) by mouth daily at bedtime, Disp: 30 tablet, Rfl: 1    Zinc 50 MG TABS, Take 1 tablet by mouth daily, Disp: , Rfl:     mupirocin (BACTROBAN) 2 % ointment, , Disp: , Rfl:            Derek Kessler MD                        Electronically signed by Derek Kessler MD at 9/14/2023  3:33 PM         Procedure visit on 9/14/2023          Note shared with patient  Additional Documentation    Vitals: /88     Pulse 74     Wt 75.3 kg (166 lb)     BMI 22.51 kg/m²     BSA 1.97 m²          More Vitals   SmartForms:  [facility] PRE-CHARTING       [facility] PCMH/PCSP WRAP UP REQUIREMENTS ADVANCED   Encounter Info: Billing Info,     History,     Allergies,     Detailed Report     Orders Placed      Urinalysis with microscopic    Case request operating room: CYSTOSCOPY WITH BIOPSIES and fulguration Once  Medication Changes      Cephalexin 500 mg Oral Once, Take after procedure    Tadalafil 20 mg Oral Every 48 hours PRN  Medication List  Visit Diagnoses      Malignant neoplasm of overlapping sites of bladder ([address])    OAB (overactive bladder)    Urinary frequency    Erectile dysfunction, unspecified erectile dysfunction type  Problem List

## 2023-11-30 ENCOUNTER — ANESTHESIA (OUTPATIENT)
Dept: PERIOP | Facility: HOSPITAL | Age: 88
End: 2023-11-30
Payer: MEDICARE

## 2023-11-30 ENCOUNTER — HOSPITAL ENCOUNTER (OUTPATIENT)
Facility: HOSPITAL | Age: 88
Setting detail: OUTPATIENT SURGERY
Discharge: HOME | End: 2023-11-30
Attending: UROLOGY | Admitting: UROLOGY
Payer: MEDICARE

## 2023-11-30 VITALS
HEART RATE: 58 BPM | TEMPERATURE: 97.9 F | WEIGHT: 170 LBS | BODY MASS INDEX: 23.03 KG/M2 | HEIGHT: 72 IN | DIASTOLIC BLOOD PRESSURE: 59 MMHG | RESPIRATION RATE: 20 BRPM | SYSTOLIC BLOOD PRESSURE: 141 MMHG | OXYGEN SATURATION: 95 %

## 2023-11-30 DIAGNOSIS — C67.8 MALIGNANT NEOPLASM OF OVERLAPPING SITES OF BLADDER (HCC): ICD-10-CM

## 2023-11-30 PROCEDURE — 88305 TISSUE EXAM BY PATHOLOGIST: CPT | Performed by: PATHOLOGY

## 2023-11-30 PROCEDURE — NC001 PR NO CHARGE: Performed by: UROLOGY

## 2023-11-30 PROCEDURE — 52224 CYSTOSCOPY AND TREATMENT: CPT | Performed by: UROLOGY

## 2023-11-30 PROCEDURE — 88341 IMHCHEM/IMCYTCHM EA ADD ANTB: CPT | Performed by: PATHOLOGY

## 2023-11-30 PROCEDURE — 88342 IMHCHEM/IMCYTCHM 1ST ANTB: CPT | Performed by: PATHOLOGY

## 2023-11-30 RX ORDER — PHENAZOPYRIDINE HYDROCHLORIDE 100 MG/1
200 TABLET, FILM COATED ORAL ONCE
Status: COMPLETED | OUTPATIENT
Start: 2023-11-30 | End: 2023-11-30

## 2023-11-30 RX ORDER — FENTANYL CITRATE 50 UG/ML
INJECTION, SOLUTION INTRAMUSCULAR; INTRAVENOUS AS NEEDED
Status: DISCONTINUED | OUTPATIENT
Start: 2023-11-30 | End: 2023-11-30

## 2023-11-30 RX ORDER — CEFAZOLIN SODIUM 2 G/50ML
SOLUTION INTRAVENOUS AS NEEDED
Status: DISCONTINUED | OUTPATIENT
Start: 2023-11-30 | End: 2023-11-30

## 2023-11-30 RX ORDER — SORBITOL 30 G/1000ML
IRRIGANT IRRIGATION AS NEEDED
Status: DISCONTINUED | OUTPATIENT
Start: 2023-11-30 | End: 2023-11-30 | Stop reason: HOSPADM

## 2023-11-30 RX ORDER — METOCLOPRAMIDE HYDROCHLORIDE 5 MG/ML
10 INJECTION INTRAMUSCULAR; INTRAVENOUS ONCE AS NEEDED
Status: DISCONTINUED | OUTPATIENT
Start: 2023-11-30 | End: 2023-11-30 | Stop reason: HOSPADM

## 2023-11-30 RX ORDER — CEFAZOLIN SODIUM 2 G/50ML
2000 SOLUTION INTRAVENOUS ONCE
Status: DISCONTINUED | OUTPATIENT
Start: 2023-11-30 | End: 2023-11-30 | Stop reason: HOSPADM

## 2023-11-30 RX ORDER — PHENAZOPYRIDINE HYDROCHLORIDE 200 MG/1
200 TABLET, FILM COATED ORAL 3 TIMES DAILY PRN
Qty: 30 TABLET | Refills: 0 | Status: SHIPPED | OUTPATIENT
Start: 2023-11-30 | End: 2023-12-12 | Stop reason: ALTCHOICE

## 2023-11-30 RX ORDER — SODIUM CHLORIDE, SODIUM LACTATE, POTASSIUM CHLORIDE, CALCIUM CHLORIDE 600; 310; 30; 20 MG/100ML; MG/100ML; MG/100ML; MG/100ML
INJECTION, SOLUTION INTRAVENOUS CONTINUOUS PRN
Status: DISCONTINUED | OUTPATIENT
Start: 2023-11-30 | End: 2023-11-30

## 2023-11-30 RX ORDER — SODIUM CHLORIDE, SODIUM LACTATE, POTASSIUM CHLORIDE, CALCIUM CHLORIDE 600; 310; 30; 20 MG/100ML; MG/100ML; MG/100ML; MG/100ML
125 INJECTION, SOLUTION INTRAVENOUS CONTINUOUS
Status: DISCONTINUED | OUTPATIENT
Start: 2023-11-30 | End: 2023-11-30 | Stop reason: HOSPADM

## 2023-11-30 RX ORDER — PROPOFOL 10 MG/ML
INJECTION, EMULSION INTRAVENOUS CONTINUOUS PRN
Status: DISCONTINUED | OUTPATIENT
Start: 2023-11-30 | End: 2023-11-30

## 2023-11-30 RX ORDER — HYDROMORPHONE HCL/PF 1 MG/ML
0.5 SYRINGE (ML) INJECTION
Status: DISCONTINUED | OUTPATIENT
Start: 2023-11-30 | End: 2023-11-30 | Stop reason: HOSPADM

## 2023-11-30 RX ORDER — FENTANYL CITRATE/PF 50 MCG/ML
50 SYRINGE (ML) INJECTION
Status: DISCONTINUED | OUTPATIENT
Start: 2023-11-30 | End: 2023-11-30 | Stop reason: HOSPADM

## 2023-11-30 RX ORDER — PROPOFOL 10 MG/ML
INJECTION, EMULSION INTRAVENOUS AS NEEDED
Status: DISCONTINUED | OUTPATIENT
Start: 2023-11-30 | End: 2023-11-30

## 2023-11-30 RX ADMIN — SODIUM CHLORIDE, SODIUM LACTATE, POTASSIUM CHLORIDE, AND CALCIUM CHLORIDE 125 ML/HR: .6; .31; .03; .02 INJECTION, SOLUTION INTRAVENOUS at 08:32

## 2023-11-30 RX ADMIN — CEFAZOLIN SODIUM 2000 MG: 2 SOLUTION INTRAVENOUS at 08:57

## 2023-11-30 RX ADMIN — PROPOFOL 50 MG: 10 INJECTION, EMULSION INTRAVENOUS at 09:04

## 2023-11-30 RX ADMIN — FENTANYL CITRATE 25 MCG: 50 INJECTION, SOLUTION INTRAMUSCULAR; INTRAVENOUS at 09:07

## 2023-11-30 RX ADMIN — PHENAZOPYRIDINE 200 MG: 100 TABLET ORAL at 09:34

## 2023-11-30 RX ADMIN — PROPOFOL 100 MCG/KG/MIN: 10 INJECTION, EMULSION INTRAVENOUS at 09:04

## 2023-11-30 RX ADMIN — SODIUM CHLORIDE, SODIUM LACTATE, POTASSIUM CHLORIDE, AND CALCIUM CHLORIDE: .6; .31; .03; .02 INJECTION, SOLUTION INTRAVENOUS at 08:57

## 2023-11-30 NOTE — OP NOTE
OPERATIVE REPORT  PATIENT NAME: Ne Khan    :  1935  MRN: [id number]  Pt Location: [facility] OR ROOM 02    SURGERY DATE: 2023    Surgeon(s) and Role:     * Ria York MD - Primary    Preop Diagnosis:  Malignant neoplasm of overlapping sites of bladder ([address]) [C67.8]    Post-Op Diagnosis Codes:     * Malignant neoplasm of overlapping sites of bladder ([address]) [C67.8]    Procedure(s):  CYSTOSCOPY WITH BIOPSIES and fulguration    Specimen(s):  ID Type Source Tests Collected by Time Destination   1 : Bladder Tumor Tissue Urinary Bladder TISSUE EXAM Ria York MD 2023 [id number]        Estimated Blood Loss:   Minimal    Drains:  * No LDAs found *    Anesthesia Type:   IV Sedation with Anesthesia    Operative Indications:  Malignant neoplasm of overlapping sites of bladder ([address]) [C67.8]      Operative Findings:  1 cm tumor right lateral wall, removed with the large cup forceps and the rest fulgurated. Small papillary recurrences very superficial scraped away and fulgurated. Complications:   None    Procedure and Technique:  82-year-old man with a history of low-grade papillary transitional cell carcinoma diagnosed in 2017 found on surveillance cystoscopy 2023 with recurrence right lateral wall right lateral trigone. Too large to fulgurate in the office. Therefore he was set up for cystoscopy bladder biopsies and fulguration in the operating. The risk of bleed infection damage urinary tract and need for additional procedures explained he gives informed consent. The patient is brought to the operating identified properly. The patient was placed under IV sedation and prepped and draped in dorsolithotomy position and a timeout performed. Cystoscopy is carried out with a 22 [place] cystoscopy sheath and 30 degree lens. The urethra was normal the stricture. The prostate was nonobstructive.   The bladder was 2+ trabeculated and there were no stones but there was a papillary carpet of about 2 cm on the right lateral wall near the trigone and there was about a 1 cm tumor on the right lateral wall that looks superficial and papillary. Using the cup forceps, I removed the tumor from the right lateral wall then I took the Bugbee electrode and I fulgurated the rest.  There is no residual disease at the end of the procedure. The bladder was drained with the sheath. Hemostasis was excellent. I was present for the entire procedure. and A qualified resident physician was not available.     Patient Disposition:  PACU  and hemodynamically stable        SIGNATURE: Dez Enriquez MD  DATE: November 30, 2023  TIME: 9:24 AM

## 2023-11-30 NOTE — ANESTHESIA POSTPROCEDURE EVALUATION
Post-Op Assessment Note    CV Status:  Stable  Pain Score: 0    Pain management: adequate       Mental Status:  Alert and awake   Hydration Status:  Euvolemic   PONV Controlled:  Controlled   Airway Patency:  Patent     Post Op Vitals Reviewed: Yes    No anethesia notable event occurred.     Staff: CRNA               /55 (11/30/23 0926)    Temp 98.9 °F (37.2 °C) (11/30/23 0926)    Pulse 56 (11/30/23 0926)   Resp 15 (11/30/23 0926)    SpO2 97 % (11/30/23 0926)

## 2023-11-30 NOTE — ANESTHESIA PREPROCEDURE EVALUATION
Procedure:  CYSTOSCOPY WITH BIOPSIES and fulguration (Bladder)    Relevant Problems   CARDIO   (+) High blood pressure      ENDO   (+) Hypothyroidism      GI/HEPATIC   (+) Gastroesophageal reflux disease without esophagitis      HEMATOLOGY   (+) Anemia   (+) Von Willebrand's disease (HCC)      MUSCULOSKELETAL   (+) Spondylosis 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      PULMONARY   (+) Chronic obstructive pulmonary disease with acute exacerbation (HCC)        Physical Exam    Airway    Mallampati score: II  TM Distance: >3 FB  Neck ROM: full     Dental    upper dentures and lower dentures    Cardiovascular      Pulmonary      Other Findings    Anesthesia Plan  ASA Score- 3     Anesthesia Type- IV sedation with anesthesia with ASA Monitors. Additional Monitors:     Airway Plan:            Plan Factors-Exercise tolerance (METS): >4 METS. Chart reviewed. EKG reviewed. Existing labs reviewed. Patient summary reviewed. Patient is a current smoker. Patient instructed to abstain from smoking on day of procedure. Patient did not smoke on day of surgery. There is medical exclusion for perioperative obstructive sleep apnea risk education. Induction- intravenous. Postoperative Plan- Plan for postoperative opioid use. Informed Consent- Anesthetic plan and risks discussed with patient. I personally reviewed this patient with the CRNA. Discussed and agreed on the Anesthesia Plan with the CRNA. Latricia Kendrick

## 2023-11-30 NOTE — DISCHARGE INSTR - AVS FIRST PAGE
Expect to see blood in the urine, and to experience urgency/frequency/burning with urination and dribbling. This is normal after urological procedures. Is also normal to experience some nausea after these procedures. Go back your regular diet carefully. Call for fever greater that 101.5, inability to urinate, prolonged nausea and vomiting, or severe pain not relieved by pain medications. .     Take over the counter remedy of choice to avoid constipation. Drink plenty of fluids.

## 2023-12-01 ENCOUNTER — TELEPHONE (OUTPATIENT)
Dept: UROLOGY | Facility: CLINIC | Age: 88
End: 2023-12-01

## 2023-12-01 NOTE — TELEPHONE ENCOUNTER
Post Op Note    Shashank Romero is a 82 y.o. male s/p  CYSTOSCOPY WITH BIOPSIES and fulguration (Bladder) performed 11/30/2023. Shashank Romero is a patient of Dr. Dr. Misbah Rainey and [place] [facility] [place]. How would you rate your pain on a scale from 1 to 10, 10 being the worst pain ever? 0    Have you had a fever? No    Have your bowel movements been regular? Yes    Do you have any difficulty urinating? No    Do you have any other questions or concerns that I can address at this time? Patient doing well at this time. Reports passing blood in his urine which is expected-no clots. Advised increased hydration with water and prn pyridium for any burning with urination or spasms. Aware medication can turn urine orange in color. Reminded of scheduled path review with [place]. Misbah Rainey [place] [facility]

## 2023-12-05 ENCOUNTER — OFFICE VISIT (OUTPATIENT)
Dept: PODIATRY | Facility: CLINIC | Age: 88
End: 2023-12-05
Payer: MEDICARE

## 2023-12-05 VITALS
WEIGHT: 167.8 LBS | HEIGHT: 72 IN | BODY MASS INDEX: 22.73 KG/M2 | OXYGEN SATURATION: 96 % | SYSTOLIC BLOOD PRESSURE: 158 MMHG | HEART RATE: 51 BPM | DIASTOLIC BLOOD PRESSURE: 77 MMHG

## 2023-12-05 DIAGNOSIS — I87.2 VENOUS INSUFFICIENCY: ICD-10-CM

## 2023-12-05 DIAGNOSIS — I73.9 PVD (PERIPHERAL VASCULAR DISEASE) (HCC): Primary | ICD-10-CM

## 2023-12-05 DIAGNOSIS — I73.9 PAD (PERIPHERAL ARTERY DISEASE) (HCC): ICD-10-CM

## 2023-12-05 DIAGNOSIS — B35.1 ONYCHOMYCOSIS: ICD-10-CM

## 2023-12-05 PROCEDURE — 11721 DEBRIDE NAIL 6 OR MORE: CPT | Performed by: STUDENT IN AN ORGANIZED HEALTH CARE EDUCATION/TRAINING PROGRAM

## 2023-12-05 PROCEDURE — 88342 IMHCHEM/IMCYTCHM 1ST ANTB: CPT | Performed by: PATHOLOGY

## 2023-12-05 PROCEDURE — 88305 TISSUE EXAM BY PATHOLOGIST: CPT | Performed by: PATHOLOGY

## 2023-12-05 PROCEDURE — 88341 IMHCHEM/IMCYTCHM EA ADD ANTB: CPT | Performed by: PATHOLOGY

## 2023-12-05 PROCEDURE — 99203 OFFICE O/P NEW LOW 30 MIN: CPT | Performed by: STUDENT IN AN ORGANIZED HEALTH CARE EDUCATION/TRAINING PROGRAM

## 2023-12-05 NOTE — PROGRESS NOTES
Assessment/Plan:     Diagnoses and all orders for this visit:    PVD (peripheral vascular disease) ([address] )    Onychomycosis    Venous insufficiency  -     Ambulatory Referral to Vascular Surgery; Future    PAD (peripheral artery disease) ([address] )  -     Ambulatory Referral to Vascular Surgery; Future        IMAGING REVIEWED AT THIS VISIT:  LEADs 11/17/23: RLE 0.97/137/123. LLE diffuse dz through fem-pop w/ sig focal stenosis, heavy calcified plaque, diffuse tibioperoneal dz, 0.80/142/96. VDUS 10/23/23: neg DVT    IMPRESSION:  PVD w/ venous insufficiency (Q8) with onychomycosis   PAD LLE     PLAN:  I reviewed clinical exam with patient in detail today. I have discussed with the patient the pathophysiology of this diagnosis and reviewed how the examination correlates with this diagnosis. I reviewed PCP note from 10/13/23  I discussed mixed arterial-venous disease with patient. I did recommend vascular surgery consultation which was placed today. C/w PCP recs in meantime. We discussed at length lifestyle modifications for venous insufficiency. We recommend to not sleep in a recliner. To avoid periods of sitting with legs in a dependent position. To elevate legs and lay flat for periods in the day and at night. To take diuretics as directed. Also recommend weight loss, increased ambulation, increased activity, and monitor diet especially sodium intake. To use compression stockings. Foot exam performed as below. Patient has significant lower extremity risk due to diminished pulses in the feet and trophic skin changes to the lower extremity including thick toenail, atrophic skin, and decreased hair growth. Debridement of toenails x10. Using nail nipper, steffanie, and curette, nails were sharply debrided, reduced in thickness and length. Devitalized nail tissue and fungal debris excised and removed. Patient tolerated well. Discussed proper shoe gear, daily inspections of feet, and general foot health with patient. Patient has Q8 findings and is recommended for at risk foot care every 9-10 weeks. Subjective:      Patient ID: Kim Jaffe is a 82 y.o. male. Raudel Johnson presents to clinic today concerning assistance with nail care. Nails are long and thick and he cannot reach to cut them. The following portions of the patient's history were reviewed and updated as appropriate: allergies, current medications, past family history, past medical history, past social history, past surgical history, and problem list.    Review of Systems   Constitutional:  Negative for activity change, chills and fever. HENT: Negative. Respiratory:  Negative for cough, chest tightness and shortness of breath. Cardiovascular:  Negative for chest pain and leg swelling. Endocrine: Negative. Genitourinary: Negative. Skin:         Toenail dystrophy   Neurological: Negative. Negative for numbness. Psychiatric/Behavioral: Negative. Negative for agitation and behavioral problems. Objective:      /77   Pulse (!) 51   Ht 6' (1.829 m)   Wt 76.1 kg (167 lb 12.8 oz)   SpO2 96%   BMI 22.76 kg/m²          Physical Exam  Constitutional:       General: He is not in acute distress. Appearance: Normal appearance. He is not ill-appearing. Cardiovascular:      Comments: Bilateral DP and PT pulses are nonpalpable. Pedal hair is absent. Legs to toes warm to cool. Varicosities with mild LE edema noted. Pulmonary:      Effort: No respiratory distress. Musculoskeletal:         General: Deformity (HTs) present. No tenderness. Normal range of motion. Skin:     Capillary Refill: Capillary refill takes less than 2 seconds. Comments: B/L LE skin is atrophic - thin, dry and shiny in appearance. Left dorsal 2nd toe with DTI. No open wound. Toenails x10 are elongated, dystrophic, discolored with thickening and subungual debris. Neurological:      General: No focal deficit present.       Mental Status: He is alert and oriented to person, place, and time. Comments: Gross sensation to feet intact. Patient denies numbness, tingling and burning to B/L feet.     Psychiatric:         Mood and Affect: Mood normal.         Behavior: Behavior normal.

## 2023-12-07 ENCOUNTER — NURSE TRIAGE (OUTPATIENT)
Age: 88
End: 2023-12-07

## 2023-12-07 NOTE — TELEPHONE ENCOUNTER
Called and spoke with patient and advised him on SUKI Croft's recommendations. Patient reports urinating without difficulty, just having pink to red tinged urine. Denies blood clots, fevers, or chills. He will increase water intake and follow up as scheduled. Aware to contact the office in the meantime if he would develop difficulty urinating. normal,  alert,  in no acute distress,  well developed, well nourished,  normal communication ability

## 2023-12-07 NOTE — TELEPHONE ENCOUNTER
Reason for Disposition   The patient has Gross Hematuria with small clots and able to urinate, new onset, with or without history of bladder cancer    Answer Questions - Initial Assessment  When did this start: About a week ago    Do you have dysuria: no    Do you have lower back, flank, or lower abdominal/bladder pain: no    Do you have urinary frequency, urgency, or changes in your urination:no    Do you have any blood clots: no    Have you become unable to urinate: no    Is the hematuria continuous or intermittent:intermittent    Can you describe the color of the blood in the urine in relation to a beverage: pink lemonade    Do you take any blood thinners: no    Have you had recent urologic surgery or procedure:yes    Have you had any recent urine testing or imaging? (UA with micro, urine culture, kidney ultrasound, CT scan): no    Do you have a history of bladder cancer: yes    Have you ever had a workup for blood in the urine: yes    Did you recently take rifampin or pyridium: no    Protocols used: Gross Hemaruria  Does not drink a lot of water about 24 oz. - Advised to increase water intake to about 64oz/day and avoid bladder irritants. Client will call back if any worsening symptoms, otherwise they will return for follow up appt. On 12/12/23.

## 2023-12-07 NOTE — TELEPHONE ENCOUNTER
Patient is status post cystoscopy with biopsies and fulguration by Dr. Darylene Penman on 11/30. Hematuria is likely secondary to this. He should increase his fluids and recommended. As long as he is able to urinate and is not passing large clots we can see him for follow-up Pathology review next week. Any concern for urinary retention he will need to be seen for either a nurse visit or ER evaluation for possible need for catheter placement and irrigation.

## 2023-12-11 ENCOUNTER — OFFICE VISIT (OUTPATIENT)
Dept: AUDIOLOGY | Facility: CLINIC | Age: 88
End: 2023-12-11

## 2023-12-11 DIAGNOSIS — H90.3 SENSORY HEARING LOSS, BILATERAL: Primary | ICD-10-CM

## 2023-12-11 NOTE — PROGRESS NOTES
Progress Note    Name:  Chai Vega  :  1935  Age:  82 y.o. MRN:  [id number]  Date of Evaluation: 23     Shawn Levy was seen today for a hearing aid fitting with custom earmolds. Significant time was spent working on insertion and removal with Shawn Levy, ultimately he was unable to utilize the hearing aids. It was recommended to initiate a return for the hearing aids to avoid losing or breaking them. Patient did not leave the office with his devices, therefore the $300 fee should not be retained.      [employer] 7[address] Brown StreetDenise, [facility]  Clinical Audiologist  [address]

## 2023-12-12 ENCOUNTER — OFFICE VISIT (OUTPATIENT)
Dept: UROLOGY | Facility: CLINIC | Age: 88
End: 2023-12-12
Payer: MEDICARE

## 2023-12-12 VITALS
DIASTOLIC BLOOD PRESSURE: 80 MMHG | WEIGHT: 169 LBS | HEART RATE: 56 BPM | SYSTOLIC BLOOD PRESSURE: 142 MMHG | BODY MASS INDEX: 22.92 KG/M2

## 2023-12-12 DIAGNOSIS — C67.8 MALIGNANT NEOPLASM OF OVERLAPPING SITES OF BLADDER (HCC): Primary | ICD-10-CM

## 2023-12-12 DIAGNOSIS — N32.81 OAB (OVERACTIVE BLADDER): ICD-10-CM

## 2023-12-12 PROCEDURE — 99213 OFFICE O/P EST LOW 20 MIN: CPT | Performed by: UROLOGY

## 2023-12-12 RX ORDER — TROSPIUM CHLORIDE 20 MG/1
20 TABLET, FILM COATED ORAL 2 TIMES DAILY
Qty: 60 TABLET | Refills: 11 | Status: SHIPPED | OUTPATIENT
Start: 2023-12-12

## 2023-12-12 NOTE — ASSESSMENT & PLAN NOTE
I explained to him that a lot of times at 2 weeks the scab falls off and then he will get a sizable bleed for about 24 to 48 hours, then it will heal. If it does not happen, he need not worry, and if it does, I will cauterize it. His biggest tumor was 1 cm. He has bladder cancer. He was told he has small nodules but after opening him up, there are other smaller nodules seen. I told him I removed all the tumors and classified them as high grade and discussed the possibility of a recurrence. If he gets to the point where he has difficulty urinating because of the blood because of the clots, then we will have to look in there and cauterize something that is bleeding. I discussed intravesical therapy but patient declined. We will set him up for a cystoscopy.

## 2023-12-12 NOTE — PROGRESS NOTES
[address]  [facility]  [address] [place] [address]  [phone]  [email or web address]      NAME: Tucker Lopez  AGE: 82 y.o. SEX: male  : 1935   MRN: [id number]    DATE: 2023  TIME: 5:17 PM    Assessment and Plan     1. Malignant neoplasm of overlapping sites of bladder [facility])  Assessment & Plan:  I explained to him that a lot of times at 2 weeks the scab falls off and then he will get a sizable bleed for about 24 to 48 hours, then it will heal. If it does not happen, he need not worry, and if it does, I will cauterize it. His biggest tumor was 1 cm. He has bladder cancer. He was told he has small nodules but after opening him up, there are other smaller nodules seen. I told him I removed all the tumors and classified them as high grade and discussed the possibility of a recurrence. If he gets to the point where he has difficulty urinating because of the blood because of the clots, then we will have to look in there and cauterize something that is bleeding. I discussed intravesical therapy but patient declined. We will set him up for a cystoscopy. Orders:  -     Cystoscopy; Future; Expected date: 2024    2. OAB (overactive bladder)  -     trospium chloride (SANCTURA) 20 mg tablet; Take 1 tablet (20 mg total) by mouth 2 (two) times a day      Urinary frequency. I advised him to restart trospium 20 mg twice a day and expect quick result in 2 days, 1 at bedtime and the other 1 in the morning. Follow-up  The patient will follow up in 3 months. Chief Complaint   No chief complaint on file. History of Present Illness   Follow-up bladder cancer status post TURBT of a 1 cm tumor right lateral wall removed with cup forceps and the rest fulgurated by me 2023. He has small papillary recurrence very superficial that were scraped away and fulgurated.   Pathology shows high-grade papillary urothelial carcinoma with foci suggesting superficial invasion into papillary stalk. There is a minute portion of benign thick muscle present favor limited sampling of muscularis propria. Kandy Maurice is an 65-year-old male who presents for bladder cancer follow-up. He is accompanied by an adult female. He never had a problem after the operation except hematuria. He felt good all the time. Two days ago, the bleeding stopped when he looked in the toilet to urinate. He had been seeing a little bit of blood for 2 weeks. He read his postoperative instructions. He asked if all the tumors were taken out. His biggest problem now is the frequency. He did not take the trospium for 3 to 5 days after surgery. He still has some left in the prescription. He took trospium before once a day at bedtime. He likes to go to sleep for 4 hours, then he will urinate, and then he will go back to sleep for another 4 hours. He had about three surgeries with the urology and never, there was never anything in there, it was always inflammation. He had 2 negative biopsies prior to his surgery.     The following portions of the patient's history were reviewed and updated as appropriate: allergies, current medications, past family history, past medical history, past social history, past surgical history and problem list.  Past Medical History:   Diagnosis Date    Anemia     Appendicitis     Coronary artery disease     Detached retina     GERD (gastroesophageal reflux disease)     Hyperlipidemia     Hypertension     Macular degeneration     Neuropathy     Pelvis fracture ([address]) 01/2021    Von Willebrand disease ([address])      Past Surgical History:   Procedure Laterality Date    ADENOIDECTOMY      APPENDECTOMY      ARTHRODESIS  01/15/2014    Lumbar     BACK SURGERY      CATARACT EXTRACTION      COLONOSCOPY  12/09/2016    fiberoptic     CYSTOSCOPY      with resection of tumor / 1/12/17, 10/2/17 / diagnostic 12/8/16, 5/30/17     CYSTOSCOPY  09/20/2018 CYSTOSCOPY  06/22/2020    EGD AND COLONOSCOPY N/A 12/9/2016    Procedure: EGD AND COLONOSCOPY;  Surgeon: Brad Terrell MD;  Location: [facility];  Service:     EYE SURGERY      FRACTURE SURGERY Bilateral     ARM    NECK SURGERY      NEUROPLASTY / TRANSPOSITION MEDIAN NERVE AT CARPAL TUNNEL Right 04/2015    NJ BLADDER INSTILLATION ANTICARCINOGENIC AGENT N/A 1/12/2017    Procedure: INSTILLATION MYTOMYCIN;  Surgeon: Aileen Young MD;  Location: [facility];  Service: Urology    NJ BLADDER INSTILLATION ANTICARCINOGENIC AGENT N/A 10/2/2017    Procedure: Berg Cowan;  Surgeon: Aileen Young MD;  [facility]: [facility];  Service: Urology    NJ COLONOSCOPY FLX DX W/COLLJ SPEC WHEN PFRMD N/A 2/5/2019    Procedure: COLONOSCOPY;  Surgeon: Naya Cruz MD;  [facility]: [facility];  Service: Gastroenterology    NJ CYSTO W/REMOVAL OF LESIONS SMALL N/A 1/12/2017    Procedure: CYSTOSCOPY, BLADDER BIOPSY WITH FULGRATION, 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;  Surgeon: Aileen Young MD;  [facility]: [facility];  Service: Urology    NJ CYSTO W/REMOVAL OF LESIONS SMALL N/A 10/2/2017    Procedure: CYSTOSCOPY WITH  BLADDER BIOPSIES WITH FULGURATION;  Surgeon: Aileen Young MD;  [facility];  Service: Urology    NJ CYSTO W/REMOVAL OF LESIONS SMALL N/A 11/30/2023    Procedure: CYSTOSCOPY WITH BIOPSIES and fulguration;  Surgeon: Bassem Jhaveri MD;  [facility];  Service: Urology    NJ ESOPHAGOGASTRODUODENOSCOPY TRANSORAL DIAGNOSTIC N/A 8/3/2018    Procedure: ESOPHAGOGASTRODUODENOSCOPY (EGD); Surgeon: Naya Cruz MD;  [facility]: [facility];  Service: Gastroenterology    RETINAL DETACHMENT SURGERY Right 03/2016    complete air fill confirmed     [address] SURGERY  03/03/2015    proximal humerus fracture / LA. .. 3/6/15   R. .. 1/3/18     TONSILECTOMY AND ADNOIDECTOMY      TONSILLECTOMY      TRANSURETHRAL RESECTION OF BLADDER TUMOR N/A 10/2/2017 Procedure: TRANSURETHRAL RESECTION OF BLADDER TUMOR (TURBT); Surgeon: Master Welch MD;  Location: [facility];  Service: Urology    [place] [id number] [facility] (HISTORICAL)       shoulder    Active Problem List     Patient Active Problem List   Diagnosis    Bladder cancer ([address])    Lung nodule, solitary    Erectile dysfunction    Gait instability    High blood pressure    Hypothyroidism    Idiopathic peripheral neuropathy    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)    Cervical stenosis of spinal canal    Spinal stenosis of lumbar region    Spondylosis 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    Hoarse voice quality    Appetite loss    Weight loss    Gastroesophageal reflux disease without esophagitis    Alternating constipation and diarrhea    Weight loss, non-intentional    Nausea    Chronic obstructive pulmonary disease with acute exacerbation (HCC)    Von Willebrand's disease ([address])    Radiculopathy, lumbar region    Fall    Anemia    Contusion of cerebral cortex with concussion ([address])    Urinary frequency    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     Current Medications     Current Outpatient Medications:     acetaminophen (TYLENOL) 325 mg tablet, Take 2 tablets (650 mg total) by mouth every 6 (six) hours as needed for mild pain, headaches or fever, Disp:  , Rfl: 0    ALPHA LIPOIC ACID-BIOTIN PO, Take by mouth, Disp: , Rfl:     atorvastatin (LIPITOR) 10 mg tablet, TAKE 1 TABLET BY MOUTH EVERY  OTHER DAY, Disp: 45 tablet, Rfl: 3    B COMPLEX VITAMINS PO, Take 1 tablet by mouth daily, Disp: , Rfl:     cetirizine (Allergy Relief Cetirizine) 10 mg tablet, Take 1 tablet (10 mg total) by mouth daily, Disp: 90 tablet, Rfl: 2    Cholecalciferol (VITAMIN D3) 2000 UNITS capsule, Take 1 capsule by mouth 2 (two) times a day 5000 units , Disp: , Rfl:     Coenzyme Q10 (CO Q10) 60 MG CAPS, Take 1 capsule by mouth daily, Disp: , Rfl:     fluocinolone (SYNALAR) 0.01 % external solution, , Disp: , Rfl:     gabapentin (NEURONTIN) 600 MG tablet, Take 1 tablet (600 mg total) by mouth 2 (two) times a day, Disp: 180 tablet, Rfl: 3    Ginkgo Biloba 120 MG TABS, Take 1 tablet by mouth daily, Disp: , Rfl:     hydrocortisone-pramoxine (PRAMOSONE) 1-2.5 % LOTN, Apply topically 3 (three) times a day, Disp: 118 mL, Rfl: 2    Inositol 500 MG TABS, Take 1 tablet by mouth 2 (two) times a day, Disp: , Rfl:     ipratropium (ATROVENT) 0.03 % nasal spray, USE 2 SPRAYS IN BOTH  NOSTRILS 3 TIMES DAILY, Disp: 120 mL, Rfl: 3    ketoconazole (NIZORAL) 2 % shampoo, , Disp: , Rfl:     lutein 6 mg, Take 1 capsule by mouth daily, Disp: , Rfl:     metoprolol tartrate (LOPRESSOR) 25 mg tablet, TAKE 1 TABLET BY MOUTH  EVERY 12 HOURS, Disp: 180 tablet, Rfl: 3    MILK THISTLE PO, Take 1 tablet by mouth daily, Disp: , Rfl:     Misc Natural Products (SUPER SNOOZE) CAPS, Take 1 capsule by mouth daily Bed time , Disp: , Rfl:     Multiple Vitamins-Minerals (OCUVITE-LUTEIN) CAPS, Take by mouth daily , Disp: , Rfl:     mupirocin (BACTROBAN) 2 % ointment, , Disp: , Rfl:     omeprazole (PriLOSEC) 40 MG capsule, Take 1 capsule (40 mg total) by mouth 2 (two) times a day, Disp: 180 capsule, Rfl: 3    pentoxifylline (TRENtal) 400 mg ER tablet, Take 1 tablet (400 mg total) by mouth 3 (three) times a day with meals, Disp: 270 tablet, Rfl: 3    psyllium (METAMUCIL) 58.6 % packet, Take 1 packet by mouth daily, Disp: , Rfl:     sildenafil (VIAGRA) 50 MG tablet, , Disp: , Rfl:     tadalafil (CIALIS) 20 MG tablet, Take 1 tablet (20 mg total) by mouth every other day as needed for erectile dysfunction, Disp: 18 tablet, Rfl: 3    trospium chloride (SANCTURA) 20 mg tablet, Take 1 tablet (20 mg total) by mouth 2 (two) times a day, Disp: 60 tablet, Rfl: 11    Zinc 50 MG TABS, Take 1 tablet by mouth daily, Disp: , Rfl:         Gurjit Tian      Transcribed for Iftikhar Murray MD, by Josh Anaya on 12/12/23 at 5:17 PM. Powered by [employer].

## 2023-12-20 ENCOUNTER — CONSULT (OUTPATIENT)
Dept: VASCULAR SURGERY | Facility: HOSPITAL | Age: 88
End: 2023-12-20

## 2023-12-20 VITALS
TEMPERATURE: 97.9 F | HEART RATE: 60 BPM | DIASTOLIC BLOOD PRESSURE: 72 MMHG | SYSTOLIC BLOOD PRESSURE: 118 MMHG | BODY MASS INDEX: 22.21 KG/M2 | OXYGEN SATURATION: 95 % | HEIGHT: 73 IN | WEIGHT: 167.6 LBS

## 2023-12-20 DIAGNOSIS — I73.9 PAD (PERIPHERAL ARTERY DISEASE) (HCC): ICD-10-CM

## 2023-12-20 DIAGNOSIS — I87.2 VENOUS INSUFFICIENCY: ICD-10-CM

## 2023-12-20 NOTE — PROGRESS NOTES
Call patient to notify normal results Lyme test is negative [FreeTextEntry1] :  7 year old healthy female  Recommend parents request IEP evaluation through school. Given handouts. Discussed possibility of independent evaluation however may not be covered by insurance Given Neurology referral as well if parents interested in evaluation for ADHD  Follow-up PRN

## 2023-12-20 NOTE — PATIENT INSTRUCTIONS
PAD  -Educated patient on pathophysiology of peripheral arterial disease, available treatment options, and indications for treatment.   -Repeat ultrasound of the legs in 1 year   -Discussed risk factor modification, including adequate glycemic and lipid control, smoking cessation, blood pressure, and lifestyle modifications.   -Recommend medical optimization with daily ASA 81mg and statin therapy with LDL goal <100. You can discontinue pentoxifylline.   -Recommend starting a structured walking program 3-5 times/week for 30 minutes. Patient should walk until claudication symptoms occur, rest for 5-10 minutes, then continue to walk. Patient should increase distance each week.   -Follow up in 1 year   -Instructed patient to call the office with questions, concerns, or new symptoms.     Venous insufficieny  -Recommend conservative measures with use of daily compression hose (20-30 mmHg), lower extremity elevation, regular exercise, and diligent skin care.   -Wound care- cleanse wound daily with soap and water. Cover with antibiotic ointment and a dry bandage. Call with any new or worsening symptoms.   -Dickson device for help with compression stockings.   - Instructed to call the office in the interim with any questions, concerns, or new symptoms.

## 2023-12-20 NOTE — PROGRESS NOTES
Assessment/Plan:    PAD (peripheral artery disease) (HCC)  89yo male with PMH of bladder cancer, hypothyroidism, HTN, ?von willebrands disease, cervical/lumbar stenosis, presents today for evaluation of left foot swelling. Denies claudication, rest pain. He recently noticed a left 2nd toe wound but does not know etiology or timeframe of wound. He is following with podiatry.     Unilateral CARL 11/17  --RLE: 0.97/137/123  --Diffuse disease with significant focal stenosis in proximal femoral, distal femoral, and proximal popliteal arteries with tibial disease. NADYA 0.80/142/96.     -BLE warm with 1+ pitting edema in left ankle region. Left 2nd toe superficial ulceration overlying MTP joint. DP/PT doppler signals bilaterally. Motor and sensation grossly intact.   -Clinical image below.    Plan:  -Educated patient on pathophysiology of peripheral arterial disease, available treatment options, and indications for treatment.   -No acute vascular intervention indicated at this time. Patient is minimally symptomatic from a PAD standpoint with toe pressures above healing range for a non-diabetic.   -Will repeat CARL in 1 year to monitor for progression of disease.   -Discussed risk factor modification, including adequate glycemic and lipid control, smoking cessation, blood pressure, and lifestyle modifications.   -Recommend medical optimization with daily ASA 81mg and statin therapy with LDL goal <100.   -Ok to discontinue trental as patient do not have any claudication symptoms.  -Walking program.   -F/u with podiatry as planned for periodic evaluation of left toe wound. Notify vascular with any new or worsening symptoms.   -Follow up in 1 year or sooner if needed.   -Instructed patient to call the office with questions, concerns, or new symptoms.     Venous insufficiency  - Reports 2 month history of left foot swelling with discomfort. Hx of trauma to left ankle.   - No hx of DVT, SVT, varicose veins, or prior vascular  intervention.   - Denies use of compression stockings. Reports improvement in swelling with elevation.   - Recommend conservative measures with use of daily compression hose (20-30 mmHg), lower extremity elevation, regular exercise, and diligent skin care.   - Rx for compression stockings provided today.   - Monitor left toe wound.   - Plan as outlined above.   - Instructed to call the office in the interim with any questions, concerns, or new symptoms.        Diagnoses and all orders for this visit:    Venous insufficiency  -     Ambulatory Referral to Vascular Surgery  -     Compression Stocking    PAD (peripheral artery disease) (HCC)  -     Ambulatory Referral to Vascular Surgery  -     VAS lower limb arterial duplex, complete bilateral; Future          Subjective:      Patient ID: Abdirahman Hope is a 88 y.o. male.           ANGIE Gary is a 87yo male with PMH of bladder cancer, hypothyroidism, HTN, ?von willebrands disease, cervical/lumbar stenosis, presents today for evaluation of left foot swelling.     He reports that he saw  his PCP for left foot swelling and had an LEV/CARL performed. He was also given trental by his PCP and has been taking for 2 weeks with no improvement. He recently saw a podiatrist, Dr. Cabrera, for evaluation and recommended a vascular evaluation..     He has a wound on the left 2nd toe that is sore and does not know how it occurred and how long it has been there. He has not been putting anything on the wound. He denies fever or chills. He denies hx of nonhealing wounds. He reports some occasional red discoloration in the foot with more swelling.     He reports pain is localized to the foot where the wound is. He denies claudication symptoms but ambulates short distances with a walker. He does not report any ischemic rest pain.     He has been elevating his legs to heart level while in bed at night and states the swelling goes down by morning. He has not been wearing compression  "stockings buthas several pairs at home.     He is not on aspirin or a statin.     No hx of diabetes and is not on any diabetic medication.     He smokes 6 cigarettes per day.     The following portions of the patient's history were reviewed and updated as appropriate: allergies, current medications, past family history, past medical history, past social history, past surgical history, and problem list.    Review of Systems   Constitutional: Negative.    HENT: Negative.     Eyes: Negative.    Respiratory: Negative.     Cardiovascular:  Positive for leg swelling.   Gastrointestinal: Negative.    Endocrine: Negative.    Genitourinary: Negative.    Musculoskeletal: Negative.    Skin:  Positive for color change and wound.   Allergic/Immunologic: Negative.    Neurological: Negative.    Hematological:  Bruises/bleeds easily.   Psychiatric/Behavioral: Negative.         I have personally reviewed and made appropriate changes to the ROS that was input by the medical assistant.     Objective:      Vitals:    12/20/23 1503   BP: 118/72   BP Location: Right arm   Patient Position: Sitting   Cuff Size: Standard   Pulse: 60   Temp: 97.9 °F (36.6 °C)   TempSrc: Tympanic   SpO2: 95%   Weight: 76 kg (167 lb 9.6 oz)   Height: 6' 1\" (1.854 m)       Patient Active Problem List   Diagnosis    Bladder cancer (HCC)    Lung nodule, solitary    Erectile dysfunction    Gait instability    High blood pressure    Hypothyroidism    Idiopathic peripheral neuropathy    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)    Cervical stenosis of spinal canal    Spinal stenosis of lumbar region    Spondylosis 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    Hoarse voice quality    Appetite loss    Weight loss    Gastroesophageal reflux disease without esophagitis    Alternating constipation and diarrhea    Weight loss, non-intentional    Nausea    Chronic obstructive pulmonary disease with acute exacerbation (HCC)    Von Willebrand's " disease (HCC)    Radiculopathy, lumbar region    Fall    Anemia    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)    Urinary frequency    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, initial encounter (HCC)    Leg edema, left    Venous insufficiency    PAD (peripheral artery disease) (HCC)       Past Surgical History:   Procedure Laterality Date    ADENOIDECTOMY      APPENDECTOMY      ARTHRODESIS  01/15/2014    Lumbar     BACK SURGERY      CATARACT EXTRACTION      COLONOSCOPY  12/09/2016    fiberoptic     CYSTOSCOPY      with resection of tumor / 1/12/17, 10/2/17 / diagnostic 12/8/16, 5/30/17     CYSTOSCOPY  09/20/2018    CYSTOSCOPY  06/22/2020    EGD AND COLONOSCOPY N/A 12/9/2016    Procedure: EGD AND COLONOSCOPY;  Surgeon: Kahlil Alfonso MD;  Location: [facility];  Service:     EYE SURGERY      FRACTURE SURGERY Bilateral     ARM    NECK SURGERY      NEUROPLASTY / TRANSPOSITION MEDIAN NERVE AT CARPAL TUNNEL Right 04/2015    VA BLADDER INSTILLATION ANTICARCINOGENIC AGENT N/A 1/12/2017    Procedure: INSTILLATION MYTOMYCIN;  Surgeon: Jhon Zuniga MD;  Location: [facility];  Service: Urology    VA BLADDER INSTILLATION ANTICARCINOGENIC AGENT N/A 10/2/2017    Procedure: INSTILLATION MYTOMYCIN;  Surgeon: Jhon Zuniga MD;  Location: [facility];  Service: Urology    VA COLONOSCOPY FLX DX W/COLLJ SPEC WHEN PFRMD N/A 2/5/2019    Procedure: COLONOSCOPY;  Surgeon: Abad Valentino MD;  Location: [facility];  Service: Gastroenterology    VA CYSTO W/REMOVAL OF LESIONS SMALL N/A 1/12/2017    Procedure: CYSTOSCOPY, BLADDER BIOPSY WITH FULGRATION, 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;  Surgeon: Jhon Zuniga MD;  Location: [facility];  Service: Urology    VA CYSTO W/REMOVAL OF LESIONS SMALL N/A 10/2/2017    Procedure: CYSTOSCOPY WITH  BLADDER BIOPSIES WITH FULGURATION;  Surgeon: Jhon Zuniga MD;  Location: [facility];  Service: Urology    VA CYSTO  W/REMOVAL OF LESIONS SMALL N/A 11/30/2023    Procedure: CYSTOSCOPY WITH BIOPSIES and fulguration;  Surgeon: Adalberto Rasmussen MD;  Location: [facility];  Service: Urology    FL ESOPHAGOGASTRODUODENOSCOPY TRANSORAL DIAGNOSTIC N/A 8/3/2018    Procedure: ESOPHAGOGASTRODUODENOSCOPY (EGD);  Surgeon: Abad Valentino MD;  Location: [facility];  Service: Gastroenterology    RETINAL DETACHMENT SURGERY Right 03/2016    complete air fill confirmed     ROTATOR CUFF REPAIR      SHOULDER SURGERY  03/03/2015    proximal humerus fracture / LA...3/6/15   R...1/3/18     TONSILECTOMY AND ADNOIDECTOMY      TONSILLECTOMY      TRANSURETHRAL RESECTION OF BLADDER TUMOR N/A 10/2/2017    Procedure: TRANSURETHRAL RESECTION OF BLADDER TUMOR (TURBT);  Surgeon: Jhon Zuniga MD;  Location: [facility];  Service: Urology    VON WILLEBRAND ANTIGEN (HISTORICAL)         Family History   Problem Relation Age of Onset    Ulcers Father         acute gastric    Heart disease Mother     Anuerysm Sister        Social History     Socioeconomic History    Marital status:      Spouse name: Not on file    Number of children: Not on file    Years of education: Not on file    Highest education level: Not on file   Occupational History    Occupation:    retired   Tobacco Use    Smoking status: Every Day     Current packs/day: 0.25     Average packs/day: 0.3 packs/day for 60.0 years (15.0 ttl pk-yrs)     Types: Cigarettes    Smokeless tobacco: Never    Tobacco comments:     smokes a pipe   Vaping Use    Vaping status: Never Used   Substance and Sexual Activity    Alcohol use: Yes     Alcohol/week: 2.0 standard drinks of alcohol     Types: 1 Glasses of wine, 1 Shots of liquor per week     Comment: DAILY     Drug use: Never    Sexual activity: Not on file   Other Topics Concern    Not on file   Social History Narrative    No advance directives     Patient has living will      Social Determinants of Health     Financial Resource Strain: Low Risk   (10/5/2023)    Overall Financial Resource Strain (CARDIA)     Difficulty of Paying Living Expenses: Not hard at all   Food Insecurity: No Food Insecurity (4/13/2021)    Hunger Vital Sign     Worried About Running Out of Food in the Last Year: Never true     Ran Out of Food in the Last Year: Never true   Transportation Needs: No Transportation Needs (10/5/2023)    PRAPARE - Transportation     Lack of Transportation (Medical): No     Lack of Transportation (Non-Medical): No   Physical Activity: Not on file   Stress: Not on file   Social Connections: Unknown (1/14/2021)    Social Connection and Isolation Panel [NHANES]     Frequency of Communication with Friends and Family: Not on file     Frequency of Social Gatherings with Friends and Family: Not on file     Attends Gnosticist Services: Not on file     Active Member of Clubs or Organizations: Not on file     Attends Club or Organization Meetings: Not on file     Marital Status: Living with partner   Intimate Partner Violence: Not on file   Housing Stability: Not on file       No Known Allergies      Current Outpatient Medications:     acetaminophen (TYLENOL) 325 mg tablet, Take 2 tablets (650 mg total) by mouth every 6 (six) hours as needed for mild pain, headaches or fever, Disp:  , Rfl: 0    ALPHA LIPOIC ACID-BIOTIN PO, Take by mouth, Disp: , Rfl:     atorvastatin (LIPITOR) 10 mg tablet, TAKE 1 TABLET BY MOUTH EVERY  OTHER DAY, Disp: 45 tablet, Rfl: 3    B COMPLEX VITAMINS PO, Take 1 tablet by mouth daily, Disp: , Rfl:     cetirizine (Allergy Relief Cetirizine) 10 mg tablet, Take 1 tablet (10 mg total) by mouth daily, Disp: 90 tablet, Rfl: 2    Cholecalciferol (VITAMIN D3) 2000 UNITS capsule, Take 1 capsule by mouth 2 (two) times a day 5000 units , Disp: , Rfl:     Coenzyme Q10 (CO Q10) 60 MG CAPS, Take 1 capsule by mouth daily, Disp: , Rfl:     fluocinolone (SYNALAR) 0.01 % external solution, , Disp: , Rfl:     gabapentin (NEURONTIN) 600 MG tablet, Take 1 tablet  "(600 mg total) by mouth 2 (two) times a day, Disp: 180 tablet, Rfl: 3    Ginkgo Biloba 120 MG TABS, Take 1 tablet by mouth daily, Disp: , Rfl:     hydrocortisone-pramoxine (PRAMOSONE) 1-2.5 % LOTN, Apply topically 3 (three) times a day, Disp: 118 mL, Rfl: 2    Inositol 500 MG TABS, Take 1 tablet by mouth 2 (two) times a day, Disp: , Rfl:     ipratropium (ATROVENT) 0.03 % nasal spray, USE 2 SPRAYS IN BOTH  NOSTRILS 3 TIMES DAILY, Disp: 120 mL, Rfl: 3    ketoconazole (NIZORAL) 2 % shampoo, , Disp: , Rfl:     lutein 6 mg, Take 1 capsule by mouth daily, Disp: , Rfl:     metoprolol tartrate (LOPRESSOR) 25 mg tablet, TAKE 1 TABLET BY MOUTH  EVERY 12 HOURS, Disp: 180 tablet, Rfl: 3    MILK THISTLE PO, Take 1 tablet by mouth daily, Disp: , Rfl:     Misc Natural Products (SUPER SNOOZE) CAPS, Take 1 capsule by mouth daily Bed time , Disp: , Rfl:     Multiple Vitamins-Minerals (OCUVITE-LUTEIN) CAPS, Take by mouth daily , Disp: , Rfl:     mupirocin (BACTROBAN) 2 % ointment, , Disp: , Rfl:     omeprazole (PriLOSEC) 40 MG capsule, Take 1 capsule (40 mg total) by mouth 2 (two) times a day, Disp: 180 capsule, Rfl: 3    pentoxifylline (TRENtal) 400 mg ER tablet, Take 1 tablet (400 mg total) by mouth 3 (three) times a day with meals, Disp: 270 tablet, Rfl: 3    psyllium (METAMUCIL) 58.6 % packet, Take 1 packet by mouth daily, Disp: , Rfl:     sildenafil (VIAGRA) 50 MG tablet, , Disp: , Rfl:     tadalafil (CIALIS) 20 MG tablet, Take 1 tablet (20 mg total) by mouth every other day as needed for erectile dysfunction, Disp: 18 tablet, Rfl: 3    trospium chloride (SANCTURA) 20 mg tablet, Take 1 tablet (20 mg total) by mouth 2 (two) times a day, Disp: 60 tablet, Rfl: 11    Zinc 50 MG TABS, Take 1 tablet by mouth daily, Disp: , Rfl:       /72 (BP Location: Right arm, Patient Position: Sitting, Cuff Size: Standard)   Pulse 60   Temp 97.9 °F (36.6 °C) (Tympanic)   Ht 6' 1\" (1.854 m)   Wt 76 kg (167 lb 9.6 oz)   SpO2 95%   BMI " 22.11 kg/m²          Physical Exam  Vitals and nursing note reviewed.   Constitutional:       Appearance: Normal appearance.   HENT:      Head: Normocephalic and atraumatic.   Cardiovascular:      Rate and Rhythm: Normal rate and regular rhythm.      Pulses:           Radial pulses are 2+ on the right side and 2+ on the left side.        Dorsalis pedis pulses are detected w/ Doppler on the right side and detected w/ Doppler on the left side.        Posterior tibial pulses are detected w/ Doppler on the right side and detected w/ Doppler on the left side.      Heart sounds: Normal heart sounds.   Pulmonary:      Effort: Pulmonary effort is normal. No respiratory distress.      Breath sounds: Normal breath sounds.   Abdominal:      General: Bowel sounds are normal. There is no distension.      Palpations: Abdomen is soft.      Comments: No abdominal bruit or pulsatile masses.    Musculoskeletal:         General: No tenderness. Normal range of motion.      Cervical back: Normal range of motion and neck supple.      Left lower le+ Pitting Edema present.   Skin:     General: Skin is warm and dry.      Capillary Refill: Capillary refill takes 2 to 3 seconds.      Findings: No erythema.      Comments: Left 2nd toe superficial ulceration   Neurological:      General: No focal deficit present.      Mental Status: He is alert and oriented to person, place, and time.   Psychiatric:         Mood and Affect: Mood normal.         Behavior: Behavior normal.           Katy Ramirez PA-C  [facility]  [phone]    I have spent a total time of 35 minutes on 23 in caring for this patient including Diagnostic results, Prognosis, Risks and benefits of tx options, Instructions for management, Patient and family education, Importance of tx compliance, Risk factor reductions, Impressions, Counseling / Coordination of care, Documenting in the medical record, Reviewing / ordering tests, medicine, procedures  , and  Obtaining or reviewing history  .

## 2023-12-21 PROBLEM — I73.9 PAD (PERIPHERAL ARTERY DISEASE) (HCC): Status: ACTIVE | Noted: 2023-12-21

## 2023-12-21 NOTE — ASSESSMENT & PLAN NOTE
- Reports several week history of LLE swelling with discomfort. Hx of trauma to left ankle.   - Denies use of compression stockings. Reports improvement in swelling with elevation.   - Recommend conservative measures with use of daily compression hose (20-30 mmHg), lower extremity elevation, regular exercise, and diligent skin care.   - Rx for compression stockings provided today.   - Monitor left toe wound.   - Plan as outlined above.   - Instructed to call the office in the interim with any questions, concerns, or new symptoms.

## 2023-12-21 NOTE — ASSESSMENT & PLAN NOTE
89yo male with PMH of bladder cancer, hypothyroidism, HTN, ?von willebrands disease, cervical/lumbar stenosis, presents today for evaluation of left foot swelling. Denies claudication, rest pain. He recently noticed a left 2nd toe wound but does not know etiology or timeframe of wound. He is following with podiatry.     Unilateral CARL 11/17  --RLE: 0.97/137/123  --Diffuse disease with significant focal stenosis in proximal femoral, distal femoral, and proximal popliteal arteries with tibial disease. NADYA 0.80/142/96.     -BLE warm with 1+ pitting edema in left ankle region. Left 2nd toe superficial ulceration overlying MTP joint. DP/PT doppler signals bilaterally. Motor and sensation grossly intact.   -Clinical image below.    Plan:  -Educated patient on pathophysiology of peripheral arterial disease, available treatment options, and indications for treatment.   -No acute vascular intervention indicated at this time. Patient is minimally symptomatic from a PAD standpoint with toe pressures above healing range for a non-diabetic.   -Will repeat CARL in 1 year to monitor for progression of disease.   -Discussed risk factor modification, including adequate glycemic and lipid control, smoking cessation, blood pressure, and lifestyle modifications.   -Recommend medical optimization with daily ASA 81mg and statin therapy with LDL goal <100.   -Ok to discontinue trental as patient do not have any claudication symptoms.  -Walking program.   -F/u with podiatry as planned for periodic evaluation of left toe wound. Notify vascular with any new or worsening symptoms.   -Follow up in 1 year or sooner if needed.   -Instructed patient to call the office with questions, concerns, or new symptoms.

## 2023-12-24 ENCOUNTER — OFFICE VISIT (OUTPATIENT)
Dept: URGENT CARE | Facility: MEDICAL CENTER | Age: 88
End: 2023-12-24
Payer: MEDICARE

## 2023-12-24 VITALS
HEART RATE: 55 BPM | BODY MASS INDEX: 22.16 KG/M2 | RESPIRATION RATE: 20 BRPM | OXYGEN SATURATION: 99 % | WEIGHT: 168 LBS | DIASTOLIC BLOOD PRESSURE: 70 MMHG | TEMPERATURE: 97.9 F | SYSTOLIC BLOOD PRESSURE: 126 MMHG

## 2023-12-24 DIAGNOSIS — L08.9 INFECTED ABRASION OF SECOND TOE OF LEFT FOOT, INITIAL ENCOUNTER: Primary | ICD-10-CM

## 2023-12-24 DIAGNOSIS — S90.415A INFECTED ABRASION OF SECOND TOE OF LEFT FOOT, INITIAL ENCOUNTER: Primary | ICD-10-CM

## 2023-12-24 PROCEDURE — G0463 HOSPITAL OUTPT CLINIC VISIT: HCPCS

## 2023-12-24 PROCEDURE — 99212 OFFICE O/P EST SF 10 MIN: CPT

## 2023-12-24 RX ORDER — CEPHALEXIN 500 MG/1
500 CAPSULE ORAL EVERY 6 HOURS SCHEDULED
Qty: 20 CAPSULE | Refills: 0 | Status: SHIPPED | OUTPATIENT
Start: 2023-12-24 | End: 2023-12-29

## 2023-12-24 NOTE — PROGRESS NOTES
[facility]        NAME: Abdirahman Hope is a 88 y.o. male  : 1935    MRN: [id number]  DATE: 2023  TIME: 2:42 PM    Assessment and Plan   Infected abrasion of second toe of left foot, initial encounter [S90.415A, L08.9]  1. Infected abrasion of second toe of left foot, initial encounter  cephalexin (KEFLEX) 500 mg capsule            Patient Instructions       Follow up with PCP in 3-5 days.  Proceed to  ER if symptoms worsen.    Chief Complaint     Chief Complaint   Patient presents with   • Toe Pain     Left foot, 2nd toe. Red, painful, and swollen. Started 10 days ago. Area opened. No trauma to the area. HX of swelling. Wearing FANY stockings.          History of Present Illness       Patient here with daughter. Patient reports having an infected toe which started about 10 days ago. Over the past 2-3 days he has noted worsening of pain and swelling. No fevers that he is aware of.  He does PVD and has been wearing compression socks with the open toe but has had pain     He reports when he was seen at podiatry last he did have a wound there but it did not look like it does today.         Review of Systems   Review of Systems   Constitutional:  Negative for chills, fatigue and fever.   Musculoskeletal:  Positive for gait problem. Negative for arthralgias.   Skin:  Positive for color change and wound.   Neurological:  Negative for dizziness, light-headedness and headaches.         Current Medications       Current Outpatient Medications:   •  acetaminophen (TYLENOL) 325 mg tablet, Take 2 tablets (650 mg total) by mouth every 6 (six) hours as needed for mild pain, headaches or fever, Disp:  , Rfl: 0  •  ALPHA LIPOIC ACID-BIOTIN PO, Take by mouth, Disp: , Rfl:   •  atorvastatin (LIPITOR) 10 mg tablet, TAKE 1 TABLET BY MOUTH EVERY  OTHER DAY, Disp: 45 tablet, Rfl: 3  •  B COMPLEX VITAMINS PO, Take 1 tablet by mouth daily, Disp: , Rfl:   •  cephalexin (KEFLEX) 500 mg capsule, Take 1  capsule (500 mg total) by mouth every 6 (six) hours for 5 days, Disp: 20 capsule, Rfl: 0  •  cetirizine (Allergy Relief Cetirizine) 10 mg tablet, Take 1 tablet (10 mg total) by mouth daily, Disp: 90 tablet, Rfl: 2  •  Cholecalciferol (VITAMIN D3) 2000 UNITS capsule, Take 1 capsule by mouth 2 (two) times a day 5000 units , Disp: , Rfl:   •  Coenzyme Q10 (CO Q10) 60 MG CAPS, Take 1 capsule by mouth daily, Disp: , Rfl:   •  fluocinolone (SYNALAR) 0.01 % external solution, , Disp: , Rfl:   •  gabapentin (NEURONTIN) 600 MG tablet, Take 1 tablet (600 mg total) by mouth 2 (two) times a day, Disp: 180 tablet, Rfl: 3  •  Ginkgo Biloba 120 MG TABS, Take 1 tablet by mouth daily, Disp: , Rfl:   •  hydrocortisone-pramoxine (PRAMOSONE) 1-2.5 % LOTN, Apply topically 3 (three) times a day, Disp: 118 mL, Rfl: 2  •  Inositol 500 MG TABS, Take 1 tablet by mouth 2 (two) times a day, Disp: , Rfl:   •  ipratropium (ATROVENT) 0.03 % nasal spray, USE 2 SPRAYS IN BOTH  NOSTRILS 3 TIMES DAILY, Disp: 120 mL, Rfl: 3  •  ketoconazole (NIZORAL) 2 % shampoo, , Disp: , Rfl:   •  lutein 6 mg, Take 1 capsule by mouth daily, Disp: , Rfl:   •  metoprolol tartrate (LOPRESSOR) 25 mg tablet, TAKE 1 TABLET BY MOUTH  EVERY 12 HOURS, Disp: 180 tablet, Rfl: 3  •  MILK THISTLE PO, Take 1 tablet by mouth daily, Disp: , Rfl:   •  Misc Natural Products (SUPER SNOOZE) CAPS, Take 1 capsule by mouth daily Bed time , Disp: , Rfl:   •  Multiple Vitamins-Minerals (OCUVITE-LUTEIN) CAPS, Take by mouth daily , Disp: , Rfl:   •  mupirocin (BACTROBAN) 2 % ointment, , Disp: , Rfl:   •  omeprazole (PriLOSEC) 40 MG capsule, Take 1 capsule (40 mg total) by mouth 2 (two) times a day, Disp: 180 capsule, Rfl: 3  •  pentoxifylline (TRENtal) 400 mg ER tablet, Take 1 tablet (400 mg total) by mouth 3 (three) times a day with meals, Disp: 270 tablet, Rfl: 3  •  psyllium (METAMUCIL) 58.6 % packet, Take 1 packet by mouth daily, Disp: , Rfl:   •  sildenafil (VIAGRA) 50 MG tablet, , Disp:  , Rfl:   •  tadalafil (CIALIS) 20 MG tablet, Take 1 tablet (20 mg total) by mouth every other day as needed for erectile dysfunction, Disp: 18 tablet, Rfl: 3  •  trospium chloride (SANCTURA) 20 mg tablet, Take 1 tablet (20 mg total) by mouth 2 (two) times a day, Disp: 60 tablet, Rfl: 11  •  Zinc 50 MG TABS, Take 1 tablet by mouth daily, Disp: , Rfl:   •  Omeprazole 20 MG TBDD, , Disp: , Rfl:     Current Allergies     Allergies as of 12/24/2023   • (No Known Allergies)            The following portions of the patient's history were reviewed and updated as appropriate: allergies, current medications, past family history, past medical history, past social history, past surgical history and problem list.     Past Medical History:   Diagnosis Date   • Anemia    • Appendicitis    • Coronary artery disease    • Detached retina    • GERD (gastroesophageal reflux disease)    • Hyperlipidemia    • Hypertension    • Macular degeneration    • Neuropathy    • Pelvis fracture (HCC) 01/2021   • Von Willebrand disease (HCC)        Past Surgical History:   Procedure Laterality Date   • ADENOIDECTOMY     • APPENDECTOMY     • ARTHRODESIS  01/15/2014    Lumbar    • BACK SURGERY     • CATARACT EXTRACTION     • COLONOSCOPY  12/09/2016    fiberoptic    • CYSTOSCOPY      with resection of tumor / 1/12/17, 10/2/17 / diagnostic 12/8/16, 5/30/17    • CYSTOSCOPY  09/20/2018   • CYSTOSCOPY  06/22/2020   • EGD AND COLONOSCOPY N/A 12/9/2016    Procedure: EGD AND COLONOSCOPY;  Surgeon: Kahlil Alfonso MD;  Location: [facility];  Service:    • EYE SURGERY     • FRACTURE SURGERY Bilateral     ARM   • NECK SURGERY     • NEUROPLASTY / TRANSPOSITION MEDIAN NERVE AT CARPAL TUNNEL Right 04/2015   • VA BLADDER INSTILLATION ANTICARCINOGENIC AGENT N/A 1/12/2017    Procedure: INSTILLATION MYTOMYCIN;  Surgeon: Jhon Zuniga MD;  Location: [facility];  Service: Urology   • VA BLADDER INSTILLATION ANTICARCINOGENIC AGENT N/A 10/2/2017    Procedure:  INSTILLATION MYTOMYCIN;  Surgeon: Jhon Zuniga MD;  Location: [facility];  Service: Urology   • SC COLONOSCOPY FLX DX W/COLLJ SPEC WHEN PFRMD N/A 2/5/2019    Procedure: COLONOSCOPY;  Surgeon: Abad Valentino MD;  Location: [facility];  Service: Gastroenterology   • SC CYSTO W/REMOVAL OF LESIONS SMALL N/A 1/12/2017    Procedure: CYSTOSCOPY, BLADDER BIOPSY WITH FULGRATION, 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;  Surgeon: Jhon Zuniga MD;  Location: [facility];  Service: Urology   • SC CYSTO W/REMOVAL OF LESIONS SMALL N/A 10/2/2017    Procedure: CYSTOSCOPY WITH  BLADDER BIOPSIES WITH FULGURATION;  Surgeon: Jhon Zuniga MD;  Location: [facility];  Service: Urology   • SC CYSTO W/REMOVAL OF LESIONS SMALL N/A 11/30/2023    Procedure: CYSTOSCOPY WITH BIOPSIES and fulguration;  Surgeon: Adalberto Rasmussen MD;  Location: [facility];  Service: Urology   • SC ESOPHAGOGASTRODUODENOSCOPY TRANSORAL DIAGNOSTIC N/A 8/3/2018    Procedure: ESOPHAGOGASTRODUODENOSCOPY (EGD);  Surgeon: Abad Valentino MD;  Location: [facility];  Service: Gastroenterology   • RETINAL DETACHMENT SURGERY Right 03/2016    complete air fill confirmed    • ROTATOR CUFF REPAIR     • SHOULDER SURGERY  03/03/2015    proximal humerus fracture / LA...3/6/15   R...1/3/18    • TONSILECTOMY AND ADNOIDECTOMY     • TONSILLECTOMY     • TRANSURETHRAL RESECTION OF BLADDER TUMOR N/A 10/2/2017    Procedure: TRANSURETHRAL RESECTION OF BLADDER TUMOR (TURBT);  Surgeon: Jhon Zuniga MD;  Location: [facility];  Service: Urology   • VON WILLEBRAND ANTIGEN (HISTORICAL)         Family History   Problem Relation Age of Onset   • Ulcers Father         acute gastric   • Heart disease Mother    • Anuerysm Sister          Medications have been verified.        Objective   /70   Pulse 55   Temp 97.9 °F (36.6 °C)   Resp 20   Wt 76.2 kg (168 lb)   SpO2 99%   BMI 22.16 kg/m²        Physical Exam     Physical Exam  Vitals and nursing note  reviewed.   Constitutional:       General: He is not in acute distress.     Appearance: Normal appearance. He is normal weight. He is not ill-appearing.   HENT:      Head: Normocephalic and atraumatic.   Cardiovascular:      Rate and Rhythm: Normal rate and regular rhythm.      Pulses:           Dorsalis pedis pulses are 1+ on the left side.      Heart sounds: Normal heart sounds.   Pulmonary:      Effort: Pulmonary effort is normal.      Breath sounds: Normal breath sounds.   Musculoskeletal:         General: Normal range of motion.      Cervical back: Normal range of motion and neck supple.        Feet:    Feet:      Left foot:      Skin integrity: Erythema present.      Comments: Likely secondary to pressure from shoes with hammer toes. Advised to monitor area, keep elevated and may soak in warm water with strict follow up with podiatry.   Skin:     General: Skin is warm.      Capillary Refill: Capillary refill takes less than 2 seconds.      Findings: Erythema present.   Neurological:      General: No focal deficit present.      Mental Status: He is alert and oriented to person, place, and time.   Psychiatric:         Mood and Affect: Mood normal.         Behavior: Behavior normal.

## 2023-12-24 NOTE — PATIENT INSTRUCTIONS
Please follow up with your primary provider in the next several days. Should you have any worsening of symptoms, or lack of improvement please be re-evaluated. If needed for significant concerns, consider 911 or ER evaluation.

## 2024-01-22 DIAGNOSIS — I73.9 PERIPHERAL VASCULAR OCCLUSIVE DISEASE (HCC): ICD-10-CM

## 2024-01-22 RX ORDER — PENTOXIFYLLINE 400 MG/1
400 TABLET, EXTENDED RELEASE ORAL
Qty: 270 TABLET | Refills: 3 | Status: SHIPPED | OUTPATIENT
Start: 2024-01-22

## 2024-01-30 ENCOUNTER — APPOINTMENT (OUTPATIENT)
Dept: RADIOLOGY | Facility: MEDICAL CENTER | Age: 89
End: 2024-01-30
Payer: MEDICARE

## 2024-01-30 ENCOUNTER — OFFICE VISIT (OUTPATIENT)
Dept: FAMILY MEDICINE CLINIC | Facility: CLINIC | Age: 89
End: 2024-01-30
Payer: MEDICARE

## 2024-01-30 VITALS
TEMPERATURE: 96.7 F | BODY MASS INDEX: 22.26 KG/M2 | HEART RATE: 57 BPM | WEIGHT: 168 LBS | DIASTOLIC BLOOD PRESSURE: 82 MMHG | HEIGHT: 73 IN | OXYGEN SATURATION: 97 % | SYSTOLIC BLOOD PRESSURE: 144 MMHG

## 2024-01-30 DIAGNOSIS — S06.2X9A: ICD-10-CM

## 2024-01-30 DIAGNOSIS — H35.3230 BILATERAL EXUDATIVE AGE-RELATED MACULAR DEGENERATION, UNSPECIFIED STAGE (HCC): ICD-10-CM

## 2024-01-30 DIAGNOSIS — S91.105A OPEN WOUND OF SECOND TOE OF LEFT FOOT, INITIAL ENCOUNTER: ICD-10-CM

## 2024-01-30 DIAGNOSIS — J44.1 CHRONIC OBSTRUCTIVE PULMONARY DISEASE WITH ACUTE EXACERBATION (HCC): ICD-10-CM

## 2024-01-30 DIAGNOSIS — C67.8 MALIGNANT NEOPLASM OF OVERLAPPING SITES OF BLADDER (HCC): ICD-10-CM

## 2024-01-30 DIAGNOSIS — I73.9 PAD (PERIPHERAL ARTERY DISEASE) (HCC): ICD-10-CM

## 2024-01-30 DIAGNOSIS — D68.00 VON WILLEBRAND'S DISEASE (HCC): ICD-10-CM

## 2024-01-30 DIAGNOSIS — S91.105A OPEN WOUND OF SECOND TOE OF LEFT FOOT, INITIAL ENCOUNTER: Primary | ICD-10-CM

## 2024-01-30 PROCEDURE — 73660 X-RAY EXAM OF TOE(S): CPT

## 2024-01-30 PROCEDURE — 73630 X-RAY EXAM OF FOOT: CPT

## 2024-01-30 PROCEDURE — 99214 OFFICE O/P EST MOD 30 MIN: CPT | Performed by: FAMILY MEDICINE

## 2024-01-30 RX ORDER — CEPHALEXIN 500 MG/1
500 CAPSULE ORAL EVERY 6 HOURS SCHEDULED
Qty: 40 CAPSULE | Refills: 0 | Status: SHIPPED | OUTPATIENT
Start: 2024-01-30 | End: 2024-02-08 | Stop reason: SDUPTHER

## 2024-01-30 RX ORDER — SULFAMETHOXAZOLE AND TRIMETHOPRIM 800; 160 MG/1; MG/1
1 TABLET ORAL EVERY 12 HOURS SCHEDULED
Qty: 20 TABLET | Refills: 0 | Status: SHIPPED | OUTPATIENT
Start: 2024-01-30 | End: 2024-02-08 | Stop reason: SDUPTHER

## 2024-01-30 NOTE — PROGRESS NOTES
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, initial encounter ([facility])       Hematopoietic and Hemostatic    Von Willebrand's disease ([facility])       Genitourinary    Bladder cancer (HCC)       Other    Bilateral exudative age-related macular degeneration ([facility])     Other Visit Diagnoses       Open wound of second toe of left foot, initial encounter    -  Primary    Relevant Medications    cephalexin (KEFLEX) 500 mg capsule    sulfamethoxazole-trimethoprim (BACTRIM DS) 800-160 mg per tablet    silver sulfadiazine (SILVADENE,SSD) 1 % cream    Other Relevant Orders    XR toe left second min 2 views    XR foot 3+ vw left    Referral to [facility]             Diagnoses and all orders for this visit:    Open wound of second toe of left foot, initial encounter  -     XR toe left second min 2 views; Future  -     XR foot 3+ vw left; Future  -     cephalexin (KEFLEX) 500 mg capsule; Take 1 capsule (500 mg total) by mouth every 6 (six) hours for 10 days  -     sulfamethoxazole-trimethoprim (BACTRIM DS) 800-160 mg per tablet; Take 1 tablet by mouth every 12 (twelve) hours for 10 days  -     Referral to 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        Subjective:      Patient ID: Abdirahman Hope is a 88 y.o. male.    Patient had fallen was crawling across a carpeted he did a eschar which was over an ulcer on his left second toe now the toe is red swollen and he has some swelling and induration in the forefront of the foot    Toe Pain   Pertinent negatives include no numbness.       The following portions of the patient's history were reviewed and updated as appropriate:   He has a past medical history of Anemia, Appendicitis, Coronary artery disease, Detached retina, GERD (gastroesophageal reflux disease), Hyperlipidemia, Hypertension, Macular degeneration, Neuropathy, Pelvis fracture ([facility]) (01/2021), and Von Willebrand disease ([facility]).,  does not have any pertinent problems on file.,   has a past surgical history that includes Back surgery; Neck surgery; Eye surgery; Appendectomy; TONSILECTOMY AND ADNOIDECTOMY; Fracture surgery (Bilateral); VON WILLEBRAND ANTIGEN (HISTORICAL); Tonsillectomy; Cataract extraction; Rotator cuff repair; pr cysto w/removal of lesions small (N/A, 1/12/2017); pr bladder instillation anticarcinogenic agent (N/A, 1/12/2017); EGD AND COLONOSCOPY (N/A, 12/9/2016); pr cysto w/removal of lesions small (N/A, 10/2/2017); pr bladder instillation anticarcinogenic agent (N/A, 10/2/2017); Transurethral resection of bladder tumor (N/A, 10/2/2017); Shoulder surgery (03/03/2015); Adenoidectomy; Arthrodesis (01/15/2014); Colonoscopy (12/09/2016); Neuroplasty / transposition median nerve at carpal tunnel (Right, 04/2015); Retinal detachment surgery (Right, 03/2016); pr esophagogastroduodenoscopy transoral diagnostic (N/A, 8/3/2018); pr colonoscopy flx dx w/collj spec when  pfrmd (N/A, 2/5/2019); Cystoscopy; Cystoscopy (09/20/2018); Cystoscopy (06/22/2020); and pr cysto w/removal of lesions small (N/A, 11/30/2023).,  family history includes Anuerysm in his sister; Heart disease in his mother; Ulcers in his father.,   reports that he has been smoking cigarettes. He has a 15.0 pack-year smoking history. He has never used smokeless tobacco. He reports current alcohol use of about 2.0 standard drinks of alcohol per week. He reports that he does not use drugs.,  has No Known Allergies..  Current Outpatient Medications   Medication Sig Dispense Refill    acetaminophen (TYLENOL) 325 mg tablet Take 2 tablets (650 mg total) by mouth every 6 (six) hours as needed for mild pain, headaches or fever  0    ALPHA LIPOIC ACID-BIOTIN PO Take by mouth      atorvastatin (LIPITOR) 10 mg tablet TAKE 1 TABLET BY MOUTH EVERY  OTHER DAY 45 tablet 3    B COMPLEX VITAMINS PO Take 1 tablet by mouth daily      cephalexin (KEFLEX) 500 mg capsule Take 1 capsule (500 mg total) by mouth every 6 (six) hours for 10 days 40 capsule 0    cetirizine (Allergy Relief Cetirizine) 10 mg tablet Take 1 tablet (10 mg total) by mouth daily 90 tablet 2    Cholecalciferol (VITAMIN D3) 2000 UNITS capsule Take 1 capsule by mouth 2 (two) times a day 5000 units       Coenzyme Q10 (CO Q10) 60 MG CAPS Take 1 capsule by mouth daily      fluocinolone (SYNALAR) 0.01 % external solution       gabapentin (NEURONTIN) 600 MG tablet Take 1 tablet (600 mg total) by mouth 2 (two) times a day 180 tablet 3    Ginkgo Biloba 120 MG TABS Take 1 tablet by mouth daily      hydrocortisone-pramoxine (PRAMOSONE) 1-2.5 % LOTN Apply topically 3 (three) times a day 118 mL 2    Inositol 500 MG TABS Take 1 tablet by mouth 2 (two) times a day      ipratropium (ATROVENT) 0.03 % nasal spray USE 2 SPRAYS IN BOTH  NOSTRILS 3 TIMES DAILY 120 mL 3    ketoconazole (NIZORAL) 2 % shampoo       lutein 6 mg Take 1 capsule by mouth daily      metoprolol tartrate (LOPRESSOR)  25 mg tablet TAKE 1 TABLET BY MOUTH  EVERY 12 HOURS 180 tablet 3    MILK THISTLE PO Take 1 tablet by mouth daily      Misc Natural Products (SUPER SNOOZE) CAPS Take 1 capsule by mouth daily Bed time       Multiple Vitamins-Minerals (OCUVITE-LUTEIN) CAPS Take by mouth daily       mupirocin (BACTROBAN) 2 % ointment       omeprazole (PriLOSEC) 40 MG capsule Take 1 capsule (40 mg total) by mouth 2 (two) times a day 180 capsule 3    Omeprazole 20 MG TBDD       pentoxifylline (TRENtal) 400 mg ER tablet Take 1 tablet (400 mg total) by mouth 3 (three) times a day with meals 270 tablet 3    psyllium (METAMUCIL) 58.6 % packet Take 1 packet by mouth daily      sildenafil (VIAGRA) 50 MG tablet       silver sulfadiazine (SILVADENE,SSD) 1 % cream Apply topically daily 50 g 1    sulfamethoxazole-trimethoprim (BACTRIM DS) 800-160 mg per tablet Take 1 tablet by mouth every 12 (twelve) hours for 10 days 20 tablet 0    tadalafil (CIALIS) 20 MG tablet Take 1 tablet (20 mg total) by mouth every other day as needed for erectile dysfunction 18 tablet 3    trospium chloride (SANCTURA) 20 mg tablet Take 1 tablet (20 mg total) by mouth 2 (two) times a day 60 tablet 11    Zinc 50 MG TABS Take 1 tablet by mouth daily       No current facility-administered medications for this visit.       Review of Systems   Constitutional:  Negative for activity change, appetite change, diaphoresis, fatigue and fever.   HENT: Negative.     Eyes: Negative.    Respiratory:  Negative for apnea, cough, chest tightness, shortness of breath and wheezing.    Cardiovascular:  Negative for chest pain, palpitations and leg swelling.   Gastrointestinal:  Negative for abdominal distention, abdominal pain, anal bleeding, constipation, diarrhea, nausea and vomiting.   Endocrine: Negative for cold intolerance, heat intolerance, polydipsia, polyphagia and polyuria.   Genitourinary:  Negative for difficulty urinating, dysuria, flank pain, hematuria and urgency.  "  Musculoskeletal:  Negative for arthralgias, back pain, gait problem, joint swelling and myalgias.   Skin:  Negative for color change, rash and wound.        Cellulitis left second toe and forefront of foot   Allergic/Immunologic: Negative for environmental allergies, food allergies and immunocompromised state.   Neurological:  Negative for dizziness, seizures, syncope, speech difficulty, numbness and headaches.   Hematological:  Negative for adenopathy. Does not bruise/bleed easily.   Psychiatric/Behavioral:  Negative for agitation, behavioral problems, hallucinations, sleep disturbance and suicidal ideas.          Objective:  Vitals:    01/30/24 1553   BP: 144/82   BP Location: Left arm   Patient Position: Sitting   Cuff Size: Large   Pulse: 57   Temp: (!) 96.7 °F (35.9 °C)   TempSrc: Temporal   SpO2: 97%   Weight: 76.2 kg (168 lb)   Height: 6' 1\" (1.854 m)     Body mass index is 22.16 kg/m².     Physical Exam  Constitutional:       General: He is not in acute distress.     Appearance: He is well-developed. He is not diaphoretic.   HENT:      Head: Normocephalic.      Right Ear: External ear normal.      Left Ear: External ear normal.      Nose: Nose normal.   Eyes:      General: No scleral icterus.        Right eye: No discharge.         Left eye: No discharge.      Conjunctiva/sclera: Conjunctivae normal.      Pupils: Pupils are equal, round, and reactive to light.   Neck:      Thyroid: No thyromegaly.      Trachea: No tracheal deviation.   Cardiovascular:      Rate and Rhythm: Normal rate and regular rhythm.      Heart sounds: Normal heart sounds. No murmur heard.     No friction rub. No gallop.   Pulmonary:      Effort: Pulmonary effort is normal. No respiratory distress.      Breath sounds: Normal breath sounds. No wheezing.   Abdominal:      General: Bowel sounds are normal.      Palpations: Abdomen is soft. There is no mass.      Tenderness: There is no abdominal tenderness. There is no guarding. "   Musculoskeletal:         General: No deformity.      Cervical back: Normal range of motion.   Lymphadenopathy:      Cervical: No cervical adenopathy.   Skin:     General: Skin is warm and dry.      Findings: Erythema and lesion present. No rash.      Comments: Denuded ulcer over second toe left foot and redness and excoriation   Neurological:      Mental Status: He is alert and oriented to person, place, and time.      Cranial Nerves: No cranial nerve deficit.   Psychiatric:         Thought Content: Thought content normal.

## 2024-01-31 ENCOUNTER — HOME HEALTH ADMISSION (OUTPATIENT)
Dept: HOME HEALTH SERVICES | Facility: HOME HEALTHCARE | Age: 89
End: 2024-01-31
Payer: MEDICARE

## 2024-01-31 DIAGNOSIS — Z23 ENCOUNTER FOR IMMUNIZATION: Primary | ICD-10-CM

## 2024-01-31 DIAGNOSIS — Z23 IMMUNIZATION DUE: Primary | ICD-10-CM

## 2024-01-31 RX ORDER — ZOSTER VACCINE RECOMBINANT, ADJUVANTED 50 MCG/0.5
0.5 KIT INTRAMUSCULAR ONCE
Qty: 1 EACH | Refills: 1 | Status: CANCELLED | OUTPATIENT
Start: 2024-01-31 | End: 2024-01-31

## 2024-01-31 RX ORDER — ZOSTER VACCINE RECOMBINANT, ADJUVANTED 50 MCG/0.5
0.5 KIT INTRAMUSCULAR ONCE
Qty: 1 EACH | Refills: 1 | Status: SHIPPED | OUTPATIENT
Start: 2024-01-31 | End: 2024-01-31

## 2024-01-31 NOTE — TELEPHONE ENCOUNTER
[facility] called asking Dr to please order the immunizations so that Pt can have them done at the pharmacy

## 2024-01-31 NOTE — RESULT ENCOUNTER NOTE
Call patient to notify normal results so far no osteomyelitis make sure we call Dr. Dennis's office and ask if they can move his appointment up because he has an open wound over his hammertoe of his second toe of his left foot scheduled to see her in the middle of February but I think he should be seen within a week

## 2024-02-01 ENCOUNTER — HOME CARE VISIT (OUTPATIENT)
Dept: HOME HEALTH SERVICES | Facility: HOME HEALTHCARE | Age: 89
End: 2024-02-01
Payer: MEDICARE

## 2024-02-01 VITALS
TEMPERATURE: 98 F | HEART RATE: 68 BPM | SYSTOLIC BLOOD PRESSURE: 118 MMHG | RESPIRATION RATE: 18 BRPM | OXYGEN SATURATION: 98 % | DIASTOLIC BLOOD PRESSURE: 68 MMHG

## 2024-02-01 PROCEDURE — 400013 VN SOC

## 2024-02-01 PROCEDURE — G0299 HHS/HOSPICE OF RN EA 15 MIN: HCPCS

## 2024-02-01 PROCEDURE — 10330081 VN NO-PAY CLAIM PROCEDURE

## 2024-02-02 PROCEDURE — 10330064 SPONGE, GAUZE 8PLY N/S 4"X4" (200/PK 20P

## 2024-02-02 PROCEDURE — 10330064 CLEANSER, WND SEA-CLEANS 6OZ  COLPLT

## 2024-02-02 PROCEDURE — 10330064 TAPE, ADHSV TRANSPORE WHT 2" (6RL/BX 10B

## 2024-02-02 NOTE — CASE COMMUNICATION
[facility] has admitted your patient to Home Health service with the following disciplines:      SN  This report is informational only, no response is needed  Primary focus of home health care integumentary wound care  Patient stated goals of care for my wound to heal  Anticipated visit pattern and next visit date 1w1 2w2 1w6 next visit 2/5  See medication list   Significant clinical findings weakness  Potential barriers to goal ach ievement wound and diet  Other pertinent information caregiver indep with wound care on days SN not present    Thank you for allowing us to participate in the care of your patient.      Florence Santillan RN       Orbital.../Triceps.../Buccal...

## 2024-02-02 NOTE — CASE COMMUNICATION
Ship to   Clinician     Ordering MD Hoyt (home health only)    Wound 1 left 2nd toe      Full      Frequency  daily    All items are ordered by the each unless otherwise noted.  Orders should be for a 2 week period (unless noted by insurance)    Cleansers  [place] [id number]    Dry Dressings   (Nonsterile gauze not covered by private insurance)  (Sterile gauze may be requested for insurance rather than nonsterile gauze)  Gauze 4x4 N/S  200 4x4s per unit  [id number]     Tape  Transpore white  2in [id number]

## 2024-02-05 ENCOUNTER — HOME CARE VISIT (OUTPATIENT)
Dept: HOME HEALTH SERVICES | Facility: HOME HEALTHCARE | Age: 89
End: 2024-02-05
Payer: MEDICARE

## 2024-02-05 VITALS
RESPIRATION RATE: 18 BRPM | DIASTOLIC BLOOD PRESSURE: 80 MMHG | SYSTOLIC BLOOD PRESSURE: 138 MMHG | TEMPERATURE: 98.4 F | HEART RATE: 78 BPM | OXYGEN SATURATION: 98 %

## 2024-02-05 PROCEDURE — G0299 HHS/HOSPICE OF RN EA 15 MIN: HCPCS

## 2024-02-05 NOTE — CASE COMMUNICATION
Patient states he knocked his head into the bedroom door when up and walking around.  Laceration noted on left face by eye.  Picture entered into Media of chart.  Area eccyhomtic and slightly swollen and patient refused to go to ER for assessment.  Daughter put 2 steri strips on it.  Will cleanse and leave open to air.

## 2024-02-08 ENCOUNTER — HOME CARE VISIT (OUTPATIENT)
Dept: HOME HEALTH SERVICES | Facility: HOME HEALTHCARE | Age: 89
End: 2024-02-08
Payer: MEDICARE

## 2024-02-08 DIAGNOSIS — S91.105A OPEN WOUND OF SECOND TOE OF LEFT FOOT, INITIAL ENCOUNTER: ICD-10-CM

## 2024-02-08 PROCEDURE — G0180 MD CERTIFICATION HHA PATIENT: HCPCS | Performed by: FAMILY MEDICINE

## 2024-02-08 RX ORDER — CEPHALEXIN 500 MG/1
500 CAPSULE ORAL EVERY 6 HOURS SCHEDULED
Qty: 40 CAPSULE | Refills: 0 | Status: SHIPPED | OUTPATIENT
Start: 2024-02-08 | End: 2024-02-18

## 2024-02-08 RX ORDER — SULFAMETHOXAZOLE AND TRIMETHOPRIM 800; 160 MG/1; MG/1
1 TABLET ORAL EVERY 12 HOURS SCHEDULED
Qty: 20 TABLET | Refills: 0 | Status: SHIPPED | OUTPATIENT
Start: 2024-02-08 | End: 2024-02-18

## 2024-02-09 ENCOUNTER — HOME CARE VISIT (OUTPATIENT)
Dept: HOME HEALTH SERVICES | Facility: HOME HEALTHCARE | Age: 89
End: 2024-02-09
Payer: MEDICARE

## 2024-02-09 VITALS
HEART RATE: 78 BPM | SYSTOLIC BLOOD PRESSURE: 126 MMHG | TEMPERATURE: 97.2 F | RESPIRATION RATE: 20 BRPM | OXYGEN SATURATION: 100 % | DIASTOLIC BLOOD PRESSURE: 76 MMHG

## 2024-02-09 PROCEDURE — G0300 HHS/HOSPICE OF LPN EA 15 MIN: HCPCS

## 2024-02-12 ENCOUNTER — HOME CARE VISIT (OUTPATIENT)
Dept: HOME HEALTH SERVICES | Facility: HOME HEALTHCARE | Age: 89
End: 2024-02-12
Payer: MEDICARE

## 2024-02-12 VITALS
TEMPERATURE: 98 F | DIASTOLIC BLOOD PRESSURE: 72 MMHG | HEART RATE: 76 BPM | OXYGEN SATURATION: 96 % | SYSTOLIC BLOOD PRESSURE: 114 MMHG | RESPIRATION RATE: 20 BRPM

## 2024-02-12 PROCEDURE — G0300 HHS/HOSPICE OF LPN EA 15 MIN: HCPCS

## 2024-02-15 ENCOUNTER — HOME CARE VISIT (OUTPATIENT)
Dept: HOME HEALTH SERVICES | Facility: HOME HEALTHCARE | Age: 89
End: 2024-02-15
Payer: MEDICARE

## 2024-02-15 PROCEDURE — G0299 HHS/HOSPICE OF RN EA 15 MIN: HCPCS

## 2024-02-19 ENCOUNTER — TELEPHONE (OUTPATIENT)
Dept: UROLOGY | Facility: CLINIC | Age: 89
End: 2024-02-19

## 2024-02-19 VITALS
OXYGEN SATURATION: 98 % | DIASTOLIC BLOOD PRESSURE: 76 MMHG | TEMPERATURE: 98.3 F | HEART RATE: 74 BPM | SYSTOLIC BLOOD PRESSURE: 132 MMHG | RESPIRATION RATE: 18 BRPM

## 2024-02-22 ENCOUNTER — HOME CARE VISIT (OUTPATIENT)
Dept: HOME HEALTH SERVICES | Facility: HOME HEALTHCARE | Age: 89
End: 2024-02-22
Payer: MEDICARE

## 2024-02-22 PROCEDURE — G0299 HHS/HOSPICE OF RN EA 15 MIN: HCPCS

## 2024-02-25 VITALS
HEART RATE: 76 BPM | RESPIRATION RATE: 18 BRPM | OXYGEN SATURATION: 98 % | SYSTOLIC BLOOD PRESSURE: 132 MMHG | TEMPERATURE: 97.8 F | DIASTOLIC BLOOD PRESSURE: 68 MMHG

## 2024-02-28 ENCOUNTER — HOME CARE VISIT (OUTPATIENT)
Dept: HOME HEALTH SERVICES | Facility: HOME HEALTHCARE | Age: 89
End: 2024-02-28
Payer: MEDICARE

## 2024-02-28 VITALS
TEMPERATURE: 98 F | DIASTOLIC BLOOD PRESSURE: 76 MMHG | SYSTOLIC BLOOD PRESSURE: 136 MMHG | OXYGEN SATURATION: 98 % | HEART RATE: 76 BPM | RESPIRATION RATE: 18 BRPM

## 2024-02-28 PROCEDURE — G0299 HHS/HOSPICE OF RN EA 15 MIN: HCPCS

## 2024-03-06 ENCOUNTER — HOME CARE VISIT (OUTPATIENT)
Dept: HOME HEALTH SERVICES | Facility: HOME HEALTHCARE | Age: 89
End: 2024-03-06
Payer: MEDICARE

## 2024-03-06 VITALS
OXYGEN SATURATION: 98 % | SYSTOLIC BLOOD PRESSURE: 122 MMHG | DIASTOLIC BLOOD PRESSURE: 66 MMHG | HEART RATE: 66 BPM | RESPIRATION RATE: 18 BRPM | TEMPERATURE: 98 F

## 2024-03-06 DIAGNOSIS — I10 ESSENTIAL HYPERTENSION: ICD-10-CM

## 2024-03-06 PROCEDURE — G0299 HHS/HOSPICE OF RN EA 15 MIN: HCPCS

## 2024-03-08 ENCOUNTER — HOSPITAL ENCOUNTER (OUTPATIENT)
Dept: MRI IMAGING | Facility: HOSPITAL | Age: 89
End: 2024-03-08
Attending: STUDENT IN AN ORGANIZED HEALTH CARE EDUCATION/TRAINING PROGRAM
Payer: MEDICARE

## 2024-03-08 ENCOUNTER — HOSPITAL ENCOUNTER (OUTPATIENT)
Dept: RADIOLOGY | Facility: CLINIC | Age: 89
End: 2024-03-08
Payer: MEDICARE

## 2024-03-08 ENCOUNTER — TELEPHONE (OUTPATIENT)
Age: 89
End: 2024-03-08

## 2024-03-08 ENCOUNTER — OFFICE VISIT (OUTPATIENT)
Dept: PODIATRY | Age: 89
End: 2024-03-08

## 2024-03-08 VITALS
OXYGEN SATURATION: 97 % | WEIGHT: 166.2 LBS | TEMPERATURE: 97.5 F | DIASTOLIC BLOOD PRESSURE: 70 MMHG | HEIGHT: 73 IN | SYSTOLIC BLOOD PRESSURE: 100 MMHG | HEART RATE: 58 BPM | BODY MASS INDEX: 22.03 KG/M2

## 2024-03-08 DIAGNOSIS — S91.105A OPEN WOUND OF SECOND TOE OF LEFT FOOT, INITIAL ENCOUNTER: Primary | ICD-10-CM

## 2024-03-08 DIAGNOSIS — S91.105A OPEN WOUND OF SECOND TOE OF LEFT FOOT, INITIAL ENCOUNTER: ICD-10-CM

## 2024-03-08 DIAGNOSIS — I73.9 PVD (PERIPHERAL VASCULAR DISEASE) (HCC): ICD-10-CM

## 2024-03-08 DIAGNOSIS — B35.1 ONYCHOMYCOSIS: ICD-10-CM

## 2024-03-08 PROCEDURE — 99213 OFFICE O/P EST LOW 20 MIN: CPT | Performed by: STUDENT IN AN ORGANIZED HEALTH CARE EDUCATION/TRAINING PROGRAM

## 2024-03-08 PROCEDURE — 73660 X-RAY EXAM OF TOE(S): CPT

## 2024-03-08 PROCEDURE — 97597 DBRDMT OPN WND 1ST 20 CM/<: CPT | Performed by: STUDENT IN AN ORGANIZED HEALTH CARE EDUCATION/TRAINING PROGRAM

## 2024-03-08 PROCEDURE — 11721 DEBRIDE NAIL 6 OR MORE: CPT | Performed by: STUDENT IN AN ORGANIZED HEALTH CARE EDUCATION/TRAINING PROGRAM

## 2024-03-08 PROCEDURE — 73718 MRI LOWER EXTREMITY W/O DYE: CPT

## 2024-03-08 NOTE — TELEPHONE ENCOUNTER
Caller:  [facility]    Doctor/Office: Dr. Cabrera/Jonh    #: [phone] option 3 (ask to speak to Dr. Knight)    Escalation: Care Eleonora from radiology calling. She would like Dr. Cabrera to call Dr. Knight in regards to patient Abdirahman. Thank you

## 2024-03-08 NOTE — PROGRESS NOTES
Assessment/Plan:     Diagnoses and all orders for this visit:    Open wound of second toe of left foot, initial encounter  -     XR toe left second min 2 views; Future  -     MRI foot/forefoot toes left wo contrast; Future  -     Ambulatory Referral to Wound Care; Future  -     Post Op Shoe    PVD (peripheral vascular disease) (HCC)  -     Ambulatory Referral to Wound Care; Future    Onychomycosis    Other orders  -     Debridement          IMAGING REVIEWED AT THIS VISIT:  Left 2nd toe XR 3/8/24: questionable tiny erosion to dorsal head of proximal phalanx   XR left 2nd toe 1/3024: possible osseous erosion to dorsal base of middle phalanx  XR left foot 1/30/24: no acute changes    Prior Imaging  LEADs 11/17/23: RLE 0.97/137/123. LLE diffuse dz through fem-pop w/ sig focal stenosis, heavy calcified plaque, diffuse tibioperoneal dz, 0.80/142/96.   VDUS 10/23/23: neg DVT    IMPRESSION:  PVD w/ venous insufficiency (Q8) with onychomycosis   PAD LLE  New Left 2nd toe ulceration w/ HT     PLAN:  Left dorsal 2nd toe PIPJ ulceration is new since I saw patient last. I ordered STAT MRI to assess for OM as the wound does appear deep. Dermagran dsd Q2-3d. WBAT surgical shoe which was dispensed today. F/u 1wk -[facility] (referral made).   I reviewed vascular consult from 12/20/23. No acute intervention planned  I reviewed urgent care note form 12/24/23  I reviewed PCP note form 1/30/24. Rx abx, VNA, silvadene cream  Debridement of toenails x10. Using nail nipper, steffanie, and curette, nails were sharply debrided, reduced in thickness and length. Devitalized nail tissue and fungal debris excised and removed. Patient tolerated well.    Discussed proper shoe gear, daily inspections of feet, and general foot health with patient.   Patient has Q8 findings and is recommended for at risk foot care every 9-10 weeks.    Debridement   Wound 01/30/24 Other (comment) Toe D2, second Left    Universal Protocol:  Consent: Verbal  "consent obtained.  Risks and benefits: risks, benefits and alternatives were discussed  Consent given by: patient  Time out: Immediately prior to procedure a \"time out\" was called to verify the correct patient, procedure, equipment, support staff and site/side marked as required.  Patient understanding: patient states understanding of the procedure being performed  Patient consent: the patient's understanding of the procedure matches consent given    Debridement Details  Performed by: physician  Debridement type: selective      Post-debridement measurements  Length (cm): 2  Width (cm): 2  Depth (cm): 0.1  Percent debrided: 100%  Surface Area (cm^2): 4  Area Debrided (cm^2): 4  Volume (cm^3): 0.4    Devitalized tissue debrided: biofilm and callus  Instrument(s) utilized: blade  Bleeding: none  Hemostasis obtained with: not applicable  Procedural pain (0-10): 1  Post-procedural pain: 1   Response to treatment: procedure was tolerated well         Subjective:      Patient ID: Abdirahman Hope is a 88 y.o. male.    Abdirahman presents to clinic today concerning assistance with nail care. Nails are long and thick and he cannot reach to cut them. Also notes left 2nd toe wound which opened after he saw me last, this is the first time I am notified of this. Notes he fell early December and scrabed his toe. Since then it has been draining but does get scabbed over. Some tenderness. Has seen urgent care and PCP. Saw vascular.         The following portions of the patient's history were reviewed and updated as appropriate: allergies, current medications, past family history, past medical history, past social history, past surgical history, and problem list.    Review of Systems   Constitutional:  Negative for activity change, chills and fever.   HENT: Negative.     Respiratory:  Negative for cough, chest tightness and shortness of breath.    Cardiovascular:  Negative for chest pain and leg swelling.   Endocrine: Negative.  " "  Genitourinary: Negative.    Skin:  Positive for wound.        Toenail dystrophy   Neurological: Negative.  Negative for numbness.   Psychiatric/Behavioral: Negative.  Negative for agitation and behavioral problems.          Objective:      /70   Pulse 58   Temp 97.5 °F (36.4 °C) (Temporal)   Ht 6' 1\" (1.854 m)   Wt 75.4 kg (166 lb 3.2 oz)   SpO2 97%   BMI 21.93 kg/m²          Physical Exam  Constitutional:       General: He is not in acute distress.     Appearance: Normal appearance. He is not ill-appearing.   Cardiovascular:      Comments: Bilateral DP and PT pulses are nonpalpable. Pedal hair is absent. Legs to toes warm to cool. Varicosities with mild LE edema noted.  Pulmonary:      Effort: No respiratory distress.   Musculoskeletal:         General: Deformity (HTs) present. No tenderness. Normal range of motion.   Skin:     Capillary Refill: Capillary refill takes less than 2 seconds.      Findings: Lesion (Left dorsal 2nd toe wound at PIPJ with tendon likely exposed, no purulence or acute SOI.) present.      Comments: B/L LE skin is atrophic - thin, dry and shiny in appearance.   Left dorsal 2nd toe with DTI. No open wound.  Toenails x10 are elongated, dystrophic, discolored with thickening and subungual debris.   Neurological:      General: No focal deficit present.      Mental Status: He is alert and oriented to person, place, and time.      Comments: Gross sensation to feet intact. Patient denies numbness, tingling and burning to B/L feet.    Psychiatric:         Mood and Affect: Mood normal.         Behavior: Behavior normal.           "

## 2024-03-13 ENCOUNTER — HOME CARE VISIT (OUTPATIENT)
Dept: HOME HEALTH SERVICES | Facility: HOME HEALTHCARE | Age: 89
End: 2024-03-13
Payer: MEDICARE

## 2024-03-13 PROCEDURE — G0299 HHS/HOSPICE OF RN EA 15 MIN: HCPCS

## 2024-03-14 ENCOUNTER — PROCEDURE VISIT (OUTPATIENT)
Dept: UROLOGY | Facility: CLINIC | Age: 89
End: 2024-03-14
Payer: MEDICARE

## 2024-03-14 ENCOUNTER — TELEPHONE (OUTPATIENT)
Dept: PODIATRY | Age: 89
End: 2024-03-14

## 2024-03-14 VITALS
OXYGEN SATURATION: 98 % | DIASTOLIC BLOOD PRESSURE: 66 MMHG | TEMPERATURE: 98.4 F | SYSTOLIC BLOOD PRESSURE: 124 MMHG | RESPIRATION RATE: 18 BRPM | HEART RATE: 72 BPM

## 2024-03-14 VITALS
SYSTOLIC BLOOD PRESSURE: 120 MMHG | WEIGHT: 162 LBS | BODY MASS INDEX: 21.37 KG/M2 | HEART RATE: 60 BPM | DIASTOLIC BLOOD PRESSURE: 74 MMHG

## 2024-03-14 DIAGNOSIS — N32.81 OAB (OVERACTIVE BLADDER): ICD-10-CM

## 2024-03-14 DIAGNOSIS — C67.8 MALIGNANT NEOPLASM OF OVERLAPPING SITES OF BLADDER (HCC): Primary | ICD-10-CM

## 2024-03-14 DIAGNOSIS — R35.1 NOCTURIA: ICD-10-CM

## 2024-03-14 DIAGNOSIS — R35.0 URINARY FREQUENCY: ICD-10-CM

## 2024-03-14 PROCEDURE — 99213 OFFICE O/P EST LOW 20 MIN: CPT | Performed by: UROLOGY

## 2024-03-14 PROCEDURE — 52000 CYSTOURETHROSCOPY: CPT | Performed by: UROLOGY

## 2024-03-14 RX ORDER — CEPHALEXIN 500 MG/1
500 CAPSULE ORAL ONCE
Qty: 1 CAPSULE | Refills: 0 | Status: SHIPPED | OUTPATIENT
Start: 2024-03-14 | End: 2024-03-14

## 2024-03-14 RX ORDER — TROSPIUM CHLORIDE 20 MG/1
20 TABLET, FILM COATED ORAL 2 TIMES DAILY
Qty: 180 TABLET | Refills: 3 | Status: SHIPPED | OUTPATIENT
Start: 2024-03-14 | End: 2024-06-12

## 2024-03-14 NOTE — PROGRESS NOTES
UROLOGY PROGRESS NOTE   [facility]  [address] [place] [address] [place]  [address]  [place] [address]  [phone]  Fax:[phone]  [email or web address]      NAME: Abdirahman Hope  AGE: 88 y.o. SEX: male  : 1935   MRN: [id number]    DATE: 3/14/2024  TIME: 1:45 PM    Assessment and Plan:    Bladder cancer, superficial with recurrence right lateral trigone and 10:00 right bladder neck.  He wishes to observe these right now.  He says he has other things going on with his health.  We will set him up for another cystoscopy in 6 months and he knows if he starts to bleed he will come in sooner and we will take care of things.    Urinary frequency: Good response to trospium.  I sent this into [facility].                 Chief Complaint   No chief complaint on file.      History of Present Illness   Follow-up bladder cancer status post TURBT of a 1 cm tumor right lateral wall removed with cup forceps and the rest fulgurated by me 2023. He has small papillary recurrence very superficial that were scraped away and fulgurated. Pathology shows high-grade papillary urothelial carcinoma with foci suggesting superficial invasion into papillary stalk. There was a minute portion of benign thick muscle present favor limited sampling of muscularis propria.  Here for follow-up surveillance cystoscopy.  Urinary frequency:    Last office visit 2023 I advised him to restart trospium 20 mg twice daily.  He is doing pretty well with this and would like a refill to [facility].  This has reduced his frequency from every hour and a half to every 3-4 hours.  He also is practicing fluid restriction.       Cystoscopy     Date/Time  3/14/2024 1:30 PM     Performed by  Adalberto Rasmussen MD   Authorized by  Adalberto Rasmussen MD     Universal Protocol:  Consent: Verbal consent obtained. Written consent obtained.      Procedure Details:  Procedure type: cystoscopy    Additional Procedure Details: Cystoscopy Procedure  Note        Pre-operative Diagnosis: Bladder cancer surveillance    Post-operative Diagnosis: Bladder cancer surveillance, superficial recurrence right lateral trigone, 10:00 bladder neck    Procedure: Flexible cystoscopy    Surgeon: Adalberto Rasmussen MD    Anesthesia: 1% Xylocaine per urethra    EBL: Minimal    Complications: none    Procedure Details   The risks, benefits, complications, treatment options, and expected outcomes were discussed with the patient. The patient concurred with the proposed plan, giving informed consent.    Cystoscopy was performed today under local anesthesia, using sterile technique. The patient was placed in the supine position, prepped with Betadine, and draped in the usual sterile fashion. The flexible cystocope was used to inspect both the urethra and bladder    Findings:  Urethra: Normal without stricture.  Prostate is occlusive.  Major median lobe.    Bladder: Trabeculated and there was superficial papillary recurrence right lateral trigone, and at 10:00 at the bladder neck..  The orifices were orthotopic and intact.           Specimens: none                 Complications:  None           Disposition: To home            Condition:  Stable              The following portions of the patient's history were reviewed and updated as appropriate: allergies, current medications, past family history, past medical history, past social history, past surgical history and problem list.  Past Medical History:   Diagnosis Date    Anemia     Appendicitis     Coronary artery disease     Detached retina     GERD (gastroesophageal reflux disease)     Hyperlipidemia     Hypertension     Macular degeneration     Neuropathy     Pelvis fracture (HCC) 01/2021    Von Willebrand disease (HCC)      Past Surgical History:   Procedure Laterality Date    ADENOIDECTOMY      APPENDECTOMY      ARTHRODESIS  01/15/2014    Lumbar     BACK SURGERY      CATARACT EXTRACTION      COLONOSCOPY  12/09/2016    fiberoptic      CYSTOSCOPY      with resection of tumor / 1/12/17, 10/2/17 / diagnostic 12/8/16, 5/30/17     CYSTOSCOPY  09/20/2018    CYSTOSCOPY  06/22/2020    EGD AND COLONOSCOPY N/A 12/9/2016    Procedure: EGD AND COLONOSCOPY;  Surgeon: Kahlil Alfonso MD;  Location: [facility];  Service:     EYE SURGERY      FRACTURE SURGERY Bilateral     ARM    NECK SURGERY      NEUROPLASTY / TRANSPOSITION MEDIAN NERVE AT CARPAL TUNNEL Right 04/2015    MO BLADDER INSTILLATION ANTICARCINOGENIC AGENT N/A 1/12/2017    Procedure: INSTILLATION MYTOMYCIN;  Surgeon: Jhon Zuniga MD;  Location: [facility];  Service: Urology    MO BLADDER INSTILLATION ANTICARCINOGENIC AGENT N/A 10/2/2017    Procedure: INSTILLATION MYTOMYCIN;  Surgeon: Jhon Zuniga MD;  Location: [facility];  Service: Urology    MO COLONOSCOPY FLX DX W/COLLJ SPEC WHEN PFRMD N/A 2/5/2019    Procedure: COLONOSCOPY;  Surgeon: Abad Valentino MD;  Location: [facility];  Service: Gastroenterology    MO CYSTO W/REMOVAL OF LESIONS SMALL N/A 1/12/2017    Procedure: CYSTOSCOPY, BLADDER BIOPSY WITH FULGRATION, 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;  Surgeon: Jhon Zuniga MD;  [facility]: [facility];  Service: Urology    MO CYSTO W/REMOVAL OF LESIONS SMALL N/A 10/2/2017    Procedure: CYSTOSCOPY WITH  BLADDER BIOPSIES WITH FULGURATION;  Surgeon: Jhon Zuniga MD;  [facility]: [facility];  Service: Urology    MO CYSTO W/REMOVAL OF LESIONS SMALL N/A 11/30/2023    Procedure: CYSTOSCOPY WITH BIOPSIES and fulguration;  Surgeon: Adalberto Rasmussen MD;  [facility];  Service: Urology    MO ESOPHAGOGASTRODUODENOSCOPY TRANSORAL DIAGNOSTIC N/A 8/3/2018    Procedure: ESOPHAGOGASTRODUODENOSCOPY (EGD);  Surgeon: Abad Valentino MD;  [facility]: [facility];  Service: Gastroenterology    RETINAL DETACHMENT SURGERY Right 03/2016    complete air fill confirmed     ROTATOR CUFF REPAIR      SHOULDER SURGERY  03/03/2015    proximal humerus fracture / LA...3/6/15   R...1/3/18      TONSILECTOMY AND ADNOIDECTOMY      TONSILLECTOMY      TRANSURETHRAL RESECTION OF BLADDER TUMOR N/A 10/2/2017    Procedure: TRANSURETHRAL RESECTION OF BLADDER TUMOR (TURBT);  Surgeon: Jhon Zuniga MD;  [facility]: [facility];  Service: Urology    VON WILLEBRAND ANTIGEN (HISTORICAL)       shoulder  Review of Systems   Review of Systems   Genitourinary:         As per HPI       Active Problem List     Patient Active Problem List   Diagnosis    Bladder cancer (HCC)    Lung nodule, solitary    Erectile dysfunction    Gait instability    High blood pressure    Hypothyroidism    Idiopathic peripheral neuropathy    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)    Cervical stenosis of spinal canal    Spinal stenosis of lumbar region    Spondylosis 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    Hoarse voice quality    Appetite loss    Weight loss    Gastroesophageal reflux disease without esophagitis    Alternating constipation and diarrhea    Weight loss, non-intentional    Nausea    Chronic obstructive pulmonary disease with acute exacerbation (HCC)    Von Willebrand's disease (HCC)    Radiculopathy, lumbar region    Fall    Anemia    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   Constitutional:       Appearance: Normal appearance.   HENT:      Head: Normocephalic and atraumatic.   Eyes:      Extraocular Movements: Extraocular movements intact.   Pulmonary:      Effort: Pulmonary effort is normal.   Genitourinary:     Penis: Normal.    Musculoskeletal:         General: Normal range of motion.       Cervical back: Normal range of motion.   Skin:     Coloration: Skin is not jaundiced or pale.   Neurological:      General: No focal deficit present.      Mental Status: He is alert and oriented to person, place, and time. Mental status is at baseline.   Psychiatric:         Mood and Affect: Mood normal.         Behavior: Behavior normal.         Thought Content: Thought content normal.         Judgment: Judgment normal.             Current Medications     Current Outpatient Medications:     acetaminophen (TYLENOL) 325 mg tablet, Take 2 tablets (650 mg total) by mouth every 6 (six) hours as needed for mild pain, headaches or fever, Disp:  , Rfl: 0    ALPHA LIPOIC ACID-BIOTIN PO, Take by mouth, Disp: , Rfl:     atorvastatin (LIPITOR) 10 mg tablet, TAKE 1 TABLET BY MOUTH EVERY  OTHER DAY, Disp: 45 tablet, Rfl: 3    B COMPLEX VITAMINS PO, Take 1 tablet by mouth daily, Disp: , Rfl:     cephalexin (KEFLEX) 500 mg capsule, Take 1 capsule (500 mg total) by mouth 1 (one) time for 1 dose Take after procedure, Disp: 1 capsule, Rfl: 0    cetirizine (Allergy Relief Cetirizine) 10 mg tablet, Take 1 tablet (10 mg total) by mouth daily, Disp: 90 tablet, Rfl: 2    Cholecalciferol (VITAMIN D3) 2000 UNITS capsule, Take 1 capsule by mouth 2 (two) times a day 5000 units , Disp: , Rfl:     Coenzyme Q10 (CO Q10) 60 MG CAPS, Take 1 capsule by mouth daily, Disp: , Rfl:     fluocinolone (SYNALAR) 0.01 % external solution, , Disp: , Rfl:     gabapentin (NEURONTIN) 600 MG tablet, Take 1 tablet (600 mg total) by mouth 2 (two) times a day, Disp: 180 tablet, Rfl: 3    Ginkgo Biloba 120 MG TABS, Take 1 tablet by mouth daily, Disp: , Rfl:     hydrocortisone-pramoxine (PRAMOSONE) 1-2.5 % LOTN, Apply topically 3 (three) times a day, Disp: 118 mL, Rfl: 2    Inositol 500 MG TABS, Take 1 tablet by mouth 2 (two) times a day, Disp: , Rfl:     ipratropium (ATROVENT) 0.03 % nasal spray, USE 2 SPRAYS IN BOTH  NOSTRILS 3 TIMES DAILY, Disp: 120 mL, Rfl: 3     ketoconazole (NIZORAL) 2 % shampoo, , Disp: , Rfl:     lutein 6 mg, Take 1 capsule by mouth daily, Disp: , Rfl:     metoprolol tartrate (LOPRESSOR) 25 mg tablet, TAKE 1 TABLET BY MOUTH EVERY 12  HOURS, Disp: 180 tablet, Rfl: 3    MILK THISTLE PO, Take 1 tablet by mouth daily, Disp: , Rfl:     Misc Natural Products (SUPER SNOOZE) CAPS, Take 1 capsule by mouth daily Bed time , Disp: , Rfl:     Multiple Vitamins-Minerals (OCUVITE-LUTEIN) CAPS, Take by mouth daily , Disp: , Rfl:     mupirocin (BACTROBAN) 2 % ointment, , Disp: , Rfl:     omeprazole (PriLOSEC) 40 MG capsule, Take 1 capsule (40 mg total) by mouth 2 (two) times a day, Disp: 180 capsule, Rfl: 3    pentoxifylline (TRENtal) 400 mg ER tablet, Take 1 tablet (400 mg total) by mouth 3 (three) times a day with meals, Disp: 270 tablet, Rfl: 3    psyllium (METAMUCIL) 58.6 % packet, Take 1 packet by mouth daily, Disp: , Rfl:     sildenafil (VIAGRA) 50 MG tablet, , Disp: , Rfl:     silver sulfadiazine (SILVADENE,SSD) 1 % cream, Apply topically daily, Disp: 50 g, Rfl: 1    tadalafil (CIALIS) 20 MG tablet, Take 1 tablet (20 mg total) by mouth every other day as needed for erectile dysfunction, Disp: 18 tablet, Rfl: 3    trospium chloride (SANCTURA) 20 mg tablet, Take 1 tablet (20 mg total) by mouth 2 (two) times a day, Disp: 180 tablet, Rfl: 3    Zinc 50 MG TABS, Take 1 tablet by mouth daily, Disp: , Rfl:     Omeprazole 20 MG TBDD, , Disp: , Rfl:         Adalberto Rasmussen MD

## 2024-03-14 NOTE — LETTER
2024     Varun Hoyt DO  [address] [facility] [address]    Patient: Abdirahman Hope   YOB: 1935   Date of Visit: 3/14/2024       Dear Dr. Hoyt:  Here are my most recent notes.  Hope you are well.         Sincerely,        Adalberto Rasmussen MD        CC: No Recipients    Adalberto Rasmussen MD  3/14/2024  1:45 PM  Sign when Signing Visit  UROLOGY PROGRESS NOTE   [facility]  [address] [place] [address] [place]  [address]  [place] [address]  [phone]  Fax:[phone]  [email or web address]      NAME: Abdirahman Hope  AGE: 88 y.o. SEX: male  : 1935   MRN: [id number]    DATE: 3/14/2024  TIME: 1:45 PM    Assessment and Plan:    Bladder cancer, superficial with recurrence right lateral trigone and 10:00 right bladder neck.  He wishes to observe these right now.  He says he has other things going on with his health.  We will set him up for another cystoscopy in 6 months and he knows if he starts to bleed he will come in sooner and we will take care of things.    Urinary frequency: Good response to trospium.  I sent this into [facility].                 Chief Complaint   No chief complaint on file.      History of Present Illness   Follow-up bladder cancer status post TURBT of a 1 cm tumor right lateral wall removed with cup forceps and the rest fulgurated by me 2023. He has small papillary recurrence very superficial that were scraped away and fulgurated. Pathology shows high-grade papillary urothelial carcinoma with foci suggesting superficial invasion into papillary stalk. There was a minute portion of benign thick muscle present favor limited sampling of muscularis propria.  Here for follow-up surveillance cystoscopy.  Urinary frequency:    Last office visit 2023 I advised him to restart trospium 20 mg twice daily.  He is doing pretty well with this and would like a refill to [facility].  This has reduced his frequency from every hour and a half to every  3-4 hours.  He also is practicing fluid restriction.       Cystoscopy     Date/Time  3/14/2024 1:30 PM     Performed by  Adalberto Rasmussen MD   Authorized by  Adalberto Rasmussen MD     Universal Protocol:  Consent: Verbal consent obtained. Written consent obtained.      Procedure Details:  Procedure type: cystoscopy    Additional Procedure Details: Cystoscopy Procedure Note        Pre-operative Diagnosis: Bladder cancer surveillance    Post-operative Diagnosis: Bladder cancer surveillance, superficial recurrence right lateral trigone, 10:00 bladder neck    Procedure: Flexible cystoscopy    Surgeon: Adalberto Rasmussen MD    Anesthesia: 1% Xylocaine per urethra    EBL: Minimal    Complications: none    Procedure Details   The risks, benefits, complications, treatment options, and expected outcomes were discussed with the patient. The patient concurred with the proposed plan, giving informed consent.    Cystoscopy was performed today under local anesthesia, using sterile technique. The patient was placed in the supine position, prepped with Betadine, and draped in the usual sterile fashion. The flexible cystocope was used to inspect both the urethra and bladder    Findings:  Urethra: Normal without stricture.  Prostate is occlusive.  Major median lobe.    Bladder: Trabeculated and there was superficial papillary recurrence right lateral trigone, and at 10:00 at the bladder neck..  The orifices were orthotopic and intact.           Specimens: none                 Complications:  None           Disposition: To home            Condition:  Stable              The following portions of the patient's history were reviewed and updated as appropriate: allergies, current medications, past family history, past medical history, past social history, past surgical history and problem list.  Past Medical History:   Diagnosis Date   • Anemia    • Appendicitis    • Coronary artery disease    • Detached retina    • GERD (gastroesophageal reflux  disease)    • Hyperlipidemia    • Hypertension    • Macular degeneration    • Neuropathy    • Pelvis fracture (HCC) 01/2021   • Von Willebrand disease (HCC)      Past Surgical History:   Procedure Laterality Date   • ADENOIDECTOMY     • APPENDECTOMY     • ARTHRODESIS  01/15/2014    Lumbar    • BACK SURGERY     • CATARACT EXTRACTION     • COLONOSCOPY  12/09/2016    fiberoptic    • CYSTOSCOPY      with resection of tumor / 1/12/17, 10/2/17 / diagnostic 12/8/16, 5/30/17    • CYSTOSCOPY  09/20/2018   • CYSTOSCOPY  06/22/2020   • EGD AND COLONOSCOPY N/A 12/9/2016    Procedure: EGD AND COLONOSCOPY;  Surgeon: Kahlil Alfonso MD;  Location: [facility];  Service:    • EYE SURGERY     • FRACTURE SURGERY Bilateral     ARM   • NECK SURGERY     • NEUROPLASTY / TRANSPOSITION MEDIAN NERVE AT CARPAL TUNNEL Right 04/2015   • ND BLADDER INSTILLATION ANTICARCINOGENIC AGENT N/A 1/12/2017    Procedure: INSTILLATION MYTOMYCIN;  Surgeon: Jhon Zuniga MD;  Location: [facility];  Service: Urology   • ND BLADDER INSTILLATION ANTICARCINOGENIC AGENT N/A 10/2/2017    Procedure: INSTILLATION MYTOMYCIN;  Surgeon: Jhon Zuniga MD;  Location: [facility];  Service: Urology   • ND COLONOSCOPY FLX DX W/COLLJ SPEC WHEN PFRMD N/A 2/5/2019    Procedure: COLONOSCOPY;  Surgeon: Abad Valentino MD;  Location: [facility];  Service: Gastroenterology   • ND CYSTO W/REMOVAL OF LESIONS SMALL N/A 1/12/2017    Procedure: CYSTOSCOPY, BLADDER BIOPSY WITH FULGRATION, 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;  Surgeon: Jhon Zuniga MD;  Location: [facility];  Service: Urology   • ND CYSTO W/REMOVAL OF LESIONS SMALL N/A 10/2/2017    Procedure: CYSTOSCOPY WITH  BLADDER BIOPSIES WITH FULGURATION;  Surgeon: Jhon Zuniga MD;  Location: [facility];  Service: Urology   • ND CYSTO W/REMOVAL OF LESIONS SMALL N/A 11/30/2023    Procedure: CYSTOSCOPY WITH BIOPSIES and fulguration;  Surgeon: Adalberto Rasmussen MD;  Location: [facility];  Service:  Urology   • TN ESOPHAGOGASTRODUODENOSCOPY TRANSORAL DIAGNOSTIC N/A 8/3/2018    Procedure: ESOPHAGOGASTRODUODENOSCOPY (EGD);  Surgeon: Abad Valentino MD;  Location: [facility];  Service: Gastroenterology   • RETINAL DETACHMENT SURGERY Right 03/2016    complete air fill confirmed    • ROTATOR CUFF REPAIR     • SHOULDER SURGERY  03/03/2015    proximal humerus fracture / LA...3/6/15   R...1/3/18    • TONSILECTOMY AND ADNOIDECTOMY     • TONSILLECTOMY     • TRANSURETHRAL RESECTION OF BLADDER TUMOR N/A 10/2/2017    Procedure: TRANSURETHRAL RESECTION OF BLADDER TUMOR (TURBT);  Surgeon: Jhon Zuniga MD;  Location: [facility];  Service: Urology   • VON WILLEBRAND ANTIGEN (HISTORICAL)       shoulder  Review of Systems   Review of Systems   Genitourinary:         As per [facility]       Active Problem List     Patient Active Problem List   Diagnosis   • Bladder cancer (HCC)   • Lung nodule, solitary   • Erectile dysfunction   • Gait instability   • High blood pressure   • Hypothyroidism   • Idiopathic peripheral neuropathy   • Bilateral exudative age-related macular degeneration ([facility])   • Cervical stenosis of spinal canal   • Spinal stenosis of lumbar region   • Spondylosis 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   • Hoarse voice quality   • Appetite loss   • Weight loss   • Gastroesophageal reflux disease without esophagitis   • Alternating constipation and diarrhea   • Weight loss, non-intentional   • Nausea   • Chronic obstructive pulmonary disease with acute exacerbation ([facility])   • Von Willebrand's disease ([facility])   • Radiculopathy, lumbar region   • Fall   • Anemia   • Contusion of cerebral cortex with concussion ([facility])   • Urinary frequency   • 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   Constitutional:       Appearance: Normal appearance.   HENT:      Head: Normocephalic and atraumatic.   Eyes:      Extraocular Movements: Extraocular movements intact.   Pulmonary:      Effort: Pulmonary effort is normal.   Genitourinary:     Penis: Normal.    Musculoskeletal:         General: Normal range of motion.      Cervical back: Normal range of motion.   Skin:     Coloration: Skin is not jaundiced or pale.   Neurological:      General: No focal deficit present.      Mental Status: He is alert and oriented to person, place, and time. Mental status is at baseline.   Psychiatric:         Mood and Affect: Mood normal.         Behavior: Behavior normal.         Thought Content: Thought content normal.         Judgment: Judgment normal.             Current Medications     Current Outpatient Medications:   •  acetaminophen (TYLENOL) 325 mg tablet, Take 2 tablets (650 mg total) by mouth every 6 (six) hours as needed for mild pain, headaches or fever, Disp:  , Rfl: 0  •  ALPHA LIPOIC ACID-BIOTIN PO, Take by mouth, Disp: , Rfl:   •  atorvastatin (LIPITOR) 10 mg tablet, TAKE 1 TABLET BY MOUTH EVERY  OTHER DAY, Disp: 45 tablet, Rfl: 3  •  B COMPLEX VITAMINS PO, Take 1 tablet by mouth daily, Disp: , Rfl:   •  cephalexin (KEFLEX) 500 mg capsule, Take 1 capsule (500 mg total) by mouth 1 (one) time for 1 dose Take after procedure, Disp: 1 capsule, Rfl: 0  •  cetirizine (Allergy Relief Cetirizine) 10 mg tablet, Take 1 tablet (10 mg total) by mouth daily, Disp: 90 tablet, Rfl: 2  •  Cholecalciferol (VITAMIN D3) 2000 UNITS capsule, Take 1 capsule by mouth 2 (two) times a day 5000 units , Disp: , Rfl:   •  Coenzyme Q10 (CO Q10) 60 MG CAPS, Take 1 capsule by mouth daily, Disp: , Rfl:   •  fluocinolone (SYNALAR) 0.01 % external solution, , Disp: , Rfl:   •  gabapentin (NEURONTIN) 600 MG tablet, Take 1 tablet (600 mg total) by  mouth 2 (two) times a day, Disp: 180 tablet, Rfl: 3  •  Ginkgo Biloba 120 MG TABS, Take 1 tablet by mouth daily, Disp: , Rfl:   •  hydrocortisone-pramoxine (PRAMOSONE) 1-2.5 % LOTN, Apply topically 3 (three) times a day, Disp: 118 mL, Rfl: 2  •  Inositol 500 MG TABS, Take 1 tablet by mouth 2 (two) times a day, Disp: , Rfl:   •  ipratropium (ATROVENT) 0.03 % nasal spray, USE 2 SPRAYS IN BOTH  NOSTRILS 3 TIMES DAILY, Disp: 120 mL, Rfl: 3  •  ketoconazole (NIZORAL) 2 % shampoo, , Disp: , Rfl:   •  lutein 6 mg, Take 1 capsule by mouth daily, Disp: , Rfl:   •  metoprolol tartrate (LOPRESSOR) 25 mg tablet, TAKE 1 TABLET BY MOUTH EVERY 12  HOURS, Disp: 180 tablet, Rfl: 3  •  MILK THISTLE PO, Take 1 tablet by mouth daily, Disp: , Rfl:   •  Misc Natural Products (SUPER SNOOZE) CAPS, Take 1 capsule by mouth daily Bed time , Disp: , Rfl:   •  Multiple Vitamins-Minerals (OCUVITE-LUTEIN) CAPS, Take by mouth daily , Disp: , Rfl:   •  mupirocin (BACTROBAN) 2 % ointment, , Disp: , Rfl:   •  omeprazole (PriLOSEC) 40 MG capsule, Take 1 capsule (40 mg total) by mouth 2 (two) times a day, Disp: 180 capsule, Rfl: 3  •  pentoxifylline (TRENtal) 400 mg ER tablet, Take 1 tablet (400 mg total) by mouth 3 (three) times a day with meals, Disp: 270 tablet, Rfl: 3  •  psyllium (METAMUCIL) 58.6 % packet, Take 1 packet by mouth daily, Disp: , Rfl:   •  sildenafil (VIAGRA) 50 MG tablet, , Disp: , Rfl:   •  silver sulfadiazine (SILVADENE,SSD) 1 % cream, Apply topically daily, Disp: 50 g, Rfl: 1  •  tadalafil (CIALIS) 20 MG tablet, Take 1 tablet (20 mg total) by mouth every other day as needed for erectile dysfunction, Disp: 18 tablet, Rfl: 3  •  trospium chloride (SANCTURA) 20 mg tablet, Take 1 tablet (20 mg total) by mouth 2 (two) times a day, Disp: 180 tablet, Rfl: 3  •  Zinc 50 MG TABS, Take 1 tablet by mouth daily, Disp: , Rfl:   •  Omeprazole 20 MG TBDD, , Disp: , Rfl:         Adalberto Rasmussen MD

## 2024-03-14 NOTE — TELEPHONE ENCOUNTER
----- Message from Geni Cabrera DPM sent at 3/14/2024 10:58 AM EDT -----  Please call the patient regarding this result. The MRI is showing no definitive bone infection however it could go on to bone infection if it does not heal and he does not seek attention at the wound healing center. Please make sure he has an appointment at the wound care center as recommended in my last note (and referral placed)

## 2024-03-19 ENCOUNTER — OFFICE VISIT (OUTPATIENT)
Dept: WOUND CARE | Facility: CLINIC | Age: 89
End: 2024-03-19
Payer: MEDICARE

## 2024-03-19 VITALS
RESPIRATION RATE: 18 BRPM | WEIGHT: 165 LBS | DIASTOLIC BLOOD PRESSURE: 76 MMHG | HEIGHT: 72 IN | TEMPERATURE: 96 F | SYSTOLIC BLOOD PRESSURE: 150 MMHG | BODY MASS INDEX: 22.35 KG/M2

## 2024-03-19 DIAGNOSIS — L03.032 CELLULITIS OF TOE OF LEFT FOOT: ICD-10-CM

## 2024-03-19 DIAGNOSIS — M86.9 OSTEOMYELITIS OF TOE OF LEFT FOOT (HCC): ICD-10-CM

## 2024-03-19 PROCEDURE — 99213 OFFICE O/P EST LOW 20 MIN: CPT | Performed by: PODIATRIST

## 2024-03-19 PROCEDURE — 99214 OFFICE O/P EST MOD 30 MIN: CPT | Performed by: PODIATRIST

## 2024-03-19 RX ORDER — CHLORHEXIDINE GLUCONATE ORAL RINSE 1.2 MG/ML
15 SOLUTION DENTAL ONCE
OUTPATIENT
Start: 2024-03-19 | End: 2024-03-19

## 2024-03-19 RX ORDER — CHLORHEXIDINE GLUCONATE 4 G/100ML
SOLUTION TOPICAL DAILY PRN
OUTPATIENT
Start: 2024-03-19

## 2024-03-19 RX ORDER — LIDOCAINE 40 MG/G
CREAM TOPICAL ONCE
Status: COMPLETED | OUTPATIENT
Start: 2024-03-19 | End: 2024-03-19

## 2024-03-19 RX ORDER — CEFAZOLIN SODIUM 2 G/50ML
2000 SOLUTION INTRAVENOUS ONCE
OUTPATIENT
Start: 2024-03-19 | End: 2024-03-19

## 2024-03-19 RX ADMIN — LIDOCAINE: 40 CREAM TOPICAL at 09:45

## 2024-03-19 NOTE — LETTER
[facility]  [address] [facility] [address]  Phone#  [phone]  Fax#  [phone]    Patient:  Abdirahman Hope  YOB: 1935  Phone:  [phone]  Date of Visit:  3/19/2024    Orders Placed This Encounter   Procedures   • Wound cleansing and dressings Other (comment) Left Toe D2, second     Wash your hands with soap and water.  Remove old dressing, discard into plastic bag and place in trash.  Cleanse the wound with NSS prior to applying a clean dressing. Do not use tissue or cotton balls. Do not scrub the wound. Pat dry using gauze.  Shower no     Apply betadine  to the left foot second toe  wound.  Cover with gauze   Secure with ailyn and tape   Change dressing daily     Any nausea vomiting fever or chills report to ER     Surgical team will contact you and set up surgery  for toe removal     Follow up post op from toe removal     Standing Status:   Future     Standing Expiration Date:   3/19/2025         Electronically signed by Vicente Barrientos DPM

## 2024-03-19 NOTE — PROGRESS NOTES
Patient ID: Abdirahman Hope is a 88 y.o. male Date of Birth 1935       Chief Complaint   Patient presents with    New Patient Visit     Wound left foot second toe patient states wound has been there since December and it may have started from a shoe rubbing the area       Allergies:  Patient has no known allergies.    Diagnosis:  1. Cellulitis of toe of left foot  -     lidocaine (LMX) 4 % cream  -     Wound cleansing and dressings Other (comment) Left Toe D2, second; Future; Expected date: 03/19/2024    2. Osteomyelitis of toe of left foot (HCC)  -     lidocaine (LMX) 4 % cream  -     Wound cleansing and dressings Other (comment) Left Toe D2, second; Future; Expected date: 03/19/2024  -     Case request operating room: AMPUTATION TOE, 2nd; Standing  -     Case request operating room: AMPUTATION TOE, 2nd       Diagnosis ICD-10-CM Associated Orders   1. Cellulitis of toe of left foot  L03.032 lidocaine (LMX) 4 % cream     Wound cleansing and dressings Other (comment) Left Toe D2, second      2. Osteomyelitis of toe of left foot (HCC)  M86.9 lidocaine (LMX) 4 % cream     Wound cleansing and dressings Other (comment) Left Toe D2, second     Case request operating room: AMPUTATION TOE, 2nd     Case request operating room: AMPUTATION TOE, 2nd           Assessment & Plan:  See wound orders.   -MRI was reviewed, we discussed the chronicity duration and lack of healing of the ulceration  - Based on the clinical picture of the leg swelling the sausage appearance of the toe and the MRI results, I have high very high suspicion of underlying osteomyelitis at this time  - Will plan for left second digit toe amputation this will be performed on an outpatient basis, he will be weightbearing as tolerated in surgical shoe, he will need surgical appointments at 1 week postop 2-week postop and likely 4-week postop    Patient was counseled and educated on the condition and the diagnosis. The diagnosis, treatment options and  prognosis were discussed with the patient.  The patient failed conservative care at this time and wished to proceed with surgical treatment.  Explained surgical details and post-op course.  Discussed all risks and complications related to the patient's condition and surgery.  The benefits of surgery were also discussed.  The patient understood that the surgery would not guarantee desirable outcome.  All questions and concerns were addressed and the consent was signed.      Subjective:   Patient was evaluation management of his left second digit, and was complaining of increased erythema, swelling and the wound has been present since December.  Did see Dr. Angelo Dennis and had MRI and was directed to wound care for continued care        The following portions of the patient's history were reviewed and updated as appropriate:   Patient Active Problem List   Diagnosis    Bladder cancer ([facility])    Lung nodule, solitary    Erectile dysfunction    Gait instability    High blood pressure    Hypothyroidism    Idiopathic peripheral neuropathy    Bilateral exudative age-related macular degeneration ([facility])    Cervical stenosis of spinal canal    Spinal stenosis of lumbar region    Spondylosis 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    Hoarse voice quality    Appetite loss    Weight loss    Gastroesophageal reflux disease without esophagitis    Alternating constipation and diarrhea    Weight loss, non-intentional    Nausea    Chronic obstructive pulmonary disease with acute exacerbation ([facility])    Von Willebrand's disease ([facility])    Radiculopathy, lumbar region    Fall    Anemia    Contusion of cerebral cortex with concussion ([facility])    Urinary frequency    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, initial encounter ([facility])    Leg edema, left    Venous insufficiency    PAD (peripheral artery disease) ([facility])    Open wound of second toe of left foot, initial encounter     Past Medical  History:   Diagnosis Date    Anemia     Appendicitis     Coronary artery disease     Detached retina     GERD (gastroesophageal reflux disease)     Hyperlipidemia     Hypertension     Macular degeneration     Neuropathy     Pelvis fracture (HCC) 01/2021    Von Willebrand disease (HCC)      Past Surgical History:   Procedure Laterality Date    ADENOIDECTOMY      APPENDECTOMY      ARTHRODESIS  01/15/2014    Lumbar     BACK SURGERY      CATARACT EXTRACTION      COLONOSCOPY  12/09/2016    fiberoptic     CYSTOSCOPY      with resection of tumor / 1/12/17, 10/2/17 / diagnostic 12/8/16, 5/30/17     CYSTOSCOPY  09/20/2018    CYSTOSCOPY  06/22/2020    EGD AND COLONOSCOPY N/A 12/9/2016    Procedure: EGD AND COLONOSCOPY;  Surgeon: Kahlil Alfonso MD;  Location: [facility];  Service:     EYE SURGERY      FRACTURE SURGERY Bilateral     ARM    NECK SURGERY      NEUROPLASTY / TRANSPOSITION MEDIAN NERVE AT CARPAL TUNNEL Right 04/2015    MO BLADDER INSTILLATION ANTICARCINOGENIC AGENT N/A 1/12/2017    Procedure: INSTILLATION MYTOMYCIN;  Surgeon: Jhon Zuniga MD;  Location: [facility];  Service: Urology    MO BLADDER INSTILLATION ANTICARCINOGENIC AGENT N/A 10/2/2017    Procedure: INSTILLATION MYTOMYCIN;  Surgeon: Jhon Zuniga MD;  Location: [facility];  Service: Urology    MO COLONOSCOPY FLX DX W/COLLJ SPEC WHEN PFRMD N/A 2/5/2019    Procedure: COLONOSCOPY;  Surgeon: Abad Valentino MD;  Location: [facility];  Service: Gastroenterology    MO CYSTO W/REMOVAL OF LESIONS SMALL N/A 1/12/2017    Procedure: CYSTOSCOPY, BLADDER BIOPSY WITH FULGRATION, 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;  Surgeon: Jhon Zuniga MD;  Location: [facility];  Service: Urology    MO CYSTO W/REMOVAL OF LESIONS SMALL N/A 10/2/2017    Procedure: CYSTOSCOPY WITH  BLADDER BIOPSIES WITH FULGURATION;  Surgeon: Jhon Zuniga MD;  [facility]: [facility];  Service: Urology    MO CYSTO W/REMOVAL OF LESIONS SMALL N/A 11/30/2023     Procedure: CYSTOSCOPY WITH BIOPSIES and fulguration;  Surgeon: Adalberto Rasmussen MD;  Location: [facility];  Service: Urology    MO ESOPHAGOGASTRODUODENOSCOPY TRANSORAL DIAGNOSTIC N/A 8/3/2018    Procedure: ESOPHAGOGASTRODUODENOSCOPY (EGD);  Surgeon: Abad Valentino MD;  Location: [facility];  Service: Gastroenterology    RETINAL DETACHMENT SURGERY Right 03/2016    complete air fill confirmed     ROTATOR CUFF REPAIR      SHOULDER SURGERY  03/03/2015    proximal humerus fracture / LA...3/6/15   R...1/3/18     TONSILECTOMY AND ADNOIDECTOMY      TONSILLECTOMY      TRANSURETHRAL RESECTION OF BLADDER TUMOR N/A 10/2/2017    Procedure: TRANSURETHRAL RESECTION OF BLADDER TUMOR (TURBT);  Surgeon: Jhon Zuniga MD;  Location: [facility];  Service: Urology    VON WILLEBRAND ANTIGEN (HISTORICAL)       Social History     Socioeconomic History    Marital status: /Civil Union     Spouse name: Not on file    Number of children: Not on file    Years of education: Not on file    Highest education level: Not on file   Occupational History    Occupation:    retired   Tobacco Use    Smoking status: Every Day     Current packs/day: 0.25     Average packs/day: 0.3 packs/day for 60.0 years (15.0 ttl pk-yrs)     Types: Cigarettes    Smokeless tobacco: Never    Tobacco comments:     smokes a pipe   Vaping Use    Vaping status: Never Used   Substance and Sexual Activity    Alcohol use: Yes     Alcohol/week: 2.0 standard drinks of alcohol     Types: 1 Glasses of wine, 1 Shots of liquor per week     Comment: DAILY     Drug use: Never    Sexual activity: Not on file   Other Topics Concern    Not on file   Social History Narrative    No advance directives     Patient has living will      Social Determinants of Health     Financial Resource Strain: Low Risk  (10/5/2023)    Overall Financial Resource Strain (CARDIA)     Difficulty of Paying Living Expenses: Not hard at all   Food Insecurity: No Food Insecurity (4/13/2021)    Hunger  Vital Sign     Worried About Running Out of Food in the Last Year: Never true     Ran Out of Food in the Last Year: Never true   Transportation Needs: No Transportation Needs (10/5/2023)    PRAPARE - Transportation     Lack of Transportation (Medical): No     Lack of Transportation (Non-Medical): No   Physical Activity: Not on file   Stress: Not on file   Social Connections: Unknown (1/14/2021)    Social Connection and Isolation Panel [NHANES]     Frequency of Communication with Friends and Family: Not on file     Frequency of Social Gatherings with Friends and Family: Not on file     Attends [employer] Services: Not on file     Active Member of Clubs or Organizations: Not on file     Attends Club or Organization Meetings: Not on file     Marital Status: Living with partner   Intimate Partner Violence: Not on file   Housing Stability: Not on file        Current Outpatient Medications:     acetaminophen (TYLENOL) 325 mg tablet, Take 2 tablets (650 mg total) by mouth every 6 (six) hours as needed for mild pain, headaches or fever, Disp:  , Rfl: 0    ALPHA LIPOIC ACID-BIOTIN PO, Take by mouth, Disp: , Rfl:     atorvastatin (LIPITOR) 10 mg tablet, TAKE 1 TABLET BY MOUTH EVERY  OTHER DAY, Disp: 45 tablet, Rfl: 3    B COMPLEX VITAMINS PO, Take 1 tablet by mouth daily, Disp: , Rfl:     cetirizine (Allergy Relief Cetirizine) 10 mg tablet, Take 1 tablet (10 mg total) by mouth daily, Disp: 90 tablet, Rfl: 2    Cholecalciferol (VITAMIN D3) 2000 UNITS capsule, Take 1 capsule by mouth 2 (two) times a day 5000 units , Disp: , Rfl:     Coenzyme Q10 (CO Q10) 60 MG CAPS, Take 1 capsule by mouth daily, Disp: , Rfl:     fluocinolone (SYNALAR) 0.01 % external solution, , Disp: , Rfl:     gabapentin (NEURONTIN) 600 MG tablet, Take 1 tablet (600 mg total) by mouth 2 (two) times a day, Disp: 180 tablet, Rfl: 3    Ginkgo Biloba 120 MG TABS, Take 1 tablet by mouth daily, Disp: , Rfl:     Inositol 500 MG TABS, Take 1 tablet by mouth 2 (two)  times a day, Disp: , Rfl:     ketoconazole (NIZORAL) 2 % shampoo, , Disp: , Rfl:     lutein 6 mg, Take 1 capsule by mouth daily, Disp: , Rfl:     metoprolol tartrate (LOPRESSOR) 25 mg tablet, TAKE 1 TABLET BY MOUTH EVERY 12  HOURS, Disp: 180 tablet, Rfl: 3    MILK THISTLE PO, Take 1 tablet by mouth daily, Disp: , Rfl:     Misc Natural Products (SUPER SNOOZE) CAPS, Take 1 capsule by mouth daily Bed time , Disp: , Rfl:     Multiple Vitamins-Minerals (OCUVITE-LUTEIN) CAPS, Take by mouth daily , Disp: , Rfl:     omeprazole (PriLOSEC) 40 MG capsule, Take 1 capsule (40 mg total) by mouth 2 (two) times a day, Disp: 180 capsule, Rfl: 3    pentoxifylline (TRENtal) 400 mg ER tablet, Take 1 tablet (400 mg total) by mouth 3 (three) times a day with meals, Disp: 270 tablet, Rfl: 3    psyllium (METAMUCIL) 58.6 % packet, Take 1 packet by mouth daily, Disp: , Rfl:     sildenafil (VIAGRA) 50 MG tablet, , Disp: , Rfl:     trospium chloride (SANCTURA) 20 mg tablet, Take 1 tablet (20 mg total) by mouth 2 (two) times a day, Disp: 180 tablet, Rfl: 3    Zinc 50 MG TABS, Take 1 tablet by mouth daily, Disp: , Rfl:     hydrocortisone-pramoxine (PRAMOSONE) 1-2.5 % LOTN, Apply topically 3 (three) times a day (Patient not taking: Reported on 3/19/2024), Disp: 118 mL, Rfl: 2    ipratropium (ATROVENT) 0.03 % nasal spray, USE 2 SPRAYS IN BOTH  NOSTRILS 3 TIMES DAILY (Patient not taking: Reported on 3/19/2024), Disp: 120 mL, Rfl: 3    mupirocin (BACTROBAN) 2 % ointment, , Disp: , Rfl:     Omeprazole 20 MG TBDD, , Disp: , Rfl:     silver sulfadiazine (SILVADENE,SSD) 1 % cream, Apply topically daily (Patient not taking: Reported on 3/19/2024), Disp: 50 g, Rfl: 1    tadalafil (CIALIS) 20 MG tablet, Take 1 tablet (20 mg total) by mouth every other day as needed for erectile dysfunction (Patient not taking: Reported on 3/19/2024), Disp: 18 tablet, Rfl: 3  No current facility-administered medications for this visit.  Family History   Problem Relation Age  of Onset    Ulcers Father         acute gastric    Heart disease Mother     Anuerysm Sister       Review of Systems   Constitutional:  Negative for chills and fever.   HENT:  Negative for ear pain and sore throat.    Eyes:  Negative for pain and visual disturbance.   Respiratory:  Negative for cough and shortness of breath.    Cardiovascular:  Negative for chest pain and palpitations.   Gastrointestinal:  Negative for abdominal pain and vomiting.   Genitourinary:  Negative for dysuria and hematuria.   Musculoskeletal:  Negative for arthralgias and back pain.   Skin:  Negative for color change and rash.   Neurological:  Negative for seizures and syncope.   All other systems reviewed and are negative.        Objective:  /76   Temp (!) 96 °F (35.6 °C) (Temporal)   Resp 18   Ht 6' (1.829 m)   Wt 74.8 kg (165 lb)   BMI 22.38 kg/m²     Physical Exam  Musculoskeletal:      Comments: Left second digit has erythema of the entirety of the digit with sausage appearance of the toe, ulceration along the PIPJ with probe to periosteum.             Wound 01/30/24 Other (comment) Toe D2, second Left (Active)   Wound Image   03/19/24 0917   Wound Description Yellow;Dry;Other (Comment) 03/19/24 0925   Susy-wound Assessment Erythema;Edema 03/19/24 0925   Wound Length (cm) 0.6 cm 03/19/24 0925   Wound Width (cm) 1.2 cm 03/19/24 0925   Wound Depth (cm) 0.2 cm 03/19/24 0925   Wound Surface Area (cm^2) 0.72 cm^2 03/19/24 0925   Wound Volume (cm^3) 0.144 cm^3 03/19/24 0925   Calculated Wound Volume (cm^3) 0.14 cm^3 03/19/24 0925   Change in Wound Size % 6.67 03/19/24 0925   Drainage Amount None 03/19/24 0925   Non-staged Wound Description Full thickness 03/19/24 0925                         Procedures             Wound Instructions:  Orders Placed This Encounter   Procedures    Wound cleansing and dressings Other (comment) Left Toe D2, second     Wash your hands with soap and water.  Remove old dressing, discard into plastic bag  "and place in trash.  Cleanse the wound with NSS prior to applying a clean dressing. Do not use tissue or cotton balls. Do not scrub the wound. Pat dry using gauze.  Shower no     Apply betadine  to the left foot second toe  wound.  Cover with gauze   Secure with ailyn and tape   Change dressing daily     Any nausea vomiting fever or chills report to ER     Surgical team will contact you and set up surgery  for toe removal     Follow up post op from toe removal     Standing Status:   Future     Standing Expiration Date:   3/19/2025         Vicente Barrientos DPM      Portions of the record may have been created with voice recognition software. Occasional wrong word or \"sound a like\" substitutions may have occurred due to the inherent limitations of voice recognition software. Read the chart carefully and recognize, using context, where substitutions have occurred.      "

## 2024-03-19 NOTE — PATIENT INSTRUCTIONS
Orders Placed This Encounter   Procedures    Wound cleansing and dressings Other (comment) Left Toe D2, second     Wash your hands with soap and water.  Remove old dressing, discard into plastic bag and place in trash.  Cleanse the wound with NSS prior to applying a clean dressing. Do not use tissue or cotton balls. Do not scrub the wound. Pat dry using gauze.  Shower no     Apply betadine  to the left foot second toe  wound.  Cover with gauze   Secure with ailyn and tape   Change dressing daily     Any nausea vomiting fever or chills report to ER     Surgical team will contact you and set up surgery  for toe removal     Follow up post op from toe removal     Standing Status:   Future     Standing Expiration Date:   3/19/2025

## 2024-03-20 ENCOUNTER — PREP FOR PROCEDURE (OUTPATIENT)
Dept: PODIATRY | Facility: CLINIC | Age: 89
End: 2024-03-20

## 2024-03-20 ENCOUNTER — HOME CARE VISIT (OUTPATIENT)
Dept: HOME HEALTH SERVICES | Facility: HOME HEALTHCARE | Age: 89
End: 2024-03-20
Payer: MEDICARE

## 2024-03-20 VITALS
RESPIRATION RATE: 18 BRPM | OXYGEN SATURATION: 98 % | HEART RATE: 78 BPM | TEMPERATURE: 98 F | SYSTOLIC BLOOD PRESSURE: 132 MMHG | DIASTOLIC BLOOD PRESSURE: 66 MMHG

## 2024-03-20 PROCEDURE — G0299 HHS/HOSPICE OF RN EA 15 MIN: HCPCS

## 2024-03-21 ENCOUNTER — TELEPHONE (OUTPATIENT)
Dept: OBGYN CLINIC | Facility: CLINIC | Age: 89
End: 2024-03-21

## 2024-03-21 NOTE — TELEPHONE ENCOUNTER
Returning patient daughters  regarding upcoming surgery with Dr Barrientos on 3/28/24 @ [facility]. Pre op appointment with pcp is scheduled for 3/22/24 No Pre Testing is required and I wanted to relay to her his follow up appointment location as he is transferring his care from Wound Care to Podiatry clinic. Ist post op will be in [place] and future appointments we can then try to arrange in [place]. I provided my direct # [phone]. I have also attempted to reach patient directly at primary # listed but didn't reach him and was unable to leave a voicemail.

## 2024-03-22 ENCOUNTER — CONSULT (OUTPATIENT)
Dept: FAMILY MEDICINE CLINIC | Facility: CLINIC | Age: 89
End: 2024-03-22
Payer: MEDICARE

## 2024-03-22 ENCOUNTER — LAB (OUTPATIENT)
Dept: LAB | Facility: MEDICAL CENTER | Age: 89
End: 2024-03-22
Payer: MEDICARE

## 2024-03-22 VITALS
DIASTOLIC BLOOD PRESSURE: 72 MMHG | BODY MASS INDEX: 22.62 KG/M2 | OXYGEN SATURATION: 97 % | WEIGHT: 167 LBS | HEIGHT: 72 IN | TEMPERATURE: 96.4 F | HEART RATE: 68 BPM | SYSTOLIC BLOOD PRESSURE: 128 MMHG

## 2024-03-22 DIAGNOSIS — R58 ECCHYMOSIS: ICD-10-CM

## 2024-03-22 DIAGNOSIS — R53.82 CHRONIC FATIGUE: ICD-10-CM

## 2024-03-22 DIAGNOSIS — Z01.818 PREOP EXAMINATION: Primary | ICD-10-CM

## 2024-03-22 DIAGNOSIS — L03.032 CELLULITIS OF TOE OF LEFT FOOT: ICD-10-CM

## 2024-03-22 DIAGNOSIS — I10 ESSENTIAL HYPERTENSION: ICD-10-CM

## 2024-03-22 DIAGNOSIS — E78.2 MIXED HYPERLIPIDEMIA: ICD-10-CM

## 2024-03-22 DIAGNOSIS — E03.9 ACQUIRED HYPOTHYROIDISM: ICD-10-CM

## 2024-03-22 DIAGNOSIS — J44.1 CHRONIC OBSTRUCTIVE PULMONARY DISEASE WITH ACUTE EXACERBATION (HCC): ICD-10-CM

## 2024-03-22 DIAGNOSIS — I73.9 PAD (PERIPHERAL ARTERY DISEASE) (HCC): ICD-10-CM

## 2024-03-22 DIAGNOSIS — H35.3230 BILATERAL EXUDATIVE AGE-RELATED MACULAR DEGENERATION, UNSPECIFIED STAGE (HCC): ICD-10-CM

## 2024-03-22 DIAGNOSIS — S91.105A OPEN WOUND OF SECOND TOE OF LEFT FOOT, INITIAL ENCOUNTER: ICD-10-CM

## 2024-03-22 DIAGNOSIS — D68.00 VON WILLEBRAND'S DISEASE (HCC): ICD-10-CM

## 2024-03-22 LAB
ALBUMIN SERPL BCP-MCNC: 4.1 G/DL (ref 3.5–5)
ALP SERPL-CCNC: 56 U/L (ref 34–104)
ALT SERPL W P-5'-P-CCNC: 15 U/L (ref 7–52)
ANION GAP SERPL CALCULATED.3IONS-SCNC: 5 MMOL/L (ref 4–13)
AST SERPL W P-5'-P-CCNC: 21 U/L (ref 13–39)
BASOPHILS # BLD AUTO: 0.04 THOUSANDS/ÂΜL (ref 0–0.1)
BASOPHILS NFR BLD AUTO: 0 % (ref 0–1)
BILIRUB SERPL-MCNC: 1.1 MG/DL (ref 0.2–1)
BUN SERPL-MCNC: 17 MG/DL (ref 5–25)
CALCIUM SERPL-MCNC: 9.9 MG/DL (ref 8.4–10.2)
CHLORIDE SERPL-SCNC: 103 MMOL/L (ref 96–108)
CO2 SERPL-SCNC: 31 MMOL/L (ref 21–32)
CREAT SERPL-MCNC: 0.97 MG/DL (ref 0.6–1.3)
EOSINOPHIL # BLD AUTO: 0.31 THOUSAND/ÂΜL (ref 0–0.61)
EOSINOPHIL NFR BLD AUTO: 4 % (ref 0–6)
ERYTHROCYTE [DISTWIDTH] IN BLOOD BY AUTOMATED COUNT: 14.6 % (ref 11.6–15.1)
GFR SERPL CREATININE-BSD FRML MDRD: 70 ML/MIN/1.73SQ M
GLUCOSE P FAST SERPL-MCNC: 90 MG/DL (ref 65–99)
HCT VFR BLD AUTO: 44.7 % (ref 36.5–49.3)
HGB BLD-MCNC: 14 G/DL (ref 12–17)
IMM GRANULOCYTES # BLD AUTO: 0.02 THOUSAND/UL (ref 0–0.2)
IMM GRANULOCYTES NFR BLD AUTO: 0 % (ref 0–2)
INR PPP: 1.07 (ref 0.84–1.19)
LYMPHOCYTES # BLD AUTO: 2.46 THOUSANDS/ÂΜL (ref 0.6–4.47)
LYMPHOCYTES NFR BLD AUTO: 28 % (ref 14–44)
MCH RBC QN AUTO: 29.9 PG (ref 26.8–34.3)
MCHC RBC AUTO-ENTMCNC: 31.3 G/DL (ref 31.4–37.4)
MCV RBC AUTO: 95 FL (ref 82–98)
MONOCYTES # BLD AUTO: 1.11 THOUSAND/ÂΜL (ref 0.17–1.22)
MONOCYTES NFR BLD AUTO: 12 % (ref 4–12)
NEUTROPHILS # BLD AUTO: 5.02 THOUSANDS/ÂΜL (ref 1.85–7.62)
NEUTS SEG NFR BLD AUTO: 56 % (ref 43–75)
NRBC BLD AUTO-RTO: 0 /100 WBCS
PLATELET # BLD AUTO: 195 THOUSANDS/UL (ref 149–390)
PMV BLD AUTO: 11.8 FL (ref 8.9–12.7)
POTASSIUM SERPL-SCNC: 4.3 MMOL/L (ref 3.5–5.3)
PROT SERPL-MCNC: 7.1 G/DL (ref 6.4–8.4)
PROTHROMBIN TIME: 13.8 SECONDS (ref 11.6–14.5)
RBC # BLD AUTO: 4.69 MILLION/UL (ref 3.88–5.62)
SODIUM SERPL-SCNC: 139 MMOL/L (ref 135–147)
TSH SERPL DL<=0.05 MIU/L-ACNC: 2.77 UIU/ML (ref 0.45–4.5)
WBC # BLD AUTO: 8.96 THOUSAND/UL (ref 4.31–10.16)

## 2024-03-22 PROCEDURE — 99214 OFFICE O/P EST MOD 30 MIN: CPT | Performed by: FAMILY MEDICINE

## 2024-03-22 PROCEDURE — 84443 ASSAY THYROID STIM HORMONE: CPT

## 2024-03-22 PROCEDURE — 36415 COLL VENOUS BLD VENIPUNCTURE: CPT

## 2024-03-22 PROCEDURE — 80053 COMPREHEN METABOLIC PANEL: CPT

## 2024-03-22 PROCEDURE — 85025 COMPLETE CBC W/AUTO DIFF WBC: CPT

## 2024-03-22 PROCEDURE — 85610 PROTHROMBIN TIME: CPT

## 2024-03-22 RX ORDER — ATORVASTATIN CALCIUM 10 MG/1
10 TABLET, FILM COATED ORAL EVERY OTHER DAY
Qty: 45 TABLET | Refills: 3 | Status: SHIPPED | OUTPATIENT
Start: 2024-03-22

## 2024-03-22 NOTE — PROGRESS NOTES
Subjective:     Abdirahman Hope is a 88 y.o. male who presents to the office today for a preoperative consultation at the request of surgeon Vicente Hassan DPM who plans on performing amputation second toe left foot on March 28. This consultation is requested for the specific conditions prompting preoperative evaluation (i.e. because of potential affect on operative risk): Von Willebrand's, COPD, PVD OD,. Planned anesthesia: Conscious sedation. The patient has the following known anesthesia issues:  no prior problems with endotracheal intubation or anesthesia. Patients bleeding risk:  Questionable von Willebrand's disease .  The following portions of the patient's history were reviewed and updated as appropriate: He  has a past medical history of Anemia, Appendicitis, Coronary artery disease, Detached retina, GERD (gastroesophageal reflux disease), Hyperlipidemia, Hypertension, Macular degeneration, Neuropathy, Pelvis fracture ([facility]) (01/2021), and Von Willebrand disease ([facility]).  He   Patient Active Problem List    Diagnosis Date Noted    Open wound of second toe of left foot, initial encounter 03/08/2024    PAD (peripheral artery disease) (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) 04/06/2023    Urinary frequency 01/13/2022    Contusion of cerebral cortex with concussion ([facility]) 09/30/2021    Anemia 01/07/2021    Fall 01/05/2021    Radiculopathy, lumbar region     Chronic obstructive pulmonary disease with acute exacerbation ([facility]) 03/19/2019    Von Willebrand's disease ([facility]) 03/19/2019    Alternating constipation and diarrhea 08/29/2018    Weight loss, non-intentional 08/29/2018    Nausea 08/29/2018    Hoarse voice quality 08/01/2018    Appetite loss 08/01/2018    Weight loss 08/01/2018    Gastroesophageal reflux disease without esophagitis 08/01/2018    Idiopathic  peripheral neuropathy 06/29/2018    Cervical stenosis of spinal canal 06/29/2018    Spinal stenosis of lumbar region 06/29/2018    Lung nodule, solitary 02/20/2018    Bladder cancer (HCC) 02/13/2018    High blood pressure 12/28/2016    Gait instability 05/31/2016    Erectile dysfunction 12/09/2014    Hypothyroidism 06/07/2013    Spondylosis 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 12/31/2012    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) 06/26/2012     He  has a past surgical history that includes Back surgery; Neck surgery; Eye surgery; Appendectomy; TONSILECTOMY AND ADNOIDECTOMY; Fracture surgery (Bilateral); VON WILLEBRAND ANTIGEN (HISTORICAL); Tonsillectomy; Cataract extraction; Rotator cuff repair; pr cysto w/removal of lesions small (N/A, 1/12/2017); pr bladder instillation anticarcinogenic agent (N/A, 1/12/2017); EGD AND COLONOSCOPY (N/A, 12/9/2016); pr cysto w/removal of lesions small (N/A, 10/2/2017); pr bladder instillation anticarcinogenic agent (N/A, 10/2/2017); Transurethral resection of bladder tumor (N/A, 10/2/2017); Shoulder surgery (03/03/2015); Adenoidectomy; Arthrodesis (01/15/2014); Colonoscopy (12/09/2016); Neuroplasty / transposition median nerve at carpal tunnel (Right, 04/2015); Retinal detachment surgery (Right, 03/2016); pr esophagogastroduodenoscopy transoral diagnostic (N/A, 8/3/2018); pr colonoscopy flx dx w/collj spec when pfrmd (N/A, 2/5/2019); Cystoscopy; Cystoscopy (09/20/2018); Cystoscopy (06/22/2020); and pr cysto w/removal of lesions small (N/A, 11/30/2023).  His family history includes Anuerysm in his sister; Heart disease in his mother; Ulcers in his father.  He  reports that he has been smoking cigarettes. He has a 15 pack-year smoking history. He has never used smokeless tobacco. He reports current alcohol use of about 2.0 standard drinks of alcohol per week. He reports that he does not use drugs.  Current Outpatient Medications   Medication Sig  Dispense Refill    acetaminophen (TYLENOL) 325 mg tablet Take 2 tablets (650 mg total) by mouth every 6 (six) hours as needed for mild pain, headaches or fever  0    ALPHA LIPOIC ACID-BIOTIN PO Take by mouth      atorvastatin (LIPITOR) 10 mg tablet TAKE 1 TABLET BY MOUTH EVERY  OTHER DAY 45 tablet 3    B COMPLEX VITAMINS PO Take 1 tablet by mouth daily      cetirizine (Allergy Relief Cetirizine) 10 mg tablet Take 1 tablet (10 mg total) by mouth daily 90 tablet 2    Cholecalciferol (VITAMIN D3) 2000 UNITS capsule Take 1 capsule by mouth 2 (two) times a day 5000 units       Coenzyme Q10 (CO Q10) 60 MG CAPS Take 1 capsule by mouth daily      fluocinolone (SYNALAR) 0.01 % external solution       gabapentin (NEURONTIN) 600 MG tablet Take 1 tablet (600 mg total) by mouth 2 (two) times a day 180 tablet 3    Ginkgo Biloba 120 MG TABS Take 1 tablet by mouth daily      hydrocortisone-pramoxine (PRAMOSONE) 1-2.5 % LOTN Apply topically 3 (three) times a day 118 mL 2    Inositol 500 MG TABS Take 1 tablet by mouth 2 (two) times a day      ipratropium (ATROVENT) 0.03 % nasal spray USE 2 SPRAYS IN BOTH  NOSTRILS 3 TIMES DAILY 120 mL 3    ketoconazole (NIZORAL) 2 % shampoo       lutein 6 mg Take 1 capsule by mouth daily      metoprolol tartrate (LOPRESSOR) 25 mg tablet TAKE 1 TABLET BY MOUTH EVERY 12  HOURS 180 tablet 3    MILK THISTLE PO Take 1 tablet by mouth daily      Misc Natural Products (SUPER SNOOZE) CAPS Take 1 capsule by mouth daily Bed time       Multiple Vitamins-Minerals (OCUVITE-LUTEIN) CAPS Take by mouth daily       omeprazole (PriLOSEC) 40 MG capsule Take 1 capsule (40 mg total) by mouth 2 (two) times a day 180 capsule 3    pentoxifylline (TRENtal) 400 mg ER tablet Take 1 tablet (400 mg total) by mouth 3 (three) times a day with meals 270 tablet 3    psyllium (METAMUCIL) 58.6 % packet Take 1 packet by mouth daily      sildenafil (VIAGRA) 50 MG tablet       silver sulfadiazine (SILVADENE,SSD) 1 % cream Apply topically  daily 50 g 1    tadalafil (CIALIS) 20 MG tablet Take 1 tablet (20 mg total) by mouth every other day as needed for erectile dysfunction 18 tablet 3    trospium chloride (SANCTURA) 20 mg tablet Take 1 tablet (20 mg total) by mouth 2 (two) times a day 180 tablet 3    Zinc 50 MG TABS Take 1 tablet by mouth daily      Omeprazole 20 MG TBDD        No current facility-administered medications for this visit.     Current Outpatient Medications on File Prior to Visit   Medication Sig    acetaminophen (TYLENOL) 325 mg tablet Take 2 tablets (650 mg total) by mouth every 6 (six) hours as needed for mild pain, headaches or fever    ALPHA LIPOIC ACID-BIOTIN PO Take by mouth    atorvastatin (LIPITOR) 10 mg tablet TAKE 1 TABLET BY MOUTH EVERY  OTHER DAY    B COMPLEX VITAMINS PO Take 1 tablet by mouth daily    cetirizine (Allergy Relief Cetirizine) 10 mg tablet Take 1 tablet (10 mg total) by mouth daily    Cholecalciferol (VITAMIN D3) 2000 UNITS capsule Take 1 capsule by mouth 2 (two) times a day 5000 units     Coenzyme Q10 (CO Q10) 60 MG CAPS Take 1 capsule by mouth daily    fluocinolone (SYNALAR) 0.01 % external solution     gabapentin (NEURONTIN) 600 MG tablet Take 1 tablet (600 mg total) by mouth 2 (two) times a day    Ginkgo Biloba 120 MG TABS Take 1 tablet by mouth daily    hydrocortisone-pramoxine (PRAMOSONE) 1-2.5 % LOTN Apply topically 3 (three) times a day    Inositol 500 MG TABS Take 1 tablet by mouth 2 (two) times a day    ipratropium (ATROVENT) 0.03 % nasal spray USE 2 SPRAYS IN BOTH  NOSTRILS 3 TIMES DAILY    ketoconazole (NIZORAL) 2 % shampoo     lutein 6 mg Take 1 capsule by mouth daily    metoprolol tartrate (LOPRESSOR) 25 mg tablet TAKE 1 TABLET BY MOUTH EVERY 12  HOURS    MILK THISTLE PO Take 1 tablet by mouth daily    Misc Natural Products (SUPER SNOOZE) CAPS Take 1 capsule by mouth daily Bed time     Multiple Vitamins-Minerals (OCUVITE-LUTEIN) CAPS Take by mouth daily     omeprazole (PriLOSEC) 40 MG capsule  Take 1 capsule (40 mg total) by mouth 2 (two) times a day    pentoxifylline (TRENtal) 400 mg ER tablet Take 1 tablet (400 mg total) by mouth 3 (three) times a day with meals    psyllium (METAMUCIL) 58.6 % packet Take 1 packet by mouth daily    sildenafil (VIAGRA) 50 MG tablet     silver sulfadiazine (SILVADENE,SSD) 1 % cream Apply topically daily    tadalafil (CIALIS) 20 MG tablet Take 1 tablet (20 mg total) by mouth every other day as needed for erectile dysfunction    trospium chloride (SANCTURA) 20 mg tablet Take 1 tablet (20 mg total) by mouth 2 (two) times a day    Zinc 50 MG TABS Take 1 tablet by mouth daily    Omeprazole 20 MG TBDD     [DISCONTINUED] mupirocin (BACTROBAN) 2 % ointment  (Patient not taking: Reported on 3/19/2024)     No current facility-administered medications on file prior to visit.     He has No Known Allergies..  Past Medical History:   Diagnosis Date    Anemia     Appendicitis     Coronary artery disease     Detached retina     GERD (gastroesophageal reflux disease)     Hyperlipidemia     Hypertension     Macular degeneration     Neuropathy     Pelvis fracture (HCC) 01/2021    Von Willebrand disease (HCC)        Past Surgical History:   Procedure Laterality Date    ADENOIDECTOMY      APPENDECTOMY      ARTHRODESIS  01/15/2014    Lumbar     BACK SURGERY      CATARACT EXTRACTION      COLONOSCOPY  12/09/2016    fiberoptic     CYSTOSCOPY      with resection of tumor / 1/12/17, 10/2/17 / diagnostic 12/8/16, 5/30/17     CYSTOSCOPY  09/20/2018    CYSTOSCOPY  06/22/2020    EGD AND COLONOSCOPY N/A 12/9/2016    Procedure: EGD AND COLONOSCOPY;  Surgeon: Kahlil Alfonso MD;  Location: [facility];  Service:     EYE SURGERY      FRACTURE SURGERY Bilateral     ARM    NECK SURGERY      NEUROPLASTY / TRANSPOSITION MEDIAN NERVE AT CARPAL TUNNEL Right 04/2015    [place] BLADDER INSTILLATION ANTICARCINOGENIC AGENT N/A 1/12/2017    Procedure: INSTILLATION MYTOMYCIN;  Surgeon: Jhon Zuniga MD;  Location: [facility];  Service: Urology    ND BLADDER INSTILLATION ANTICARCINOGENIC AGENT N/A 10/2/2017    Procedure: INSTILLATION MYTOMYCIN;  Surgeon: Jhon Zuniga MD;  Location: [facility];  Service: Urology    ND COLONOSCOPY FLX DX W/COLLJ SPEC WHEN PFRMD N/A 2/5/2019    Procedure: COLONOSCOPY;  Surgeon: Abad Valentino MD;  Location: [facility];  Service: Gastroenterology    ND CYSTO W/REMOVAL OF LESIONS SMALL N/A 1/12/2017    Procedure: CYSTOSCOPY, BLADDER BIOPSY WITH FULGRATION, 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;  Surgeon: Jhon Zuniga MD;  Location: [facility];  Service: Urology    ND CYSTO W/REMOVAL OF LESIONS SMALL N/A 10/2/2017    Procedure: CYSTOSCOPY WITH  BLADDER BIOPSIES WITH FULGURATION;  Surgeon: hJon Zuniga MD;  Location: [facility];  Service: Urology    ND CYSTO W/REMOVAL OF LESIONS SMALL N/A 11/30/2023    Procedure: CYSTOSCOPY WITH BIOPSIES and fulguration;  Surgeon: Adalberto Rasmussen MD;  Location: [facility];  Service: Urology    ND ESOPHAGOGASTRODUODENOSCOPY TRANSORAL DIAGNOSTIC N/A 8/3/2018    Procedure: ESOPHAGOGASTRODUODENOSCOPY (EGD);  Surgeon: Abad Valentino MD;  Location: [facility];  Service: Gastroenterology    RETINAL DETACHMENT SURGERY Right 03/2016    complete air fill confirmed     ROTATOR CUFF REPAIR      SHOULDER SURGERY  03/03/2015    proximal humerus fracture / LA...3/6/15   R...1/3/18     TONSILECTOMY AND ADNOIDECTOMY      TONSILLECTOMY      TRANSURETHRAL RESECTION OF BLADDER TUMOR N/A 10/2/2017    Procedure: TRANSURETHRAL RESECTION OF BLADDER TUMOR (TURBT);  Surgeon: Jhon Zuniga MD;  Location: [facility];  Service: Urology    VON WILLEBRAND ANTIGEN (HISTORICAL)         Review of Systems  A comprehensive review of systems was negative except for: Eyes: positive for visual disturbance  Respiratory: positive for Symptoms; Respiratory w/o Neg: emphysema  Cardiovascular: positive for hypertension  Hematologic/lymphatic: positive for questionable history  of von Willebrand's patient states that he has been told that he does not have but I did note that it is in his chart     Objective:  Physical Exam    Cardiographics  ECG:  Pending  Echocardiogram:  Not done    Imaging  Chest x-ray:  no recent chest x-ray     Lab Review   No visits with results within 2 Month(s) from this visit.   Latest known visit with results is:   Admission on 11/30/2023, Discharged on 11/30/2023   Component Date Value    Case Report 11/30/2023                      Value:Surgical Pathology Report                         Case: [id number]                                   Authorizing Provider:  Adalberto Rasmussen MD            Collected:           11/30/2023 0917              Ordering Location:     [facility] Received:            11/30/2023 1037                                     Operating Room                                                               Pathologist:           Mc Young MD                                                                Specimen:    Urinary Bladder, Bladder Tumor                                                             Final Diagnosis 11/30/2023                      Value:This result contains rich text formatting which cannot be displayed here.    Microscopic Description 11/30/2023                      Value:This result contains rich text formatting which cannot be displayed here.    Note 11/30/2023                      Value:This result contains rich text formatting which cannot be displayed here.    Additional Information 11/30/2023                      Value:This result contains rich text formatting which cannot be displayed here.    Gross Description 11/30/2023                      Value:This result contains rich text formatting which cannot be displayed here.        Assessment:     88 y.o. male with planned surgery as above.    Known risk factors for perioperative complications: None    Difficulty with intubation is not anticipated.    Cardiac  Risk Estimation: Minimal    Current medications which may produce withdrawal symptoms if withheld perioperatively:  None    Plan:  Patient is CLEARED for surgery without any additional cardiac testing.   1. Preoperative workup as follows preoperative labs will be evaluated.  2. Change in medication regimen before surgery: none, continue medication regimen including morning of surgery, with sip of water.  3. Prophylaxis for cardiac events with perioperative beta-blockers: not indicated.  4. Invasive hemodynamic monitoring perioperatively: not indicated.  5. Deep vein thrombosis prophylaxis postoperatively: None .  6. Other measures:  Not indicated

## 2024-03-26 ENCOUNTER — ANESTHESIA EVENT (OUTPATIENT)
Dept: PERIOP | Facility: HOSPITAL | Age: 89
End: 2024-03-26
Payer: MEDICARE

## 2024-03-26 ENCOUNTER — TELEPHONE (OUTPATIENT)
Dept: PODIATRY | Facility: CLINIC | Age: 89
End: 2024-03-26

## 2024-03-26 NOTE — TELEPHONE ENCOUNTER
Spoke directly with patient and confirmed with him that the hospital will call him tomorrow evening to provide arrival time and instructions. Patient verbalized understanding and nothing further is needed.

## 2024-03-26 NOTE — PRE-PROCEDURE INSTRUCTIONS
Pre-Surgery Instructions:   Medication Instructions    acetaminophen (TYLENOL) 325 mg tablet Uses PRN- OK to take day of surgery    ALPHA LIPOIC ACID-BIOTIN PO Stop taking 7 days prior to surgery.    atorvastatin (LIPITOR) 10 mg tablet Take night before surgery    B COMPLEX VITAMINS PO Stop taking 7 days prior to surgery.    cetirizine (Allergy Relief Cetirizine) 10 mg tablet Take night before surgery    Cholecalciferol (VITAMIN D3) 2000 UNITS capsule Stop taking 7 days prior to surgery.    Coenzyme Q10 (CO Q10) 60 MG CAPS Stop taking 7 days prior to surgery.    gabapentin (NEURONTIN) 600 MG tablet Take day of surgery.    Ginkgo Biloba 120 MG TABS Stop taking 7 days prior to surgery.    lutein 6 mg Stop taking 7 days prior to surgery.    metoprolol tartrate (LOPRESSOR) 25 mg tablet Take day of surgery.    MILK THISTLE PO Stop taking 7 days prior to surgery.    Misc Natural Products (SUPER SNOOZE) CAPS Stop taking 7 days prior to surgery.    Multiple Vitamins-Minerals (OCUVITE-LUTEIN) CAPS Stop taking 7 days prior to surgery.    omeprazole (PriLOSEC) 40 MG capsule Take day of surgery.    Omeprazole 20 MG TBDD Take day of surgery.    pentoxifylline (TRENtal) 400 mg ER tablet Sending to SOC for instructions    trospium chloride (SANCTURA) 20 mg tablet Hold day of surgery.    Zinc 50 MG TABS Stop taking 7 days prior to surgery.      Patient states he is not taking any meds day of surgery except for metoprolol.    Medication instructions for day surgery reviewed. Please use only a sip of water to take your instructed medications. Avoid all over the counter vitamins, supplements and NSAIDS for one week prior to surgery per anesthesia guidelines. Tylenol is ok to take as needed.     You will receive a call one business day prior to surgery with an arrival time and hospital directions. If your surgery is scheduled on a Monday, the hospital will be calling you on the Friday prior to your surgery. If you have not heard from  anyone by 8pm, please call the hospital supervisor through the hospital  at [phone]. ([place] [phone] or [place] [phone]).    Do not eat or drink anything after midnight the night before your surgery, including candy, mints, lifesavers, or chewing gum. Do not drink alcohol 24hrs before your surgery. Try not to smoke at least 24hrs before your surgery.       Follow the pre surgery showering instructions as listed in the “My Surgical Experience Booklet” or otherwise provided by your surgeon's office. Do not use a blade to shave the surgical area 1 week before surgery. It is okay to use a clean electric clippers up to 24 hours before surgery. Do not apply any lotions, creams, including makeup, cologne, deodorant, or perfumes after showering on the day of your surgery. Do not use dry shampoo, hair spray, hair gel, or any type of hair products.     No contact lenses, eye make-up, or artificial eyelashes. Remove nail polish, including gel polish, and any artificial, gel, or acrylic nails if possible. Remove all jewelry including rings and body piercing jewelry.     Wear causal clothing that is easy to take on and off. Consider your type of surgery.    Keep any valuables, jewelry, piercings at home. Please bring any specially ordered equipment (sling, braces) if indicated.    Arrange for a responsible person to drive you to and from the hospital on the day of your surgery. Please confirm the visitor policy for the day of your procedure when you receive your phone call with an arrival time.     Call the surgeon's office with any new illnesses, exposures, or additional questions prior to surgery.    Please reference your “My Surgical Experience Booklet” for additional information to prepare for your upcoming surgery.

## 2024-03-26 NOTE — TELEPHONE ENCOUNTER
Caller: Abdirahman Hope    Doctor: Vicente Barrientos DPM    Reason for call: Abdirahman called to see if his surgery is still on the schedule for Thursday, 3/28/24. I told him that his is on the schedule.  He asked if there are any instructions or anything he needs to know before going to the hospital. I told him he will get a call the day before before going over everything.  He asked  if the office could call him.    Call back#: [phone]

## 2024-03-27 ENCOUNTER — PREP FOR PROCEDURE (OUTPATIENT)
Dept: OBGYN CLINIC | Facility: CLINIC | Age: 89
End: 2024-03-27

## 2024-03-27 ENCOUNTER — TELEPHONE (OUTPATIENT)
Dept: OBGYN CLINIC | Facility: CLINIC | Age: 89
End: 2024-03-27

## 2024-03-27 ENCOUNTER — TELEPHONE (OUTPATIENT)
Age: 89
End: 2024-03-27

## 2024-03-27 NOTE — TELEPHONE ENCOUNTER
Caller: Abdirahman Hope    Doctor and/or Office: Dr. Barrientos/ROBINSON    #: [phone]    Escalation: Surgery/Abdirahman rec'd call that SX is CX-please call him back to let him know what happened and when he is having the surgery.  Thanks

## 2024-03-27 NOTE — TELEPHONE ENCOUNTER
"I spoke with the patient and he requested to reschedule surgery for [facility] on 4/5/24 but would like to know if the \"infection\" he has spread and get worse for having to delay surgery?  "

## 2024-03-27 NOTE — TELEPHONE ENCOUNTER
Trying to reach patient regarding below message from care team. Patients surgery will be rescheduled to next available at [facility] unless when I speak to patient he is ok to schedule sooner at [facility]. Instructions to be provided once I reach patient.     AB Flores MD; Caren Interiano RN; Shelly Girard MA  Okay,    Unfortunately, we will have postpone. Shelly, can you move this to the next available? Good catch everyone.    Best,  Juan Barrientos

## 2024-03-28 ENCOUNTER — ANESTHESIA (OUTPATIENT)
Dept: PERIOP | Facility: HOSPITAL | Age: 89
End: 2024-03-28
Payer: MEDICARE

## 2024-03-29 ENCOUNTER — HOME CARE VISIT (OUTPATIENT)
Dept: HOME HEALTH SERVICES | Facility: HOME HEALTHCARE | Age: 89
End: 2024-03-29
Payer: MEDICARE

## 2024-03-29 VITALS
RESPIRATION RATE: 18 BRPM | HEART RATE: 60 BPM | DIASTOLIC BLOOD PRESSURE: 66 MMHG | TEMPERATURE: 98.2 F | SYSTOLIC BLOOD PRESSURE: 112 MMHG | OXYGEN SATURATION: 98 %

## 2024-03-29 PROCEDURE — G0299 HHS/HOSPICE OF RN EA 15 MIN: HCPCS

## 2024-04-01 NOTE — CASE COMMUNICATION
Home Health need continues for: Integumentary  Primary diagnoses/co-morbidities/recent procedures in past 60 days that impact current episode: Pain  Current level of functional ability: with assist  Homebound status and living arrangements: unable to leave home unassisted and without taxing effort.  Risk for falls and infections  Goals accomplished and/or measurable progress toward unmet goals in past 60 days: wound slowly healing  Focu s of care for next 60 days for each discipline ordered: wound care  Skin integrity/wound status: not healing  Patient going for toe amputation on 4/4  Code status: FULL  Most recent fall risk: Moderate  Plan of Care confirmed with Lai

## 2024-04-02 ENCOUNTER — HOME CARE VISIT (OUTPATIENT)
Dept: HOME HEALTH SERVICES | Facility: HOME HEALTHCARE | Age: 89
End: 2024-04-02
Payer: MEDICARE

## 2024-04-02 ENCOUNTER — PREP FOR PROCEDURE (OUTPATIENT)
Dept: OBGYN CLINIC | Facility: CLINIC | Age: 89
End: 2024-04-02

## 2024-04-02 VITALS
SYSTOLIC BLOOD PRESSURE: 122 MMHG | DIASTOLIC BLOOD PRESSURE: 72 MMHG | HEART RATE: 72 BPM | TEMPERATURE: 98 F | RESPIRATION RATE: 18 BRPM | OXYGEN SATURATION: 98 %

## 2024-04-02 PROCEDURE — G0299 HHS/HOSPICE OF RN EA 15 MIN: HCPCS

## 2024-04-02 PROCEDURE — 400014 VN F/U

## 2024-04-05 ENCOUNTER — OFFICE VISIT (OUTPATIENT)
Dept: PODIATRY | Facility: CLINIC | Age: 89
End: 2024-04-05
Payer: MEDICARE

## 2024-04-05 VITALS
BODY MASS INDEX: 22.65 KG/M2 | DIASTOLIC BLOOD PRESSURE: 77 MMHG | WEIGHT: 167 LBS | SYSTOLIC BLOOD PRESSURE: 159 MMHG | HEART RATE: 63 BPM

## 2024-04-05 DIAGNOSIS — I73.9 PVD (PERIPHERAL VASCULAR DISEASE) (HCC): ICD-10-CM

## 2024-04-05 DIAGNOSIS — S91.105A OPEN WOUND OF SECOND TOE OF LEFT FOOT, INITIAL ENCOUNTER: Primary | ICD-10-CM

## 2024-04-05 PROCEDURE — 99213 OFFICE O/P EST LOW 20 MIN: CPT | Performed by: PODIATRIST

## 2024-04-05 NOTE — PROGRESS NOTES
Assessment/Plan:    No problem-specific Assessment & Plan notes found for this encounter.       Diagnoses and all orders for this visit:    Open wound of second toe of left foot, initial encounter    PVD (peripheral vascular disease) ([facility])      -Still okay to be done on outpatient basis, continue current local wound care regimen  - Will see on 4/11 for toe amputation    Subjective:      Patient ID: Abdirahman Hope is a 88 y.o. male.    Patient was evaluation management of his left second digit, he is planned to have toe amputation with me on 4/11.  This has been rescheduled 1 or 2 times and he is concerned for continuing infection and spread of infection        The following portions of the patient's history were reviewed and updated as appropriate: allergies, current medications, past family history, past medical history, past social history, past surgical history, and problem list.    Review of Systems   Constitutional:  Negative for chills and fever.   HENT:  Negative for ear pain and sore throat.    Eyes:  Negative for pain and visual disturbance.   Respiratory:  Negative for cough and shortness of breath.    Cardiovascular:  Negative for chest pain and palpitations.   Gastrointestinal:  Negative for abdominal pain and vomiting.   Genitourinary:  Negative for dysuria and hematuria.   Musculoskeletal:  Negative for arthralgias and back pain.   Skin:  Negative for color change and rash.   Neurological:  Negative for seizures and syncope.   All other systems reviewed and are negative.        Objective:      /77 (BP Location: Left arm, Patient Position: Sitting, Cuff Size: Standard)   Pulse 63   Wt 75.8 kg (167 lb)   BMI 22.65 kg/m²          Physical Exam  Musculoskeletal:      Comments: The toe actually appears less infected and less acutely infected than when I booked the surgery.  Ulceration continues probe to bone, mild sausage appearance of digit.  Bone is exposed, no ascending erythema

## 2024-04-06 ENCOUNTER — HOME CARE VISIT (OUTPATIENT)
Dept: HOME HEALTH SERVICES | Facility: HOME HEALTHCARE | Age: 89
End: 2024-04-06
Payer: MEDICARE

## 2024-04-10 ENCOUNTER — HOME CARE VISIT (OUTPATIENT)
Dept: HOME HEALTH SERVICES | Facility: HOME HEALTHCARE | Age: 89
End: 2024-04-10
Payer: MEDICARE

## 2024-04-10 PROCEDURE — G0299 HHS/HOSPICE OF RN EA 15 MIN: HCPCS

## 2024-04-11 ENCOUNTER — HOSPITAL ENCOUNTER (OUTPATIENT)
Facility: HOSPITAL | Age: 89
Setting detail: OUTPATIENT SURGERY
Discharge: HOME | End: 2024-04-11
Attending: PODIATRIST | Admitting: PODIATRIST
Payer: MEDICARE

## 2024-04-11 VITALS
HEIGHT: 72 IN | BODY MASS INDEX: 22.62 KG/M2 | RESPIRATION RATE: 18 BRPM | SYSTOLIC BLOOD PRESSURE: 157 MMHG | TEMPERATURE: 96.2 F | WEIGHT: 167 LBS | OXYGEN SATURATION: 96 % | HEART RATE: 58 BPM | DIASTOLIC BLOOD PRESSURE: 75 MMHG

## 2024-04-11 DIAGNOSIS — M86.9 OSTEOMYELITIS OF TOE OF LEFT FOOT (HCC): ICD-10-CM

## 2024-04-11 PROBLEM — IMO0001 SMOKING: Status: ACTIVE | Noted: 2024-04-11

## 2024-04-11 PROBLEM — E78.5 HYPERLIPIDEMIA: Status: ACTIVE | Noted: 2024-04-11

## 2024-04-11 PROBLEM — F17.200 SMOKING: Status: ACTIVE | Noted: 2024-04-11

## 2024-04-11 LAB
ATRIAL RATE: 62 BPM
P AXIS: 78 DEGREES
PR INTERVAL: 280 MS
QRS AXIS: -65 DEGREES
QRSD INTERVAL: 138 MS
QT INTERVAL: 472 MS
QTC INTERVAL: 479 MS
T WAVE AXIS: 8 DEGREES
VENTRICULAR RATE: 62 BPM

## 2024-04-11 PROCEDURE — 88311 DECALCIFY TISSUE: CPT | Performed by: STUDENT IN AN ORGANIZED HEALTH CARE EDUCATION/TRAINING PROGRAM

## 2024-04-11 PROCEDURE — 99024 POSTOP FOLLOW-UP VISIT: CPT | Performed by: PODIATRIST

## 2024-04-11 PROCEDURE — 93010 ELECTROCARDIOGRAM REPORT: CPT | Performed by: INTERNAL MEDICINE

## 2024-04-11 PROCEDURE — 88305 TISSUE EXAM BY PATHOLOGIST: CPT | Performed by: STUDENT IN AN ORGANIZED HEALTH CARE EDUCATION/TRAINING PROGRAM

## 2024-04-11 PROCEDURE — 93005 ELECTROCARDIOGRAM TRACING: CPT

## 2024-04-11 PROCEDURE — 28820 AMPUTATION OF TOE: CPT | Performed by: PODIATRIST

## 2024-04-11 PROCEDURE — NC001 PR NO CHARGE: Performed by: STUDENT IN AN ORGANIZED HEALTH CARE EDUCATION/TRAINING PROGRAM

## 2024-04-11 RX ORDER — CEFAZOLIN SODIUM 2 G/50ML
2000 SOLUTION INTRAVENOUS ONCE
Status: COMPLETED | OUTPATIENT
Start: 2024-04-11 | End: 2024-04-11

## 2024-04-11 RX ORDER — CHLORHEXIDINE GLUCONATE ORAL RINSE 1.2 MG/ML
15 SOLUTION DENTAL ONCE
Status: COMPLETED | OUTPATIENT
Start: 2024-04-11 | End: 2024-04-11

## 2024-04-11 RX ORDER — PHENYLEPHRINE HCL IN 0.9% NACL 1 MG/10 ML
SYRINGE (ML) INTRAVENOUS AS NEEDED
Status: DISCONTINUED | OUTPATIENT
Start: 2024-04-11 | End: 2024-04-11

## 2024-04-11 RX ORDER — CHLORHEXIDINE GLUCONATE 4 G/100ML
SOLUTION TOPICAL DAILY PRN
Status: DISCONTINUED | OUTPATIENT
Start: 2024-04-11 | End: 2024-04-11 | Stop reason: HOSPADM

## 2024-04-11 RX ORDER — CEPHALEXIN 500 MG/1
500 CAPSULE ORAL EVERY 6 HOURS SCHEDULED
Qty: 28 CAPSULE | Refills: 0 | Status: SHIPPED | OUTPATIENT
Start: 2024-04-11 | End: 2024-04-18

## 2024-04-11 RX ORDER — CEPHALEXIN 500 MG/1
500 CAPSULE ORAL EVERY 6 HOURS SCHEDULED
Qty: 28 CAPSULE | Refills: 0 | Status: SHIPPED | OUTPATIENT
Start: 2024-04-11 | End: 2024-04-11

## 2024-04-11 RX ORDER — MAGNESIUM HYDROXIDE 1200 MG/15ML
LIQUID ORAL AS NEEDED
Status: DISCONTINUED | OUTPATIENT
Start: 2024-04-11 | End: 2024-04-11 | Stop reason: HOSPADM

## 2024-04-11 RX ORDER — ONDANSETRON 2 MG/ML
4 INJECTION INTRAMUSCULAR; INTRAVENOUS ONCE AS NEEDED
Status: DISCONTINUED | OUTPATIENT
Start: 2024-04-11 | End: 2024-04-11 | Stop reason: HOSPADM

## 2024-04-11 RX ORDER — FENTANYL CITRATE/PF 50 MCG/ML
25 SYRINGE (ML) INJECTION
Status: DISCONTINUED | OUTPATIENT
Start: 2024-04-11 | End: 2024-04-11 | Stop reason: HOSPADM

## 2024-04-11 RX ORDER — SODIUM CHLORIDE, SODIUM LACTATE, POTASSIUM CHLORIDE, CALCIUM CHLORIDE 600; 310; 30; 20 MG/100ML; MG/100ML; MG/100ML; MG/100ML
125 INJECTION, SOLUTION INTRAVENOUS CONTINUOUS
Status: DISCONTINUED | OUTPATIENT
Start: 2024-04-11 | End: 2024-04-11 | Stop reason: HOSPADM

## 2024-04-11 RX ORDER — OXYCODONE HYDROCHLORIDE 5 MG/1
5 TABLET ORAL ONCE AS NEEDED
Status: DISCONTINUED | OUTPATIENT
Start: 2024-04-11 | End: 2024-04-11 | Stop reason: HOSPADM

## 2024-04-11 RX ORDER — LIDOCAINE HYDROCHLORIDE 20 MG/ML
INJECTION, SOLUTION EPIDURAL; INFILTRATION; INTRACAUDAL; PERINEURAL AS NEEDED
Status: DISCONTINUED | OUTPATIENT
Start: 2024-04-11 | End: 2024-04-11 | Stop reason: HOSPADM

## 2024-04-11 RX ORDER — SODIUM CHLORIDE, SODIUM LACTATE, POTASSIUM CHLORIDE, CALCIUM CHLORIDE 600; 310; 30; 20 MG/100ML; MG/100ML; MG/100ML; MG/100ML
INJECTION, SOLUTION INTRAVENOUS CONTINUOUS PRN
Status: DISCONTINUED | OUTPATIENT
Start: 2024-04-11 | End: 2024-04-11

## 2024-04-11 RX ORDER — SODIUM CHLORIDE, SODIUM LACTATE, POTASSIUM CHLORIDE, CALCIUM CHLORIDE 600; 310; 30; 20 MG/100ML; MG/100ML; MG/100ML; MG/100ML
20 INJECTION, SOLUTION INTRAVENOUS CONTINUOUS
Status: DISCONTINUED | OUTPATIENT
Start: 2024-04-11 | End: 2024-04-11 | Stop reason: HOSPADM

## 2024-04-11 RX ORDER — PROPOFOL 10 MG/ML
INJECTION, EMULSION INTRAVENOUS AS NEEDED
Status: DISCONTINUED | OUTPATIENT
Start: 2024-04-11 | End: 2024-04-11

## 2024-04-11 RX ADMIN — Medication 50 MCG: at 14:19

## 2024-04-11 RX ADMIN — SODIUM CHLORIDE, SODIUM LACTATE, POTASSIUM CHLORIDE, AND CALCIUM CHLORIDE 125 ML/HR: .6; .31; .03; .02 INJECTION, SOLUTION INTRAVENOUS at 11:37

## 2024-04-11 RX ADMIN — CHLORHEXIDINE GLUCONATE 15 ML: 1.2 RINSE ORAL at 11:33

## 2024-04-11 RX ADMIN — CEFAZOLIN SODIUM 2000 MG: 2 SOLUTION INTRAVENOUS at 14:18

## 2024-04-11 RX ADMIN — PROPOFOL 50 MCG/KG/MIN: 10 INJECTION, EMULSION INTRAVENOUS at 14:12

## 2024-04-11 RX ADMIN — Medication 50 MCG: at 14:21

## 2024-04-11 RX ADMIN — SODIUM CHLORIDE, SODIUM LACTATE, POTASSIUM CHLORIDE, AND CALCIUM CHLORIDE: .6; .31; .03; .02 INJECTION, SOLUTION INTRAVENOUS at 13:05

## 2024-04-11 NOTE — DISCHARGE INSTR - AVS FIRST PAGE
[facility]  Dr. Barrientos  Post-Operative Instructions    1. Take your prescribed medication as needed. You can take ibuprofen in between doses of the narcotic if needed. You have also been prescribed an antibiotic, keflex, to your pharmacy. Take as prescribed  2. Upon arrival at home, lie down and elevate your surgical foot on 2 pillows.  3. Remain quiet, off your feet as much as possible, for the first 24-48 hours. This is when your feet first swell and may become painful. After 48 hours you may begin limited walking following these restrictions:weight bearing as tolerated surgical shoe     4. Drink large quantities of water. Consume no alcohol. Continue a well-balanced diet.  5. Report any unusual discomfort or fever to this office.  6. A limited amount of discomfort and swelling is to be expected. In some cases the skin may take on a bruised appearance. The surgical solution that was applied to your foot prior to the operation is dark in color and the operation site may appear to be oozing when it actually is not.  7. A slight amount of blood is to be expected, and is no cause for alarm. Do not remove the dressings. If there is active bleeding and if the bleeding persists, add additional gauze to the bandage, apply direct pressure, elevate your feet and call this office.  8. Do not get the dressings wet. As regular bathing may be inconvenient, sponge baths are recommended. If you shower, make sure the dressing stays dry.   9. When anesthesia wears off and if any discomfort should be present, apply an ice pack directly over the operated area for 15 minute intervals for several hours or until the pain leaves. (USE IN EXCESS OF 15 MINUTES COULD CAUSE FROSTBITE). Do not use hot water bags or electric pads. A convenient icepack can be made by placing ice cubes in a plastic bag and covering this with a towel.  10. If necessary, take a mild laxative before retiring.  11. Wear your special open shoes anytime you  put weight on your foot, even if it is just to walk to the bathroom and back. It will probably be 2 or 3 weeks before you will be permitted to try regular shoes.  12. Having performed the operation, we are interested in a prompt recovery. Please cooperate by following the above instructions.  13. Please call to confirm your post-op appointment or call with any other questions.

## 2024-04-11 NOTE — ANESTHESIA POSTPROCEDURE EVALUATION
Post-Op Assessment Note    CV Status:  Stable  Pain Score: 0    Pain management: adequate       Mental Status:  Alert and awake   Hydration Status:  Euvolemic   PONV Controlled:  Controlled   Airway Patency:  Patent     Post Op Vitals Reviewed: Yes    No anethesia notable event occurred.    Staff: CRNA               BP   144/76   Temp 98   Pulse 70   Resp 12   SpO2 99   Report and handoff in PACU to RN. Vss and pt expressed gratitude.

## 2024-04-11 NOTE — H&P
Please see Progress Note written by Varun Schrader DO dated 3/22/24 for full H&P and consider this the H&P update.  No changes to history, no hospitalizations or ED visits since date of original H&P.      Exam on DOS:  Heart:  RRR, S1 and S2  Lungs:  Clear to auscultation bilaterally, no wheezing.  Breathing comfortably on room air and able to speak in full sentences.  Abdomen:  +BS, soft, nontender to palpation

## 2024-04-11 NOTE — DISCHARGE SUMMARY
Discharge Summary Outpatient Procedure Podiatry -   Abdirahman Hope 88 y.o. 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      Provisions for Follow-Up Care/Important appointments:  See after visit summary for information related to follow-up care and any pertinent home health orders.      Discharge Medications:  See after visit summary for reconciled discharge medications provided to patient and family.

## 2024-04-11 NOTE — ANESTHESIA PREPROCEDURE EVALUATION
Procedure:  AMPUTATION TOE, 2nd (Left: Toe)    Relevant Problems   ANESTHESIA (within normal limits)      CARDIO   (+) High blood pressure   (+) Hyperlipidemia   (+) PAD (peripheral artery disease) (HCC)      ENDO   (+) Hypothyroidism      GI/HEPATIC  Confirmed NPO appropriate  Drinks 1-2 alcoholic beverages daily   (+) Gastroesophageal reflux disease without esophagitis      /RENAL (within normal limits)      HEMATOLOGY   (+) Anemia   (+) Von Willebrand's disease (HCC)      MUSCULOSKELETAL   (+) Spondylosis 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      NEURO/PSYCH (within normal limits)      PULMONARY   (+) Chronic obstructive pulmonary disease with acute exacerbation (HCC)   (+) Smoking   (-) URI (upper respiratory infection)        Physical Exam    Airway    Mallampati score: II  TM Distance: >3 FB  Neck ROM: full     Dental    upper dentures and lower dentures    Cardiovascular  Rhythm: regular, Rate: normal    Pulmonary   Breath sounds clear to auscultation    Other Findings        Anesthesia Plan  ASA Score- 3     Anesthesia Type- IV sedation with anesthesia with ASA Monitors.         Additional Monitors:     Airway Plan:     Comment: I discussed the risks and benefits of IV sedation anesthesia including the possibility of the need to convert to general anesthesia and the potential risk of awareness.  The patient was given the opportunity to ask questions, which were answered..       Plan Factors-Exercise tolerance (METS): >4 METS.    Chart reviewed. EKG reviewed.  Existing labs reviewed.     Patient is a current smoker.      Obstructive sleep apnea risk education given perioperatively.        Induction- intravenous.    Postoperative Plan-     Informed Consent- Anesthetic plan and risks discussed with patient, spouse and daughter.  I personally reviewed this patient with the CRNA. Discussed and agreed on the Anesthesia Plan with the CRNA..            Lab Results   Component Value Date    WBC  8.96 03/22/2024    HGB 14.0 03/22/2024    HCT 44.7 03/22/2024    MCV 95 03/22/2024     03/22/2024     Lab Results   Component Value Date    SODIUM 139 03/22/2024    K 4.3 03/22/2024     03/22/2024    CO2 31 03/22/2024    BUN 17 03/22/2024    CREATININE 0.97 03/22/2024    GLUC 97 11/13/2023    CALCIUM 9.9 03/22/2024     Lab Results   Component Value Date    ALT 15 03/22/2024    AST 21 03/22/2024    ALKPHOS 56 03/22/2024    BILITOT 0.85 07/30/2015     Lab Results   Component Value Date    INR 1.07 03/22/2024    INR 1.05 01/05/2021    INR 1.05 12/11/2018    PROTIME 13.8 03/22/2024    PROTIME 13.5 01/05/2021    PROTIME 13.2 12/11/2018     Lab Results   Component Value Date    HGBA1C 6.1 06/07/2018

## 2024-04-11 NOTE — OP NOTE
OPERATIVE REPORT  PATIENT NAME: Abdirahman Hope    :  1935  MRN: [id number]  Pt Location: [facility] OR ROOM 02    SURGERY DATE: 2024    Surgeons and Role:     * Vicente Barrientos DPM - Primary    Preop Diagnosis:  Osteomyelitis of toe of left foot (HCC) [M86.9]    Post-Op Diagnosis Codes:     * Osteomyelitis of toe of left foot (HCC) [M86.9]    Procedure(s):  Left - LEFT 2ND TOE AMPUTATION    Specimen(s):  ID Type Source Tests Collected by Time Destination   1 : Left Second Toe Tissue Toe, Left TISSUE EXAM Vicente Barrientos DPM 2024 1419        Estimated Blood Loss:   Minimal    Drains:  * No LDAs found *    Anesthesia Type:   Conscious Sedation     Operative Indications:  Osteomyelitis of toe of left foot (HCC) [M86.9]      Operative Findings:  1) Amputation at the level of the MPJ. Remaining skin and soft tissue appeared viable.  Will plan for 7 days of Keflex postsurgery.    Complications:   None    Procedure and Technique:  Patient brought back to the operating room and remained on the stretcher for the entirety the procedure.  After anesthesia was administered a preinjection timeout was completed with all parties agreement.  A local block consisting of 5 cc of 1% lidocaine and 0.5% Marcaine was injected to the left foot.  The left lower extremity then prepped and draped.  A preincision timeout was then completed with all parties in agreement.    Attention was directed to the 2 digit where a raquete type of incision was made. Utilizing a sterile 15 blade, this incision was carried deep straight to bone. Soft tissue structures were then reflected off the MPJ. Utilizing a sterile 15 blade, all capsular structures were severed and the toe was then disarticulated and then placed on the back table. It was noted that all tissue margins were bleeding and viable. No deep sinus tracts or areas of purulence were visualized. The remaining bone on the proximal aspect of the joint was noted  to be of hard and viable quality. Any remaining tendinous structures were identified and severed proximally to remove any nidus of infection.     The wound was then cleansed with copious amount of sterile saline. Skin closure was then obtained with 4-0 nylon placed in a simple type of fashion.  A dressing consisting of Xeroform DSD was applied to the foot.  Patient tolerated procedure well with immediate complications.     Dr. Barrientos was present for the entire procedure.    Patient Disposition:  PACU         SIGNATURE: Darius Ann DPM  DATE: April 11, 2024  TIME: 2:45 PM

## 2024-04-13 ENCOUNTER — HOME CARE VISIT (OUTPATIENT)
Dept: HOME HEALTH SERVICES | Facility: HOME HEALTHCARE | Age: 89
End: 2024-04-13
Payer: MEDICARE

## 2024-04-13 PROCEDURE — G0299 HHS/HOSPICE OF RN EA 15 MIN: HCPCS

## 2024-04-14 VITALS
OXYGEN SATURATION: 98 % | HEART RATE: 66 BPM | DIASTOLIC BLOOD PRESSURE: 74 MMHG | SYSTOLIC BLOOD PRESSURE: 132 MMHG | RESPIRATION RATE: 18 BRPM | TEMPERATURE: 98 F

## 2024-04-16 PROCEDURE — 88311 DECALCIFY TISSUE: CPT | Performed by: STUDENT IN AN ORGANIZED HEALTH CARE EDUCATION/TRAINING PROGRAM

## 2024-04-16 PROCEDURE — 88305 TISSUE EXAM BY PATHOLOGIST: CPT | Performed by: STUDENT IN AN ORGANIZED HEALTH CARE EDUCATION/TRAINING PROGRAM

## 2024-04-17 VITALS
RESPIRATION RATE: 18 BRPM | DIASTOLIC BLOOD PRESSURE: 66 MMHG | SYSTOLIC BLOOD PRESSURE: 122 MMHG | HEART RATE: 68 BPM | OXYGEN SATURATION: 98 % | TEMPERATURE: 97.8 F

## 2024-04-18 ENCOUNTER — OFFICE VISIT (OUTPATIENT)
Dept: PODIATRY | Facility: CLINIC | Age: 89
End: 2024-04-18
Payer: MEDICARE

## 2024-04-18 VITALS
DIASTOLIC BLOOD PRESSURE: 82 MMHG | HEART RATE: 71 BPM | BODY MASS INDEX: 22.62 KG/M2 | SYSTOLIC BLOOD PRESSURE: 178 MMHG | HEIGHT: 72 IN | WEIGHT: 167 LBS

## 2024-04-18 DIAGNOSIS — S91.105A OPEN WOUND OF SECOND TOE OF LEFT FOOT, INITIAL ENCOUNTER: Primary | ICD-10-CM

## 2024-04-18 DIAGNOSIS — I73.9 PVD (PERIPHERAL VASCULAR DISEASE) (HCC): ICD-10-CM

## 2024-04-18 PROCEDURE — 99213 OFFICE O/P EST LOW 20 MIN: CPT | Performed by: PODIATRIST

## 2024-04-19 ENCOUNTER — HOME CARE VISIT (OUTPATIENT)
Dept: HOME HEALTH SERVICES | Facility: HOME HEALTHCARE | Age: 89
End: 2024-04-19
Payer: MEDICARE

## 2024-04-23 NOTE — CASE COMMUNICATION
Sn spoke with patient and significant other.  No SNV needed due to patient being at surgeon yesterday for an appt and no wound care needed.

## 2024-04-24 ENCOUNTER — HOME CARE VISIT (OUTPATIENT)
Dept: HOME HEALTH SERVICES | Facility: HOME HEALTHCARE | Age: 89
End: 2024-04-24
Payer: MEDICARE

## 2024-04-24 PROCEDURE — G0299 HHS/HOSPICE OF RN EA 15 MIN: HCPCS

## 2024-04-25 ENCOUNTER — OFFICE VISIT (OUTPATIENT)
Dept: PODIATRY | Facility: CLINIC | Age: 89
End: 2024-04-25
Payer: MEDICARE

## 2024-04-25 VITALS
BODY MASS INDEX: 22.62 KG/M2 | WEIGHT: 167 LBS | HEIGHT: 72 IN | DIASTOLIC BLOOD PRESSURE: 74 MMHG | HEART RATE: 75 BPM | SYSTOLIC BLOOD PRESSURE: 152 MMHG

## 2024-04-25 DIAGNOSIS — Z89.422 HISTORY OF AMPUTATION OF LESSER TOE, LEFT (HCC): Primary | ICD-10-CM

## 2024-04-25 PROCEDURE — 99212 OFFICE O/P EST SF 10 MIN: CPT | Performed by: PODIATRIST

## 2024-04-25 NOTE — PROGRESS NOTES
Assessment/Plan:    No problem-specific Assessment & Plan notes found for this encounter.       Diagnoses and all orders for this visit:    History of amputation of lesser toe, left (HCC)      -Sutures removed today, no evidence of dehiscence, normal postop appearance, may transition back to normal shoe, follow-up with Dr. Cabrera for at risk footcare    Subjective:      Patient ID: Abdirahman Hope is a 88 y.o. male.    Patient transfer evaluation management of his amputation site.  Has no new pedal complaints, no pain, no drainage, no issues        The following portions of the patient's history were reviewed and updated as appropriate: allergies, current medications, past family history, past medical history, past social history, past surgical history, and problem list.    Review of Systems   Constitutional:  Negative for chills and fever.   HENT:  Negative for ear pain and sore throat.    Eyes:  Negative for pain and visual disturbance.   Respiratory:  Negative for cough and shortness of breath.    Cardiovascular:  Negative for chest pain and palpitations.   Gastrointestinal:  Negative for abdominal pain and vomiting.   Genitourinary:  Negative for dysuria and hematuria.   Musculoskeletal:  Negative for arthralgias and back pain.   Skin:  Negative for color change and rash.   Neurological:  Negative for seizures and syncope.   All other systems reviewed and are negative.        Objective:      /74 (BP Location: Right arm, Patient Position: Sitting, Cuff Size: Large)   Pulse 75   Ht 6' (1.829 m)   Wt 75.8 kg (167 lb)   BMI 22.65 kg/m²          Physical Exam  Musculoskeletal:      Comments: Cellulitis is resolved, no evidence of dehiscence following suture removal, normal postoperative appearance  -No signs of infection normal postop appearance

## 2024-04-29 VITALS
SYSTOLIC BLOOD PRESSURE: 132 MMHG | RESPIRATION RATE: 18 BRPM | TEMPERATURE: 98 F | DIASTOLIC BLOOD PRESSURE: 76 MMHG | HEART RATE: 66 BPM | OXYGEN SATURATION: 98 %

## 2024-04-30 ENCOUNTER — HOME CARE VISIT (OUTPATIENT)
Dept: HOME HEALTH SERVICES | Facility: HOME HEALTHCARE | Age: 89
End: 2024-04-30
Payer: MEDICARE

## 2024-04-30 PROCEDURE — G0299 HHS/HOSPICE OF RN EA 15 MIN: HCPCS

## 2024-05-01 VITALS
RESPIRATION RATE: 18 BRPM | TEMPERATURE: 98.3 F | DIASTOLIC BLOOD PRESSURE: 66 MMHG | SYSTOLIC BLOOD PRESSURE: 118 MMHG | OXYGEN SATURATION: 98 % | HEART RATE: 60 BPM

## 2024-05-21 ENCOUNTER — OFFICE VISIT (OUTPATIENT)
Dept: PODIATRY | Facility: CLINIC | Age: 89
End: 2024-05-21
Payer: MEDICARE

## 2024-05-21 VITALS
WEIGHT: 162.4 LBS | HEART RATE: 70 BPM | BODY MASS INDEX: 22 KG/M2 | HEIGHT: 72 IN | SYSTOLIC BLOOD PRESSURE: 142 MMHG | DIASTOLIC BLOOD PRESSURE: 71 MMHG | OXYGEN SATURATION: 96 %

## 2024-05-21 DIAGNOSIS — B35.1 ONYCHOMYCOSIS: ICD-10-CM

## 2024-05-21 DIAGNOSIS — I73.9 PVD (PERIPHERAL VASCULAR DISEASE) (HCC): Primary | ICD-10-CM

## 2024-05-21 DIAGNOSIS — G60.9 IDIOPATHIC PERIPHERAL NEUROPATHY: ICD-10-CM

## 2024-05-21 DIAGNOSIS — R52 NOCTURNAL PAIN: ICD-10-CM

## 2024-05-21 DIAGNOSIS — Z89.422 HISTORY OF AMPUTATION OF LESSER TOE, LEFT (HCC): ICD-10-CM

## 2024-05-21 PROCEDURE — 99212 OFFICE O/P EST SF 10 MIN: CPT | Performed by: STUDENT IN AN ORGANIZED HEALTH CARE EDUCATION/TRAINING PROGRAM

## 2024-05-21 PROCEDURE — 11721 DEBRIDE NAIL 6 OR MORE: CPT | Performed by: STUDENT IN AN ORGANIZED HEALTH CARE EDUCATION/TRAINING PROGRAM

## 2024-05-21 NOTE — PATIENT INSTRUCTIONS
Trial Nervive nerve supplement     Trial gel heel cushion    Achilles Tendon Stretching Exercises    A) Standing Gastrocnemius stretch  Place hands on wall or chair. If using wall, put your hands at eye level.  Step the leg you want to stretch behind you. Keep your back heel on the floor and point your toes straight ahead or slightly inward towards the heel of the opposite foot.   Bend your knee toward the wall while keeping your back leg straight.  Lean toward the wall until you feel a gentle stretch in you calf of the straight leg. Don't lean so far that you feel pain.  Hold for 15 seconds. Complete 3 reps.    B) Standing soleus stretch  Place your hands on the wall or chair. If using wall, put your hands at eye level.  Step the leg you want to stretch behind you (your back foot will need to be closer to the front foot than the above stretch). Keep your back heel on the floor and point your toes straight ahead or slightly inward towards the heel of the opposite foot.   Bend both your front and back knee at the same time (may help to stick your butt out). You do not need to lean towards the wall, just bend the knees.   Lean toward the wall until you feel a gentle stretch in you calf of the straight leg. Don't lean so far that you feel pain.  Hold for 15 seconds. Complete 3 reps.          Keep these tips and tricks in mind to get the most out of your stretching;  Take your time - move slowly, whether you are deepening into a stretch or changing positions. This will limit the risk of injury & discomfort.  Avoid bouncing - quick sudden movements will only worsen achilles tendon issues. Stay relaxed during stretch.  Keep your heel down and toes straight ahead or slightly inward - this will allow the achilles tendon to stretch properly.   Stop if you feel pain - Don't strain or force your muscle. If you feel sharp pain, stop immediately.

## 2024-05-21 NOTE — PROGRESS NOTES
Abdirahman ORTIZ Herminia  1935  AT RISK FOOT CARE    1. PVD (peripheral vascular disease) ([facility])        2. History of amputation of lesser toe, left ([facility])        3. Onychomycosis        4. Idiopathic peripheral neuropathy        5. Nocturnal pain            Patient presents for at-risk foot care.  Patient has no acute concerns today but would like to talk about recs for left heel pain and foot numbness.  Patient has significant lower extremity risk due to previous amputation.    Notes left heel pain at night which wakes him up from sleep. This resolves with walking. Notes numbness to feet.     On exam patient has thickened, hypertrophic, discolored, brittle toenails with subungual debris and tenderness x9   Callus: 0  Amputation: L2    Today's treatment includes:  Debridement of toenails. Using nail nipper, steffanie, and curette, nails were sharply debrided, reduced in thickness and length. Devitalized nail tissue and fungal debris excised and removed. Patient tolerated well.    I discussed possible etiology of peripheral neuropathy with patient including Diabetes, Vitamin B deficiency, Autoimmune diseases such as rheumatoid arthritis, Injury to nerves, Infectious diseases (HIV/AIDS, leprosy, syphilis), Post-herpetic neuralgia (complication resulting from shingles), Genetic disorders, idiopathic neuropathy, alcohol abuse.  I discussed Nervive nerve supplement    I recommended pain mnmgt consult however pt defers  I recommended PT however patient defers    Discussed proper shoe gear, daily inspections of feet, and general foot health with patient. Patient has Q7  findings and is recommended for at risk foot care every 9-10 weeks.    Patients most recent complete clinical exam was performed: 12/5/23

## 2024-05-27 DIAGNOSIS — K21.9 GASTROESOPHAGEAL REFLUX DISEASE WITHOUT ESOPHAGITIS: ICD-10-CM

## 2024-05-28 RX ORDER — GABAPENTIN 600 MG/1
600 TABLET ORAL 2 TIMES DAILY
Qty: 180 TABLET | Refills: 1 | Status: SHIPPED | OUTPATIENT
Start: 2024-05-28

## 2024-05-28 RX ORDER — OMEPRAZOLE 40 MG/1
40 CAPSULE, DELAYED RELEASE ORAL 2 TIMES DAILY
Qty: 180 CAPSULE | Refills: 1 | Status: SHIPPED | OUTPATIENT
Start: 2024-05-28

## 2024-06-15 DIAGNOSIS — J30.1 ALLERGIC RHINITIS DUE TO POLLEN, UNSPECIFIED SEASONALITY: ICD-10-CM

## 2024-06-17 RX ORDER — IPRATROPIUM BROMIDE 21 UG/1
SPRAY, METERED NASAL
Qty: 120 ML | Refills: 3 | Status: SHIPPED | OUTPATIENT
Start: 2024-06-17

## 2024-07-01 ENCOUNTER — OFFICE VISIT (OUTPATIENT)
Age: 89
End: 2024-07-01
Payer: MEDICARE

## 2024-07-01 VITALS
SYSTOLIC BLOOD PRESSURE: 124 MMHG | DIASTOLIC BLOOD PRESSURE: 72 MMHG | HEART RATE: 60 BPM | TEMPERATURE: 97.6 F | HEIGHT: 72 IN | BODY MASS INDEX: 21.62 KG/M2 | WEIGHT: 159.6 LBS | RESPIRATION RATE: 16 BRPM | OXYGEN SATURATION: 99 %

## 2024-07-01 DIAGNOSIS — R68.81 EARLY SATIETY: ICD-10-CM

## 2024-07-01 DIAGNOSIS — R63.4 WEIGHT LOSS: ICD-10-CM

## 2024-07-01 DIAGNOSIS — R10.13 DYSPEPSIA: Primary | ICD-10-CM

## 2024-07-01 DIAGNOSIS — R63.0 APPETITE LOSS: ICD-10-CM

## 2024-07-01 PROCEDURE — 99214 OFFICE O/P EST MOD 30 MIN: CPT | Performed by: STUDENT IN AN ORGANIZED HEALTH CARE EDUCATION/TRAINING PROGRAM

## 2024-07-01 PROCEDURE — G2211 COMPLEX E/M VISIT ADD ON: HCPCS | Performed by: STUDENT IN AN ORGANIZED HEALTH CARE EDUCATION/TRAINING PROGRAM

## 2024-07-01 RX ORDER — BISACODYL 5 MG/1
20 TABLET, DELAYED RELEASE ORAL ONCE
Qty: 4 TABLET | Refills: 0 | Status: SHIPPED | OUTPATIENT
Start: 2024-07-01 | End: 2024-07-01

## 2024-07-01 RX ORDER — POLYETHYLENE GLYCOL 3350 17 G/17G
238 POWDER, FOR SOLUTION ORAL ONCE
Qty: 238 G | Refills: 0 | Status: SHIPPED | OUTPATIENT
Start: 2024-07-01 | End: 2024-07-01

## 2024-07-01 NOTE — PATIENT INSTRUCTIONS
We will schedule you for upper endoscopy (EGD) and colonoscopy  Please follow the instructions for the bowel prep  We will also order a gastric emptying study  Continue your medications as directed  And continue drinking protein supplements and monitor your weight        COLONOSCOPY  MIRALAX/Dulcolax Bowel Preparation Instructions    The OR/GI Lab will contact you the evening prior to your procedure with your exact arrival time.    Our practice requires a 1 week notice for any cancellations or rescheduling. We kindly ask that you immediately notify us of any changes including any new medications that are prescribed. Thank you for your cooperation.       WEEK BEFORE YOUR PROCEDURE:  Stop taking Iron tablets.  5 days prior, AVOID vegetables and fruits with skins or seeds, nuts, corn, popcorn and whole grain breads.   Purchase: One (1) 238-gram container of Miralax (polyethylene glycol 3350), four (4) 5 mg Dulcolax (bisacodyl) tablets, and one (1) 64-ounce bottle of Gatorade (sports drink) - no red, orange, or purple. These may be purchased at any pharmacy without a prescription. Generic products are permissible.   Arrange responsible transportation for day of the procedure.     DAY BEFORE THE PROCEDURE:   CLEAR liquids only for entire day prior. Nothing red, orange or purple.    You MAY have:                                                               Soda  Water  Broth Gatorade  Jello  Popsicles Coffee/tea without milk/creamer     YOU MAY NOT HAVE:  Solid foods   Milk and milk products    Juice with pulp    BOWEL PREPARATION:  Includes: One (1) 238-gram container of Miralax (polyethylene glycol 3350), four (4) 5 mg Dulcolax (bisacodyl) tablets, and one (1) 64-ounce bottle of Gatorade (sports drink).  Preparation may be refrigerated.  Entire bowel prep should be completed.     Afternoon before the procedure (2:00 pm - 5:00 pm):    Take two (2) 5 mg Dulcolax laxative tablets.     Evening before the procedure (6:00  pm):  Mix entire container of Miralax with one (1) 64-ounce bottle of Gatorade and shake until all medication is dissolved.   Begin drinking solution. Drink an eight (8) ounce cup every 10-15 minutes until you have consumed half (32 ounces) of the solution.  Refrigerate remaining solution.    Night before the procedure (8:00 pm):  Take two (2) 5 mg Dulcolax laxative tablets.     Beginning 5 hours before your procedure:  Drink the remaining amount of prepared solution (32 ounces).  Drink an eight (8) ounce cup every 10-15 minutes until you have consumed the remaining solution.     Bowel prep should be completed 4 hours prior to procedure time.    NOTHING TO EAT OR DRINK AFTER MIDNIGHT- EXCEPT FOR YOUR PREP    DAY OF THE PROCEDURE:  You may brush your teeth.  Leave all jewelry at home.  Please arrive for your procedure as indicated by the OR / GI Lab / Endoscopy Unit. The hospital will contact you the day before with your exact arrival time.   Make sure you have arranged ahead of time for a responsible adult (18 or older) to accompany and drive you home after the procedure.  Please discuss any transportation concerns with our staff prior to your procedure.    The effects of the anesthesia can persist for 24 hours.  After receiving the sedation, you must exercise caution before engaging in any activity that could harm yourself and others (such as driving a car).  Do not make any important decisions or do not drink any alcoholic beverages during this time period.  After your procedure, you may have anything you'd like to eat or drink.  You will probably want to start with something light.  Please include plenty of fluids.  Avoid items that cause gas such as sodas and salads.    SPECIAL INSTRUCTIONS:    For patients currently taking blood thinners and/or antiplatelet therapy our office will contact the prescribing provider.  Our office will contact you with any required changes to your medication regimen.     Blood  thinner (i.e. - Coumadin, Pradaxa, Lovenox, Xarelto, Eliquis)  ?  Continue (Do Not Stop)  ? Stop______________for_____________days prior to the procedure.    Antiplatelet (i.e. - Plavix, Aggrenox, Effient, Brilinta)  ?  Continue (Do Not Stop)  ? Stop______________for_____________days prior to the procedure.       Diabetes:   If you are Diabetic, please see separate Diabetic Instruction Sheet.          Prescribed medications:  Do not stop your aspirin, or any of your other medications (unless instructed otherwise).    Take the rest of your prescribed medications with small sips of water at least 2 hours prior to your procedure.      For any questions or concerns related to your bowel preparation or pre-procedure instructions, please contact our office at [phone].  Thank you for choosing [facility]!

## 2024-07-02 PROBLEM — R68.81 EARLY SATIETY: Status: ACTIVE | Noted: 2024-07-02

## 2024-07-02 NOTE — PROGRESS NOTES
[facility]  Outpatient Follow-up  Encounter: [id number]    PATIENT INFO     Name: Abdirahman Hope  YOB: 1935   Age: 88 y.o.   Sex: male   MRN: [id number]    ASSESSMENT & PLAN     Abdirahman Hope is a 88 y.o. male with complaints of unintentional weight loss and dyspepsia symptoms who presents to GI office for evaluation.    Problem List Items Addressed This Visit       Appetite loss    Weight loss     Reports approximately 20 pound weight loss over the last couple of months.  I suspect that his weight loss may be due to a lack of appetite although there is certainly concern for other etiologies.  He does admit to early satiety and nausea which could be contributing to his appetite thus resulting in his weight loss.  Lower suspicion for occult malignancy but certainly possible.    We would recommend EGD and colonoscopy for evaluation  Patient is agreeable to proceed  MiraLAX/Dulcolax bowel prep  I have recommended that he continue to monitor his weight trends  Encouraged protein supplementation with Ensure or other protein shakes  If bidirectional endoscopy is unremarkable, I would recommend cisneros CT for evaluation but we will hold off at this time given his multiple x-rays and radiation exposure    I obtained informed consent from the patient.  The risks/benefits/alternatives of the procedure were discussed with the patient.  Risks included, but not limited to, infection, bleeding, perforation, injury to organs in the abdomen, missed lesion and incomplete procedure were discussed.  Patient was agreeable and electronic signature was obtained.          Relevant Orders    EGD    Colonoscopy    Dyspepsia - Primary     Does complain of early satiety as well as belching and nausea.  He could have underlying gastroparesis contributing to his nausea symptoms.  This is likely affecting his appetite and thus resulting in weight loss.    We will schedule him for gastric  emptying study  Plan for bidirectional endoscopy for evaluation as well  Continue omeprazole 40 mg twice daily         Relevant Orders    NM gastric emptying    EGD    Colonoscopy    Early satiety    Relevant Orders    NM gastric emptying    EGD    Colonoscopy     Orders Placed This Encounter   Procedures    NM gastric emptying    EGD    Colonoscopy       FOLLOW-UP: 4 weeks    HISTORY OF PRESENT ILLNESS       Abdirahman Hope is a 88 y.o. male who presents to GI office for complaints of weight loss and upper GI symptoms.  Patient is new to me but has been seen by my colleagues in the past.  He is accompanied by his significant other and his son.    Patient reports that he has lost approximately 20 pounds over the last couple of months.  He believes this may be attributed to his poor appetite due to his other GI symptoms which include nausea and early satiety as well as belching.  The symptoms have been ongoing for the last couple of months.  He does have a history of reflux for which she was on omeprazole.  He underwent an endoscopy in 2022 which was largely unremarkable.  He has been supplementing his nutrition with Ensure when able and this does seem to have helped with his weight loss although he has overall lost significant weight without trying.  He denies any abdominal pain or change in bowel habits.  He denies any hematochezia or melena.  He does take fiber supplementation for history of diverticulosis and constipation.  He denies any dysphagia or odynophagia.     ENDOSCOPIC HISTORY     UPPER ENDOSCOPY: 3/2022 with mild gastritis (biopsies negative), normal duodenum and esophagus    COLONOSCOPY: 3/2022 with 2 subcentimeter polyps, large internal hemorrhoids, few diverticula, otherwise unremarkable; further screening no longer recommended given patient's age    REVIEW OF SYSTEMS     CONSTITUTIONAL: Denies any fever, chills, rigors  HEENT: No earache or tinnitus, denies hearing loss or visual  disturbances  CARDIOVASCULAR: No chest pain or palpitations  RESPIRATORY: Denies any cough, hemoptysis, shortness of breath or dyspnea on exertion  GASTROINTESTINAL: As noted in the History of Present Illness  GENITOURINARY: No problems with urination, denies any hematuria or dysuria  NEUROLOGIC: No dizziness or vertigo, denies headaches   MUSCULOSKELETAL: Denies any muscle or joint pain   SKIN: Denies jaundice or itching  PSYCHOSOCIAL: Denies depression or anxiety, denies any recent memory loss     Historical Information   Past Medical History:   Diagnosis Date    Anemia     Appendicitis     Coronary artery disease     Detached retina     GERD (gastroesophageal reflux disease)     Hyperlipidemia     Hypertension     Macular degeneration     Neuropathy     Pelvis fracture (HCC) 01/2021    Von Willebrand disease (HCC)      Past Surgical History:   Procedure Laterality Date    ADENOIDECTOMY      APPENDECTOMY      ARTHRODESIS  01/15/2014    Lumbar     BACK SURGERY      CATARACT EXTRACTION      COLONOSCOPY  12/09/2016    fiberoptic     CYSTOSCOPY      with resection of tumor / 1/12/17, 10/2/17 / diagnostic 12/8/16, 5/30/17     CYSTOSCOPY  09/20/2018    CYSTOSCOPY  06/22/2020    EGD AND COLONOSCOPY N/A 12/9/2016    Procedure: EGD AND COLONOSCOPY;  Surgeon: Kahlil Alfonso MD;  Location: [facility];  Service:     EYE SURGERY      FRACTURE SURGERY Bilateral     ARM    NECK SURGERY      NEUROPLASTY / TRANSPOSITION MEDIAN NERVE AT CARPAL TUNNEL Right 04/2015    IN AMPUTATION METATARSAL W/TOE SINGLE Left 4/11/2024    Procedure: LEFT 2ND TOE AMPUTATION;  Surgeon: Vicente Barrientos DPM;  Location: [facility];  Service: Podiatry    IN BLADDER INSTILLATION ANTICARCINOGENIC AGENT N/A 1/12/2017    Procedure: INSTILLATION MYTOMYCIN;  Surgeon: Jhon Zuniga MD;  Location: [facility];  Service: Urology    IN BLADDER INSTILLATION ANTICARCINOGENIC AGENT N/A 10/2/2017    Procedure: INSTILLATION MYTOMYCIN;  Surgeon: Jhon  IRVIN Zuniga MD;  Location: [facility];  Service: Urology    MA COLONOSCOPY FLX DX W/COLLJ SPEC WHEN PFRMD N/A 2/5/2019    Procedure: COLONOSCOPY;  Surgeon: Abad Valentino MD;  Location: [facility];  Service: Gastroenterology    MA CYSTO W/REMOVAL OF LESIONS SMALL N/A 1/12/2017    Procedure: CYSTOSCOPY, BLADDER BIOPSY WITH FULGRATION, 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;  Surgeon: Jhon Zuniga MD;  [facility]: [facility];  Service: Urology    MA CYSTO W/REMOVAL OF LESIONS SMALL N/A 10/2/2017    Procedure: CYSTOSCOPY WITH  BLADDER BIOPSIES WITH FULGURATION;  Surgeon: Jhon Zuniga MD;  [facility]: [facility];  Service: Urology    MA CYSTO W/REMOVAL OF LESIONS SMALL N/A 11/30/2023    Procedure: CYSTOSCOPY WITH BIOPSIES and fulguration;  Surgeon: Adalberto Rasmussen MD;  Location: [facility];  Service: Urology    MA ESOPHAGOGASTRODUODENOSCOPY TRANSORAL DIAGNOSTIC N/A 8/3/2018    Procedure: ESOPHAGOGASTRODUODENOSCOPY (EGD);  Surgeon: Abad Valentino MD;  [facility]: [facility];  Service: Gastroenterology    RETINAL DETACHMENT SURGERY Right 03/2016    complete air fill confirmed     ROTATOR CUFF REPAIR      SHOULDER SURGERY  03/03/2015    proximal humerus fracture / LA...3/6/15   R...1/3/18     TONSILECTOMY AND ADNOIDECTOMY      TONSILLECTOMY      TRANSURETHRAL RESECTION OF BLADDER TUMOR N/A 10/2/2017    Procedure: TRANSURETHRAL RESECTION OF BLADDER TUMOR (TURBT);  Surgeon: Jhon Zuniga MD;  Location: [facility];  Service: Urology    VON WILLEBRAND ANTIGEN (HISTORICAL)       Social History   Social History     Substance and Sexual Activity   Alcohol Use Yes    Alcohol/week: 2.0 standard drinks of alcohol    Types: 1 Glasses of wine, 1 Shots of liquor per week    Comment: DAILY      Social History     Substance and Sexual Activity   Drug Use Never     Social History     Tobacco Use   Smoking Status Every Day    Current packs/day: 0.25    Average packs/day: 0.3 packs/day for 60.0 years (15.0 ttl  pk-yrs)    Types: Cigarettes   Smokeless Tobacco Never   Tobacco Comments    smokes a pipe     Family History   Problem Relation Age of Onset    Ulcers Father         acute gastric    Heart disease Mother     Anuerysm Sister        MEDICATIONS & ALLERGIES     Current Outpatient Medications   Medication Instructions    acetaminophen (TYLENOL) 650 mg, Oral, Every 6 hours PRN    ALPHA LIPOIC ACID-BIOTIN PO 1 tablet, Oral, Daily    atorvastatin (LIPITOR) 10 mg, Oral, Every other day    B COMPLEX VITAMINS PO 1 tablet, Oral, Daily    bisacodyl (DULCOLAX) 20 mg, Oral, Once    cetirizine (ALLERGY RELIEF CETIRIZINE) 10 mg, Oral, Daily    Cholecalciferol (VITAMIN D3) 2000 UNITS capsule 1 capsule, Oral, 2 times daily, 5000 units    Coenzyme Q10 (CO Q10) 60 MG CAPS 1 capsule, Oral, Daily    fluocinolone (SYNALAR) 0.01 % external solution 1 Application, Topical, 2 times daily    gabapentin (NEURONTIN) 600 mg, Oral, 2 times daily    Ginkgo Biloba 120 MG TABS 1 tablet, Oral, Daily    hydrocortisone-pramoxine (PRAMOSONE) 1-2.5 % LOTN Apply externally, 3 times daily    Inositol 500 MG TABS 1 tablet, Oral, 2 times daily    ipratropium (ATROVENT) 0.03 % nasal spray USE 2 SPRAYS IN BOTH NOSTRILS 3  TIMES DAILY    ketoconazole (NIZORAL) 2 % shampoo No dose, route, or frequency recorded.    lutein 6 mg 1 capsule, Oral, Daily    metoprolol tartrate (LOPRESSOR) 25 mg tablet TAKE 1 TABLET BY MOUTH EVERY 12  HOURS    MILK THISTLE PO 1 tablet, Oral, Daily    Misc Natural Products (SUPER SNOOZE) CAPS 1 capsule, Oral, Daily, Bed time    Multiple Vitamins-Minerals (OCUVITE-LUTEIN) CAPS Oral, Daily    omeprazole (PRILOSEC) 40 mg, Oral, 2 times daily    Omeprazole 20 MG TBDD     pentoxifylline (TRENTAL) 400 mg, Oral, 3 times daily with meals    polyethylene glycol (MIRALAX) 238 g, Oral, Once, Take 238 g my mouth. Use as directed    psyllium (METAMUCIL) 58.6 % packet 1 packet, Oral, Daily    sildenafil (VIAGRA) 50 MG tablet     silver sulfadiazine  (SILVADENE,SSD) 1 % cream Topical, Daily    tadalafil (CIALIS) 20 mg, Oral, Every 48 hours PRN    trospium chloride (SANCTURA) 20 mg, Oral, 2 times daily    Zinc 50 MG TABS 1 tablet, Oral, Daily     No Known Allergies    PHYSICAL EXAM      Objective   Blood pressure 124/72, pulse 60, temperature 97.6 °F (36.4 °C), temperature source Tympanic, resp. rate 16, height 6' (1.829 m), weight 72.4 kg (159 lb 9.6 oz), SpO2 99%. Body mass index is 21.65 kg/m².    General Appearance:   Alert, cooperative, no distress, in wheelchair   HEENT:   Normocephalic, atraumatic, anicteric     Neck:   Supple, symmetrical, trachea midline   Lungs:   Equal chest rise, respirations unlabored    Heart:   Regular rate   Abdomen:   Soft, non-tender, non-distended; normal bowel sounds; no masses, no organomegaly    Rectal:   Deferred    Extremities:   No cyanosis or edema    Neuro:   Moves all 4 extremities    Skin:   No jaundice, rashes, or lesions      LABORATORY RESULTS     No visits with results within 1 Day(s) from this visit.   Latest known visit with results is:   Admission on 04/11/2024, Discharged on 04/11/2024   Component Date Value    Ventricular Rate 04/11/2024 62     Atrial Rate 04/11/2024 62     WA Interval 04/11/2024 280     QRSD Interval 04/11/2024 138     QT Interval 04/11/2024 472     QTC Interval 04/11/2024 479     P Axis 04/11/2024 78     QRS Axis 04/11/2024 -65     T Wave Axis 04/11/2024 8     Case Report 04/11/2024                      Value:Surgical Pathology Report                         Case: [id number]                                  Authorizing Provider:  Vicente Barrientos, Collected:           04/11/2024 1419                                     DPM                                                                          Ordering Location:     [facility] Received:            04/11/2024 150                                     Operating Room                                                                Pathologist:           James Hough DO                                                           Specimen:    Toe, Left, Left Second Toe                                                                 Final Diagnosis 04/11/2024                      Value:A. Left second toe, amputation:                          - Acute osteomyelitis and ulcer.     Additional Information 04/11/2024                      Value:All reported additional testing was performed with appropriately reactive                           controls.  These tests were developed and their performance                           characteristics determined by [facility] or                           appropriate performing facility, though some tests may be performed on                           tissues which have not been validated for performance characteristics                           (such as staining performed on alcohol exposed cell blocks and decalcified                           tissues).  Results should be interpreted with caution and in the context                           of the patients’ clinical condition. These tests may not be cleared or                           approved by the U.S. Food and Drug Administration, though the FDA has                           determined that such clearance or approval is not necessary. These tests                           are used for clinical purposes and they should not be regarded as                           investigational or for research. This laboratory has been approved by CLIA                           88, designated as a high-complexity laboratory and is qualified to perform                           these tests.                                                    Interpretation performed at [facility], [facility] [facility] [address]     Gross Description 04/11/2024                      Value:A. The specimen is received in  "formalin, labeled with the patient's name                           and hospital number, and is designated \" left second toe.\"  It consists of                           a 6.5 x 2.1 x 2.0 cm toe, disarticulated at the metatarsal phalangeal                           joint.  The bony margin is covered in smooth white-tan articular                           cartilage.  The skin and soft tissue margins appear grossly viable.  The                           distal two thirds of the toe are covered in tan-pink wrinkled skin.  An                           unremarkable nail is present.  There is a 1.6 x 0.9 cm tan-green, necrotic                           ulcer on the dorsal surface of the proximal interphalangeal joint.  The                           ulcer is 0.9 cm from the nearest skin margin.  Sectioning reveals firm                           yellow bone beneath the ulcer.  Representative sections are submitted,                           following decalcification in decal 2, as follows:                          1: En face bony margin                          2: Ulcer with underlying bone                                                    Note: The estimated total formalin fixation time based upon information                           provided by the submitting clinician and the standard processing schedule                           is under 72 hours.                                                    MScheib        IMAGING RESULTS     No results found.  I have personally reviewed any available and pertinent imaging study reports.      Kang Underwood D.O.  [facility]  Division of Gastroenterology & Hepatology  Available on TigerText  [email or web address]    ** Please Note: This note is constructed using a voice recognition dictation system. **  "

## 2024-07-02 NOTE — ASSESSMENT & PLAN NOTE
Reports approximately 20 pound weight loss over the last couple of months.  I suspect that his weight loss may be due to a lack of appetite although there is certainly concern for other etiologies.  He does admit to early satiety and nausea which could be contributing to his appetite thus resulting in his weight loss.  Lower suspicion for occult malignancy but certainly possible.    We would recommend EGD and colonoscopy for evaluation  Patient is agreeable to proceed  MiraLAX/Dulcolax bowel prep  I have recommended that he continue to monitor his weight trends  Encouraged protein supplementation with Ensure or other protein shakes  If bidirectional endoscopy is unremarkable, I would recommend cisneros CT for evaluation but we will hold off at this time given his multiple x-rays and radiation exposure    I obtained informed consent from the patient.  The risks/benefits/alternatives of the procedure were discussed with the patient.  Risks included, but not limited to, infection, bleeding, perforation, injury to organs in the abdomen, missed lesion and incomplete procedure were discussed.  Patient was agreeable and electronic signature was obtained.

## 2024-07-02 NOTE — ASSESSMENT & PLAN NOTE
Does complain of early satiety as well as belching and nausea.  He could have underlying gastroparesis contributing to his nausea symptoms.  This is likely affecting his appetite and thus resulting in weight loss.    We will schedule him for gastric emptying study  Plan for bidirectional endoscopy for evaluation as well  Continue omeprazole 40 mg twice daily

## 2024-08-12 ENCOUNTER — OFFICE VISIT (OUTPATIENT)
Dept: PODIATRY | Facility: CLINIC | Age: 89
End: 2024-08-12
Payer: MEDICARE

## 2024-08-12 VITALS
HEART RATE: 45 BPM | HEIGHT: 72 IN | DIASTOLIC BLOOD PRESSURE: 77 MMHG | OXYGEN SATURATION: 98 % | BODY MASS INDEX: 21.13 KG/M2 | TEMPERATURE: 97.2 F | SYSTOLIC BLOOD PRESSURE: 152 MMHG | RESPIRATION RATE: 16 BRPM | WEIGHT: 156 LBS

## 2024-08-12 DIAGNOSIS — B35.1 ONYCHOMYCOSIS: Primary | ICD-10-CM

## 2024-08-12 DIAGNOSIS — I73.9 PERIPHERAL VASCULAR DISEASE, UNSPECIFIED (HCC): ICD-10-CM

## 2024-08-12 PROCEDURE — 99212 OFFICE O/P EST SF 10 MIN: CPT | Performed by: PODIATRIST

## 2024-08-12 NOTE — PROGRESS NOTES
Ambulatory Visit  Name: Abdirahman Hope      : 1935      MRN: [id number]  Encounter Provider: Darnell Barrios DPM  Encounter Date: 2024   Encounter department: [facility] [place]    Assessment & Plan   1. Onychomycosis  2. Peripheral vascular disease, unspecified (HCC)      History of Present Illness     Abdirahman Hope is a 88 y.o. male who presents chief conern of painful thick nails on both feet.    Review of Systems  Current Outpatient Medications on File Prior to Visit   Medication Sig Dispense Refill    acetaminophen (TYLENOL) 325 mg tablet Take 2 tablets (650 mg total) by mouth every 6 (six) hours as needed for mild pain, headaches or fever  0    ALPHA LIPOIC ACID-BIOTIN PO Take 1 tablet by mouth in the morning      atorvastatin (LIPITOR) 10 mg tablet Take 1 tablet (10 mg total) by mouth every other day 45 tablet 3    B COMPLEX VITAMINS PO Take 1 tablet by mouth daily      cetirizine (Allergy Relief Cetirizine) 10 mg tablet Take 1 tablet (10 mg total) by mouth daily 90 tablet 2    Cholecalciferol (VITAMIN D3) 2000 UNITS capsule Take 1 capsule by mouth 2 (two) times a day 5000 units       Coenzyme Q10 (CO Q10) 60 MG CAPS Take 1 capsule by mouth daily      fluocinolone (SYNALAR) 0.01 % external solution Apply 1 Application topically 2 (two) times a day      gabapentin (NEURONTIN) 600 MG tablet TAKE 1 TABLET BY MOUTH TWICE  DAILY 180 tablet 1    Ginkgo Biloba 120 MG TABS Take 1 tablet by mouth daily      hydrocortisone-pramoxine (PRAMOSONE) 1-2.5 % LOTN Apply topically 3 (three) times a day 118 mL 2    Inositol 500 MG TABS Take 1 tablet by mouth 2 (two) times a day      ipratropium (ATROVENT) 0.03 % nasal spray USE 2 SPRAYS IN BOTH NOSTRILS 3  TIMES DAILY 120 mL 3    ketoconazole (NIZORAL) 2 % shampoo       lutein 6 mg Take 1 capsule by mouth daily      metoprolol tartrate (LOPRESSOR) 25 mg tablet TAKE 1 TABLET BY MOUTH EVERY 12  HOURS 180 tablet 3    MILK THISTLE PO Take 1  tablet by mouth daily      Misc Natural Products (SUPER SNOOZE) CAPS Take 1 capsule by mouth daily Bed time       Multiple Vitamins-Minerals (OCUVITE-LUTEIN) CAPS Take by mouth daily       omeprazole (PriLOSEC) 40 MG capsule TAKE 1 CAPSULE BY MOUTH TWICE  DAILY 180 capsule 1    Omeprazole 20 MG TBDD       pentoxifylline (TRENtal) 400 mg ER tablet Take 1 tablet (400 mg total) by mouth 3 (three) times a day with meals 270 tablet 3    psyllium (METAMUCIL) 58.6 % packet Take 1 packet by mouth daily      sildenafil (VIAGRA) 50 MG tablet       silver sulfadiazine (SILVADENE,SSD) 1 % cream Apply topically daily 50 g 1    tadalafil (CIALIS) 20 MG tablet Take 1 tablet (20 mg total) by mouth every other day as needed for erectile dysfunction 18 tablet 3    Zinc 50 MG TABS Take 1 tablet by mouth daily      bisacodyl (DULCOLAX) 5 mg EC tablet Take 4 tablets (20 mg total) by mouth once for 1 dose 4 tablet 0    polyethylene glycol (MiraLax) 17 GM/SCOOP powder Take 238 g by mouth once for 1 dose Take 238 g my mouth. Use as directed 238 g 0    trospium chloride (SANCTURA) 20 mg tablet Take 1 tablet (20 mg total) by mouth 2 (two) times a day 180 tablet 3     No current facility-administered medications on file prior to visit.      Objective     /77   Pulse (!) 45   Temp (!) 97.2 °F (36.2 °C)   Resp 16   Ht 6' (1.829 m)   Wt 70.8 kg (156 lb)   SpO2 98%   BMI 21.16 kg/m²     Physical Exam  Cardiovascular:      Pulses:           Dorsalis pedis pulses are 1+ on the right side and 1+ on the left side.        Posterior tibial pulses are 1+ on the right side and 1+ on the left side.   Musculoskeletal:      Right foot: Deformity present.        Feet:    Feet:      Right foot:      Skin integrity: Dry skin present.      Toenail Condition: Fungal disease present.     Left foot:      Skin integrity: Dry skin present.      Toenail Condition: Fungal disease present.        Administrative Statements   I have spent a total time of 15  minutes in caring for this patient on the day of the visit/encounter including Counseling / Coordination of care          Amputation: left 2nd toe    Today's treatment includes:  Debridement of toenails. Using nail nipper, steffanie, and curette, nails were sharply debrided, reduced in thickness and length. Devitalized nail tissue and fungal debris excised and removed. Patient tolerated well.        Discussed proper shoe gear, daily inspections of feet, and general foot health with patient.Instructed patient that it was OK to use custom insoles but to remove the insoles from the manufacture and place the costume one in the shoes. Patient has Q7  findings.    Return in about 10 weeks (around 10/21/2024) for RFC.

## 2024-08-23 ENCOUNTER — ANESTHESIA (OUTPATIENT)
Dept: ANESTHESIOLOGY | Facility: HOSPITAL | Age: 89
End: 2024-08-23

## 2024-08-23 ENCOUNTER — HOSPITAL ENCOUNTER (OUTPATIENT)
Dept: PERIOP | Facility: HOSPITAL | Age: 89
Setting detail: OUTPATIENT SURGERY
End: 2024-08-23
Attending: STUDENT IN AN ORGANIZED HEALTH CARE EDUCATION/TRAINING PROGRAM
Payer: MEDICARE

## 2024-08-23 ENCOUNTER — ANESTHESIA EVENT (OUTPATIENT)
Dept: ANESTHESIOLOGY | Facility: HOSPITAL | Age: 89
End: 2024-08-23

## 2024-08-23 ENCOUNTER — ANESTHESIA EVENT (OUTPATIENT)
Dept: PERIOP | Facility: HOSPITAL | Age: 89
End: 2024-08-23

## 2024-08-23 ENCOUNTER — ANESTHESIA (OUTPATIENT)
Dept: PERIOP | Facility: HOSPITAL | Age: 89
End: 2024-08-23

## 2024-08-23 VITALS
BODY MASS INDEX: 21.13 KG/M2 | TEMPERATURE: 98.8 F | OXYGEN SATURATION: 99 % | HEIGHT: 72 IN | RESPIRATION RATE: 16 BRPM | SYSTOLIC BLOOD PRESSURE: 137 MMHG | HEART RATE: 85 BPM | DIASTOLIC BLOOD PRESSURE: 95 MMHG | WEIGHT: 156 LBS

## 2024-08-23 DIAGNOSIS — R63.4 WEIGHT LOSS: ICD-10-CM

## 2024-08-23 DIAGNOSIS — R68.81 EARLY SATIETY: ICD-10-CM

## 2024-08-23 DIAGNOSIS — R10.13 DYSPEPSIA: ICD-10-CM

## 2024-08-23 PROCEDURE — 45380 COLONOSCOPY AND BIOPSY: CPT | Performed by: STUDENT IN AN ORGANIZED HEALTH CARE EDUCATION/TRAINING PROGRAM

## 2024-08-23 PROCEDURE — 88305 TISSUE EXAM BY PATHOLOGIST: CPT | Performed by: PATHOLOGY

## 2024-08-23 PROCEDURE — 43239 EGD BIOPSY SINGLE/MULTIPLE: CPT | Performed by: STUDENT IN AN ORGANIZED HEALTH CARE EDUCATION/TRAINING PROGRAM

## 2024-08-23 RX ORDER — PROPOFOL 10 MG/ML
INJECTION, EMULSION INTRAVENOUS AS NEEDED
Status: DISCONTINUED | OUTPATIENT
Start: 2024-08-23 | End: 2024-08-23

## 2024-08-23 RX ORDER — LIDOCAINE HYDROCHLORIDE 20 MG/ML
INJECTION, SOLUTION EPIDURAL; INFILTRATION; INTRACAUDAL; PERINEURAL AS NEEDED
Status: DISCONTINUED | OUTPATIENT
Start: 2024-08-23 | End: 2024-08-23

## 2024-08-23 RX ORDER — HYDROMORPHONE HCL IN WATER/PF 6 MG/30 ML
0.2 PATIENT CONTROLLED ANALGESIA SYRINGE INTRAVENOUS
Status: DISCONTINUED | OUTPATIENT
Start: 2024-08-23 | End: 2024-08-27 | Stop reason: HOSPADM

## 2024-08-23 RX ORDER — FENTANYL CITRATE/PF 50 MCG/ML
25 SYRINGE (ML) INJECTION
Status: CANCELLED | OUTPATIENT
Start: 2024-08-23

## 2024-08-23 RX ORDER — FENTANYL CITRATE/PF 50 MCG/ML
25 SYRINGE (ML) INJECTION
Status: DISCONTINUED | OUTPATIENT
Start: 2024-08-23 | End: 2024-08-27 | Stop reason: HOSPADM

## 2024-08-23 RX ORDER — ONDANSETRON 2 MG/ML
4 INJECTION INTRAMUSCULAR; INTRAVENOUS ONCE AS NEEDED
Status: DISCONTINUED | OUTPATIENT
Start: 2024-08-23 | End: 2024-08-27 | Stop reason: HOSPADM

## 2024-08-23 RX ORDER — ONDANSETRON 2 MG/ML
4 INJECTION INTRAMUSCULAR; INTRAVENOUS ONCE AS NEEDED
Status: CANCELLED | OUTPATIENT
Start: 2024-08-23

## 2024-08-23 RX ORDER — PHENYLEPHRINE HCL IN 0.9% NACL 1 MG/10 ML
SYRINGE (ML) INTRAVENOUS AS NEEDED
Status: DISCONTINUED | OUTPATIENT
Start: 2024-08-23 | End: 2024-08-23

## 2024-08-23 RX ORDER — EPHEDRINE SULFATE 50 MG/ML
INJECTION INTRAVENOUS AS NEEDED
Status: DISCONTINUED | OUTPATIENT
Start: 2024-08-23 | End: 2024-08-23

## 2024-08-23 RX ORDER — LIDOCAINE HYDROCHLORIDE 10 MG/ML
0.5 INJECTION, SOLUTION EPIDURAL; INFILTRATION; INTRACAUDAL; PERINEURAL ONCE AS NEEDED
Status: DISCONTINUED | OUTPATIENT
Start: 2024-08-23 | End: 2024-08-27 | Stop reason: HOSPADM

## 2024-08-23 RX ORDER — LIDOCAINE HYDROCHLORIDE 10 MG/ML
0.5 INJECTION, SOLUTION EPIDURAL; INFILTRATION; INTRACAUDAL; PERINEURAL ONCE AS NEEDED
Status: CANCELLED | OUTPATIENT
Start: 2024-08-23

## 2024-08-23 RX ORDER — HYDROMORPHONE HCL IN WATER/PF 6 MG/30 ML
0.2 PATIENT CONTROLLED ANALGESIA SYRINGE INTRAVENOUS
Status: CANCELLED | OUTPATIENT
Start: 2024-08-23

## 2024-08-23 RX ORDER — SODIUM CHLORIDE, SODIUM LACTATE, POTASSIUM CHLORIDE, CALCIUM CHLORIDE 600; 310; 30; 20 MG/100ML; MG/100ML; MG/100ML; MG/100ML
INJECTION, SOLUTION INTRAVENOUS CONTINUOUS PRN
Status: DISCONTINUED | OUTPATIENT
Start: 2024-08-23 | End: 2024-08-23

## 2024-08-23 RX ADMIN — PROPOFOL 50 MG: 10 INJECTION, EMULSION INTRAVENOUS at 11:41

## 2024-08-23 RX ADMIN — LIDOCAINE HYDROCHLORIDE 100 MG: 20 INJECTION, SOLUTION EPIDURAL; INFILTRATION; INTRACAUDAL; PERINEURAL at 11:41

## 2024-08-23 RX ADMIN — EPHEDRINE SULFATE 5 MG: 50 INJECTION, SOLUTION INTRAVENOUS at 11:56

## 2024-08-23 RX ADMIN — Medication 100 MCG: at 11:52

## 2024-08-23 RX ADMIN — EPHEDRINE SULFATE 5 MG: 50 INJECTION, SOLUTION INTRAVENOUS at 11:57

## 2024-08-23 RX ADMIN — SODIUM CHLORIDE, SODIUM LACTATE, POTASSIUM CHLORIDE, AND CALCIUM CHLORIDE: .6; .31; .03; .02 INJECTION, SOLUTION INTRAVENOUS at 11:30

## 2024-08-23 RX ADMIN — Medication 100 MCG: at 12:09

## 2024-08-23 RX ADMIN — PROPOFOL 30 MG: 10 INJECTION, EMULSION INTRAVENOUS at 11:44

## 2024-08-23 RX ADMIN — PROPOFOL 80 MCG/KG/MIN: 10 INJECTION, EMULSION INTRAVENOUS at 11:49

## 2024-08-23 RX ADMIN — Medication 100 MCG: at 11:44

## 2024-08-23 RX ADMIN — Medication 200 MCG: at 11:56

## 2024-08-23 RX ADMIN — PROPOFOL 20 MG: 10 INJECTION, EMULSION INTRAVENOUS at 11:47

## 2024-08-23 NOTE — ANESTHESIA PREPROCEDURE EVALUATION
"Procedure:  EGD  COLONOSCOPY    Relevant Problems   CARDIO   (+) High blood pressure   (+) Hyperlipidemia      ENDO   (+) Hypothyroidism      GI/HEPATIC   (+) Gastroesophageal reflux disease without esophagitis      HEMATOLOGY   (+) Anemia   (+) Von Willebrand's disease (HCC)      MUSCULOSKELETAL   (+) Spondylosis 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      PULMONARY   (+) Chronic obstructive pulmonary disease with acute exacerbation (HCC)   (+) Smoking     CAD/PCI/MI/CHF -- denies  COPD/ASTHMA/LAYLA -- denies  PROBS WITH PRIOR ANESTHESIA -- denies  NPO STATUS CONFIRMED    Unclear whether VWD; apparently ruled out by newer hematologist    Lab Results   Component Value Date    SODIUM 139 03/22/2024    K 4.3 03/22/2024    BUN 17 03/22/2024    CREATININE 0.97 03/22/2024    EGFR 70 03/22/2024    GLUCOSE 90 07/30/2015     Lab Results   Component Value Date    HGBA1C 6.1 06/07/2018       Lab Results   Component Value Date    HGB 14.0 03/22/2024    HGB 14.7 11/13/2023    HGB 14.1 05/22/2022     03/22/2024     11/13/2023     05/22/2022     Lab Results   Component Value Date    WBC 8.96 03/22/2024       Lab Results   Component Value Date    CREATININE 0.97 03/22/2024    CREATININE 0.94 11/13/2023    CREATININE 1.10 05/22/2022       Lab Results   Component Value Date    INR 1.07 03/22/2024    INR 1.05 01/05/2021    INR 1.05 12/11/2018     Lab Results   Component Value Date    PTT 38 (H) 01/05/2021    PTT 41 (H) 12/11/2018    PTT 46 (H) 09/20/2017       No results found for: \"LACTATE\"      Type and Screen  O                    Physical Exam    Airway    Mallampati score: II  TM Distance: >3 FB       Dental       Cardiovascular      Pulmonary      Other Findings  Dentures/partial removed      Anesthesia Plan  ASA Score- 3     Anesthesia Type- IV sedation with anesthesia with ASA Monitors.         Additional Monitors:     Airway Plan:     Comment: IIsaac M.D., have personally " seen and evaluated the patient prior to anesthetic care.  I have reviewed the pre-anesthetic record, and other medical records if appropriate to the anesthetic care.  If a CRNA is involved in the case, I have reviewed the CRNA assessment, if present, and agree.      Risks/benefits and alternatives discussed with patient including possible PONV, sore throat, and possibility of rare anesthetic and surgical emergencies.  .       Plan Factors-    Chart reviewed. EKG reviewed. Imaging results reviewed. Existing labs reviewed. Patient summary reviewed.    Patient is not a current smoker.  Patient instructed to abstain from smoking on day of procedure. Patient did not smoke on day of surgery.    Obstructive sleep apnea risk education given perioperatively.        Induction-     Postoperative Plan-         Informed Consent- Anesthetic plan and risks discussed with patient.  I personally reviewed this patient with the CRNA. Discussed and agreed on the Anesthesia Plan with the CRNA..

## 2024-08-23 NOTE — ANESTHESIA POSTPROCEDURE EVALUATION
Post-Op Assessment Note    CV Status:  Stable  Pain Score: 0    Pain management: satisfactory to patient       Mental Status:  Alert and awake   Hydration Status:  Euvolemic   PONV Controlled:  Controlled   Airway Patency:  Patent  Two or more mitigation strategies used for obstructive sleep apnea   Post Op Vitals Reviewed: Yes    No anethesia notable event occurred.    Staff: CRNA               BP   112/54   Temp   97.3   Pulse  85   Resp   16   SpO2   100

## 2024-08-23 NOTE — H&P
[facility]  History & Physical     PATIENT INFO     Name: Abdirahman Hope  YOB: 1935   Age: 88 y.o.   Sex: male   MRN: [id number]     HISTORY OF PRESENT ILLNESS     Abdirahman Hope is a 88 y.o. year old male who presents for EGD and colonoscopy for weight loss, dyspepsia.  Last colonoscopy in 2022.  No antithrombotics or anticoagulants.     REVIEW OF SYSTEMS     Per the HPI, and otherwise unremarkable.    Historical Information   Past Medical History:   Diagnosis Date    Anemia     Appendicitis     Coronary artery disease     Detached retina     GERD (gastroesophageal reflux disease)     Hyperlipidemia     Hypertension     Macular degeneration     Neuropathy     Pelvis fracture (HCC) 01/2021    Von Willebrand disease (HCC)      Past Surgical History:   Procedure Laterality Date    ADENOIDECTOMY      APPENDECTOMY      ARTHRODESIS  01/15/2014    Lumbar     BACK SURGERY      CATARACT EXTRACTION      COLONOSCOPY  12/09/2016    fiberoptic     CYSTOSCOPY      with resection of tumor / 1/12/17, 10/2/17 / diagnostic 12/8/16, 5/30/17     CYSTOSCOPY  09/20/2018    CYSTOSCOPY  06/22/2020    EGD AND COLONOSCOPY N/A 12/9/2016    Procedure: EGD AND COLONOSCOPY;  Surgeon: Kahlil Alfonso MD;  [facility];  Service:     EYE SURGERY      FRACTURE SURGERY Bilateral     ARM    NECK SURGERY      NEUROPLASTY / TRANSPOSITION MEDIAN NERVE AT CARPAL TUNNEL Right 04/2015    NC AMPUTATION METATARSAL W/TOE SINGLE Left 4/11/2024    Procedure: LEFT 2ND TOE AMPUTATION;  Surgeon: Vicente Barrientos DPM;  [facility];  Service: Podiatry    NC BLADDER INSTILLATION ANTICARCINOGENIC AGENT N/A 1/12/2017    Procedure: INSTILLATION MYTOMYCIN;  Surgeon: Jhon Zuniga MD;  [facility];  Service: Urology    NC BLADDER INSTILLATION ANTICARCINOGENIC AGENT N/A 10/2/2017    Procedure: INSTILLATION MYTOMYCIN;  Surgeon: Jhon Zuniga MD;  [facility];  Service: Urology    WA COLONOSCOPY FLX DX W/COLLJ SPEC WHEN PFRMD N/A 2/5/2019    Procedure: COLONOSCOPY;  Surgeon: Abad Valentino MD;  [facility];  Service: Gastroenterology    WA CYSTO W/REMOVAL OF LESIONS SMALL N/A 1/12/2017    Procedure: CYSTOSCOPY, BLADDER BIOPSY WITH FULGRATION, 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;  Surgeon: Jhon Zuniga MD;  [facility];  Service: Urology    WA CYSTO W/REMOVAL OF LESIONS SMALL N/A 10/2/2017    Procedure: CYSTOSCOPY WITH  BLADDER BIOPSIES WITH FULGURATION;  Surgeon: Jhon Zuniga MD;  [facility];  Service: Urology    WA CYSTO W/REMOVAL OF LESIONS SMALL N/A 11/30/2023    Procedure: CYSTOSCOPY WITH BIOPSIES and fulguration;  Surgeon: Adalberto Rasmussen MD;  [facility];  Service: Urology    WA ESOPHAGOGASTRODUODENOSCOPY TRANSORAL DIAGNOSTIC N/A 8/3/2018    Procedure: ESOPHAGOGASTRODUODENOSCOPY (EGD);  Surgeon: Abad Valentino MD;  [facility];  Service: Gastroenterology    RETINAL DETACHMENT SURGERY Right 03/2016    complete air fill confirmed     ROTATOR CUFF REPAIR      SHOULDER SURGERY  03/03/2015    proximal humerus fracture / LA...3/6/15   R...1/3/18     TONSILECTOMY AND ADNOIDECTOMY      TONSILLECTOMY      TRANSURETHRAL RESECTION OF BLADDER TUMOR N/A 10/2/2017    Procedure: TRANSURETHRAL RESECTION OF BLADDER TUMOR (TURBT);  Surgeon: Jhon Zuniga MD;  [facility];  Service: Urology    VON WILLEBRAND ANTIGEN (HISTORICAL)       Social History   Social History     Substance and Sexual Activity   Alcohol Use Yes    Alcohol/week: 2.0 standard drinks of alcohol    Types: 1 Glasses of wine, 1 Shots of liquor per week    Comment: Occ     Social History     Substance and Sexual Activity   Drug Use Never     Social History     Tobacco Use   Smoking Status Every Day    Current packs/day: 0.25    Average packs/day: 0.3 packs/day for 60.0 years (15.0 ttl pk-yrs)    Types: Cigarettes   Smokeless  Tobacco Never   Tobacco Comments    smokes a pipe     Family History   Problem Relation Age of Onset    Ulcers Father         acute gastric    Heart disease Mother     Anuerysm Sister         MEDICATIONS & ALLERGIES     Current Outpatient Medications   Medication Instructions    acetaminophen (TYLENOL) 650 mg, Oral, Every 6 hours PRN    ALPHA LIPOIC ACID-BIOTIN PO 1 tablet, Oral, Daily    atorvastatin (LIPITOR) 10 mg, Oral, Every other day    B COMPLEX VITAMINS PO 1 tablet, Oral, Daily    cetirizine (ALLERGY RELIEF CETIRIZINE) 10 mg, Oral, Daily    Cholecalciferol (VITAMIN D3) 2000 UNITS capsule 1 capsule, Oral, 2 times daily, 5000 units    Coenzyme Q10 (CO Q10) 60 MG CAPS 1 capsule, Oral, Daily    fluocinolone (SYNALAR) 0.01 % external solution 1 Application, Topical, 2 times daily    gabapentin (NEURONTIN) 600 mg, Oral, 2 times daily    Ginkgo Biloba 120 MG TABS 1 tablet, Oral, Daily    hydrocortisone-pramoxine (PRAMOSONE) 1-2.5 % LOTN Apply externally, 3 times daily    Inositol 500 MG TABS 1 tablet, Oral, 2 times daily    ipratropium (ATROVENT) 0.03 % nasal spray USE 2 SPRAYS IN BOTH NOSTRILS 3  TIMES DAILY    ketoconazole (NIZORAL) 2 % shampoo No dose, route, or frequency recorded.    lutein 6 mg 1 capsule, Oral, Daily    metoprolol tartrate (LOPRESSOR) 25 mg tablet TAKE 1 TABLET BY MOUTH EVERY 12  HOURS    MILK THISTLE PO 1 tablet, Oral, Daily    Misc Natural Products (SUPER SNOOZE) CAPS 1 capsule, Oral, Daily, Bed time    Multiple Vitamins-Minerals (OCUVITE-LUTEIN) CAPS Oral, Daily    omeprazole (PRILOSEC) 40 mg, Oral, 2 times daily    Omeprazole 20 MG TBDD     pentoxifylline (TRENTAL) 400 mg, Oral, 3 times daily with meals    psyllium (METAMUCIL) 58.6 % packet 1 packet, Oral, Daily    sildenafil (VIAGRA) 50 MG tablet     silver sulfadiazine (SILVADENE,SSD) 1 % cream Topical, Daily    tadalafil (CIALIS) 20 mg, Oral, Every 48 hours PRN    trospium chloride (SANCTURA) 20 mg, Oral, 2 times daily    Zinc 50 MG  TABS 1 tablet, Oral, Daily     No Known Allergies     PHYSICAL EXAM      Objective   Blood pressure 142/97, pulse 83, temperature (!) 97 °F (36.1 °C), temperature source Tympanic, resp. rate 18, height 6' (1.829 m), weight 70.8 kg (156 lb), SpO2 97%. Body mass index is 21.16 kg/m².    General Appearance:   Alert, cooperative, no distress   Lungs:   Equal chest rise, respirations unlabored    Heart:   Regular rate and rhythm   Abdomen:   Soft, non-tender, non-distended; normal bowel sounds; no masses, no organomegaly    Extremities:   No edema       ASSESSMENT & PLAN     This is a 88 y.o. year old male here for EGD and colonoscopy, and he is stable and optimized for his procedure.      Kang Underwood D.O.  [facility]  Division of Gastroenterology & Hepatology  Available on [employer]  [email or web address]    ** Please Note: This note is constructed using a voice recognition dictation system. **

## 2024-08-29 PROCEDURE — 88305 TISSUE EXAM BY PATHOLOGIST: CPT | Performed by: PATHOLOGY

## 2024-09-02 ENCOUNTER — APPOINTMENT (OUTPATIENT)
Dept: RADIOLOGY | Facility: MEDICAL CENTER | Age: 89
End: 2024-09-02
Payer: MEDICARE

## 2024-09-02 ENCOUNTER — OFFICE VISIT (OUTPATIENT)
Dept: URGENT CARE | Facility: MEDICAL CENTER | Age: 89
End: 2024-09-02
Payer: MEDICARE

## 2024-09-02 VITALS
OXYGEN SATURATION: 97 % | TEMPERATURE: 97.8 F | HEART RATE: 63 BPM | SYSTOLIC BLOOD PRESSURE: 132 MMHG | RESPIRATION RATE: 20 BRPM | DIASTOLIC BLOOD PRESSURE: 82 MMHG

## 2024-09-02 DIAGNOSIS — S42.124A CLOSED NONDISPLACED FRACTURE OF ACROMIAL PROCESS OF RIGHT SCAPULA, INITIAL ENCOUNTER: Primary | ICD-10-CM

## 2024-09-02 DIAGNOSIS — S49.91XA RIGHT SHOULDER INJURY, INITIAL ENCOUNTER: ICD-10-CM

## 2024-09-02 DIAGNOSIS — S22.31XA CLOSED FRACTURE OF ONE RIB OF RIGHT SIDE, INITIAL ENCOUNTER: ICD-10-CM

## 2024-09-02 DIAGNOSIS — S29.9XXA CHEST WALL INJURY, INITIAL ENCOUNTER: ICD-10-CM

## 2024-09-02 PROCEDURE — G0463 HOSPITAL OUTPT CLINIC VISIT: HCPCS | Performed by: PHYSICIAN ASSISTANT

## 2024-09-02 PROCEDURE — 71101 X-RAY EXAM UNILAT RIBS/CHEST: CPT

## 2024-09-02 PROCEDURE — 99212 OFFICE O/P EST SF 10 MIN: CPT | Performed by: PHYSICIAN ASSISTANT

## 2024-09-02 PROCEDURE — 73030 X-RAY EXAM OF SHOULDER: CPT

## 2024-09-02 NOTE — PATIENT INSTRUCTIONS
Tylenol as needed for pain  Apply ice to shoulder 4 x daily  Take deep breaths periodically  Follow up with orthopedics

## 2024-09-02 NOTE — PROGRESS NOTES
[facility]        NAME: Abdirahman Hope is a 88 y.o. male  : 1935    MRN: [id number]  DATE: 2024  TIME: 2:27 PM    Assessment and Plan   Closed nondisplaced fracture of acromial process of right scapula, initial encounter [S42.124A]  1. Closed nondisplaced fracture of acromial process of right scapula, initial encounter        2. Chest wall injury, initial encounter  XR ribs right w pa chest min 3 views      3. Right shoulder injury, initial encounter  XR shoulder 2+ vw right      4. Closed fracture of one rib of right side, initial encounter              Patient Instructions     Tylenol as needed for pain  Apply ice to shoulder 4 x daily  Take deep breaths periodically  Follow up with orthopedics    Follow up with PCP in 3-5 days.  Proceed to  ER if symptoms worsen.    If tests have been performed at [facility], our office will contact you with results if changes need to be made to the care plan discussed with you at the visit.  [facility]    Chief Complaint     Chief Complaint   Patient presents with   • Shoulder Pain     Right shoulder and rib pain. Fell last night and landed on his right. Reaching for his walker and missed judged. Did not hit head. Did crawl and got himself up and then fell again. No abrasions. Shoulders hurt when he turns it a different way. Took tylenol for the pain last night. No on blood thiners         History of Present Illness       Patient presents with right rib and right shoulder pain after he fell twice last night.  Patient states he reached for his walker and missed judged which led him to fall.  Patient denies head strike or loss of consciousness.  Patient is not taking blood thinners.  Taking Tylenol for pain.        Review of Systems   Review of Systems   Constitutional:  Negative for fever.   Cardiovascular:  Positive for chest pain (right lateral chest wall).   Musculoskeletal:         Right shoulder  pain right rib pain         Current Medications       Current Outpatient Medications:   •  acetaminophen (TYLENOL) 325 mg tablet, Take 2 tablets (650 mg total) by mouth every 6 (six) hours as needed for mild pain, headaches or fever, Disp:  , Rfl: 0  •  ALPHA LIPOIC ACID-BIOTIN PO, Take 1 tablet by mouth in the morning, Disp: , Rfl:   •  atorvastatin (LIPITOR) 10 mg tablet, Take 1 tablet (10 mg total) by mouth every other day, Disp: 45 tablet, Rfl: 3  •  B COMPLEX VITAMINS PO, Take 1 tablet by mouth daily, Disp: , Rfl:   •  cetirizine (Allergy Relief Cetirizine) 10 mg tablet, Take 1 tablet (10 mg total) by mouth daily, Disp: 90 tablet, Rfl: 2  •  Cholecalciferol (VITAMIN D3) 2000 UNITS capsule, Take 1 capsule by mouth 2 (two) times a day 5000 units , Disp: , Rfl:   •  Coenzyme Q10 (CO Q10) 60 MG CAPS, Take 1 capsule by mouth daily, Disp: , Rfl:   •  fluocinolone (SYNALAR) 0.01 % external solution, Apply 1 Application topically 2 (two) times a day, Disp: , Rfl:   •  gabapentin (NEURONTIN) 600 MG tablet, TAKE 1 TABLET BY MOUTH TWICE  DAILY, Disp: 180 tablet, Rfl: 1  •  Ginkgo Biloba 120 MG TABS, Take 1 tablet by mouth daily, Disp: , Rfl:   •  hydrocortisone-pramoxine (PRAMOSONE) 1-2.5 % LOTN, Apply topically 3 (three) times a day, Disp: 118 mL, Rfl: 2  •  Inositol 500 MG TABS, Take 1 tablet by mouth 2 (two) times a day, Disp: , Rfl:   •  ipratropium (ATROVENT) 0.03 % nasal spray, USE 2 SPRAYS IN BOTH NOSTRILS 3  TIMES DAILY, Disp: 120 mL, Rfl: 3  •  ketoconazole (NIZORAL) 2 % shampoo, , Disp: , Rfl:   •  lutein 6 mg, Take 1 capsule by mouth daily, Disp: , Rfl:   •  metoprolol tartrate (LOPRESSOR) 25 mg tablet, TAKE 1 TABLET BY MOUTH EVERY 12  HOURS, Disp: 180 tablet, Rfl: 3  •  MILK THISTLE PO, Take 1 tablet by mouth daily, Disp: , Rfl:   •  Misc Natural Products (SUPER SNOOZE) CAPS, Take 1 capsule by mouth daily Bed time , Disp: , Rfl:   •  Multiple Vitamins-Minerals (OCUVITE-LUTEIN) CAPS, Take by mouth daily , Disp:  , Rfl:   •  omeprazole (PriLOSEC) 40 MG capsule, TAKE 1 CAPSULE BY MOUTH TWICE  DAILY, Disp: 180 capsule, Rfl: 1  •  Omeprazole 20 MG TBDD, , Disp: , Rfl:   •  pentoxifylline (TRENtal) 400 mg ER tablet, Take 1 tablet (400 mg total) by mouth 3 (three) times a day with meals, Disp: 270 tablet, Rfl: 3  •  psyllium (METAMUCIL) 58.6 % packet, Take 1 packet by mouth daily, Disp: , Rfl:   •  sildenafil (VIAGRA) 50 MG tablet, , Disp: , Rfl:   •  silver sulfadiazine (SILVADENE,SSD) 1 % cream, Apply topically daily, Disp: 50 g, Rfl: 1  •  tadalafil (CIALIS) 20 MG tablet, Take 1 tablet (20 mg total) by mouth every other day as needed for erectile dysfunction, Disp: 18 tablet, Rfl: 3  •  Zinc 50 MG TABS, Take 1 tablet by mouth daily, Disp: , Rfl:   •  trospium chloride (SANCTURA) 20 mg tablet, Take 1 tablet (20 mg total) by mouth 2 (two) times a day, Disp: 180 tablet, Rfl: 3    Current Allergies     Allergies as of 09/02/2024   • (No Known Allergies)            The following portions of the patient's history were reviewed and updated as appropriate: allergies, current medications, past family history, past medical history, past social history, past surgical history and problem list.     Past Medical History:   Diagnosis Date   • Anemia    • Appendicitis    • Coronary artery disease    • Detached retina    • GERD (gastroesophageal reflux disease)    • Hyperlipidemia    • Hypertension    • Macular degeneration    • Neuropathy    • Pelvis fracture (HCC) 01/2021   • Von Willebrand disease (HCC)        Past Surgical History:   Procedure Laterality Date   • ADENOIDECTOMY     • APPENDECTOMY     • ARTHRODESIS  01/15/2014    Lumbar    • BACK SURGERY     • CATARACT EXTRACTION     • COLONOSCOPY  12/09/2016    fiberoptic    • CYSTOSCOPY      with resection of tumor / 1/12/17, 10/2/17 / diagnostic 12/8/16, 5/30/17    • CYSTOSCOPY  09/20/2018   • CYSTOSCOPY  06/22/2020   • EGD AND COLONOSCOPY N/A 12/9/2016    Procedure: EGD AND COLONOSCOPY;   Surgeon: Kahlil Alfonso MD;  [facility];  Service:    • EYE SURGERY     • FRACTURE SURGERY Bilateral     ARM   • NECK SURGERY     • NEUROPLASTY / TRANSPOSITION MEDIAN NERVE AT CARPAL TUNNEL Right 04/2015   • AL AMPUTATION METATARSAL W/TOE SINGLE Left 4/11/2024    Procedure: LEFT 2ND TOE AMPUTATION;  Surgeon: Vicente Barrientos DPM;  [facility];  Service: Podiatry   • AL BLADDER INSTILLATION ANTICARCINOGENIC AGENT N/A 1/12/2017    Procedure: INSTILLATION MYTOMYCIN;  Surgeon: Jhon Zuniga MD;  [facility];  Service: Urology   • AL BLADDER INSTILLATION ANTICARCINOGENIC AGENT N/A 10/2/2017    Procedure: INSTILLATION MYTOMYCIN;  Surgeon: Jhon Zuniga MD;  [facility];  Service: Urology   • AL COLONOSCOPY FLX DX W/COLLJ SPEC WHEN PFRMD N/A 2/5/2019    Procedure: COLONOSCOPY;  Surgeon: Abad Valentino MD;  [facility];  Service: Gastroenterology   • AL CYSTO W/REMOVAL OF LESIONS SMALL N/A 1/12/2017    Procedure: CYSTOSCOPY, BLADDER BIOPSY WITH FULGRATION, 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;  Surgeon: Jhon Zuniga MD;  [facility];  Service: Urology   • AL CYSTO W/REMOVAL OF LESIONS SMALL N/A 10/2/2017    Procedure: CYSTOSCOPY WITH  BLADDER BIOPSIES WITH FULGURATION;  Surgeon: Jhon Zuniga MD;  [facility];  Service: Urology   • AL CYSTO W/REMOVAL OF LESIONS SMALL N/A 11/30/2023    Procedure: CYSTOSCOPY WITH BIOPSIES and fulguration;  Surgeon: Adalberto Rasmussen MD;  [facility];  Service: Urology   • AL ESOPHAGOGASTRODUODENOSCOPY TRANSORAL DIAGNOSTIC N/A 8/3/2018    Procedure: ESOPHAGOGASTRODUODENOSCOPY (EGD);  Surgeon: Abad Valentino MD;  [facility];  Service: Gastroenterology   • RETINAL DETACHMENT SURGERY Right 03/2016    complete air fill confirmed    • ROTATOR CUFF REPAIR     • SHOULDER SURGERY  03/03/2015    proximal humerus fracture / LA...3/6/15   R...1/3/18    • TONSILECTOMY AND  ADNOIDECTOMY     • TONSILLECTOMY     • TRANSURETHRAL RESECTION OF BLADDER TUMOR N/A 10/2/2017    Procedure: TRANSURETHRAL RESECTION OF BLADDER TUMOR (TURBT);  Surgeon: Jhon Zuniga MD;  [facility];  Service: Urology   • VON WILLEBRAND ANTIGEN (HISTORICAL)         Family History   Problem Relation Age of Onset   • Ulcers Father         acute gastric   • Heart disease Mother    • Anuerysm Sister          Medications have been verified.        Objective   /82   Pulse 63   Temp 97.8 °F (36.6 °C)   Resp 20   SpO2 97%   No LMP for male patient.       Physical Exam     Physical Exam  Vitals and nursing note reviewed.   Constitutional:       Appearance: Normal appearance.   HENT:      Head: Normocephalic and atraumatic.   Cardiovascular:      Rate and Rhythm: Normal rate.   Pulmonary:      Effort: Pulmonary effort is normal.   Chest:      Comments: Right lateral lower rib tenderness  Musculoskeletal:      Comments: Right shoulder without swelling ecchymosis rashes or wounds.  Tenderness over the posterior aspect of the shoulder.  Pain with placing hand behind back.  No pain on palpation of the humerus or clavicle.  Diminished range of motion secondary to pain.   Skin:     General: Skin is warm.   Neurological:      Mental Status: He is alert.     Xray independently reviewed by me contemporaneously as fracture acromial process, questionable 10 rib fx. Awaiting radiology interpretation.    Patient decline shoulder immobilizer/sling as he requires a walker to ambulate.

## 2024-09-17 ENCOUNTER — PROCEDURE VISIT (OUTPATIENT)
Dept: UROLOGY | Facility: CLINIC | Age: 89
End: 2024-09-17
Payer: MEDICARE

## 2024-09-17 VITALS
OXYGEN SATURATION: 99 % | WEIGHT: 155 LBS | TEMPERATURE: 97.7 F | HEART RATE: 62 BPM | SYSTOLIC BLOOD PRESSURE: 130 MMHG | BODY MASS INDEX: 20.99 KG/M2 | DIASTOLIC BLOOD PRESSURE: 80 MMHG | HEIGHT: 72 IN

## 2024-09-17 DIAGNOSIS — C67.8 MALIGNANT NEOPLASM OF OVERLAPPING SITES OF BLADDER (HCC): Primary | ICD-10-CM

## 2024-09-17 DIAGNOSIS — S41.102D WOUND OF LEFT UPPER EXTREMITY, SUBSEQUENT ENCOUNTER: ICD-10-CM

## 2024-09-17 LAB
SL AMB  POCT GLUCOSE, UA: NEGATIVE
SL AMB LEUKOCYTE ESTERASE,UA: NEGATIVE
SL AMB POCT BILIRUBIN,UA: NEGATIVE
SL AMB POCT BLOOD,UA: NORMAL
SL AMB POCT CLARITY,UA: CLEAR
SL AMB POCT COLOR,UA: YELLOW
SL AMB POCT KETONES,UA: NEGATIVE
SL AMB POCT NITRITE,UA: NEGATIVE
SL AMB POCT PH,UA: 6.5
SL AMB POCT SPECIFIC GRAVITY,UA: 1.01
SL AMB POCT URINE PROTEIN: NEGATIVE
SL AMB POCT UROBILINOGEN: 0.2

## 2024-09-17 PROCEDURE — 81002 URINALYSIS NONAUTO W/O SCOPE: CPT | Performed by: UROLOGY

## 2024-09-17 PROCEDURE — 87070 CULTURE OTHR SPECIMN AEROBIC: CPT | Performed by: UROLOGY

## 2024-09-17 PROCEDURE — 99213 OFFICE O/P EST LOW 20 MIN: CPT | Performed by: UROLOGY

## 2024-09-17 PROCEDURE — 87147 CULTURE TYPE IMMUNOLOGIC: CPT | Performed by: UROLOGY

## 2024-09-17 PROCEDURE — 10060 I&D ABSCESS SIMPLE/SINGLE: CPT | Performed by: UROLOGY

## 2024-09-17 PROCEDURE — 52214 CYSTOSCOPY AND TREATMENT: CPT | Performed by: UROLOGY

## 2024-09-17 PROCEDURE — 87205 SMEAR GRAM STAIN: CPT | Performed by: UROLOGY

## 2024-09-17 PROCEDURE — 87186 SC STD MICRODIL/AGAR DIL: CPT | Performed by: UROLOGY

## 2024-09-17 RX ORDER — CEPHALEXIN 500 MG/1
500 CAPSULE ORAL EVERY 6 HOURS SCHEDULED
Qty: 28 CAPSULE | Refills: 0 | Status: SHIPPED | OUTPATIENT
Start: 2024-09-17 | End: 2024-09-24

## 2024-09-17 RX ORDER — CEPHALEXIN 500 MG/1
500 CAPSULE ORAL EVERY 6 HOURS SCHEDULED
Qty: 28 CAPSULE | Refills: 0 | Status: SHIPPED | OUTPATIENT
Start: 2024-09-17 | End: 2024-09-17 | Stop reason: SDUPTHER

## 2024-09-17 NOTE — LETTER
2024     Varun Hoyt, DO  [address] [facility] [address]    Patient: Abdirahman Hope   YOB: 1935   Date of Visit: 2024       Dear Dr. Hoyt:    Thank you for referring Abdirahman Hope to me for evaluation. Below are my notes for this consultation.    If you have questions, please do not hesitate to call me. I look forward to following your patient along with you.         Sincerely,        Adalberto Rasmussen MD        CC: No Recipients    Adalberto Rasmussen MD  2024  3:42 PM  Sign when Signing Visit  UROLOGY PROGRESS NOTE   [facility]  [address] [place] [address] [place]  [address]  [place] [address]  [phone]  Fax:[phone]  [email or web address]      NAME: Abdirahman Hope  AGE: 88 y.o. SEX: male  : 1935   MRN: [id number]    DATE: 2024  TIME: 3:41 PM    Assessment and Plan:    Bladder cancer with superficial recurrence as below-fulgurated.  Repeat cystoscopy in 6 months.    Left forearm cellulitis with infected pimple-drained and started on Keflex.  Culture sent.    Urinary frequency-continue with the trospium for now.  There is a lot going on in today's office visit so I did not fully get to address this.  He may be a PTNS candidate.               Chief Complaint   No chief complaint on file.      History of Present Illness   Bladder cancer, superficial with recurrence right lateral trigone at the 10:00 right bladder neck.  Last cystoscopy was 2024 and the patient wished to observe those at presently.  He had other things going on with his health.  He has been set up for another cystoscopy for now and he knew that if he started to bleed he would come in sooner would take care of things.  He is status post TURBT of a 1 cm tumor right lateral wall removed with cup forceps and the rest fulgurated by me 2023.  Pathology showed high-grade papillary urothelial carcinoma with foci suggesting superficial invasion into papillary  stock.  There is a minute portion of benign thick muscle present favor limiting samples of muscularis propria.    Urinary frequency: He had a good response to trospium 20 mg twice daily.  However this is no longer working so well anymore.  Urinary frequency about every hour and a half about every hour and a half at night to go to the bathroom.    Left forearm cellulitis/pimple-unknown duration, no fevers, 2 cm of erythema with a central comedone.  I opened this with an 18-gauge needle and drained the pus, about 1 to 2 cm of pus.  Sent for culture.  Started on Keflex 500 mg 1 p.o. 4 times daily for 7 days.           Cystoscopy     Date/Time  9/17/2024 3:00 PM     Performed by  Adalberto Rasmussen MD   Authorized by  Adalberto Rasmussen MD     Universal Protocol:  Consent: Verbal consent obtained. Written consent obtained.      Procedure Details:  Procedure type: fulguration    Location:  [facility]  Additional Procedure Details: Cystoscopy Procedure Note        Pre-operative Diagnosis: Bladder cancer surveillance    Post-operative Diagnosis: Same, recurrence right lateral trigone small superficial fulgurated 2 cm    Procedure: Flexible cystoscopy, fulguration of lesions    Surgeon: Adalberto Rasmussen MD    Anesthesia: 1% Xylocaine per urethra    EBL: Minimal    Complications: none    Procedure Details   The risks, benefits, complications, treatment options, and expected outcomes were discussed with the patient. The patient concurred with the proposed plan, giving informed consent.    Cystoscopy was performed today under local anesthesia, using sterile technique. The patient was placed in the supine position, prepped with Betadine, and draped in the usual sterile fashion. The flexible cystocope was used to inspect both the urethra and bladder    Findings:  Urethra: Normal without stricture.  Prostate mostly nonocclusive.    Bladder:  Smooth, not trabeculated and there was a small superficial papillary recurrence lateral to the trigone and  anterior to the orifice.  The orifice did not appear to be involved.  Using the Bugbee I fulgurated all of these mostly flat..  The orifices were orthotopic and intact.           Specimens: none                 Complications:  None           Disposition: To home            Condition:  Stable              The following portions of the patient's history were reviewed and updated as appropriate: allergies, current medications, past family history, past medical history, past social history, past surgical history and problem list.  Past Medical History:   Diagnosis Date   • Anemia    • Appendicitis    • Coronary artery disease    • Detached retina    • GERD (gastroesophageal reflux disease)    • Hyperlipidemia    • Hypertension    • Macular degeneration    • Neuropathy    • Pelvis fracture (HCC) 01/2021   • Von Willebrand disease (HCC)      Past Surgical History:   Procedure Laterality Date   • ADENOIDECTOMY     • APPENDECTOMY     • ARTHRODESIS  01/15/2014    Lumbar    • BACK SURGERY     • CATARACT EXTRACTION     • COLONOSCOPY  12/09/2016    fiberoptic    • CYSTOSCOPY      with resection of tumor / 1/12/17, 10/2/17 / diagnostic 12/8/16, 5/30/17    • CYSTOSCOPY  09/20/2018   • CYSTOSCOPY  06/22/2020   • EGD AND COLONOSCOPY N/A 12/9/2016    Procedure: EGD AND COLONOSCOPY;  Surgeon: Kahlil Alfonso MD;  Location: [facility];  Service:    • EYE SURGERY     • FRACTURE SURGERY Bilateral     ARM   • NECK SURGERY     • NEUROPLASTY / TRANSPOSITION MEDIAN NERVE AT CARPAL TUNNEL Right 04/2015   • UT AMPUTATION METATARSAL W/TOE SINGLE Left 4/11/2024    Procedure: LEFT 2ND TOE AMPUTATION;  Surgeon: Vicente Barrientos DPM;  Location: [facility];  Service: Podiatry   • UT BLADDER INSTILLATION ANTICARCINOGENIC AGENT N/A 1/12/2017    Procedure: INSTILLATION MYTOMYCIN;  Surgeon: Jhon Zuniga MD;  Location: [facility];  Service: Urology   • UT BLADDER INSTILLATION ANTICARCINOGENIC AGENT N/A 10/2/2017    Procedure:  INSTILLATION MYTOMYCIN;  Surgeon: Jhon Zuniga MD;  [facility]: [facility];  Service: Urology   • MT COLONOSCOPY FLX DX W/COLLJ SPEC WHEN PFRMD N/A 2/5/2019    Procedure: COLONOSCOPY;  Surgeon: Abad Valentino MD;  [facility]: [facility];  Service: Gastroenterology   • MT CYSTO W/REMOVAL OF LESIONS SMALL N/A 1/12/2017    Procedure: CYSTOSCOPY, BLADDER BIOPSY WITH FULGRATION, 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;  Surgeon: Jhon Zuniga MD;  [facility]: [facility];  Service: Urology   • MT CYSTO W/REMOVAL OF LESIONS SMALL N/A 10/2/2017    Procedure: CYSTOSCOPY WITH  BLADDER BIOPSIES WITH FULGURATION;  Surgeon: Jhon Zuniga MD;  [facility];  Service: Urology   • MT CYSTO W/REMOVAL OF LESIONS SMALL N/A 11/30/2023    Procedure: CYSTOSCOPY WITH BIOPSIES and fulguration;  Surgeon: Adalberto Rasmussen MD;  [facility]: [facility];  Service: Urology   • MT ESOPHAGOGASTRODUODENOSCOPY TRANSORAL DIAGNOSTIC N/A 8/3/2018    Procedure: ESOPHAGOGASTRODUODENOSCOPY (EGD);  Surgeon: Abad Valentino MD;  [facility]: [facility];  Service: Gastroenterology   • RETINAL DETACHMENT SURGERY Right 03/2016    complete air fill confirmed    • ROTATOR CUFF REPAIR     • SHOULDER SURGERY  03/03/2015    proximal humerus fracture / LA...3/6/15   R...1/3/18    • TONSILECTOMY AND ADNOIDECTOMY     • TONSILLECTOMY     • TRANSURETHRAL RESECTION OF BLADDER TUMOR N/A 10/2/2017    Procedure: TRANSURETHRAL RESECTION OF BLADDER TUMOR (TURBT);  Surgeon: Jhon Zuniga MD;  Location: [facility];  Service: Urology   • VON WILLEBRAND ANTIGEN (HISTORICAL)       shoulder  Review of Systems   Review of Systems   Genitourinary:  Positive for frequency.   Skin:  Positive for wound.        Left forearm cellulitis/comedone       Active Problem List     Patient Active Problem List   Diagnosis   • Bladder cancer (HCC)   • Lung nodule, solitary   • Erectile dysfunction   • Gait instability   • High blood pressure   • Hypothyroidism   •  Idiopathic peripheral neuropathy   • Bilateral exudative age-related macular degeneration ([facility])   • Cervical stenosis of spinal canal   • Spinal stenosis of lumbar region   • Spondylosis 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   • Hoarse voice quality   • Appetite loss   • Weight loss   • Gastroesophageal reflux disease without esophagitis   • Alternating constipation and diarrhea   • Nausea   • Chronic obstructive pulmonary disease with acute exacerbation ([facility])   • Von Willebrand's disease ([facility])   • Radiculopathy, lumbar region   • Fall   • Anemia   • Contusion of cerebral cortex with concussion ([facility])   • Urinary frequency   • 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   Constitutional:       Appearance: Normal appearance. He is normal weight.   HENT:      Head: Normocephalic and atraumatic.   Eyes:      Extraocular Movements: Extraocular movements intact.   Pulmonary:      Effort: Pulmonary effort is normal.   Abdominal:      Palpations: Abdomen is soft.   Genitourinary:     Penis: Normal.    Musculoskeletal:         General: Normal range of motion.      Cervical back: Normal range of motion.      Comments: Left forearm 2 cm erythematous region with central comedone   Skin:     Coloration: Skin is pale. Skin is not jaundiced.      Findings: Lesion present.      Comments: As described   Neurological:      General: No focal deficit present.      Mental Status: He is alert and oriented to person, place, and time. Mental status is at baseline.   Psychiatric:          Mood and Affect: Mood normal.         Behavior: Behavior normal.         Thought Content: Thought content normal.         Judgment: Judgment normal.             Current Medications     Current Outpatient Medications:   •  acetaminophen (TYLENOL) 325 mg tablet, Take 2 tablets (650 mg total) by mouth every 6 (six) hours as needed for mild pain, headaches or fever, Disp:  , Rfl: 0  •  ALPHA LIPOIC ACID-BIOTIN PO, Take 1 tablet by mouth in the morning, Disp: , Rfl:   •  atorvastatin (LIPITOR) 10 mg tablet, Take 1 tablet (10 mg total) by mouth every other day, Disp: 45 tablet, Rfl: 3  •  B COMPLEX VITAMINS PO, Take 1 tablet by mouth daily, Disp: , Rfl:   •  cephalexin (KEFLEX) 500 mg capsule, Take 1 capsule (500 mg total) by mouth every 6 (six) hours for 7 days, Disp: 28 capsule, Rfl: 0  •  cetirizine (Allergy Relief Cetirizine) 10 mg tablet, Take 1 tablet (10 mg total) by mouth daily, Disp: 90 tablet, Rfl: 2  •  Cholecalciferol (VITAMIN D3) 2000 UNITS capsule, Take 1 capsule by mouth 2 (two) times a day 5000 units , Disp: , Rfl:   •  Coenzyme Q10 (CO Q10) 60 MG CAPS, Take 1 capsule by mouth daily, Disp: , Rfl:   •  fluocinolone (SYNALAR) 0.01 % external solution, Apply 1 Application topically 2 (two) times a day, Disp: , Rfl:   •  gabapentin (NEURONTIN) 600 MG tablet, TAKE 1 TABLET BY MOUTH TWICE  DAILY, Disp: 180 tablet, Rfl: 1  •  Ginkgo Biloba 120 MG TABS, Take 1 tablet by mouth daily, Disp: , Rfl:   •  hydrocortisone-pramoxine (PRAMOSONE) 1-2.5 % LOTN, Apply topically 3 (three) times a day, Disp: 118 mL, Rfl: 2  •  Inositol 500 MG TABS, Take 1 tablet by mouth 2 (two) times a day, Disp: , Rfl:   •  ipratropium (ATROVENT) 0.03 % nasal spray, USE 2 SPRAYS IN BOTH NOSTRILS 3  TIMES DAILY, Disp: 120 mL, Rfl: 3  •  ketoconazole (NIZORAL) 2 % shampoo, , Disp: , Rfl:   •  lutein 6 mg, Take 1 capsule by mouth daily, Disp: , Rfl:   •  metoprolol tartrate (LOPRESSOR) 25 mg tablet, TAKE 1 TABLET BY MOUTH EVERY 12  HOURS,  Disp: 180 tablet, Rfl: 3  •  MILK THISTLE PO, Take 1 tablet by mouth daily, Disp: , Rfl:   •  Misc Natural Products (SUPER SNOOZE) CAPS, Take 1 capsule by mouth daily Bed time , Disp: , Rfl:   •  Multiple Vitamins-Minerals (OCUVITE-LUTEIN) CAPS, Take by mouth daily , Disp: , Rfl:   •  omeprazole (PriLOSEC) 40 MG capsule, TAKE 1 CAPSULE BY MOUTH TWICE  DAILY, Disp: 180 capsule, Rfl: 1  •  Omeprazole 20 MG TBDD, , Disp: , Rfl:   •  pentoxifylline (TRENtal) 400 mg ER tablet, Take 1 tablet (400 mg total) by mouth 3 (three) times a day with meals, Disp: 270 tablet, Rfl: 3  •  psyllium (METAMUCIL) 58.6 % packet, Take 1 packet by mouth daily, Disp: , Rfl:   •  sildenafil (VIAGRA) 50 MG tablet, , Disp: , Rfl:   •  silver sulfadiazine (SILVADENE,SSD) 1 % cream, Apply topically daily, Disp: 50 g, Rfl: 1  •  tadalafil (CIALIS) 20 MG tablet, Take 1 tablet (20 mg total) by mouth every other day as needed for erectile dysfunction, Disp: 18 tablet, Rfl: 3  •  trospium chloride (SANCTURA) 20 mg tablet, Take 1 tablet (20 mg total) by mouth 2 (two) times a day, Disp: 180 tablet, Rfl: 3  •  Zinc 50 MG TABS, Take 1 tablet by mouth daily, Disp: , Rfl:         Adalberto Rasmussen MD

## 2024-09-17 NOTE — PROGRESS NOTES
UROLOGY PROGRESS NOTE   [facility]  [address]  [address]  [place] [address]  [phone]  Fax:[phone]  [email or web address]      NAME: Abdirahman Hope  AGE: 88 y.o. SEX: male  : 1935   MRN: [id number]    DATE: 2024  TIME: 3:41 PM    Assessment and Plan:    Bladder cancer with superficial recurrence as below-fulgurated.  Repeat cystoscopy in 6 months.    Left forearm cellulitis with infected pimple-drained and started on Keflex.  Culture sent.    Urinary frequency-continue with the trospium for now.  There is a lot going on in today's office visit so I did not fully get to address this.  He may be a PTNS candidate.               Chief Complaint   No chief complaint on file.      History of Present Illness   Bladder cancer, superficial with recurrence right lateral trigone at the 10:00 right bladder neck.  Last cystoscopy was 2024 and the patient wished to observe those at presently.  He had other things going on with his health.  He has been set up for another cystoscopy for now and he knew that if he started to bleed he would come in sooner would take care of things.  He is status post TURBT of a 1 cm tumor right lateral wall removed with cup forceps and the rest fulgurated by me 2023.  Pathology showed high-grade papillary urothelial carcinoma with foci suggesting superficial invasion into papillary stock.  There is a minute portion of benign thick muscle present favor limiting samples of muscularis propria.    Urinary frequency: He had a good response to trospium 20 mg twice daily.  However this is no longer working so well anymore.  Urinary frequency about every hour and a half about every hour and a half at night to go to the bathroom.    Left forearm cellulitis/pimple-unknown duration, no fevers, 2 cm of erythema with a central comedone.  I opened this with an 18-gauge needle and drained the pus, about 1 to 2 cm of pus.  Sent for  culture.  Started on Keflex 500 mg 1 p.o. 4 times daily for 7 days.           Cystoscopy     Date/Time  9/17/2024 3:00 PM     Performed by  Adalberto Rasmussen MD   Authorized by  Adalberto Rasmussen MD     Universal Protocol:  Consent: Verbal consent obtained. Written consent obtained.      Procedure Details:  Procedure type: fulguration    Location:  [facility]  Additional Procedure Details: Cystoscopy Procedure Note        Pre-operative Diagnosis: Bladder cancer surveillance    Post-operative Diagnosis: Same, recurrence right lateral trigone small superficial fulgurated 2 cm    Procedure: Flexible cystoscopy, fulguration of lesions    Surgeon: Adalberto Rasmussen MD    Anesthesia: 1% Xylocaine per urethra    EBL: Minimal    Complications: none    Procedure Details   The risks, benefits, complications, treatment options, and expected outcomes were discussed with the patient. The patient concurred with the proposed plan, giving informed consent.    Cystoscopy was performed today under local anesthesia, using sterile technique. The patient was placed in the supine position, prepped with Betadine, and draped in the usual sterile fashion. The flexible cystocope was used to inspect both the urethra and bladder    Findings:  Urethra: Normal without stricture.  Prostate mostly nonocclusive.    Bladder:  Smooth, not trabeculated and there was a small superficial papillary recurrence lateral to the trigone and anterior to the orifice.  The orifice did not appear to be involved.  Using the Bugbee I fulgurated all of these mostly flat..  The orifices were orthotopic and intact.           Specimens: none                 Complications:  None           Disposition: To home            Condition:  Stable              The following portions of the patient's history were reviewed and updated as appropriate: allergies, current medications, past family history, past medical history, past social history, past surgical history and problem list.  Past Medical  History:   Diagnosis Date    Anemia     Appendicitis     Coronary artery disease     Detached retina     GERD (gastroesophageal reflux disease)     Hyperlipidemia     Hypertension     Macular degeneration     Neuropathy     Pelvis fracture (HCC) 01/2021    Von Willebrand disease (HCC)      Past Surgical History:   Procedure Laterality Date    ADENOIDECTOMY      APPENDECTOMY      ARTHRODESIS  01/15/2014    Lumbar     BACK SURGERY      CATARACT EXTRACTION      COLONOSCOPY  12/09/2016    fiberoptic     CYSTOSCOPY      with resection of tumor / 1/12/17, 10/2/17 / diagnostic 12/8/16, 5/30/17     CYSTOSCOPY  09/20/2018    CYSTOSCOPY  06/22/2020    EGD AND COLONOSCOPY N/A 12/9/2016    Procedure: EGD AND COLONOSCOPY;  Surgeon: Kahlil Alfonso MD;  Location: [facility];  Service:     EYE SURGERY      FRACTURE SURGERY Bilateral     ARM    NECK SURGERY      NEUROPLASTY / TRANSPOSITION MEDIAN NERVE AT CARPAL TUNNEL Right 04/2015    WI AMPUTATION METATARSAL W/TOE SINGLE Left 4/11/2024    Procedure: LEFT 2ND TOE AMPUTATION;  Surgeon: Vicente Barrientos DPM;  Location: [facility];  Service: Podiatry    WI BLADDER INSTILLATION ANTICARCINOGENIC AGENT N/A 1/12/2017    Procedure: INSTILLATION MYTOMYCIN;  Surgeon: Jhon Zuniga MD;  Location: [facility];  Service: Urology    WI BLADDER INSTILLATION ANTICARCINOGENIC AGENT N/A 10/2/2017    Procedure: INSTILLATION MYTOMYCIN;  Surgeon: Jhon Zuniga MD;  [facility]: [facility];  Service: Urology    WI COLONOSCOPY FLX DX W/COLLJ SPEC WHEN PFRMD N/A 2/5/2019    Procedure: COLONOSCOPY;  Surgeon: Abad Valentino MD;  [facility]: [facility];  Service: Gastroenterology    WI CYSTO W/REMOVAL OF LESIONS SMALL N/A 1/12/2017    Procedure: CYSTOSCOPY, BLADDER BIOPSY WITH FULGRATION, 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;  Surgeon: Jhon Zuniga MD;  [facility]: [facility];  Service: Urology    WI CYSTO W/REMOVAL OF LESIONS SMALL N/A 10/2/2017    Procedure: CYSTOSCOPY  WITH  BLADDER BIOPSIES WITH FULGURATION;  Surgeon: Jhon Zuniga MD;  [facility]: [facility];  Service: Urology    IN CYSTO W/REMOVAL OF LESIONS SMALL N/A 11/30/2023    Procedure: CYSTOSCOPY WITH BIOPSIES and fulguration;  Surgeon: Adalberto Rasmussen MD;  [facility]: [facility];  Service: Urology    IN ESOPHAGOGASTRODUODENOSCOPY TRANSORAL DIAGNOSTIC N/A 8/3/2018    Procedure: ESOPHAGOGASTRODUODENOSCOPY (EGD);  Surgeon: Aabd Valentino MD;  [facility]: [facility];  Service: Gastroenterology    RETINAL DETACHMENT SURGERY Right 03/2016    complete air fill confirmed     ROTATOR CUFF REPAIR      SHOULDER SURGERY  03/03/2015    proximal humerus fracture / LA...3/6/15   R...1/3/18     TONSILECTOMY AND ADNOIDECTOMY      TONSILLECTOMY      TRANSURETHRAL RESECTION OF BLADDER TUMOR N/A 10/2/2017    Procedure: TRANSURETHRAL RESECTION OF BLADDER TUMOR (TURBT);  Surgeon: Jhon Zuniga MD;  [facility]: [facility];  Service: Urology    VON WILLEBRAND ANTIGEN (HISTORICAL)       shoulder  Review of Systems   Review of Systems   Genitourinary:  Positive for frequency.   Skin:  Positive for wound.        Left forearm cellulitis/comedone       Active Problem List     Patient Active Problem List   Diagnosis    Bladder cancer (HCC)    Lung nodule, solitary    Erectile dysfunction    Gait instability    High blood pressure    Hypothyroidism    Idiopathic peripheral neuropathy    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)    Cervical stenosis of spinal canal    Spinal stenosis of lumbar region    Spondylosis 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    Hoarse voice quality    Appetite loss    Weight loss    Gastroesophageal reflux disease without esophagitis    Alternating constipation and diarrhea    Nausea    Chronic obstructive pulmonary disease with acute exacerbation (HCC)    Von Willebrand's disease (HCC)    Radiculopathy, lumbar region    Fall    Anemia    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   Constitutional:       Appearance: Normal appearance. He is normal weight.   HENT:      Head: Normocephalic and atraumatic.   Eyes:      Extraocular Movements: Extraocular movements intact.   Pulmonary:      Effort: Pulmonary effort is normal.   Abdominal:      Palpations: Abdomen is soft.   Genitourinary:     Penis: Normal.    Musculoskeletal:         General: Normal range of motion.      Cervical back: Normal range of motion.      Comments: Left forearm 2 cm erythematous region with central comedone   Skin:     Coloration: Skin is pale. Skin is not jaundiced.      Findings: Lesion present.      Comments: As described   Neurological:      General: No focal deficit present.      Mental Status: He is alert and oriented to person, place, and time. Mental status is at baseline.   Psychiatric:         Mood and Affect: Mood normal.         Behavior: Behavior normal.         Thought Content: Thought content normal.         Judgment: Judgment normal.             Current Medications     Current Outpatient Medications:     acetaminophen (TYLENOL) 325 mg tablet, Take 2 tablets (650 mg total) by mouth every 6 (six) hours as needed for mild pain, headaches or fever, Disp:  , Rfl: 0    ALPHA LIPOIC ACID-BIOTIN PO, Take 1 tablet by mouth in the morning, Disp: , Rfl:     atorvastatin (LIPITOR) 10 mg tablet, Take 1 tablet (10 mg total) by mouth every other day, Disp: 45 tablet, Rfl: 3    B COMPLEX VITAMINS PO, Take 1 tablet by  mouth daily, Disp: , Rfl:     cephalexin (KEFLEX) 500 mg capsule, Take 1 capsule (500 mg total) by mouth every 6 (six) hours for 7 days, Disp: 28 capsule, Rfl: 0    cetirizine (Allergy Relief Cetirizine) 10 mg tablet, Take 1 tablet (10 mg total) by mouth daily, Disp: 90 tablet, Rfl: 2    Cholecalciferol (VITAMIN D3) 2000 UNITS capsule, Take 1 capsule by mouth 2 (two) times a day 5000 units , Disp: , Rfl:     Coenzyme Q10 (CO Q10) 60 MG CAPS, Take 1 capsule by mouth daily, Disp: , Rfl:     fluocinolone (SYNALAR) 0.01 % external solution, Apply 1 Application topically 2 (two) times a day, Disp: , Rfl:     gabapentin (NEURONTIN) 600 MG tablet, TAKE 1 TABLET BY MOUTH TWICE  DAILY, Disp: 180 tablet, Rfl: 1    Ginkgo Biloba 120 MG TABS, Take 1 tablet by mouth daily, Disp: , Rfl:     hydrocortisone-pramoxine (PRAMOSONE) 1-2.5 % LOTN, Apply topically 3 (three) times a day, Disp: 118 mL, Rfl: 2    Inositol 500 MG TABS, Take 1 tablet by mouth 2 (two) times a day, Disp: , Rfl:     ipratropium (ATROVENT) 0.03 % nasal spray, USE 2 SPRAYS IN BOTH NOSTRILS 3  TIMES DAILY, Disp: 120 mL, Rfl: 3    ketoconazole (NIZORAL) 2 % shampoo, , Disp: , Rfl:     lutein 6 mg, Take 1 capsule by mouth daily, Disp: , Rfl:     metoprolol tartrate (LOPRESSOR) 25 mg tablet, TAKE 1 TABLET BY MOUTH EVERY 12  HOURS, Disp: 180 tablet, Rfl: 3    MILK THISTLE PO, Take 1 tablet by mouth daily, Disp: , Rfl:     Misc Natural Products (SUPER SNOOZE) CAPS, Take 1 capsule by mouth daily Bed time , Disp: , Rfl:     Multiple Vitamins-Minerals (OCUVITE-LUTEIN) CAPS, Take by mouth daily , Disp: , Rfl:     omeprazole (PriLOSEC) 40 MG capsule, TAKE 1 CAPSULE BY MOUTH TWICE  DAILY, Disp: 180 capsule, Rfl: 1    Omeprazole 20 MG TBDD, , Disp: , Rfl:     pentoxifylline (TRENtal) 400 mg ER tablet, Take 1 tablet (400 mg total) by mouth 3 (three) times a day with meals, Disp: 270 tablet, Rfl: 3    psyllium (METAMUCIL) 58.6 % packet, Take 1 packet by mouth daily, Disp: , Rfl:      sildenafil (VIAGRA) 50 MG tablet, , Disp: , Rfl:     silver sulfadiazine (SILVADENE,SSD) 1 % cream, Apply topically daily, Disp: 50 g, Rfl: 1    tadalafil (CIALIS) 20 MG tablet, Take 1 tablet (20 mg total) by mouth every other day as needed for erectile dysfunction, Disp: 18 tablet, Rfl: 3    trospium chloride (SANCTURA) 20 mg tablet, Take 1 tablet (20 mg total) by mouth 2 (two) times a day, Disp: 180 tablet, Rfl: 3    Zinc 50 MG TABS, Take 1 tablet by mouth daily, Disp: , Rfl:         Adalberto Rasmussen MD

## 2024-09-20 LAB
BACTERIA WND AEROBE CULT: ABNORMAL
GRAM STN SPEC: ABNORMAL
GRAM STN SPEC: ABNORMAL

## 2024-09-25 ENCOUNTER — HOSPITAL ENCOUNTER (OUTPATIENT)
Dept: NUCLEAR MEDICINE | Facility: HOSPITAL | Age: 89
Discharge: HOME | End: 2024-09-25
Attending: STUDENT IN AN ORGANIZED HEALTH CARE EDUCATION/TRAINING PROGRAM
Payer: MEDICARE

## 2024-09-25 ENCOUNTER — APPOINTMENT (OUTPATIENT)
Dept: LAB | Facility: CLINIC | Age: 89
End: 2024-09-25
Payer: MEDICARE

## 2024-09-25 DIAGNOSIS — R10.13 DYSPEPSIA: ICD-10-CM

## 2024-09-25 DIAGNOSIS — R68.81 EARLY SATIETY: ICD-10-CM

## 2024-09-25 PROCEDURE — A9541 TC99M SULFUR COLLOID: HCPCS

## 2024-09-25 PROCEDURE — 78264 GASTRIC EMPTYING IMG STUDY: CPT

## 2024-10-01 DIAGNOSIS — R35.1 NOCTURIA: Primary | ICD-10-CM

## 2024-10-01 RX ORDER — TROSPIUM CHLORIDE ER 60 MG/1
60 CAPSULE ORAL
Qty: 30 CAPSULE | Refills: 1 | Status: SHIPPED | OUTPATIENT
Start: 2024-10-01

## 2024-10-03 ENCOUNTER — TELEPHONE (OUTPATIENT)
Dept: UROLOGY | Facility: CLINIC | Age: 89
End: 2024-10-03

## 2024-10-03 NOTE — TELEPHONE ENCOUNTER
Received fax from [facility] needing Prior Auth on Trospium Chloride ER 60 mg Capsules.    KEY CODE: [id number]    Please initiate Prior Authorization.

## 2024-10-03 NOTE — TELEPHONE ENCOUNTER
PA for SANCTURA  APPROVED     Date(s) approved UNTIL 12/31/2025        Patient advised by          []MyChart Message  []Phone call   [x]LMOM  []L/M to call office as no active Communication consent on file  []Unable to leave detailed message as VM not approved on Communication consent       Pharmacy advised by    [x]Fax  []Phone call    Approval letter scanned into Media Yes

## 2024-10-03 NOTE — TELEPHONE ENCOUNTER
PA for SANCTURA SUBMITTED     via    []M-KEY:   [x]Surescripts-Case ID # [id number]   []Availity-Auth ID # NDC #   []Faxed to plan   []Other website   []Phone call Case ID #     Office notes sent, clinical questions answered. Awaiting determination    Turnaround time for your insurance to make a decision on your Prior Authorization can take 7-21 business days.

## 2024-10-07 ENCOUNTER — OFFICE VISIT (OUTPATIENT)
Dept: GASTROENTEROLOGY | Facility: CLINIC | Age: 89
End: 2024-10-07
Payer: MEDICARE

## 2024-10-07 VITALS
DIASTOLIC BLOOD PRESSURE: 87 MMHG | WEIGHT: 163.4 LBS | OXYGEN SATURATION: 99 % | SYSTOLIC BLOOD PRESSURE: 154 MMHG | TEMPERATURE: 98 F | HEIGHT: 72 IN | HEART RATE: 57 BPM | BODY MASS INDEX: 22.13 KG/M2

## 2024-10-07 DIAGNOSIS — R68.81 EARLY SATIETY: ICD-10-CM

## 2024-10-07 DIAGNOSIS — K59.04 CHRONIC IDIOPATHIC CONSTIPATION: ICD-10-CM

## 2024-10-07 DIAGNOSIS — K30 DELAYED GASTRIC EMPTYING: Primary | ICD-10-CM

## 2024-10-07 DIAGNOSIS — K21.9 GASTROESOPHAGEAL REFLUX DISEASE WITHOUT ESOPHAGITIS: ICD-10-CM

## 2024-10-07 DIAGNOSIS — R10.13 DYSPEPSIA: ICD-10-CM

## 2024-10-07 PROBLEM — R63.0 APPETITE LOSS: Status: RESOLVED | Noted: 2018-08-01 | Resolved: 2024-10-07

## 2024-10-07 PROBLEM — R19.8 ALTERNATING CONSTIPATION AND DIARRHEA: Status: RESOLVED | Noted: 2018-08-29 | Resolved: 2024-10-07

## 2024-10-07 PROCEDURE — G2211 COMPLEX E/M VISIT ADD ON: HCPCS | Performed by: STUDENT IN AN ORGANIZED HEALTH CARE EDUCATION/TRAINING PROGRAM

## 2024-10-07 PROCEDURE — 99214 OFFICE O/P EST MOD 30 MIN: CPT | Performed by: STUDENT IN AN ORGANIZED HEALTH CARE EDUCATION/TRAINING PROGRAM

## 2024-10-07 RX ORDER — OMEPRAZOLE 40 MG/1
40 CAPSULE, DELAYED RELEASE ORAL DAILY
Qty: 180 CAPSULE | Refills: 1 | Status: SHIPPED | OUTPATIENT
Start: 2024-10-07

## 2024-10-07 NOTE — ASSESSMENT & PLAN NOTE
Symptoms improved.  Recent EGD reviewed.  Largely unremarkable.    Continue omeprazole 40 mg daily  We will decrease this to once daily from twice daily  If his symptoms worsen, he can increase back to twice daily as needed or add H2 blocker in the evening at bedtime

## 2024-10-07 NOTE — PATIENT INSTRUCTIONS
We will decrease the omeprazole to once a day  Take the omeprazole in the morning  Continue the Metamucil

## 2024-10-07 NOTE — PROGRESS NOTES
Ambulatory Visit  Name: Abdirahman Hope      : 1935      MRN: [id number]  Encounter Provider: Kang Underwood DO  Encounter Date: 10/7/2024   Encounter department: [facility] [place]    Assessment & Plan  Delayed gastric emptying  Mild acute delayed gastric emptying noted on recent gastric emptying study.  Symptoms are relatively mild at this time.  Overall improved since making dietary and lifestyle changes.    Continue with dietary and lifestyle modifications which have been helping  No indication for prokinetic agents at this time         Early satiety  Symptoms are improved.  Mild/minimal delayed gastric emptying noted on recent gastric emptying study.    Symptoms are improved with dietary and lifestyle changes which I would encourage she continue  No indication for prokinetic agents       Dyspepsia  Symptoms improved.  Recent EGD reviewed.  Largely unremarkable.    Continue omeprazole 40 mg daily  We will decrease this to once daily from twice daily  If his symptoms worsen, he can increase back to twice daily as needed or add H2 blocker in the evening at bedtime       Gastroesophageal reflux disease without esophagitis  Improved.    We will decrease omeprazole to once daily  Can add Pepcid at bedtime if necessary or increase back to twice daily if needed  Orders:    omeprazole (PriLOSEC) 40 MG capsule; Take 1 capsule (40 mg total) by mouth daily    Chronic idiopathic constipation  Symptoms are improved.    Encouraged hydration and continued fiber supplementation         History of Present Illness     Abdirahman Hope is a 88 y.o. male who presents to GI office for follow-up.  Patient is accompanied by his significant other.  He has been doing very well since his last office visit and since his last procedures.  He has been gaining weight after making some dietary and lifestyle changes.  He states that he cut out susana in the evening.  He is also cut down on smoking.   They have also been eating slightly later in the evening which is allowed him to be more hungry and encouraging him to eat more food.  This has helped significantly with his symptoms.  He did have a EGD and colonoscopy performed which was largely unremarkable.  He did have some mild diverticulosis noted.  He does take fiber supplementation.  He does admit that his water intake is not the greatest but is trying to increase his water intake.  Reflux has improved.  Overall doing well with no other acute GI complaints today.    History obtained from : patient and patient's Significant Other  Review of Systems   Constitutional:  Negative for activity change and appetite change.   Respiratory:  Negative for shortness of breath.    Cardiovascular:  Negative for chest pain.   Gastrointestinal:  Negative for abdominal distention, abdominal pain, blood in stool, constipation, diarrhea, nausea, rectal pain and vomiting.   Skin:  Negative for color change, rash and wound.     Medical History Reviewed by provider this encounter:       Current Outpatient Medications on File Prior to Visit   Medication Sig Dispense Refill    acetaminophen (TYLENOL) 325 mg tablet Take 2 tablets (650 mg total) by mouth every 6 (six) hours as needed for mild pain, headaches or fever  0    ALPHA LIPOIC ACID-BIOTIN PO Take 1 tablet by mouth in the morning      atorvastatin (LIPITOR) 10 mg tablet Take 1 tablet (10 mg total) by mouth every other day 45 tablet 3    B COMPLEX VITAMINS PO Take 1 tablet by mouth daily      cetirizine (Allergy Relief Cetirizine) 10 mg tablet Take 1 tablet (10 mg total) by mouth daily 90 tablet 2    Cholecalciferol (VITAMIN D3) 2000 UNITS capsule Take 1 capsule by mouth 2 (two) times a day 5000 units       Coenzyme Q10 (CO Q10) 60 MG CAPS Take 1 capsule by mouth daily      fluocinolone (SYNALAR) 0.01 % external solution Apply 1 Application topically 2 (two) times a day      gabapentin (NEURONTIN) 600 MG tablet TAKE 1 TABLET BY  MOUTH TWICE  DAILY 180 tablet 1    Ginkgo Biloba 120 MG TABS Take 1 tablet by mouth daily      hydrocortisone-pramoxine (PRAMOSONE) 1-2.5 % LOTN Apply topically 3 (three) times a day 118 mL 2    Inositol 500 MG TABS Take 1 tablet by mouth 2 (two) times a day      ipratropium (ATROVENT) 0.03 % nasal spray USE 2 SPRAYS IN BOTH NOSTRILS 3  TIMES DAILY 120 mL 3    ketoconazole (NIZORAL) 2 % shampoo       lutein 6 mg Take 1 capsule by mouth daily      metoprolol tartrate (LOPRESSOR) 25 mg tablet TAKE 1 TABLET BY MOUTH EVERY 12  HOURS 180 tablet 3    MILK THISTLE PO Take 1 tablet by mouth daily      Misc Natural Products (SUPER SNOOZE) CAPS Take 1 capsule by mouth daily Bed time       Multiple Vitamins-Minerals (OCUVITE-LUTEIN) CAPS Take by mouth daily       pentoxifylline (TRENtal) 400 mg ER tablet Take 1 tablet (400 mg total) by mouth 3 (three) times a day with meals 270 tablet 3    psyllium (METAMUCIL) 58.6 % packet Take 1 packet by mouth daily      sildenafil (VIAGRA) 50 MG tablet       silver sulfadiazine (SILVADENE,SSD) 1 % cream Apply topically daily 50 g 1    tadalafil (CIALIS) 20 MG tablet Take 1 tablet (20 mg total) by mouth every other day as needed for erectile dysfunction 18 tablet 3    trospium (SANCTURA XR) 60 mg 24 hr capsule Take 1 capsule (60 mg total) by mouth daily before breakfast 30 capsule 1    Zinc 50 MG TABS Take 1 tablet by mouth daily      [DISCONTINUED] omeprazole (PriLOSEC) 40 MG capsule TAKE 1 CAPSULE BY MOUTH TWICE  DAILY 180 capsule 1    [DISCONTINUED] Omeprazole 20 MG TBDD       trospium chloride (SANCTURA) 20 mg tablet Take 1 tablet (20 mg total) by mouth 2 (two) times a day 180 tablet 3     No current facility-administered medications on file prior to visit.      Social History     Tobacco Use    Smoking status: Every Day     Current packs/day: 0.25     Average packs/day: 0.3 packs/day for 60.0 years (15.0 ttl pk-yrs)     Types: Cigarettes    Smokeless tobacco: Never    Tobacco comments:      smokes a pipe   Vaping Use    Vaping status: Never Used   Substance and Sexual Activity    Alcohol use: Yes     Alcohol/week: 2.0 standard drinks of alcohol     Types: 1 Glasses of wine, 1 Shots of liquor per week     Comment: Occ    Drug use: Never    Sexual activity: Not Currently         Objective     /87 (BP Location: Left arm, Patient Position: Sitting, Cuff Size: Standard)   Pulse 57   Temp 98 °F (36.7 °C) (Temporal)   Ht 6' (1.829 m)   Wt 74.1 kg (163 lb 6.4 oz)   SpO2 99%   BMI 22.16 kg/m²     Physical Exam  Vitals reviewed.   Constitutional:       Appearance: Normal appearance.   HENT:      Head: Normocephalic and atraumatic.      Nose: Nose normal.   Eyes:      Extraocular Movements: Extraocular movements intact.   Cardiovascular:      Rate and Rhythm: Normal rate and regular rhythm.   Pulmonary:      Effort: Pulmonary effort is normal.      Breath sounds: Normal breath sounds.   Abdominal:      General: Bowel sounds are normal. There is no distension.      Palpations: Abdomen is soft. There is no mass.      Tenderness: There is no abdominal tenderness. There is no guarding or rebound.   Neurological:      Mental Status: He is alert. Mental status is at baseline.   Psychiatric:         Mood and Affect: Mood normal.         Behavior: Behavior normal.       Administrative Statements   Topics discussed with the patient / family include symptom assessment and management, medication review, and medication adjustment.

## 2024-10-07 NOTE — ASSESSMENT & PLAN NOTE
Symptoms are improved.  Mild/minimal delayed gastric emptying noted on recent gastric emptying study.    Symptoms are improved with dietary and lifestyle changes which I would encourage she continue  No indication for prokinetic agents

## 2024-10-07 NOTE — ASSESSMENT & PLAN NOTE
Improved.    We will decrease omeprazole to once daily  Can add Pepcid at bedtime if necessary or increase back to twice daily if needed  Orders:    omeprazole (PriLOSEC) 40 MG capsule; Take 1 capsule (40 mg total) by mouth daily

## 2024-10-07 NOTE — ASSESSMENT & PLAN NOTE
Mild acute delayed gastric emptying noted on recent gastric emptying study.  Symptoms are relatively mild at this time.  Overall improved since making dietary and lifestyle changes.    Continue with dietary and lifestyle modifications which have been helping  No indication for prokinetic agents at this time

## 2024-10-14 ENCOUNTER — OFFICE VISIT (OUTPATIENT)
Dept: PODIATRY | Facility: CLINIC | Age: 89
End: 2024-10-14
Payer: MEDICARE

## 2024-10-14 ENCOUNTER — TELEPHONE (OUTPATIENT)
Dept: UROLOGY | Facility: CLINIC | Age: 89
End: 2024-10-14

## 2024-10-14 VITALS
OXYGEN SATURATION: 98 % | HEIGHT: 72 IN | TEMPERATURE: 98 F | WEIGHT: 163 LBS | BODY MASS INDEX: 22.08 KG/M2 | DIASTOLIC BLOOD PRESSURE: 79 MMHG | RESPIRATION RATE: 16 BRPM | SYSTOLIC BLOOD PRESSURE: 142 MMHG | HEART RATE: 96 BPM

## 2024-10-14 DIAGNOSIS — G60.9 IDIOPATHIC PERIPHERAL NEUROPATHY: ICD-10-CM

## 2024-10-14 DIAGNOSIS — B35.1 ONYCHOMYCOSIS: Primary | ICD-10-CM

## 2024-10-14 DIAGNOSIS — R35.1 NOCTURIA: ICD-10-CM

## 2024-10-14 DIAGNOSIS — Z89.422 HISTORY OF AMPUTATION OF LESSER TOE, LEFT (HCC): ICD-10-CM

## 2024-10-14 DIAGNOSIS — I73.9 PAD (PERIPHERAL ARTERY DISEASE) (HCC): ICD-10-CM

## 2024-10-14 DIAGNOSIS — R60.1 GENERALIZED EDEMA: ICD-10-CM

## 2024-10-14 PROCEDURE — RECHECK: Performed by: PODIATRIST

## 2024-10-14 PROCEDURE — 11721 DEBRIDE NAIL 6 OR MORE: CPT | Performed by: PODIATRIST

## 2024-10-14 RX ORDER — TROSPIUM CHLORIDE ER 60 MG/1
60 CAPSULE ORAL
Qty: 90 CAPSULE | Refills: 3 | Status: SHIPPED | OUTPATIENT
Start: 2024-10-14

## 2024-10-14 NOTE — PROGRESS NOTES
Ambulatory Visit  Name: Abdirahman Hope      : 1935      MRN: [id number]  Encounter Provider: Darnell Barrios DPM  Encounter Date: 10/14/2024   Encounter department: [facility] PODIATRY [place]    Assessment & Plan  Onychomycosis       Debride mycotic nails and thin the nail plates x 9 with the use of a nail nipper manually and an electric Dremel bur was used to reduce the thickness of the nail beds and smoothed the distal aspect of the nails.   Idiopathic peripheral neuropathy       Recommended that he talk to the physician who is monitoring his gabapentin if the neuropathy the pain is really bothering him to increase the dose or the number of gabapentin given during the day.  History of amputation of lesser toe, left (HCC)         PAD (peripheral artery disease) (HCC)         Generalized edema       He is either to change to a diabetic style a sock which does not have as much compression or he is to get into some of the compression stockings that he has at home to help control the edema on the lower extremity and feet.    Pt was instructed to use lotion once a day on both feet such as cerave, Cetaphil or similar thick style of lotion.     Patient was directed to get a new pair of full-length over-the-counter inserts to be placed in his shoes.  He was told about the ones at [facility] or even a Dr. Gregory's gel.  Since he is looking for arch support directed him away from the gels because they are more just for padding.  He was reminded about taking the inserts from the  out of the shoes prior to placing new over-the-counter inserts in.    Return in about 10 weeks (around 2024).     History of Present Illness     Abdirahman Hope is a 88 y.o. male who presents with chief complaint of painful thick nails on both feet.    History obtained from : patient  Review of Systems  Medical History Reviewed by provider this encounter:       Current Outpatient Medications on File Prior to  Visit   Medication Sig Dispense Refill    acetaminophen (TYLENOL) 325 mg tablet Take 2 tablets (650 mg total) by mouth every 6 (six) hours as needed for mild pain, headaches or fever  0    ALPHA LIPOIC ACID-BIOTIN PO Take 1 tablet by mouth in the morning      atorvastatin (LIPITOR) 10 mg tablet Take 1 tablet (10 mg total) by mouth every other day 45 tablet 3    B COMPLEX VITAMINS PO Take 1 tablet by mouth daily      cetirizine (Allergy Relief Cetirizine) 10 mg tablet Take 1 tablet (10 mg total) by mouth daily 90 tablet 2    Cholecalciferol (VITAMIN D3) 2000 UNITS capsule Take 1 capsule by mouth 2 (two) times a day 5000 units       Coenzyme Q10 (CO Q10) 60 MG CAPS Take 1 capsule by mouth daily      fluocinolone (SYNALAR) 0.01 % external solution Apply 1 Application topically 2 (two) times a day      gabapentin (NEURONTIN) 600 MG tablet TAKE 1 TABLET BY MOUTH TWICE  DAILY 180 tablet 1    Ginkgo Biloba 120 MG TABS Take 1 tablet by mouth daily      hydrocortisone-pramoxine (PRAMOSONE) 1-2.5 % LOTN Apply topically 3 (three) times a day 118 mL 2    Inositol 500 MG TABS Take 1 tablet by mouth 2 (two) times a day      ipratropium (ATROVENT) 0.03 % nasal spray USE 2 SPRAYS IN BOTH NOSTRILS 3  TIMES DAILY 120 mL 3    ketoconazole (NIZORAL) 2 % shampoo       lutein 6 mg Take 1 capsule by mouth daily      metoprolol tartrate (LOPRESSOR) 25 mg tablet TAKE 1 TABLET BY MOUTH EVERY 12  HOURS 180 tablet 3    MILK THISTLE PO Take 1 tablet by mouth daily      Misc Natural Products (SUPER SNOOZE) CAPS Take 1 capsule by mouth daily Bed time       Multiple Vitamins-Minerals (OCUVITE-LUTEIN) CAPS Take by mouth daily       omeprazole (PriLOSEC) 40 MG capsule Take 1 capsule (40 mg total) by mouth daily 180 capsule 1    pentoxifylline (TRENtal) 400 mg ER tablet Take 1 tablet (400 mg total) by mouth 3 (three) times a day with meals 270 tablet 3    psyllium (METAMUCIL) 58.6 % packet Take 1 packet by mouth daily      sildenafil (VIAGRA) 50 MG  tablet       silver sulfadiazine (SILVADENE,SSD) 1 % cream Apply topically daily 50 g 1    tadalafil (CIALIS) 20 MG tablet Take 1 tablet (20 mg total) by mouth every other day as needed for erectile dysfunction 18 tablet 3    trospium (SANCTURA XR) 60 mg 24 hr capsule Take 1 capsule (60 mg total) by mouth daily before breakfast 30 capsule 1    Zinc 50 MG TABS Take 1 tablet by mouth daily      trospium chloride (SANCTURA) 20 mg tablet Take 1 tablet (20 mg total) by mouth 2 (two) times a day 180 tablet 3     No current facility-administered medications on file prior to visit.      Social History     Tobacco Use    Smoking status: Every Day     Current packs/day: 0.25     Average packs/day: 0.3 packs/day for 60.0 years (15.0 ttl pk-yrs)     Types: Cigarettes    Smokeless tobacco: Never    Tobacco comments:     smokes a pipe   Vaping Use    Vaping status: Never Used   Substance and Sexual Activity    Alcohol use: Yes     Alcohol/week: 2.0 standard drinks of alcohol     Types: 1 Glasses of wine, 1 Shots of liquor per week     Comment: Occ    Drug use: Never    Sexual activity: Not Currently         Objective     /79   Pulse 96   Temp 98 °F (36.7 °C)   Resp 16   Ht 6' (1.829 m)   Wt 73.9 kg (163 lb)   SpO2 98%   BMI 22.11 kg/m²     Physical Exam  Vascular exam is a faint 1/4 DP PT negative digital hair normal distal cooling slightly delayed capillary refill with edema present bilaterally.  There is demarcation about mid tibial region from the socks being tight of about 0.5 cm.    Derm nails are brittle elongated hypertrophic yellow discoloration with subungual debris x 9.  There is an increased thickness and the nails are approximately 1 to 3 mm.  There is white flaky tissue present on the dorsal and plantar aspects of both feet with no fissures or dried blood present within the tissues.  There is loss of turgor of the skin and the skin appears to be thinning.    Ortho hammertoe deformities are present on the  third and fourth digits bilaterally there is also pes planus noted bilaterally.  There is amputation of the left second toe at the MPJs.    Neuro light touch and proprioception is intact but the patient is feeling a numbness in the area of the metatarsal heads.    Administrative Statements   I have spent a total time of 16 minutes in caring for this patient on the day of the visit/encounter including Risks and benefits of tx options, Instructions for management, Patient and family education, Importance of tx compliance, Counseling / Coordination of care, Documenting in the medical record, and Reviewing / ordering tests, medicine, procedures  .

## 2024-10-14 NOTE — ASSESSMENT & PLAN NOTE
Recommended that he talk to the physician who is monitoring his gabapentin if the neuropathy the pain is really bothering him to increase the dose or the number of gabapentin given during the day.

## 2024-10-28 ENCOUNTER — OFFICE VISIT (OUTPATIENT)
Dept: FAMILY MEDICINE CLINIC | Facility: CLINIC | Age: 89
End: 2024-10-28
Payer: MEDICARE

## 2024-10-28 VITALS
OXYGEN SATURATION: 98 % | TEMPERATURE: 97.4 F | HEIGHT: 72 IN | DIASTOLIC BLOOD PRESSURE: 77 MMHG | BODY MASS INDEX: 21.94 KG/M2 | SYSTOLIC BLOOD PRESSURE: 138 MMHG | HEART RATE: 58 BPM | WEIGHT: 162 LBS

## 2024-10-28 DIAGNOSIS — L72.9 CUTANEOUS CYST: ICD-10-CM

## 2024-10-28 DIAGNOSIS — Z00.00 MEDICARE ANNUAL WELLNESS VISIT, SUBSEQUENT: Primary | ICD-10-CM

## 2024-10-28 PROCEDURE — G0439 PPPS, SUBSEQ VISIT: HCPCS | Performed by: FAMILY MEDICINE

## 2024-10-28 RX ORDER — MUPIROCIN 20 MG/G
OINTMENT TOPICAL 3 TIMES DAILY
Qty: 22 G | Refills: 1 | Status: SHIPPED | OUTPATIENT
Start: 2024-10-28

## 2024-10-28 NOTE — PROGRESS NOTES
Ambulatory Visit  Name: Abdirahman Hope      : 1935      MRN: [id number]  Encounter Provider: Varun Hoyt DO  Encounter Date: 10/28/2024   Encounter department: [place] [facility]    Assessment & Plan  Medicare annual wellness visit, subsequent         Cutaneous cyst           Falls Plan of Care: balance, strength, and gait training instructions were provided.     Preventive health issues were discussed with patient, and age appropriate screening tests were ordered as noted in patient's After Visit Summary. Personalized health advice and appropriate referrals for health education or preventive services given if needed, as noted in patient's After Visit Summary.    History of Present Illness     Well visit      Patient Care Team:  Varun Hoyt DO as PCP - General (Family Medicine)  MD Jhon Steele MD Craig Krause, DO Ayaz Matin, MD as Endoscopist  Darnell Barrios DPM (Podiatry)  Kang Underwood DO (Gastroenterology)    Review of Systems   Constitutional:  Positive for activity change and fatigue.   HENT:  Positive for hearing loss.    Eyes:  Positive for visual disturbance.   Respiratory:  Positive for shortness of breath.    Cardiovascular:  Negative for chest pain and palpitations.   Genitourinary:  Positive for frequency.   Musculoskeletal:  Positive for arthralgias and gait problem.   Skin:  Positive for color change and wound.   Psychiatric/Behavioral:  Positive for sleep disturbance.      Medical History Reviewed by provider this encounter:       Annual Wellness Visit Questionnaire   Abdirahman is here for his Subsequent Wellness visit.     Health Risk Assessment:   Patient rates overall health as fair. Patient feels that their physical health rating is same. Patient is satisfied with their life. Eyesight was rated as slightly worse. Hearing was rated as same. Patient feels that their emotional and mental health rating is same. Patients states they are never, rarely  angry. Patient states they are never, rarely unusually tired/fatigued. Pain experienced in the last 7 days has been none. Patient states that he has experienced no weight loss or gain in last 6 months.     Fall Risk Screening:   In the past year, patient has experienced: history of falling in past year    Number of falls: 1  Injured during fall?: Yes    Feels unsteady when standing or walking?: Yes    Worried about falling?: Yes      Home Safety:  Patient has trouble with stairs inside or outside of their home. Patient has working smoke alarms and has no working carbon monoxide detector. Home safety hazards include: none.     Nutrition:   Current diet is Regular and Frequent junk food.     Medications:   Patient is currently taking over-the-counter supplements. OTC medications include: see medication list. Patient is able to manage medications.     Activities of Daily Living (ADLs)/Instrumental Activities of Daily Living (IADLs):   Walk and transfer into and out of bed and chair?: Yes  Dress and groom yourself?: Yes    Bathe or shower yourself?: Yes    Feed yourself? Yes  Do your laundry/housekeeping?: Yes  Manage your money, pay your bills and track your expenses?: Yes  Make your own meals?: Yes    Do your own shopping?: Yes    ADL comments: Help with bill paying by daughter, due to eye sight    Previous Hospitalizations:   Any hospitalizations or ED visits within the last 12 months?: No      Advance Care Planning:   Living will: Yes    Durable POA for healthcare: Yes    Advanced directive: Yes    Advanced directive counseling given: No    Five wishes given: No    Patient declined ACP directive: No    End of Life Decisions reviewed with patient: Yes    Provider agrees with end of life decisions: Yes      Cognitive Screening:   Provider or family/friend/caregiver concerned regarding cognition?: No    PREVENTIVE SCREENINGS      Cardiovascular Screening:    General: Screening Not Indicated and History Lipid  Disorder      Diabetes Screening:     General: Screening Current      Colorectal Cancer Screening:     General: Screening Not Indicated      Prostate Cancer Screening:    General: Screening Not Indicated      Osteoporosis Screening:    General: Screening Not Indicated      Abdominal Aortic Aneurysm (AAA) Screening:    Risk factors include: tobacco use        Lung Cancer Screening:     General: Screening Not Indicated      Hepatitis C Screening:    General: Patient Declines    Screening, Brief Intervention, and Referral to Treatment (SBIRT)    Screening  Typical number of drinks in a day: 0  Typical number of drinks in a week: 0  Interpretation: Low risk drinking behavior.    Single Item Drug Screening:  How often have you used an illegal drug (including marijuana) or a prescription medication for non-medical reasons in the past year? never    Single Item Drug Screen Score: 0  Interpretation: Negative screen for possible drug use disorder    Social Determinants of Health     Financial Resource Strain: Low Risk  (10/5/2023)    Overall Financial Resource Strain (CARDIA)     Difficulty of Paying Living Expenses: Not hard at all   Food Insecurity: No Food Insecurity (4/13/2021)    Hunger Vital Sign     Worried About Running Out of Food in the Last Year: Never true     Ran Out of Food in the Last Year: Never true   Transportation Needs: No Transportation Needs (4/30/2024)    OASIS : Transportation     Lack of Transportation (Medical): No     Lack of Transportation (Non-Medical): No     Patient Unable or Declines to Respond: No     No results found.    Objective     /77 (BP Location: Left arm, Patient Position: Sitting, Cuff Size: Standard)   Pulse 58   Temp (!) 97.4 °F (36.3 °C) (Tympanic)   Ht 6' (1.829 m)   Wt 73.5 kg (162 lb)   SpO2 98%   BMI 21.97 kg/m²     Physical Exam  Constitutional:       Appearance: He is well-developed.   HENT:      Head: Normocephalic and atraumatic.      Right Ear: External  ear normal.      Left Ear: External ear normal.      Nose: Nose normal.   Eyes:      Conjunctiva/sclera: Conjunctivae normal.      Pupils: Pupils are equal, round, and reactive to light.   Cardiovascular:      Rate and Rhythm: Normal rate and regular rhythm.      Heart sounds: Normal heart sounds. No murmur heard.     No friction rub.   Pulmonary:      Effort: Pulmonary effort is normal. No respiratory distress.      Breath sounds: Normal breath sounds. No wheezing or rales.   Chest:      Chest wall: No tenderness.   Abdominal:      General: Bowel sounds are normal.      Palpations: Abdomen is soft.   Musculoskeletal:         General: Normal range of motion.      Cervical back: Normal range of motion and neck supple.   Skin:     General: Skin is warm and dry.      Capillary Refill: Capillary refill takes 2 to 3 seconds.   Neurological:      Mental Status: He is alert and oriented to person, place, and time.   Psychiatric:         Behavior: Behavior normal.         Thought Content: Thought content normal.         Judgment: Judgment normal.

## 2024-10-28 NOTE — PATIENT INSTRUCTIONS
Medicare Preventive Visit Patient Instructions  Thank you for completing your Welcome to Medicare Visit or Medicare Annual Wellness Visit today. Your next wellness visit will be due in one year (10/29/2025).  The screening/preventive services that you may require over the next 5-10 years are detailed below. Some tests may not apply to you based off risk factors and/or age. Screening tests ordered at today's visit but not completed yet may show as past due. Also, please note that scanned in results may not display below.  Preventive Screenings:  Service Recommendations Previous Testing/Comments   Colorectal Cancer Screening  Colonoscopy    Fecal Occult Blood Test (FOBT)/Fecal Immunochemical Test (FIT)  Fecal DNA/Cologuard Test  Flexible Sigmoidoscopy Age: 45-75 years old   Colonoscopy: every 10 years (May be performed more frequently if at higher risk)  OR  FOBT/FIT: every 1 year  OR  Cologuard: every 3 years  OR  Sigmoidoscopy: every 5 years  Screening may be recommended earlier than age 45 if at higher risk for colorectal cancer. Also, an individualized decision between you and your healthcare provider will decide whether screening between the ages of 76-85 would be appropriate. Colonoscopy: 08/23/2024  FOBT/FIT: Not on file  Cologuard: Not on file  Sigmoidoscopy: Not on file    Screening Not Indicated     Prostate Cancer Screening Individualized decision between patient and health care provider in men between ages of 55-69   Medicare will cover every 12 months beginning on the day after your 50th birthday PSA: No results in last 5 years     Screening Not Indicated     Hepatitis C Screening Once for adults born between 1945 and 1965  More frequently in patients at high risk for Hepatitis C Hep C Antibody: Not on file        Diabetes Screening 1-2 times per year if you're at risk for diabetes or have pre-diabetes Fasting glucose: 90 mg/dL (3/22/2024)  A1C: No results in last 5 years (No results in last 5  years)  Screening Current   Cholesterol Screening Once every 5 years if you don't have a lipid disorder. May order more often based on risk factors. Lipid panel: 04/16/2022  Screening Not Indicated  History Lipid Disorder      Other Preventive Screenings Covered by Medicare:  Abdominal Aortic Aneurysm (AAA) Screening: covered once if your at risk. You're considered to be at risk if you have a family history of AAA or a male between the age of 65-75 who smoking at least 100 cigarettes in your lifetime.  Lung Cancer Screening: covers low dose CT scan once per year if you meet all of the following conditions: (1) Age 55-77; (2) No signs or symptoms of lung cancer; (3) Current smoker or have quit smoking within the last 15 years; (4) You have a tobacco smoking history of at least 20 pack years (packs per day x number of years you smoked); (5) You get a written order from a healthcare provider.  Glaucoma Screening: covered annually if you're considered high risk: (1) You have diabetes OR (2) Family history of glaucoma OR (3)  aged 50 and older OR (4)  American aged 65 and older  Osteoporosis Screening: covered every 2 years if you meet one of the following conditions: (1) Have a vertebral abnormality; (2) On glucocorticoid therapy for more than 3 months; (3) Have primary hyperparathyroidism; (4) On osteoporosis medications and need to assess response to drug therapy.  HIV Screening: covered annually if you're between the age of 15-65. Also covered annually if you are younger than 15 and older than 65 with risk factors for HIV infection. For pregnant patients, it is covered up to 3 times per pregnancy.    Immunizations:  Immunization Recommendations   Influenza Vaccine Annual influenza vaccination during flu season is recommended for all persons aged >= 6 months who do not have contraindications   Pneumococcal Vaccine   * Pneumococcal conjugate vaccine = PCV13 (Prevnar 13), PCV15 (Vaxneuvance),  PCV20 (Prevnar 20)  * Pneumococcal polysaccharide vaccine = PPSV23 (Pneumovax) Adults 19-63 yo with certain risk factors or if 65+ yo  If never received any pneumonia vaccine: recommend Prevnar 20 (PCV20)  Give PCV20 if previously received 1 dose of PCV13 or PPSV23   Hepatitis B Vaccine 3 dose series if at intermediate or high risk (ex: diabetes, end stage renal disease, liver disease)   Respiratory syncytial virus (RSV) Vaccine - COVERED BY MEDICARE PART D  * RSVPreF3 (Arexvy) CDC recommends that adults 60 years of age and older may receive a single dose of RSV vaccine using shared clinical decision-making (SCDM)   Tetanus (Td) Vaccine - COST NOT COVERED BY MEDICARE PART B Following completion of primary series, a booster dose should be given every 10 years to maintain immunity against tetanus. Td may also be given as tetanus wound prophylaxis.   Tdap Vaccine - COST NOT COVERED BY MEDICARE PART B Recommended at least once for all adults. For pregnant patients, recommended with each pregnancy.   Shingles Vaccine (Shingrix) - COST NOT COVERED BY MEDICARE PART B  2 shot series recommended in those 19 years and older who have or will have weakened immune systems or those 50 years and older     Health Maintenance Due:      Topic Date Due   • Colorectal Cancer Screening  Discontinued     Immunizations Due:      Topic Date Due   • Hepatitis A Vaccine (1 of 2 - Risk 2-dose series) Never done   • Influenza Vaccine (1) 09/01/2024     Advance Directives   What are advance directives?  Advance directives are legal documents that state your wishes and plans for medical care. These plans are made ahead of time in case you lose your ability to make decisions for yourself. Advance directives can apply to any medical decision, such as the treatments you want, and if you want to donate organs.   What are the types of advance directives?  There are many types of advance directives, and each state has rules about how to use them. You  may choose a combination of any of the following:  Living will:  This is a written record of the treatment you want. You can also choose which treatments you do not want, which to limit, and which to stop at a certain time. This includes surgery, medicine, IV fluid, and tube feedings.   Durable power of  for healthcare (DPAHC):  This is a written record that states who you want to make healthcare choices for you when you are unable to make them for yourself. This person, called a proxy, is usually a family member or a friend. You may choose more than 1 proxy.  Do not resuscitate (DNR) order:  A DNR order is used in case your heart stops beating or you stop breathing. It is a request not to have certain forms of treatment, such as CPR. A DNR order may be included in other types of advance directives.  Medical directive:  This covers the care that you want if you are in a coma, near death, or unable to make decisions for yourself. You can list the treatments you want for each condition. Treatment may include pain medicine, surgery, blood transfusions, dialysis, IV or tube feedings, and a ventilator (breathing machine).  Values history:  This document has questions about your views, beliefs, and how you feel and think about life. This information can help others choose the care that you would choose.  Why are advance directives important?  An advance directive helps you control your care. Although spoken wishes may be used, it is better to have your wishes written down. Spoken wishes can be misunderstood, or not followed. Treatments may be given even if you do not want them. An advance directive may make it easier for your family to make difficult choices about your care.   Fall Prevention    Fall prevention  includes ways to make your home and other areas safer. It also includes ways you can move more carefully to prevent a fall. Health conditions that cause changes in your blood pressure, vision, or muscle  strength and coordination may increase your risk for falls. Medicines may also increase your risk for falls if they make you dizzy, weak, or sleepy.   Fall prevention tips:   Stand or sit up slowly.    Use assistive devices as directed.    Wear shoes that fit well and have soles that .    Wear a personal alarm.    Stay active.    Manage your medical conditions.    Home Safety Tips:  Add items to prevent falls in the bathroom.    Keep paths clear.    Install bright lights in your home.    Keep items you use often on shelves within reach.    Paint or place reflective tape on the edges of your stairs.    Cigarette Smoking and Your Health   Risks to your health if you smoke:  Nicotine and other chemicals found in tobacco damage every cell in your body. Even if you are a light smoker, you have an increased risk for cancer, heart disease, and lung disease. If you are pregnant or have diabetes, smoking increases your risk for complications.   Benefits to your health if you stop smoking:   You decrease respiratory symptoms such as coughing, wheezing, and shortness of breath.   You reduce your risk for cancers of the lung, mouth, throat, kidney, bladder, pancreas, stomach, and cervix. If you already have cancer, you increase the benefits of chemotherapy. You also reduce your risk for cancer returning or a second cancer from developing.   You reduce your risk for heart disease, blood clots, heart attack, and stroke.   You reduce your risk for lung infections, and diseases such as pneumonia, asthma, chronic bronchitis, and emphysema.  Your circulation improves. More oxygen can be delivered to your body. If you have diabetes, you lower your risk for complications, such as kidney, artery, and eye diseases. You also lower your risk for nerve damage. Nerve damage can lead to amputations, poor vision, and blindness.  You improve your body's ability to heal and to fight infections.  For more information and support to stop  smoking:   Smokefree.gov  Phone: 4- 973 - 097-1524  Web Address: www.smokefree.gov     © Copyright [employer] 2018 Information is for End User's use only and may not be sold, redistributed or otherwise used for commercial purposes. All illustrations and images included in CareNotes® are the copyrighted property of [employer] or [employer]

## 2024-12-05 DIAGNOSIS — J30.1 ALLERGIC RHINITIS DUE TO POLLEN, UNSPECIFIED SEASONALITY: ICD-10-CM

## 2024-12-06 RX ORDER — CETIRIZINE HYDROCHLORIDE 10 MG/1
10 TABLET ORAL DAILY
Qty: 90 TABLET | Refills: 1 | Status: SHIPPED | OUTPATIENT
Start: 2024-12-06

## 2024-12-09 ENCOUNTER — OFFICE VISIT (OUTPATIENT)
Dept: FAMILY MEDICINE CLINIC | Facility: CLINIC | Age: 89
End: 2024-12-09
Payer: MEDICARE

## 2024-12-09 VITALS
WEIGHT: 167 LBS | HEART RATE: 56 BPM | DIASTOLIC BLOOD PRESSURE: 82 MMHG | BODY MASS INDEX: 22.62 KG/M2 | SYSTOLIC BLOOD PRESSURE: 128 MMHG | HEIGHT: 72 IN | TEMPERATURE: 96.2 F | OXYGEN SATURATION: 92 %

## 2024-12-09 DIAGNOSIS — H61.23 IMPACTED CERUMEN OF BOTH EARS: Primary | ICD-10-CM

## 2024-12-09 PROCEDURE — 99213 OFFICE O/P EST LOW 20 MIN: CPT | Performed by: FAMILY MEDICINE

## 2024-12-09 PROCEDURE — G2211 COMPLEX E/M VISIT ADD ON: HCPCS | Performed by: FAMILY MEDICINE

## 2024-12-23 ENCOUNTER — OFFICE VISIT (OUTPATIENT)
Dept: PODIATRY | Facility: CLINIC | Age: 89
End: 2024-12-23
Payer: MEDICARE

## 2024-12-23 VITALS
TEMPERATURE: 98 F | DIASTOLIC BLOOD PRESSURE: 77 MMHG | HEIGHT: 72 IN | RESPIRATION RATE: 16 BRPM | OXYGEN SATURATION: 98 % | WEIGHT: 167 LBS | SYSTOLIC BLOOD PRESSURE: 127 MMHG | HEART RATE: 76 BPM | BODY MASS INDEX: 22.62 KG/M2

## 2024-12-23 DIAGNOSIS — M79.674 PAIN IN TOES OF BOTH FEET: ICD-10-CM

## 2024-12-23 DIAGNOSIS — I73.9 PVD (PERIPHERAL VASCULAR DISEASE) (HCC): ICD-10-CM

## 2024-12-23 DIAGNOSIS — R52 NOCTURNAL PAIN: ICD-10-CM

## 2024-12-23 DIAGNOSIS — B35.1 ONYCHOMYCOSIS: Primary | ICD-10-CM

## 2024-12-23 DIAGNOSIS — M79.675 PAIN IN TOES OF BOTH FEET: ICD-10-CM

## 2024-12-23 DIAGNOSIS — Z79.01 LONG TERM CURRENT USE OF ANTICOAGULANT: ICD-10-CM

## 2024-12-23 DIAGNOSIS — R60.1 GENERALIZED EDEMA: ICD-10-CM

## 2024-12-23 PROCEDURE — 11721 DEBRIDE NAIL 6 OR MORE: CPT | Performed by: PODIATRIST

## 2024-12-23 PROCEDURE — RECHECK: Performed by: PODIATRIST

## 2024-12-23 NOTE — PROGRESS NOTES
Name: Abdirahman Hope      : 1935      MRN: [id number]  Encounter Provider: Darnell Barrios DPM  Encounter Date: 2024   Encounter department: [facility] [place]  :  Assessment & Plan  Onychomycosis       Debride mycotic nails and thin the nail plates x 10 with the use of a nail nipper manually and an electric Dremel bur was used to reduce the thickness of the nail beds and smoothed the distal aspect of the nails.   PVD (peripheral vascular disease) (HCC)         Nocturnal pain       There was suggested to the patient that when he gets the pain in his heel to elevate the heel off the bed with the use of a pillow underneath the Achilles.  Generalized edema         Pain in toes of both feet         Long term current use of anticoagulant         Pt was instructed to use lotion once a day on both feet such as cerave, Cetaphil or similar thick style of lotion.     Discussed proper shoe gear, daily inspections of feet, and general foot health with patient. Patient has Q8  findings and is recommended for at risk foot care every 9-10 weeks.    Patients most recent complete clinical foot exam was on:       Return in about 10 weeks (around 3/3/2025).     History of Present Illness   HPI  Abdirahman Hope is a 89 y.o. male who presents with complaint of painful thick nails on both feet and also a painful left heel mostly at night and it comes and goes.Patient presents for at-risk foot care.  Patient has no acute concerns today.  Patient has significant lower extremity risk due to neuropathy, parasthesia, edema, and trophic skin changes to the lower extremity.   History obtained from: patient    Review of Systems  Medical History Reviewed by provider this encounter:     .  Current Outpatient Medications on File Prior to Visit   Medication Sig Dispense Refill    acetaminophen (TYLENOL) 325 mg tablet Take 2 tablets (650 mg total) by mouth every 6 (six) hours as needed for mild pain,  headaches or fever  0    ALPHA LIPOIC ACID-BIOTIN PO Take 1 tablet by mouth in the morning      atorvastatin (LIPITOR) 10 mg tablet Take 1 tablet (10 mg total) by mouth every other day 45 tablet 3    B COMPLEX VITAMINS PO Take 1 tablet by mouth daily      cetirizine (ZyrTEC) 10 mg tablet TAKE ONE (1) TABLET (10 MG TOTAL) BY MOUTH DAILY 90 tablet 1    Cholecalciferol (VITAMIN D3) 2000 UNITS capsule Take 1 capsule by mouth 2 (two) times a day 5000 units       Coenzyme Q10 (CO Q10) 60 MG CAPS Take 1 capsule by mouth daily      fluocinolone (SYNALAR) 0.01 % external solution Apply 1 Application topically 2 (two) times a day      gabapentin (NEURONTIN) 600 MG tablet TAKE 1 TABLET BY MOUTH TWICE  DAILY 180 tablet 1    Ginkgo Biloba 120 MG TABS Take 1 tablet by mouth daily      hydrocortisone-pramoxine (PRAMOSONE) 1-2.5 % LOTN Apply topically 3 (three) times a day 118 mL 2    Inositol 500 MG TABS Take 1 tablet by mouth 2 (two) times a day      ipratropium (ATROVENT) 0.03 % nasal spray USE 2 SPRAYS IN BOTH NOSTRILS 3  TIMES DAILY 120 mL 3    ketoconazole (NIZORAL) 2 % shampoo       lutein 6 mg Take 1 capsule by mouth daily      metoprolol tartrate (LOPRESSOR) 25 mg tablet TAKE 1 TABLET BY MOUTH EVERY 12  HOURS 180 tablet 3    MILK THISTLE PO Take 1 tablet by mouth daily      Misc Natural Products (SUPER SNOOZE) CAPS Take 1 capsule by mouth daily Bed time       Multiple Vitamins-Minerals (OCUVITE-LUTEIN) CAPS Take by mouth daily       mupirocin (BACTROBAN) 2 % ointment Apply topically 3 (three) times a day 22 g 1    omeprazole (PriLOSEC) 40 MG capsule Take 1 capsule (40 mg total) by mouth daily 180 capsule 1    psyllium (METAMUCIL) 58.6 % packet Take 1 packet by mouth daily      sildenafil (VIAGRA) 50 MG tablet       silver sulfadiazine (SILVADENE,SSD) 1 % cream Apply topically daily 50 g 1    trospium (SANCTURA XR) 60 mg 24 hr capsule Take 1 capsule (60 mg total) by mouth daily before breakfast 90 capsule 3    Zinc 50 MG  TABS Take 1 tablet by mouth daily      pentoxifylline (TRENtal) 400 mg ER tablet Take 1 tablet (400 mg total) by mouth 3 (three) times a day with meals (Patient not taking: Reported on 12/9/2024) 270 tablet 3    tadalafil (CIALIS) 20 MG tablet Take 1 tablet (20 mg total) by mouth every other day as needed for erectile dysfunction (Patient not taking: Reported on 12/9/2024) 18 tablet 3    trospium chloride (SANCTURA) 20 mg tablet Take 1 tablet (20 mg total) by mouth 2 (two) times a day (Patient not taking: Reported on 12/9/2024) 180 tablet 3     No current facility-administered medications on file prior to visit.      Social History     Tobacco Use    Smoking status: Every Day     Current packs/day: 0.25     Average packs/day: 0.3 packs/day for 60.0 years (15.0 ttl pk-yrs)     Types: Cigarettes    Smokeless tobacco: Never    Tobacco comments:     smokes a pipe   Vaping Use    Vaping status: Never Used   Substance and Sexual Activity    Alcohol use: Yes     Alcohol/week: 2.0 standard drinks of alcohol     Types: 1 Glasses of wine, 1 Shots of liquor per week     Comment: Occ    Drug use: Never    Sexual activity: Not Currently        Objective   /77   Pulse 76   Temp 98 °F (36.7 °C)   Resp 16   Ht 6' (1.829 m)   Wt 75.8 kg (167 lb)   SpO2 98%   BMI 22.65 kg/m²      Physical Exam  Vascular status is 0/4 DP PT negative digital hair normal distal cooling slightly delayed capillary refill bilaterally.  Capillary refill is approximately 3 to 4 seconds and the pitting edema is +3.    Derm nails are brittle elongated hypertrophic white-yellow discoloration with subungual debris x 10.  There is increased thickness and the nails are approximately 2 mm.  There is white flaky tissue present on the dorsal and plantar with the negative signs of any dried blood or fissures present.    Ortho no pain with palpation of either heel no pain with palpation of the Achilles or plantar fasciitis.    Administrative Statements   I  have spent a total time of 15 minutes in caring for this patient on the day of the visit/encounter including Risks and benefits of tx options, Instructions for management, Patient and family education, Importance of tx compliance, Risk factor reductions, Counseling / Coordination of care, and Documenting in the medical record.

## 2024-12-26 NOTE — PROGRESS NOTES
Name: Abdirahman Hope      : 1935      MRN: [id number]  Encounter Provider: Varun Hoyt DO  Encounter Date: 2024   Encounter department: [place] [facility]  :  Assessment & Plan  Impacted cerumen of both ears  Water irrigation used after patient prepped ears with Debrox tympanic membrane intact after completion of procedure              History of Present Illness     Here for debridement of impacted cerumen from ears      Review of Systems   Constitutional:  Negative for activity change, appetite change, diaphoresis, fatigue and fever.   HENT:  Positive for dental problem, hearing loss, sinus pressure and sneezing.    Eyes:  Positive for visual disturbance.   Respiratory:  Positive for cough and shortness of breath. Negative for apnea, chest tightness and wheezing.         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   Gastrointestinal:  Negative for abdominal distention, abdominal pain, anal bleeding, constipation, diarrhea, nausea and vomiting.   Endocrine: Negative for cold intolerance, heat intolerance, polydipsia, polyphagia and polyuria.   Genitourinary:  Positive for difficulty urinating. Negative for dysuria, flank pain, hematuria and urgency.   Musculoskeletal:  Negative for arthralgias, back pain, gait problem, joint swelling and myalgias.   Skin:  Negative for color change, rash and wound.   Allergic/Immunologic: Negative for environmental allergies, food allergies and immunocompromised state.   Neurological:  Negative for dizziness, seizures, syncope, speech difficulty, numbness and headaches.        Patient has ambulatory dysfunction secondary to spinal cord stenosis   Hematological:  Negative for adenopathy. Does not bruise/bleed easily.   Psychiatric/Behavioral:  Negative for agitation, behavioral problems, hallucinations, sleep disturbance and suicidal ideas.        Objective   /82 (BP Location: Left arm, Patient  Position: Sitting, Cuff Size: Large)   Pulse 56   Temp (!) 96.2 °F (35.7 °C) (Temporal)   Ht 6' (1.829 m)   Wt 75.8 kg (167 lb)   SpO2 92%   BMI 22.65 kg/m²      Physical Exam  Constitutional:       Appearance: Normal appearance. He is well-developed.   HENT:      Head: Normocephalic and atraumatic.      Right Ear: External ear normal. There is impacted cerumen.      Left Ear: External ear normal. There is impacted cerumen.      Nose: Nose normal.   Eyes:      Conjunctiva/sclera: Conjunctivae normal.      Pupils: Pupils are equal, round, and reactive to light.   Cardiovascular:      Rate and Rhythm: Normal rate and regular rhythm.      Heart sounds: Normal heart sounds. No murmur heard.     No friction rub.   Pulmonary:      Effort: Pulmonary effort is normal. No respiratory distress.      Breath sounds: Normal breath sounds. No wheezing or rales.   Chest:      Chest wall: No tenderness.   Abdominal:      General: Bowel sounds are normal.      Palpations: Abdomen is soft.   Musculoskeletal:         General: Normal range of motion.      Cervical back: Normal range of motion and neck supple.   Skin:     General: Skin is warm and dry.      Capillary Refill: Capillary refill takes 2 to 3 seconds.   Neurological:      Mental Status: He is alert and oriented to person, place, and time.      Motor: Weakness present.      Coordination: Coordination abnormal.      Gait: Gait abnormal.      Deep Tendon Reflexes: Reflexes abnormal.   Psychiatric:         Behavior: Behavior normal.         Thought Content: Thought content normal.         Judgment: Judgment normal.

## 2024-12-27 DIAGNOSIS — R35.0 FREQUENT URINATION: Primary | ICD-10-CM

## 2024-12-28 ENCOUNTER — APPOINTMENT (OUTPATIENT)
Dept: LAB | Facility: MEDICAL CENTER | Age: 89
End: 2024-12-28
Payer: MEDICARE

## 2024-12-28 DIAGNOSIS — R35.0 FREQUENT URINATION: ICD-10-CM

## 2024-12-28 LAB
BACTERIA UR QL AUTO: ABNORMAL /HPF
BILIRUB UR QL STRIP: NEGATIVE
CLARITY UR: CLEAR
COLOR UR: YELLOW
GLUCOSE UR STRIP-MCNC: NEGATIVE MG/DL
HGB UR QL STRIP.AUTO: ABNORMAL
HYALINE CASTS #/AREA URNS LPF: ABNORMAL /LPF
KETONES UR STRIP-MCNC: NEGATIVE MG/DL
LEUKOCYTE ESTERASE UR QL STRIP: ABNORMAL
MUCOUS THREADS UR QL AUTO: ABNORMAL
NITRITE UR QL STRIP: NEGATIVE
NON-SQ EPI CELLS URNS QL MICRO: ABNORMAL /HPF
PH UR STRIP.AUTO: 6 [PH]
PROT UR STRIP-MCNC: ABNORMAL MG/DL
RBC #/AREA URNS AUTO: ABNORMAL /HPF
SP GR UR STRIP.AUTO: 1.02 (ref 1–1.03)
UROBILINOGEN UR STRIP-ACNC: <2 MG/DL
WBC #/AREA URNS AUTO: ABNORMAL /HPF

## 2024-12-28 PROCEDURE — 81001 URINALYSIS AUTO W/SCOPE: CPT

## 2024-12-28 PROCEDURE — 87086 URINE CULTURE/COLONY COUNT: CPT

## 2024-12-30 ENCOUNTER — RESULTS FOLLOW-UP (OUTPATIENT)
Dept: FAMILY MEDICINE CLINIC | Facility: CLINIC | Age: 89
End: 2024-12-30

## 2024-12-30 DIAGNOSIS — R31.21 ASYMPTOMATIC MICROSCOPIC HEMATURIA: Primary | ICD-10-CM

## 2024-12-30 LAB — BACTERIA UR CULT: NORMAL

## 2024-12-30 NOTE — RESULT ENCOUNTER NOTE
Please call the patient regarding his abnormal result.  The patient's urinalysis shows blood white blood cells but no bacteria and the culture was negative so a couple of possibilities would be a problem with the urinary bladder and being that he has been a smoker for so many years of his life the possibility of a bladder tumor has to be entertained secondly the possibility of a prostate tumor or infection should be entertained.  Patient should be evaluated by urology services and may possibly require scoping of his bladder I will put a consult on for the urologists that are in our building that they can address this issue.

## 2025-01-10 DIAGNOSIS — E78.2 MIXED HYPERLIPIDEMIA: ICD-10-CM

## 2025-01-10 RX ORDER — ATORVASTATIN CALCIUM 10 MG/1
10 TABLET, FILM COATED ORAL EVERY OTHER DAY
Qty: 45 TABLET | Refills: 3 | Status: SHIPPED | OUTPATIENT
Start: 2025-01-10

## 2025-01-10 NOTE — TELEPHONE ENCOUNTER
Addended by: JAYLENE POLANCO on: 1/10/2025 01:05 PM     Modules accepted: Orders     Called Pt with message

## 2025-02-03 ENCOUNTER — OFFICE VISIT (OUTPATIENT)
Dept: FAMILY MEDICINE CLINIC | Facility: CLINIC | Age: 89
End: 2025-02-03
Payer: MEDICARE

## 2025-02-03 VITALS
SYSTOLIC BLOOD PRESSURE: 144 MMHG | OXYGEN SATURATION: 98 % | HEART RATE: 58 BPM | HEIGHT: 72 IN | WEIGHT: 171 LBS | TEMPERATURE: 96.7 F | BODY MASS INDEX: 23.16 KG/M2 | DIASTOLIC BLOOD PRESSURE: 72 MMHG

## 2025-02-03 DIAGNOSIS — E08.59 DIABETES DUE TO UNDERLYING CONDITION W OTH CIRCULATORY COMP (HCC): ICD-10-CM

## 2025-02-03 DIAGNOSIS — K21.9 GASTROESOPHAGEAL REFLUX DISEASE WITHOUT ESOPHAGITIS: ICD-10-CM

## 2025-02-03 DIAGNOSIS — I80.02 SUPERFICIAL PHLEBITIS OF LEFT LEG: Primary | ICD-10-CM

## 2025-02-03 PROCEDURE — G2211 COMPLEX E/M VISIT ADD ON: HCPCS | Performed by: FAMILY MEDICINE

## 2025-02-03 PROCEDURE — 99214 OFFICE O/P EST MOD 30 MIN: CPT | Performed by: FAMILY MEDICINE

## 2025-02-03 RX ORDER — GABAPENTIN 600 MG/1
600 TABLET ORAL 2 TIMES DAILY
Qty: 180 TABLET | Refills: 1 | Status: SHIPPED | OUTPATIENT
Start: 2025-02-03

## 2025-02-03 RX ORDER — OMEPRAZOLE 40 MG/1
40 CAPSULE, DELAYED RELEASE ORAL 2 TIMES DAILY
Qty: 180 CAPSULE | Refills: 3 | OUTPATIENT
Start: 2025-02-03

## 2025-02-03 RX ORDER — CEPHALEXIN 500 MG/1
500 CAPSULE ORAL EVERY 6 HOURS SCHEDULED
Qty: 40 CAPSULE | Refills: 0 | Status: SHIPPED | OUTPATIENT
Start: 2025-02-03 | End: 2025-02-13

## 2025-02-03 NOTE — PROGRESS NOTES
Name: Abdirahman Hope      : 1935      MRN: [id number]  Encounter Provider: Varun Hoyt DO  Encounter Date: 2/3/2025   Encounter department: [place] [facility]  :  Assessment & Plan  Superficial phlebitis of left leg  Small area of redness and induration in the medial aspect left lower leg              History of Present Illness   Patient has developed a small area of redness and a punctate area of infection in the left lower leg the patient will apply warm compresses he will continue taking a baby aspirin and he will need a venous Doppler of the lower leg    Leg Pain   Pertinent negatives include no numbness.     Review of Systems   Constitutional:  Negative for activity change, appetite change, diaphoresis, fatigue and fever.   HENT: Negative.     Eyes: Negative.    Respiratory:  Negative for apnea, cough, chest tightness, shortness of breath and wheezing.    Cardiovascular:  Negative for chest pain, palpitations and leg swelling.   Gastrointestinal:  Negative for abdominal distention, abdominal pain, anal bleeding, constipation, diarrhea, nausea and vomiting.   Endocrine: Negative for cold intolerance, heat intolerance, polydipsia, polyphagia and polyuria.   Genitourinary:  Negative for difficulty urinating, dysuria, flank pain, hematuria and urgency.   Musculoskeletal:  Negative for arthralgias, back pain, gait problem, joint swelling and myalgias.   Skin:  Negative for color change, rash and wound.   Allergic/Immunologic: Negative for environmental allergies, food allergies and immunocompromised state.   Neurological:  Negative for dizziness, seizures, syncope, speech difficulty, numbness and headaches.   Hematological:  Negative for adenopathy. Does not bruise/bleed easily.   Psychiatric/Behavioral:  Negative for agitation, behavioral problems, hallucinations, sleep disturbance and suicidal ideas.        Objective   /72 (BP Location: Left arm, Patient Position: Sitting, Cuff Size:  Large)   Pulse 58   Temp (!) 96.7 °F (35.9 °C) (Temporal)   Ht 6' (1.829 m)   Wt 77.6 kg (171 lb)   SpO2 98%   BMI 23.19 kg/m²      Physical Exam  Constitutional:       Appearance: He is well-developed.   HENT:      Head: Normocephalic and atraumatic.      Right Ear: External ear normal.      Left Ear: External ear normal.      Nose: Nose normal.   Eyes:      Conjunctiva/sclera: Conjunctivae normal.      Pupils: Pupils are equal, round, and reactive to light.   Cardiovascular:      Rate and Rhythm: Normal rate and regular rhythm.      Heart sounds: Normal heart sounds. No murmur heard.     No friction rub.   Pulmonary:      Effort: Pulmonary effort is normal. No respiratory distress.      Breath sounds: Normal breath sounds. No wheezing or rales.   Chest:      Chest wall: No tenderness.   Abdominal:      General: Bowel sounds are normal.      Palpations: Abdomen is soft.   Musculoskeletal:         General: Normal range of motion.      Cervical back: Normal range of motion and neck supple.   Skin:     General: Skin is warm and dry.      Capillary Refill: Capillary refill takes 2 to 3 seconds.   Neurological:      Mental Status: He is alert and oriented to person, place, and time.   Psychiatric:         Behavior: Behavior normal.         Thought Content: Thought content normal.         Judgment: Judgment normal.

## 2025-02-04 RX ORDER — OMEPRAZOLE 40 MG/1
40 CAPSULE, DELAYED RELEASE ORAL DAILY
Qty: 180 CAPSULE | Refills: 0 | Status: SHIPPED | OUTPATIENT
Start: 2025-02-04

## 2025-02-06 ENCOUNTER — HOSPITAL ENCOUNTER (OUTPATIENT)
Dept: NON INVASIVE DIAGNOSTICS | Facility: HOSPITAL | Age: 89
Discharge: HOME | End: 2025-02-06
Payer: MEDICARE

## 2025-02-06 DIAGNOSIS — I80.02 SUPERFICIAL PHLEBITIS OF LEFT LEG: ICD-10-CM

## 2025-02-06 PROCEDURE — 93971 EXTREMITY STUDY: CPT | Performed by: SURGERY

## 2025-02-06 PROCEDURE — 93971 EXTREMITY STUDY: CPT

## 2025-02-07 ENCOUNTER — RESULTS FOLLOW-UP (OUTPATIENT)
Dept: FAMILY MEDICINE CLINIC | Facility: CLINIC | Age: 89
End: 2025-02-07

## 2025-02-07 NOTE — RESULT ENCOUNTER NOTE
Call patient to notify normal results no evidence of blood clot in leg does have what looks like a hematoma in the distal calf region which over time will most likely reabsorb

## 2025-02-21 ENCOUNTER — HOSPITAL ENCOUNTER (EMERGENCY)
Facility: HOSPITAL | Age: 89
Discharge: HOME | End: 2025-02-21
Payer: MEDICARE

## 2025-02-21 ENCOUNTER — APPOINTMENT (EMERGENCY)
Dept: RADIOLOGY | Facility: HOSPITAL | Age: 89
End: 2025-02-21
Payer: MEDICARE

## 2025-02-21 VITALS
OXYGEN SATURATION: 99 % | HEIGHT: 72 IN | SYSTOLIC BLOOD PRESSURE: 142 MMHG | TEMPERATURE: 96.9 F | DIASTOLIC BLOOD PRESSURE: 79 MMHG | BODY MASS INDEX: 22.04 KG/M2 | HEART RATE: 58 BPM | WEIGHT: 162.7 LBS | RESPIRATION RATE: 18 BRPM

## 2025-02-21 DIAGNOSIS — S42.209A PROXIMAL HUMERUS FRACTURE: Primary | ICD-10-CM

## 2025-02-21 PROCEDURE — 99284 EMERGENCY DEPT VISIT MOD MDM: CPT

## 2025-02-21 PROCEDURE — 73060 X-RAY EXAM OF HUMERUS: CPT

## 2025-02-21 PROCEDURE — 99285 EMERGENCY DEPT VISIT HI MDM: CPT

## 2025-02-21 PROCEDURE — 73070 X-RAY EXAM OF ELBOW: CPT

## 2025-02-21 PROCEDURE — 73030 X-RAY EXAM OF SHOULDER: CPT

## 2025-02-21 PROCEDURE — 71045 X-RAY EXAM CHEST 1 VIEW: CPT

## 2025-02-21 RX ORDER — IBUPROFEN 600 MG/1
600 TABLET, FILM COATED ORAL ONCE
Status: DISCONTINUED | OUTPATIENT
Start: 2025-02-21 | End: 2025-02-21 | Stop reason: HOSPADM

## 2025-02-21 RX ORDER — ACETAMINOPHEN 325 MG/1
650 TABLET ORAL ONCE
Status: COMPLETED | OUTPATIENT
Start: 2025-02-21 | End: 2025-02-21

## 2025-02-21 RX ORDER — IBUPROFEN 600 MG/1
600 TABLET, FILM COATED ORAL EVERY 6 HOURS PRN
Qty: 30 TABLET | Refills: 0 | Status: SHIPPED | OUTPATIENT
Start: 2025-02-21

## 2025-02-21 RX ADMIN — ACETAMINOPHEN 650 MG: 325 TABLET ORAL at 06:46

## 2025-02-21 NOTE — DISCHARGE INSTRUCTIONS
Wear your sling at all times when awake and standing or sitting.  Use ice for the next 24 hours.     Use ibuprofen/motrin 600mg every 6 hours as needed for pain  Use tylenol 1000mg or less every 6 hours as needed for pain    If you are unsafe at home trying to walk, if you fall again, then you need to come back to the ER.

## 2025-02-21 NOTE — ED PROVIDER NOTES
Time reflects when diagnosis was documented in both MDM as applicable and the Disposition within this note       Time User Action Codes Description Comment    2/21/2025  7:13 AM Ricardo Mcghee Add [S42.209A] Proximal humerus fracture           ED Disposition       ED Disposition   Discharge    Condition   Stable    Date/Time   Fri Feb 21, 2025  7:12 AM    Comment   Abdirahman Hope discharge to home/self care.                   Assessment & Plan       Medical Decision Making  Medical complexity: 89-year-old male not on any anticoagulation or antiplatelet medications now presenting with significant pain, tenderness, and deformity of the left upper extremity.  This was after a mechanical fall at home.  Patient normally ambulates with a walker as he was doing this morning, however tripped over a transition between hard surface and thick carpet.  He fell onto the carpeted surface however felt a snap in his left forearm which now has visible deformity and muscle spasm of the deltoid.  Suspect proximal humerus fracture.  Will obtain chest x-ray to rule out displaced rib fractures, pneumothorax, or pleural effusion.  Will obtain plain film x-rays of the shoulder, humerus, and elbow.  Will splint as indicated.    Reassessment/disposition: Patient was noted to have a proximal humerus fracture.  He was placed in dynamic sling by nursing attack and was reevaluated by myself afterwards.  He maintained neurovascularly intact status.  Patient expressed understanding of his diagnosis, his daughter who is also his main caretaker, also expressed understanding of the diagnosis as well as the treatment plan to remain nonweightbearing of the left upper extremity, to maintain sling, and to follow-up with orthopedic surgery.  I did prescribe him ibuprofen/Motrin as needed for pain after reviewing recent lab work and kidney function tests.  I then advised the patient to use Tylenol first-line as well as ice, rest, and to monitor  closely for signs and symptoms of compartment syndrome which would include pale, pulseless, decreased sensation, or severe unrelenting pain.  Additionally I reviewed with the patient signs of nerve damage including numbness, tingling, or motor function deficits.  Patient and daughter both expressed understanding of these symptoms and agreed to come back to the emergency department if the patient were to develop these.  Otherwise the patient will follow-up with orthopedic surgery and his primary doctor.  Daughter assured me that the patient would be able to safely accomplish his ADLs with assistance at home, and both daughter and the patient states that they feel safe with a plan for discharge and spite of the patient's humeral fracture.  The patient does have home assistance as well as a daughter who lives in very close proximity to be able to assist with his ADLs.  Daughter states that she will be with the patient all day today, and likely through the weekend as well to help him during his recovery with trips to the bathroom and accomplishing the rest of his ADLs.    Amount and/or Complexity of Data Reviewed  Radiology: ordered and independent interpretation performed.    Risk  OTC drugs.  Prescription drug management.             Medications   ibuprofen (MOTRIN) tablet 600 mg (has no administration in time range)   acetaminophen (TYLENOL) tablet 650 mg (650 mg Oral Given 2/21/25 0646)       ED Risk Strat Scores                            SBIRT 22yo+      Flowsheet Row Most Recent Value   Initial Alcohol Screen: US AUDIT-C     1. How often do you have a drink containing alcohol? 0 Filed at: 02/21/2025 0611   2. How many drinks containing alcohol do you have on a typical day you are drinking?  0 Filed at: 02/21/2025 0611   3a. Male UNDER 65: How often do you have five or more drinks on one occasion? 0 Filed at: 02/21/2025 0611   3b. FEMALE Any Age, or MALE 65+: How often do you have 4 or more drinks on one  occassion? 0 Filed at: 02/21/2025 0611   Audit-C Score 0 Filed at: 02/21/2025 0611   DION: How many times in the past year have you...    Used an illegal drug or used a prescription medication for non-medical reasons? Never Filed at: 02/21/2025 0611                            History of Present Illness       Chief Complaint   Patient presents with    Fall     Pt coming in for left arm and elbow pain after walking to the bathroom with his walker and falling over to his left side. Pt states he did not hit his head and is not on any blood thinners.        Past Medical History:   Diagnosis Date    Anemia     Appendicitis     Coronary artery disease     Detached retina     GERD (gastroesophageal reflux disease)     Hyperlipidemia     Hypertension     Macular degeneration     Neuropathy     Pelvis fracture (HCC) 01/2021    Von Willebrand disease (HCC)       Past Surgical History:   Procedure Laterality Date    ADENOIDECTOMY      APPENDECTOMY      ARTHRODESIS  01/15/2014    Lumbar     BACK SURGERY      CATARACT EXTRACTION      COLONOSCOPY  12/09/2016    fiberoptic     CYSTOSCOPY      with resection of tumor / 1/12/17, 10/2/17 / diagnostic 12/8/16, 5/30/17     CYSTOSCOPY  09/20/2018    CYSTOSCOPY  06/22/2020    EGD AND COLONOSCOPY N/A 12/9/2016    Procedure: EGD AND COLONOSCOPY;  Surgeon: Kahlil Alfonso MD;  Location: [facility];  Service:     EYE SURGERY      FRACTURE SURGERY Bilateral     ARM    NECK SURGERY      NEUROPLASTY / TRANSPOSITION MEDIAN NERVE AT CARPAL TUNNEL Right 04/2015    NC AMPUTATION METATARSAL W/TOE SINGLE Left 4/11/2024    Procedure: LEFT 2ND TOE AMPUTATION;  Surgeon: Vicente Barrientos DPM;  Location: [facility];  Service: Podiatry    NC BLADDER INSTILLATION ANTICARCINOGENIC AGENT N/A 1/12/2017    Procedure: INSTILLATION MYTOMYCIN;  Surgeon: Jhon Zuniga MD;  Location: [facility];  Service: Urology    NC BLADDER INSTILLATION ANTICARCINOGENIC AGENT N/A 10/2/2017    Procedure:  INSTILLATION MYTOMYCIN;  Surgeon: Jhon Zuniga MD;  Location: [facility];  Service: Urology    ID COLONOSCOPY FLX DX W/COLLJ SPEC WHEN PFRMD N/A 2/5/2019    Procedure: COLONOSCOPY;  Surgeon: Abad Valentino MD;  Location: [facility];  Service: Gastroenterology    ID CYSTO W/REMOVAL OF LESIONS SMALL N/A 1/12/2017    Procedure: CYSTOSCOPY, BLADDER BIOPSY WITH FULGRATION, 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;  Surgeon: Jhon Zuniga MD;  Location: [facility];  Service: Urology    ID CYSTO W/REMOVAL OF LESIONS SMALL N/A 10/2/2017    Procedure: CYSTOSCOPY WITH  BLADDER BIOPSIES WITH FULGURATION;  Surgeon: Jhon Zuniga MD;  Location: [facility];  Service: Urology    ID CYSTO W/REMOVAL OF LESIONS SMALL N/A 11/30/2023    Procedure: CYSTOSCOPY WITH BIOPSIES and fulguration;  Surgeon: Adalberto Rasmussen MD;  Location: [facility];  Service: Urology    ID ESOPHAGOGASTRODUODENOSCOPY TRANSORAL DIAGNOSTIC N/A 8/3/2018    Procedure: ESOPHAGOGASTRODUODENOSCOPY (EGD);  Surgeon: Abad Valentino MD;  Location: [facility];  Service: Gastroenterology    RETINAL DETACHMENT SURGERY Right 03/2016    complete air fill confirmed     ROTATOR CUFF REPAIR      SHOULDER SURGERY  03/03/2015    proximal humerus fracture / LA...3/6/15   R...1/3/18     TONSILECTOMY AND ADNOIDECTOMY      TONSILLECTOMY      TRANSURETHRAL RESECTION OF BLADDER TUMOR N/A 10/2/2017    Procedure: TRANSURETHRAL RESECTION OF BLADDER TUMOR (TURBT);  Surgeon: Jhon Zuniga MD;  Location: [facility];  Service: Urology    VON WILLEBRAND ANTIGEN (HISTORICAL)        Family History   Problem Relation Age of Onset    Ulcers Father         acute gastric    Heart disease Mother     Anuerysm Sister       Social History     Tobacco Use    Smoking status: Every Day     Current packs/day: 0.25     Average packs/day: 0.3 packs/day for 60.0 years (15.0 ttl pk-yrs)     Types: Cigarettes    Smokeless tobacco: Never    Tobacco comments:     smokes a pipe    Vaping Use    Vaping status: Never Used   Substance Use Topics    Alcohol use: Yes     Alcohol/week: 2.0 standard drinks of alcohol     Types: 1 Glasses of wine, 1 Shots of liquor per week     Comment: Occ    Drug use: Never      E-Cigarette/Vaping    E-Cigarette Use Never User       E-Cigarette/Vaping Substances    Nicotine No     THC No     CBD No     Flavoring No     Other No     Unknown No       I have reviewed and agree with the history as documented.     This is an 89-year-old male who has known history of gait instability, and bladder cancer who presents this morning with left arm pain and deformity after fall at home.  The patient reports that he was using his walker to get to the bathroom this morning whenever he stumbled over the transition from his hard floor to a carpeted surface.  This caused him to fall slowly sideways.  He tried to brace himself with his left arm which struck the ground awkwardly next to him.  He had immediate pain in the left upper arm.  He states that he did not strike his head.  He denies striking his chest or flank.  He states that his only injury concern is of the left upper arm.  He denies any sensory deficit of the left hand or wrist, and states he has normal range of motion in both the hand and the wrist as well.  Patient is accompanied by his daughter who affirms that he is currently at his baseline, and states that he is a good historian and it seems as though his story about the fall corroborates with what she witnessed as well.  The patient is not on any anticoagulant or antiplatelet agents.        Review of Systems   Constitutional:  Negative for chills, fatigue and fever.   HENT:  Negative for congestion and sore throat.    Eyes:  Negative for pain and visual disturbance.   Respiratory:  Negative for cough, chest tightness and shortness of breath.    Cardiovascular:  Negative for chest pain and palpitations.   Gastrointestinal:  Negative for abdominal pain, blood in  stool, constipation, diarrhea, nausea and vomiting.   Genitourinary:  Negative for dysuria, flank pain and hematuria.   Musculoskeletal:  Positive for arthralgias. Negative for back pain and neck pain.   Skin:  Negative for color change and rash.   Neurological:  Negative for dizziness, syncope and light-headedness.   Hematological:  Negative for adenopathy. Does not bruise/bleed easily.   All other systems reviewed and are negative.          Objective       ED Triage Vitals [02/21/25 0610]   Temperature Pulse Blood Pressure Respirations SpO2 Patient Position - Orthostatic VS   (!) 96.9 °F (36.1 °C) 58 142/79 18 99 % Sitting      Temp src Heart Rate Source BP Location FiO2 (%) Pain Score    -- -- Right arm -- 10 - Worst Possible Pain      Vitals      Date and Time Temp Pulse SpO2 Resp BP Pain Score FACES Pain Rating User   02/21/25 0646 -- -- -- -- -- 10 - Worst Possible Pain -- SH   02/21/25 0610 96.9 °F (36.1 °C) 58 99 % 18 142/79 10 - Worst Possible Pain -- SH            Physical Exam  Vitals and nursing note reviewed.   Constitutional:       General: He is not in acute distress.     Appearance: He is well-developed. He is obese. He is not toxic-appearing or diaphoretic.   HENT:      Head: Normocephalic and atraumatic.      Right Ear: External ear normal.      Left Ear: External ear normal.      Nose: Nose normal. No congestion or rhinorrhea.      Mouth/Throat:      Mouth: Mucous membranes are moist.      Pharynx: No oropharyngeal exudate or posterior oropharyngeal erythema.   Eyes:      General: No scleral icterus.     Extraocular Movements: Extraocular movements intact.      Conjunctiva/sclera: Conjunctivae normal.      Pupils: Pupils are equal, round, and reactive to light.   Neck:      Comments: No tenderness with palpation along the midline cervical spine, no difficulty with range of motion either with lateral gaze or upward/downward gaze  Cardiovascular:      Rate and Rhythm: Normal rate and regular rhythm.       Pulses: Normal pulses.      Heart sounds: No murmur heard.  Pulmonary:      Effort: Pulmonary effort is normal. No respiratory distress.      Breath sounds: Normal breath sounds. No decreased breath sounds, wheezing or rales.   Chest:      Chest wall: No tenderness.   Abdominal:      Palpations: Abdomen is soft. There is no mass.      Tenderness: There is no abdominal tenderness. There is no right CVA tenderness, left CVA tenderness or guarding.      Hernia: No hernia is present.   Musculoskeletal:         General: Swelling, tenderness, deformity and signs of injury present. Normal range of motion.      Left upper arm: Swelling, deformity, tenderness and bony tenderness present.      Left elbow: Swelling and deformity (Patient and daughter state that the deformity in the elbow is chronic) present.      Cervical back: Normal range of motion and neck supple. No rigidity or tenderness.      Right lower leg: No edema.      Left lower leg: No edema.   Skin:     General: Skin is warm and dry.      Capillary Refill: Capillary refill takes less than 2 seconds.   Neurological:      General: No focal deficit present.      Mental Status: He is alert and oriented to person, place, and time.   Psychiatric:         Mood and Affect: Mood normal.         Behavior: Behavior normal.         Results Reviewed       None            XR humerus LEFT   ED Interpretation by Ricardo Mcghee MD (02/21 0730)   Proximal humerus fracture at surgical neck, impaction noted, some displacement noted      XR shoulder 2+ views LEFT    (Results Pending)   XR elbow 2 views LEFT    (Results Pending)   XR chest 1 view portable    (Results Pending)       Procedures    ED Medication and Procedure Management   Prior to Admission Medications   Prescriptions Last Dose Informant Patient Reported? Taking?   ALPHA LIPOIC ACID-BIOTIN PO  Self Yes No   Sig: Take 1 tablet by mouth in the morning   B COMPLEX VITAMINS PO  Self Yes No   Sig: Take 1 tablet by  mouth daily   Cholecalciferol (VITAMIN D3) 2000 UNITS capsule  Self Yes No   Sig: Take 1 capsule by mouth 2 (two) times a day 5000 units    Coenzyme Q10 (CO Q10) 60 MG CAPS  Self Yes No   Sig: Take 1 capsule by mouth daily   Ginkgo Biloba 120 MG TABS  Self Yes No   Sig: Take 1 tablet by mouth daily   Inositol 500 MG TABS  Self Yes No   Sig: Take 1 tablet by mouth 2 (two) times a day   MILK THISTLE PO  Self Yes No   Sig: Take 1 tablet by mouth daily   Misc Natural Products (SUPER SNOOZE) CAPS  Self Yes No   Sig: Take 1 capsule by mouth daily Bed time    Multiple Vitamins-Minerals (OCUVITE-LUTEIN) CAPS  Self Yes No   Sig: Take by mouth daily    Zinc 50 MG TABS  Self Yes No   Sig: Take 1 tablet by mouth daily   acetaminophen (TYLENOL) 325 mg tablet  Self No No   Sig: Take 2 tablets (650 mg total) by mouth every 6 (six) hours as needed for mild pain, headaches or fever   atorvastatin (LIPITOR) 10 mg tablet   No No   Sig: Take 1 tablet (10 mg total) by mouth every other day   cetirizine (ZyrTEC) 10 mg tablet   No No   Sig: TAKE ONE (1) TABLET (10 MG TOTAL) BY MOUTH DAILY   fluocinolone (SYNALAR) 0.01 % external solution  Self Yes No   Sig: Apply 1 Application topically 2 (two) times a day   gabapentin (NEURONTIN) 600 MG tablet   No No   Sig: TAKE 1 TABLET BY MOUTH TWICE  DAILY   hydrocortisone-pramoxine (PRAMOSONE) 1-2.5 % LOTN  Self No No   Sig: Apply topically 3 (three) times a day   Patient not taking: Reported on 2/3/2025   ipratropium (ATROVENT) 0.03 % nasal spray  Self No No   Sig: USE 2 SPRAYS IN BOTH NOSTRILS 3  TIMES DAILY   ketoconazole (NIZORAL) 2 % shampoo  Self Yes No   Patient not taking: Reported on 2/3/2025   lutein 6 mg  Self Yes No   Sig: Take 1 capsule by mouth daily   metoprolol tartrate (LOPRESSOR) 25 mg tablet  Self No No   Sig: TAKE 1 TABLET BY MOUTH EVERY 12  HOURS   mupirocin (BACTROBAN) 2 % ointment   No No   Sig: Apply topically 3 (three) times a day   Patient not taking: Reported on 2/3/2025    omeprazole (PriLOSEC) 40 MG capsule   No No   Sig: Take 1 capsule (40 mg total) by mouth daily   pentoxifylline (TRENtal) 400 mg ER tablet  Self No No   Sig: Take 1 tablet (400 mg total) by mouth 3 (three) times a day with meals   Patient not taking: Reported on 12/9/2024   psyllium (METAMUCIL) 58.6 % packet  Self Yes No   Sig: Take 1 packet by mouth daily   sildenafil (VIAGRA) 50 MG tablet  Self Yes No   silver sulfadiazine (SILVADENE,SSD) 1 % cream  Self No No   Sig: Apply topically daily   Patient not taking: Reported on 2/3/2025   tadalafil (CIALIS) 20 MG tablet  Self No No   Sig: Take 1 tablet (20 mg total) by mouth every other day as needed for erectile dysfunction   Patient not taking: Reported on 12/9/2024   trospium (SANCTURA XR) 60 mg 24 hr capsule   No No   Sig: Take 1 capsule (60 mg total) by mouth daily before breakfast   Patient not taking: Reported on 2/3/2025   trospium chloride (SANCTURA) 20 mg tablet  Self No No   Sig: Take 1 tablet (20 mg total) by mouth 2 (two) times a day   Patient not taking: Reported on 12/9/2024      Facility-Administered Medications: None     Discharge Medication List as of 2/21/2025  7:18 AM        START taking these medications    Details   ibuprofen (MOTRIN) 600 mg tablet Take 1 tablet (600 mg total) by mouth every 6 (six) hours as needed for mild pain, Starting Fri 2/21/2025, Normal           CONTINUE these medications which have NOT CHANGED    Details   acetaminophen (TYLENOL) 325 mg tablet Take 2 tablets (650 mg total) by mouth every 6 (six) hours as needed for mild pain, headaches or fever, Starting Thu 1/7/2021, No Print      ALPHA LIPOIC ACID-BIOTIN PO Take 1 tablet by mouth in the morning, Historical Med      atorvastatin (LIPITOR) 10 mg tablet Take 1 tablet (10 mg total) by mouth every other day, Starting Fri 1/10/2025, Normal      B COMPLEX VITAMINS PO Take 1 tablet by mouth daily, Historical Med      cetirizine (ZyrTEC) 10 mg tablet TAKE ONE (1) TABLET (10 MG  TOTAL) BY MOUTH DAILY, Starting Fri 12/6/2024, Normal      Cholecalciferol (VITAMIN D3) 2000 UNITS capsule Take 1 capsule by mouth 2 (two) times a day 5000 units , Historical Med      Coenzyme Q10 (CO Q10) 60 MG CAPS Take 1 capsule by mouth daily, Historical Med      fluocinolone (SYNALAR) 0.01 % external solution Apply 1 Application topically 2 (two) times a day, Starting Fri 8/26/2022, Historical Med      gabapentin (NEURONTIN) 600 MG tablet TAKE 1 TABLET BY MOUTH TWICE  DAILY, Starting Mon 2/3/2025, Normal      Ginkgo Biloba 120 MG TABS Take 1 tablet by mouth daily, Historical Med      hydrocortisone-pramoxine (PRAMOSONE) 1-2.5 % LOTN Apply topically 3 (three) times a day, Starting Wed 5/3/2023, Normal      Inositol 500 MG TABS Take 1 tablet by mouth 2 (two) times a day, Historical Med      ipratropium (ATROVENT) 0.03 % nasal spray USE 2 SPRAYS IN BOTH NOSTRILS 3  TIMES DAILY, Normal      ketoconazole (NIZORAL) 2 % shampoo Starting u 3/3/2022, Historical Med      lutein 6 mg Take 1 capsule by mouth daily, Historical Med      metoprolol tartrate (LOPRESSOR) 25 mg tablet TAKE 1 TABLET BY MOUTH EVERY 12  HOURS, Normal      !! MILK THISTLE PO Take 1 tablet by mouth daily, Historical Med      !! Misc Natural Products (SUPER SNOOZE) CAPS Take 1 capsule by mouth daily Bed time , Historical Med      Multiple Vitamins-Minerals (OCUVITE-LUTEIN) CAPS Take by mouth daily , Historical Med      mupirocin (BACTROBAN) 2 % ointment Apply topically 3 (three) times a day, Starting Mon 10/28/2024, Normal      omeprazole (PriLOSEC) 40 MG capsule Take 1 capsule (40 mg total) by mouth daily, Starting Tue 2/4/2025, Normal      pentoxifylline (TRENtal) 400 mg ER tablet Take 1 tablet (400 mg total) by mouth 3 (three) times a day with meals, Starting Mon 1/22/2024, Normal      psyllium (METAMUCIL) 58.6 % packet Take 1 packet by mouth daily, Historical Med      sildenafil (VIAGRA) 50 MG tablet Historical Med      silver sulfadiazine  (SILVADENE,SSD) 1 % cream Apply topically daily, Starting Tue 1/30/2024, Normal      tadalafil (CIALIS) 20 MG tablet Take 1 tablet (20 mg total) by mouth every other day as needed for erectile dysfunction, Starting Thu 9/14/2023, Normal      trospium (SANCTURA XR) 60 mg 24 hr capsule Take 1 capsule (60 mg total) by mouth daily before breakfast, Starting Mon 10/14/2024, Normal      trospium chloride (SANCTURA) 20 mg tablet Take 1 tablet (20 mg total) by mouth 2 (two) times a day, Starting Thu 3/14/2024, Until Mon 12/9/2024, Normal      Zinc 50 MG TABS Take 1 tablet by mouth daily, Historical Med       !! - Potential duplicate medications found. Please discuss with provider.          ED SEPSIS DOCUMENTATION   Time reflects when diagnosis was documented in both MDM as applicable and the Disposition within this note       Time User Action Codes Description Comment    2/21/2025  7:13 AM Ricardo Mcghee Add [S42.209A] Proximal humerus fracture                  Ricardo Mcghee MD  02/21/25 0737

## 2025-02-24 ENCOUNTER — OFFICE VISIT (OUTPATIENT)
Dept: OBGYN CLINIC | Facility: CLINIC | Age: 89
End: 2025-02-24
Payer: MEDICARE

## 2025-02-24 VITALS
BODY MASS INDEX: 21.94 KG/M2 | TEMPERATURE: 98.1 F | RESPIRATION RATE: 16 BRPM | HEART RATE: 80 BPM | OXYGEN SATURATION: 99 % | WEIGHT: 162 LBS | HEIGHT: 72 IN

## 2025-02-24 DIAGNOSIS — S42.209A PROXIMAL HUMERUS FRACTURE: ICD-10-CM

## 2025-02-24 PROCEDURE — 99203 OFFICE O/P NEW LOW 30 MIN: CPT | Performed by: STUDENT IN AN ORGANIZED HEALTH CARE EDUCATION/TRAINING PROGRAM

## 2025-02-24 NOTE — PROGRESS NOTES
Name: Abdirahman Hope      : 1935      MRN: [id number]  Encounter Provider: Shimon Valladares MD  Encounter Date: 2025   Encounter department: [facility] [place]  :  Assessment & Plan  Proximal humerus fracture  89-year-old male 3 days out from a left proximal humerus fracture.  X-rays with alignment amenable to nonoperative management.  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  At this point we will start nonoperative management recommend continued sling use for an additional 2 weeks.  He can gently come out of the sling and let the arm hang with gentle pendulums as tolerated.  Otherwise no shoulder motion no lifting with the left upper extremity.  Did discuss the rationale for home health referral given the patient's home restrictions and age and lack of mobility with using a walker at baseline.  At this point the patient and his family will hold off on a home health referral.  Otherwise the patient will follow-up with me in 2 weeks for repeat clinical evaluation and left shoulder x-rays at that time.  All questions were answered to the patient and his family.  Orders:    Ambulatory Referral to Orthopedic Surgery        History of Present Illness   HPI  Abdirahman Hope is a 89 y.o. male who presents with left shoulder pain.  The pain started 3 days ago with injury.  At that time the patient states he was walking to go to the bathroom where he tripped over his walker and fell onto the left side.  He had immediate pain and difficulty elevating the arm.  He presented to the ER where x-rays were obtained and he was diagnosed with a proximal humerus fracture.  His left arm was placed into a sling and he was given orthopedic follow-up.  Of note patient has a chronic injury to his left elbow that was sustained over 80 years ago when he fell off a third story roof.  At that time he was treated nonoperatively  however his elbow went on to nonunion.  He denies head strike or loss of consciousness.  Does state he has had multiple recent falls with balance issues.  At baseline he ambulates with a walker and states he has to get up multiple times during the night due to the need to continuously go to the bathroom.  He lives at home with an  and has multiple steps.  He presents today with his family.  History obtained from: patient and patient's daughter    Review of Systems  Medical History Reviewed by provider this encounter:  Tobacco  Allergies  Meds  Problems  Med Hx  Surg Hx  Fam Hx     .     Objective   Pulse 80   Temp 98.1 °F (36.7 °C)   Resp 16   Ht 6' (1.829 m)   Wt 73.5 kg (162 lb)   SpO2 99%   BMI 21.97 kg/m²      Physical Exam  On examination of the right shoulder the skin is intact there is no open wounds.  There is ecchymosis and edema around the proximal humerus.  Shoulder range of motion was deferred due to the nature of his acute injury.  His sensations intact to light touch over the axillary, median, radial, and ulnar nerve distributions.  He has 5 out of 5 strength with AIN, PIN, and ulnar nerve testing.  There is a chronic deformity about the left elbow.    Imaging review:  3 views of the left shoulder obtained on February 21, 2025 were dependently reviewed and demonstrate a comminuted fracture through the surgical neck of the proximal humerus with minimal retroversion and overall acceptable alignment for nonoperative treatment    X-rays of the left elbow obtained on the same day were also independently reviewed and demonstrates a chronic fracture dislocation of the left elbow with pseudoarthrosis is similar to previously visualized x-rays.

## 2025-03-03 ENCOUNTER — OFFICE VISIT (OUTPATIENT)
Dept: PODIATRY | Facility: CLINIC | Age: 89
End: 2025-03-03
Payer: MEDICARE

## 2025-03-03 VITALS
OXYGEN SATURATION: 99 % | WEIGHT: 162 LBS | HEART RATE: 71 BPM | HEIGHT: 72 IN | RESPIRATION RATE: 16 BRPM | BODY MASS INDEX: 21.94 KG/M2 | TEMPERATURE: 97.7 F

## 2025-03-03 DIAGNOSIS — Z79.01 LONG TERM CURRENT USE OF ANTICOAGULANT: ICD-10-CM

## 2025-03-03 DIAGNOSIS — G60.9 IDIOPATHIC PERIPHERAL NEUROPATHY: ICD-10-CM

## 2025-03-03 DIAGNOSIS — M79.675 PAIN IN TOES OF BOTH FEET: ICD-10-CM

## 2025-03-03 DIAGNOSIS — B35.1 ONYCHOMYCOSIS: Primary | ICD-10-CM

## 2025-03-03 DIAGNOSIS — I73.9 PAD (PERIPHERAL ARTERY DISEASE) (HCC): ICD-10-CM

## 2025-03-03 DIAGNOSIS — M79.674 PAIN IN TOES OF BOTH FEET: ICD-10-CM

## 2025-03-03 PROCEDURE — 11721 DEBRIDE NAIL 6 OR MORE: CPT | Performed by: PODIATRIST

## 2025-03-03 PROCEDURE — RECHECK: Performed by: PODIATRIST

## 2025-03-03 NOTE — PROGRESS NOTES
Name: Abdirahman Hope      : 1935      MRN: [id number]  Encounter Provider: Darnell Barrios DPM  Encounter Date: 3/3/2025   Encounter department: [facility] [place]  :  Assessment & Plan  Onychomycosis       Debride mycotic nails and thin the nail plates x 10 with the use of a nail nipper manually and an electric Dremel bur was used to reduce the thickness of the nail beds and smoothed the distal aspect of the nails.   Idiopathic peripheral neuropathy       Discussed with the patient changing when he takes his evening dose of gabapentin.  This is because the patient is having heel pain during the night and occasionally a restless leg type syndrome.  It was suggested that he take the medication around 9:00 instead of 8:00 to be closer to his bedtime.  Also discussed briefly with the patient changing the times he takes his medication which will be further addressed at next visit  Long term current use of anticoagulant         Pain in toes of both feet         PAD (peripheral artery disease) (HCC)         Pt was instructed to use lotion once a day on both feet such as cerave, Cetaphil or similar thick style of lotion.     Discussed proper shoe gear, daily inspections of feet, and general foot health with patient. Patient has Q8  findings and is recommended for at risk foot care every 9-10 weeks.    Patients most recent complete clinical foot exam was on: 2024      Return in about 10 weeks (around 2025).     History of Present Illness   HPI  Abdirahman Hope is a 89 y.o. male who presents with chief complaint of painful thick nails on both feet and a painful heel when he is in bed.  Patient also relates that he is having mild restless leg syndrome when he is in bed.  He has peripheral neuropathy along with peripheral arterial disease and is on long-term blood thinners.  He was seen today in his transport chair.Patient presents for at-risk foot care.  Patient has no acute  concerns today.  Patient has significant lower extremity risk due to neuropathy, parasthesia, edema, and trophic skin changes to the lower extremity.   History obtained from: patient    Review of Systems  Medical History Reviewed by provider this encounter:     .  Current Outpatient Medications on File Prior to Visit   Medication Sig Dispense Refill    acetaminophen (TYLENOL) 325 mg tablet Take 2 tablets (650 mg total) by mouth every 6 (six) hours as needed for mild pain, headaches or fever  0    ALPHA LIPOIC ACID-BIOTIN PO Take 1 tablet by mouth in the morning      atorvastatin (LIPITOR) 10 mg tablet Take 1 tablet (10 mg total) by mouth every other day 45 tablet 3    B COMPLEX VITAMINS PO Take 1 tablet by mouth daily      cetirizine (ZyrTEC) 10 mg tablet TAKE ONE (1) TABLET (10 MG TOTAL) BY MOUTH DAILY 90 tablet 1    Cholecalciferol (VITAMIN D3) 2000 UNITS capsule Take 1 capsule by mouth 2 (two) times a day 5000 units       Coenzyme Q10 (CO Q10) 60 MG CAPS Take 1 capsule by mouth daily      fluocinolone (SYNALAR) 0.01 % external solution Apply 1 Application topically 2 (two) times a day      gabapentin (NEURONTIN) 600 MG tablet TAKE 1 TABLET BY MOUTH TWICE  DAILY 180 tablet 1    Ginkgo Biloba 120 MG TABS Take 1 tablet by mouth daily      ibuprofen (MOTRIN) 600 mg tablet Take 1 tablet (600 mg total) by mouth every 6 (six) hours as needed for mild pain 30 tablet 0    Inositol 500 MG TABS Take 1 tablet by mouth 2 (two) times a day      ipratropium (ATROVENT) 0.03 % nasal spray USE 2 SPRAYS IN BOTH NOSTRILS 3  TIMES DAILY 120 mL 3    lutein 6 mg Take 1 capsule by mouth daily      metoprolol tartrate (LOPRESSOR) 25 mg tablet TAKE 1 TABLET BY MOUTH EVERY 12  HOURS 180 tablet 3    MILK THISTLE PO Take 1 tablet by mouth daily      Misc Natural Products (SUPER SNOOZE) CAPS Take 1 capsule by mouth daily Bed time       Multiple Vitamins-Minerals (OCUVITE-LUTEIN) CAPS Take by mouth daily       omeprazole (PriLOSEC) 40 MG  capsule Take 1 capsule (40 mg total) by mouth daily 180 capsule 0    psyllium (METAMUCIL) 58.6 % packet Take 1 packet by mouth daily      Zinc 50 MG TABS Take 1 tablet by mouth daily      hydrocortisone-pramoxine (PRAMOSONE) 1-2.5 % LOTN Apply topically 3 (three) times a day (Patient not taking: Reported on 2/3/2025) 118 mL 2    ketoconazole (NIZORAL) 2 % shampoo  (Patient not taking: Reported on 2/3/2025)      mupirocin (BACTROBAN) 2 % ointment Apply topically 3 (three) times a day (Patient not taking: Reported on 2/3/2025) 22 g 1    pentoxifylline (TRENtal) 400 mg ER tablet Take 1 tablet (400 mg total) by mouth 3 (three) times a day with meals (Patient not taking: Reported on 12/9/2024) 270 tablet 3    sildenafil (VIAGRA) 50 MG tablet       silver sulfadiazine (SILVADENE,SSD) 1 % cream Apply topically daily (Patient not taking: Reported on 2/3/2025) 50 g 1    tadalafil (CIALIS) 20 MG tablet Take 1 tablet (20 mg total) by mouth every other day as needed for erectile dysfunction (Patient not taking: Reported on 12/9/2024) 18 tablet 3    trospium (SANCTURA XR) 60 mg 24 hr capsule Take 1 capsule (60 mg total) by mouth daily before breakfast (Patient not taking: Reported on 2/3/2025) 90 capsule 3    trospium chloride (SANCTURA) 20 mg tablet Take 1 tablet (20 mg total) by mouth 2 (two) times a day (Patient not taking: Reported on 12/9/2024) 180 tablet 3     No current facility-administered medications on file prior to visit.      Social History     Tobacco Use    Smoking status: Every Day     Current packs/day: 0.25     Average packs/day: 0.3 packs/day for 60.0 years (15.0 ttl pk-yrs)     Types: Cigarettes    Smokeless tobacco: Never    Tobacco comments:     smokes a pipe   Vaping Use    Vaping status: Never Used   Substance and Sexual Activity    Alcohol use: Yes     Alcohol/week: 2.0 standard drinks of alcohol     Types: 1 Glasses of wine, 1 Shots of liquor per week     Comment: Occ    Drug use: Never    Sexual  activity: Not Currently        Objective   Pulse 71   Temp 97.7 °F (36.5 °C) (Temporal)   Resp 16   Ht 6' (1.829 m)   Wt 73.5 kg (162 lb)   SpO2 99%   BMI 21.97 kg/m²      Physical Exam  Vascular status is 0/4 DP PT negative digital hair delayed capillary refill and normal distal cooling bilaterally.  There is pitting edema present bilaterally which is +2 today.  The capillary refill is approximately 3 to 4 seconds.    Derm nails are brittle, elongated, hypertrophic, white-yellow discoloration with subungual debris x 10.  There is an increased thickness and the nails were approximately 2 mm.  There is white flaky tissue present on the dorsal and plantar aspects of both feet.  There is superficial fissures within the flaky tissue.  No signs of any dried blood present in any of the tissues.  There is loss of turgor and thinning of the skin noted bilaterally there were 3 eschar present on the distal aspect of the left hallux.  There is slight erythema surrounding all 3 areas there was no depth no pain with palpation.  The eschar were noted on the distal aspect of the toe left foot and tumor located on the medial side distal to the IPJ.    There is numbness still present in the area of the metatarsal heads and distally on both feet.  There is reduced sensation noted in the forefoot region.    Administrative Statements   I have spent a total time of 15 minutes in caring for this patient on the day of the visit/encounter including Risks and benefits of tx options, Instructions for management, Patient and family education, Importance of tx compliance, Counseling / Coordination of care, Documenting in the medical record, and Obtaining or reviewing history  .

## 2025-03-03 NOTE — ASSESSMENT & PLAN NOTE
Discussed with the patient changing when he takes his evening dose of gabapentin.  This is because the patient is having heel pain during the night and occasionally a restless leg type syndrome.  It was suggested that he take the medication around 9:00 instead of 8:00 to be closer to his bedtime.  Also discussed briefly with the patient changing the times he takes his medication which will be further addressed at next visit

## 2025-03-17 ENCOUNTER — HOME CARE VISIT (OUTPATIENT)
Dept: HOME HEALTH SERVICES | Facility: HOME HEALTHCARE | Age: 89
End: 2025-03-17

## 2025-03-17 ENCOUNTER — OFFICE VISIT (OUTPATIENT)
Dept: OBGYN CLINIC | Facility: CLINIC | Age: 89
End: 2025-03-17
Payer: MEDICARE

## 2025-03-17 ENCOUNTER — HOME HEALTH ADMISSION (OUTPATIENT)
Dept: HOME HEALTH SERVICES | Facility: HOME HEALTHCARE | Age: 89
End: 2025-03-17
Payer: MEDICARE

## 2025-03-17 ENCOUNTER — APPOINTMENT (OUTPATIENT)
Dept: RADIOLOGY | Facility: MEDICAL CENTER | Age: 89
End: 2025-03-17
Payer: MEDICARE

## 2025-03-17 VITALS
TEMPERATURE: 97.2 F | BODY MASS INDEX: 21.94 KG/M2 | WEIGHT: 162 LBS | HEIGHT: 72 IN | RESPIRATION RATE: 16 BRPM | OXYGEN SATURATION: 98 % | HEART RATE: 106 BPM

## 2025-03-17 DIAGNOSIS — S42.292A OTHER CLOSED DISPLACED FRACTURE OF PROXIMAL END OF LEFT HUMERUS, INITIAL ENCOUNTER: ICD-10-CM

## 2025-03-17 DIAGNOSIS — S42.292A OTHER CLOSED DISPLACED FRACTURE OF PROXIMAL END OF LEFT HUMERUS, INITIAL ENCOUNTER: Primary | ICD-10-CM

## 2025-03-17 PROCEDURE — 99213 OFFICE O/P EST LOW 20 MIN: CPT | Performed by: STUDENT IN AN ORGANIZED HEALTH CARE EDUCATION/TRAINING PROGRAM

## 2025-03-17 PROCEDURE — 73030 X-RAY EXAM OF SHOULDER: CPT

## 2025-03-17 RX ORDER — MULTIVITAMIN WITH IRON
TABLET ORAL
COMMUNITY
Start: 2025-03-17

## 2025-03-17 NOTE — PROGRESS NOTES
Name: Abdirahman Hope      : 1935      MRN: [id number]  Encounter Provider: Shimon Valladares MD  Encounter Date: 3/17/2025   Encounter department: [facility] [place]  :  Assessment & Plan  Other closed displaced fracture of proximal end of left humerus, initial encounter    Orders:    XR shoulder 2+ vw left; Future    Referral to Home Health- [facility]; Future    Ambulatory Referral to Physical Therapy; Future  89-year-old male approximately 1 month out from a left proximal humerus fracture with non-operative management. X-rays obtained from today's visit show mild interval displacement with increased varus and retroversion. Discussed with the patient that the non-operative management is still indicated. Discussed treatment protocol and overall healing prognosis with the patient and his caretaker at time of visit. At this time, the patient will begin physical therapy for gentle range of motion of the left shoulder. He was provided a prescription for in home physical therapy, with a non-operative proximal humerus fracture protocol.  Recommended that he continue exercises on his own such as pendulums which were demonstrated in office. Otherwise the patient will follow-up with me in 3 weeks for repeat clinical evaluation and left shoulder x-rays at that time. All questions were answered to the patient and his family.       History of Present Illness   HPI  Abdirahman Hope is a 89 y.o. male who presents for follow-up evaluation of a proximal humerus fracture of the left humerus.  He is approximately 1 month s/p injury, from 2025.  The patient was last seen in office on 2025 where non-operative treatment was determined, he was placed in a sling for 2 weeks, and instructed to avoid AROM of the left shoulder. On today's presentation, he reports continued soreness about the shoulder. He is not wearing the sling at time of visit, however, he has his arm held  closely to his body. He states that he is unable to lift his arm on his own at this time. He denies numbness or tingling.     The patient does have a history of  multiple recent falls with balance issues.  At baseline he ambulates with a walker and states he has to get up multiple times during the night due to the need to continuously go to the bathroom.  He lives at home with an  and has multiple steps.  He presents today with his family.    History obtained from: patient and patient's daughter    Review of Systems   Constitutional:  Negative for chills and fever.   HENT:  Negative for ear pain and sore throat.    Eyes:  Negative for pain and visual disturbance.   Respiratory:  Negative for cough and shortness of breath.    Cardiovascular:  Negative for chest pain and palpitations.   Gastrointestinal:  Negative for abdominal pain and vomiting.   Genitourinary:  Negative for dysuria and hematuria.   Musculoskeletal:  Negative for arthralgias and back pain.   Skin:  Negative for color change and rash.   Neurological:  Negative for seizures and syncope.   All other systems reviewed and are negative.    Medical History Reviewed by provider this encounter:     .     Objective   Pulse (!) 106   Temp (!) 97.2 °F (36.2 °C) (Temporal)   Resp 16   Ht 6' (1.829 m)   Wt 73.5 kg (162 lb)   SpO2 98%   BMI 21.97 kg/m²      Physical Exam  HENT:      Head: Normocephalic and atraumatic.      Nose: Nose normal.   Eyes:      Conjunctiva/sclera: Conjunctivae normal.   Cardiovascular:      Rate and Rhythm: Normal rate.   Pulmonary:      Effort: Pulmonary effort is normal.   Musculoskeletal:      Cervical back: Neck supple.   Skin:     General: Skin is warm and dry.      Capillary Refill: Capillary refill takes less than 2 seconds.   Neurological:      Mental Status: He is alert and oriented to person, place, and time.   Psychiatric:         Mood and Affect: Mood normal.         Behavior: Behavior normal.       On  examination of the right shoulder the skin is intact there is no open wounds.    There is resolving ecchymosis and edema around the proximal humerus.    Shoulder range of motion was deferred due to the nature of his acute injury.    His sensations intact to light touch over the axillary, median, radial, and ulnar nerve distributions.    He has 5 out of 5 strength with AIN, PIN, and ulnar nerve testing.    There is a chronic deformity about the left elbow.    Imaging review:  L Shoulder Xrs obtained today demonstrate surgical neck proximal humerus fracture with some increase in varus and retroversion compared to injury films.    Scribe Attestation      I,:  Mynor Randhawa am acting as a scribe while in the presence of the attending physician.:       I,:  Shimon Valladares MD personally performed the services described in this documentation    as scribed in my presence.:

## 2025-03-18 ENCOUNTER — PROCEDURE VISIT (OUTPATIENT)
Dept: UROLOGY | Facility: CLINIC | Age: 89
End: 2025-03-18
Payer: MEDICARE

## 2025-03-18 ENCOUNTER — HOME CARE VISIT (OUTPATIENT)
Dept: HOME HEALTH SERVICES | Facility: HOME HEALTHCARE | Age: 89
End: 2025-03-18

## 2025-03-18 ENCOUNTER — TELEPHONE (OUTPATIENT)
Dept: UROLOGY | Facility: CLINIC | Age: 89
End: 2025-03-18

## 2025-03-18 VITALS
WEIGHT: 164 LBS | BODY MASS INDEX: 22.21 KG/M2 | OXYGEN SATURATION: 98 % | HEIGHT: 72 IN | HEART RATE: 78 BPM | SYSTOLIC BLOOD PRESSURE: 136 MMHG | TEMPERATURE: 97.8 F | DIASTOLIC BLOOD PRESSURE: 88 MMHG

## 2025-03-18 DIAGNOSIS — N32.81 OAB (OVERACTIVE BLADDER): ICD-10-CM

## 2025-03-18 DIAGNOSIS — R35.1 NOCTURIA: ICD-10-CM

## 2025-03-18 DIAGNOSIS — R35.0 URINARY FREQUENCY: ICD-10-CM

## 2025-03-18 DIAGNOSIS — R31.21 ASYMPTOMATIC MICROSCOPIC HEMATURIA: ICD-10-CM

## 2025-03-18 DIAGNOSIS — C67.8 MALIGNANT NEOPLASM OF OVERLAPPING SITES OF BLADDER (HCC): Primary | ICD-10-CM

## 2025-03-18 PROCEDURE — 52000 CYSTOURETHROSCOPY: CPT | Performed by: UROLOGY

## 2025-03-18 PROCEDURE — 99214 OFFICE O/P EST MOD 30 MIN: CPT | Performed by: UROLOGY

## 2025-03-18 RX ORDER — CEPHALEXIN 500 MG/1
500 CAPSULE ORAL ONCE
Qty: 1 CAPSULE | Refills: 0 | Status: SHIPPED | OUTPATIENT
Start: 2025-03-18 | End: 2025-03-18

## 2025-03-18 NOTE — PATIENT INSTRUCTIONS
You have just undergone cystoscopy of the bladder.  Expect to see some blood in the urine, which should clear up within 24 to 48 hours.  You also may experience burning urgency and frequency of urination which is normal.  Call for fever greater than 101.5 degrees, severe pain not relieved by over-the-counter medicines, or inability to urinate.  You may resume all normal activities.  For irritative symptoms, you can purchase over-the-counter remedies such as cystek or Azo, or any other preparations advertised in the pharmacy for bladder symptoms.  I will call in Pyridium which is a prescription strength medication for bladder irritability upon your request.     We will schedule surgery and Posterior Tibial Nerve Stimulation- our schedulers will call.

## 2025-03-18 NOTE — TELEPHONE ENCOUNTER
----- Message from Adalberto Rasmussen MD sent at 3/18/2025  3:23 PM EDT -----  Please call patient to schedule TURBT at [facility].  Alina alexander you please get him on your schedule for PTNS thank you

## 2025-03-18 NOTE — PROGRESS NOTES
UROLOGY PROGRESS NOTE   [facility]  [address] [place] [address] [address]  [address]  [place] [address]  [phone]  Fax:[phone]  [email or web address]      NAME: Abdirahman Hope  AGE: 89 y.o. SEX: male  : 1935   MRN: [id number]    DATE: 3/18/2025  TIME: 3:35 PM    Assessment and Plan;    Bladder cancer with recurrence, superficial right floor of bladder and posterior wall-too much tumor to fulgurate in the office.  Therefore we will set him up for TURBT/bladder biopsies and fulguration in the operating room with mitomycin instillation.  The procedure as well as the risks of bleeding infection damage urinary tract and need for additional procedures are known to the patient and gives informed consent.  H&P done.    Urinary frequency, urge incontinence and nocturia-he is interested in PTNS after I explained the procedure and the rationale behind it.  Will set this up.               Chief Complaint   No chief complaint on file.      History of Present Illness   89-year-old man, established patient-last seen by me 2024 for superficial bladder cancer.  Here for repeat cystoscopy.  During that visit I drained left forearm infection and started him on Keflex.  He is status post TURBT of a 1 cm right lateral wall tumor with cup forceps by me 2023.  Pathology showed high-grade papillary urothelial carcinoma with foci suggesting superficial invasion into papillary stem.  Muscle was present and benign.  On last cystoscopy 2024, he had a small superficial papillary recurrence lateral to the trigone and anterior to the orifice.  I fulgurated all of this flat.    Urinary frequency: Trospium was not working out so well when I last saw him.  He thought I thought he may be a PTNS candidate but we are deferring this until now.       Cystoscopy     Date/Time  3/18/2025 3:00 PM     Performed by  Adalberto Rasmussen MD   Authorized by  Adalberto Rasmussen MD     Universal  Protocol:  Consent: Verbal consent obtained. Written consent obtained.      Procedure Details:  Procedure type: cystoscopy    Additional Procedure Details: Cystoscopy Procedure Note        Pre-operative Diagnosis: Bladder cancer surveillance    Post-operative Diagnosis: Same, with recurrent tumor patch right floor of bladder and posterior wall    Procedure: Flexible cystoscopy    Surgeon: Adalberto Rasmussen MD    Anesthesia: 1% Xylocaine per urethra    EBL: Minimal    Complications: none    Procedure Details   The risks, benefits, complications, treatment options, and expected outcomes were discussed with the patient. The patient concurred with the proposed plan, giving informed consent.    Cystoscopy was performed today under local anesthesia, using sterile technique. The patient was placed in the supine position, prepped with Betadine, and draped in the usual sterile fashion. The flexible cystocope was used to inspect both the urethra and bladder    Findings:  Urethra: Normal without stricture.  Prostate nonocclusive.    Bladder: Heavily trabeculated with superficial recurrence right floor of the bladder and posterior wall.  May be premalignant or CIS..  The orifices were orthotopic and intact.           Specimens: None                 Complications:  None           Disposition: To home            Condition:  Stable              The following portions of the patient's history were reviewed and updated as appropriate: allergies, current medications, past family history, past medical history, past social history, past surgical history and problem list.  Past Medical History:   Diagnosis Date    Anemia     Appendicitis     Coronary artery disease     Detached retina     GERD (gastroesophageal reflux disease)     Hyperlipidemia     Hypertension     Macular degeneration     Neuropathy     Pelvis fracture (HCC) 01/2021    Von Willebrand disease (HCC)      Past Surgical History:   Procedure Laterality Date    ADENOIDECTOMY       APPENDECTOMY      ARTHRODESIS  01/15/2014    Lumbar     BACK SURGERY      CATARACT EXTRACTION      COLONOSCOPY  12/09/2016    fiberoptic     CYSTOSCOPY      with resection of tumor / 1/12/17, 10/2/17 / diagnostic 12/8/16, 5/30/17     CYSTOSCOPY  09/20/2018    CYSTOSCOPY  06/22/2020    EGD AND COLONOSCOPY N/A 12/9/2016    Procedure: EGD AND COLONOSCOPY;  Surgeon: Kahlil Alfonso MD;  Location: [facility];  Service:     EYE SURGERY      FRACTURE SURGERY Bilateral     ARM    NECK SURGERY      NEUROPLASTY / TRANSPOSITION MEDIAN NERVE AT CARPAL TUNNEL Right 04/2015    AK AMPUTATION METATARSAL W/TOE SINGLE Left 4/11/2024    Procedure: LEFT 2ND TOE AMPUTATION;  Surgeon: Vicente Barrientos DPM;  Location: [facility];  Service: Podiatry    AK BLADDER INSTILLATION ANTICARCINOGENIC AGENT N/A 1/12/2017    Procedure: INSTILLATION MYTOMYCIN;  Surgeon: Jhon Zuniga MD;  Location: [facility];  Service: Urology    AK BLADDER INSTILLATION ANTICARCINOGENIC AGENT N/A 10/2/2017    Procedure: INSTILLATION MYTOMYCIN;  Surgeon: Jhon Zuniga MD;  Location: [facility];  Service: Urology    AK COLONOSCOPY FLX DX W/COLLJ SPEC WHEN PFRMD N/A 2/5/2019    Procedure: COLONOSCOPY;  Surgeon: Abad Valentino MD;  Location: [facility];  Service: Gastroenterology    AK CYSTO W/REMOVAL OF LESIONS SMALL N/A 1/12/2017    Procedure: CYSTOSCOPY, BLADDER BIOPSY WITH FULGRATION, 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;  Surgeon: Jhon Zuniga MD;  [facility]: [facility];  Service: Urology    AK CYSTO W/REMOVAL OF LESIONS SMALL N/A 10/2/2017    Procedure: CYSTOSCOPY WITH  BLADDER BIOPSIES WITH FULGURATION;  Surgeon: Jhon Zuniga MD;  [facility];  Service: Urology    AK CYSTO W/REMOVAL OF LESIONS SMALL N/A 11/30/2023    Procedure: CYSTOSCOPY WITH BIOPSIES and fulguration;  Surgeon: Adalberto Rasmussen MD;  [facility]: [facility];  Service: Urology    AK ESOPHAGOGASTRODUODENOSCOPY TRANSORAL DIAGNOSTIC N/A 8/3/2018     Procedure: ESOPHAGOGASTRODUODENOSCOPY (EGD);  Surgeon: Abad Valentino MD;  [facility]: [facility];  Service: Gastroenterology    RETINAL DETACHMENT SURGERY Right 03/2016    complete air fill confirmed     ROTATOR CUFF REPAIR      SHOULDER SURGERY  03/03/2015    proximal humerus fracture / LA...3/6/15   R...1/3/18     TONSILECTOMY AND ADNOIDECTOMY      TONSILLECTOMY      TRANSURETHRAL RESECTION OF BLADDER TUMOR N/A 10/2/2017    Procedure: TRANSURETHRAL RESECTION OF BLADDER TUMOR (TURBT);  Surgeon: Jhon Zuniga MD;  [facility]: [facility];  Service: Urology    VON WILLEBRAND ANTIGEN (HISTORICAL)       shoulder  Review of Systems   Review of Systems   Constitutional:  Negative for fever.   Respiratory:  Negative for shortness of breath.    Cardiovascular:  Negative for chest pain.   Genitourinary:  Positive for frequency and urgency.        Urinary incontinence   Musculoskeletal:         Left shoulder fracture with pain and immobility       Active Problem List     Patient Active Problem List   Diagnosis    Bladder cancer (HCC)    Lung nodule, solitary    Erectile dysfunction    Gait instability    High blood pressure    Hypothyroidism    Idiopathic peripheral neuropathy    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)    Cervical stenosis of spinal canal    Spinal stenosis of lumbar region    Spondylosis 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    Hoarse voice quality    Weight loss    Gastroesophageal reflux disease without esophagitis    Nausea    Chronic obstructive pulmonary disease with acute exacerbation (HCC)    Von Willebrand's disease (HCC)    Radiculopathy, lumbar region    Fall    Anemia    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)    Urinary frequency    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   Constitutional:       Appearance: Normal appearance. He is normal weight.   HENT:      Head: Normocephalic and atraumatic.   Eyes:      Extraocular Movements: Extraocular movements intact.   Cardiovascular:      Rate and Rhythm: Normal rate and regular rhythm.      Heart sounds: Normal heart sounds.   Pulmonary:      Effort: Pulmonary effort is normal.      Breath sounds: Normal breath sounds.   Genitourinary:     Penis: Normal.    Musculoskeletal:         General: No swelling or tenderness.      Cervical back: Normal range of motion.      Comments: Uses walker, needs assistance with standing etc.   Skin:     Coloration: Skin is pale. Skin is not jaundiced.   Neurological:      Mental Status: He is alert.             Current Medications     Current Outpatient Medications:     acetaminophen (TYLENOL) 325 mg tablet, Take 2 tablets (650 mg total) by mouth every 6 (six) hours as needed for mild pain, headaches or fever, Disp:  , Rfl: 0    ALPHA LIPOIC ACID-BIOTIN PO, Take 1 tablet by mouth in the morning, Disp: , Rfl:     atorvastatin (LIPITOR) 10 mg tablet, Take 1 tablet (10 mg total) by mouth every other day, Disp: 45 tablet, Rfl: 3    B COMPLEX VITAMINS PO, Take 1 tablet by mouth daily, Disp: , Rfl:     cephalexin (KEFLEX) 500 mg capsule, Take 1 capsule (500 mg total) by mouth 1 (one) time for 1 dose Take after procedure, Disp: 1 capsule, Rfl: 0    cetirizine (ZyrTEC) 10 mg tablet, TAKE ONE (1) TABLET (10 MG TOTAL) BY MOUTH DAILY, Disp: 90 tablet, Rfl: 1    Cholecalciferol (VITAMIN D3) 2000 UNITS capsule, Take 1 capsule  by mouth 2 (two) times a day 5000 units , Disp: , Rfl:     Coenzyme Q10 (CO Q10) 60 MG CAPS, Take 1 capsule by mouth daily, Disp: , Rfl:     gabapentin (NEURONTIN) 600 MG tablet, TAKE 1 TABLET BY MOUTH TWICE  DAILY, Disp: 180 tablet, Rfl: 1    Ginkgo Biloba 120 MG TABS, Take 1 tablet by mouth daily, Disp: , Rfl:     Inositol 500 MG TABS, Take 1 tablet by mouth 2 (two) times a day, Disp: , Rfl:     ipratropium (ATROVENT) 0.03 % nasal spray, USE 2 SPRAYS IN BOTH NOSTRILS 3  TIMES DAILY, Disp: 120 mL, Rfl: 3    lutein 6 mg, Take 1 capsule by mouth daily, Disp: , Rfl:     metoprolol tartrate (LOPRESSOR) 25 mg tablet, TAKE 1 TABLET BY MOUTH EVERY 12  HOURS, Disp: 180 tablet, Rfl: 3    MILK THISTLE PO, Take 1 tablet by mouth daily, Disp: , Rfl:     Misc Natural Products (SUPER SNOOZE) CAPS, Take 1 capsule by mouth daily Bed time , Disp: , Rfl:     Multiple Vitamins-Minerals (OCUVITE-LUTEIN) CAPS, Take by mouth daily , Disp: , Rfl:     omeprazole (PriLOSEC) 40 MG capsule, Take 1 capsule (40 mg total) by mouth daily, Disp: 180 capsule, Rfl: 0    psyllium (METAMUCIL) 58.6 % packet, Take 1 packet by mouth daily, Disp: , Rfl:     sildenafil (VIAGRA) 50 MG tablet, , Disp: , Rfl:     vitamin B-12 (VITAMIN B-12) 500 mcg tablet, , Disp: , Rfl:     Zinc 50 MG TABS, Take 1 tablet by mouth daily, Disp: , Rfl:     fluocinolone (SYNALAR) 0.01 % external solution, Apply 1 Application topically 2 (two) times a day (Patient not taking: Reported on 3/17/2025), Disp: , Rfl:     hydrocortisone-pramoxine (PRAMOSONE) 1-2.5 % LOTN, Apply topically 3 (three) times a day (Patient not taking: Reported on 2/3/2025), Disp: 118 mL, Rfl: 2    ibuprofen (MOTRIN) 600 mg tablet, Take 1 tablet (600 mg total) by mouth every 6 (six) hours as needed for mild pain (Patient not taking: Reported on 3/17/2025), Disp: 30 tablet, Rfl: 0    ketoconazole (NIZORAL) 2 % shampoo, , Disp: , Rfl:     mupirocin (BACTROBAN) 2 % ointment, Apply topically 3 (three) times a  day (Patient not taking: Reported on 2/3/2025), Disp: 22 g, Rfl: 1    pentoxifylline (TRENtal) 400 mg ER tablet, Take 1 tablet (400 mg total) by mouth 3 (three) times a day with meals (Patient not taking: Reported on 12/9/2024), Disp: 270 tablet, Rfl: 3    silver sulfadiazine (SILVADENE,SSD) 1 % cream, Apply topically daily (Patient not taking: Reported on 2/3/2025), Disp: 50 g, Rfl: 1    tadalafil (CIALIS) 20 MG tablet, Take 1 tablet (20 mg total) by mouth every other day as needed for erectile dysfunction (Patient not taking: Reported on 12/9/2024), Disp: 18 tablet, Rfl: 3    trospium (SANCTURA XR) 60 mg 24 hr capsule, Take 1 capsule (60 mg total) by mouth daily before breakfast (Patient not taking: Reported on 2/3/2025), Disp: 90 capsule, Rfl: 3    trospium chloride (SANCTURA) 20 mg tablet, Take 1 tablet (20 mg total) by mouth 2 (two) times a day (Patient not taking: Reported on 12/9/2024), Disp: 180 tablet, Rfl: 3        Adalberto Rasmussen MD

## 2025-03-18 NOTE — H&P
UROLOGY PROGRESS NOTE   [facility]  [address] [place] [address] [place]  [address]  [place] [address]  [phone]  Fax:[phone]  [email or web address]      NAME: Abdirahman Hope  AGE: 89 y.o. SEX: male  : 1935   MRN: [id number]    DATE: 3/18/2025  TIME: 3:35 PM    Assessment and Plan;    Bladder cancer with recurrence, superficial right floor of bladder and posterior wall-too much tumor to fulgurate in the office.  Therefore we will set him up for TURBT/bladder biopsies and fulguration in the operating room with mitomycin instillation.  The procedure as well as the risks of bleeding infection damage urinary tract and need for additional procedures are known to the patient and gives informed consent.  H&P done.    Urinary frequency, urge incontinence and nocturia-he is interested in PTNS after I explained the procedure and the rationale behind it.  Will set this up.               Chief Complaint   No chief complaint on file.      History of Present Illness   89-year-old man, established patient-last seen by me 2024 for superficial bladder cancer.  Here for repeat cystoscopy.  During that visit I drained left forearm infection and started him on Keflex.  He is status post TURBT of a 1 cm right lateral wall tumor with cup forceps by me 2023.  Pathology showed high-grade papillary urothelial carcinoma with foci suggesting superficial invasion into papillary stem.  Muscle was present and benign.  On last cystoscopy 2024, he had a small superficial papillary recurrence lateral to the trigone and anterior to the orifice.  I fulgurated all of this flat.    Urinary frequency: Trospium was not working out so well when I last saw him.  He thought I thought he may be a PTNS candidate but we are deferring this until now.       Cystoscopy     Date/Time  3/18/2025 3:00 PM     Performed by  Adalberto Rasmussen MD   Authorized by  Adalebrto Rasmussen MD     Universal  Protocol:  Consent: Verbal consent obtained. Written consent obtained.      Procedure Details:  Procedure type: cystoscopy    Additional Procedure Details: Cystoscopy Procedure Note        Pre-operative Diagnosis: Bladder cancer surveillance    Post-operative Diagnosis: Same, with recurrent tumor patch right floor of bladder and posterior wall    Procedure: Flexible cystoscopy    Surgeon: Adalberto Rasmussen MD    Anesthesia: 1% Xylocaine per urethra    EBL: Minimal    Complications: none    Procedure Details   The risks, benefits, complications, treatment options, and expected outcomes were discussed with the patient. The patient concurred with the proposed plan, giving informed consent.    Cystoscopy was performed today under local anesthesia, using sterile technique. The patient was placed in the supine position, prepped with Betadine, and draped in the usual sterile fashion. The flexible cystocope was used to inspect both the urethra and bladder    Findings:  Urethra: Normal without stricture.  Prostate nonocclusive.    Bladder: Heavily trabeculated with superficial recurrence right floor of the bladder and posterior wall.  May be premalignant or CIS..  The orifices were orthotopic and intact.           Specimens: None                 Complications:  None           Disposition: To home            Condition:  Stable              The following portions of the patient's history were reviewed and updated as appropriate: allergies, current medications, past family history, past medical history, past social history, past surgical history and problem list.  Past Medical History:   Diagnosis Date    Anemia     Appendicitis     Coronary artery disease     Detached retina     GERD (gastroesophageal reflux disease)     Hyperlipidemia     Hypertension     Macular degeneration     Neuropathy     Pelvis fracture (HCC) 01/2021    Von Willebrand disease (HCC)      Past Surgical History:   Procedure Laterality Date    ADENOIDECTOMY       APPENDECTOMY      ARTHRODESIS  01/15/2014    Lumbar     BACK SURGERY      CATARACT EXTRACTION      COLONOSCOPY  12/09/2016    fiberoptic     CYSTOSCOPY      with resection of tumor / 1/12/17, 10/2/17 / diagnostic 12/8/16, 5/30/17     CYSTOSCOPY  09/20/2018    CYSTOSCOPY  06/22/2020    EGD AND COLONOSCOPY N/A 12/9/2016    Procedure: EGD AND COLONOSCOPY;  Surgeon: Kahlil Alfonso MD;  Location: [facility];  Service:     EYE SURGERY      FRACTURE SURGERY Bilateral     ARM    NECK SURGERY      NEUROPLASTY / TRANSPOSITION MEDIAN NERVE AT CARPAL TUNNEL Right 04/2015    MA AMPUTATION METATARSAL W/TOE SINGLE Left 4/11/2024    Procedure: LEFT 2ND TOE AMPUTATION;  Surgeon: Vicente Barrientos DPM;  Location: [facility];  Service: Podiatry    MA BLADDER INSTILLATION ANTICARCINOGENIC AGENT N/A 1/12/2017    Procedure: INSTILLATION MYTOMYCIN;  Surgeon: Jhon Zuniga MD;  Location: [facility];  Service: Urology    MA BLADDER INSTILLATION ANTICARCINOGENIC AGENT N/A 10/2/2017    Procedure: INSTILLATION MYTOMYCIN;  Surgeon: Jhon Zuniga MD;  Location: [facility];  Service: Urology    MA COLONOSCOPY FLX DX W/COLLJ SPEC WHEN PFRMD N/A 2/5/2019    Procedure: COLONOSCOPY;  Surgeon: Abad Valentino MD;  Location: [facility];  Service: Gastroenterology    MA CYSTO W/REMOVAL OF LESIONS SMALL N/A 1/12/2017    Procedure: CYSTOSCOPY, BLADDER BIOPSY WITH FULGRATION, 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;  Surgeon: Jhon Zuniga MD;  Location: [facility];  Service: Urology    MA CYSTO W/REMOVAL OF LESIONS SMALL N/A 10/2/2017    Procedure: CYSTOSCOPY WITH  BLADDER BIOPSIES WITH FULGURATION;  Surgeon: Jhon Zuniga MD;  [facility]: [facility];  Service: Urology    MA CYSTO W/REMOVAL OF LESIONS SMALL N/A 11/30/2023    Procedure: CYSTOSCOPY WITH BIOPSIES and fulguration;  Surgeon: Adalberto Rasmussen MD;  Location: [facility];  Service: Urology    MA ESOPHAGOGASTRODUODENOSCOPY TRANSORAL DIAGNOSTIC N/A 8/3/2018     Procedure: ESOPHAGOGASTRODUODENOSCOPY (EGD);  Surgeon: Abad Valentino MD;  Location: [facility];  Service: Gastroenterology    RETINAL DETACHMENT SURGERY Right 03/2016    complete air fill confirmed     ROTATOR CUFF REPAIR      SHOULDER SURGERY  03/03/2015    proximal humerus fracture / LA...3/6/15   R...1/3/18     TONSILECTOMY AND ADNOIDECTOMY      TONSILLECTOMY      TRANSURETHRAL RESECTION OF BLADDER TUMOR N/A 10/2/2017    Procedure: TRANSURETHRAL RESECTION OF BLADDER TUMOR (TURBT);  Surgeon: Jhon Zuniga MD;  Location: [facility];  Service: Urology    VON WILLEBRAND ANTIGEN (HISTORICAL)       shoulder  Review of Systems   Review of Systems   Constitutional:  Negative for fever.   Respiratory:  Negative for shortness of breath.    Cardiovascular:  Negative for chest pain.   Genitourinary:  Positive for frequency and urgency.        Urinary incontinence   Musculoskeletal:         Left shoulder fracture with pain and immobility       Active Problem List     Patient Active Problem List   Diagnosis    Bladder cancer (HCC)    Lung nodule, solitary    Erectile dysfunction    Gait instability    High blood pressure    Hypothyroidism    Idiopathic peripheral neuropathy    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)    Cervical stenosis of spinal canal    Spinal stenosis of lumbar region    Spondylosis 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    Hoarse voice quality    Weight loss    Gastroesophageal reflux disease without esophagitis    Nausea    Chronic obstructive pulmonary disease with acute exacerbation (HCC)    Von Willebrand's disease (HCC)    Radiculopathy, lumbar region    Fall    Anemia    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)    Urinary frequency    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   Constitutional:       Appearance: Normal appearance. He is normal weight.   HENT:      Head: Normocephalic and atraumatic.   Eyes:      Extraocular Movements: Extraocular movements intact.   Cardiovascular:      Rate and Rhythm: Normal rate and regular rhythm.      Heart sounds: Normal heart sounds.   Pulmonary:      Effort: Pulmonary effort is normal.      Breath sounds: Normal breath sounds.   Genitourinary:     Penis: Normal.    Musculoskeletal:         General: No swelling or tenderness.      Cervical back: Normal range of motion.      Comments: Uses walker, needs assistance with standing etc.   Skin:     Coloration: Skin is pale. Skin is not jaundiced.   Neurological:      Mental Status: He is alert.             Current Medications     Current Outpatient Medications:     acetaminophen (TYLENOL) 325 mg tablet, Take 2 tablets (650 mg total) by mouth every 6 (six) hours as needed for mild pain, headaches or fever, Disp:  , Rfl: 0    ALPHA LIPOIC ACID-BIOTIN PO, Take 1 tablet by mouth in the morning, Disp: , Rfl:     atorvastatin (LIPITOR) 10 mg tablet, Take 1 tablet (10 mg total) by mouth every other day, Disp: 45 tablet, Rfl: 3    B COMPLEX VITAMINS PO, Take 1 tablet by mouth daily, Disp: , Rfl:     cephalexin (KEFLEX) 500 mg capsule, Take 1 capsule (500 mg total) by mouth 1 (one) time for 1 dose Take after procedure, Disp: 1 capsule, Rfl: 0    cetirizine (ZyrTEC) 10 mg tablet, TAKE ONE (1) TABLET (10 MG TOTAL) BY MOUTH DAILY, Disp: 90 tablet, Rfl: 1    Cholecalciferol (VITAMIN D3) 2000 UNITS capsule, Take 1 capsule  by mouth 2 (two) times a day 5000 units , Disp: , Rfl:     Coenzyme Q10 (CO Q10) 60 MG CAPS, Take 1 capsule by mouth daily, Disp: , Rfl:     gabapentin (NEURONTIN) 600 MG tablet, TAKE 1 TABLET BY MOUTH TWICE  DAILY, Disp: 180 tablet, Rfl: 1    Ginkgo Biloba 120 MG TABS, Take 1 tablet by mouth daily, Disp: , Rfl:     Inositol 500 MG TABS, Take 1 tablet by mouth 2 (two) times a day, Disp: , Rfl:     ipratropium (ATROVENT) 0.03 % nasal spray, USE 2 SPRAYS IN BOTH NOSTRILS 3  TIMES DAILY, Disp: 120 mL, Rfl: 3    lutein 6 mg, Take 1 capsule by mouth daily, Disp: , Rfl:     metoprolol tartrate (LOPRESSOR) 25 mg tablet, TAKE 1 TABLET BY MOUTH EVERY 12  HOURS, Disp: 180 tablet, Rfl: 3    MILK THISTLE PO, Take 1 tablet by mouth daily, Disp: , Rfl:     Misc Natural Products (SUPER SNOOZE) CAPS, Take 1 capsule by mouth daily Bed time , Disp: , Rfl:     Multiple Vitamins-Minerals (OCUVITE-LUTEIN) CAPS, Take by mouth daily , Disp: , Rfl:     omeprazole (PriLOSEC) 40 MG capsule, Take 1 capsule (40 mg total) by mouth daily, Disp: 180 capsule, Rfl: 0    psyllium (METAMUCIL) 58.6 % packet, Take 1 packet by mouth daily, Disp: , Rfl:     sildenafil (VIAGRA) 50 MG tablet, , Disp: , Rfl:     vitamin B-12 (VITAMIN B-12) 500 mcg tablet, , Disp: , Rfl:     Zinc 50 MG TABS, Take 1 tablet by mouth daily, Disp: , Rfl:     fluocinolone (SYNALAR) 0.01 % external solution, Apply 1 Application topically 2 (two) times a day (Patient not taking: Reported on 3/17/2025), Disp: , Rfl:     hydrocortisone-pramoxine (PRAMOSONE) 1-2.5 % LOTN, Apply topically 3 (three) times a day (Patient not taking: Reported on 2/3/2025), Disp: 118 mL, Rfl: 2    ibuprofen (MOTRIN) 600 mg tablet, Take 1 tablet (600 mg total) by mouth every 6 (six) hours as needed for mild pain (Patient not taking: Reported on 3/17/2025), Disp: 30 tablet, Rfl: 0    ketoconazole (NIZORAL) 2 % shampoo, , Disp: , Rfl:     mupirocin (BACTROBAN) 2 % ointment, Apply topically 3 (three) times a  day (Patient not taking: Reported on 2/3/2025), Disp: 22 g, Rfl: 1    pentoxifylline (TRENtal) 400 mg ER tablet, Take 1 tablet (400 mg total) by mouth 3 (three) times a day with meals (Patient not taking: Reported on 12/9/2024), Disp: 270 tablet, Rfl: 3    silver sulfadiazine (SILVADENE,SSD) 1 % cream, Apply topically daily (Patient not taking: Reported on 2/3/2025), Disp: 50 g, Rfl: 1    tadalafil (CIALIS) 20 MG tablet, Take 1 tablet (20 mg total) by mouth every other day as needed for erectile dysfunction (Patient not taking: Reported on 12/9/2024), Disp: 18 tablet, Rfl: 3    trospium (SANCTURA XR) 60 mg 24 hr capsule, Take 1 capsule (60 mg total) by mouth daily before breakfast (Patient not taking: Reported on 2/3/2025), Disp: 90 capsule, Rfl: 3    trospium chloride (SANCTURA) 20 mg tablet, Take 1 tablet (20 mg total) by mouth 2 (two) times a day (Patient not taking: Reported on 12/9/2024), Disp: 180 tablet, Rfl: 3        Adalberto Rasmussen MD

## 2025-03-18 NOTE — LETTER
2025     Varun Hoyt, DO  [address] [facility] [address]    Patient: Abdirahman Hope   YOB: 1935   Date of Visit: 3/18/2025       Dear Dr. Hoyt:    Thank you for referring Abdirahman Hope to me for evaluation. Below are my notes for this consultation.    If you have questions, please do not hesitate to call me. I look forward to following your patient along with you.         Sincerely,        Adalberto Rasmussen MD        CC: No Recipients    Adalberto Rasmussen MD  3/18/2025  3:36 PM  Sign when Signing Visit  UROLOGY PROGRESS NOTE   [facility]  [address] [place] [address] [place]  [address]  [place] [address]  [phone]  Fax:[phone]  [email or web address]      NAME: Abdirahman Hope  AGE: 89 y.o. SEX: male  : 1935   MRN: [id number]    DATE: 3/18/2025  TIME: 3:35 PM    Assessment and Plan;    Bladder cancer with recurrence, superficial right floor of bladder and posterior wall-too much tumor to fulgurate in the office.  Therefore we will set him up for TURBT/bladder biopsies and fulguration in the operating room with mitomycin instillation.  The procedure as well as the risks of bleeding infection damage urinary tract and need for additional procedures are known to the patient and gives informed consent.  H&P done.    Urinary frequency, urge incontinence and nocturia-he is interested in PTNS after I explained the procedure and the rationale behind it.  Will set this up.               Chief Complaint   No chief complaint on file.      History of Present Illness   89-year-old man, established patient-last seen by me 2024 for superficial bladder cancer.  Here for repeat cystoscopy.  During that visit I drained left forearm infection and started him on Keflex.  He is status post TURBT of a 1 cm right lateral wall tumor with cup forceps by me 2023.  Pathology showed high-grade papillary urothelial carcinoma with foci suggesting superficial  invasion into papillary stem.  Muscle was present and benign.  On last cystoscopy September 17, 2024, he had a small superficial papillary recurrence lateral to the trigone and anterior to the orifice.  I fulgurated all of this flat.    Urinary frequency: Trospium was not working out so well when I last saw him.  He thought I thought he may be a PTNS candidate but we are deferring this until now.       Cystoscopy     Date/Time  3/18/2025 3:00 PM     Performed by  Adalberto Rasmussen MD   Authorized by  Adalberto Rasmussen MD     Universal Protocol:  Consent: Verbal consent obtained. Written consent obtained.      Procedure Details:  Procedure type: cystoscopy    Additional Procedure Details: Cystoscopy Procedure Note        Pre-operative Diagnosis: Bladder cancer surveillance    Post-operative Diagnosis: Same, with recurrent tumor patch right floor of bladder and posterior wall    Procedure: Flexible cystoscopy    Surgeon: Adalberto Rasmussen MD    Anesthesia: 1% Xylocaine per urethra    EBL: Minimal    Complications: none    Procedure Details   The risks, benefits, complications, treatment options, and expected outcomes were discussed with the patient. The patient concurred with the proposed plan, giving informed consent.    Cystoscopy was performed today under local anesthesia, using sterile technique. The patient was placed in the supine position, prepped with Betadine, and draped in the usual sterile fashion. The flexible cystocope was used to inspect both the urethra and bladder    Findings:  Urethra: Normal without stricture.  Prostate nonocclusive.    Bladder: Heavily trabeculated with superficial recurrence right floor of the bladder and posterior wall.  May be premalignant or CIS..  The orifices were orthotopic and intact.           Specimens: None                 Complications:  None           Disposition: To home            Condition:  Stable              The following portions of the patient's history were reviewed and  updated as appropriate: allergies, current medications, past family history, past medical history, past social history, past surgical history and problem list.  Past Medical History:   Diagnosis Date   • Anemia    • Appendicitis    • Coronary artery disease    • Detached retina    • GERD (gastroesophageal reflux disease)    • Hyperlipidemia    • Hypertension    • Macular degeneration    • Neuropathy    • Pelvis fracture (HCC) 01/2021   • Von Willebrand disease (HCC)      Past Surgical History:   Procedure Laterality Date   • ADENOIDECTOMY     • APPENDECTOMY     • ARTHRODESIS  01/15/2014    Lumbar    • BACK SURGERY     • CATARACT EXTRACTION     • COLONOSCOPY  12/09/2016    fiberoptic    • CYSTOSCOPY      with resection of tumor / 1/12/17, 10/2/17 / diagnostic 12/8/16, 5/30/17    • CYSTOSCOPY  09/20/2018   • CYSTOSCOPY  06/22/2020   • EGD AND COLONOSCOPY N/A 12/9/2016    Procedure: EGD AND COLONOSCOPY;  Surgeon: Kahlil Alfonso MD;  Location: [facility];  Service:    • EYE SURGERY     • FRACTURE SURGERY Bilateral     ARM   • NECK SURGERY     • NEUROPLASTY / TRANSPOSITION MEDIAN NERVE AT CARPAL TUNNEL Right 04/2015   • KY AMPUTATION METATARSAL W/TOE SINGLE Left 4/11/2024    Procedure: LEFT 2ND TOE AMPUTATION;  Surgeon: Vicente Barrientos DPM;  [facility]: [facility];  Service: Podiatry   • KY BLADDER INSTILLATION ANTICARCINOGENIC AGENT N/A 1/12/2017    Procedure: INSTILLATION MYTOMYCIN;  Surgeon: Jhon Zuniga MD;  [facility];  Service: Urology   • KY BLADDER INSTILLATION ANTICARCINOGENIC AGENT N/A 10/2/2017    Procedure: INSTILLATION MYTOMYCIN;  Surgeon: Jhon Zuniga MD;  [facility];  Service: Urology   • KY COLONOSCOPY FLX DX W/COLLJ SPEC WHEN PFRMD N/A 2/5/2019    Procedure: COLONOSCOPY;  Surgeon: Abad Valentino MD;  [facility]: [facility];  Service: Gastroenterology   • KY CYSTO W/REMOVAL OF LESIONS SMALL N/A 1/12/2017    Procedure: CYSTOSCOPY, BLADDER BIOPSY WITH FULGRATION,  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;  Surgeon: Jhon Zuniga MD;  [facility];  Service: Urology   • NM CYSTO W/REMOVAL OF LESIONS SMALL N/A 10/2/2017    Procedure: CYSTOSCOPY WITH  BLADDER BIOPSIES WITH FULGURATION;  Surgeon: Jhon Zuniga MD;  [facility];  Service: Urology   • NM CYSTO W/REMOVAL OF LESIONS SMALL N/A 11/30/2023    Procedure: CYSTOSCOPY WITH BIOPSIES and fulguration;  Surgeon: Adalberto Rasmussen MD;  [facility];  Service: Urology   • NM ESOPHAGOGASTRODUODENOSCOPY TRANSORAL DIAGNOSTIC N/A 8/3/2018    Procedure: ESOPHAGOGASTRODUODENOSCOPY (EGD);  Surgeon: Abad Valentino MD;  [facility];  Service: Gastroenterology   • RETINAL DETACHMENT SURGERY Right 03/2016    complete air fill confirmed    • ROTATOR CUFF REPAIR     • SHOULDER SURGERY  03/03/2015    proximal humerus fracture / LA...3/6/15   R...1/3/18    • TONSILECTOMY AND ADNOIDECTOMY     • TONSILLECTOMY     • TRANSURETHRAL RESECTION OF BLADDER TUMOR N/A 10/2/2017    Procedure: TRANSURETHRAL RESECTION OF BLADDER TUMOR (TURBT);  Surgeon: Jhon Zuniga MD;  [facility]: [facility];  Service: Urology   • VON WILLEBRAND ANTIGEN (HISTORICAL)       shoulder  Review of Systems   Review of Systems   Constitutional:  Negative for fever.   Respiratory:  Negative for shortness of breath.    Cardiovascular:  Negative for chest pain.   Genitourinary:  Positive for frequency and urgency.        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)   • Von Willebrand's disease ([facility])   • Radiculopathy, lumbar region   • Fall   • Anemia   • Contusion of cerebral cortex with concussion (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)   • Leg edema, left   • Venous insufficiency   • PAD (peripheral artery disease) (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)       Objective   /88   Pulse 78   Temp 97.8 °F (36.6 °C)   Ht 6' (1.829 m)   Wt 74.4 kg (164 lb)   SpO2 98%   BMI 22.24 kg/m²     Physical Exam  Vitals reviewed.   Constitutional:       Appearance: Normal appearance. He is normal weight.   HENT:      Head: Normocephalic and atraumatic.   Eyes:      Extraocular Movements: Extraocular movements intact.   Cardiovascular:      Rate and Rhythm: Normal rate and regular rhythm.      Heart sounds: Normal heart sounds.   Pulmonary:      Effort: Pulmonary effort is normal.      Breath sounds: Normal breath sounds.   Genitourinary:     Penis: Normal.    Musculoskeletal:         General: No swelling or tenderness.      Cervical back: Normal range of motion.      Comments: Uses walker, needs assistance with standing etc.   Skin:     Coloration: Skin is pale. Skin is not jaundiced.   Neurological:      Mental Status: He is alert.             Current Medications     Current Outpatient Medications:   •  acetaminophen (TYLENOL) 325 mg tablet, Take 2 tablets (650 mg total) by mouth every 6 (six) hours as needed for mild pain, headaches or fever,  Disp:  , Rfl: 0  •  ALPHA LIPOIC ACID-BIOTIN PO, Take 1 tablet by mouth in the morning, Disp: , Rfl:   •  atorvastatin (LIPITOR) 10 mg tablet, Take 1 tablet (10 mg total) by mouth every other day, Disp: 45 tablet, Rfl: 3  •  B COMPLEX VITAMINS PO, Take 1 tablet by mouth daily, Disp: , Rfl:   •  cephalexin (KEFLEX) 500 mg capsule, Take 1 capsule (500 mg total) by mouth 1 (one) time for 1 dose Take after procedure, Disp: 1 capsule, Rfl: 0  •  cetirizine (ZyrTEC) 10 mg tablet, TAKE ONE (1) TABLET (10 MG TOTAL) BY MOUTH DAILY, Disp: 90 tablet, Rfl: 1  •  Cholecalciferol (VITAMIN D3) 2000 UNITS capsule, Take 1 capsule by mouth 2 (two) times a day 5000 units , Disp: , Rfl:   •  Coenzyme Q10 (CO Q10) 60 MG CAPS, Take 1 capsule by mouth daily, Disp: , Rfl:   •  gabapentin (NEURONTIN) 600 MG tablet, TAKE 1 TABLET BY MOUTH TWICE  DAILY, Disp: 180 tablet, Rfl: 1  •  Ginkgo Biloba 120 MG TABS, Take 1 tablet by mouth daily, Disp: , Rfl:   •  Inositol 500 MG TABS, Take 1 tablet by mouth 2 (two) times a day, Disp: , Rfl:   •  ipratropium (ATROVENT) 0.03 % nasal spray, USE 2 SPRAYS IN BOTH NOSTRILS 3  TIMES DAILY, Disp: 120 mL, Rfl: 3  •  lutein 6 mg, Take 1 capsule by mouth daily, Disp: , Rfl:   •  metoprolol tartrate (LOPRESSOR) 25 mg tablet, TAKE 1 TABLET BY MOUTH EVERY 12  HOURS, Disp: 180 tablet, Rfl: 3  •  MILK THISTLE PO, Take 1 tablet by mouth daily, Disp: , Rfl:   •  Misc Natural Products (SUPER SNOOZE) CAPS, Take 1 capsule by mouth daily Bed time , Disp: , Rfl:   •  Multiple Vitamins-Minerals (OCUVITE-LUTEIN) CAPS, Take by mouth daily , Disp: , Rfl:   •  omeprazole (PriLOSEC) 40 MG capsule, Take 1 capsule (40 mg total) by mouth daily, Disp: 180 capsule, Rfl: 0  •  psyllium (METAMUCIL) 58.6 % packet, Take 1 packet by mouth daily, Disp: , Rfl:   •  sildenafil (VIAGRA) 50 MG tablet, , Disp: , Rfl:   •  vitamin B-12 (VITAMIN B-12) 500 mcg tablet, , Disp: , Rfl:   •  Zinc 50 MG TABS, Take 1 tablet by mouth daily, Disp: , Rfl:    •  fluocinolone (SYNALAR) 0.01 % external solution, Apply 1 Application topically 2 (two) times a day (Patient not taking: Reported on 3/17/2025), Disp: , Rfl:   •  hydrocortisone-pramoxine (PRAMOSONE) 1-2.5 % LOTN, Apply topically 3 (three) times a day (Patient not taking: Reported on 2/3/2025), Disp: 118 mL, Rfl: 2  •  ibuprofen (MOTRIN) 600 mg tablet, Take 1 tablet (600 mg total) by mouth every 6 (six) hours as needed for mild pain (Patient not taking: Reported on 3/17/2025), Disp: 30 tablet, Rfl: 0  •  ketoconazole (NIZORAL) 2 % shampoo, , Disp: , Rfl:   •  mupirocin (BACTROBAN) 2 % ointment, Apply topically 3 (three) times a day (Patient not taking: Reported on 2/3/2025), Disp: 22 g, Rfl: 1  •  pentoxifylline (TRENtal) 400 mg ER tablet, Take 1 tablet (400 mg total) by mouth 3 (three) times a day with meals (Patient not taking: Reported on 12/9/2024), Disp: 270 tablet, Rfl: 3  •  silver sulfadiazine (SILVADENE,SSD) 1 % cream, Apply topically daily (Patient not taking: Reported on 2/3/2025), Disp: 50 g, Rfl: 1  •  tadalafil (CIALIS) 20 MG tablet, Take 1 tablet (20 mg total) by mouth every other day as needed for erectile dysfunction (Patient not taking: Reported on 12/9/2024), Disp: 18 tablet, Rfl: 3  •  trospium (SANCTURA XR) 60 mg 24 hr capsule, Take 1 capsule (60 mg total) by mouth daily before breakfast (Patient not taking: Reported on 2/3/2025), Disp: 90 capsule, Rfl: 3  •  trospium chloride (SANCTURA) 20 mg tablet, Take 1 tablet (20 mg total) by mouth 2 (two) times a day (Patient not taking: Reported on 12/9/2024), Disp: 180 tablet, Rfl: 3        Adalberto Rasmussen MD

## 2025-03-19 ENCOUNTER — HOME CARE VISIT (OUTPATIENT)
Dept: HOME HEALTH SERVICES | Facility: HOME HEALTHCARE | Age: 89
End: 2025-03-19
Payer: MEDICARE

## 2025-03-19 VITALS — HEART RATE: 104 BPM | DIASTOLIC BLOOD PRESSURE: 60 MMHG | OXYGEN SATURATION: 97 % | SYSTOLIC BLOOD PRESSURE: 140 MMHG

## 2025-03-19 PROCEDURE — 400013 VN SOC

## 2025-03-19 PROCEDURE — 10330081 VN NO-PAY CLAIM PROCEDURE

## 2025-03-19 PROCEDURE — G0151 HHCP-SERV OF PT,EA 15 MIN: HCPCS

## 2025-03-19 NOTE — TELEPHONE ENCOUNTER
Spoke with patients daughter. She plans on visiting patient on Thursday evening. She will write down the dates of patients other appointments and discuss with her brother dates that will work for patients schedule and for family to transport. Plan to schedule PTNS appointments with patients daughter on Friday in office.

## 2025-03-19 NOTE — CASE COMMUNICATION
Upon skin inspection:   L  buttocks- pimple with area of nonblanchable red area.     L heel- appears like deflated blister.   L big toe- 1 open wound on distal toe; 2 scabs anterior toes.     R top of foot medial- pink streak (blanchable).     refer to media for pictures.   If any skilled nurse need for wound care after Fri appt with podiatry, please send order into [facility].   please indicate if any specific wound care; update pt if any rec ommendations.    Educated pt on s/s's of worsening status/infection and to contact MD.

## 2025-03-20 ENCOUNTER — PREP FOR PROCEDURE (OUTPATIENT)
Dept: UROLOGY | Facility: CLINIC | Age: 89
End: 2025-03-20

## 2025-03-20 DIAGNOSIS — Z01.818 ENCOUNTER FOR PREADMISSION TESTING: Primary | ICD-10-CM

## 2025-03-20 NOTE — TELEPHONE ENCOUNTER
Spoke with patient and confirmed surgery date of: 5/28  Type of surgery: TURBT w/ lili  Operating physician: Dr. Rasmussen  Location of surgery: [facility]    Verbally went over prep with patient dtr on: 3/20  NPO  Bowel prep? No  Hospital calls afternoon prior with arrival time -Calls Friday afternoon for Monday surgeries  Patient needs ride to and from surgery (outpatient)   Pre-op testing to be done 2 weeks prior to surgery  (CBC, BMP, UC, EKG)  Blood thinners: ASA  no hold needed  Clearances needed: (None)    Mailed to patient on: 3/20  Copy of packet scanned into Media on:  Labs in packet  Soap / Bowel prep in packet  Pre-op & Post-op in packet  Dates of H&P and post-op if needed    Consent: in Media

## 2025-03-20 NOTE — CASE COMMUNICATION
Medication discrepancies or Major drug interactions:  -Major interaction: Nizoral & Atorvastatin ;   Nizoral & Magnesium &  Chewable calcium.    -Pt not taking but on AVS: Vitamin B12, Fluocinolone, Pramosone, Ibuprofen, Mupirocin, Trospium, Trental, Silvadene, Sildenafil. (added to med list in epic).    -Pt taking but not on AVS; Aspirin 81mg 1 tab daily; Artificial Tears- PRN for when get shots in eye; Chewable Calcium 500mg 1 tab merry ly; Magnesium 500mg 1 tab daily.     -Taking differenty: Alpha Lipoic 300mg 1 tab BID; Atorvastatin - somtimes takes daily instead of darion other day; Gapapentin 3x/day instead of BID; Lutein 40mg instead of 6mg; Omeprazole-- bottle in home has 1tab BID which taking but AVS has 1 tab daily; Gingko Biloba 60mg instead of 120mg; Inositol 510mg  instead of 500mg; Vitamin D3 5,000 units instead of 2000 units; ocuvite Lutein 1 tab BID instead  of 1 tab daily.    contact pt/family with any med clarifications.     Abnormal clinical findings:  Red nonblanchable L buttocks. 3 areas- 2 scabbed & 1 open area on L big toe; L heel- pain, appears like a deflated blister.   -pt denies being diabetic but listed as active problem on problem list.   -incontinent BM/diarrhea this date.   If response needed, please respond via Secure Chat  [facility] with the following disciplines: PT.  PT recommended OT but pt declined. Working on L UE ther ex protocol as well as mobility/gait/safety.   Patient stated goals of care: get my arm %.  Potential barriers to goal achievement: comorbidiities, impaired balance, pain, age.  Primary focus of home health care:Musculoskeletal  Anticipated visit pattern and next visit date: PT for 1wk1 2wk3.   Thank you for allowing us to pa rticipate in the care of your patient.    Karena Lang PT

## 2025-03-21 ENCOUNTER — OFFICE VISIT (OUTPATIENT)
Dept: FAMILY MEDICINE CLINIC | Facility: CLINIC | Age: 89
End: 2025-03-21
Payer: MEDICARE

## 2025-03-21 ENCOUNTER — HOME CARE VISIT (OUTPATIENT)
Dept: HOME HEALTH SERVICES | Facility: HOME HEALTHCARE | Age: 89
End: 2025-03-21
Payer: MEDICARE

## 2025-03-21 VITALS
WEIGHT: 164 LBS | TEMPERATURE: 97.2 F | DIASTOLIC BLOOD PRESSURE: 62 MMHG | SYSTOLIC BLOOD PRESSURE: 120 MMHG | OXYGEN SATURATION: 97 % | HEIGHT: 72 IN | HEART RATE: 59 BPM | BODY MASS INDEX: 22.21 KG/M2

## 2025-03-21 DIAGNOSIS — L97.524 NON-PRESSURE CHRONIC ULCER OF OTHER PART OF LEFT FOOT WITH NECROSIS OF BONE (HCC): ICD-10-CM

## 2025-03-21 DIAGNOSIS — S06.2X9A: ICD-10-CM

## 2025-03-21 DIAGNOSIS — L89.621 DECUBITUS ULCER OF LEFT HEEL, STAGE 1: ICD-10-CM

## 2025-03-21 DIAGNOSIS — E08.59 DIABETES DUE TO UNDERLYING CONDITION W OTH CIRCULATORY COMP (HCC): Primary | ICD-10-CM

## 2025-03-21 DIAGNOSIS — J30.1 ALLERGIC RHINITIS DUE TO POLLEN, UNSPECIFIED SEASONALITY: ICD-10-CM

## 2025-03-21 DIAGNOSIS — F41.1 GENERALIZED ANXIETY DISORDER: ICD-10-CM

## 2025-03-21 DIAGNOSIS — E78.2 MIXED HYPERLIPIDEMIA: ICD-10-CM

## 2025-03-21 DIAGNOSIS — J44.1 CHRONIC OBSTRUCTIVE PULMONARY DISEASE WITH ACUTE EXACERBATION (HCC): ICD-10-CM

## 2025-03-21 DIAGNOSIS — H35.3230 BILATERAL EXUDATIVE AGE-RELATED MACULAR DEGENERATION, UNSPECIFIED STAGE (HCC): ICD-10-CM

## 2025-03-21 DIAGNOSIS — C67.8 MALIGNANT NEOPLASM OF OVERLAPPING SITES OF BLADDER (HCC): ICD-10-CM

## 2025-03-21 DIAGNOSIS — D68.00 VON WILLEBRAND'S DISEASE (HCC): ICD-10-CM

## 2025-03-21 LAB — SL AMB POCT HEMOGLOBIN AIC: 5.6 (ref ?–6.5)

## 2025-03-21 PROCEDURE — G0321 AUDIO-ONLY HHS: HCPCS

## 2025-03-21 PROCEDURE — 99214 OFFICE O/P EST MOD 30 MIN: CPT | Performed by: FAMILY MEDICINE

## 2025-03-21 PROCEDURE — G2211 COMPLEX E/M VISIT ADD ON: HCPCS | Performed by: FAMILY MEDICINE

## 2025-03-21 PROCEDURE — 83036 HEMOGLOBIN GLYCOSYLATED A1C: CPT | Performed by: FAMILY MEDICINE

## 2025-03-21 RX ORDER — IPRATROPIUM BROMIDE 21 UG/1
2 SPRAY, METERED NASAL 3 TIMES DAILY
Qty: 150 ML | Refills: 3 | Status: SHIPPED | OUTPATIENT
Start: 2025-03-21 | End: 2025-03-21 | Stop reason: SDUPTHER

## 2025-03-21 RX ORDER — SERTRALINE HYDROCHLORIDE 25 MG/1
25 TABLET, FILM COATED ORAL DAILY
Qty: 30 TABLET | Refills: 3 | Status: SHIPPED | OUTPATIENT
Start: 2025-03-21 | End: 2025-04-20

## 2025-03-21 RX ORDER — ATORVASTATIN CALCIUM 10 MG/1
10 TABLET, FILM COATED ORAL EVERY OTHER DAY
Qty: 45 TABLET | Refills: 3 | Status: SHIPPED | OUTPATIENT
Start: 2025-03-21

## 2025-03-21 RX ORDER — IPRATROPIUM BROMIDE 21 UG/1
2 SPRAY, METERED NASAL 3 TIMES DAILY
Qty: 150 ML | Refills: 3 | Status: SHIPPED | OUTPATIENT
Start: 2025-03-21 | End: 2025-06-19

## 2025-03-21 NOTE — ASSESSMENT & PLAN NOTE
Lab Results   Component Value Date    HGBA1C 5.6 03/21/2025       Orders:    POCT hemoglobin A1c

## 2025-03-21 NOTE — PROGRESS NOTES
Name: Abdirahman Hope      : 1935      MRN: [id number]  Encounter Provider: Varun Hoyt DO  Encounter Date: 3/21/2025   Encounter department: [facility]  :  Assessment & Plan  Generalized anxiety disorder    Orders:    sertraline (ZOLOFT) 25 mg tablet; Take 1 tablet (25 mg total) by mouth daily    Allergic rhinitis due to pollen, unspecified seasonality    Orders:    ipratropium (ATROVENT) 0.03 % nasal spray; 2 sprays into each nostril 3 (three) times a day    Mixed hyperlipidemia    Orders:    atorvastatin (LIPITOR) 10 mg tablet; Take 1 tablet (10 mg total) by mouth every other day    Decubitus ulcer of left heel, stage 1         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 ([facility])    Lab Results   Component Value Date    HGBA1C 5.6 2025       Orders:    POCT hemoglobin A1c           History of Present Illness   Here today with for recurring urge to urinate due to generalized anxiety state saw his urologist who declined to treat him for anxiety asked him to see his primary care physician for prescription second problem stage I heel ulcer left heel it is currently a blister it has not yet opened up third problem need for nasal spray for chronic allergic vasomotor rhinitis    Anxiety  Patient reports no chest pain or palpitations.         Review of Systems   Constitutional:  Positive for activity change and fatigue.   HENT:  Positive for dental problem, hearing loss and postnasal drip.    Eyes:  Positive for visual disturbance.   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   Genitourinary:  Positive for frequency and urgency.   Musculoskeletal:  Positive for back pain and gait problem.   Skin:         Large stage I decubitus ulcer left heel       Objective   /62 (BP Location: Left arm, Patient Position: Sitting, Cuff Size: Standard)   Pulse 59   Temp (!) 97.2 °F (36.2 °C) (Temporal)   Ht 6'  (1.829 m)   Wt 74.4 kg (164 lb)   SpO2 97%   BMI 22.24 kg/m²      Physical Exam  Constitutional:       Appearance: He is ill-appearing.   HENT:      Head: Normocephalic.      Nose: Congestion and rhinorrhea present.   Cardiovascular:      Rate and Rhythm: Normal rate and regular rhythm.   Pulmonary:      Effort: Pulmonary effort is normal.      Breath sounds: Normal breath sounds.   Musculoskeletal:      Cervical back: Rigidity present.   Skin:     Comments: Stage I decubitus blister on the left heel   Neurological:      General: No focal deficit present.      Mental Status: He is alert and oriented to person, place, and time.   Psychiatric:         Mood and Affect: Mood normal.         Behavior: Behavior normal.         Thought Content: Thought content normal.         Judgment: Judgment normal.

## 2025-03-21 NOTE — ASSESSMENT & PLAN NOTE
Orders:    atorvastatin (LIPITOR) 10 mg tablet; Take 1 tablet (10 mg total) by mouth every other day

## 2025-03-21 NOTE — TELEPHONE ENCOUNTER
Spoke with patients daughter Emilie and scheduled patient for 8 weekly PTNS appointments beginning on 4/3/25.

## 2025-03-24 ENCOUNTER — APPOINTMENT (OUTPATIENT)
Dept: RADIOLOGY | Facility: MEDICAL CENTER | Age: 89
End: 2025-03-24
Payer: MEDICARE

## 2025-03-24 ENCOUNTER — OFFICE VISIT (OUTPATIENT)
Dept: PODIATRY | Facility: CLINIC | Age: 89
End: 2025-03-24
Payer: MEDICARE

## 2025-03-24 VITALS
HEIGHT: 72 IN | TEMPERATURE: 97.2 F | WEIGHT: 164 LBS | BODY MASS INDEX: 22.21 KG/M2 | HEART RATE: 65 BPM | OXYGEN SATURATION: 98 %

## 2025-03-24 DIAGNOSIS — L97.509 CHRONIC ULCER OF GREAT TOE (HCC): ICD-10-CM

## 2025-03-24 DIAGNOSIS — L89.622 PRESSURE INJURY OF LEFT HEEL, STAGE 2 (HCC): Primary | ICD-10-CM

## 2025-03-24 DIAGNOSIS — L89.622 PRESSURE INJURY OF LEFT HEEL, STAGE 2 (HCC): ICD-10-CM

## 2025-03-24 DIAGNOSIS — L89.612 PRESSURE INJURY OF RIGHT HEEL, STAGE 2 (HCC): ICD-10-CM

## 2025-03-24 PROCEDURE — 99213 OFFICE O/P EST LOW 20 MIN: CPT | Performed by: PODIATRIST

## 2025-03-24 PROCEDURE — 73630 X-RAY EXAM OF FOOT: CPT

## 2025-03-24 NOTE — PROGRESS NOTES
Pressure injury right posterior heel with stable serous blister  Pressure injury distal great toe    Not diabetic    Juan did amputation in  of second toe of right foot.      Rx right foot x-ray    Rx referral to wound care  Name: Abdirahman Hope      : 1935      MRN: [id number]  Encounter Provider: Tracey Fernandes DPM  Encounter Date: 3/24/2025   Encounter department: [facility] [place]  :  Assessment & Plan  Pressure injury of left heel, stage 2 (HCC)    Orders:    Ambulatory Referral to Wound Care; Future    Chronic ulcer of great toe (HCC)    Orders:    Ambulatory Referral to Wound Care; Future         IMPRESSION:  LLE posterior heel pressure injury with serous bulla.  No SOI  LLE GT distal pressure ulceration.  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 Barrientos 2024  PCP note from 3/21/2025 reviewed.  Patient referred to podiatry for evaluation of left heel blister  Patient encouraged to speak with his primary care physician on increasing gabapentin dose due to reported nerve pain and restless leg syndrome  Patient counseled on left lower extremity wounds.  Patient counseled at length on pressure injuries and resulting wound formation.  Patient was counseled that he is at high risk for further infection, surgical intervention, limb loss due to history of osteomyelitis and comorbidities  Rx left foot x-ray for baseline evaluation of great toe and posterior heel  Patient and family advised to apply Betadine to open wounds daily.  Utilize dressings provided by PCP if heel blister drains  Patient counseled on the importance of offloading to allow for wound healing.  Recommendations for Prevalon boots placed in chart and discussed at length with patient's daughter  Heel should be floating at all times without pressure on skin  Patient and family counseled on clinical signs of infection  and they will present to the ED for further evaluation if these occur  Rx referral to the wound care center  Follow-up 1 week at the wound care center      History of Present Illness   HPI  Abdirahman Hope is a 89 y.o. male who presents for evaluation management of left posterior heel serous blister and left great toe pressure injury.  Patient states that injuries developed approximately 3 weeks ago.  Patient denies noticing any inciting event.  He is currently not on ambulatory.  He was seen by his primary care provider who told him to continue to monitor and he was given wound care supplies to use if the blister on his heel ruptures.  Patient is companied by his significant other and his daughter at today's evaluation.  They deny any redness, drainage, or swelling in the areas.  Patient reports the heel is sore however he does not feel any pain at the level of the great toe.  Patient is known to Dr. Barrientos for amputation of left second toe performed in April 2024.  Patient has not been treating wounds in any way.  He presents today wearing regular sneakers.  He denies nausea, vomiting, fever, chills, shortness of breath      Review of Systems   Constitutional:  Negative for chills and fever.   Respiratory:  Negative for shortness of breath.    Cardiovascular:  Positive for leg swelling.   Musculoskeletal:  Positive for arthralgias, back pain and gait problem.   Skin:  Positive for color change and wound.          Objective   Pulse 65   Temp (!) 97.2 °F (36.2 °C) (Temporal)   Ht 6' (1.829 m)   Wt 74.4 kg (164 lb)   SpO2 98%   BMI 22.24 kg/m²      Physical Exam  Constitutional:       General: He is not in acute distress.  Cardiovascular:      Comments: Pulse 1/4, PT pulse 0/4  CRT less than 3 seconds to distal digits  Skin temperature gradient decreases from knees to digits bilaterally  Absent pedal hair growth  Skin thin    Musculoskeletal:      Comments: Dorsiflexion/plantarflexion at ankle and distal  digits limited  Tender with palpation of left posterior heel and area of pressure injury.  No pain with palpation or range of motion of left great toe  No ecchymosis noted, no significant edema noted   Skin:     Comments: Left posterior heel serous bulla approximately 7 cm x 7 cm without eschar at today's evaluation.  Minimal serous fluid.  No significant surrounding erythema or edema  Left great toe distal ulceration with primarily dry granular base.  2 dry granular abrasions noted dorsal aspect of great toe.  No surrounding erythema or edema, no crepitus, no fluctuance, no malodor    Bilateral skin dry.  Nails thickened, dystrophic, and discolored

## 2025-03-24 NOTE — PATIENT INSTRUCTIONS
Recommend purchasing offloading boot.  Prevalon boot.  It can be found on [employer].  Offload posterior heel at all times using boot.  Elevate legs on pillows.  Nothing should come in contact with posterior heel.  Evaluate great toe and make sure no pressure or rubbing is occurring to the great toe.  Apply Betadine to great toe.  Apply Betadine to posterior heel.

## 2025-03-25 ENCOUNTER — RESULTS FOLLOW-UP (OUTPATIENT)
Dept: FAMILY MEDICINE CLINIC | Facility: CLINIC | Age: 89
End: 2025-03-25

## 2025-03-25 ENCOUNTER — HOME CARE VISIT (OUTPATIENT)
Dept: HOME HEALTH SERVICES | Facility: HOME HEALTHCARE | Age: 89
End: 2025-03-25
Payer: MEDICARE

## 2025-03-25 VITALS — OXYGEN SATURATION: 97 % | HEART RATE: 71 BPM | SYSTOLIC BLOOD PRESSURE: 122 MMHG | DIASTOLIC BLOOD PRESSURE: 60 MMHG

## 2025-03-25 PROCEDURE — G0151 HHCP-SERV OF PT,EA 15 MIN: HCPCS

## 2025-03-25 NOTE — RESULT ENCOUNTER NOTE
Call patient to notify normal results no fracture of foot make sure patient has his appointment with podiatry this week

## 2025-03-26 PROCEDURE — G0180 MD CERTIFICATION HHA PATIENT: HCPCS | Performed by: STUDENT IN AN ORGANIZED HEALTH CARE EDUCATION/TRAINING PROGRAM

## 2025-03-27 ENCOUNTER — HOME CARE VISIT (OUTPATIENT)
Dept: HOME HEALTH SERVICES | Facility: HOME HEALTHCARE | Age: 89
End: 2025-03-27
Payer: MEDICARE

## 2025-03-27 VITALS — DIASTOLIC BLOOD PRESSURE: 60 MMHG | SYSTOLIC BLOOD PRESSURE: 118 MMHG | HEART RATE: 54 BPM | OXYGEN SATURATION: 95 %

## 2025-03-27 PROCEDURE — G0151 HHCP-SERV OF PT,EA 15 MIN: HCPCS

## 2025-04-01 ENCOUNTER — HOME CARE VISIT (OUTPATIENT)
Dept: HOME HEALTH SERVICES | Facility: HOME HEALTHCARE | Age: 89
End: 2025-04-01
Payer: MEDICARE

## 2025-04-01 ENCOUNTER — OFFICE VISIT (OUTPATIENT)
Dept: WOUND CARE | Facility: CLINIC | Age: 89
End: 2025-04-01
Payer: MEDICARE

## 2025-04-01 VITALS — HEART RATE: 72 BPM | SYSTOLIC BLOOD PRESSURE: 118 MMHG | OXYGEN SATURATION: 94 % | DIASTOLIC BLOOD PRESSURE: 62 MMHG

## 2025-04-01 VITALS
RESPIRATION RATE: 18 BRPM | DIASTOLIC BLOOD PRESSURE: 70 MMHG | HEIGHT: 72 IN | WEIGHT: 165 LBS | BODY MASS INDEX: 22.35 KG/M2 | TEMPERATURE: 97 F | SYSTOLIC BLOOD PRESSURE: 140 MMHG | HEART RATE: 78 BPM

## 2025-04-01 DIAGNOSIS — L97.521 CHRONIC ULCER OF TOE OF LEFT FOOT, LIMITED TO BREAKDOWN OF SKIN (HCC): ICD-10-CM

## 2025-04-01 DIAGNOSIS — L89.622 PRESSURE INJURY OF LEFT HEEL, STAGE 2 (HCC): Primary | ICD-10-CM

## 2025-04-01 DIAGNOSIS — I73.9 PVD (PERIPHERAL VASCULAR DISEASE) (HCC): ICD-10-CM

## 2025-04-01 PROCEDURE — G0151 HHCP-SERV OF PT,EA 15 MIN: HCPCS

## 2025-04-01 PROCEDURE — 99213 OFFICE O/P EST LOW 20 MIN: CPT | Performed by: PODIATRIST

## 2025-04-01 PROCEDURE — 99215 OFFICE O/P EST HI 40 MIN: CPT | Performed by: PODIATRIST

## 2025-04-01 PROCEDURE — G2212 PROLONG OUTPT/OFFICE VIS: HCPCS | Performed by: PODIATRIST

## 2025-04-01 NOTE — PROGRESS NOTES
Patient ID: Abdirahman Hope is a 89 y.o. male Date of Birth 1935       Chief Complaint   Patient presents with    New Patient Visit     Pt has wounds on left great toe and heel stating they were there for about 4-6 weeks  he stated he did not know they were there       Allergies  Patient has no known allergies.    Diagnosis:  1. PVD (peripheral vascular disease) ([facility])  -     Wound cleansing and dressings; Future; Expected date: 04/08/2025  -     Ambulatory referral to Vascular Surgery; Future  -     VAS ARTERIAL DUPLEX- LOWER LIMB BILATERAL; Future; Expected date: 04/01/2025  2. Pressure injury of left heel, stage 2 ([facility])  -     Wound cleansing and dressings; Future; Expected date: 04/08/2025  3. 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 PVD (peripheral vascular disease) ([facility])  I73.9 Wound cleansing and dressings     Ambulatory referral to Vascular Surgery     VAS ARTERIAL DUPLEX- LOWER LIMB BILATERAL      2. Pressure injury of left heel, stage 2 ([facility])  L89.622 Wound cleansing and dressings      3. 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, limited to breakdown of skin ([facility])  L97.521 Wound cleansing and dressings             Assessment & Plan:  LLE posterior heel pressure injury with stable eschar  LLE distal great toe pressure injury with stable eschar  LLE medial great toe pressure injury with stable eschar  PVD    Over 60 minutes were spent with the patient discussing wounds, wound care, NADYA results, need for further studies, management, and answering all questions and concerns  Left posterior heel pressure injury with stable eschar, no SOI  Left GT distal pressure ulceration with stable eschar no SOI  Betadine paint and offloading to all wounds  Patient is not diabetic, recent A1c 5.3%  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 Barrientos 4/2024  PCP note from 3/21/2025 reviewed.  Patient referred to podiatry for evaluation of left heel blister  Patient counseled on left lower extremity wounds.  Patient counseled at length on pressure injuries and resulting wound formation.  Patient was counseled that he is at high risk for further infection, surgical intervention, limb loss due to history of osteomyelitis and comorbidities  XR 3/24/2025 negative for acute osseous abnormalities  Continue with Prevalon boots to offload posterior heel and protect toes.  Offload heels with additional pillows  Heel should be floating at all times without pressure on skin  4/1/2025 wound care office ABIs RLE 0.5 to, LLE 0.88  Rx vas arterial studies  Rx referral to vascular surgery  Patient counseled at length on peripheral vascular disease  Patient counseled decreased healing potential.  Reviewed delayed healing, nonhealing, possible need for surgical intervention if infection occurs.  Patient and family counseled on clinical signs of infection and they will present to the ED for further evaluation if these occur  Follow-up 2 weeks for reevaluation    Subjective:   HPI  89-year-old male who presents for evaluation management of left posterior heel pressure injury and left great toe pressure injury.  Present for approximately 4 weeks.  Patient was seen at the podiatry office and referred to wound care for further evaluation and management.  He is currently not ambulatory except for short distances to the bathroom as he has an upper extremity fracture and cannot use his walker.  Patient has been applying Betadine paint to great toe.  They have not been applying any dressing to the posterior heel as they feel the blister never burst did however they deny presence of blister at this time.  Patient denies any pain.  They have been using Prevalon boot as directed.  Patient denies any redness, swelling, or drainage from the areas.  He denies any nausea, vomiting,  fever, chills, shortness of breath  The following portions of the patient's history were reviewed and updated as appropriate:   Patient Active Problem List   Diagnosis    Bladder cancer ([facility])    Lung nodule, solitary    Erectile dysfunction    Gait instability    High blood pressure    Hypothyroidism    Idiopathic peripheral neuropathy    Bilateral exudative age-related macular degeneration ([facility])    Cervical stenosis of spinal canal    Spinal stenosis of lumbar region    Spondylosis 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    Hoarse voice quality    Weight loss    Gastroesophageal reflux disease without esophagitis    Nausea    Chronic obstructive pulmonary disease with acute exacerbation (HCC)    Von Willebrand's disease ([facility])    Radiculopathy, lumbar region    Fall    Anemia    Contusion of cerebral cortex with concussion ([facility])    Urinary frequency    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, initial encounter ([facility])    Leg edema, left    Venous insufficiency    PAD (peripheral artery disease) ([facility])    Open wound of second toe of left foot, initial encounter    Hyperlipidemia    Smoking    Dyspepsia    Early satiety    Delayed gastric emptying    Chronic idiopathic constipation    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 ([facility])    Non-pressure chronic ulcer of other part of left foot with necrosis of bone ([facility])     Past Medical History:   Diagnosis Date    Anemia     Appendicitis     Coronary artery disease     Detached retina     GERD (gastroesophageal reflux disease)     Hyperlipidemia     Hypertension     Macular degeneration     Neuropathy     Pelvis fracture ([facility]) 01/2021    Von Willebrand disease ([facility])      Past Surgical History:   Procedure Laterality Date    ADENOIDECTOMY      APPENDECTOMY      ARTHRODESIS  01/15/2014    Lumbar     BACK SURGERY      CATARACT EXTRACTION      COLONOSCOPY  12/09/2016    fiberoptic     CYSTOSCOPY      with  resection of tumor / 1/12/17, 10/2/17 / diagnostic 12/8/16, 5/30/17     CYSTOSCOPY  09/20/2018    CYSTOSCOPY  06/22/2020    EGD AND COLONOSCOPY N/A 12/9/2016    Procedure: EGD AND COLONOSCOPY;  Surgeon: Kahlil Alfonso MD;  Location: [facility];  Service:     EYE SURGERY      FRACTURE SURGERY Bilateral     ARM    NECK SURGERY      NEUROPLASTY / TRANSPOSITION MEDIAN NERVE AT CARPAL TUNNEL Right 04/2015    OK AMPUTATION METATARSAL W/TOE SINGLE Left 4/11/2024    Procedure: LEFT 2ND TOE AMPUTATION;  Surgeon: Vicente Barrientos DPM;  Location: [facility];  Service: Podiatry    OK BLADDER INSTILLATION ANTICARCINOGENIC AGENT N/A 1/12/2017    Procedure: INSTILLATION MYTOMYCIN;  Surgeon: Jhon Zuniga MD;  Location: [facility];  Service: Urology    OK BLADDER INSTILLATION ANTICARCINOGENIC AGENT N/A 10/2/2017    Procedure: INSTILLATION MYTOMYCIN;  Surgeon: Jhon Zuniga MD;  Location: [facility];  Service: Urology    OK COLONOSCOPY FLX DX W/COLLJ SPEC WHEN PFRMD N/A 2/5/2019    Procedure: COLONOSCOPY;  Surgeon: Abad Valentino MD;  Location: [facility];  Service: Gastroenterology    OK CYSTO W/REMOVAL OF LESIONS SMALL N/A 1/12/2017    Procedure: CYSTOSCOPY, BLADDER BIOPSY WITH FULGRATION, 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;  Surgeon: Jhon Zuniga MD;  Location: [facility];  Service: Urology    OK CYSTO W/REMOVAL OF LESIONS SMALL N/A 10/2/2017    Procedure: CYSTOSCOPY WITH  BLADDER BIOPSIES WITH FULGURATION;  Surgeon: Jhon Zuniga MD;  [facility]: [facility];  Service: Urology    OK CYSTO W/REMOVAL OF LESIONS SMALL N/A 11/30/2023    Procedure: CYSTOSCOPY WITH BIOPSIES and fulguration;  Surgeon: Adalberto Rasmussen MD;  Location: [facility];  Service: Urology    OK ESOPHAGOGASTRODUODENOSCOPY TRANSORAL DIAGNOSTIC N/A 8/3/2018    Procedure: ESOPHAGOGASTRODUODENOSCOPY (EGD);  Surgeon: Abad Valentino MD;  [facility]: [facility];  Service: Gastroenterology    RETINAL DETACHMENT SURGERY Right  03/2016    complete air fill confirmed     ROTATOR CUFF REPAIR      SHOULDER SURGERY  03/03/2015    proximal humerus fracture / LA...3/6/15   R...1/3/18     TONSILECTOMY AND ADNOIDECTOMY      TONSILLECTOMY      TRANSURETHRAL RESECTION OF BLADDER TUMOR N/A 10/2/2017    Procedure: TRANSURETHRAL RESECTION OF BLADDER TUMOR (TURBT);  Surgeon: Jhon Zuniga MD;  [facility]: [facility];  Service: Urology    VON WILLEBRAND ANTIGEN (HISTORICAL)       Social History     Socioeconomic History    Marital status: /Civil Union     Spouse name: Not on file    Number of children: Not on file    Years of education: Not on file    Highest education level: Not on file   Occupational History    Occupation:    retired   Tobacco Use    Smoking status: Every Day     Current packs/day: 0.25     Average packs/day: 0.3 packs/day for 60.0 years (15.0 ttl pk-yrs)     Types: Cigarettes    Smokeless tobacco: Never    Tobacco comments:     smokes a pipe   Vaping Use    Vaping status: Never Used   Substance and Sexual Activity    Alcohol use: Yes     Alcohol/week: 2.0 standard drinks of alcohol     Types: 1 Glasses of wine, 1 Shots of liquor per week     Comment: Occ    Drug use: Never    Sexual activity: Not Currently   Other Topics Concern    Not on file   Social History Narrative    No advance directives     Patient has living will      Social Drivers of Health     Financial Resource Strain: Low Risk  (10/5/2023)    Overall Financial Resource Strain (CARDIA)     Difficulty of Paying Living Expenses: Not hard at all   Food Insecurity: No Food Insecurity (10/28/2024)    Hunger Vital Sign     Worried About Running Out of Food in the Last Year: Never true     Ran Out of Food in the Last Year: Never true   Transportation Needs: No Transportation Needs (3/19/2025)    OASIS : Transportation     Lack of Transportation (Medical): No     Lack of Transportation (Non-Medical): No     Patient Unable or Declines to Respond: No    Physical Activity: Not on file   Stress: Not on file   Social Connections: Unknown (6/18/2024)    Received from [facility]     How often do you feel lonely or isolated from those around you? (Adult - for ages 18 years and over): Not on file   Intimate Partner Violence: Not on file   Housing Stability: Unknown (10/28/2024)    Housing Stability Vital Sign     Unable to Pay for Housing in the Last Year: No     Number of Times Moved in the Last Year: Not on file     Homeless in the Last Year: No        Current Outpatient Medications:     acetaminophen (TYLENOL) 325 mg tablet, Take 2 tablets (650 mg total) by mouth every 6 (six) hours as needed for mild pain, headaches or fever, Disp:  , Rfl: 0    ALPHA LIPOIC ACID-BIOTIN PO, Take 1 tablet by mouth in the morning per bottle & pt/partner: 300mg 1 tab BID. , Disp: , Rfl:     atorvastatin (LIPITOR) 10 mg tablet, Take 1 tablet (10 mg total) by mouth every other day, Disp: 45 tablet, Rfl: 3    B COMPLEX VITAMINS PO, Take 1 tablet by mouth daily, Disp: , Rfl:     Calcium-Vitamin D-Vitamin K (CHEWABLE CALCIUM PO), Take 500 mg by mouth daily. not on AVS; per partner/bottle., Disp: , Rfl:     cetirizine (ZyrTEC) 10 mg tablet, TAKE ONE (1) TABLET (10 MG TOTAL) BY MOUTH DAILY, Disp: 90 tablet, Rfl: 1    Cholecalciferol (VITAMIN D3) 2000 UNITS capsule, Take 1 capsule by mouth 2 (two) times a day 5000 units per partner/bottle., Disp: , Rfl:     Coenzyme Q10 (CO Q10) 60 MG CAPS, Take 1 capsule by mouth daily per pt/partner & bottle: 100mg 1 tab daily. , Disp: , Rfl:     fluocinolone (SYNALAR) 0.01 % external solution, Apply 1 Application topically 2 (two) times a day pt not taking per report; therefore, unable to speficy location, Disp: , Rfl:     gabapentin (NEURONTIN) 600 MG tablet, TAKE 1 TABLET BY MOUTH TWICE  DAILY, Disp: 180 tablet, Rfl: 1    Ginkgo Biloba 120 MG TABS, Take 1 tablet by mouth daily pt taking and per bottle: 60mg 1 tab daily., Disp: , Rfl:      hydrocortisone-pramoxine (PRAMOSONE) 1-2.5 % LOTN, Apply topically 3 (three) times a day, Disp: 118 mL, Rfl: 2    ibuprofen (MOTRIN) 600 mg tablet, Take 1 tablet (600 mg total) by mouth every 6 (six) hours as needed for mild pain, Disp: 30 tablet, Rfl: 0    ketoconazole (NIZORAL) 2 % shampoo, Apply 1 Application topically Uses when washing hair. , Disp: , Rfl:     lutein 6 mg, Take 1 capsule by mouth daily per pt/bottle: 40mg 1 tab daily., Disp: , Rfl:     Magnesium 500 MG CAPS, Take 1 capsule by mouth daily. not on AVS; per partner., Disp: , Rfl:     metoprolol tartrate (LOPRESSOR) 25 mg tablet, TAKE 1 TABLET BY MOUTH EVERY 12  HOURS, Disp: 180 tablet, Rfl: 3    MILK THISTLE PO, Take 1 tablet by mouth daily 250mg per bottle in home, Disp: , Rfl:     Misc Natural Products (SUPER SNOOZE) CAPS, Take 1 capsule by mouth daily Bed time , Disp: , Rfl:     Multiple Vitamins-Minerals (OCUVITE-LUTEIN) CAPS, Take 1 capsule by mouth daily per partner--taking 1 tab BID., Disp: , Rfl:     omeprazole (PriLOSEC) 40 MG capsule, Take 1 capsule (40 mg total) by mouth daily, Disp: 180 capsule, Rfl: 0    pentoxifylline (TRENtal) 400 mg ER tablet, Take 1 tablet (400 mg total) by mouth 3 (three) times a day with meals, Disp: 270 tablet, Rfl: 3    Propylene Glycol-Glycerin (Artificial Tears) 1-0.3 % SOLN, Apply 2 drops to eye as needed (pt reports uses when gets shots in eyes). not on AVS; per pt report.  pt reports using PRN when getting shot in eye- using in either eye (B)., Disp: , Rfl:     psyllium (METAMUCIL) 58.6 % packet, Take 1 packet by mouth daily unable to view bottle for further instructions.  per [facility] coding: add mix briskly in 8oz cold liquid., Disp: , Rfl:     sertraline (ZOLOFT) 25 mg tablet, Take 1 tablet (25 mg total) by mouth daily, Disp: 30 tablet, Rfl: 3    sildenafil (VIAGRA) 50 MG tablet, pt reports not taking.  50mg. no further instructions present on AVS, Disp: , Rfl:     tadalafil (CIALIS) 20 MG tablet, Take 1  tablet (20 mg total) by mouth every other day as needed for erectile dysfunction, Disp: 18 tablet, Rfl: 3    trospium (SANCTURA XR) 60 mg 24 hr capsule, Take 1 capsule (60 mg total) by mouth daily before breakfast, Disp: 90 capsule, Rfl: 3    vitamin B-12 (VITAMIN B-12) 500 mcg tablet, not taking per partner as taking B complex. no further insructions provided. oral. 500mg., Disp: , Rfl:     Zinc 50 MG TABS, Take 1 tablet by mouth daily, Disp: , Rfl:     Inositol 500 MG TABS, Take 1 tablet by mouth 2 (two) times a day, Disp: , Rfl:     ipratropium (ATROVENT) 0.03 % nasal spray, 2 sprays into each nostril 3 (three) times a day, Disp: 150 mL, Rfl: 3    trospium chloride (SANCTURA) 20 mg tablet, Take 1 tablet (20 mg total) by mouth 2 (two) times a day (Patient not taking: Reported on 12/9/2024), Disp: 180 tablet, Rfl: 3  Family History   Problem Relation Age of Onset    Ulcers Father         acute gastric    Heart disease Mother     Anuerysm Sister       Review of Systems  Constitutional:  Negative for chills and fever.   Respiratory:  Negative for shortness of breath.    Cardiovascular:  Positive for leg swelling.   Musculoskeletal:  Positive for arthralgias, back pain and gait problem.   Skin:  Positive for color change and wound.        Objective:  /70   Pulse 78   Temp (!) 97 °F (36.1 °C)   Resp 18   Ht 6' (1.829 m)   Wt 74.8 kg (165 lb)   BMI 22.38 kg/m²     Physical Exam    Constitutional:       General: He is not in acute distress.  Cardiovascular:      Comments: Pulse 1/4, PT pulse 0/4  CRT less than 3 seconds to distal digits  Skin temperature gradient decreases from knees to digits bilaterally  Absent pedal hair growth  Skin thin     Musculoskeletal:      Comments: Dorsiflexion/plantarflexion at ankle and distal digits limited  Tender with palpation of left posterior heel and area of pressure injury.  No pain with palpation or range of motion of left great toe  No ecchymosis noted, no significant  edema noted   Skin:     Comments: Left posterior heel serous bulla approximately 7 cm x 7 cm without eschar at today's evaluation.  Minimal serous fluid.  No significant surrounding erythema or edema  Left great toe distal ulceration with primarily dry granular base.  2 dry granular abrasions noted dorsal aspect of great toe.  No surrounding erythema or edema, no crepitus, no fluctuance, no malodor     Bilateral skin dry.  Nails thickened, dystrophic, and discolored     Wound 03/19/25 Heel Left (Active)   Wound Image   04/01/25 1442       Wound 03/19/25 Toe D1, great Distal;Right (Active)   Wound Image   04/01/25 1446   Wound Description Eschar 04/01/25 1446   Wound Length (cm) 0.5 cm 04/01/25 1446   Wound Width (cm) 0.3 cm 04/01/25 1446   Wound Depth (cm) 0.1 cm 04/01/25 1446   Wound Surface Area (cm^2) 0.15 cm^2 04/01/25 1446   Wound Volume (cm^3) 0.015 cm^3 04/01/25 1446   Calculated Wound Volume (cm^3) 0.02 cm^3 04/01/25 1446   Drainage Amount MADI 04/01/25 1446   Susy-wound Assessment Callus;Dry 04/01/25 1446       Wound 03/19/25 Toe D1, great Anterior;Medial;Mid;Right (Active)   Wound Image   04/01/25 1447   Wound Description Eschar 04/01/25 1448   Wound Length (cm) 0.3 cm 04/01/25 1448   Wound Width (cm) 0.7 cm 04/01/25 1448   Wound Depth (cm) 0.1 cm 04/01/25 1448   Wound Surface Area (cm^2) 0.21 cm^2 04/01/25 1448   Wound Volume (cm^3) 0.021 cm^3 04/01/25 1448   Calculated Wound Volume (cm^3) 0.02 cm^3 04/01/25 1448   Drainage Amount MADI 04/01/25 1448   Susy-wound Assessment Dry;Scaly 04/01/25 1448       Wound 03/19/25 Toe D1, great Anterior;Medial;Proximal;Right (Active)   Wound Image   04/01/25 1448   Wound Description Epithelialization;Brown 04/01/25 1448   Non-staged Wound Description Partial thickness 04/01/25 1448   Wound Length (cm) 0.1 cm 04/01/25 1448   Wound Width (cm) 0.1 cm 04/01/25 1448   Wound Depth (cm) 0.1 cm 04/01/25 1448   Wound Surface Area (cm^2) 0.01 cm^2 04/01/25 1448   Wound Volume  (cm^3) 0.001 cm^3 04/01/25 1448   Calculated Wound Volume (cm^3) 0 cm^3 04/01/25 1448   Drainage Amount MADI 04/01/25 1448   Susy-wound Assessment Dry;Scaly 04/01/25 1448                       Procedures           Wound Instructions:  Orders Placed This Encounter   Procedures    Wound cleansing and dressings     Wash your hands with soap and water.  Remove old dressing, discard into plastic bag and place in trash.  Cleanse the wound with mild soap and water  prior to applying a clean dressing. Do not use tissue or cotton balls. Do not scrub the wound. Pat dry using gauze.  Shower no do  Apply betadine  to the left heel and great toe wounds.  May leave open to air  Complete daily  Off-loading Instructions:    Keep weight and pressure off wound at all times.  Use prevalon boot when in bed do not walk in prevalon boot   Use pillows to elevate feet   If you develop fever, chills, nausea or vomiting, increase in drainage or foul smelling drainage go to the closest emergency department for evaluation.      Please try to consume 3-4 servings of protein (30g) each day.   - Each serving of protein should contain about 30 mg protein.   - Good sources of protein include, lean meats (fish, chicken, etc), eggs, dairy products (yogurt, cheese), tofu, legumes (chickpeas, lentils, peas, black beans), nuts (cashew, walnut, peanuts, etc), & quinoa.       [facility]  Follow up in  2 weeks     Standing Status:   Future     Expected Date:   4/8/2025     Expiration Date:   4/8/2025    Ambulatory referral to Vascular Surgery     Standing Status:   Future     Expiration Date:   4/1/2026     Referral Priority:   Routine     Referral Type:   Consult - AMB     Referral Reason:   Specialty Services Required     Requested Specialty:   Vascular Surgery     Number of Visits Requested:   1     Expiration Date:   4/1/2026         Tracey Fernandes DPM    Portions of the record may have been created with voice  "recognition software. Occasional wrong word or \"sound a like\" substitutions may have occurred due to the inherent limitations of voice recognition software. Read the chart carefully and recognize, using context, where substitutions have occurred.     "

## 2025-04-01 NOTE — PATIENT INSTRUCTIONS
Orders Placed This Encounter   Procedures    Wound cleansing and dressings     Wash your hands with soap and water.  Remove old dressing, discard into plastic bag and place in trash.  Cleanse the wound with mild soap and water  prior to applying a clean dressing. Do not use tissue or cotton balls. Do not scrub the wound. Pat dry using gauze.  Shower no do  Apply betadine  to the left heel and great toe wounds.  May leave open to air  Complete daily  Off-loading Instructions:    Keep weight and pressure off wound at all times.  Use prevalon boot when in bed do not walk in prevalon boot   Use pillows to elevate feet   If you develop fever, chills, nausea or vomiting, increase in drainage or foul smelling drainage go to the closest emergency department for evaluation.      Please try to consume 3-4 servings of protein (30g) each day.   - Each serving of protein should contain about 30 mg protein.   - Good sources of protein include, lean meats (fish, chicken, etc), eggs, dairy products (yogurt, cheese), tofu, legumes (chickpeas, lentils, peas, black beans), nuts (cashew, walnut, peanuts, etc), & quinoa.       Obtain vascular studies as ordered by [facility]  Follow up in  2 weeks     Standing Status:   Future     Expected Date:   4/8/2025     Expiration Date:   4/8/2025    Ambulatory referral to Vascular Surgery     Standing Status:   Future     Expiration Date:   4/1/2026     Referral Priority:   Routine     Referral Type:   Consult - AMB     Referral Reason:   Specialty Services Required     Requested Specialty:   Vascular Surgery     Number of Visits Requested:   1     Expiration Date:   4/1/2026

## 2025-04-03 ENCOUNTER — PROCEDURE VISIT (OUTPATIENT)
Dept: UROLOGY | Facility: CLINIC | Age: 89
End: 2025-04-03
Payer: MEDICARE

## 2025-04-03 VITALS
HEIGHT: 72 IN | BODY MASS INDEX: 22.35 KG/M2 | HEART RATE: 58 BPM | DIASTOLIC BLOOD PRESSURE: 60 MMHG | TEMPERATURE: 97.2 F | OXYGEN SATURATION: 96 % | RESPIRATION RATE: 16 BRPM | WEIGHT: 165 LBS | SYSTOLIC BLOOD PRESSURE: 118 MMHG

## 2025-04-03 DIAGNOSIS — N32.81 OAB (OVERACTIVE BLADDER): ICD-10-CM

## 2025-04-03 DIAGNOSIS — R35.0 URINARY FREQUENCY: Primary | ICD-10-CM

## 2025-04-03 DIAGNOSIS — R35.1 NOCTURIA: ICD-10-CM

## 2025-04-03 PROCEDURE — 64566 NEUROELTRD STIM POST TIBIAL: CPT

## 2025-04-03 NOTE — PROGRESS NOTES
4/3/2025    Abdirahman Hope  1935  [id number]    Diagnosis: Urinary frequency, Nocturia, OAB    Chief Complaint    PTNS        Patient presents for PTNS 1 of 12 managed by Dr. Rasmussen. Consent and ABN signed.     Plan  Follow up in 1 week for next weekly PTNS.      Urinary Incontinence Screening      Flowsheet Row Most Recent Value   Urinary Incontinence    Urinary Incontinence? Yes   Incomplete emptying? No   Urinary frequency? Yes   Urinary urgency? Yes   Urinary hesitancy? No   Dysuria (painful difficult urination)? No   Nocturia (waking up to use the bathroom)? No   Straining (having to push to go)? No   Weak stream? No   Intermittent stream? No   Post void dribbling? No          Vitals:    04/03/25 1033   BP: 118/60   Pulse: 58   Resp: 16   Temp: (!) 97.2 °F (36.2 °C)   SpO2: 96%   Weight: 74.8 kg (165 lb)   Height: 6' (1.829 m)         Procedure PTNS    History:  Date onset of symptoms: at least a year ago  Drugs: Tried Trospium but this was ineffective     Session 1 of 12    Ankle used: right  Treatment setting:15  Duration of treatment: 30 minutes  Lead lot #: [id number]  Expiration Date: 9-  Feeling/Response: right ankle/leg     Patient Goals   Decrease Urgency Yes- yes- he wishes to decrease this  Decrease Frequency Yes- he wishes to decrease this  Episodes of incontinence Yes- wishes to decrease this overnight   Sleep Through Night Yes, but wakes up incontinent   Related Health and Social Factors Yes-smokes 6 cigarettes per day     Bladder Diary Summary:  Caffeine cups per day: 1 cup of coffee per day and 1 can of soda per day   Alcohol- number of drinks per day: none recently  Daytime voids: 7  Nighttime voids: he sleeps through the night and wakes up incontinent of urine  Urgency: high- has to get to the bathroom fairly quickly   Incontinence- episodes per day: Patient wears Depends and has to change them once in the morning and once at bedtime     Treatment Plan  Increase Fluids  Yes  Decrease Caffeine No  Decrease FluidsNo  Urge reduction techniques No  Kegels No-explained these to patient and he will try to work on these  Timed voiding No-patient will try this along with double voiding   No fluids before bed No- patient will try cutting off fluids 1 hour prior to bedtime         Alina Contreras RN

## 2025-04-04 ENCOUNTER — HOME CARE VISIT (OUTPATIENT)
Dept: HOME HEALTH SERVICES | Facility: HOME HEALTHCARE | Age: 89
End: 2025-04-04
Payer: MEDICARE

## 2025-04-04 VITALS — DIASTOLIC BLOOD PRESSURE: 70 MMHG | HEART RATE: 96 BPM | OXYGEN SATURATION: 97 % | SYSTOLIC BLOOD PRESSURE: 128 MMHG

## 2025-04-04 PROCEDURE — G0151 HHCP-SERV OF PT,EA 15 MIN: HCPCS

## 2025-04-07 ENCOUNTER — OFFICE VISIT (OUTPATIENT)
Dept: OBGYN CLINIC | Facility: CLINIC | Age: 89
End: 2025-04-07
Payer: MEDICARE

## 2025-04-07 ENCOUNTER — APPOINTMENT (OUTPATIENT)
Dept: RADIOLOGY | Facility: MEDICAL CENTER | Age: 89
End: 2025-04-07
Payer: MEDICARE

## 2025-04-07 VITALS
TEMPERATURE: 97.1 F | RESPIRATION RATE: 16 BRPM | OXYGEN SATURATION: 98 % | BODY MASS INDEX: 22.35 KG/M2 | HEIGHT: 72 IN | HEART RATE: 52 BPM | WEIGHT: 165 LBS

## 2025-04-07 DIAGNOSIS — S42.292A OTHER CLOSED DISPLACED FRACTURE OF PROXIMAL END OF LEFT HUMERUS, INITIAL ENCOUNTER: ICD-10-CM

## 2025-04-07 DIAGNOSIS — S42.292A OTHER CLOSED DISPLACED FRACTURE OF PROXIMAL END OF LEFT HUMERUS, INITIAL ENCOUNTER: Primary | ICD-10-CM

## 2025-04-07 PROCEDURE — 73030 X-RAY EXAM OF SHOULDER: CPT

## 2025-04-07 PROCEDURE — 99213 OFFICE O/P EST LOW 20 MIN: CPT | Performed by: STUDENT IN AN ORGANIZED HEALTH CARE EDUCATION/TRAINING PROGRAM

## 2025-04-07 NOTE — PROGRESS NOTES
Assessment & Plan  Other closed displaced fracture of proximal end of left humerus, initial encounter  89-year-old male approximately 6 weeks out from a left proximal humerus fracture with non-operative management. X-rays obtained from today's visit show stable alignment with stable apex anterior and retroversion, mild interval healing. Physical exam demonstrates that the left shoulder is moving together as a unit. Patient was advised that he can use his left upper extremity as tolerated with help of in-home physical therapy. Discussed with the patient that the non-operative management is still indicated. Discussed continued treatment protocol and overall healing prognosis with the patient and his caretaker at time of visit.  At this time, the patient will continue with physical therapy for gentle range of motion and strengthening of the left shoulder. He will continue with a non-operative proximal humerus fracture protocol.  Recommended that he continue exercises on his own on the days that he does not have PT coming into the home. Otherwise the patient will follow-up with me in 5-6 weeks for repeat clinical evaluation and left shoulder x-rays at that time. All questions were answered to the patient and his family.      Orders:    XR shoulder 2+ vw left; Future             General  Subjective    Abdirahman Hope is a right hand dominant 89 y.o. male who presents for follow-up of left shoulder pain.    Patient was last seen on 3/17/25 and diagnosed with closed displaced proximal humerus fracture. Since the last visit he has been doing well overall however he states that he has difficulty with abduction due to an old elbow injury..     The patient rates the pain as a 3/10. The pain is located Anterior and Lateral when performing forward flexion or abduction movements. Patient states that at rest he does not have any pain.       Objective      Vitals:    04/07/25 1132   Pulse: (!) 52   Resp: 16   Temp: (!) 97.1 °F  (36.2 °C)   SpO2: 98%     Body mass index is 22.38 kg/m².  Physical Exam    Shoulder Exam    Affected shoulder:   left      On examination of the right shoulder the skin is intact there is no open wounds.    There is resolving ecchymosis and edema around the proximal humerus.    Passive FE to 100 degrees without pain  On rotation the shoulder appears to be moving as a unit  His sensations intact to light touch over the axillary, median, radial, and ulnar nerve distributions.    He has 5 out of 5 strength with AIN, PIN, and ulnar nerve testing.  Able to fire anterior and posterior deltoids.  There is a chronic deformity about the left elbow.      Distally the patient's neurovascular status is normal.    Additional exam: N/a    Review of Prior Testing:    I independently interpreted the following test:     left shoulder x-ray images done on 4/7/25:  Multiple views show stable alignment from prior imaging with apex anterior fracture with head retroversion, mild interval healing         Follow Up: Return in about 5 weeks (around 5/12/2025) for Recheck, Repeat X-ray left shoulder.    All questions answered and patient agrees with plan.    I have spent a total time of 20 minutes in caring for this patient on the day of the visit/encounter including Diagnostic results, Risks and benefits of tx options, Instructions for management, Patient and family education, Impressions, Documenting in the medical record, Reviewing/placing orders in the medical record (including tests, medications, and/or procedures), and Obtaining or reviewing history  .      Scribe Attestation      I,:  Gloria Khalil am acting as a scribe while in the presence of the attending physician.:       I,:  Shimon Valladares MD personally performed the services described in this documentation    as scribed in my presence.:

## 2025-04-08 ENCOUNTER — HOME CARE VISIT (OUTPATIENT)
Dept: HOME HEALTH SERVICES | Facility: HOME HEALTHCARE | Age: 89
End: 2025-04-08
Payer: MEDICARE

## 2025-04-08 VITALS — HEART RATE: 57 BPM | DIASTOLIC BLOOD PRESSURE: 62 MMHG | OXYGEN SATURATION: 97 % | SYSTOLIC BLOOD PRESSURE: 118 MMHG

## 2025-04-08 PROCEDURE — G0151 HHCP-SERV OF PT,EA 15 MIN: HCPCS

## 2025-04-10 ENCOUNTER — PROCEDURE VISIT (OUTPATIENT)
Dept: UROLOGY | Facility: CLINIC | Age: 89
End: 2025-04-10
Payer: MEDICARE

## 2025-04-10 VITALS
RESPIRATION RATE: 18 BRPM | DIASTOLIC BLOOD PRESSURE: 70 MMHG | TEMPERATURE: 97 F | HEIGHT: 72 IN | BODY MASS INDEX: 22.35 KG/M2 | SYSTOLIC BLOOD PRESSURE: 124 MMHG | OXYGEN SATURATION: 98 % | HEART RATE: 56 BPM | WEIGHT: 165 LBS

## 2025-04-10 DIAGNOSIS — R35.0 URINARY FREQUENCY: Primary | ICD-10-CM

## 2025-04-10 DIAGNOSIS — N32.81 OAB (OVERACTIVE BLADDER): ICD-10-CM

## 2025-04-10 DIAGNOSIS — R35.1 NOCTURIA: ICD-10-CM

## 2025-04-10 PROCEDURE — 64566 NEUROELTRD STIM POST TIBIAL: CPT

## 2025-04-10 NOTE — PROGRESS NOTES
I supervised the Advanced Practitioner on . I reviewed the Advanced Practitioner note and agree.    Adalberto Rasmussen MD 04/10/25

## 2025-04-10 NOTE — PROGRESS NOTES
4/10/2025    Abdirahman Hope  1935  [id number]    Diagnosis: Urinary frequency, Nocturia, OAB      Chief Complaint    PTNS        Patient presents for PTNS 2 of 12 managed by Dr. Rasmussen.     Plan  Follow up in 1 week for next weekly PTNS     Urinary Incontinence Screening      Flowsheet Row Most Recent Value   Urinary Incontinence    Urinary Incontinence? Yes   Incomplete emptying? Yes  [sometimes]   Urinary frequency? Yes   Urinary urgency? Yes   Urinary hesitancy? No   Dysuria (painful difficult urination)? No   Nocturia (waking up to use the bathroom)? No   Straining (having to push to go)? No   Weak stream? Yes   Intermittent stream? No   Post void dribbling? No          Vitals:    04/10/25 1121   BP: 124/70   Pulse: 56   Resp: 18   Temp: (!) 97 °F (36.1 °C)   SpO2: 98%   Weight: 74.8 kg (165 lb)   Height: 6' (1.829 m)         Procedure PTNS    Session 2 of 12    Ankle used: right  Treatment settin  Duration of treatment: 30 minutes  Lead lot #: [id number]  Expiration Date: 9-  Feeling/Response: right heel     Patient Progress  Decreased Urgency No  Decreased Frequency No  Episodes of incontinence No- no change   Sleep Through Night Yes  Related Health and Social Factors Yes    Bladder Diary Summary:  Caffeine cups per day:1 cup of coffee per day, 1 small can of soda per day  Alcohol- number of drinks per day: 0  Daytime voids: every 1-2 hours   Nighttime voids: patient sleeps through the night and wakes up incontinent of urine  Urgency:pretty high- sometimes he gets the urge but doesn't have time to get to the bathroom   Incontinence- episodes per day: patient wears Depends and changes them twice a day     Treatment Plan  Increase Fluids Yes-water   Decrease Caffeine No  Decrease FluidsNo  Urge reduction techniques No  Kegels Yes- he tried these a little bit   Timed voiding No  No fluids before bed Yes          Alina Contreras RN

## 2025-04-11 ENCOUNTER — HOME CARE VISIT (OUTPATIENT)
Dept: HOME HEALTH SERVICES | Facility: HOME HEALTHCARE | Age: 89
End: 2025-04-11
Payer: MEDICARE

## 2025-04-11 VITALS — OXYGEN SATURATION: 96 % | SYSTOLIC BLOOD PRESSURE: 120 MMHG | HEART RATE: 81 BPM | DIASTOLIC BLOOD PRESSURE: 60 MMHG

## 2025-04-11 PROCEDURE — G0151 HHCP-SERV OF PT,EA 15 MIN: HCPCS

## 2025-04-14 ENCOUNTER — HOME CARE VISIT (OUTPATIENT)
Dept: HOME HEALTH SERVICES | Facility: HOME HEALTHCARE | Age: 89
End: 2025-04-14
Payer: MEDICARE

## 2025-04-14 VITALS — SYSTOLIC BLOOD PRESSURE: 124 MMHG | OXYGEN SATURATION: 98 % | HEART RATE: 60 BPM | DIASTOLIC BLOOD PRESSURE: 60 MMHG

## 2025-04-14 DIAGNOSIS — F41.1 GENERALIZED ANXIETY DISORDER: ICD-10-CM

## 2025-04-14 PROCEDURE — G0151 HHCP-SERV OF PT,EA 15 MIN: HCPCS

## 2025-04-15 ENCOUNTER — OFFICE VISIT (OUTPATIENT)
Dept: WOUND CARE | Facility: CLINIC | Age: 89
End: 2025-04-15

## 2025-04-15 VITALS
HEART RATE: 64 BPM | DIASTOLIC BLOOD PRESSURE: 60 MMHG | RESPIRATION RATE: 18 BRPM | TEMPERATURE: 96.1 F | SYSTOLIC BLOOD PRESSURE: 124 MMHG

## 2025-04-15 DIAGNOSIS — L97.521 CHRONIC ULCER OF TOE OF LEFT FOOT, LIMITED TO BREAKDOWN OF SKIN (HCC): Primary | ICD-10-CM

## 2025-04-15 NOTE — PROGRESS NOTES
Wound Procedure Treatment Distal;Left Toe D1, great    Performed by: Angela Montgomery RN  Authorized by: Tracey Fernandes DPM  Associated wounds:   Wound 03/19/25 Toe D1, great Distal;Left    Applied topical:  Betadine

## 2025-04-15 NOTE — PROGRESS NOTES
Patient ID: Abdirahman Hope is a 89 y.o. male Date of Birth 1935       Chief Complaint   Patient presents with    Follow Up Wound Care Visit       Allergies:  Patient has no known allergies.    Diagnosis:  1. 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   LLE posterior heel pressure injury healed  LLE distal great toe pressure injury with stable eschar  LLE medial great toe pressure injury healed  PVD       Left posterior heel and dorsal great toe wounds healed at today's evaluation  Left GT distal wound improved since last exam, no SOI, selective debridement performed  Continue to paint distal great toe with Betadine  Patient encouraged to use occlusive moisturizer such as Vaseline and Aquaphor on prior wound sites  Patient counseled on left lower extremity wounds.  Patient counseled at length on pressure injuries and resulting wound formation.  Patient was counseled that he is at high risk for further infection, surgical intervention, limb loss due to history of osteomyelitis and comorbidities  XR 3/24/2025 negative for acute osseous abnormalities  Continue with Prevalon boots to offload posterior heel and protect toes.  Offload heels with additional pillows  Heel should be floating at all times without pressure on skin  4/1/2025 wound care office ABIs RLE 0.5 to, LLE 0.88  Rx vas arterial studies at last exam, patient has not scheduled  Rx referral to vascular surgery at last exam, patient has  not scheduled  Patient counseled at length on peripheral vascular disease  Patient counseled decreased healing potential.  Reviewed delayed healing, nonhealing, possible need for surgical intervention if infection occurs.  Patient and family counseled on clinical signs of infection and they will present to the ED for further evaluation if these occur  Follow-up 2 weeks for reevaluation    Subjective:   HPI      4/15/25 presents for left posterior heel DTI, left GT DTI.  Patient and family states they have continued with Betadine to all wounds and protective dressings.  Patient has been using offloading boot and trying to keep wounds offloaded.  He denies any increasing redness, swelling, or pain.  Patient has not scheduled vascular studies or an appointment with vascular surgery for further evaluation.    4/1/2025 89-year-old male who presents for evaluation management of left posterior heel pressure injury and left great toe pressure injury. Present for approximately 4 weeks. Patient was seen at the podiatry office and referred to wound care for further evaluation and management. He is currently not ambulatory except for short distances to the bathroom as he has an upper extremity fracture and cannot use his walker. Patient has been applying Betadine paint to great toe. They have not been applying any dressing to the posterior heel as they feel the blister never burst did however they deny presence of blister at this time. Patient denies any pain. They have been using Prevalon boot as directed. Patient denies any redness, swelling, or drainage from the areas. He denies any nausea, vomiting, fever, chills, shortness of breath     The following portions of the patient's history were reviewed and updated as appropriate:   Patient Active Problem List   Diagnosis    Bladder cancer (HCC)    Lung nodule, solitary    Erectile dysfunction    Gait instability    High blood pressure    Hypothyroidism    Idiopathic peripheral  neuropathy    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)    Cervical stenosis of spinal canal    Spinal stenosis of lumbar region    Spondylosis 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    Hoarse voice quality    Weight loss    Gastroesophageal reflux disease without esophagitis    Nausea    Chronic obstructive pulmonary disease with acute exacerbation (HCC)    Von Willebrand's disease (HCC)    Radiculopathy, lumbar region    Fall    Anemia    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)    Urinary frequency    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, initial encounter ([facility])    Leg edema, left    Venous insufficiency    PAD (peripheral artery disease) ([facility])    Open wound of second toe of left foot, initial encounter    Hyperlipidemia    Smoking    Dyspepsia    Early satiety    Delayed gastric emptying    Chronic idiopathic constipation    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 ([facility])    Non-pressure chronic ulcer of other part of left foot with necrosis of bone ([facility])     Past Medical History:   Diagnosis Date    Anemia     Appendicitis     Coronary artery disease     Detached retina     GERD (gastroesophageal reflux disease)     Hyperlipidemia     Hypertension     Macular degeneration     Neuropathy     Pelvis fracture (HCC) 01/2021    Von Willebrand disease ([facility])      Past Surgical History:   Procedure Laterality Date    ADENOIDECTOMY      APPENDECTOMY      ARTHRODESIS  01/15/2014    Lumbar     BACK SURGERY      CATARACT EXTRACTION      COLONOSCOPY  12/09/2016    fiberoptic     CYSTOSCOPY      with resection of tumor / 1/12/17, 10/2/17 / diagnostic 12/8/16, 5/30/17     CYSTOSCOPY  09/20/2018    CYSTOSCOPY  06/22/2020    EGD AND COLONOSCOPY N/A 12/9/2016    Procedure: EGD AND COLONOSCOPY;  Surgeon: Kahlil Alfonso MD;  Location: [facility];  Service:     EYE SURGERY      FRACTURE SURGERY Bilateral     ARM    NECK SURGERY       NEUROPLASTY / TRANSPOSITION MEDIAN NERVE AT CARPAL TUNNEL Right 04/2015    CA AMPUTATION METATARSAL W/TOE SINGLE Left 4/11/2024    Procedure: LEFT 2ND TOE AMPUTATION;  Surgeon: Vicente Barrientos DPM;  Location: [facility];  Service: Podiatry    CA BLADDER INSTILLATION ANTICARCINOGENIC AGENT N/A 1/12/2017    Procedure: INSTILLATION MYTOMYCIN;  Surgeon: Jhon Zuniga MD;  Location: [facility];  Service: Urology    CA BLADDER INSTILLATION ANTICARCINOGENIC AGENT N/A 10/2/2017    Procedure: INSTILLATION MYTOMYCIN;  Surgeon: Jhon Zuniga MD;  Location: [facility];  Service: Urology    CA COLONOSCOPY FLX DX W/COLLJ SPEC WHEN PFRMD N/A 2/5/2019    Procedure: COLONOSCOPY;  Surgeon: Abad Valentino MD;  Location: [facility];  Service: Gastroenterology    CA CYSTO W/REMOVAL OF LESIONS SMALL N/A 1/12/2017    Procedure: CYSTOSCOPY, BLADDER BIOPSY WITH FULGRATION, 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;  Surgeon: Jhon Zuniga MD;  Location: [facility];  Service: Urology    CA CYSTO W/REMOVAL OF LESIONS SMALL N/A 10/2/2017    Procedure: CYSTOSCOPY WITH  BLADDER BIOPSIES WITH FULGURATION;  Surgeon: Jhon Zuniga MD;  [facility]: [facility];  Service: Urology    CA CYSTO W/REMOVAL OF LESIONS SMALL N/A 11/30/2023    Procedure: CYSTOSCOPY WITH BIOPSIES and fulguration;  Surgeon: Adalberto Rasmussen MD;  Location: [facility];  Service: Urology    CA ESOPHAGOGASTRODUODENOSCOPY TRANSORAL DIAGNOSTIC N/A 8/3/2018    Procedure: ESOPHAGOGASTRODUODENOSCOPY (EGD);  Surgeon: Abad Valentino MD;  Location: [facility];  Service: Gastroenterology    RETINAL DETACHMENT SURGERY Right 03/2016    complete air fill confirmed     ROTATOR CUFF REPAIR      SHOULDER SURGERY  03/03/2015    proximal humerus fracture / LA...3/6/15   R...1/3/18     TONSILECTOMY AND ADNOIDECTOMY      TONSILLECTOMY      TRANSURETHRAL RESECTION OF BLADDER TUMOR N/A 10/2/2017    Procedure: TRANSURETHRAL RESECTION OF BLADDER TUMOR (TURBT);   Surgeon: Jhon Zuniga MD;  Location: [facility];  Service: Urology    VON WILLEBRAND ANTIGEN (HISTORICAL)       Social History     Socioeconomic History    Marital status: /Civil Union     Spouse name: Not on file    Number of children: Not on file    Years of education: Not on file    Highest education level: Not on file   Occupational History    Occupation:    retired   Tobacco Use    Smoking status: Every Day     Current packs/day: 0.25     Average packs/day: 0.3 packs/day for 60.0 years (15.0 ttl pk-yrs)     Types: Cigarettes    Smokeless tobacco: Never    Tobacco comments:     smokes a pipe   Vaping Use    Vaping status: Never Used   Substance and Sexual Activity    Alcohol use: Yes     Alcohol/week: 2.0 standard drinks of alcohol     Types: 1 Glasses of wine, 1 Shots of liquor per week     Comment: Occ    Drug use: Never    Sexual activity: Not Currently   Other Topics Concern    Not on file   Social History Narrative    No advance directives     Patient has living will      Social Drivers of Health     Financial Resource Strain: Low Risk  (10/5/2023)    Overall Financial Resource Strain (CARDIA)     Difficulty of Paying Living Expenses: Not hard at all   Food Insecurity: No Food Insecurity (10/28/2024)    Hunger Vital Sign     Worried About Running Out of Food in the Last Year: Never true     Ran Out of Food in the Last Year: Never true   Transportation Needs: No Transportation Needs (3/19/2025)    OASIS : Transportation     Lack of Transportation (Medical): No     Lack of Transportation (Non-Medical): No     Patient Unable or Declines to Respond: No   Physical Activity: Not on file   Stress: Not on file   Social Connections: Unknown (6/18/2024)    Received from [facility]    Social Fanvibe     How often do you feel lonely or isolated from those around you? (Adult - for ages 18 years and over): Not on file   Intimate Partner Violence: Not on file   Housing Stability: Unknown  (10/28/2024)    Housing Stability Vital Sign     Unable to Pay for Housing in the Last Year: No     Number of Times Moved in the Last Year: Not on file     Homeless in the Last Year: No        Current Outpatient Medications:     acetaminophen (TYLENOL) 325 mg tablet, Take 2 tablets (650 mg total) by mouth every 6 (six) hours as needed for mild pain, headaches or fever, Disp:  , Rfl: 0    ALPHA LIPOIC ACID-BIOTIN PO, Take 1 tablet by mouth in the morning per bottle & pt/partner: 300mg 1 tab BID. , Disp: , Rfl:     atorvastatin (LIPITOR) 10 mg tablet, Take 1 tablet (10 mg total) by mouth every other day, Disp: 45 tablet, Rfl: 3    B COMPLEX VITAMINS PO, Take 1 tablet by mouth daily, Disp: , Rfl:     Calcium-Vitamin D-Vitamin K (CHEWABLE CALCIUM PO), Take 500 mg by mouth daily. not on AVS; per partner/bottle., Disp: , Rfl:     cetirizine (ZyrTEC) 10 mg tablet, TAKE ONE (1) TABLET (10 MG TOTAL) BY MOUTH DAILY, Disp: 90 tablet, Rfl: 1    Cholecalciferol (VITAMIN D3) 2000 UNITS capsule, Take 1 capsule by mouth 2 (two) times a day 5000 units per partner/bottle., Disp: , Rfl:     Coenzyme Q10 (CO Q10) 60 MG CAPS, Take 1 capsule by mouth daily per pt/partner & bottle: 100mg 1 tab daily. , Disp: , Rfl:     fluocinolone (SYNALAR) 0.01 % external solution, Apply 1 Application topically 2 (two) times a day pt not taking per report; therefore, unable to speficy location (Patient not taking: Reported on 4/3/2025), Disp: , Rfl:     gabapentin (NEURONTIN) 600 MG tablet, TAKE 1 TABLET BY MOUTH TWICE  DAILY (Patient not taking: Reported on 4/7/2025), Disp: 180 tablet, Rfl: 1    Ginkgo Biloba 120 MG TABS, Take 1 tablet by mouth daily pt taking and per bottle: 60mg 1 tab daily. (Patient not taking: Reported on 4/7/2025), Disp: , Rfl:     hydrocortisone-pramoxine (PRAMOSONE) 1-2.5 % LOTN, Apply topically 3 (three) times a day (Patient not taking: Reported on 4/3/2025), Disp: 118 mL, Rfl: 2    ibuprofen (MOTRIN) 600 mg tablet, Take 1  tablet (600 mg total) by mouth every 6 (six) hours as needed for mild pain (Patient not taking: Reported on 4/3/2025), Disp: 30 tablet, Rfl: 0    Inositol 500 MG TABS, Take 1 tablet by mouth 2 (two) times a day, Disp: , Rfl:     ipratropium (ATROVENT) 0.03 % nasal spray, 2 sprays into each nostril 3 (three) times a day, Disp: 150 mL, Rfl: 3    ketoconazole (NIZORAL) 2 % shampoo, Apply 1 Application topically Uses when washing hair. , Disp: , Rfl:     lutein 6 mg, Take 1 capsule by mouth daily per pt/bottle: 40mg 1 tab daily., Disp: , Rfl:     Magnesium 500 MG CAPS, Take 1 capsule by mouth daily. not on AVS; per partner., Disp: , Rfl:     metoprolol tartrate (LOPRESSOR) 25 mg tablet, TAKE 1 TABLET BY MOUTH EVERY 12  HOURS, Disp: 180 tablet, Rfl: 3    MILK THISTLE PO, Take 1 tablet by mouth daily 250mg per bottle in home, Disp: , Rfl:     Misc Natural Products (SUPER SNOOZE) CAPS, Take 1 capsule by mouth daily Bed time , Disp: , Rfl:     Multiple Vitamins-Minerals (OCUVITE-LUTEIN) CAPS, Take 1 capsule by mouth daily per partner--taking 1 tab BID., Disp: , Rfl:     omeprazole (PriLOSEC) 40 MG capsule, Take 1 capsule (40 mg total) by mouth daily, Disp: 180 capsule, Rfl: 0    pentoxifylline (TRENtal) 400 mg ER tablet, Take 1 tablet (400 mg total) by mouth 3 (three) times a day with meals (Patient not taking: Reported on 4/3/2025), Disp: 270 tablet, Rfl: 3    Propylene Glycol-Glycerin (Artificial Tears) 1-0.3 % SOLN, Apply 2 drops to eye as needed (pt reports uses when gets shots in eyes). not on AVS; per pt report.  pt reports using PRN when getting shot in eye- using in either eye (B)., Disp: , Rfl:     psyllium (METAMUCIL) 58.6 % packet, Take 1 packet by mouth daily unable to view bottle for further instructions.  per [facility] coding: add mix briskly in 8oz cold liquid., Disp: , Rfl:     sertraline (ZOLOFT) 25 mg tablet, Take 1 tablet (25 mg total) by mouth daily, Disp: 30 tablet, Rfl: 3    sildenafil (VIAGRA) 50 MG tablet,  pt reports not taking.  50mg. no further instructions present on AVS, Disp: , Rfl:     tadalafil (CIALIS) 20 MG tablet, Take 1 tablet (20 mg total) by mouth every other day as needed for erectile dysfunction (Patient not taking: Reported on 4/7/2025), Disp: 18 tablet, Rfl: 3    trospium (SANCTURA XR) 60 mg 24 hr capsule, Take 1 capsule (60 mg total) by mouth daily before breakfast (Patient not taking: Reported on 4/3/2025), Disp: 90 capsule, Rfl: 3    trospium chloride (SANCTURA) 20 mg tablet, Take 1 tablet (20 mg total) by mouth 2 (two) times a day (Patient not taking: Reported on 12/9/2024), Disp: 180 tablet, Rfl: 3    vitamin B-12 (VITAMIN B-12) 500 mcg tablet, not taking per partner as taking B complex. no further insructions provided. oral. 500mg., Disp: , Rfl:     Zinc 50 MG TABS, Take 1 tablet by mouth daily, Disp: , Rfl:   Family History   Problem Relation Age of Onset    Ulcers Father         acute gastric    Heart disease Mother     Anuerysm Sister       Review of Systems      Objective:  /60   Pulse 64   Temp (!) 96.1 °F (35.6 °C)   Resp 18     Physical Exam  Constitutional:       General: He is not in acute distress.  Cardiovascular:      Comments: Pulse 1/4, PT pulse 0/4  CRT less than 3 seconds to distal digits  Skin temperature gradient decreases from knees to digits bilaterally  Absent pedal hair growth  Skin thin     Musculoskeletal:      Comments: Dorsiflexion/plantarflexion at ankle and distal digits limited  Tender with palpation of left posterior heel and area of pressure injury.  No pain with palpation or range of motion of left great toe  No ecchymosis noted, no significant edema noted   Skin:     Comments: Left posterior heel wound healed with epithelialized base, hyperkeratotic periwound  Left great toe distal ulceration with primarily dry granular base.   Left great toe healed 2 dry granular abrasions noted dorsal aspect of great toe.  No surrounding erythema or edema, no crepitus,  no fluctuance, no malodor     Bilateral skin dry.  Nails thickened, dystrophic, and discolored     Wound 03/19/25 Pressure Injury Heel Left (Active)   Wound Image   04/15/25 1352   Wound Description Pink;Intact 04/15/25 1353   Pressure Injury Stage U 04/01/25 1450   Wound Length (cm) 0 cm 04/15/25 1353   Wound Width (cm) 0 cm 04/15/25 1353   Wound Depth (cm) 0 cm 04/15/25 1353   Wound Surface Area (cm^2) 0 cm^2 04/15/25 1353   Wound Volume (cm^3) 0 cm^3 04/15/25 1353   Calculated Wound Volume (cm^3) 0 cm^3 04/15/25 1353   Change in Wound Size % 100 04/15/25 1353   Drainage Amount None 04/15/25 1353   Susy-wound Assessment Dry;Scaly 04/15/25 1353       Wound 03/19/25 Toe D1, great Distal;Left (Active)   Wound Image   04/15/25 1356   Wound Description Brown;Eschar;Dry 04/15/25 1357   Non-staged Wound Description Full thickness 04/15/25 1357   Wound Length (cm) 0.3 cm 04/15/25 1357   Wound Width (cm) 0.3 cm 04/15/25 1357   Wound Depth (cm) 0.1 cm 04/15/25 1357   Wound Surface Area (cm^2) 0.09 cm^2 04/15/25 1357   Wound Volume (cm^3) 0.009 cm^3 04/15/25 1357   Calculated Wound Volume (cm^3) 0.01 cm^3 04/15/25 1357   Change in Wound Size % 50 04/15/25 1357   Drainage Amount None 04/15/25 1357   Susy-wound Assessment Callus;Dry;Intact 04/15/25 1357       [REMOVED] Wound 03/19/25 Toe D1, great Anterior;Medial;Mid;Left (Removed)   Wound Image   04/15/25 1355   Wound Description Dry;Intact 04/15/25 1356   Wound Length (cm) 0 cm 04/15/25 1356   Wound Width (cm) 0 cm 04/15/25 1356   Wound Depth (cm) 0 cm 04/15/25 1356   Wound Surface Area (cm^2) 0 cm^2 04/15/25 1356   Wound Volume (cm^3) 0 cm^3 04/15/25 1356   Calculated Wound Volume (cm^3) 0 cm^3 04/15/25 1356   Change in Wound Size % 100 04/15/25 1356   Drainage Amount None 04/15/25 1356   Susy-wound Assessment Dry;Scaly 04/15/25 1356       [REMOVED] Wound 03/19/25 Toe D1, great Anterior;Medial;Proximal;Left (Removed)   Wound Image   04/15/25 1355   Wound Description  Dry;Pink;Epithelialization 04/15/25 1356   Non-staged Wound Description Partial thickness 04/01/25 1448   Wound Length (cm) 0 cm 04/15/25 1356   Wound Width (cm) 0 cm 04/15/25 1356   Wound Depth (cm) 0 cm 04/15/25 1356   Wound Surface Area (cm^2) 0 cm^2 04/15/25 1356   Wound Volume (cm^3) 0 cm^3 04/15/25 1356   Calculated Wound Volume (cm^3) 0 cm^3 04/15/25 1356   Drainage Amount None 04/15/25 1356   Susy-wound Assessment Dry;Scaly 04/15/25 1356       [REMOVED] Wound 03/19/25 Buttocks Left (Removed)                     Procedures             Wound Instructions:  Orders Placed This Encounter   Procedures    Wound cleansing and dressings Distal;Left Toe D1, great     Wound cleansing and dressings     Wash your hands with soap and water.  Remove old dressing, discard into plastic bag and place in trash.  Cleanse the wound with mild soap and water  prior to applying a clean dressing. Do not use tissue or cotton balls. Do not scrub the wound. Pat dry using gauze.  Shower no do  Apply betadine to the left great toe wound.  May leave open to air  Complete daily  Off-loading Instructions:     Keep weight and pressure off wound at all times.  Use prevalon boot when in bed do not walk in prevalon boot   Use pillows to elevate feet   If you develop fever, chills, nausea or vomiting, increase in drainage or foul smelling drainage go to the closest emergency department for evaluation.       Please try to consume 3-4 servings of protein (30g) each day.   - Each serving of protein should contain about 30 mg protein.   - Good sources of protein include, lean meats (fish, chicken, etc), eggs, dairy products (yogurt, cheese), tofu, legumes (chickpeas, lentils, peas, black beans), nuts (cashew, walnut, peanuts, etc), & quinoa.        Obtain vascular studies as ordered by doctor    Follow up in 2 weeks     Standing Status:   Future     Expiration Date:   4/22/2025    Wound Procedure Treatment Distal;Left Toe D1, great     This order  "was created via procedure documentation    Wound cleansing and dressings     Left heel wound and left great anterior toe wounds healed today    Continue to ensure there is no pressure to newly healed areas  Apply moisturizer such as vaseline to the heel     Standing Status:   Future     Expiration Date:   4/22/2025         Tracey Fernandes DPM      Portions of the record may have been created with voice recognition software. Occasional wrong word or \"sound a like\" substitutions may have occurred due to the inherent limitations of voice recognition software. Read the chart carefully and recognize, using context, where substitutions have occurred.      "

## 2025-04-15 NOTE — PATIENT INSTRUCTIONS
Orders Placed This Encounter   Procedures    Wound cleansing and dressings Distal;Left Toe D1, great     Wound cleansing and dressings     Wash your hands with soap and water.  Remove old dressing, discard into plastic bag and place in trash.  Cleanse the wound with mild soap and water  prior to applying a clean dressing. Do not use tissue or cotton balls. Do not scrub the wound. Pat dry using gauze.  Shower no do  Apply betadine to the left great toe wound.  May leave open to air  Complete daily  Off-loading Instructions:     Keep weight and pressure off wound at all times.  Use prevalon boot when in bed do not walk in prevalon boot   Use pillows to elevate feet   If you develop fever, chills, nausea or vomiting, increase in drainage or foul smelling drainage go to the closest emergency department for evaluation.       Please try to consume 3-4 servings of protein (30g) each day.   - Each serving of protein should contain about 30 mg protein.   - Good sources of protein include, lean meats (fish, chicken, etc), eggs, dairy products (yogurt, cheese), tofu, legumes (chickpeas, lentils, peas, black beans), nuts (cashew, walnut, peanuts, etc), & quinoa.        Obtain vascular studies as ordered by doctor    Follow up in 2 weeks     Standing Status:   Future     Expiration Date:   4/22/2025    Wound Procedure Treatment Distal;Left Toe D1, great     This order was created via procedure documentation    Wound cleansing and dressings     Left heel wound and left great anterior toe wounds healed today    Continue to ensure there is no pressure to newly healed areas  Apply moisturizer such as vaseline to the heel     Standing Status:   Future     Expiration Date:   4/22/2025

## 2025-04-16 ENCOUNTER — HOME CARE VISIT (OUTPATIENT)
Dept: HOME HEALTH SERVICES | Facility: HOME HEALTHCARE | Age: 89
End: 2025-04-16
Payer: MEDICARE

## 2025-04-16 VITALS — HEART RATE: 54 BPM | DIASTOLIC BLOOD PRESSURE: 60 MMHG | SYSTOLIC BLOOD PRESSURE: 118 MMHG | OXYGEN SATURATION: 97 %

## 2025-04-16 PROCEDURE — G0151 HHCP-SERV OF PT,EA 15 MIN: HCPCS

## 2025-04-17 ENCOUNTER — PROCEDURE VISIT (OUTPATIENT)
Dept: UROLOGY | Facility: CLINIC | Age: 89
End: 2025-04-17
Payer: MEDICARE

## 2025-04-17 VITALS
BODY MASS INDEX: 19.64 KG/M2 | HEART RATE: 62 BPM | RESPIRATION RATE: 16 BRPM | OXYGEN SATURATION: 96 % | DIASTOLIC BLOOD PRESSURE: 74 MMHG | SYSTOLIC BLOOD PRESSURE: 122 MMHG | HEIGHT: 72 IN | WEIGHT: 145 LBS | TEMPERATURE: 97.9 F

## 2025-04-17 DIAGNOSIS — R35.0 URINARY FREQUENCY: Primary | ICD-10-CM

## 2025-04-17 DIAGNOSIS — N32.81 OAB (OVERACTIVE BLADDER): ICD-10-CM

## 2025-04-17 PROCEDURE — 64566 NEUROELTRD STIM POST TIBIAL: CPT

## 2025-04-17 NOTE — PROGRESS NOTES
2025    Abdirahman Hope  1935  [id number]    Diagnosis: Urinary frequency, OAB    Chief Complaint    PTNS        Patient presents for PTNS 3 of 12 managed by Dr. Rasmussen.     Plan  Follow up in 1 week for next weekly PTNS treatment     Urinary Incontinence Screening      Flowsheet Row Most Recent Value   Urinary Incontinence    Urinary Incontinence? Yes   Incomplete emptying? Yes  [sometimes]   Urinary frequency? Yes   Urinary urgency? Yes   Urinary hesitancy? No   Dysuria (painful difficult urination)? No   Nocturia (waking up to use the bathroom)? No   Straining (having to push to go)? No   Weak stream? Yes  [sometimes]   Intermittent stream? No   Post void dribbling? Yes          Vitals:    25 1040   BP: 122/74   Pulse: 62   Resp: 16   Temp: 97.9 °F (36.6 °C)   SpO2: 96%   Weight: 65.8 kg (145 lb)   Height: 6' (1.829 m)         Procedure PTNS    Session 3 of 12    Ankle used: right  Treatment settin  Duration of treatment: 30 minutes  Lead lot #: [id number]  Expiration Date: 9-  Feeling/Response: pulsing in the leg     Patient  Progress  Decreased Urgency No  Decreased Frequency No  Episodes of incontinence No- no change   Sleep Through Night Yes-wakes up incontinent   Related Health and Social Factors Yes-smoking     Bladder Diary Summary:  Caffeine cups per day: 1 cup of coffee per day, one 6 oz can of soda per day  Alcohol- number of drinks per day: 0  Daytime voids: every 1-1.5 hours   Nighttime voids:0- he sleeps through the night and wakes up incontinent   Urgency:high   Incontinence- episodes per day: Patient wears a Depend . States he currently changes it in the morning and at bedtime. He reports he has several incontinence episodes during the day, however he leaves the Depend on all day anyway even when it's wet. Patient was advised to change Depends and get washed and dried after each incontinence episode to prevent skin breakdown or utilize pads and take these off and  dispose of them when soiled     Treatment Plan  Increase Fluids Yes-water   Decrease Caffeine No  Decrease FluidsNo  Urge reduction techniques No  Kegels Yes- has been trying these   Timed voiding No  No fluids before bed Yes          Alina Contreras RN

## 2025-04-18 ENCOUNTER — TELEPHONE (OUTPATIENT)
Age: 89
End: 2025-04-18

## 2025-04-18 NOTE — TELEPHONE ENCOUNTER
Caller: Brandon De Jesus [facility]     Doctor/Office: Dr Valladares     CB#: [phone]      What needs to be faxed: OP PT order and shoulder protocol     ATTN to: Liza    Fax#: [phone]

## 2025-04-21 ENCOUNTER — HOME CARE VISIT (OUTPATIENT)
Dept: HOME HEALTH SERVICES | Facility: HOME HEALTHCARE | Age: 89
End: 2025-04-21
Payer: MEDICARE

## 2025-04-21 VITALS — DIASTOLIC BLOOD PRESSURE: 60 MMHG | OXYGEN SATURATION: 99 % | HEART RATE: 54 BPM | SYSTOLIC BLOOD PRESSURE: 124 MMHG

## 2025-04-21 PROCEDURE — G0151 HHCP-SERV OF PT,EA 15 MIN: HCPCS

## 2025-04-23 ENCOUNTER — HOME CARE VISIT (OUTPATIENT)
Dept: HOME HEALTH SERVICES | Facility: HOME HEALTHCARE | Age: 89
End: 2025-04-23
Payer: MEDICARE

## 2025-04-23 VITALS — HEART RATE: 52 BPM | OXYGEN SATURATION: 96 % | DIASTOLIC BLOOD PRESSURE: 60 MMHG | SYSTOLIC BLOOD PRESSURE: 128 MMHG

## 2025-04-23 PROCEDURE — G0151 HHCP-SERV OF PT,EA 15 MIN: HCPCS

## 2025-04-23 NOTE — TELEPHONE ENCOUNTER
Caller: Liza [facility]     Doctor: Dr. Valladares     Reason for call: Has been trying to get referral and protocol faxed over to them but they are having trouble with the fax. Liza would like to know if patient's order and protocol could be printed out and she will stop by and pick it up prior to patients appt tomorrow? She would like to stop by the [facility] office to pick it up if possible       Call back#: [phone]

## 2025-04-23 NOTE — TELEPHONE ENCOUNTER
Caller: Iveth blackman [facility]    Doctor: Dr. Valladares    Reason for call: please re fax OT order and shoulder protocol to   [phone]    Call back#: [phone]

## 2025-04-23 NOTE — TELEPHONE ENCOUNTER
Caller: Brandon De Jesus [facility]     Doctor: Dr. Valladares    Reason for call: Checking status of fax request. Informed her it was faxed on 4/21 at 8:37 am. She will call back if it did not go through    Call back#:

## 2025-04-24 ENCOUNTER — HOSPITAL ENCOUNTER (OUTPATIENT)
Dept: NON INVASIVE DIAGNOSTICS | Facility: HOSPITAL | Age: 89
Discharge: HOME | End: 2025-04-24
Attending: PODIATRIST
Payer: MEDICARE

## 2025-04-24 ENCOUNTER — PROCEDURE VISIT (OUTPATIENT)
Dept: UROLOGY | Facility: CLINIC | Age: 89
End: 2025-04-24
Payer: MEDICARE

## 2025-04-24 VITALS
SYSTOLIC BLOOD PRESSURE: 136 MMHG | WEIGHT: 160.3 LBS | RESPIRATION RATE: 16 BRPM | TEMPERATURE: 97.8 F | OXYGEN SATURATION: 99 % | HEART RATE: 56 BPM | DIASTOLIC BLOOD PRESSURE: 70 MMHG | BODY MASS INDEX: 21.71 KG/M2 | HEIGHT: 72 IN

## 2025-04-24 DIAGNOSIS — I73.9 PVD (PERIPHERAL VASCULAR DISEASE) (HCC): ICD-10-CM

## 2025-04-24 DIAGNOSIS — R35.0 URINARY FREQUENCY: Primary | ICD-10-CM

## 2025-04-24 DIAGNOSIS — R35.1 NOCTURIA: ICD-10-CM

## 2025-04-24 DIAGNOSIS — N32.81 OAB (OVERACTIVE BLADDER): ICD-10-CM

## 2025-04-24 PROCEDURE — 64566 NEUROELTRD STIM POST TIBIAL: CPT

## 2025-04-24 PROCEDURE — 93923 UPR/LXTR ART STDY 3+ LVLS: CPT

## 2025-04-24 PROCEDURE — 93925 LOWER EXTREMITY STUDY: CPT | Performed by: SURGERY

## 2025-04-24 PROCEDURE — 93925 LOWER EXTREMITY STUDY: CPT

## 2025-04-24 PROCEDURE — 93922 UPR/L XTREMITY ART 2 LEVELS: CPT | Performed by: SURGERY

## 2025-04-24 NOTE — PROGRESS NOTES
2025    Abdirahman Hope  1935  [id number]    Diagnosis: Nocturia, Urinary frequency, OAB    Chief Complaint    PTNS        Patient presents for PTNS  12 managed by Dr. Rasmussen.     Plan  Follow up in 1 week for weekly PTNS     Urinary Incontinence Screening      Flowsheet Row Most Recent Value   Urinary Incontinence    Urinary Incontinence? Yes   Incomplete emptying? Yes  [sometimes]   Urinary frequency? Yes   Urinary urgency? Yes   Urinary hesitancy? No   Dysuria (painful difficult urination)? No   Nocturia (waking up to use the bathroom)? Yes   Straining (having to push to go)? No   Weak stream? Yes   Intermittent stream? No   Post void dribbling? Yes   Vaginal pressure? No   Vaginal dryness? No          Vitals:    25 1055   BP: 136/70   Pulse: 56   Resp: 16   Temp: 97.8 °F (36.6 °C)   SpO2: 99%   Weight: 72.7 kg (160 lb 4.8 oz)   Height: 6' (1.829 m)         Procedure PTNS    Session 4 of     Ankle used: right  Treatment settin  Duration of treatment: 30 minutes  Lead lot #: [id number]  Expiration Date: 2027  Feeling/Response:right ankle     Patient Progress   Decreased Urgency No  Decreased Frequency No  Episodes of incontinence Yes- the same, no change   Sleep Through Night No  Related Health and Social Factors Yes    Bladder Diary Summary:  Caffeine cups per day: 1 cup of coffee per day, 1 can of soda per day  Alcohol- number of drinks per day: 0  Daytime voids: 10  Nighttime voids: 4  Urgency: high- has to get to the bathroom right away or he will be incontinent   Incontinence- episodes per day: Patient wears a Depend all the time. He reports he tried using pads inside his Depend, however he was putting them inside a Depend that was already wet. He was advised to put a clean Depend on in the morning with a pad inside, then remove the pad and change that and the Depend as they get wet. Again advised against keeping a wet pad or Depend on due to risk for skin breakdown.      Treatment Plan  Increase Fluids Yes  Decrease Caffeine No  Decrease FluidsNo  Urge reduction techniques No  Kegels Yes  Timed voiding No  No fluids before bed Yes          Alina Contreras RN

## 2025-04-28 ENCOUNTER — OFFICE VISIT (OUTPATIENT)
Dept: FAMILY MEDICINE CLINIC | Facility: CLINIC | Age: 89
End: 2025-04-28
Payer: MEDICARE

## 2025-04-28 VITALS
HEIGHT: 72 IN | SYSTOLIC BLOOD PRESSURE: 118 MMHG | HEART RATE: 64 BPM | TEMPERATURE: 97.9 F | OXYGEN SATURATION: 96 % | WEIGHT: 160 LBS | DIASTOLIC BLOOD PRESSURE: 62 MMHG | BODY MASS INDEX: 21.67 KG/M2

## 2025-04-28 DIAGNOSIS — F41.1 GENERALIZED ANXIETY DISORDER: Primary | ICD-10-CM

## 2025-04-28 DIAGNOSIS — N18.30 STAGE 3 CHRONIC KIDNEY DISEASE, UNSPECIFIED WHETHER STAGE 3A OR 3B CKD (HCC): ICD-10-CM

## 2025-04-28 DIAGNOSIS — Z99.2 ANEMIA DUE TO CHRONIC KIDNEY DISEASE, ON CHRONIC DIALYSIS (HCC): ICD-10-CM

## 2025-04-28 DIAGNOSIS — N18.6 ANEMIA DUE TO CHRONIC KIDNEY DISEASE, ON CHRONIC DIALYSIS (HCC): ICD-10-CM

## 2025-04-28 DIAGNOSIS — E78.2 MIXED HYPERLIPIDEMIA: ICD-10-CM

## 2025-04-28 DIAGNOSIS — S83.279S CMPLX TEAR OF LAT MENSC, CURRENT INJURY, UNSP KNEE, SEQUELA: ICD-10-CM

## 2025-04-28 DIAGNOSIS — D63.1 ANEMIA DUE TO CHRONIC KIDNEY DISEASE, ON CHRONIC DIALYSIS (HCC): ICD-10-CM

## 2025-04-28 PROCEDURE — 99214 OFFICE O/P EST MOD 30 MIN: CPT | Performed by: FAMILY MEDICINE

## 2025-04-28 PROCEDURE — G2211 COMPLEX E/M VISIT ADD ON: HCPCS | Performed by: FAMILY MEDICINE

## 2025-04-28 NOTE — PROGRESS NOTES
Name: Abdirahman Hope      : 1935      MRN: [id number]  Encounter Provider: Varun Hoyt DO  Encounter Date: 2025   Encounter department: [facility]  :  Assessment & Plan  Generalized anxiety disorder    Orders:    sertraline (ZOLOFT) 50 mg tablet; Take 1 tablet (50 mg total) by mouth daily    Mixed hyperlipidemia    Orders:    Lipid panel; Future    Comprehensive metabolic panel    Cmplx tear of lat mensc, current injury, unsp knee, sequela         Stage 3 chronic kidney disease, unspecified whether stage 3a or 3b CKD ([facility])  Lab Results   Component Value Date    EGFR 70 2024    EGFR 73 2023    EGFR 61 2022    CREATININE 0.97 2024    CREATININE 0.94 2023    CREATININE 1.10 2022       Orders:    Comprehensive metabolic panel    Anemia due to chronic kidney disease, on chronic dialysis ([facility])  Lab Results   Component Value Date    EGFR 70 2024    EGFR 73 2023    EGFR 61 2022    CREATININE 0.97 2024    CREATININE 0.94 2023    CREATININE 1.10 2022       Orders:    CBC and differential; Future          Falls Plan of Care: balance, strength, and gait training instructions were provided.       History of Present Illness   Follow-up visits here for patient no recent falls although he does fall frequently we discussed this at length the need to use a walker his biggest problem right now is his urinary bladder he gets anxious and then he feels like he is going to go to the bathroom as he is incontinent of urine      Review of Systems   Constitutional:  Positive for activity change and fatigue.   HENT:  Negative for hearing loss.    Eyes:  Positive for visual disturbance.        Severe macular degeneration   Respiratory:  Negative for shortness of breath.    Cardiovascular:  Negative for chest pain.   Genitourinary:  Positive for difficulty urinating, frequency and urgency.   Musculoskeletal:  Positive for arthralgias, back  pain and gait problem.   Neurological:  Positive for dizziness and weakness.   Psychiatric/Behavioral:  The patient is nervous/anxious.        Objective   /62 (BP Location: Right arm, Patient Position: Sitting, Cuff Size: Standard)   Pulse 64   Temp 97.9 °F (36.6 °C) (Temporal)   Ht 6' (1.829 m)   Wt 72.6 kg (160 lb)   SpO2 96%   BMI 21.70 kg/m²      Physical Exam  Constitutional:       Appearance: He is ill-appearing.   HENT:      Nose: Nose normal.   Eyes:      Extraocular Movements: Extraocular movements intact.      Pupils: Pupils are equal, round, and reactive to light.      Comments: Macular degeneration   Cardiovascular:      Rate and Rhythm: Normal rate and regular rhythm.   Pulmonary:      Effort: Pulmonary effort is normal.      Breath sounds: Normal breath sounds.   Musculoskeletal:         General: No swelling or tenderness.   Skin:     General: Skin is warm and dry.   Neurological:      General: No focal deficit present.      Mental Status: He is alert and oriented to person, place, and time.   Psychiatric:         Mood and Affect: Mood normal.         Behavior: Behavior normal.         Thought Content: Thought content normal.         Judgment: Judgment normal.

## 2025-04-28 NOTE — ASSESSMENT & PLAN NOTE
Lab Results   Component Value Date    EGFR 70 03/22/2024    EGFR 73 11/13/2023    EGFR 61 05/22/2022    CREATININE 0.97 03/22/2024    CREATININE 0.94 11/13/2023    CREATININE 1.10 05/22/2022       Orders:    CBC and differential; Future

## 2025-05-01 ENCOUNTER — PROCEDURE VISIT (OUTPATIENT)
Dept: UROLOGY | Facility: CLINIC | Age: 89
End: 2025-05-01
Payer: MEDICARE

## 2025-05-01 VITALS
BODY MASS INDEX: 21.56 KG/M2 | WEIGHT: 159.2 LBS | DIASTOLIC BLOOD PRESSURE: 66 MMHG | SYSTOLIC BLOOD PRESSURE: 130 MMHG | HEIGHT: 72 IN | OXYGEN SATURATION: 99 % | HEART RATE: 60 BPM | TEMPERATURE: 97.3 F | RESPIRATION RATE: 16 BRPM

## 2025-05-01 DIAGNOSIS — R35.1 NOCTURIA: ICD-10-CM

## 2025-05-01 DIAGNOSIS — N32.81 OAB (OVERACTIVE BLADDER): ICD-10-CM

## 2025-05-01 DIAGNOSIS — R35.0 URINARY FREQUENCY: Primary | ICD-10-CM

## 2025-05-01 PROCEDURE — 64566 NEUROELTRD STIM POST TIBIAL: CPT

## 2025-05-01 NOTE — PROGRESS NOTES
"5/1/2025    Abdirahman Hope  1935  [id number]    Diagnosis: Urinary frequency, Nocturia, OAB     Chief Complaint    PTNS        Patient presents for PTNS 5 of 12 managed by Dr. Rasmussen.     Plan  Follow up in 1 week for next weekly PTNS     Urinary Incontinence Screening      Flowsheet Row Most Recent Value   Urinary Incontinence    Urinary Incontinence? Yes   Incomplete emptying? Yes  [sometimes]   Urinary frequency? Yes   Urinary urgency? Yes   Urinary hesitancy? No   Dysuria (painful difficult urination)? No   Nocturia (waking up to use the bathroom)? Yes   Straining (having to push to go)? No   Weak stream? Yes   Intermittent stream? No   Post void dribbling? Yes          Vitals:    05/01/25 1051   BP: 130/66   Pulse: 60   Resp: 16   Temp: (!) 97.3 °F (36.3 °C)   SpO2: 99%   Weight: 72.2 kg (159 lb 3.2 oz)   Height: 6' (1.829 m)       Procedure PTNS    Session 5 of 12    Ankle used: right  Treatment setting:15  Duration of treatment: 30 minutes  Lead lot #: [id number]  Expiration Date: 5-9-2027  Feeling/Response: bottom of the right foot     Patient Progress  Decreased Urgency No  Decreased Frequency No  Episodes of incontinence Yes- no improvement   Sleep Through Night No  Related Health and Social Factors Yes    Bladder Diary Summary:  Caffeine cups per day: 1 cup of coffee and 1 can of soda per day   Alcohol- number of drinks per day: 0  Daytime voids: \"close to 10\"  Nighttime voids: 4  Urgency: pretty high- has to get to the bathroom right after getting the urge to urinate or he will be incontinent   Incontinence- episodes per day: Patient has been wearing a Depend all the time and sometimes places a pad inside the Depend. Patient reports he is still using the same Depend all day long and changes it just in the morning and at bedtime even when it is wet with urine. Patient was again advised against this and encouraged to change his pad and Depend when it is wet to prevent skin breakdown. "     Treatment Plan  Increase Fluids Yes  Decrease Caffeine No  Decrease FluidsNo  Urge reduction techniques No  Kegels Yes  Timed voiding No  No fluids before bed Yes          Alina Contreras RN

## 2025-05-07 DIAGNOSIS — K21.9 GASTROESOPHAGEAL REFLUX DISEASE WITHOUT ESOPHAGITIS: ICD-10-CM

## 2025-05-07 RX ORDER — OMEPRAZOLE 40 MG/1
40 CAPSULE, DELAYED RELEASE ORAL 2 TIMES DAILY
Qty: 180 CAPSULE | Refills: 1 | Status: SHIPPED | OUTPATIENT
Start: 2025-05-07 | End: 2025-05-12 | Stop reason: SDUPTHER

## 2025-05-08 ENCOUNTER — PROCEDURE VISIT (OUTPATIENT)
Dept: UROLOGY | Facility: CLINIC | Age: 89
End: 2025-05-08
Payer: MEDICARE

## 2025-05-08 VITALS
SYSTOLIC BLOOD PRESSURE: 118 MMHG | HEIGHT: 72 IN | OXYGEN SATURATION: 98 % | BODY MASS INDEX: 21.45 KG/M2 | HEART RATE: 77 BPM | RESPIRATION RATE: 16 BRPM | WEIGHT: 158.4 LBS | DIASTOLIC BLOOD PRESSURE: 70 MMHG | TEMPERATURE: 97.8 F

## 2025-05-08 DIAGNOSIS — R35.0 URINARY FREQUENCY: Primary | ICD-10-CM

## 2025-05-08 DIAGNOSIS — R35.1 NOCTURIA: ICD-10-CM

## 2025-05-08 DIAGNOSIS — N32.81 OAB (OVERACTIVE BLADDER): ICD-10-CM

## 2025-05-08 PROCEDURE — 64566 NEUROELTRD STIM POST TIBIAL: CPT

## 2025-05-08 NOTE — PROGRESS NOTES
2025    Abdirahman Hope  1935  [id number]    Diagnosis: Nocturia, Urinary frequency, OAB    Chief Complaint    PTNS        Patient presents for PTNS  managed by Dr. Rasmussen.     Plan    Follow up in 1 week for weekly PTNS      Vitals:    25 1121   BP: 118/70   Pulse: 77   Resp: 16   Temp: 97.8 °F (36.6 °C)   SpO2: 98%   Weight: 71.8 kg (158 lb 6.4 oz)   Height: 6' (1.829 m)         Procedure PTNS    Session     Ankle used: right  Treatment settin  Duration of treatment: 30 minutes  Lead lot #: [id number]  Expiration Date: 2027   Feeling/Response: right ankle and leg     Patient Progress  Decreased Urgency No  Decreased Frequency No  Episodes of incontinence Yes- no improvement   Sleep Through Night No  Related Health and Social Factors Yes    Bladder Diary Summary:  Caffeine cups per day: 1 cup of coffee and 1 can of soda each day   Alcohol- number of drinks per day: 0  Daytime voids: 8-10  Nighttime voids: 6  Urgency: high   Incontinence- episodes per day: Patient wears a Depend and sometimes puts a pad inside. He states he continues to utilize the same pad or Depend all day long even when it gets wet with urine. Again advised patient he should clean himself and apply a new pad or Depend when he is wet to avoid skin breakdown.      Treatment Plan  Increase Fluids Yes  Decrease Caffeine No  Decrease FluidsNo  Urge reduction techniques No  Kegels Yes  Timed voiding No  No fluids before bed Yes          Alina Contreras RN

## 2025-05-08 NOTE — PROGRESS NOTES
I supervised the Advanced Practitioner on . I reviewed the Advanced Practitioner note and agree.    Adalberto Rasmussen MD 05/08/25

## 2025-05-12 ENCOUNTER — PROCEDURE VISIT (OUTPATIENT)
Dept: PODIATRY | Facility: CLINIC | Age: 89
End: 2025-05-12
Payer: MEDICARE

## 2025-05-12 ENCOUNTER — OFFICE VISIT (OUTPATIENT)
Dept: OBGYN CLINIC | Facility: CLINIC | Age: 89
End: 2025-05-12
Payer: MEDICARE

## 2025-05-12 ENCOUNTER — APPOINTMENT (OUTPATIENT)
Dept: RADIOLOGY | Facility: MEDICAL CENTER | Age: 89
End: 2025-05-12
Attending: STUDENT IN AN ORGANIZED HEALTH CARE EDUCATION/TRAINING PROGRAM
Payer: MEDICARE

## 2025-05-12 VITALS
HEIGHT: 72 IN | RESPIRATION RATE: 16 BRPM | HEART RATE: 48 BPM | OXYGEN SATURATION: 96 % | WEIGHT: 157 LBS | BODY MASS INDEX: 21.26 KG/M2 | TEMPERATURE: 97.2 F

## 2025-05-12 VITALS
BODY MASS INDEX: 21.26 KG/M2 | RESPIRATION RATE: 16 BRPM | WEIGHT: 157 LBS | TEMPERATURE: 97.2 F | HEIGHT: 72 IN | HEART RATE: 48 BPM | OXYGEN SATURATION: 96 %

## 2025-05-12 DIAGNOSIS — M79.675 PAIN IN TOES OF BOTH FEET: ICD-10-CM

## 2025-05-12 DIAGNOSIS — M79.674 PAIN IN TOES OF BOTH FEET: ICD-10-CM

## 2025-05-12 DIAGNOSIS — I73.9 PVD (PERIPHERAL VASCULAR DISEASE) (HCC): ICD-10-CM

## 2025-05-12 DIAGNOSIS — S42.292G OTHER CLOSED DISPLACED FRACTURE OF PROXIMAL END OF LEFT HUMERUS WITH DELAYED HEALING, SUBSEQUENT ENCOUNTER: Primary | ICD-10-CM

## 2025-05-12 DIAGNOSIS — G60.9 IDIOPATHIC PERIPHERAL NEUROPATHY: ICD-10-CM

## 2025-05-12 DIAGNOSIS — E78.2 MIXED HYPERLIPIDEMIA: ICD-10-CM

## 2025-05-12 DIAGNOSIS — S42.292A OTHER CLOSED DISPLACED FRACTURE OF PROXIMAL END OF LEFT HUMERUS, INITIAL ENCOUNTER: ICD-10-CM

## 2025-05-12 DIAGNOSIS — Z89.422: ICD-10-CM

## 2025-05-12 DIAGNOSIS — I10 ESSENTIAL HYPERTENSION: ICD-10-CM

## 2025-05-12 DIAGNOSIS — I73.9 PAD (PERIPHERAL ARTERY DISEASE) (HCC): ICD-10-CM

## 2025-05-12 DIAGNOSIS — K21.9 GASTROESOPHAGEAL REFLUX DISEASE WITHOUT ESOPHAGITIS: ICD-10-CM

## 2025-05-12 DIAGNOSIS — B35.1 ONYCHOMYCOSIS: Primary | ICD-10-CM

## 2025-05-12 PROBLEM — S42.202G CLOSED FRACTURE OF PROXIMAL END OF LEFT HUMERUS WITH DELAYED HEALING: Status: ACTIVE | Noted: 2025-05-12

## 2025-05-12 PROCEDURE — 73030 X-RAY EXAM OF SHOULDER: CPT

## 2025-05-12 PROCEDURE — 99214 OFFICE O/P EST MOD 30 MIN: CPT | Performed by: STUDENT IN AN ORGANIZED HEALTH CARE EDUCATION/TRAINING PROGRAM

## 2025-05-12 PROCEDURE — 11721 DEBRIDE NAIL 6 OR MORE: CPT | Performed by: PODIATRIST

## 2025-05-12 RX ORDER — ATORVASTATIN CALCIUM 10 MG/1
10 TABLET, FILM COATED ORAL EVERY OTHER DAY
Qty: 45 TABLET | Refills: 3 | Status: SHIPPED | OUTPATIENT
Start: 2025-05-12

## 2025-05-12 RX ORDER — OMEPRAZOLE 40 MG/1
40 CAPSULE, DELAYED RELEASE ORAL 2 TIMES DAILY
Qty: 180 CAPSULE | Refills: 1 | Status: SHIPPED | OUTPATIENT
Start: 2025-05-12

## 2025-05-12 RX ORDER — GABAPENTIN 600 MG/1
600 TABLET ORAL 2 TIMES DAILY
Qty: 180 TABLET | Refills: 1 | Status: SHIPPED | OUTPATIENT
Start: 2025-05-12

## 2025-05-12 RX ORDER — METOPROLOL TARTRATE 25 MG/1
25 TABLET, FILM COATED ORAL EVERY 12 HOURS
Qty: 180 TABLET | Refills: 3 | Status: SHIPPED | OUTPATIENT
Start: 2025-05-12

## 2025-05-12 NOTE — PROGRESS NOTES
Name: Abdirahman Hope      : 1935      MRN: [id number]  Encounter Provider: Darnell Barrios DPM  Encounter Date: 2025   Encounter department: [facility] [place]  :  Assessment & Plan  Onychomycosis       Debride mycotic nails and thin the nail plates x 9 with the use of a nail nipper manually and an electric Dremel bur was used to reduce the thickness of the nail beds and smoothed the distal aspect of the nails.   Idiopathic peripheral neuropathy       Patient increased the dosage of the gabapentin on his own at his family doctor now that he had done math.  Patient relates that there is been a slight change in the pain that he was having in his feet.  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 Patient has Q7  findings and is recommended for at risk foot care every 9-10 weeks.    Patients most recent complete clinical foot exam was on: 03/3/2025      History of Present Illness   HPI  Abdirahman Hope is a 89 y.o. male who presents with chief complaint of painful thick nails on both feet.  He has been to the wound center for wounds on his left foot.  His wife was present in the room for today's visit.Patient presents for at-risk foot care.  Patient has no acute concerns today.  Patient has significant lower extremity risk due to neuropathy, parasthesia, edema, and trophic skin changes to the lower extremity.   History obtained from: patient    Review of Systems  Medical History Reviewed by provider this encounter:     .  Current Outpatient Medications on File Prior to Visit   Medication Sig Dispense Refill    acetaminophen (TYLENOL) 325 mg tablet Take 2 tablets (650 mg total) by mouth every 6 (six) hours as needed for mild pain, headaches or fever  0     ALPHA LIPOIC ACID-BIOTIN PO Take 1 tablet by mouth in the morning per bottle & pt/partner: 300mg 1 tab BID.       atorvastatin (LIPITOR) 10 mg tablet Take 1 tablet (10 mg total) by mouth every other day 45 tablet 3    B COMPLEX VITAMINS PO Take 1 tablet by mouth daily      Calcium-Vitamin D-Vitamin K (CHEWABLE CALCIUM PO) Take 500 mg by mouth daily. not on AVS; per partner/bottle.      cetirizine (ZyrTEC) 10 mg tablet TAKE ONE (1) TABLET (10 MG TOTAL) BY MOUTH DAILY 90 tablet 1    Cholecalciferol (VITAMIN D3) 2000 UNITS capsule Take 1 capsule by mouth 2 (two) times a day 5000 units per partner/bottle.      Coenzyme Q10 (CO Q10) 60 MG CAPS Take 1 capsule by mouth daily per pt/partner & bottle: 100mg 1 tab daily.       gabapentin (NEURONTIN) 600 MG tablet TAKE 1 TABLET BY MOUTH TWICE  DAILY 180 tablet 1    Ginkgo Biloba 120 MG TABS Take 1 tablet by mouth daily pt taking and per bottle: 60mg 1 tab daily.      Inositol 500 MG TABS Take 1 tablet by mouth 2 (two) times a day      ipratropium (ATROVENT) 0.03 % nasal spray 2 sprays into each nostril 3 (three) times a day 150 mL 3    lutein 6 mg Take 1 capsule by mouth daily per pt/bottle: 40mg 1 tab daily.      metoprolol tartrate (LOPRESSOR) 25 mg tablet TAKE 1 TABLET BY MOUTH EVERY 12  HOURS 180 tablet 3    MILK THISTLE PO Take 1 tablet by mouth daily 250mg per bottle in home      Misc Natural Products (SUPER SNOOZE) CAPS Take 1 capsule by mouth daily Bed time       Multiple Vitamins-Minerals (OCUVITE-LUTEIN) CAPS Take 1 capsule by mouth daily per partner--taking 1 tab BID.      omeprazole (PriLOSEC) 40 MG capsule TAKE 1 CAPSULE BY MOUTH TWICE  DAILY 180 capsule 1    Propylene Glycol-Glycerin (Artificial Tears) 1-0.3 % SOLN Apply 2 drops to eye as needed (pt reports uses when gets shots in eyes). not on AVS; per pt report.   pt reports using PRN when getting shot in eye- using in either eye (B).      psyllium (METAMUCIL) 58.6 % packet Take 1 packet by mouth daily  unable to view bottle for further instructions.   per [facility] coding: add mix briskly in 8oz cold liquid.      sertraline (ZOLOFT) 50 mg tablet Take 1 tablet (50 mg total) by mouth daily 30 tablet 5    sildenafil (VIAGRA) 50 MG tablet Take by mouth      vitamin B-12 (VITAMIN B-12) 500 mcg tablet not taking per partner as taking B complex. no further insructions provided. oral. 500mg.      Zinc 50 MG TABS Take 1 tablet by mouth daily       No current facility-administered medications on file prior to visit.      Social History     Tobacco Use    Smoking status: Every Day     Current packs/day: 0.25     Average packs/day: 0.3 packs/day for 60.0 years (15.0 ttl pk-yrs)     Types: Cigarettes    Smokeless tobacco: Never    Tobacco comments:     smokes a pipe   Vaping Use    Vaping status: Never Used   Substance and Sexual Activity    Alcohol use: Yes     Alcohol/week: 2.0 standard drinks of alcohol     Types: 1 Glasses of wine, 1 Shots of liquor per week     Comment: Occ    Drug use: Never    Sexual activity: Not Currently        Objective   Pulse (!) 48   Temp (!) 97.2 °F (36.2 °C)   Resp 16   Ht 6' (1.829 m)   Wt 71.2 kg (157 lb)   SpO2 96%   BMI 21.29 kg/m²      Briefly explained the results of the arterial Doppler to the patient.  He was told about the blockages  There is a 50-75% stenosis noted in the proximal and mid superficial femoral  artery. Diffuse disease throughout the remaining femoro-popliteal and  tibio-peroneal segments.  Ankle/Brachial index: 0.86 which is in the mild disease category (Prior 0.97)  PVR/ PPG tracings are normal.  Metatarsal pressure of 142 mmHg  Great toe pressure of 99 mmHg, within the healing range     LEFT LOWER LIMB:  There is a 50-75% stenosis noted in the mid and distal superficial femoral and  proximal popliteal arteries. A >75% stenosis is seen in the distal anterior  tibial artery. Diffuse disease throughout the remaining femoro-popliteal and  tibio-peroneal  segments.  Ankle/Brachial index: 0.61 which is in the severe disease category (Prior 0.80  )  PVR/ PPG tracings are dampened.  Metatarsal pressure of 65 mmHg  Great toe pressure of 28 mmHg, within the healing range  Physical Exam  Vascular exam is a faint 1/4 DP PT negative digital hair, normal distal cooling, slightly delayed capillary refill with edema present bilaterally.       Derm nails are brittle elongated hypertrophic yellow discoloration with subungual debris x 9.  There is an increased thickness in the nails of approximately 1 to 3 mm.  There is dry eschar present on the distal medial aspect of the left hallux measuring approximately 1 mm in width by approximately 3 mm in length.  No signs of any infection it is still well attached.  On the posterior aspect of the left heel there is pink healthy tissue present where a blister had been.  No signs of any infection or breakdown the area is very painful because of the new skin and is sensitive to touch.  There is loss of turgor of the skin and the skin appears to be thinning.    Ortho hammertoe deformities are present on the third and fourth digits bilaterally there is also pes planus noted bilaterally.  There is amputation of the left second toe at the MPJs.

## 2025-05-12 NOTE — ASSESSMENT & PLAN NOTE
Patient increased the dosage of the gabapentin on his own at his family doctor now that he had done math.  Patient relates that there is been a slight change in the pain that he was having in his feet.

## 2025-05-12 NOTE — PROGRESS NOTES
Assessment & Plan  Other closed displaced fracture of proximal end of left humerus with delayed healing, subsequent encounter  11 weeks s/p closed treatment of proximal humerus fracture, patient with still inability to actively forward elevate the arm. Imaging with stable alignment, minimal healing, mild fracture site resorption. We reviewed his imaging and exam findings and discussed the possible risk of non-union. Given the patients age and medical co-morbidities as well as activity level we will continue with non-operative treatment. Follow-up in 6 weeks for repeat exam and radiographs.  Orders:    XR shoulder 2+ vw left; Future             General  Subjective    Abdirahman Hope is a right hand dominant 89 y.o. male who presents for follow-up of left shoulder pain.    Patient was last seen on 4/7/25. Since the last visit he has been doing well overall however he states that he continues to have difficulty with elevating the arm actively. He is continuing to work with PT.    The patient rates the pain as a 1/10. The pain is located Anterior and Lateral when performing forward flexion or abduction movements. Patient states that at rest he does not have any pain.       Objective      Vitals:    05/12/25 1405   Pulse: (!) 48   Resp: 16   Temp: (!) 97.2 °F (36.2 °C)   SpO2: 96%     Body mass index is 21.29 kg/m².  Physical Exam    Shoulder Exam    Affected shoulder:   left      On examination of the right shoulder the skin is intact there is no open wounds.    There is resolving ecchymosis and edema around the proximal humerus.    Passive FE to 100 degrees without pain  His sensations intact to light touch over the axillary, median, radial, and ulnar nerve distributions.    He has 5 out of 5 strength with AIN, PIN, and ulnar nerve testing.  Able to fire anterior and posterior deltoids.  There is a chronic deformity about the left elbow.      Distally the patient's neurovascular status is normal.    Additional  exam: N/a    Review of Prior Testing:    I independently interpreted the following test:     left shoulder x-ray images done on 4/7/25:  Multiple views show stable alignment from prior imaging with apex anterior fracture with head retroversion, mild interval healing     Left shoulder xrays performed today demonstrate stable alignment, possible absorption at the fracture site, no significant interval healing.    Follow Up: Return in about 6 weeks (around 6/23/2025) for Re-evaluation and repeat xrays of left shoulder.    All questions answered and patient agrees with plan.    I have spent a total time of 20 minutes in caring for this patient on the day of the visit/encounter including Diagnostic results, Risks and benefits of tx options, Instructions for management, Patient and family education, Impressions, Documenting in the medical record, Reviewing/placing orders in the medical record (including tests, medications, and/or procedures), and Obtaining or reviewing history  .      Scribe Attestation      I,:   am acting as a scribe while in the presence of the attending physician.:       I,:   personally performed the services described in this documentation    as scribed in my presence.:

## 2025-05-12 NOTE — ASSESSMENT & PLAN NOTE
11 weeks s/p closed treatment of proximal humerus fracture, patient with still inability to actively forward elevate the arm. Imaging with stable alignment, minimal healing, mild fracture site resorption. We reviewed his imaging and exam findings and discussed the possible risk of non-union. Given the patients age and medical co-morbidities as well as activity level we will continue with non-operative treatment. Follow-up in 6 weeks for repeat exam and radiographs.  Orders:    XR shoulder 2+ vw left; Future

## 2025-05-15 ENCOUNTER — PROCEDURE VISIT (OUTPATIENT)
Dept: UROLOGY | Facility: CLINIC | Age: 89
End: 2025-05-15
Payer: MEDICARE

## 2025-05-15 VITALS
TEMPERATURE: 97.2 F | DIASTOLIC BLOOD PRESSURE: 68 MMHG | WEIGHT: 158.6 LBS | HEART RATE: 61 BPM | RESPIRATION RATE: 16 BRPM | OXYGEN SATURATION: 97 % | BODY MASS INDEX: 21.48 KG/M2 | HEIGHT: 72 IN | SYSTOLIC BLOOD PRESSURE: 128 MMHG

## 2025-05-15 DIAGNOSIS — N32.81 OAB (OVERACTIVE BLADDER): ICD-10-CM

## 2025-05-15 DIAGNOSIS — R35.1 NOCTURIA: ICD-10-CM

## 2025-05-15 DIAGNOSIS — R35.0 URINARY FREQUENCY: Primary | ICD-10-CM

## 2025-05-15 PROCEDURE — 64566 NEUROELTRD STIM POST TIBIAL: CPT

## 2025-05-15 NOTE — PROGRESS NOTES
5/15/2025    Abdirahman Hope  1935  [id number]    Diagnosis: Nocturia, Urinary frequency, OAB     Chief Complaint    PTNS        Patient presents for PTNS  12 managed by Dr. Rasmussen.     Plan  Follow up in 1 week for weekly PTNS and AP follow up.     Urinary Incontinence Screening      Flowsheet Row Most Recent Value   Urinary Incontinence    Urinary Incontinence? Yes   Incomplete emptying? Yes  [sometimes]   Urinary frequency? Yes   Urinary urgency? Yes   Urinary hesitancy? No   Dysuria (painful difficult urination)? No   Nocturia (waking up to use the bathroom)? Yes   Straining (having to push to go)? No   Weak stream? Yes   Intermittent stream? No   Post void dribbling? No          Vitals:    05/15/25 1054   BP: 128/68   Pulse: 61   Resp: 16   Temp: (!) 97.2 °F (36.2 °C)   SpO2: 97%   Weight: 71.9 kg (158 lb 9.6 oz)   Height: 6' (1.829 m)         Procedure PTNS    Session 7     Ankle used: right  Treatment settin  Duration of treatment: 30 minutes  Lead lot #: [id number]  Expiration Date: 2027  Feeling/Response: right leg    Patient  Progress  Decreased Urgency No  Decreased Frequency No  Episodes of incontinence Yes-no improvement   Sleep Through Night No  Related Health and Social Factors Yes    Bladder Diary Summary:  Caffeine cups per day: 1 cup of coffee per day and a soda on occasion  Alcohol- number of drinks per day: 0  Daytime voids: 6-8  Nighttime voids: 4-5  Urgency: high   Incontinence- episodes per day: patient wears a Depend during the day and at night and puts a pad inside the Depend. He reports he has been trying to remove the pad when it gets wet as opposed to keeping it on all day even when it got wet like he was prior     Treatment Plan  Increase Fluids Yes  Decrease Caffeine No  Decrease FluidsNo  Urge reduction techniques No  Kegels Yes  Timed voiding No  No fluids before bed Yes          Alina Contreras RN

## 2025-05-15 NOTE — PROGRESS NOTES
I supervised the Advanced Practitioner on . I reviewed the Advanced Practitioner note and agree.    Adalberto Rasmussen MD 05/15/25

## 2025-05-16 NOTE — PRE-PROCEDURE INSTRUCTIONS
Pre-Surgery Instructions:   Medication Instructions    acetaminophen (TYLENOL) 325 mg tablet Uses PRN- OK to take day of surgery    ALPHA LIPOIC ACID-BIOTIN PO Stop taking 7 days prior to surgery.    atorvastatin (LIPITOR) 10 mg tablet Take day of surgery.    B COMPLEX VITAMINS PO Stop taking 7 days prior to surgery.    Calcium-Vitamin D-Vitamin K (CHEWABLE CALCIUM PO) Stop taking 7 days prior to surgery.    cetirizine (ZyrTEC) 10 mg tablet Take day of surgery.    Cholecalciferol (VITAMIN D3) 2000 UNITS capsule Stop taking 7 days prior to surgery.    Coenzyme Q10 (CO Q10) 60 MG CAPS Stop taking 7 days prior to surgery.    gabapentin (NEURONTIN) 600 MG tablet Take day of surgery.    Ginkgo Biloba 120 MG TABS Stop taking 7 days prior to surgery.    ipratropium (ATROVENT) 0.03 % nasal spray Uses PRN- OK to take day of surgery    lutein 6 mg Stop taking 7 days prior to surgery.    metoprolol tartrate (LOPRESSOR) 25 mg tablet Take day of surgery.    MILK THISTLE PO Stop taking 7 days prior to surgery.    Multiple Vitamins-Minerals (OCUVITE-LUTEIN) CAPS Stop taking 7 days prior to surgery.    omeprazole (PriLOSEC) 40 MG capsule Take day of surgery.    Propylene Glycol-Glycerin (Artificial Tears) 1-0.3 % SOLN Take day of surgery.    psyllium (METAMUCIL) 58.6 % packet Hold day of surgery.    sertraline (ZOLOFT) 50 mg tablet Take day of surgery.    vitamin B-12 (VITAMIN B-12) 500 mcg tablet Stop taking 7 days prior to surgery.    Zinc 50 MG TABS Stop taking 7 days prior to surgery.   Medication instructions for day of surgery reviewed. Please take all instructed medications with only a sip of water.       You will receive a call one business day prior to surgery with an arrival time and hospital directions. If your surgery is scheduled on a Monday, the hospital will be calling you on the Friday prior to your surgery. If you have not heard from anyone by 8pm, please call the hospital supervisor through the hospital   at [phone]. ([place] [phone] or Likely [phone]).    Do not eat or drink anything after midnight the night before your surgery, including candy, mints, lifesavers, or chewing gum. Do not drink alcohol 24hrs before your surgery. Try not to smoke at least 24hrs before your surgery.       Follow the pre surgery showering instructions as listed in the “My Surgical Experience Booklet” or otherwise provided by your surgeon's office. Do not use a blade to shave the surgical area 1 week before surgery. It is okay to use a clean electric clippers up to 24 hours before surgery. Do not apply any lotions, creams, including makeup, cologne, deodorant, or perfumes after showering on the day of your surgery. Do not use dry shampoo, hair spray, hair gel, or any type of hair products.     No contact lenses, eye make-up, or artificial eyelashes. Remove nail polish, including gel polish, and any artificial, gel, or acrylic nails if possible. Remove all jewelry including rings and body piercing jewelry.     Wear causal clothing that is easy to take on and off. Consider your type of surgery.    Keep any valuables, jewelry, piercings at home. Please bring any specially ordered equipment (sling, braces) if indicated.    Arrange for a responsible person to drive you to and from the hospital on the day of your surgery. Please confirm the visitor policy for the day of your procedure when you receive your phone call with an arrival time.     Call the surgeon's office with any new illnesses, exposures, or additional questions prior to surgery.    Please reference your “My Surgical Experience Booklet” for additional information to prepare for your upcoming surgery.

## 2025-05-17 ENCOUNTER — APPOINTMENT (OUTPATIENT)
Dept: LAB | Facility: HOSPITAL | Age: 89
End: 2025-05-17
Payer: MEDICARE

## 2025-05-17 ENCOUNTER — OFFICE VISIT (OUTPATIENT)
Dept: LAB | Facility: HOSPITAL | Age: 89
End: 2025-05-17
Attending: UROLOGY
Payer: MEDICARE

## 2025-05-17 DIAGNOSIS — Z99.2 ANEMIA DUE TO CHRONIC KIDNEY DISEASE, ON CHRONIC DIALYSIS (HCC): ICD-10-CM

## 2025-05-17 DIAGNOSIS — R35.0 URINARY FREQUENCY: ICD-10-CM

## 2025-05-17 DIAGNOSIS — D63.1 ANEMIA DUE TO CHRONIC KIDNEY DISEASE, ON CHRONIC DIALYSIS (HCC): ICD-10-CM

## 2025-05-17 DIAGNOSIS — N18.6 ANEMIA DUE TO CHRONIC KIDNEY DISEASE, ON CHRONIC DIALYSIS (HCC): ICD-10-CM

## 2025-05-17 DIAGNOSIS — Z01.818 ENCOUNTER FOR PREADMISSION TESTING: ICD-10-CM

## 2025-05-17 DIAGNOSIS — R35.1 NOCTURIA: ICD-10-CM

## 2025-05-17 DIAGNOSIS — C67.8 MALIGNANT NEOPLASM OF OVERLAPPING SITES OF BLADDER (HCC): ICD-10-CM

## 2025-05-17 DIAGNOSIS — E78.2 MIXED HYPERLIPIDEMIA: ICD-10-CM

## 2025-05-17 LAB
ALBUMIN SERPL BCG-MCNC: 3.9 G/DL (ref 3.5–5)
ALP SERPL-CCNC: 103 U/L (ref 34–104)
ALT SERPL W P-5'-P-CCNC: 8 U/L (ref 7–52)
ANION GAP SERPL CALCULATED.3IONS-SCNC: 7 MMOL/L (ref 4–13)
AST SERPL W P-5'-P-CCNC: 13 U/L (ref 13–39)
ATRIAL RATE: 47 BPM
BACTERIA UR QL AUTO: ABNORMAL /HPF
BASOPHILS # BLD AUTO: 0.03 THOUSANDS/ÂΜL (ref 0–0.1)
BASOPHILS NFR BLD AUTO: 1 % (ref 0–1)
BILIRUB SERPL-MCNC: 0.59 MG/DL (ref 0.2–1)
BILIRUB UR QL STRIP: NEGATIVE
BUN SERPL-MCNC: 19 MG/DL (ref 5–25)
CALCIUM SERPL-MCNC: 9.3 MG/DL (ref 8.4–10.2)
CHLORIDE SERPL-SCNC: 102 MMOL/L (ref 96–108)
CHOLEST SERPL-MCNC: 133 MG/DL (ref ?–200)
CLARITY UR: ABNORMAL
CO2 SERPL-SCNC: 30 MMOL/L (ref 21–32)
COLOR UR: YELLOW
CREAT SERPL-MCNC: 1.02 MG/DL (ref 0.6–1.3)
EOSINOPHIL # BLD AUTO: 0.2 THOUSAND/ÂΜL (ref 0–0.61)
EOSINOPHIL NFR BLD AUTO: 3 % (ref 0–6)
ERYTHROCYTE [DISTWIDTH] IN BLOOD BY AUTOMATED COUNT: 16.1 % (ref 11.6–15.1)
GFR SERPL CREATININE-BSD FRML MDRD: 65 ML/MIN/1.73SQ M
GLUCOSE P FAST SERPL-MCNC: 88 MG/DL (ref 65–99)
GLUCOSE UR STRIP-MCNC: NEGATIVE MG/DL
HCT VFR BLD AUTO: 41.5 % (ref 36.5–49.3)
HDLC SERPL-MCNC: 38 MG/DL
HGB BLD-MCNC: 12.9 G/DL (ref 12–17)
HGB UR QL STRIP.AUTO: ABNORMAL
IMM GRANULOCYTES # BLD AUTO: 0.01 THOUSAND/UL (ref 0–0.2)
IMM GRANULOCYTES NFR BLD AUTO: 0 % (ref 0–2)
KETONES UR STRIP-MCNC: NEGATIVE MG/DL
LDLC SERPL CALC-MCNC: 67 MG/DL (ref 0–100)
LEUKOCYTE ESTERASE UR QL STRIP: ABNORMAL
LYMPHOCYTES # BLD AUTO: 2.28 THOUSANDS/ÂΜL (ref 0.6–4.47)
LYMPHOCYTES NFR BLD AUTO: 39 % (ref 14–44)
MCH RBC QN AUTO: 27.8 PG (ref 26.8–34.3)
MCHC RBC AUTO-ENTMCNC: 31.1 G/DL (ref 31.4–37.4)
MCV RBC AUTO: 89 FL (ref 82–98)
MONOCYTES # BLD AUTO: 0.84 THOUSAND/ÂΜL (ref 0.17–1.22)
MONOCYTES NFR BLD AUTO: 14 % (ref 4–12)
NEUTROPHILS # BLD AUTO: 2.56 THOUSANDS/ÂΜL (ref 1.85–7.62)
NEUTS SEG NFR BLD AUTO: 43 % (ref 43–75)
NITRITE UR QL STRIP: NEGATIVE
NON-SQ EPI CELLS URNS QL MICRO: ABNORMAL /HPF
NONHDLC SERPL-MCNC: 95 MG/DL
NRBC BLD AUTO-RTO: 0 /100 WBCS
P AXIS: 70 DEGREES
PH UR STRIP.AUTO: 6 [PH]
PLATELET # BLD AUTO: 237 THOUSANDS/UL (ref 149–390)
PMV BLD AUTO: 11.2 FL (ref 8.9–12.7)
POTASSIUM SERPL-SCNC: 4.3 MMOL/L (ref 3.5–5.3)
PR INTERVAL: 320 MS
PROT SERPL-MCNC: 6.8 G/DL (ref 6.4–8.4)
PROT UR STRIP-MCNC: ABNORMAL MG/DL
QRS AXIS: -68 DEGREES
QRSD INTERVAL: 132 MS
QT INTERVAL: 514 MS
QTC INTERVAL: 455 MS
RBC # BLD AUTO: 4.64 MILLION/UL (ref 3.88–5.62)
RBC #/AREA URNS AUTO: ABNORMAL /HPF
SODIUM SERPL-SCNC: 139 MMOL/L (ref 135–147)
SP GR UR STRIP.AUTO: >=1.03 (ref 1–1.03)
T WAVE AXIS: -10 DEGREES
TRIGL SERPL-MCNC: 142 MG/DL (ref ?–150)
UROBILINOGEN UR STRIP-ACNC: <2 MG/DL
VENTRICULAR RATE: 47 BPM
WBC # BLD AUTO: 5.92 THOUSAND/UL (ref 4.31–10.16)
WBC #/AREA URNS AUTO: ABNORMAL /HPF

## 2025-05-17 PROCEDURE — 80061 LIPID PANEL: CPT

## 2025-05-17 PROCEDURE — 87077 CULTURE AEROBIC IDENTIFY: CPT

## 2025-05-17 PROCEDURE — 87186 SC STD MICRODIL/AGAR DIL: CPT

## 2025-05-17 PROCEDURE — 93005 ELECTROCARDIOGRAM TRACING: CPT

## 2025-05-17 PROCEDURE — 87086 URINE CULTURE/COLONY COUNT: CPT

## 2025-05-17 PROCEDURE — 81001 URINALYSIS AUTO W/SCOPE: CPT

## 2025-05-17 PROCEDURE — 85025 COMPLETE CBC W/AUTO DIFF WBC: CPT

## 2025-05-19 ENCOUNTER — RESULTS FOLLOW-UP (OUTPATIENT)
Dept: FAMILY MEDICINE CLINIC | Facility: CLINIC | Age: 89
End: 2025-05-19

## 2025-05-19 LAB
ATRIAL RATE: 47 BPM
P AXIS: 70 DEGREES
PR INTERVAL: 320 MS
QRS AXIS: -68 DEGREES
QRSD INTERVAL: 132 MS
QT INTERVAL: 514 MS
QTC INTERVAL: 455 MS
T WAVE AXIS: -10 DEGREES
VENTRICULAR RATE: 47 BPM

## 2025-05-19 PROCEDURE — 93010 ELECTROCARDIOGRAM REPORT: CPT | Performed by: INTERNAL MEDICINE

## 2025-05-19 NOTE — RESULT ENCOUNTER NOTE
Please call the patient regarding his abnormal result.  Cholesterol is good chemistry panel is good showing good kidney and liver function the blood count is good he is not significantly anemic his urine sample was dirty and had bacteria and some red blood cells and some and some nitrite so he does have a urinary infection please find out if he is currently taking an antibiotic and if he is not I would treat him with cephalexin 500 mg 4 times daily for 10 days

## 2025-05-20 ENCOUNTER — RESULTS FOLLOW-UP (OUTPATIENT)
Dept: UROLOGY | Facility: CLINIC | Age: 89
End: 2025-05-20

## 2025-05-20 DIAGNOSIS — N30.00 ACUTE CYSTITIS WITHOUT HEMATURIA: Primary | ICD-10-CM

## 2025-05-20 LAB
BACTERIA UR CULT: ABNORMAL
BACTERIA UR CULT: ABNORMAL

## 2025-05-20 RX ORDER — CEPHALEXIN 500 MG/1
500 CAPSULE ORAL EVERY 6 HOURS SCHEDULED
Qty: 28 CAPSULE | Refills: 0 | Status: SHIPPED | OUTPATIENT
Start: 2025-05-20 | End: 2025-05-27

## 2025-05-20 NOTE — TELEPHONE ENCOUNTER
Per communication consent LM with daughter Emilie and is aware of urine culture results and to start Keflex.

## 2025-05-20 NOTE — TELEPHONE ENCOUNTER
----- Message from Adalberto Rasmussen MD sent at 5/20/2025  3:55 PM EDT -----  Please let him know the Keflex was called into his pharmacy  ----- Message -----  From: Lab, Background User  Sent: 5/17/2025  11:58 AM EDT  To: Adalberto Rasmussen MD

## 2025-05-22 ENCOUNTER — OFFICE VISIT (OUTPATIENT)
Dept: UROLOGY | Facility: CLINIC | Age: 89
End: 2025-05-22
Payer: MEDICARE

## 2025-05-22 VITALS
DIASTOLIC BLOOD PRESSURE: 70 MMHG | HEART RATE: 56 BPM | SYSTOLIC BLOOD PRESSURE: 130 MMHG | OXYGEN SATURATION: 95 % | WEIGHT: 158 LBS | RESPIRATION RATE: 16 BRPM | HEIGHT: 72 IN | BODY MASS INDEX: 21.4 KG/M2 | TEMPERATURE: 97.2 F

## 2025-05-22 DIAGNOSIS — C67.8 MALIGNANT NEOPLASM OF OVERLAPPING SITES OF BLADDER (HCC): ICD-10-CM

## 2025-05-22 DIAGNOSIS — R35.1 NOCTURIA: ICD-10-CM

## 2025-05-22 DIAGNOSIS — R35.0 URINARY FREQUENCY: Primary | ICD-10-CM

## 2025-05-22 DIAGNOSIS — R39.15 URINARY URGENCY: ICD-10-CM

## 2025-05-22 DIAGNOSIS — N32.81 OAB (OVERACTIVE BLADDER): ICD-10-CM

## 2025-05-22 PROCEDURE — 64566 NEUROELTRD STIM POST TIBIAL: CPT

## 2025-05-22 PROCEDURE — 99213 OFFICE O/P EST LOW 20 MIN: CPT

## 2025-05-22 RX ORDER — MIRABEGRON 25 MG/1
25 TABLET, FILM COATED, EXTENDED RELEASE ORAL DAILY
Qty: 30 TABLET | Refills: 3 | Status: SHIPPED | OUTPATIENT
Start: 2025-05-22

## 2025-05-22 NOTE — PROGRESS NOTES
2025    Abdirahman Hope  1935  [id number]    Diagnosis: Nocturia, Urinary frequency, OAB    Chief Complaint    Urinary frequency, urgency, incontinence        Patient presents for PTNS  managed by Dr. Rasmussen.      Urinary Incontinence Screening      Flowsheet Row Most Recent Value   Urinary Incontinence    Urinary Incontinence? Yes   Incomplete emptying? No   Urinary frequency? Yes   Urinary urgency? Yes   Urinary hesitancy? No   Dysuria (painful difficult urination)? No   Nocturia (waking up to use the bathroom)? Yes   Straining (having to push to go)? No   Weak stream? No   Intermittent stream? No   Post void dribbling? Yes  [sometimes]          Vitals:    25 1323   BP: 130/70   Pulse: 56   Resp: 16   Temp: (!) 97.2 °F (36.2 °C)   SpO2: 95%   Weight: 71.7 kg (158 lb)   Height: 6' (1.829 m)         Procedure PTNS    Session 8 of     Ankle used: right  Treatment settin  Duration of treatment: 30 minutes  Lead lot #: [id number]  Expiration Date: 2027   Feeling/Response: right foot     Patient Progress  Decreased Urgency No  Decreased Frequency No  Episodes of incontinence Yes- no improvement   Sleep Through Night No  Related Health and Social Factors Yes    Bladder Diary Summary:  Caffeine cups per day: 1 cup of coffee daily and an occasional soda  Alcohol- number of drinks per day: 0  Daytime voids:6-8  Nighttime voids: 4-5  Urgency: high   Incontinence- episodes per day: patient wears a Depend / and sometimes wears a pad inside as well. He states he changes the Depend twice a day     Treatment Plan  Increase Fluids Yes  Decrease Caffeine No  Decrease FluidsNo  Urge reduction techniques No  Kegels Yes  Timed voiding No  No fluids before bed Yes          Alina Contreras RN

## 2025-05-22 NOTE — ASSESSMENT & PLAN NOTE
Patient is scheduled for TURBT with mitomycin instillation with Dr. Rasmussen on 5/28/2025.  Preop urine culture was positive and he is currently taking Keflex 500 mg every 6 hours x 7 days.  Surgical consent form was already signed at his last office visit.  He will proceed with surgery as scheduled.

## 2025-05-22 NOTE — PROGRESS NOTES
Name: Abdirahman Hope      : 1935      MRN: [id number]  Encounter Provider: SUKI Jang  Encounter Date: 2025   Encounter department: [facility] [place]    :  Assessment & Plan  Urinary frequency  Patient presented today for weekly session 8 of 12 for PTNS.  Unfortunate he has seen no change in baseline symptoms since being started on weekly therapy.  I am recommending we discontinue this.  I had a lengthy discussion with the patient and his daughter today regarding further management of his urinary symptoms.  Unfortunately he had no response to trospium in the past.  Due to his age and comorbidities I would strongly discouraged use of anticholinergics.  We did discuss consideration for beta 3 agonist such as Myrbetriq or Gemtesa however these often are cost prohibitive.  According to estimation Myrbetriq may cost him $47 per month.  I sent a prescription to his pharmacy to trial this.    Orders:    Posterior Tibial Nerve Stimulation    Myrbetriq 25 MG TB24; Take 25 mg by mouth in the morning    Malignant neoplasm of overlapping sites of bladder (HCC)  Patient is scheduled for TURBT with mitomycin instillation with Dr. Rasmussen on 2025.  Preop urine culture was positive and he is currently taking Keflex 500 mg every 6 hours x 7 days.  Surgical consent form was already signed at his last office visit.  He will proceed with surgery as scheduled.               Interval HPI:        History of Present Illness     Patient of Dr. Rasmussen's with history of recurrent bladder cancer as described in more detail below.  He presents today however for follow-up evaluation to assess response to PTNS currently receiving weekly session 8 of 12.  He has a history of urinary frequency with urge incontinence and nocturia.  Trospium was ineffective.        Bladder cancer:  He is status post TURBT of a 1 cm right lateral wall tumor with cup forceps by Dr. Rasmussen 2023. Pathology  showed high-grade papillary urothelial carcinoma with foci suggesting superficial invasion into papillary stem. Muscle was present and benign. Cystoscopy September 17, 2024, he had a small superficial papillary recurrence lateral to the trigone and anterior to the orifice. Dr. Rasmussen fulgurated all of this flat. Surveillance cystoscopy 3/18/2025 revealed superficial recurrence right floor of the bladder and posterior wall. He is currently scheduled for TURBT with mitomycin installation on 5/28/2025 with Dr. Rasmussen.          Objective   There were no vitals taken for this visit.    Review of Systems   Constitutional:  Negative for chills and fever.   HENT:  Negative for congestion and sore throat.    Respiratory:  Negative for cough and shortness of breath.    Cardiovascular:  Negative for chest pain and leg swelling.   Gastrointestinal:  Negative for abdominal pain, constipation and diarrhea.   Genitourinary:  Positive for frequency and urgency (incontinence). Negative for difficulty urinating, dysuria and hematuria.   Musculoskeletal:  Negative for back pain and gait problem.   Skin:  Negative for wound.   Allergic/Immunologic: Negative for immunocompromised state.   Hematological:  Does not bruise/bleed easily.       Physical Exam  Vitals and nursing note reviewed.   Constitutional:       General: He is not in acute distress.     Appearance: He is well-developed.   HENT:      Head: Normocephalic and atraumatic.     Eyes:      Conjunctiva/sclera: Conjunctivae normal.       Cardiovascular:      Rate and Rhythm: Normal rate and regular rhythm.      Heart sounds: No murmur heard.  Pulmonary:      Effort: Pulmonary effort is normal. No respiratory distress.      Breath sounds: Normal breath sounds.   Abdominal:      Palpations: Abdomen is soft.      Tenderness: There is no abdominal tenderness.     Musculoskeletal:         General: No swelling.      Cervical back: Neck supple.     Skin:     General: Skin is warm and  dry.      Capillary Refill: Capillary refill takes less than 2 seconds.     Neurological:      Mental Status: He is alert.     Psychiatric:         Mood and Affect: Mood normal.           Imaging:          Please Note:  Voice dictation software has been used to create this document. There may be inadvertent transcriptions errors.     SUKI Jang 05/22/25

## 2025-05-22 NOTE — ASSESSMENT & PLAN NOTE
Patient presented today for weekly session 8 of 12 for PTNS.  Unfortunate he has seen no change in baseline symptoms since being started on weekly therapy.  I am recommending we discontinue this.  I had a lengthy discussion with the patient and his daughter today regarding further management of his urinary symptoms.  Unfortunately he had no response to trospium in the past.  Due to his age and comorbidities I would strongly discouraged use of anticholinergics.  We did discuss consideration for beta 3 agonist such as Myrbetriq or Gemtesa however these often are cost prohibitive.  According to estimation Myrbetriq may cost him $47 per month.  I sent a prescription to his pharmacy to trial this.    Orders:    Posterior Tibial Nerve Stimulation    Myrbetriq 25 MG TB24; Take 25 mg by mouth in the morning

## 2025-05-27 NOTE — H&P
Adalberto Rasmussen MD  Physician  Specialty: Urology     H/P updated by 2025 no changes     Encounter Date: 3/18/2025  Procedure Orders   Cystoscopy [[id number]] ordered by Adalberto Rasmussen MD     Post-procedure Diagnoses   Malignant neoplasm of overlapping sites of bladder (HCC) [C67.8]   Urinary frequency [R35.0]   Nocturia [R35.1]   OAB (overactive bladder) [N32.81]          Signed       Expand All Collapse All    UROLOGY PROGRESS NOTE   [facility]  [address] [place] [address] [address]  [address]  [place] [address]  [phone]  Fax:[phone]  [email or web address]        NAME: Abdirahman Hope  AGE: 89 y.o. SEX: male  : 1935          MRN: [id number]     DATE: 3/18/2025  TIME: 3:35 PM     Assessment and Plan;     Bladder cancer with recurrence, superficial right floor of bladder and posterior wall-too much tumor to fulgurate in the office.  Therefore we will set him up for TURBT/bladder biopsies and fulguration in the operating room with mitomycin instillation.  The procedure as well as the risks of bleeding infection damage urinary tract and need for additional procedures are known to the patient and gives informed consent.  H&P done.     Urinary frequency, urge incontinence and nocturia-he is interested in PTNS after I explained the procedure and the rationale behind it.  Will set this up.                     Chief Complaint   No chief complaint on file.        History of Present Illness   89-year-old man, established patient-last seen by me 2024 for superficial bladder cancer.  Here for repeat cystoscopy.  During that visit I drained left forearm infection and started him on Keflex.  He is status post TURBT of a 1 cm right lateral wall tumor with cup forceps by me 2023.  Pathology showed high-grade papillary urothelial carcinoma with foci suggesting superficial invasion into papillary stem.  Muscle was present and benign.  On last cystoscopy 2024, he  had a small superficial papillary recurrence lateral to the trigone and anterior to the orifice.  I fulgurated all of this flat.     Urinary frequency: Trospium was not working out so well when I last saw him.  He thought I thought he may be a PTNS candidate but we are deferring this until now.          Cystoscopy      Date/Time  3/18/2025 3:00 PM      Performed by  Adalberto Rasmussen MD   Authorized by  Adalberto Rasmussen MD     Universal Protocol:  Consent: Verbal consent obtained. Written consent obtained.        Procedure Details:  Procedure type: cystoscopy    Additional Procedure Details: Cystoscopy Procedure Note           Pre-operative Diagnosis: Bladder cancer surveillance     Post-operative Diagnosis: Same, with recurrent tumor patch right floor of bladder and posterior wall     Procedure: Flexible cystoscopy     Surgeon: Adalberto Rasmussen MD     Anesthesia: 1% Xylocaine per urethra     EBL: Minimal     Complications: none     Procedure Details   The risks, benefits, complications, treatment options, and expected outcomes were discussed with the patient. The patient concurred with the proposed plan, giving informed consent.     Cystoscopy was performed today under local anesthesia, using sterile technique. The patient was placed in the supine position, prepped with Betadine, and draped in the usual sterile fashion. The flexible cystocope was used to inspect both the urethra and bladder     Findings:  Urethra: Normal without stricture.  Prostate nonocclusive.     Bladder: Heavily trabeculated with superficial recurrence right floor of the bladder and posterior wall.  May be premalignant or CIS..  The orifices were orthotopic and intact.           Specimens: None                 Complications:  None           Disposition: To home            Condition:  Stable                 The following portions of the patient's history were reviewed and updated as appropriate: allergies, current medications, past family history, past  medical history, past social history, past surgical history and problem list.  Past Medical History        Past Medical History:   Diagnosis Date    Anemia      Appendicitis      Coronary artery disease      Detached retina      GERD (gastroesophageal reflux disease)      Hyperlipidemia      Hypertension      Macular degeneration      Neuropathy      Pelvis fracture (HCC) 01/2021    Von Willebrand disease (HCC)           Past Surgical History         Past Surgical History:   Procedure Laterality Date    ADENOIDECTOMY        APPENDECTOMY        ARTHRODESIS   01/15/2014     Lumbar     BACK SURGERY        CATARACT EXTRACTION        COLONOSCOPY   12/09/2016     fiberoptic     CYSTOSCOPY         with resection of tumor / 1/12/17, 10/2/17 / diagnostic 12/8/16, 5/30/17     CYSTOSCOPY   09/20/2018    CYSTOSCOPY   06/22/2020    EGD AND COLONOSCOPY N/A 12/9/2016     Procedure: EGD AND COLONOSCOPY;  Surgeon: Kahlil Alfonso MD;  Location: [facility];  Service:     EYE SURGERY        FRACTURE SURGERY Bilateral       ARM    NECK SURGERY        NEUROPLASTY / TRANSPOSITION MEDIAN NERVE AT CARPAL TUNNEL Right 04/2015    MI AMPUTATION METATARSAL W/TOE SINGLE Left 4/11/2024     Procedure: LEFT 2ND TOE AMPUTATION;  Surgeon: Vicente Barrientos DPM;  Location: [facility];  Service: Podiatry    MI BLADDER INSTILLATION ANTICARCINOGENIC AGENT N/A 1/12/2017     Procedure: INSTILLATION MYTOMYCIN;  Surgeon: Jhon Zuniga MD;  [facility];  Service: Urology    MI BLADDER INSTILLATION ANTICARCINOGENIC AGENT N/A 10/2/2017     Procedure: INSTILLATION MYTOMYCIN;  Surgeon: Jhon Zuniga MD;  [facility];  Service: Urology    MI COLONOSCOPY FLX DX W/COLLJ SPEC WHEN PFRMD N/A 2/5/2019     Procedure: COLONOSCOPY;  Surgeon: Abad Valentino MD;  [facility];  Service: Gastroenterology    MI CYSTO W/REMOVAL OF LESIONS SMALL N/A 1/12/2017     Procedure: CYSTOSCOPY, BLADDER BIOPSY WITH FULGRATION, POSSIBLE  TRANSURETHRAL RESECTION OF BLADDER TUMOR;  Surgeon: Jhon Zuniga MD;  [facility];  Service: Urology    NM CYSTO W/REMOVAL OF LESIONS SMALL N/A 10/2/2017     Procedure: CYSTOSCOPY WITH  BLADDER BIOPSIES WITH FULGURATION;  Surgeon: Jhon Zuniga MD;  [facility];  Service: Urology    NM CYSTO W/REMOVAL OF LESIONS SMALL N/A 11/30/2023     Procedure: CYSTOSCOPY WITH BIOPSIES and fulguration;  Surgeon: Adalberto Rasmussen MD;  [facility];  Service: Urology    NM ESOPHAGOGASTRODUODENOSCOPY TRANSORAL DIAGNOSTIC N/A 8/3/2018     Procedure: ESOPHAGOGASTRODUODENOSCOPY (EGD);  Surgeon: Abad Valentino MD;  [facility];  Service: Gastroenterology    RETINAL DETACHMENT SURGERY Right 03/2016     complete air fill confirmed     ROTATOR CUFF REPAIR        SHOULDER SURGERY   03/03/2015     proximal humerus fracture / LA...3/6/15   R...1/3/18     TONSILECTOMY AND ADNOIDECTOMY        TONSILLECTOMY        TRANSURETHRAL RESECTION OF BLADDER TUMOR N/A 10/2/2017     Procedure: TRANSURETHRAL RESECTION OF BLADDER TUMOR (TURBT);  Surgeon: Jhon Zuniga MD;  [facility];  Service: Urology    VON WILLEBRAND ANTIGEN (HISTORICAL)             shoulder  Review of Systems   Review of Systems   Constitutional:  Negative for fever.   Respiratory:  Negative for shortness of breath.    Cardiovascular:  Negative for chest pain.   Genitourinary:  Positive for frequency and urgency.        Urinary incontinence   Musculoskeletal:         Left shoulder fracture with pain and immobility         Active Problem List      Problem List       Patient Active Problem List   Diagnosis    Bladder cancer (HCC)    Lung nodule, solitary    Erectile dysfunction    Gait instability    High blood pressure    Hypothyroidism    Idiopathic peripheral neuropathy    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)    Cervical stenosis of spinal canal    Spinal stenosis of lumbar region    Spondylosis of  cervical region without myelopathy or radiculopathy    Hoarse voice quality    Weight loss    Gastroesophageal reflux disease without esophagitis    Nausea    Chronic obstructive pulmonary disease with acute exacerbation ([facility])    Von Willebrand's disease ([facility])    Radiculopathy, lumbar region    Fall    Anemia    Contusion of cerebral cortex with concussion ([facility])    Urinary frequency    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   Constitutional:       Appearance: Normal appearance. He is normal weight.   HENT:      Head: Normocephalic and atraumatic.   Eyes:      Extraocular Movements: Extraocular movements intact.   Cardiovascular:      Rate and Rhythm: Normal rate and regular rhythm.      Heart sounds: Normal heart sounds.   Pulmonary:      Effort: Pulmonary effort is normal.      Breath sounds: Normal breath sounds.   Genitourinary:     Penis: Normal.    Musculoskeletal:         General: No swelling or tenderness.      Cervical back: Normal range of motion.      Comments: Uses walker, needs assistance with standing etc.   Skin:     Coloration: Skin is pale. Skin is not jaundiced.   Neurological:      Mental Status: He is alert.                  Current Medications     Current Medications      Current Outpatient Medications:     acetaminophen (TYLENOL) 325 mg tablet, Take 2 tablets (650 mg total) by mouth every 6 (six) hours as needed for mild  pain, headaches or fever, Disp:  , Rfl: 0    ALPHA LIPOIC ACID-BIOTIN PO, Take 1 tablet by mouth in the morning, Disp: , Rfl:     atorvastatin (LIPITOR) 10 mg tablet, Take 1 tablet (10 mg total) by mouth every other day, Disp: 45 tablet, Rfl: 3    B COMPLEX VITAMINS PO, Take 1 tablet by mouth daily, Disp: , Rfl:     cephalexin (KEFLEX) 500 mg capsule, Take 1 capsule (500 mg total) by mouth 1 (one) time for 1 dose Take after procedure, Disp: 1 capsule, Rfl: 0    cetirizine (ZyrTEC) 10 mg tablet, TAKE ONE (1) TABLET (10 MG TOTAL) BY MOUTH DAILY, Disp: 90 tablet, Rfl: 1    Cholecalciferol (VITAMIN D3) 2000 UNITS capsule, Take 1 capsule by mouth 2 (two) times a day 5000 units , Disp: , Rfl:     Coenzyme Q10 (CO Q10) 60 MG CAPS, Take 1 capsule by mouth daily, Disp: , Rfl:     gabapentin (NEURONTIN) 600 MG tablet, TAKE 1 TABLET BY MOUTH TWICE  DAILY, Disp: 180 tablet, Rfl: 1    Ginkgo Biloba 120 MG TABS, Take 1 tablet by mouth daily, Disp: , Rfl:     Inositol 500 MG TABS, Take 1 tablet by mouth 2 (two) times a day, Disp: , Rfl:     ipratropium (ATROVENT) 0.03 % nasal spray, USE 2 SPRAYS IN BOTH NOSTRILS 3  TIMES DAILY, Disp: 120 mL, Rfl: 3    lutein 6 mg, Take 1 capsule by mouth daily, Disp: , Rfl:     metoprolol tartrate (LOPRESSOR) 25 mg tablet, TAKE 1 TABLET BY MOUTH EVERY 12  HOURS, Disp: 180 tablet, Rfl: 3    MILK THISTLE PO, Take 1 tablet by mouth daily, Disp: , Rfl:     Misc Natural Products (SUPER SNOOZE) CAPS, Take 1 capsule by mouth daily Bed time , Disp: , Rfl:     Multiple Vitamins-Minerals (OCUVITE-LUTEIN) CAPS, Take by mouth daily , Disp: , Rfl:     omeprazole (PriLOSEC) 40 MG capsule, Take 1 capsule (40 mg total) by mouth daily, Disp: 180 capsule, Rfl: 0    psyllium (METAMUCIL) 58.6 % packet, Take 1 packet by mouth daily, Disp: , Rfl:     sildenafil (VIAGRA) 50 MG tablet, , Disp: , Rfl:     vitamin B-12 (VITAMIN B-12) 500 mcg tablet, , Disp: , Rfl:     Zinc 50 MG TABS, Take 1 tablet by mouth daily, Disp: ,  Rfl:     fluocinolone (SYNALAR) 0.01 % external solution, Apply 1 Application topically 2 (two) times a day (Patient not taking: Reported on 3/17/2025), Disp: , Rfl:     hydrocortisone-pramoxine (PRAMOSONE) 1-2.5 % LOTN, Apply topically 3 (three) times a day (Patient not taking: Reported on 2/3/2025), Disp: 118 mL, Rfl: 2    ibuprofen (MOTRIN) 600 mg tablet, Take 1 tablet (600 mg total) by mouth every 6 (six) hours as needed for mild pain (Patient not taking: Reported on 3/17/2025), Disp: 30 tablet, Rfl: 0    ketoconazole (NIZORAL) 2 % shampoo, , Disp: , Rfl:     mupirocin (BACTROBAN) 2 % ointment, Apply topically 3 (three) times a day (Patient not taking: Reported on 2/3/2025), Disp: 22 g, Rfl: 1    pentoxifylline (TRENtal) 400 mg ER tablet, Take 1 tablet (400 mg total) by mouth 3 (three) times a day with meals (Patient not taking: Reported on 12/9/2024), Disp: 270 tablet, Rfl: 3    silver sulfadiazine (SILVADENE,SSD) 1 % cream, Apply topically daily (Patient not taking: Reported on 2/3/2025), Disp: 50 g, Rfl: 1    tadalafil (CIALIS) 20 MG tablet, Take 1 tablet (20 mg total) by mouth every other day as needed for erectile dysfunction (Patient not taking: Reported on 12/9/2024), Disp: 18 tablet, Rfl: 3    trospium (SANCTURA XR) 60 mg 24 hr capsule, Take 1 capsule (60 mg total) by mouth daily before breakfast (Patient not taking: Reported on 2/3/2025), Disp: 90 capsule, Rfl: 3    trospium chloride (SANCTURA) 20 mg tablet, Take 1 tablet (20 mg total) by mouth 2 (two) times a day (Patient not taking: Reported on 12/9/2024), Disp: 180 tablet, Rfl: 3              Adalberto Rasmussen MD                     Electronically signed by Adalberto Rasmussen MD at 3/18/2025  3:43 PM         Procedure visit on 3/18/2025          Note shared with patient  Additional Documentation    Vitals: /88     Pulse 78     Temp 97.8 °F (36.6 °C)     Ht 6' (1.829 m)     Wt 74.4 kg (164 lb)     SpO2 98%     BMI 22.24 kg/m²     BSA 1.96 m²   SmartForms:   [facility] WRAP UP REQUIREMENTS ADVANCED       [facility] PRE[facility]ARTING   Encounter Info: Billing Info,     History,     Allergies,     Detailed Report     Orders Placed      UA (URINE) with reflex to Scope    Urine culture    CBC and differential    Cystoscopy    Case request operating room: TRANSURETHRAL RESECTION OF BLADDER TUMOR (TURBT) with mitomycin instillation Once  All Encounter Results  Other Orders Performed    Ambulatory Referral to Urology Pending Review  Medication Changes      Cephalexin 500 mg Oral Once, Take after procedure  Medication List  Visit Diagnoses      Malignant neoplasm of overlapping sites of bladder (HCC)    Urinary frequency    Nocturia    OAB (overactive bladder)    Asymptomatic microscopic hematuria  Problem List

## 2025-05-28 ENCOUNTER — ANESTHESIA EVENT (OUTPATIENT)
Dept: PERIOP | Facility: HOSPITAL | Age: 89
End: 2025-05-28
Payer: MEDICARE

## 2025-05-28 ENCOUNTER — HOSPITAL ENCOUNTER (OUTPATIENT)
Facility: HOSPITAL | Age: 89
Setting detail: OUTPATIENT SURGERY
Discharge: HOME | End: 2025-05-28
Attending: UROLOGY | Admitting: UROLOGY
Payer: MEDICARE

## 2025-05-28 ENCOUNTER — ANESTHESIA (OUTPATIENT)
Dept: PERIOP | Facility: HOSPITAL | Age: 89
End: 2025-05-28
Payer: MEDICARE

## 2025-05-28 VITALS
OXYGEN SATURATION: 94 % | SYSTOLIC BLOOD PRESSURE: 121 MMHG | RESPIRATION RATE: 18 BRPM | BODY MASS INDEX: 21.4 KG/M2 | HEART RATE: 78 BPM | DIASTOLIC BLOOD PRESSURE: 53 MMHG | TEMPERATURE: 97 F | HEIGHT: 72 IN | WEIGHT: 158 LBS

## 2025-05-28 DIAGNOSIS — C67.8 MALIGNANT NEOPLASM OF OVERLAPPING SITES OF BLADDER (HCC): Primary | ICD-10-CM

## 2025-05-28 PROCEDURE — 88307 TISSUE EXAM BY PATHOLOGIST: CPT | Performed by: PATHOLOGY

## 2025-05-28 PROCEDURE — 88341 IMHCHEM/IMCYTCHM EA ADD ANTB: CPT | Performed by: PATHOLOGY

## 2025-05-28 PROCEDURE — NC001 PR NO CHARGE: Performed by: UROLOGY

## 2025-05-28 PROCEDURE — 88342 IMHCHEM/IMCYTCHM 1ST ANTB: CPT | Performed by: PATHOLOGY

## 2025-05-28 PROCEDURE — 52234 CYSTOSCOPY AND TREATMENT: CPT | Performed by: UROLOGY

## 2025-05-28 RX ORDER — LIDOCAINE HYDROCHLORIDE 10 MG/ML
INJECTION, SOLUTION EPIDURAL; INFILTRATION; INTRACAUDAL; PERINEURAL AS NEEDED
Status: DISCONTINUED | OUTPATIENT
Start: 2025-05-28 | End: 2025-05-28

## 2025-05-28 RX ORDER — DEXAMETHASONE SODIUM PHOSPHATE 10 MG/ML
INJECTION, SOLUTION INTRAMUSCULAR; INTRAVENOUS AS NEEDED
Status: DISCONTINUED | OUTPATIENT
Start: 2025-05-28 | End: 2025-05-28

## 2025-05-28 RX ORDER — CEFAZOLIN SODIUM 2 G/50ML
2000 SOLUTION INTRAVENOUS ONCE
Status: COMPLETED | OUTPATIENT
Start: 2025-05-28 | End: 2025-05-28

## 2025-05-28 RX ORDER — FENTANYL CITRATE 50 UG/ML
INJECTION, SOLUTION INTRAMUSCULAR; INTRAVENOUS AS NEEDED
Status: DISCONTINUED | OUTPATIENT
Start: 2025-05-28 | End: 2025-05-28

## 2025-05-28 RX ORDER — FENTANYL CITRATE/PF 50 MCG/ML
25 SYRINGE (ML) INJECTION
Status: DISCONTINUED | OUTPATIENT
Start: 2025-05-28 | End: 2025-05-28 | Stop reason: HOSPADM

## 2025-05-28 RX ORDER — HYDRALAZINE HYDROCHLORIDE 20 MG/ML
INJECTION INTRAMUSCULAR; INTRAVENOUS AS NEEDED
Status: DISCONTINUED | OUTPATIENT
Start: 2025-05-28 | End: 2025-05-28

## 2025-05-28 RX ORDER — ONDANSETRON 2 MG/ML
INJECTION INTRAMUSCULAR; INTRAVENOUS AS NEEDED
Status: DISCONTINUED | OUTPATIENT
Start: 2025-05-28 | End: 2025-05-28

## 2025-05-28 RX ORDER — ONDANSETRON 2 MG/ML
4 INJECTION INTRAMUSCULAR; INTRAVENOUS ONCE AS NEEDED
Status: DISCONTINUED | OUTPATIENT
Start: 2025-05-28 | End: 2025-05-28 | Stop reason: HOSPADM

## 2025-05-28 RX ORDER — SORBITOL 30 G/1000ML
IRRIGANT IRRIGATION AS NEEDED
Status: DISCONTINUED | OUTPATIENT
Start: 2025-05-28 | End: 2025-05-28 | Stop reason: HOSPADM

## 2025-05-28 RX ORDER — PHENAZOPYRIDINE HYDROCHLORIDE 100 MG/1
200 TABLET, FILM COATED ORAL ONCE
Status: COMPLETED | OUTPATIENT
Start: 2025-05-28 | End: 2025-05-28

## 2025-05-28 RX ORDER — PROPOFOL 10 MG/ML
INJECTION, EMULSION INTRAVENOUS AS NEEDED
Status: DISCONTINUED | OUTPATIENT
Start: 2025-05-28 | End: 2025-05-28

## 2025-05-28 RX ORDER — PHENAZOPYRIDINE HYDROCHLORIDE 200 MG/1
200 TABLET, FILM COATED ORAL 3 TIMES DAILY PRN
Qty: 30 TABLET | Refills: 0 | Status: SHIPPED | OUTPATIENT
Start: 2025-05-28

## 2025-05-28 RX ADMIN — PHENYLEPHRINE HYDROCHLORIDE 40 MCG/MIN: 10 INJECTION INTRAVENOUS at 14:24

## 2025-05-28 RX ADMIN — ONDANSETRON 4 MG: 2 INJECTION INTRAMUSCULAR; INTRAVENOUS at 14:33

## 2025-05-28 RX ADMIN — FENTANYL CITRATE 25 MCG: 50 INJECTION, SOLUTION INTRAMUSCULAR; INTRAVENOUS at 14:34

## 2025-05-28 RX ADMIN — LIDOCAINE HYDROCHLORIDE 50 MG: 10 INJECTION, SOLUTION EPIDURAL; INFILTRATION; INTRACAUDAL; PERINEURAL at 14:26

## 2025-05-28 RX ADMIN — HYDRALAZINE HYDROCHLORIDE 10 MG: 20 INJECTION INTRAMUSCULAR; INTRAVENOUS at 14:47

## 2025-05-28 RX ADMIN — CEFAZOLIN SODIUM 2000 MG: 2 SOLUTION INTRAVENOUS at 14:21

## 2025-05-28 RX ADMIN — DEXAMETHASONE SODIUM PHOSPHATE 4 MG: 10 INJECTION, SOLUTION INTRAMUSCULAR; INTRAVENOUS at 14:33

## 2025-05-28 RX ADMIN — PROPOFOL 50 MG: 10 INJECTION, EMULSION INTRAVENOUS at 14:26

## 2025-05-28 RX ADMIN — PHENAZOPYRIDINE 200 MG: 100 TABLET ORAL at 15:37

## 2025-05-28 RX ADMIN — PROPOFOL 50 MG: 10 INJECTION, EMULSION INTRAVENOUS at 14:27

## 2025-05-28 NOTE — ANESTHESIA POSTPROCEDURE EVALUATION
Post-Op Assessment Note    CV Status:  Stable  Pain Score: 0    Pain management: adequate       Mental Status:  Alert and awake   Hydration Status:  Euvolemic   PONV Controlled:  Controlled   Airway Patency:  Patent     Post Op Vitals Reviewed: Yes    No anethesia notable event occurred.    Staff: CRNA           Last Filed PACU Vitals:  Vitals Value Taken Time   Temp 97.5    Pulse 71    /58    Resp 14    SpO2 96

## 2025-05-28 NOTE — INTERVAL H&P NOTE
H&P reviewed. After examining the patient I find no changes in the patients condition since the H&P had been written.    Vitals:    05/28/25 1235   BP:    Pulse: (!) 52   Resp: 18   Temp: (!) 97 °F (36.1 °C)   SpO2:

## 2025-05-28 NOTE — ANESTHESIA PREPROCEDURE EVALUATION
Procedure:  TRANSURETHRAL RESECTION OF BLADDER TUMOR (TURBT) with mitomycin instillation (Bladder)  INSTILLATION MITOMYCIN (Bladder)    Relevant Problems   CARDIO   (+) High blood pressure   (+) Hyperlipidemia   (+) PAD (peripheral artery disease) (HCC)   (+) Venous insufficiency      ENDO   (+) Hypothyroidism      GI/HEPATIC   (+) Gastroesophageal reflux disease without esophagitis      HEMATOLOGY   (+) Anemia   (+) Von Willebrand's disease (HCC)      MUSCULOSKELETAL   (+) Spondylosis 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      PULMONARY   (+) Chronic obstructive pulmonary disease with acute exacerbation (HCC)      Denies CP, SOB  NPO appropriate since MN, denies hearing about gastroparesis diagnosis in 2024, no dietary modification. Feels hungry and denies earlier satiety   ?vWD  Physical Exam    Airway     Mallampati score: II  TM Distance: >3 FB  Neck ROM: full      Cardiovascular  Rhythm: regular, Rate: normalCardiovascular exam normal    Dental   Comment: Dentures out poor dentition    Pulmonary  Pulmonary exam normal     Neurological      Other Findings      Anesthesia Plan  ASA Score- 3     Anesthesia Type- general with ASA Monitors.         Additional Monitors:     Airway Plan: LMA and LMA.           Plan Factors-Exercise tolerance (METS): <4 METS.    Chart reviewed. EKG reviewed. Imaging results reviewed. Existing labs reviewed. Patient summary reviewed.    Patient is not a current smoker.              Induction- intravenous.    Postoperative Plan- .   Monitoring Plan - Monitoring plan - standard ASA monitoring          Informed Consent- Anesthetic plan and risks discussed with patient and daughter.  I personally reviewed this patient with the CRNA. Discussed and agreed on the Anesthesia Plan with the CRNA..      NPO Status:  Vitals Value Taken Time   Date of last liquid 05/28/25 05/28/25 12:28   Time of last liquid 1030 05/28/25 12:28   Date of last solid 05/27/25 05/28/25 12:28   Time of  last solid 1800 05/28/25 12:28

## 2025-05-28 NOTE — OP NOTE
OPERATIVE REPORT  PATIENT NAME: Abdirahman Hope    :  1935  MRN: [id number]  Pt Location: [facility] ROOM 02    SURGERY DATE: 2025    Surgeons and Role:     * Adalberto Rasmussen MD - Primary    Preop Diagnosis:  Malignant neoplasm of overlapping sites of bladder (HCC) [C67.8]    Post-Op Diagnosis Codes:     * Malignant neoplasm of overlapping sites of bladder (HCC) [C67.8]    Procedure(s):  TRANSURETHRAL RESECTION OF BLADDER TUMOR (TURBT) 1 cm total with mitomycin instillation  INSTILLATION MITOMYCIN    Specimen(s):  ID Type Source Tests Collected by Time Destination   1 : bladder tumor Tissue Urinary Bladder TISSUE EXAM Adalberto Rasmussen MD 2025 1445        Estimated Blood Loss:   Minimal    Drains:  Urethral Catheter Latex 16 Fr. (Active)   Number of days: 0       Anesthesia Type:   General/LMA    Operative Indications:  Malignant neoplasm of overlapping sites of bladder (HCC) [C67.8]      Operative Findings:  Patches of superficial tumor, approximately 2 to 3 cm of patches but actually only 1 cm tumor resected, involving the trigone.  Resected and fulgurated.  Mitomycin instilled.  Small trabeculated bladder.       Complications:   None    Procedure and Technique:  89-year-old man with history of bladder cancer with recurrence which looks like superficial papillary especially in the trigone, too much for me to fulgurate in the office.  I therefore offered him TURBT with mitomycin instillation in the operating room.  The procedure as well as the risks of bleeding infection damage urinary tract and need for additional procedures were explained and gives informed consent.    The patient was brought to the operating room and identified properly.            After LMA anesthesia was induced, the patient was prepared and draped in the dorsolithotomy position in the usual fashion, with standard care taken to protect pressure points. A time out was performed. The 26 Somali resectoscope sheath and resectoscope with  30 degree lens was used to perform the procedure. After entering the bladder, the above tumors were noted. The resectoscope loop was used to transurethrally resect the tumor down into the muscle, and the base and margins were fulgurated.  Fulguration was used to destroy a lot of the trigonal very superficial lesions or anything that looks premalignant.  The specimen was evacuated and sent to pathology. After hemostasis was excellent, the bladder was drained and the patient was awakened and transferred to PACU in good condition.    I was present for the entire procedure. and A qualified resident physician was not available.    Patient Disposition:  PACU  and extubated and stable         SIGNATURE: Adalberto Rasmussen MD  DATE: May 28, 2025  TIME: 3:02 PM

## 2025-05-28 NOTE — DISCHARGE INSTR - AVS FIRST PAGE
You have just undergone cystoscopy, which is looking into the bladder and transurethral resection of bladder tumor.  This is a procedure where the bladder tumors removed either with cutting and cauterizing or using biopsy forceps.  The site where the tumor was now has to heal.  There are no stitches.  Therefore there will be a scab and as the scab comes off for the next 2 weeks you will see bleeding.  This is normal.  You may also have burning and frequency of urination.    You may resume normal activities to include driving starting tomorrow.  Today, no driving, operating machinery or consuming alcohol.  You may shower and take stairs today, and tomorrow you may resume all normal activities including exercise.    Call for fever greater than 101.5 degrees, severe pain not relieved by over-the-counter pain medicines or Pyridium, or inability to urinate.  Call 911 or go to the emergency department if you have chest pain or shortness of breath for swelling of 1 leg.    Take over-the-counter remedies to avoid constipation.  Try to make sure you do not bear down too much for a bowel movement over the next 2 weeks.

## 2025-05-28 NOTE — PERIOPERATIVE NURSING NOTE
Hobson unclamped at  1520. Drained 50ml purple colored urine. Hobson removed. Everything placed in yellow chemo bucket

## 2025-05-28 NOTE — ANESTHESIA POSTPROCEDURE EVALUATION
Post-Op Assessment Note    CV Status:  Stable  Pain Score: 0    Pain management: adequate       Mental Status:  Alert and awake   Hydration Status:  Euvolemic   PONV Controlled:  Controlled   Airway Patency:  Patent     Post Op Vitals Reviewed: Yes    No anethesia notable event occurred.    Staff: Anesthesiologist, CRNA         Last Filed PACU Vitals:  Vitals Value Taken Time   Temp 97.7 °F (36.5 °C) 05/28/25 15:00   Pulse 68 05/28/25 15:41   /49 05/28/25 15:38   Resp 23 05/28/25 15:41   SpO2 97 % 05/28/25 15:41   Vitals shown include unfiled device data.    Modified Luz:     Vitals Value Taken Time   Activity 2 05/28/25 15:00   Respiration 2 05/28/25 15:00   Circulation 1 05/28/25 15:00   Consciousness 2 05/28/25 15:00   Oxygen Saturation 2 05/28/25 15:00     Modified Luz Score: 9

## 2025-05-29 ENCOUNTER — TELEPHONE (OUTPATIENT)
Dept: UROLOGY | Facility: CLINIC | Age: 89
End: 2025-05-29

## 2025-05-29 NOTE — TELEPHONE ENCOUNTER
Post Op Note    Abdirahman Hope is a 89 y.o. male s/p TRANSURETHRAL RESECTION OF BLADDER TUMOR (TURBT) with mitomycin instillation (Bladder)       INSTILLATION MITOMYCIN (Bladder)  performed 5/28/2025.  Abdirahman Hope is a patient of  Dr. Rasmussen and is seen at the [place] office.     VM left for patients daughter that I was calling to follow up and see how patient was doing after his procedure yesterday. Advised patient is scheduled for path review on 6/10 @ 2:30 pm in [place] with Dr. Rasmussen. Asked daughter to return call to the office with any questions or concerns.

## 2025-05-29 NOTE — TELEPHONE ENCOUNTER
PT daughter returned call to nurse    She stated pt is doing great .  State pt is still having some burning but improved from yesterday.    Aware pf the post op appt on 6/10/25

## 2025-06-02 ENCOUNTER — NURSE TRIAGE (OUTPATIENT)
Age: 89
End: 2025-06-02

## 2025-06-02 NOTE — TELEPHONE ENCOUNTER
Patient calling in with daughter to report pink tinged urine, and red blood on toilet paper when wiping after BM. Patient had TURBT on 5/28. Patient reporting initially has diarrhea, now has some constipation, has to strain a bit. Reviewed hydration, reviewed otc stool softener use. Reviewed avoidance of bladder irritants and ED precautions including severe pain, persistent nausea and vomiting, inability to urinate, fever over 101       Reason for Disposition   Post-Op Transurethral Resection of Bladder Tumor (TURBT)/Bladder Biopsy, home care    Answer Assessment - Initial Assessment Questions  1. When did your symptoms start?  Today  2. Do you have any incisions or drains in place? If yes, is it draining?   N/a  3. Do you have a fever? If yes, how did you take it and what was your temperature? Did you take anything to relieve your temperature?   No fever  4. Have you become unable to urinate? Does your stream spray (especially for males)?    Able to urinate, pink in color, no clots  5. Do you have pain? If yes, where, and what is the severity of your pain?   Pressure on groin  6. Do you have a catheter in place? If yes, what kind? Is it draining?   no  7. Do you have any blood clots?   no  8. Have you had a bowel movement recently or since surgery?   Originally had diarrhea for 2 days after, took imodium and now having some constipation    Protocols used: Urology-Post-Op Problem-ADULT-[place]

## 2025-06-02 NOTE — TELEPHONE ENCOUNTER
Recommend avoiding use of Imodium as this can cause constipation.  Blood in the urine is not uncommon following TURBT.  Agree with using stool softeners to avoid straining with bowel movements as well.

## 2025-06-02 NOTE — TELEPHONE ENCOUNTER
Spoke with patients daughter Emilie and made her aware of SUKI Pruett's recommendations. She verbalized understanding.

## 2025-06-04 PROCEDURE — 88342 IMHCHEM/IMCYTCHM 1ST ANTB: CPT | Performed by: PATHOLOGY

## 2025-06-04 PROCEDURE — 88341 IMHCHEM/IMCYTCHM EA ADD ANTB: CPT | Performed by: PATHOLOGY

## 2025-06-04 PROCEDURE — 88307 TISSUE EXAM BY PATHOLOGIST: CPT | Performed by: PATHOLOGY

## 2025-06-09 ENCOUNTER — TELEPHONE (OUTPATIENT)
Age: 89
End: 2025-06-09

## 2025-06-09 NOTE — TELEPHONE ENCOUNTER
Called and spoke with patients daughter Emilie. Advised patients appointment tomorrow with Dr. Rasmussen needs to be adjusted. Appointment was re-scheduled to 6/12 @ 11:00 am in [place].

## 2025-06-09 NOTE — TELEPHONE ENCOUNTER
Patient daughter called stating she was returning the call form the office. Warm transfer to nurse.

## 2025-06-10 ENCOUNTER — TELEPHONE (OUTPATIENT)
Age: 89
End: 2025-06-10

## 2025-06-10 ENCOUNTER — OFFICE VISIT (OUTPATIENT)
Age: 89
End: 2025-06-10
Payer: MEDICARE

## 2025-06-10 VITALS
DIASTOLIC BLOOD PRESSURE: 84 MMHG | BODY MASS INDEX: 21.07 KG/M2 | TEMPERATURE: 97.8 F | WEIGHT: 159 LBS | HEIGHT: 73 IN | OXYGEN SATURATION: 96 % | SYSTOLIC BLOOD PRESSURE: 118 MMHG | HEART RATE: 86 BPM

## 2025-06-10 DIAGNOSIS — R35.0 URINARY FREQUENCY: ICD-10-CM

## 2025-06-10 DIAGNOSIS — N39.41 URGE INCONTINENCE: ICD-10-CM

## 2025-06-10 DIAGNOSIS — C67.8 MALIGNANT NEOPLASM OF OVERLAPPING SITES OF BLADDER (HCC): Primary | ICD-10-CM

## 2025-06-10 DIAGNOSIS — R39.15 URINARY URGENCY: ICD-10-CM

## 2025-06-10 DIAGNOSIS — R35.1 NOCTURIA: ICD-10-CM

## 2025-06-10 PROCEDURE — 99214 OFFICE O/P EST MOD 30 MIN: CPT | Performed by: UROLOGY

## 2025-06-10 RX ORDER — MIRABEGRON 50 MG/1
1 TABLET, FILM COATED, EXTENDED RELEASE ORAL DAILY
Qty: 30 TABLET | Refills: 11 | Status: SHIPPED | OUTPATIENT
Start: 2025-06-10 | End: 2025-07-10

## 2025-06-10 NOTE — TELEPHONE ENCOUNTER
Spoke with patients daughter Emilie and made her aware that Dr. Rasmussen's 2 pm for today cancelled. Patient was placed back on the schedule for today at 2 pm for pathology review.

## 2025-06-10 NOTE — PROGRESS NOTES
UROLOGY PROGRESS NOTE   [facility]  [address]  [address]  [place] [address]  [phone]  Fax:[phone]  [email or web address]      NAME: Abdirahman Hope  AGE: 89 y.o. SEX: male  : 1935   MRN: [id number]    DATE: 6/10/2025  TIME: 2:44 PM    Assessment and Plan:    Bladder cancer as below-cannot grade due to cautery artifact but I think this is superficial and minimal.  Recheck cystoscopy in 3 months.    Urge incontinence, nocturia and urinary frequency-we will see how the Myrbetriq 50 mg works.  If it is not he will let me know and I will schedule his 3-month cystoscopy to be in the operating room with 100 international units of Botox.  The risks of bleeding infection urinary retention botulism were explained.    1. Malignant neoplasm of overlapping sites of bladder (HCC)  -     Cystoscopy; Future; Expected date: 09/10/2025  2. Urinary frequency  -     Mirabegron ER (Myrbetriq) 50 MG TB24; Take 1 tablet (50 mg total) by mouth daily  3. Urinary urgency  -     Mirabegron ER (Myrbetriq) 50 MG TB24; Take 1 tablet (50 mg total) by mouth daily  4. Nocturia  -     Mirabegron ER (Myrbetriq) 50 MG TB24; Take 1 tablet (50 mg total) by mouth daily  5. Urge incontinence  -     Mirabegron ER (Myrbetriq) 50 MG TB24; Take 1 tablet (50 mg total) by mouth daily                  Chief Complaint     Chief Complaint   Patient presents with    Follow-up     Pt states that he has 0 control of his bladder, there is slight blood is in urine. He states that he is pain, especially in his groan area.        History of Present Illness   Here for pathology review status post TURBT by me for recurrence of superficial tumor right floor of bladder and posterior wall May 28, 2025.    During last visit I found minor recurrence He is status post TURBT of a 1 cm right lateral wall tumor with cup forceps by me 2023. Pathology showed high-grade papillary urothelial carcinoma with foci suggesting  "superficial invasion into papillary stem. Muscle was present and benign. On last cystoscopy September 17, 2024, he had a small superficial papillary recurrence lateral to the trigone and anterior to the orifice. I fulgurated all of this flat. He developed a lump in his left groin a few days after the procedure.Was constipated- took stool softeners and the lump and constipation went away- but last night the lump came back painfully.has dysuria. Still some mild hematuria. Wears a Depend, soaks it.    Pathology report from 8/20/2025-  Final Diagnosis   A. Urinary Bladder, Bladder tumor, Transurethral resection:  - Superficial fragments of papillary urothelial neoplasm with severe procedural artifact.  - Muscularis propria (detrusor muscle) is not present.  - Multiple step sections are examined.         Urinary frequency urge incontinence and nocturia-interested in PTNS, we are in the process of setting this up for himCurrently has little control to voiding, has severe urge incontinence.Flomax didn't help.  PTNS given for 8 treatments, didn't help. Flynn gave him Myrbetriq 25 mg - hasn't tried it yet.      The following portions of the patient's history were reviewed and updated as appropriate: allergies, current medications, past family history, past medical history, past social history, past surgical history and problem list.  Past Medical History[1]  Past Surgical History[2]  shoulder  Review of Systems   Review of Systems   Gastrointestinal:  Positive for constipation.   Genitourinary:         As per hpi       Active Problem List   Problem List[3]    Objective   /84   Pulse 86   Temp 97.8 °F (36.6 °C)   Ht 6' 1\" (1.854 m)   Wt 72.1 kg (159 lb)   SpO2 96%   BMI 20.98 kg/m²     Physical Exam  Vitals reviewed.   Constitutional:       Appearance: Normal appearance. He is normal weight.   HENT:      Head: Normocephalic and atraumatic.     Eyes:      Extraocular Movements: Extraocular movements intact. "     Abdominal:      General: Abdomen is flat. There is no distension.      Palpations: Abdomen is soft. There is no mass.      Tenderness: There is no abdominal tenderness. There is no guarding or rebound.      Hernia: No hernia is present.   Genitourinary:     Penis: Normal.       Testes: Normal.      Comments: No lump noted.  Normal phallus and testicles.  No lesions.  No inguinal hernia.    Musculoskeletal:         General: Normal range of motion.      Cervical back: Normal range of motion.     Skin:     Coloration: Skin is not jaundiced or pale.     Neurological:      General: No focal deficit present.      Mental Status: He is alert and oriented to person, place, and time. Mental status is at baseline.     Psychiatric:         Mood and Affect: Mood normal.         Behavior: Behavior normal.         Thought Content: Thought content normal.         Judgment: Judgment normal.             Current Medications   Current Medications[4]  I have spent a total time of 30 minutes in caring for this patient on the day of the visit/encounter including Diagnostic results, Prognosis, Risks and benefits of tx options, Instructions for management, Impressions, Counseling / Coordination of care, Documenting in the medical record, Reviewing/placing orders in the medical record (including tests, medications, and/or procedures), and Obtaining or reviewing history  .        Adalberto Rasmussen MD                [1]   Past Medical History:  Diagnosis Date    Anemia     Appendicitis     Coronary artery disease     Detached retina     GERD (gastroesophageal reflux disease)     Hyperlipidemia     Hypertension     Macular degeneration     Neuropathy     Pelvis fracture (HCC) 01/2021    Von Willebrand disease (HCC)    [2]   Past Surgical History:  Procedure Laterality Date    ADENOIDECTOMY      APPENDECTOMY      ARTHRODESIS  01/15/2014    Lumbar     BACK SURGERY      CATARACT EXTRACTION      COLONOSCOPY  12/09/2016    fiberoptic     CYSTOSCOPY       with resection of tumor / 1/12/17, 10/2/17 / diagnostic 12/8/16, 5/30/17     CYSTOSCOPY  09/20/2018    CYSTOSCOPY  06/22/2020    EGD AND COLONOSCOPY N/A 12/9/2016    Procedure: EGD AND COLONOSCOPY;  Surgeon: Kahlil Alfonso MD;  Location: [facility];  Service:     EYE SURGERY      FRACTURE SURGERY Bilateral     ARM    INSTILLATION MYTOMYCIN N/A 5/28/2025    Procedure: INSTILLATION MITOMYCIN;  Surgeon: Adalberto Rasmussen MD;  Location: [facility];  Service: Urology    NECK SURGERY      NEUROPLASTY / TRANSPOSITION MEDIAN NERVE AT CARPAL TUNNEL Right 04/2015    IA AMPUTATION METATARSAL W/TOE SINGLE Left 4/11/2024    Procedure: LEFT 2ND TOE AMPUTATION;  Surgeon: Vicente Barrientos DPM;  Location: [facility];  Service: Podiatry    IA BLADDER INSTILLATION ANTICARCINOGENIC AGENT N/A 1/12/2017    Procedure: INSTILLATION MYTOMYCIN;  Surgeon: Jhon Zuniga MD;  Location: [facility];  Service: Urology    IA BLADDER INSTILLATION ANTICARCINOGENIC AGENT N/A 10/2/2017    Procedure: INSTILLATION MYTOMYCIN;  Surgeon: Jhon Zuniga MD;  [facility]: [facility];  Service: Urology    IA COLONOSCOPY FLX DX W/COLLJ SPEC WHEN PFRMD N/A 2/5/2019    Procedure: COLONOSCOPY;  Surgeon: Abad Valentino MD;  Location: [facility];  Service: Gastroenterology    IA CYSTO W/REMOVAL OF LESIONS SMALL N/A 1/12/2017    Procedure: CYSTOSCOPY, BLADDER BIOPSY WITH FULGRATION, 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;  Surgeon: Jhon Zuniga MD;  [facility]: [facility];  Service: Urology    IA CYSTO W/REMOVAL OF LESIONS SMALL N/A 10/2/2017    Procedure: CYSTOSCOPY WITH  BLADDER BIOPSIES WITH FULGURATION;  Surgeon: Jhon Zuniga MD;  [facility]: [facility];  Service: Urology    IA CYSTO W/REMOVAL OF LESIONS SMALL N/A 11/30/2023    Procedure: CYSTOSCOPY WITH BIOPSIES and fulguration;  Surgeon: Adalberto Rasmussen MD;  [facility];  Service: Urology    IA CYSTO W/REMOVAL OF LESIONS SMALL N/A 5/28/2025    Procedure:  TRANSURETHRAL RESECTION OF BLADDER TUMOR (TURBT) with mitomycin instillation;  Surgeon: Adalberto Rasmussen MD;  [facility];  Service: Urology    OK ESOPHAGOGASTRODUODENOSCOPY TRANSORAL DIAGNOSTIC N/A 8/3/2018    Procedure: ESOPHAGOGASTRODUODENOSCOPY (EGD);  Surgeon: Abad Valentino MD;  [facility]: [facility];  Service: Gastroenterology    RETINAL DETACHMENT SURGERY Right 03/2016    complete air fill confirmed     ROTATOR CUFF REPAIR      SHOULDER SURGERY  03/03/2015    proximal humerus fracture / LA...3/6/15   R...1/3/18     TONSILECTOMY AND ADNOIDECTOMY      TONSILLECTOMY      TRANSURETHRAL RESECTION OF BLADDER TUMOR N/A 10/2/2017    Procedure: TRANSURETHRAL RESECTION OF BLADDER TUMOR (TURBT);  Surgeon: Jhon Zuniga MD;  Location: [facility];  Service: Urology    VON WILLEBRAND ANTIGEN (HISTORICAL)     [3]   Patient Active Problem List  Diagnosis    Malignant neoplasm of overlapping sites of bladder (HCC)    Lung nodule, solitary    Erectile dysfunction    Gait instability    High blood pressure    Hypothyroidism    Idiopathic peripheral neuropathy    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)    Cervical stenosis of spinal canal    Spinal stenosis of lumbar region    Spondylosis 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    Hoarse voice quality    Weight loss    Gastroesophageal reflux disease without esophagitis    Nausea    Chronic obstructive pulmonary disease with acute exacerbation (HCC)    Von Willebrand's disease (HCC)    Radiculopathy, lumbar region    Fall    Anemia    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)    Urinary frequency    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, initial encounter (HCC)    Leg edema, left    Venous insufficiency    PAD (peripheral artery disease) (HCC)    Open wound of second toe of left foot, initial encounter    Hyperlipidemia    Smoking    Dyspepsia    Early satiety    Delayed gastric  emptying    Chronic idiopathic constipation    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 (HCC)    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)    Closed fracture of proximal end of left humerus with delayed healing   [4]   Current Outpatient Medications:     acetaminophen (TYLENOL) 325 mg tablet, Take 2 tablets (650 mg total) by mouth every 6 (six) hours as needed for mild pain, headaches or fever, Disp:  , Rfl: 0    ALPHA LIPOIC ACID-BIOTIN PO, Take 1 tablet by mouth in the morning per bottle & pt/partner: 300mg 1 tab BID., Disp: , Rfl:     atorvastatin (LIPITOR) 10 mg tablet, Take 1 tablet (10 mg total) by mouth every other day, Disp: 45 tablet, Rfl: 3    B COMPLEX VITAMINS PO, Take 1 tablet by mouth in the morning., Disp: , Rfl:     Calcium-Vitamin D-Vitamin K (CHEWABLE CALCIUM PO), Take 500 mg by mouth in the morning. not on AVS; per partner/bottle., Disp: , Rfl:     cetirizine (ZyrTEC) 10 mg tablet, TAKE ONE (1) TABLET (10 MG TOTAL) BY MOUTH DAILY, Disp: 90 tablet, Rfl: 1    Cholecalciferol (VITAMIN D3) 2000 UNITS capsule, Take 1 capsule by mouth in the morning and 1 capsule in the evening. 5000 units per partner/bottle., Disp: , Rfl:     Coenzyme Q10 (CO Q10) 60 MG CAPS, Take 1 capsule by mouth in the morning. per pt/partner & bottle: 100mg 1 tab daily. ., Disp: , Rfl:     gabapentin (NEURONTIN) 600 MG tablet, Take 1 tablet (600 mg total) by mouth 2 (two) times a day, Disp: 180 tablet, Rfl: 1    Ginkgo Biloba 120 MG TABS, Take 1 tablet by mouth in the morning. pt taking and per bottle: 60mg 1 tab daily., Disp: , Rfl:     Inositol 500 MG TABS, Take 1 tablet by mouth in the morning and 1 tablet in the evening., Disp: , Rfl:     ipratropium (ATROVENT) 0.03 % nasal spray, 2 sprays into each nostril 3 (three) times a day, Disp: 150 mL, Rfl: 3    lutein 6 mg, Take 1 capsule by mouth in the morning. per pt/bottle: 40mg 1 tab daily., Disp: , Rfl:     metoprolol  tartrate (LOPRESSOR) 25 mg tablet, Take 1 tablet (25 mg total) by mouth every 12 (twelve) hours, Disp: 180 tablet, Rfl: 3    MILK THISTLE PO, Take 1 tablet by mouth in the morning. 250mg per bottle in home., Disp: , Rfl:     Mirabegron ER (Myrbetriq) 50 MG TB24, Take 1 tablet (50 mg total) by mouth daily, Disp: 30 tablet, Rfl: 11    Misc Natural Products (SUPER SNOOZE) CAPS, Take 1 capsule by mouth in the morning. Bed time ., Disp: , Rfl:     Multiple Vitamins-Minerals (OCUVITE-LUTEIN) CAPS, Take 1 capsule by mouth in the morning. per partner--taking 1 tab BID., Disp: , Rfl:     Myrbetriq 25 MG TB24, Take 25 mg by mouth in the morning, Disp: 30 tablet, Rfl: 3    omeprazole (PriLOSEC) 40 MG capsule, Take 1 capsule (40 mg total) by mouth 2 (two) times a day, Disp: 180 capsule, Rfl: 1    phenazopyridine (PYRIDIUM) 200 mg tablet, Take 1 tablet (200 mg total) by mouth 3 (three) times a day as needed for bladder spasms, Disp: 30 tablet, Rfl: 0    Propylene Glycol-Glycerin (Artificial Tears) 1-0.3 % SOLN, Apply 2 drops to eye as needed (pt reports uses when gets shots in eyes) not on AVS; per pt report.  pt reports using PRN when getting shot in eye- using in either eye (B)., Disp: , Rfl:     psyllium (METAMUCIL) 58.6 % packet, Take 1 packet by mouth in the morning. unable to view bottle for further instructions.  per [facility] coding: add mix briskly in 8oz cold liquid., Disp: , Rfl:     sertraline (ZOLOFT) 50 mg tablet, Take 1 tablet (50 mg total) by mouth daily, Disp: 30 tablet, Rfl: 5    sildenafil (VIAGRA) 50 MG tablet, Take by mouth, Disp: , Rfl:     vitamin B-12 (VITAMIN B-12) 500 mcg tablet, , Disp: , Rfl:     Zinc 50 MG TABS, Take 1 tablet by mouth in the morning., Disp: , Rfl:

## 2025-06-10 NOTE — PATIENT INSTRUCTIONS
We will schedule you for cystoscopy in the office in 3 months.  Start taking the Myrbetriq daily.  If you are no better by 1 month, let me know and we will change the cystoscopy from the office to be done in 3 months in the operating room and I will inject Botox 100 international units at the time.

## 2025-06-20 DIAGNOSIS — M25.50 ARTHRALGIA, UNSPECIFIED JOINT: Primary | ICD-10-CM

## 2025-06-20 DIAGNOSIS — K21.9 GASTROESOPHAGEAL REFLUX DISEASE WITHOUT ESOPHAGITIS: ICD-10-CM

## 2025-06-20 RX ORDER — GABAPENTIN 600 MG/1
600 TABLET ORAL 3 TIMES DAILY
Qty: 270 TABLET | Refills: 2 | Status: SHIPPED | OUTPATIENT
Start: 2025-06-20

## 2025-06-20 RX ORDER — IBUPROFEN 600 MG/1
600 TABLET, FILM COATED ORAL EVERY 6 HOURS PRN
Qty: 100 TABLET | Refills: 1 | Status: SHIPPED | OUTPATIENT
Start: 2025-06-20

## 2025-06-23 ENCOUNTER — APPOINTMENT (OUTPATIENT)
Dept: RADIOLOGY | Facility: MEDICAL CENTER | Age: 89
End: 2025-06-23
Attending: STUDENT IN AN ORGANIZED HEALTH CARE EDUCATION/TRAINING PROGRAM
Payer: MEDICARE

## 2025-06-23 ENCOUNTER — OFFICE VISIT (OUTPATIENT)
Dept: OBGYN CLINIC | Facility: CLINIC | Age: 89
End: 2025-06-23
Payer: MEDICARE

## 2025-06-23 VITALS
BODY MASS INDEX: 19.61 KG/M2 | HEART RATE: 88 BPM | TEMPERATURE: 97.6 F | WEIGHT: 148 LBS | RESPIRATION RATE: 16 BRPM | HEIGHT: 73 IN | OXYGEN SATURATION: 94 %

## 2025-06-23 DIAGNOSIS — S42.292G OTHER CLOSED DISPLACED FRACTURE OF PROXIMAL END OF LEFT HUMERUS WITH DELAYED HEALING, SUBSEQUENT ENCOUNTER: Primary | ICD-10-CM

## 2025-06-23 PROCEDURE — 99213 OFFICE O/P EST LOW 20 MIN: CPT | Performed by: STUDENT IN AN ORGANIZED HEALTH CARE EDUCATION/TRAINING PROGRAM

## 2025-06-23 PROCEDURE — 73030 X-RAY EXAM OF SHOULDER: CPT

## 2025-06-23 NOTE — PROGRESS NOTES
Assessment & Plan  Other closed displaced fracture of proximal end of left humerus with delayed healing, subsequent encounter  Reviewed physical exam and updated radiographs at the time of visit. His fracture has maintained alignment and the shoulder appears to be moving as a single unit. We discussed that a shoulder replacement could be an option if he is not happy with his conservative treatment outcomes. At this time, he has declined surgical intervention and would like to continue with physical therapy. He has no restrictions for a weight lifting standpoint. Pain and mobility may guide his return to normal use and function of the left upper extremity. He will follow up in 3 month for re-evaluation and updated xrays of the left shoulder. The patient expresses understanding and is in agreement with today's treatment plan. They may contact the office with any question or concerns.       Orders:    XR shoulder 2+ vw left         General  Subjective    Abdirahman Hope is a left hand dominant 89 y.o. male who presents for follow-up of left shoulder pain.    Patient was last seen on 5/12/2025. Since the last visit he has been doing well overall however he states that he continues to have difficulty with elevating the arm actively, but he endorses that it is slowly improving. He is continuing to work with PT.    The patient rates the pain as a 1/10. The pain is located Anterior and Lateral when performing forward flexion or abduction movements. Patient states that at rest he does not have any pain.Overall, he does notice small improvements.        Objective      Vitals:    06/23/25 1137   Pulse: 88   Resp: 16   Temp: 97.6 °F (36.4 °C)   SpO2: 94%     Body mass index is 19.53 kg/m².  Physical Exam  Vitals and nursing note reviewed.   Constitutional:       General: He is not in acute distress.     Appearance: He is well-developed.   HENT:      Head: Normocephalic and atraumatic.     Eyes:      Conjunctiva/sclera:  Conjunctivae normal.       Cardiovascular:      Rate and Rhythm: Normal rate and regular rhythm.      Heart sounds: No murmur heard.  Pulmonary:      Effort: Pulmonary effort is normal. No respiratory distress.      Breath sounds: Normal breath sounds.   Abdominal:      Palpations: Abdomen is soft.      Tenderness: There is no abdominal tenderness.     Musculoskeletal:         General: No swelling.      Cervical back: Neck supple.     Skin:     General: Skin is warm and dry.      Capillary Refill: Capillary refill takes less than 2 seconds.     Neurological:      Mental Status: He is alert.     Psychiatric:         Mood and Affect: Mood normal.         Shoulder Exam    Affected shoulder:   left      On examination of the left shoulder the skin is intact there is no open wounds.    Active Abduction to 90 degrees without pain  Passive FE to 130 degrees without pain  His sensations intact to light touch over the axillary, median, radial, and ulnar nerve distributions.    He has 5 out of 5 strength with AIN, PIN, and ulnar nerve testing.  Able to fire anterior and posterior deltoids.  There is a chronic deformity about the left elbow.      Distally the patient's neurovascular status is normal.    Additional exam: N/a    Review of Prior Testing:    I independently interpreted the following test:     left shoulder x-ray images done on 4/7/25:  Multiple views show stable alignment from prior imaging with apex anterior fracture with head retroversion, mild interval healing     Left shoulder xrays performed 5/12/2025 demonstrate stable alignment, possible absorption at the fracture site, no significant interval healing.    X-Ray of left shoulder obtained on 6/23/2025 were reviewed and demonstrate continued stable alignment with varus and retroversion of the fracture site, no significant changes in healing.      Follow Up: Return in about 3 months (around 9/23/2025) for Repeat X-ray.    All questions answered and patient  agrees with plan.        Scribe Attestation      I,:  Mala Pollard am acting as a scribe while in the presence of the attending physician.:       I,:  Shimon Valladares MD personally performed the services described in this documentation    as scribed in my presence.:

## 2025-06-23 NOTE — ASSESSMENT & PLAN NOTE
Reviewed physical exam and updated radiographs at the time of visit. His fracture has maintained alignment and the shoulder appears to be moving as a single unit. We discussed that a shoulder replacement could be an option if he is not happy with his conservative treatment outcomes. At this time, he has declined surgical intervention and would like to continue with physical therapy. He has no restrictions for a weight lifting standpoint. Pain and mobility may guide his return to normal use and function of the left upper extremity. He will follow up in 3 month for re-evaluation and updated xrays of the left shoulder. The patient expresses understanding and is in agreement with today's treatment plan. They may contact the office with any question or concerns.       Orders:    XR shoulder 2+ vw left

## 2025-06-29 NOTE — TELEPHONE ENCOUNTER
Problem: Chronic Conditions and Co-morbidities  Goal: Patient's chronic conditions and co-morbidity symptoms are monitored and maintained or improved  6/29/2025 1153 by Maryan Tubbs RN  Outcome: Progressing     Problem: Discharge Planning  Goal: Discharge to home or other facility with appropriate resources  6/29/2025 1153 by Maryan Tubbs, RN  Outcome: Progressing     Problem: Safety - Adult  Goal: Free from fall injury  Outcome: Progressing      LVM with results

## 2025-07-11 DIAGNOSIS — J30.1 ALLERGIC RHINITIS DUE TO POLLEN, UNSPECIFIED SEASONALITY: ICD-10-CM

## 2025-07-11 RX ORDER — IPRATROPIUM BROMIDE 21 UG/1
2 SPRAY, METERED NASAL 3 TIMES DAILY
Qty: 150 ML | Refills: 3 | Status: SHIPPED | OUTPATIENT
Start: 2025-07-11 | End: 2025-10-09

## 2025-07-14 ENCOUNTER — OFFICE VISIT (OUTPATIENT)
Dept: PODIATRY | Facility: CLINIC | Age: 89
End: 2025-07-14
Payer: MEDICARE

## 2025-07-14 VITALS
RESPIRATION RATE: 16 BRPM | HEIGHT: 73 IN | WEIGHT: 148 LBS | OXYGEN SATURATION: 99 % | BODY MASS INDEX: 19.61 KG/M2 | HEART RATE: 50 BPM | TEMPERATURE: 97.1 F

## 2025-07-14 DIAGNOSIS — I73.9 PVD (PERIPHERAL VASCULAR DISEASE) (HCC): Primary | ICD-10-CM

## 2025-07-14 DIAGNOSIS — I73.9 PAD (PERIPHERAL ARTERY DISEASE) (HCC): ICD-10-CM

## 2025-07-14 PROCEDURE — 99212 OFFICE O/P EST SF 10 MIN: CPT | Performed by: PODIATRIST

## 2025-07-14 NOTE — PROGRESS NOTES
Name: Abdirahman Hope      : 1935      MRN: [id number]  Encounter Provider: Tracey Fernandes DPM  Encounter Date: 2025   Encounter department: [facility] PODIATRY [place]  :  Assessment & Plan  PVD (peripheral vascular disease) (Formerly [facility])    Orders:    Ambulatory Referral to Vascular Surgery; Future    PAD (peripheral artery disease) (Formerly [facility])         LLE posterior pressure injury healed  LLE distal great toe pressure injury healed  LLE medial great toe pressure injury healed  PAD    Patient evaluated, all wounds healed at today's evaluation  Patient counseled on lower extremity vascular studies and their findings.  Patient counseled that he has decreased healing potential if future wounds develop  Rx referral to vascular surgery.  Patient would like to discuss peripheral vascular disease and possible prophylactic management   LE ADS of 2025  RIGHT LOWER LIMB:  There is a 50-75% stenosis noted in the proximal and mid superficial femoral  artery. Diffuse disease throughout the remaining femoro-popliteal and  tibio-peroneal segments.  Ankle/Brachial index: 0.86 which is in the mild disease category (Prior 0.97)  PVR/ PPG tracings are normal.  Metatarsal pressure of 142 mmHg  Great toe pressure of 99 mmHg, within the healing range  LEFT LOWER LIMB:  There is a 50-75% stenosis noted in the mid and distal superficial femoral and  proximal popliteal arteries. A >75% stenosis is seen in the distal anterior  tibial artery. Diffuse disease throughout the remaining femoro-popliteal and  tibio-peroneal segments.  Ankle/Brachial index: 0.61 which is in the severe disease category (Prior 0.80)  PVR/ PPG tracings are dampened.  Metatarsal pressure of 65 mmHg  Great toe pressure of 28 mmHg, within the healing range  Compared to previous study on 2023, there is new stenosis noted in the  left BEBA. Bilateral NADYA's have decrease and the left toe pressure is now below  healing range.  Recommend patient  "follow-up with his regular podiatrist Dr. Barrios for continued at risk foot care  Follow-up with me as needed    History of Present Illness   HPI  Abdirahman Hope is a 89 y.o. male who presents for evaluation and management of left lower extremity.  Patient chose not to follow-up at the wound care center as he felt like his wounds were healing well at home.  Patient presents today for evaluation.  He reports he feels his wounds have completely healed.  He does have intermittent discomfort at the left posterior heel.  Patient states he has not followed up with vascular as discussed during his wound care appointment.  Patient did not utilize referral to vascular that were provided.  He denies any cramping or discomfort to lower extremities.  Patient is accompanied by his spouse at today's evaluation      Review of Systems  Constitutional:  Negative for chills and fever.   Respiratory:  Negative for shortness of breath.    Cardiovascular:  Positive for leg swelling.   Musculoskeletal:  Positive for arthralgias, back pain and gait problem.   Skin:  negative           Objective   Pulse (!) 50   Temp (!) 97.1 °F (36.2 °C)   Resp 16   Ht 6' 1\" (1.854 m)   Wt 67.1 kg (148 lb)   SpO2 99%   BMI 19.53 kg/m²      Physical Exam  Constitutional:       General: He is not in acute distress.  Cardiovascular:      Comments: Pulse 1/4, PT pulse 0/4  CRT less than 3 seconds to distal digits  Skin temperature gradient decreases from knees to digits bilaterally  Absent pedal hair growth  Skin thin     Musculoskeletal:      Comments: Dorsiflexion/plantarflexion at ankle and distal digits limited  Mild tenderness with palpation of posterior heel  No pain with palpation or range of motion of left great toe  No ecchymosis noted, no significant edema noted   Skin:     Comments:   No open wounds at today's evaluation  No evidence of DTI posterior heel bilaterally  Bilateral skin dry.  Nails thickened, dystrophic, and discolored "

## 2025-07-15 DIAGNOSIS — J30.1 ALLERGIC RHINITIS DUE TO POLLEN, UNSPECIFIED SEASONALITY: ICD-10-CM

## 2025-07-16 DIAGNOSIS — J30.1 ALLERGIC RHINITIS DUE TO POLLEN, UNSPECIFIED SEASONALITY: ICD-10-CM

## 2025-07-16 DIAGNOSIS — M25.50 ARTHRALGIA, UNSPECIFIED JOINT: ICD-10-CM

## 2025-07-16 RX ORDER — IBUPROFEN 600 MG/1
600 TABLET, FILM COATED ORAL EVERY 6 HOURS PRN
Qty: 100 TABLET | Refills: 1 | OUTPATIENT
Start: 2025-07-16

## 2025-07-16 RX ORDER — CETIRIZINE HYDROCHLORIDE 10 MG/1
10 TABLET ORAL DAILY
Qty: 90 TABLET | Refills: 1 | Status: SHIPPED | OUTPATIENT
Start: 2025-07-16

## 2025-07-16 RX ORDER — CETIRIZINE HYDROCHLORIDE 10 MG/1
10 TABLET ORAL DAILY
Qty: 90 TABLET | Refills: 1 | OUTPATIENT
Start: 2025-07-16

## 2025-07-16 RX ORDER — IBUPROFEN 600 MG/1
600 TABLET, FILM COATED ORAL EVERY 8 HOURS PRN
Qty: 100 TABLET | Refills: 1 | Status: SHIPPED | OUTPATIENT
Start: 2025-07-16

## 2025-07-16 NOTE — TELEPHONE ENCOUNTER
Discussed Rx's with Pt - Rx's resubmitted to  - Refill for Motrin was never received from [employer] - Pt does not have it

## 2025-07-24 ENCOUNTER — PROCEDURE VISIT (OUTPATIENT)
Dept: PODIATRY | Facility: CLINIC | Age: 89
End: 2025-07-24
Payer: MEDICARE

## 2025-07-24 VITALS
OXYGEN SATURATION: 97 % | HEART RATE: 58 BPM | BODY MASS INDEX: 19.67 KG/M2 | HEIGHT: 73 IN | TEMPERATURE: 97.9 F | RESPIRATION RATE: 16 BRPM | WEIGHT: 148.4 LBS

## 2025-07-24 DIAGNOSIS — M79.675 PAIN IN TOES OF BOTH FEET: ICD-10-CM

## 2025-07-24 DIAGNOSIS — G60.9 IDIOPATHIC PERIPHERAL NEUROPATHY: ICD-10-CM

## 2025-07-24 DIAGNOSIS — Z89.422: ICD-10-CM

## 2025-07-24 DIAGNOSIS — B35.1 ONYCHOMYCOSIS: Primary | ICD-10-CM

## 2025-07-24 DIAGNOSIS — M79.674 PAIN IN TOES OF BOTH FEET: ICD-10-CM

## 2025-07-24 DIAGNOSIS — I73.9 PAD (PERIPHERAL ARTERY DISEASE) (HCC): ICD-10-CM

## 2025-07-24 PROCEDURE — 11721 DEBRIDE NAIL 6 OR MORE: CPT | Performed by: PODIATRIST

## 2025-07-24 NOTE — PROGRESS NOTES
"Name: Abdirahman Hope      : 1935      MRN: [id number]  Encounter Provider: Darnell Barrios DPM  Encounter Date: 2025   Encounter department: [facility] PODIATRY [place]  :  Assessment & Plan  Onychomycosis       Debride mycotic nails and thin the nail plates x 9 with the use of a nail nipper manually and an electric Dremel bur was used to reduce the thickness of the nail beds and smoothed the distal aspect of the nails.   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 Patient has Q7  findings and is recommended for at risk foot care every 9-10 weeks.    Patients most recent complete clinical foot exam was on: 2025    Return in about 10 weeks (around 10/2/2025).     History of Present Illness   HPI  Abdirahman Hope is a 89 y.o. male who presents with chief complaint of painful thick nails on both feet.  No change in his medical history or medications since his last visit and he is in fair spirits  He is now using a wheelchair to get around long distances.  His wife was present in the room for today's visitPatient presents for at-risk foot care.  Patient has no acute concerns today.  Patient has significant lower extremity risk due to neuropathy, parasthesia, edema, and trophic skin changes to the lower extremity. .  Review of Systems  Medical History Reviewed by provider this encounter:     .  Medications Ordered Prior to Encounter[1]   Social History[2]     Objective   Pulse 58   Temp 97.9 °F (36.6 °C) (Temporal)   Resp 16   Ht 6' 1\" (1.854 m)   Wt 67.3 kg (148 lb 6.4 oz)   SpO2 97%   BMI 19.58 kg/m²      Physical Exam  Vascular exam is a faint 1/4 DP PT negative digital hair, " normal distal cooling, slightly delayed capillary refill with edema present bilaterally.       Derm nails are brittle elongated hypertrophic yellow discoloration with subungual debris x 9.  There is an increased thickness in the nails of approximately 1 to 3 mm.  here is loss of turgor of the skin and the skin appears to be thinning.     Ortho hammertoe deformities are present on the third and fourth digits bilaterally there is also pes planus noted bilaterally.  There is amputation of the left second toe at the MPJs.         [1]   Current Outpatient Medications on File Prior to Visit   Medication Sig Dispense Refill    acetaminophen (TYLENOL) 325 mg tablet Take 2 tablets (650 mg total) by mouth every 6 (six) hours as needed for mild pain, headaches or fever  0    ALPHA LIPOIC ACID-BIOTIN PO Take 1 tablet by mouth in the morning per bottle & pt/partner: 300mg 1 tab BID.      atorvastatin (LIPITOR) 10 mg tablet Take 1 tablet (10 mg total) by mouth every other day 45 tablet 3    B COMPLEX VITAMINS PO Take 1 tablet by mouth in the morning.      Calcium-Vitamin D-Vitamin K (CHEWABLE CALCIUM PO) Take 500 mg by mouth in the morning. not on AVS; per partner/bottle.      cetirizine (ZyrTEC) 10 mg tablet Take 1 tablet (10 mg total) by mouth in the morning 90 tablet 1    Cholecalciferol (VITAMIN D3) 2000 UNITS capsule Take 1 capsule by mouth in the morning and 1 capsule in the evening. 5000 units per partner/bottle.      Coenzyme Q10 (CO Q10) 60 MG CAPS Take 1 capsule by mouth in the morning. per pt/partner & bottle: 100mg 1 tab daily. .      gabapentin (NEURONTIN) 600 MG tablet Take 1 tablet (600 mg total) by mouth 3 (three) times a day 270 tablet 2    Ginkgo Biloba 120 MG TABS Take 1 tablet by mouth in the morning. pt taking and per bottle: 60mg 1 tab daily.      ibuprofen (MOTRIN) 600 mg tablet Take 1 tablet (600 mg total) by mouth every 8 (eight) hours as needed for moderate pain 100 tablet 1    Inositol 500 MG TABS Take  1 tablet by mouth in the morning and 1 tablet in the evening.      ipratropium (ATROVENT) 0.03 % nasal spray 2 sprays into each nostril 3 (three) times a day 150 mL 3    lutein 6 mg Take 1 capsule by mouth in the morning. per pt/bottle: 40mg 1 tab daily.      metoprolol tartrate (LOPRESSOR) 25 mg tablet Take 1 tablet (25 mg total) by mouth every 12 (twelve) hours 180 tablet 3    MILK THISTLE PO Take 1 tablet by mouth in the morning. 250mg per bottle in home.      Misc Natural Products (SUPER SNOOZE) CAPS Take 1 capsule by mouth in the morning. Bed time .      Multiple Vitamins-Minerals (OCUVITE-LUTEIN) CAPS Take 1 capsule by mouth in the morning. per partner--taking 1 tab BID.      Myrbetriq 25 MG TB24 Take 25 mg by mouth in the morning 30 tablet 3    omeprazole (PriLOSEC) 40 MG capsule Take 1 capsule (40 mg total) by mouth 2 (two) times a day 180 capsule 1    phenazopyridine (PYRIDIUM) 200 mg tablet Take 1 tablet (200 mg total) by mouth 3 (three) times a day as needed for bladder spasms 30 tablet 0    Propylene Glycol-Glycerin (Artificial Tears) 1-0.3 % SOLN Apply 2 drops to eye as needed (pt reports uses when gets shots in eyes) not on AVS; per pt report.   pt reports using PRN when getting shot in eye- using in either eye (B).      psyllium (METAMUCIL) 58.6 % packet Take 1 packet by mouth in the morning. unable to view bottle for further instructions.   per [facility] coding: add mix briskly in 8oz cold liquid.      sertraline (ZOLOFT) 50 mg tablet Take 1 tablet (50 mg total) by mouth daily 30 tablet 5    sildenafil (VIAGRA) 50 MG tablet Take by mouth      vitamin B-12 (VITAMIN B-12) 500 mcg tablet       Zinc 50 MG TABS Take 1 tablet by mouth in the morning.      Mirabegron ER (Myrbetriq) 50 MG TB24 Take 1 tablet (50 mg total) by mouth daily 30 tablet 11     No current facility-administered medications on file prior to visit.   [2]   Social History  Tobacco Use    Smoking status: Every Day     Current packs/day: 0.25      Average packs/day: 0.3 packs/day for 60.0 years (15.0 ttl pk-yrs)     Types: Cigarettes    Smokeless tobacco: Never    Tobacco comments:     smokes a pipe   Vaping Use    Vaping status: Never Used   Substance and Sexual Activity    Alcohol use: Yes     Alcohol/week: 2.0 standard drinks of alcohol     Types: 1 Glasses of wine, 1 Shots of liquor per week     Comment: Occ    Drug use: Never    Sexual activity: Not Currently

## 2025-08-15 ENCOUNTER — OFFICE VISIT (OUTPATIENT)
Dept: FAMILY MEDICINE CLINIC | Facility: CLINIC | Age: 89
End: 2025-08-15
Payer: MEDICARE

## 2025-08-15 PROBLEM — G89.29 CHRONIC EPIGASTRIC PAIN: Status: ACTIVE | Noted: 2024-07-01

## (undated) DEVICE — TUBING SUCTION 5MM X 12 FT

## (undated) DEVICE — NEEDLE 18 G X 1 1/2

## (undated) DEVICE — PACK TUR

## (undated) DEVICE — UROLOGIC DRAIN BAG: Brand: UNBRANDED

## (undated) DEVICE — URO CATCHER BAG STERILE 0-UC32

## (undated) DEVICE — RESECTOSCOPE LOOE ELECTRODE 27040F/6

## (undated) DEVICE — MAT FLOOR STEP DRI 24 X 36 IN N-STRL

## (undated) DEVICE — TRANSPOSAL ULTRAFLEX DUO/QUAD ULTRA CART MANIFOLD

## (undated) DEVICE — CATH FOLEY 18FR 5ML 2 WAY SILICONE ELASTIMER

## (undated) DEVICE — CHEMOTHERAPY CONTAINER,HINGED LID, YELLOW: Brand: SHARPSAFETY

## (undated) DEVICE — CURITY NON-ADHERENT STRIPS: Brand: CURITY

## (undated) DEVICE — GLOVE INDICATOR PI UNDERGLOVE SZ 7 BLUE

## (undated) DEVICE — CONMED SCOPE SAVER BITE BLOCK, 20X27 MM: Brand: SCOPE SAVER

## (undated) DEVICE — BETHLEHEM UNIVERSAL  MIONR EXT: Brand: CARDINAL HEALTH

## (undated) DEVICE — CATH URETERAL 5FR X 70 CM FLEX TIP POLYUR BARD

## (undated) DEVICE — SPECIMEN CONTAINER STERILE PEEL PACK

## (undated) DEVICE — COBAN 4 IN STERILE

## (undated) DEVICE — REM POLYHESIVE ADULT PATIENT RETURN ELECTRODE: Brand: VALLEYLAB

## (undated) DEVICE — FORCEPS BIOPSY ALLIGATOR JAW W/NEEDLE 2.8MM

## (undated) DEVICE — EXIDINE 4 PCT

## (undated) DEVICE — GUIDEWIRE STRGHT TIP 0.035 IN  SOLO PLUS

## (undated) DEVICE — GLOVE SRG BIOGEL ECLIPSE 7.5

## (undated) DEVICE — 10FR FRAZIER SUCTION HANDLE: Brand: CARDINAL HEALTH

## (undated) DEVICE — LUBRICANT SURGILUBE TUBE 4 OZ  FLIP TOP

## (undated) DEVICE — STERILE SURGICAL LUBRICANT,  TUBE: Brand: SURGILUBE

## (undated) DEVICE — Device: Brand: LEVEL 1

## (undated) DEVICE — INTENDED FOR TISSUE SEPARATION, AND OTHER PROCEDURES THAT REQUIRE A SHARP SURGICAL BLADE TO PUNCTURE OR CUT.: Brand: BARD-PARKER SAFETY BLADES SIZE 15, STERILE

## (undated) DEVICE — INTENDED FOR TISSUE SEPARATION, AND OTHER PROCEDURES THAT REQUIRE A SHARP SURGICAL BLADE TO PUNCTURE OR CUT.: Brand: BARD-PARKER ® CARBON RIB-BACK BLADES

## (undated) DEVICE — LUBRICANT JELLY SURGILUBE TUBE 4OZ FLIP TOP

## (undated) DEVICE — BASIC SINGLE BASIN-LF: Brand: MEDLINE INDUSTRIES, INC.

## (undated) DEVICE — THE EXACTO COLD SNARE IS INTENDED TO BE USED WITHOUT DIATHERMIC ENERGY FOR THE ENDOSCOPIC RESECTION OF POLYP TISSUE IN THE GASTROINTESTINAL TRACT.: Brand: EXACTO

## (undated) DEVICE — SCD SEQUENTIAL COMPRESSION COMFORT SLEEVE MEDIUM KNEE LENGTH: Brand: KENDALL SCD

## (undated) DEVICE — 2000CC GUARDIAN II: Brand: GUARDIAN

## (undated) DEVICE — GLOVE SRG BIOGEL 7

## (undated) DEVICE — CURITY STRETCH BANDAGE: Brand: CURITY

## (undated) DEVICE — SUT ETHILON 4-0 PS-2 18 IN 1667H

## (undated) DEVICE — RESECTOSCOPE BALL ELECTRODE 24 FR 27040N/6

## (undated) DEVICE — GLOVE SRG BIOGEL 8

## (undated) DEVICE — CHLORHEXIDINE 4PCT 4 OZ

## (undated) DEVICE — SPONGE STICK WITH PVP-I: Brand: KENDALL

## (undated) DEVICE — 4-PORT MANIFOLD: Brand: NEPTUNE 2

## (undated) DEVICE — Device: Brand: OLYMPUS

## (undated) DEVICE — DRESSING TELFA 2 X 3 IN STRL

## (undated) DEVICE — GLOVE INDICATOR PI UNDERGLOVE SZ 7.5 BLUE

## (undated) DEVICE — BAG URINE DRAINAGE 2000ML ANTI RFLX LF

## (undated) DEVICE — BASIC SINGLE BASIN 2-LF: Brand: MEDLINE INDUSTRIES, INC.

## (undated) DEVICE — NEEDLE 25G X 1 1/2

## (undated) DEVICE — CATH FOLEY 16FR 5ML 2WAY LUBRICATH

## (undated) DEVICE — CATHETER ADAPTER: Brand: ADDTO